# Patient Record
Sex: MALE | Race: BLACK OR AFRICAN AMERICAN | NOT HISPANIC OR LATINO | Employment: OTHER | URBAN - METROPOLITAN AREA
[De-identification: names, ages, dates, MRNs, and addresses within clinical notes are randomized per-mention and may not be internally consistent; named-entity substitution may affect disease eponyms.]

---

## 2017-01-27 ENCOUNTER — HOSPITAL ENCOUNTER (OUTPATIENT)
Dept: PHYSICAL THERAPY | Facility: HOSPITAL | Age: 57
Setting detail: RECURRING SERIES
Discharge: HOME | End: 2017-04-04
Attending: NEUROLOGICAL SURGERY | Admitting: NEUROLOGICAL SURGERY

## 2017-05-05 ENCOUNTER — HOSPITAL ENCOUNTER (OUTPATIENT)
Dept: URGENT CARE | Facility: CLINIC | Age: 57
Setting detail: SPECIMEN
Discharge: HOME | End: 2017-05-05
Attending: FAMILY MEDICINE | Admitting: FAMILY MEDICINE

## 2017-05-05 ENCOUNTER — HOSPITAL ENCOUNTER (OUTPATIENT)
Dept: URGENT CARE | Facility: CLINIC | Age: 57
Discharge: HOME | End: 2017-05-05
Attending: FAMILY MEDICINE | Admitting: FAMILY MEDICINE

## 2017-05-05 LAB
ERYTHROCYTE [DISTWIDTH] IN BLOOD BY AUTOMATED COUNT: 14.1 % (ref 11.5–14.5)
HCT VFR BLD AUTO: 43.3 % (ref 40–54)
HGB BLD-MCNC: 14.4 G/DL (ref 14–18)
MCH RBC QN AUTO: 32.4 PG (ref 26–32)
MCHC RBC AUTO-ENTMCNC: 33.4 G/DL (ref 32–36)
MCV RBC AUTO: 97.1 FL (ref 80–94)
PLATELET # BLD AUTO: 141 10*3/UL (ref 150–450)
PMV BLD AUTO: 10.7 FL (ref 7.4–10.4)
RBC # BLD AUTO: 4.46 10*6/UL (ref 4.6–6)
WBC # BLD AUTO: 10.7 10*3/UL (ref 4.5–11.5)

## 2017-05-08 ENCOUNTER — HOSPITAL ENCOUNTER (OUTPATIENT)
Dept: FAMILY MEDICINE CLINIC | Facility: CLINIC | Age: 57
Setting detail: SPECIMEN
Discharge: HOME | End: 2017-05-08
Attending: PHYSICIAN ASSISTANT | Admitting: PHYSICIAN ASSISTANT

## 2017-05-08 LAB
ALBUMIN SERPL-MCNC: 4.2 G/DL (ref 3.5–4.8)
ALBUMIN/GLOB SERPL: 1.1 {RATIO} (ref 1–1.7)
ALP SERPL-CCNC: 82 IU/L (ref 32–91)
ALT SERPL-CCNC: 71 IU/L (ref 17–63)
ANION GAP SERPL CALC-SCNC: 15.7 MMOL/L (ref 10–20)
AST SERPL-CCNC: 48 IU/L (ref 15–41)
BASOPHILS # BLD AUTO: 0 10*3/UL (ref 0–0.2)
BASOPHILS NFR BLD AUTO: 1 % (ref 0–2)
BILIRUB SERPL-MCNC: 0.6 MG/DL (ref 0.3–1.2)
BUN SERPL-MCNC: 11 MG/DL (ref 8–20)
BUN/CREAT SERPL: 10 (ref 6.2–20.3)
CALCIUM SERPL-MCNC: 9.3 MG/DL (ref 8.9–10.3)
CHLORIDE SERPL-SCNC: 106 MMOL/L (ref 101–111)
CHOLEST SERPL-MCNC: 202 MG/DL
CHOLEST/HDLC SERPL: 4.6 {RATIO}
CONV CO2: 26 MMOL/L (ref 22–32)
CONV LDL CHOLESTEROL DIRECT: 134 MG/DL (ref 0–100)
CONV TOTAL PROTEIN: 8 G/DL (ref 6.1–7.9)
CREAT UR-MCNC: 1.1 MG/DL (ref 0.7–1.2)
DIFFERENTIAL METHOD BLD: (no result)
EOSINOPHIL # BLD AUTO: 0.1 10*3/UL (ref 0–0.3)
EOSINOPHIL # BLD AUTO: 1 % (ref 0–3)
ERYTHROCYTE [DISTWIDTH] IN BLOOD BY AUTOMATED COUNT: 14.1 % (ref 11.5–14.5)
FOLATE SERPL-MCNC: 23.3 NG/ML (ref 5.9–24.8)
GLOBULIN UR ELPH-MCNC: 3.8 G/DL (ref 2.5–3.8)
GLUCOSE SERPL-MCNC: 98 MG/DL (ref 65–99)
HCT VFR BLD AUTO: 44.9 % (ref 40–54)
HDLC SERPL-MCNC: 44 MG/DL
HGB BLD-MCNC: 14.7 G/DL (ref 14–18)
LDLC/HDLC SERPL: 3.1 {RATIO}
LIPID INTERPRETATION: ABNORMAL
LYMPHOCYTES # BLD AUTO: 1.7 10*3/UL (ref 0.8–4.8)
LYMPHOCYTES NFR BLD AUTO: 29 % (ref 18–42)
MAGNESIUM SERPL-MCNC: 2 MG/DL (ref 1.8–2.5)
MCH RBC QN AUTO: 32.3 PG (ref 26–32)
MCHC RBC AUTO-ENTMCNC: 32.8 G/DL (ref 32–36)
MCV RBC AUTO: 98.5 FL (ref 80–94)
MONOCYTES # BLD AUTO: 0.4 10*3/UL (ref 0.1–1.3)
MONOCYTES NFR BLD AUTO: 6 % (ref 2–11)
NEUTROPHILS # BLD AUTO: 3.8 10*3/UL (ref 2.3–8.6)
NEUTROPHILS NFR BLD AUTO: 63 % (ref 50–75)
NRBC BLD AUTO-RTO: 0 /100{WBCS}
NRBC/RBC NFR BLD MANUAL: 0 10*3/UL
PLATELET # BLD AUTO: 152 10*3/UL (ref 150–450)
PMV BLD AUTO: 10 FL (ref 7.4–10.4)
POTASSIUM SERPL-SCNC: 3.7 MMOL/L (ref 3.6–5.1)
PSA SERPL-MCNC: 1.4 NG/ML (ref 0–4)
RBC # BLD AUTO: 4.56 10*6/UL (ref 4.6–6)
SODIUM SERPL-SCNC: 144 MMOL/L (ref 136–144)
TRIGL SERPL-MCNC: 78 MG/DL
TSH SERPL-ACNC: 1.28 UIU/ML (ref 0.34–5.6)
VIT B12 SERPL-MCNC: 241 PG/ML (ref 180–914)
VLDLC SERPL CALC-MCNC: 24.1 MG/DL
WBC # BLD AUTO: 6 10*3/UL (ref 4.5–11.5)

## 2017-07-28 ENCOUNTER — HOSPITAL ENCOUNTER (OUTPATIENT)
Dept: FAMILY MEDICINE CLINIC | Facility: CLINIC | Age: 57
Setting detail: SPECIMEN
Discharge: HOME | End: 2017-07-28
Attending: PHYSICIAN ASSISTANT | Admitting: PHYSICIAN ASSISTANT

## 2017-07-28 LAB
ALBUMIN SERPL-MCNC: 4.2 G/DL (ref 3.5–4.8)
ALBUMIN/GLOB SERPL: 1.2 {RATIO} (ref 1–1.7)
ALP SERPL-CCNC: 64 IU/L (ref 32–91)
ALT SERPL-CCNC: 39 IU/L (ref 17–63)
ANION GAP SERPL CALC-SCNC: 14.2 MMOL/L (ref 10–20)
AST SERPL-CCNC: 22 IU/L (ref 15–41)
BILIRUB SERPL-MCNC: 0.5 MG/DL (ref 0.3–1.2)
BUN SERPL-MCNC: 15 MG/DL (ref 8–20)
BUN/CREAT SERPL: 13.6 (ref 6.2–20.3)
CALCIUM SERPL-MCNC: 9.1 MG/DL (ref 8.9–10.3)
CHLORIDE SERPL-SCNC: 105 MMOL/L (ref 101–111)
CONV CO2: 25 MMOL/L (ref 22–32)
CONV TOTAL PROTEIN: 7.6 G/DL (ref 6.1–7.9)
CREAT UR-MCNC: 1.1 MG/DL (ref 0.7–1.2)
GLOBULIN UR ELPH-MCNC: 3.4 G/DL (ref 2.5–3.8)
GLUCOSE SERPL-MCNC: 116 MG/DL (ref 65–99)
MAGNESIUM SERPL-MCNC: 2.2 MG/DL (ref 1.8–2.5)
POTASSIUM SERPL-SCNC: 4.2 MMOL/L (ref 3.6–5.1)
SODIUM SERPL-SCNC: 140 MMOL/L (ref 136–144)
VIT B12 SERPL-MCNC: 169 PG/ML (ref 180–914)

## 2017-08-08 ENCOUNTER — HOSPITAL ENCOUNTER (OUTPATIENT)
Dept: GENERAL RADIOLOGY | Facility: HOSPITAL | Age: 57
Discharge: HOME | End: 2017-08-08
Attending: PHYSICIAN ASSISTANT | Admitting: PHYSICIAN ASSISTANT

## 2017-11-20 ENCOUNTER — HOSPITAL ENCOUNTER (OUTPATIENT)
Dept: FAMILY MEDICINE CLINIC | Facility: CLINIC | Age: 57
Setting detail: SPECIMEN
Discharge: HOME | End: 2017-11-20
Attending: FAMILY MEDICINE | Admitting: FAMILY MEDICINE

## 2017-11-20 LAB
BILIRUB UR QL STRIP: NEGATIVE MG/DL
CASTS URNS QL MICRO: ABNORMAL /[LPF]
COLOR UR: ABNORMAL
CONV BACTERIA IN URINE MICRO: NEGATIVE
CONV CLARITY OF URINE: CLEAR
CONV HYALINE CASTS IN URINE MICRO: 3 /[LPF] (ref 0–5)
CONV PROTEIN IN URINE BY AUTOMATED TEST STRIP: NEGATIVE MG/DL
CONV SMALL ROUND CELLS: ABNORMAL /[HPF]
CONV UROBILINOGEN IN URINE BY AUTOMATED TEST STRIP: 1 MG/DL
CULTURE INDICATED?: ABNORMAL
GLUCOSE UR QL: NEGATIVE MG/DL
HGB UR QL STRIP: NEGATIVE
KETONES UR QL STRIP: NEGATIVE MG/DL
LEUKOCYTE ESTERASE UR QL STRIP: ABNORMAL
NITRITE UR QL STRIP: NEGATIVE
PH UR STRIP.AUTO: 5.5 [PH] (ref 4.5–8)
RBC #/AREA URNS HPF: 1 /[HPF] (ref 0–3)
SP GR UR: 1.02 (ref 1–1.03)
SPERM URNS QL MICRO: ABNORMAL /[HPF]
SQUAMOUS SPT QL MICRO: 1 /[HPF] (ref 0–5)
UNIDENT CRYS URNS QL MICRO: ABNORMAL /[HPF]
WBC #/AREA URNS HPF: 13 /[HPF] (ref 0–5)
YEAST SPEC QL WET PREP: ABNORMAL /[HPF]

## 2018-01-17 ENCOUNTER — HOSPITAL ENCOUNTER (OUTPATIENT)
Dept: OTHER | Facility: HOSPITAL | Age: 58
Discharge: HOME | End: 2018-01-17
Attending: PHYSICIAN ASSISTANT | Admitting: PHYSICIAN ASSISTANT

## 2018-01-17 ENCOUNTER — HOSPITAL ENCOUNTER (OUTPATIENT)
Dept: FAMILY MEDICINE CLINIC | Facility: CLINIC | Age: 58
Setting detail: SPECIMEN
Discharge: HOME | End: 2018-01-17
Attending: PHYSICIAN ASSISTANT | Admitting: PHYSICIAN ASSISTANT

## 2018-01-17 LAB
ALBUMIN SERPL-MCNC: 4.1 G/DL (ref 3.5–4.8)
ALBUMIN/GLOB SERPL: 1.2 {RATIO} (ref 1–1.7)
ALP SERPL-CCNC: 60 IU/L (ref 32–91)
ALT SERPL-CCNC: 49 IU/L (ref 17–63)
ANION GAP SERPL CALC-SCNC: 11.7 MMOL/L (ref 10–20)
AST SERPL-CCNC: 35 IU/L (ref 15–41)
BASOPHILS # BLD AUTO: 0 10*3/UL (ref 0–0.2)
BASOPHILS NFR BLD AUTO: 1 % (ref 0–2)
BILIRUB SERPL-MCNC: 0.6 MG/DL (ref 0.3–1.2)
BUN SERPL-MCNC: 7 MG/DL (ref 8–20)
BUN/CREAT SERPL: 7.8 (ref 6.2–20.3)
CALCIUM SERPL-MCNC: 9.5 MG/DL (ref 8.9–10.3)
CHLORIDE SERPL-SCNC: 106 MMOL/L (ref 101–111)
CONV CO2: 27 MMOL/L (ref 22–32)
CONV TOTAL PROTEIN: 7.5 G/DL (ref 6.1–7.9)
CREAT UR-MCNC: 0.9 MG/DL (ref 0.7–1.2)
DIFFERENTIAL METHOD BLD: (no result)
EOSINOPHIL # BLD AUTO: 0.1 10*3/UL (ref 0–0.3)
EOSINOPHIL # BLD AUTO: 1 % (ref 0–3)
ERYTHROCYTE [DISTWIDTH] IN BLOOD BY AUTOMATED COUNT: 13.7 % (ref 11.5–14.5)
GLOBULIN UR ELPH-MCNC: 3.4 G/DL (ref 2.5–3.8)
GLUCOSE SERPL-MCNC: 85 MG/DL (ref 65–99)
HCT VFR BLD AUTO: 46.2 % (ref 40–54)
HGB BLD-MCNC: 15.6 G/DL (ref 14–18)
LYMPHOCYTES # BLD AUTO: 2.2 10*3/UL (ref 0.8–4.8)
LYMPHOCYTES NFR BLD AUTO: 36 % (ref 18–42)
MAGNESIUM SERPL-MCNC: 2.1 MG/DL (ref 1.8–2.5)
MCH RBC QN AUTO: 32.1 PG (ref 26–32)
MCHC RBC AUTO-ENTMCNC: 33.7 G/DL (ref 32–36)
MCV RBC AUTO: 95.2 FL (ref 80–94)
MONOCYTES # BLD AUTO: 0.4 10*3/UL (ref 0.1–1.3)
MONOCYTES NFR BLD AUTO: 6 % (ref 2–11)
NEUTROPHILS # BLD AUTO: 3.4 10*3/UL (ref 2.3–8.6)
NEUTROPHILS NFR BLD AUTO: 56 % (ref 50–75)
NRBC BLD AUTO-RTO: 0 /100{WBCS}
NRBC/RBC NFR BLD MANUAL: 0 10*3/UL
PLATELET # BLD AUTO: 151 10*3/UL (ref 150–450)
PMV BLD AUTO: 10.6 FL (ref 7.4–10.4)
POTASSIUM SERPL-SCNC: 3.7 MMOL/L (ref 3.6–5.1)
RBC # BLD AUTO: 4.85 10*6/UL (ref 4.6–6)
SODIUM SERPL-SCNC: 141 MMOL/L (ref 136–144)
WBC # BLD AUTO: 6.1 10*3/UL (ref 4.5–11.5)

## 2018-01-22 ENCOUNTER — HOSPITAL ENCOUNTER (OUTPATIENT)
Dept: GENERAL RADIOLOGY | Facility: HOSPITAL | Age: 58
Discharge: HOME | End: 2018-01-22
Attending: PHYSICIAN ASSISTANT | Admitting: PHYSICIAN ASSISTANT

## 2018-02-20 ENCOUNTER — HOSPITAL ENCOUNTER (OUTPATIENT)
Dept: FAMILY MEDICINE CLINIC | Facility: CLINIC | Age: 58
Setting detail: SPECIMEN
Discharge: HOME | End: 2018-02-20
Attending: FAMILY MEDICINE | Admitting: FAMILY MEDICINE

## 2018-02-20 LAB
ALBUMIN SERPL-MCNC: 3.8 G/DL (ref 3.5–4.8)
ALBUMIN/GLOB SERPL: 1.2 {RATIO} (ref 1–1.7)
ALP SERPL-CCNC: 59 IU/L (ref 32–91)
ALT SERPL-CCNC: 26 IU/L (ref 17–63)
ANION GAP SERPL CALC-SCNC: 9.8 MMOL/L (ref 10–20)
AST SERPL-CCNC: 23 IU/L (ref 15–41)
BASOPHILS # BLD AUTO: 0 10*3/UL (ref 0–0.2)
BASOPHILS NFR BLD AUTO: 1 % (ref 0–2)
BILIRUB SERPL-MCNC: 0.4 MG/DL (ref 0.3–1.2)
BUN SERPL-MCNC: 9 MG/DL (ref 8–20)
BUN/CREAT SERPL: 8.2 (ref 6.2–20.3)
CALCIUM SERPL-MCNC: 8.9 MG/DL (ref 8.9–10.3)
CHLORIDE SERPL-SCNC: 108 MMOL/L (ref 101–111)
CHOLEST SERPL-MCNC: 214 MG/DL
CHOLEST/HDLC SERPL: 4.5 {RATIO}
CONV CO2: 27 MMOL/L (ref 22–32)
CONV LDL CHOLESTEROL DIRECT: 144 MG/DL (ref 0–100)
CONV TOTAL PROTEIN: 6.9 G/DL (ref 6.1–7.9)
CREAT UR-MCNC: 1.1 MG/DL (ref 0.7–1.2)
DIFFERENTIAL METHOD BLD: (no result)
EOSINOPHIL # BLD AUTO: 0.1 10*3/UL (ref 0–0.3)
EOSINOPHIL # BLD AUTO: 2 % (ref 0–3)
ERYTHROCYTE [DISTWIDTH] IN BLOOD BY AUTOMATED COUNT: 14.8 % (ref 11.5–14.5)
GLOBULIN UR ELPH-MCNC: 3.1 G/DL (ref 2.5–3.8)
GLUCOSE SERPL-MCNC: 107 MG/DL (ref 65–99)
HCT VFR BLD AUTO: 44.5 % (ref 40–54)
HDLC SERPL-MCNC: 48 MG/DL
HGB BLD-MCNC: 15.1 G/DL (ref 14–18)
LDLC/HDLC SERPL: 3 {RATIO}
LIPID INTERPRETATION: ABNORMAL
LYMPHOCYTES # BLD AUTO: 1.8 10*3/UL (ref 0.8–4.8)
LYMPHOCYTES NFR BLD AUTO: 28 % (ref 18–42)
MCH RBC QN AUTO: 33.1 PG (ref 26–32)
MCHC RBC AUTO-ENTMCNC: 34 G/DL (ref 32–36)
MCV RBC AUTO: 97.1 FL (ref 80–94)
MONOCYTES # BLD AUTO: 0.3 10*3/UL (ref 0.1–1.3)
MONOCYTES NFR BLD AUTO: 5 % (ref 2–11)
NEUTROPHILS # BLD AUTO: 4 10*3/UL (ref 2.3–8.6)
NEUTROPHILS NFR BLD AUTO: 64 % (ref 50–75)
NRBC BLD AUTO-RTO: 0 /100{WBCS}
NRBC/RBC NFR BLD MANUAL: 0 10*3/UL
PLATELET # BLD AUTO: 151 10*3/UL (ref 150–450)
PMV BLD AUTO: 10.5 FL (ref 7.4–10.4)
POTASSIUM SERPL-SCNC: 3.8 MMOL/L (ref 3.6–5.1)
RBC # BLD AUTO: 4.58 10*6/UL (ref 4.6–6)
SODIUM SERPL-SCNC: 141 MMOL/L (ref 136–144)
TRIGL SERPL-MCNC: 159 MG/DL
VLDLC SERPL CALC-MCNC: 22.1 MG/DL
WBC # BLD AUTO: 6.3 10*3/UL (ref 4.5–11.5)

## 2018-05-29 ENCOUNTER — HOSPITAL ENCOUNTER (OUTPATIENT)
Dept: GENERAL RADIOLOGY | Facility: HOSPITAL | Age: 58
Discharge: HOME | End: 2018-05-29
Attending: NURSE PRACTITIONER | Admitting: NURSE PRACTITIONER

## 2018-06-20 ENCOUNTER — HOSPITAL ENCOUNTER (OUTPATIENT)
Dept: PHYSICAL THERAPY | Facility: HOSPITAL | Age: 58
Setting detail: RECURRING SERIES
Discharge: HOME | End: 2018-08-09
Attending: NURSE PRACTITIONER | Admitting: NURSE PRACTITIONER

## 2018-11-13 ENCOUNTER — HOSPITAL ENCOUNTER (OUTPATIENT)
Dept: FAMILY MEDICINE CLINIC | Facility: CLINIC | Age: 58
Setting detail: SPECIMEN
Discharge: HOME | End: 2018-11-13
Attending: NURSE PRACTITIONER | Admitting: NURSE PRACTITIONER

## 2018-11-13 LAB
BACTERIA SPEC AEROBE CULT: NORMAL
BACTERIA SPEC AEROBE CULT: NORMAL
C TRACH RRNA SPEC QL PROBE: NORMAL
COLONY COUNT: NORMAL
Lab: NORMAL
MICRO REPORT STATUS: NORMAL
N GONORRHOEA RRNA SPEC QL PROBE: NORMAL
SPECIMEN SOURCE: NORMAL
SPECIMEN SOURCE: NORMAL

## 2018-11-15 ENCOUNTER — HOSPITAL ENCOUNTER (OUTPATIENT)
Dept: ULTRASOUND IMAGING | Facility: HOSPITAL | Age: 58
Discharge: HOME | End: 2018-11-15
Attending: NURSE PRACTITIONER | Admitting: NURSE PRACTITIONER

## 2018-12-31 ENCOUNTER — HOSPITAL ENCOUNTER (OUTPATIENT)
Dept: URGENT CARE | Facility: CLINIC | Age: 58
Setting detail: SPECIMEN
Discharge: HOME | End: 2018-12-31
Attending: FAMILY MEDICINE | Admitting: FAMILY MEDICINE

## 2019-01-04 ENCOUNTER — HOSPITAL ENCOUNTER (OUTPATIENT)
Dept: PHYSICAL THERAPY | Facility: HOSPITAL | Age: 59
Setting detail: RECURRING SERIES
Discharge: HOME | End: 2019-02-12
Attending: NURSE PRACTITIONER | Admitting: NURSE PRACTITIONER

## 2019-02-04 ENCOUNTER — HOSPITAL ENCOUNTER (OUTPATIENT)
Dept: CT IMAGING | Facility: HOSPITAL | Age: 59
Discharge: HOME | End: 2019-02-04
Attending: NURSE PRACTITIONER | Admitting: NURSE PRACTITIONER

## 2019-02-04 LAB — CREAT BLDA-MCNC: 1 MG/DL (ref 0.6–1.3)

## 2019-02-08 ENCOUNTER — HOSPITAL ENCOUNTER (OUTPATIENT)
Dept: FAMILY MEDICINE CLINIC | Facility: CLINIC | Age: 59
Setting detail: SPECIMEN
Discharge: HOME | End: 2019-02-08
Attending: FAMILY MEDICINE | Admitting: FAMILY MEDICINE

## 2019-02-08 LAB
ALBUMIN SERPL-MCNC: 4.1 G/DL (ref 3.5–4.8)
ALBUMIN/GLOB SERPL: 1.2 {RATIO} (ref 1–1.7)
ALP SERPL-CCNC: 71 IU/L (ref 32–91)
ALT SERPL-CCNC: 60 IU/L (ref 17–63)
ANION GAP SERPL CALC-SCNC: 11.6 MMOL/L (ref 10–20)
AST SERPL-CCNC: 27 IU/L (ref 15–41)
BASOPHILS # BLD AUTO: 0 10*3/UL (ref 0–0.2)
BASOPHILS NFR BLD AUTO: 1 % (ref 0–2)
BILIRUB SERPL-MCNC: 0.6 MG/DL (ref 0.3–1.2)
BUN SERPL-MCNC: 12 MG/DL (ref 8–20)
BUN/CREAT SERPL: 12 (ref 6.2–20.3)
CALCIUM SERPL-MCNC: 9.1 MG/DL (ref 8.9–10.3)
CHLORIDE SERPL-SCNC: 107 MMOL/L (ref 101–111)
CHOLEST SERPL-MCNC: 220 MG/DL
CHOLEST/HDLC SERPL: 5.2 {RATIO}
CONV CO2: 25 MMOL/L (ref 22–32)
CONV LDL CHOLESTEROL DIRECT: 157 MG/DL (ref 0–100)
CONV TOTAL PROTEIN: 7.6 G/DL (ref 6.1–7.9)
CREAT UR-MCNC: 1 MG/DL (ref 0.7–1.2)
DIFFERENTIAL METHOD BLD: (no result)
EOSINOPHIL # BLD AUTO: 0.1 10*3/UL (ref 0–0.3)
EOSINOPHIL # BLD AUTO: 2 % (ref 0–3)
ERYTHROCYTE [DISTWIDTH] IN BLOOD BY AUTOMATED COUNT: 14.5 % (ref 11.5–14.5)
GLOBULIN UR ELPH-MCNC: 3.5 G/DL (ref 2.5–3.8)
GLUCOSE SERPL-MCNC: 84 MG/DL (ref 65–99)
HCT VFR BLD AUTO: 45.2 % (ref 40–54)
HDLC SERPL-MCNC: 43 MG/DL
HGB BLD-MCNC: 15.1 G/DL (ref 14–18)
LDLC/HDLC SERPL: 3.7 {RATIO}
LIPID INTERPRETATION: ABNORMAL
LYMPHOCYTES # BLD AUTO: 2 10*3/UL (ref 0.8–4.8)
LYMPHOCYTES NFR BLD AUTO: 34 % (ref 18–42)
MCH RBC QN AUTO: 33.4 PG (ref 26–32)
MCHC RBC AUTO-ENTMCNC: 33.5 G/DL (ref 32–36)
MCV RBC AUTO: 99.7 FL (ref 80–94)
MONOCYTES # BLD AUTO: 0.4 10*3/UL (ref 0.1–1.3)
MONOCYTES NFR BLD AUTO: 7 % (ref 2–11)
NEUTROPHILS # BLD AUTO: 3.3 10*3/UL (ref 2.3–8.6)
NEUTROPHILS NFR BLD AUTO: 56 % (ref 50–75)
NRBC BLD AUTO-RTO: 0 /100{WBCS}
NRBC/RBC NFR BLD MANUAL: 0 10*3/UL
PLATELET # BLD AUTO: 165 10*3/UL (ref 150–450)
PMV BLD AUTO: 10.2 FL (ref 7.4–10.4)
POTASSIUM SERPL-SCNC: 3.6 MMOL/L (ref 3.6–5.1)
RBC # BLD AUTO: 4.53 10*6/UL (ref 4.6–6)
SODIUM SERPL-SCNC: 140 MMOL/L (ref 136–144)
TRIGL SERPL-MCNC: 163 MG/DL
VLDLC SERPL CALC-MCNC: 20 MG/DL
WBC # BLD AUTO: 5.9 10*3/UL (ref 4.5–11.5)

## 2019-07-02 ENCOUNTER — OFFICE VISIT (OUTPATIENT)
Dept: FAMILY MEDICINE CLINIC | Facility: CLINIC | Age: 59
End: 2019-07-02

## 2019-07-02 VITALS
RESPIRATION RATE: 18 BRPM | TEMPERATURE: 98.1 F | WEIGHT: 221.4 LBS | HEIGHT: 71 IN | OXYGEN SATURATION: 97 % | BODY MASS INDEX: 31 KG/M2 | HEART RATE: 81 BPM | DIASTOLIC BLOOD PRESSURE: 89 MMHG | SYSTOLIC BLOOD PRESSURE: 132 MMHG

## 2019-07-02 DIAGNOSIS — M25.511 ACUTE PAIN OF RIGHT SHOULDER: Primary | ICD-10-CM

## 2019-07-02 DIAGNOSIS — M25.511 RIGHT SHOULDER PAIN, UNSPECIFIED CHRONICITY: ICD-10-CM

## 2019-07-02 PROBLEM — I10 BENIGN ESSENTIAL HYPERTENSION: Status: ACTIVE | Noted: 2017-05-08

## 2019-07-02 PROBLEM — G89.29 CHRONIC LOW BACK PAIN: Status: ACTIVE | Noted: 2017-08-17

## 2019-07-02 PROBLEM — G62.9 PERIPHERAL NEUROPATHY: Status: ACTIVE | Noted: 2017-05-08

## 2019-07-02 PROBLEM — E53.8 VITAMIN B12 DEFICIENCY: Status: ACTIVE | Noted: 2017-05-09

## 2019-07-02 PROBLEM — F41.9 ANXIETY: Status: ACTIVE | Noted: 2019-01-24

## 2019-07-02 PROBLEM — J44.9 CHRONIC OBSTRUCTIVE PULMONARY DISEASE: Status: ACTIVE | Noted: 2019-02-08

## 2019-07-02 PROBLEM — E78.5 HYPERLIPIDEMIA: Status: ACTIVE | Noted: 2017-05-09

## 2019-07-02 PROBLEM — M54.50 CHRONIC LOW BACK PAIN: Status: ACTIVE | Noted: 2017-08-17

## 2019-07-02 PROBLEM — M50.322 OTHER CERVICAL DISC DEGENERATION AT C5-C6 LEVEL: Status: ACTIVE | Noted: 2018-08-22

## 2019-07-02 PROBLEM — M51.24 THORACIC DISC HERNIATION: Status: ACTIVE | Noted: 2018-08-22

## 2019-07-02 PROCEDURE — 99213 OFFICE O/P EST LOW 20 MIN: CPT | Performed by: NURSE PRACTITIONER

## 2019-07-02 NOTE — PROGRESS NOTES
Subjective   Jagdish eRa is a 58 y.o. male.     Chief Complaint   Patient presents with   • Shoulder Pain     Right. Unsure if he injured. Limited ROM.        HPI  3 weeks ago started having pain right shoulder. He does not know of any injury never injured in past he is right handed.   Limited rom. He has taken ibuprofen.not helping. No physical therapy on this shoulder but had on right side and neck.     The following portions of the patient's history were reviewed and updated as appropriate: allergies, current medications, past family history, past medical history, past social history, past surgical history and problem list.    No current outpatient medications on file.    Recent Results (from the past 4032 hour(s))   POC Creatinine    Collection Time: 02/04/19  7:08 PM   Result Value Ref Range    Creatinine, Arterial 1.0 0.6 - 1.3 mg/dL    GFR MDRD Non African American >60 >60 mL/min/1.73m2    eGFR African Am >60 >60 mL/min/1.73m2   CBC & Differential    Collection Time: 02/08/19 11:10 AM   Result Value Ref Range    WBC 5.9 4.5 - 11.5 10*3/uL    RBC 4.53 (L) 4.60 - 6.00 10*6/uL    Hemoglobin 15.1 14.0 - 18.0 g/dL    Hematocrit 45.2 40 - 54 %    MCV 99.7 (H) 80 - 94 fL    MCH 33.4 (H) 26 - 32 pg    MCHC 33.5 32 - 36 g/dL    RDW 14.5 11.5 - 14.5 %    Platelets 165 150 - 450 10*3/uL    MPV 10.2 7.4 - 10.4 fL    Differential Type AUTO     Neutrophils Absolute 3.3 2.3 - 8.6 10*3/uL    Lymphocytes Absolute 2.0 0.8 - 4.8 10*3/uL    Monocytes Absolute 0.4 0.1 - 1.3 10*3/uL    Eosinophils Absolute 0.1 0.0 - 0.3 10*3/uL    Basophils Absolute 0.0 0 - 0.2 10*3/uL    Neutrophil Rel % 56 50 - 75 %    Lymphocyte Rel % 34 18 - 42 %    Monocyte Rel % 7 2 - 11 %    Eosinophil Rel % 2 0 - 3 %    Basophil Rel % 1 0 - 2 %    nRBC 0 0 /100[WBCs]    Absolute nRBC 0 10*3/uL   Lipid Panel    Collection Time: 02/08/19 11:10 AM   Result Value Ref Range    Total Cholesterol 220 (H) <200 mg/dL    Triglycerides 163 (H) <150 mg/dL    HDL  Cholesterol 43 >39 mg/dL    LDL Cholesterol  157 (H) 0 - 100 mg/dL    VLDL Cholesterol 20.0 <21 mg/dL    LDL/HDL Ratio 3.7     Chol/HDL Ratio 5.2     Lipid Interpretation       Total Cholesterol, Total Triglycerides, LDL Cholesterol,and HDL Cholesterol:     Lipid Interpretation       TOTAL CHOLESTEROL                    HDL CHOLESTEROL  Desirable:               Lipid Interpretation       <200 mg/dL     Low HDL:            <40 mg/dL  Borderline High:   200-239 mg/dL     Lipid Interpretation          Normal:           40-60 mg/dL  High:           > or = 240 mg/dL        Lipid Interpretation       Desirable:          >60 mg/dL  TOTAL TRIGLYCERIDE                   LDL    Lipid Interpretation       CHOLESTEROL  Normal:               <150 mg/dL     Optimal:           <100 mg/dL    Lipid Interpretation        Borderline High:   150-199 mg/dL     Low Risk:       100-129 mg/dL  High:         Lipid Interpretation               200-499 mg/dL     Borderline High:130-159 mg/dL  Very High:      > or =    Lipid Interpretation       500 mg/dL     High:           160-189 mg/dL                                        Lipid Interpretation         Very High:   > or = 190 mg/dL  The following ratios of LDL to HDL and Total    Lipid Interpretation       Cholesterol to HDL are for information only:  LDL/HDL RATIO                        Lipid Interpretation          TOTAL CHOLESTEROL/HDL RATIO  Desirable:              <5           Low Risk:     Lipid Interpretation            3.3-4.4   Optimal:        < or = 3.5           Average Risk:   4.4-7.1        Lipid Interpretation                                          Medium Risk:    7.1-11                          Lipid Interpretation                   High Risk:         >11       Comprehensive Metabolic Panel    Collection Time: 02/08/19 11:10 AM   Result Value Ref Range    Sodium 140 136 - 144 mmol/L    Potassium 3.6 3.6 - 5.1 mmol/L    Chloride 107 101 - 111 mmol/L    CO2 25 22 - 32 mmol/L  "   Glucose 84 65 - 99 mg/dL    BUN 12 8 - 20 mg/dL    Creatinine 1.0 0.7 - 1.2 mg/dl    Calcium 9.1 8.9 - 10.3 mg/dL    Total Protein 7.6 6.1 - 7.9 g/dL    Albumin 4.1 3.5 - 4.8 g/dL    Total Bilirubin 0.6 0.3 - 1.2 mg/dL    Alkaline Phosphatase 71 32 - 91 IU/L    AST (SGOT) 27 15 - 41 IU/L    ALT (SGPT) 60 17 - 63 IU/L    Anion Gap 11.6 10 - 20    BUN/Creatinine Ratio 12.0 6.2 - 20.3    GFR MDRD Non African American >60 >60 mL/min/1.73m2    GFR MDRD African American >60 >60 mL/min/1.73m2    Globulin 3.5 2.5 - 3.8 G/dL    A/G Ratio 1.2 1.0 - 1.7         Review of Systems   Constitutional: Negative for chills and fever.   HENT: Negative for congestion and sinus pressure.    Eyes: Negative for blurred vision and pain.   Respiratory: Negative for cough and shortness of breath.    Cardiovascular: Negative for chest pain and leg swelling.   Gastrointestinal: Negative for abdominal pain, nausea and indigestion.   Endocrine: Negative for cold intolerance, heat intolerance, polydipsia, polyphagia and polyuria.   Musculoskeletal:        Shoulder pain limited ROM   Skin: Negative for dry skin, rash and bruise.   Psychiatric/Behavioral: Negative for dysphoric mood and stress.       Objective     /89 (BP Location: Left arm, Patient Position: Sitting, Cuff Size: Large Adult)   Pulse 81   Temp 98.1 °F (36.7 °C) (Oral)   Resp 18   Ht 180.3 cm (71\")   Wt 100 kg (221 lb 6.4 oz)   SpO2 97%   BMI 30.88 kg/m²     Physical Exam   Constitutional: He is oriented to person, place, and time. He appears well-developed and well-nourished.   HENT:   Head: Normocephalic and atraumatic.   Eyes: Conjunctivae and EOM are normal. Pupils are equal, round, and reactive to light.   Neck: Normal range of motion. Neck supple.   Cardiovascular: Normal rate, regular rhythm, normal heart sounds and intact distal pulses.   Pulmonary/Chest: Effort normal and breath sounds normal.   Abdominal: Soft. Bowel sounds are normal.   Musculoskeletal:     "    Right shoulder: He exhibits decreased range of motion and tenderness. He exhibits no bony tenderness, no crepitus and no deformity.        Arms:  Tender with palpation to right humerus at insertion site of bicep.      Neurological: He is alert and oriented to person, place, and time.   Skin: Skin is warm and dry.   Psychiatric: He has a normal mood and affect. His behavior is normal.   Nursing note and vitals reviewed.        Assessment/Plan   Jagdish was seen today for shoulder pain.    Diagnoses and all orders for this visit:    Acute pain of right shoulder  -     Ambulatory Referral to Physical Therapy    Right shoulder pain, unspecified chronicity      Patient Instructions   Joint Pain  Joint pain can be caused by many things. The joint can be bruised, infected, weak from aging, or sore from exercise. The pain will probably go away if you follow your doctor's instructions for home care. If your joint pain continues, more tests may be needed to help find the cause of your condition.  Follow these instructions at home:  Watch your condition for any changes. Follow these instructions as told to lessen the pain that you are feeling:  · Take medicines only as told by your doctor.  · Rest the sore joint for as long as told by your doctor. If your doctor tells you to, raise (elevate) the painful joint above the level of your heart while you are sitting or lying down.  · Do not do things that cause pain or make the pain worse.  · If told, put ice on the painful area:  ? Put ice in a plastic bag.  ? Place a towel between your skin and the bag.  ? Leave the ice on for 20 minutes, 2-3 times per day.  · Wear an elastic bandage, splint, or sling as told by your doctor. Loosen the bandage or splint if your fingers or toes lose feeling (become numb) and tingle, or if they turn cold and blue.  · Begin exercising or stretching the joint as told by your doctor. Ask your doctor what types of exercise are safe for you.  · Keep all  follow-up visits as told by your doctor. This is important.    Contact a doctor if:  · Your pain gets worse and medicine does not help it.  · Your joint pain does not get better in 3 days.  · You have more bruising or swelling.  · You have a fever.  · You lose 10 pounds (4.5 kg) or more without trying.  Get help right away if:  · You are not able to move the joint.  · Your fingers or toes become numb or they turn cold and blue.  This information is not intended to replace advice given to you by your health care provider. Make sure you discuss any questions you have with your health care provider.  Document Released: 12/06/2010 Document Revised: 05/25/2017 Document Reviewed: 09/29/2015  [employer] Interactive Patient Education © 2018 [employer]  Use heat prior to exercises instructed. Ice after 20 min.   F/u with physical therapy      Yamilka Pascual, KEILA    07/02/19

## 2019-07-02 NOTE — PATIENT INSTRUCTIONS
Joint Pain  Joint pain can be caused by many things. The joint can be bruised, infected, weak from aging, or sore from exercise. The pain will probably go away if you follow your doctor's instructions for home care. If your joint pain continues, more tests may be needed to help find the cause of your condition.  Follow these instructions at home:  Watch your condition for any changes. Follow these instructions as told to lessen the pain that you are feeling:  · Take medicines only as told by your doctor.  · Rest the sore joint for as long as told by your doctor. If your doctor tells you to, raise (elevate) the painful joint above the level of your heart while you are sitting or lying down.  · Do not do things that cause pain or make the pain worse.  · If told, put ice on the painful area:  ? Put ice in a plastic bag.  ? Place a towel between your skin and the bag.  ? Leave the ice on for 20 minutes, 2-3 times per day.  · Wear an elastic bandage, splint, or sling as told by your doctor. Loosen the bandage or splint if your fingers or toes lose feeling (become numb) and tingle, or if they turn cold and blue.  · Begin exercising or stretching the joint as told by your doctor. Ask your doctor what types of exercise are safe for you.  · Keep all follow-up visits as told by your doctor. This is important.    Contact a doctor if:  · Your pain gets worse and medicine does not help it.  · Your joint pain does not get better in 3 days.  · You have more bruising or swelling.  · You have a fever.  · You lose 10 pounds (4.5 kg) or more without trying.  Get help right away if:  · You are not able to move the joint.  · Your fingers or toes become numb or they turn cold and blue.  This information is not intended to replace advice given to you by your health care provider. Make sure you discuss any questions you have with your health care provider.  Document Released: 12/06/2010 Document Revised: 05/25/2017 Document Reviewed:  09/29/2015  [employer] Interactive Patient Education © 2018 [employer]  Use heat prior to exercises instructed. Ice after 20 min.   F/u with physical therapy

## 2019-07-16 ENCOUNTER — OFFICE VISIT (OUTPATIENT)
Dept: PHYSICAL THERAPY | Facility: CLINIC | Age: 59
End: 2019-07-16

## 2019-07-16 DIAGNOSIS — M25.511 ACUTE PAIN OF RIGHT SHOULDER: Primary | ICD-10-CM

## 2019-07-16 PROCEDURE — 97162 PT EVAL MOD COMPLEX 30 MIN: CPT | Performed by: PHYSICAL THERAPIST

## 2019-07-16 PROCEDURE — 97110 THERAPEUTIC EXERCISES: CPT | Performed by: PHYSICAL THERAPIST

## 2019-07-16 NOTE — PROGRESS NOTES
Physical Therapy Initial Evaluation and Plan of Care    Patient: Jagdish Rea   : 1960  Diagnosis/ICD-10 Code:  Acute pain of right shoulder [M25.511]  Referring practitioner: KEILA Kaur  Date of Initial Visit: 2019  Today's Date: 2019  Patient seen for 1 sessions           Subjective Questionnaire: QuickDASH: 53%      Subjective Evaluation    History of Present Illness  Mechanism of injury: Patient reports insidious onset of R lateral shoulder pain 1 month+ ago. Better than initially, but has been avoiding using R UE and compensating with L. Pain localized to lateral acromion. Worse with reaching to side, reaching behind, overhead, putting on seatbelt, reaching shelf, holding anything heavier than coffee cup. Affects driving and dressing, unable to sleep on R side. Ok with use of elbow/wrist. Previous issue with L rotator cuff, mostly better. He is on disability. Wants to return to normal ADLs. He reports occasional N/T R ulnar distribution with sleeping position and carrying groceries. He has used over the door traction previously. Reports knows he has bad posture and contributes.    No scheduled follow up with MD    Subjective comment: R shoulder pain  Patient Occupation: disability Pain  Current pain ratin  At best pain ratin  At worst pain ratin  Location: R supraspinatus insertion  Quality: sharp, throbbing and dull ache  Relieving factors: medications, relaxation and rest  Aggravating factors: overhead activity, lifting, movement, prolonged positioning and outstretched reach    Hand dominance: right    Diagnostic Tests  X-ray: normal (18)  Abnormal MRI: none shoulder, cervical: mild DDD C5-6.    Patient Goals  Patient goals for therapy: decreased pain, increased motion, increased strength, independence with ADLs/IADLs and return to sport/leisure activities         Objective       Postural Observations  Seated posture: poor  Standing posture: poor    Additional  Postural Observation Details  Mild winging  R scap, exaggerated thoracic kyphosis, forward shoulder, forward head, mild thoracic rotation.    Palpation     Right   No palpable tenderness to the biceps. Tenderness of the supraspinatus.     Tenderness     Right Shoulder  Tenderness in the acromion and supraspinatus tendon.     Cervical/Thoracic Screen   Cervical range of motion within normal limits with the following exceptions: Mild restriction rot and sidebend no reproduction of shoulder pain    Neurological Testing     Sensation     Shoulder     Right Shoulder   Intact: light touch    Additional Neurological Details  Intermittent positional ulnar distribution N/T R, not reproduced during eval.    Active Range of Motion   Left Shoulder   Flexion: 147 degrees with pain  Abduction: 124 degrees with pain  External rotation BTH: T3   Internal rotation BTB: L5     Right Shoulder   Flexion: 146 degrees with pain  Abduction: 80 degrees with pain  External rotation BTH: C7 with pain  Internal rotation BTB: sacrum with pain    Additional Active Range of Motion Details    Catch at lateral R shoulder all directions.    Passive Range of Motion     Right Shoulder   External rotation 45°: 85 degrees   Internal rotation 45°: 60 degrees     Scapular Mobility     Right Shoulder   Scapular mobility: fair    Strength/Myotome Testing     Left Shoulder     Planes of Motion   Flexion: 4   Extension: 4   Abduction: 4   External rotation at 0°: 4+   Internal rotation at 0°: 4+     Right Shoulder     Planes of Motion   Flexion: 4- (pain)   Extension: 4- (painfree)   Abduction: 4- (pain)   External rotation at 0°: 4+ (painfree)   Internal rotation at 0°: 4+ (painfree)     Additional Strength Details  Strong painfree elbow flex/ext bilat, symmetrical.    not tested      Tests     Left Shoulder   Negative lift-off.     Right Shoulder   Positive Hawkin's, Neer's and painful arc.   Negative external rotation lag sign and full can.      Additional Tests Details  L lift off difficut/tight but able    R lift off unable to assume position. Empty and full can painful, but strong.   sprulings negative.         Assessment & Plan     Assessment  Impairments: abnormal or restricted ROM, activity intolerance, impaired physical strength, lacks appropriate home exercise program and pain with function  Assessment details: Pt would benefit from skilled care to address the listed deficits of the R shoulder in order to maximize function and return to functional use of R UE with ADLs.  Prognosis: good  Functional Limitations: carrying objects, lifting, sleeping, pulling, uncomfortable because of pain, reaching behind back and reaching overhead  Goals  Plan Goals: SHORT TERM GOALS: Time for goal achievement: 4 weeks  1. Patient to be compliant with their initial HEP.  2. Patient Independent with AAROM shoulder ex. Pulley or cane.  3. Patient has close to full PROM R shoulder.  4. Patient to exhibit active shoulder flexion/abduction 130 or better to assist with overhead activities without pain.     LONG TERM GOALS:  1. Pt score < 20 % perceived disability on DASH.    2. Pt has functional AROM shoulder flexion/abduction without pain.  3. Pt to reach to overhead shelf to put away dishes without pain.  4. Pt has 4+/5 UE strength for chores around the house.  5. Pt reports they are ready for DC to [facility] for self management of their condition.  6. Pt to sleep through the night without aggravation R shoulder pain.    Plan  Therapy options: will be seen for skilled physical therapy services  Planned modality interventions: cryotherapy, ultrasound, TENS and electrical stimulation/Russian stimulation  Planned therapy interventions: flexibility, functional ROM exercises, home exercise program, joint mobilization, manual therapy, postural training, soft tissue mobilization, strengthening, stretching and therapeutic activities  Frequency: 2x week  Duration in  visits: 10  Treatment plan discussed with: patient      Kinesiotape R scap retraction. Education re: posture correction. HEP: provided handout and pt voices good understanding and good initial tolerance. Instructed in home icing program, pt plans to ice at home today.    Timed:         Manual Therapy:    5     mins  73609;     Therapeutic Exercise:    15     mins  11295;     Neuromuscular Jake:        mins  60010;    Therapeutic Activity:          mins  72463;     Gait Training:           mins  53401;     Ultrasound:          mins  63376;    Ionto                                   mins   54027  Self Care                            mins   65916  Aquatic                               mins 54116      Un-Timed:  Electrical Stimulation:         mins  52218 ( );  Dry Needling          mins self-pay  Traction          mins 18705  Low Eval          Mins  09724  Mod Eval     22     Mins  01122  High Eval                            Mins  82517  Re-Eval                               mins  72212        Timed Treatment:  20    mins   Total Treatment:     42   mins    PT SIGNATURE: Beverly Dumont, PT   DATE TREATMENT INITIATED: 7/16/2019    Medicare Initial Certification  Certification Period: 10/14/2019  I certify that the therapy services are furnished while this patient is under my care.  The services outlined above are required by this patient, and will be reviewed every 90 days.     PHYSICIAN: Yamilka Pascual, APRN      DATE:     Please sign and return via fax to  .. Thank you, [facility].

## 2019-07-26 ENCOUNTER — OFFICE VISIT (OUTPATIENT)
Dept: PHYSICAL THERAPY | Facility: CLINIC | Age: 59
End: 2019-07-26

## 2019-07-26 DIAGNOSIS — M25.511 ACUTE PAIN OF RIGHT SHOULDER: Primary | ICD-10-CM

## 2019-07-26 PROCEDURE — 97140 MANUAL THERAPY 1/> REGIONS: CPT | Performed by: PHYSICAL THERAPIST

## 2019-07-26 PROCEDURE — 97110 THERAPEUTIC EXERCISES: CPT | Performed by: PHYSICAL THERAPIST

## 2019-07-26 NOTE — PROGRESS NOTES
Physical Therapy Daily Progress Note    VISIT#: 2    Subjective   Jagdish Rea reports: shoulder doing a little better, had one episode where he moved too quickly that hurt, but calmed back down after rest. Been doing HEP some. Tape seemed to help a lot.    Objective     See Exercise, Manual, and Modality Logs for complete treatment.     Patient Education: posture correction.     Assessment & Plan     Assessment  Assessment details: All exercises painfree. Improvement in PROM and AAROM today. Still had pain with combined motion getting arm into sleeve of shirt.      Progress per Plan of Care. Monitor and progress as tolerated.        Timed:         Manual Therapy:    10     mins  20343;     Therapeutic Exercise:    30     mins  21707;     Neuromuscular Jake:        mins  65881;    Therapeutic Activity:          mins  85669;     Gait Training:           mins  55384;     Ultrasound:          mins  52868;    Ionto                                   mins   09630  Self Care                            mins   18939  Canalith Repos                   mins  4209  Aquatic                               mins 40629    Un-Timed:  Electrical Stimulation:         mins  67560 ( );  Dry Needling          mins self-pay  Traction          mins 76730  Low Eval          Mins  24906  Mod Eval          Mins  03117  High Eval                            Mins  80725  Re-Eval                               mins  90006    Timed Treatment:   40   mins   Total Treatment:     40   mins    Beverly Dumont, PT

## 2019-08-08 ENCOUNTER — DOCUMENTATION (OUTPATIENT)
Dept: PHYSICAL THERAPY | Facility: CLINIC | Age: 59
End: 2019-08-08

## 2019-08-08 ENCOUNTER — TELEPHONE (OUTPATIENT)
Dept: PHYSICAL THERAPY | Facility: CLINIC | Age: 59
End: 2019-08-08

## 2019-08-08 DIAGNOSIS — M25.511 ACUTE PAIN OF RIGHT SHOULDER: Primary | ICD-10-CM

## 2019-08-08 NOTE — PROGRESS NOTES
Discharge Summary  Discharge Summary from Physical Therapy Report      Dates  PT visit: 7/16/19 and 7/26/19  Number of Visits: 2    Discharge Status of Patient: unable to formally reassess due to cancellation. Spoke with patient, said he is doing much better and is doing the exercises provided and would like be discharged at this time     Goals: Partially Met    Discharge Plan: Continue with current home exercise program as instructed    Date of Discharge 8/8/19        Beverly Dumont, PT  Physical Therapist

## 2019-08-10 RX ORDER — AMLODIPINE BESYLATE 10 MG/1
TABLET ORAL
Qty: 30 TABLET | Refills: 0 | Status: SHIPPED | OUTPATIENT
Start: 2019-08-10 | End: 2019-09-15 | Stop reason: SDUPTHER

## 2019-08-10 RX ORDER — METOPROLOL SUCCINATE 25 MG/1
TABLET, EXTENDED RELEASE ORAL
Qty: 30 TABLET | Refills: 0 | Status: SHIPPED | OUTPATIENT
Start: 2019-08-10 | End: 2019-09-15 | Stop reason: SDUPTHER

## 2019-09-15 RX ORDER — METOPROLOL SUCCINATE 25 MG/1
TABLET, EXTENDED RELEASE ORAL
Qty: 30 TABLET | Refills: 2 | Status: SHIPPED | OUTPATIENT
Start: 2019-09-15 | End: 2019-09-28

## 2019-09-15 RX ORDER — AMLODIPINE BESYLATE 10 MG/1
TABLET ORAL
Qty: 30 TABLET | Refills: 2 | Status: SHIPPED | OUTPATIENT
Start: 2019-09-15 | End: 2019-09-28

## 2019-09-28 ENCOUNTER — APPOINTMENT (OUTPATIENT)
Dept: GENERAL RADIOLOGY | Facility: HOSPITAL | Age: 59
End: 2019-09-28

## 2019-09-28 ENCOUNTER — HOSPITAL ENCOUNTER (OUTPATIENT)
Facility: HOSPITAL | Age: 59
Setting detail: OBSERVATION
Discharge: HOME | End: 2019-09-29
Attending: EMERGENCY MEDICINE | Admitting: INTERNAL MEDICINE

## 2019-09-28 DIAGNOSIS — R07.9 CHEST PAIN IN ADULT: ICD-10-CM

## 2019-09-28 DIAGNOSIS — I10 ACCELERATED HYPERTENSION: Primary | ICD-10-CM

## 2019-09-28 DIAGNOSIS — E87.6 HYPOKALEMIA: ICD-10-CM

## 2019-09-28 LAB
ALBUMIN SERPL-MCNC: 3.8 G/DL (ref 3.5–4.8)
ALBUMIN/GLOB SERPL: 1.2 G/DL (ref 1–1.7)
ALP SERPL-CCNC: 66 U/L (ref 32–91)
ALT SERPL W P-5'-P-CCNC: 25 U/L (ref 17–63)
ANION GAP SERPL CALCULATED.3IONS-SCNC: 11.1 MMOL/L (ref 5–15)
AST SERPL-CCNC: 21 U/L (ref 15–41)
BASOPHILS # BLD AUTO: 0 10*3/MM3 (ref 0–0.2)
BASOPHILS NFR BLD AUTO: 0.7 % (ref 0–1.5)
BILIRUB SERPL-MCNC: 0.7 MG/DL (ref 0.3–1.2)
BUN BLD-MCNC: 8 MG/DL (ref 8–20)
BUN/CREAT SERPL: 7.3 (ref 6.2–20.3)
CALCIUM SPEC-SCNC: 8.6 MG/DL (ref 8.9–10.3)
CHLORIDE SERPL-SCNC: 108 MMOL/L (ref 101–111)
CO2 SERPL-SCNC: 25 MMOL/L (ref 22–32)
CREAT BLD-MCNC: 1.1 MG/DL (ref 0.7–1.2)
DEPRECATED RDW RBC AUTO: 47.3 FL (ref 37–54)
EOSINOPHIL # BLD AUTO: 0.1 10*3/MM3 (ref 0–0.4)
EOSINOPHIL NFR BLD AUTO: 2.1 % (ref 0.3–6.2)
ERYTHROCYTE [DISTWIDTH] IN BLOOD BY AUTOMATED COUNT: 13.8 % (ref 12.3–15.4)
GFR SERPL CREATININE-BSD FRML MDRD: 83 ML/MIN/1.73
GLOBULIN UR ELPH-MCNC: 3.3 GM/DL (ref 2.5–3.8)
GLUCOSE BLD-MCNC: 76 MG/DL (ref 65–99)
HCT VFR BLD AUTO: 47.4 % (ref 37.5–51)
HGB BLD-MCNC: 15.9 G/DL (ref 13–17.7)
LYMPHOCYTES # BLD AUTO: 2.4 10*3/MM3 (ref 0.7–3.1)
LYMPHOCYTES NFR BLD AUTO: 36.7 % (ref 19.6–45.3)
MAGNESIUM SERPL-MCNC: 2.2 MG/DL (ref 1.8–2.5)
MCH RBC QN AUTO: 33 PG (ref 26.6–33)
MCHC RBC AUTO-ENTMCNC: 33.5 G/DL (ref 31.5–35.7)
MCV RBC AUTO: 98.5 FL (ref 79–97)
MONOCYTES # BLD AUTO: 0.5 10*3/MM3 (ref 0.1–0.9)
MONOCYTES NFR BLD AUTO: 8.1 % (ref 5–12)
NEUTROPHILS # BLD AUTO: 3.5 10*3/MM3 (ref 1.7–7)
NEUTROPHILS NFR BLD AUTO: 52.4 % (ref 42.7–76)
NRBC BLD AUTO-RTO: 0.1 /100 WBC (ref 0–0.2)
PLATELET # BLD AUTO: 140 10*3/MM3 (ref 140–450)
PMV BLD AUTO: 10.7 FL (ref 6–12)
POTASSIUM BLD-SCNC: 3.1 MMOL/L (ref 3.6–5.1)
PROT SERPL-MCNC: 7.1 G/DL (ref 6.1–7.9)
RBC # BLD AUTO: 4.81 10*6/MM3 (ref 4.14–5.8)
SODIUM BLD-SCNC: 141 MMOL/L (ref 136–144)
TROPONIN I SERPL-MCNC: <0.03 NG/ML (ref 0–0.03)
TROPONIN I SERPL-MCNC: <0.03 NG/ML (ref 0–0.03)
WBC NRBC COR # BLD: 6.7 10*3/MM3 (ref 3.4–10.8)

## 2019-09-28 PROCEDURE — G0378 HOSPITAL OBSERVATION PER HR: HCPCS

## 2019-09-28 PROCEDURE — 84484 ASSAY OF TROPONIN QUANT: CPT | Performed by: EMERGENCY MEDICINE

## 2019-09-28 PROCEDURE — 96374 THER/PROPH/DIAG INJ IV PUSH: CPT

## 2019-09-28 PROCEDURE — 99219 PR INITIAL OBSERVATION CARE/DAY 50 MINUTES: CPT | Performed by: NURSE PRACTITIONER

## 2019-09-28 PROCEDURE — 71045 X-RAY EXAM CHEST 1 VIEW: CPT

## 2019-09-28 PROCEDURE — 85025 COMPLETE CBC W/AUTO DIFF WBC: CPT | Performed by: EMERGENCY MEDICINE

## 2019-09-28 PROCEDURE — 83735 ASSAY OF MAGNESIUM: CPT | Performed by: EMERGENCY MEDICINE

## 2019-09-28 PROCEDURE — 80053 COMPREHEN METABOLIC PANEL: CPT | Performed by: EMERGENCY MEDICINE

## 2019-09-28 PROCEDURE — 99284 EMERGENCY DEPT VISIT MOD MDM: CPT

## 2019-09-28 PROCEDURE — 84484 ASSAY OF TROPONIN QUANT: CPT | Performed by: NURSE PRACTITIONER

## 2019-09-28 PROCEDURE — 93005 ELECTROCARDIOGRAM TRACING: CPT | Performed by: EMERGENCY MEDICINE

## 2019-09-28 RX ORDER — LURASIDONE HYDROCHLORIDE 40 MG/1
20 TABLET, FILM COATED ORAL
COMMUNITY
End: 2020-01-09

## 2019-09-28 RX ORDER — POTASSIUM CHLORIDE 1.5 G/1.77G
40 POWDER, FOR SOLUTION ORAL AS NEEDED
Status: DISCONTINUED | OUTPATIENT
Start: 2019-09-28 | End: 2019-09-29 | Stop reason: HOSPADM

## 2019-09-28 RX ORDER — POTASSIUM CHLORIDE 20 MEQ/1
40 TABLET, EXTENDED RELEASE ORAL AS NEEDED
Status: DISCONTINUED | OUTPATIENT
Start: 2019-09-28 | End: 2019-09-29 | Stop reason: HOSPADM

## 2019-09-28 RX ORDER — ASPIRIN 325 MG
325 TABLET ORAL ONCE
Status: COMPLETED | OUTPATIENT
Start: 2019-09-28 | End: 2019-09-28

## 2019-09-28 RX ORDER — TRAMADOL HYDROCHLORIDE 50 MG/1
50 TABLET ORAL NIGHTLY PRN
Status: CANCELLED | OUTPATIENT
Start: 2019-09-28

## 2019-09-28 RX ORDER — ACETAMINOPHEN 325 MG/1
650 TABLET ORAL EVERY 4 HOURS PRN
Status: DISCONTINUED | OUTPATIENT
Start: 2019-09-28 | End: 2019-09-29 | Stop reason: HOSPADM

## 2019-09-28 RX ORDER — SODIUM CHLORIDE 0.9 % (FLUSH) 0.9 %
10 SYRINGE (ML) INJECTION EVERY 12 HOURS SCHEDULED
Status: DISCONTINUED | OUTPATIENT
Start: 2019-09-28 | End: 2019-09-29 | Stop reason: HOSPADM

## 2019-09-28 RX ORDER — METOPROLOL SUCCINATE 25 MG/1
25 TABLET, EXTENDED RELEASE ORAL DAILY
Status: CANCELLED | OUTPATIENT
Start: 2019-09-29

## 2019-09-28 RX ORDER — ACETAMINOPHEN 160 MG/5ML
650 SOLUTION ORAL EVERY 4 HOURS PRN
Status: DISCONTINUED | OUTPATIENT
Start: 2019-09-28 | End: 2019-09-29 | Stop reason: HOSPADM

## 2019-09-28 RX ORDER — ONDANSETRON 2 MG/ML
4 INJECTION INTRAMUSCULAR; INTRAVENOUS EVERY 6 HOURS PRN
Status: DISCONTINUED | OUTPATIENT
Start: 2019-09-28 | End: 2019-09-29 | Stop reason: HOSPADM

## 2019-09-28 RX ORDER — AMLODIPINE BESYLATE 5 MG/1
10 TABLET ORAL DAILY
Status: CANCELLED | OUTPATIENT
Start: 2019-09-29

## 2019-09-28 RX ORDER — SODIUM CHLORIDE 0.9 % (FLUSH) 0.9 %
10 SYRINGE (ML) INJECTION AS NEEDED
Status: DISCONTINUED | OUTPATIENT
Start: 2019-09-28 | End: 2019-09-29 | Stop reason: HOSPADM

## 2019-09-28 RX ORDER — METOPROLOL TARTRATE 5 MG/5ML
5 INJECTION INTRAVENOUS EVERY 6 HOURS
Status: DISCONTINUED | OUTPATIENT
Start: 2019-09-28 | End: 2019-09-29 | Stop reason: HOSPADM

## 2019-09-28 RX ORDER — METOPROLOL SUCCINATE 25 MG/1
25 TABLET, EXTENDED RELEASE ORAL DAILY
COMMUNITY
End: 2019-12-22

## 2019-09-28 RX ORDER — SODIUM CHLORIDE AND POTASSIUM CHLORIDE 150; 900 MG/100ML; MG/100ML
100 INJECTION, SOLUTION INTRAVENOUS CONTINUOUS
Status: DISCONTINUED | OUTPATIENT
Start: 2019-09-28 | End: 2019-09-29

## 2019-09-28 RX ORDER — TRAMADOL HYDROCHLORIDE 50 MG/1
50 TABLET ORAL
COMMUNITY
End: 2019-11-04

## 2019-09-28 RX ORDER — ARIPIPRAZOLE 5 MG/1
5 TABLET ORAL
COMMUNITY
End: 2020-01-09

## 2019-09-28 RX ORDER — ARIPIPRAZOLE 5 MG/1
5 TABLET ORAL NIGHTLY
Status: CANCELLED | OUTPATIENT
Start: 2019-09-28

## 2019-09-28 RX ORDER — ACETAMINOPHEN 650 MG/1
650 SUPPOSITORY RECTAL EVERY 4 HOURS PRN
Status: DISCONTINUED | OUTPATIENT
Start: 2019-09-28 | End: 2019-09-29 | Stop reason: HOSPADM

## 2019-09-28 RX ORDER — LURASIDONE HYDROCHLORIDE 40 MG/1
20 TABLET, FILM COATED ORAL NIGHTLY
Status: CANCELLED | OUTPATIENT
Start: 2019-09-28

## 2019-09-28 RX ORDER — AMLODIPINE BESYLATE 10 MG/1
10 TABLET ORAL DAILY
COMMUNITY
End: 2019-12-22

## 2019-09-28 RX ADMIN — METOPROLOL TARTRATE 5 MG: 5 INJECTION INTRAVENOUS at 18:57

## 2019-09-28 RX ADMIN — ASPIRIN 325 MG ORAL TABLET 325 MG: 325 PILL ORAL at 18:56

## 2019-09-29 ENCOUNTER — APPOINTMENT (OUTPATIENT)
Dept: NUCLEAR MEDICINE | Facility: HOSPITAL | Age: 59
End: 2019-09-29

## 2019-09-29 VITALS
TEMPERATURE: 97.8 F | OXYGEN SATURATION: 92 % | RESPIRATION RATE: 20 BRPM | DIASTOLIC BLOOD PRESSURE: 68 MMHG | SYSTOLIC BLOOD PRESSURE: 123 MMHG | HEART RATE: 67 BPM | HEIGHT: 73 IN | BODY MASS INDEX: 28.89 KG/M2 | WEIGHT: 218 LBS

## 2019-09-29 LAB
ANION GAP SERPL CALCULATED.3IONS-SCNC: 10 MMOL/L (ref 5–15)
ARTICHOKE IGE QN: 133 MG/DL (ref 0–100)
BASOPHILS # BLD AUTO: 0 10*3/MM3 (ref 0–0.2)
BASOPHILS NFR BLD AUTO: 0.7 % (ref 0–1.5)
BH CV STRESS BP STAGE 1: NORMAL
BH CV STRESS BP STAGE 2: NORMAL
BH CV STRESS BP STAGE 3: NORMAL
BH CV STRESS COMMENTS STAGE 1: NORMAL
BH CV STRESS COMMENTS STAGE 2: NORMAL
BH CV STRESS DOSE REGADENOSON STAGE 1: 0.4
BH CV STRESS DURATION MIN STAGE 1: 0
BH CV STRESS DURATION MIN STAGE 2: 4
BH CV STRESS DURATION SEC STAGE 1: 10
BH CV STRESS DURATION SEC STAGE 2: 0
BH CV STRESS HR STAGE 1: 64
BH CV STRESS HR STAGE 2: 85
BH CV STRESS HR STAGE 3: 86
BH CV STRESS PROTOCOL 1: NORMAL
BH CV STRESS RECOVERY BP: NORMAL MMHG
BH CV STRESS RECOVERY HR: 68 BPM
BH CV STRESS STAGE 1: 1
BH CV STRESS STAGE 2: 2
BH CV STRESS STAGE 3: 3
BUN BLD-MCNC: 9 MG/DL (ref 8–20)
BUN/CREAT SERPL: 6.9 (ref 6.2–20.3)
CALCIUM SPEC-SCNC: 8.4 MG/DL (ref 8.9–10.3)
CHLORIDE SERPL-SCNC: 108 MMOL/L (ref 101–111)
CHOLEST SERPL-MCNC: 180 MG/DL
CO2 SERPL-SCNC: 28 MMOL/L (ref 22–32)
CREAT BLD-MCNC: 1.3 MG/DL (ref 0.7–1.2)
DEPRECATED RDW RBC AUTO: 46.8 FL (ref 37–54)
EOSINOPHIL # BLD AUTO: 0.1 10*3/MM3 (ref 0–0.4)
EOSINOPHIL NFR BLD AUTO: 2.6 % (ref 0.3–6.2)
ERYTHROCYTE [DISTWIDTH] IN BLOOD BY AUTOMATED COUNT: 13.7 % (ref 12.3–15.4)
GFR SERPL CREATININE-BSD FRML MDRD: 69 ML/MIN/1.73
GLUCOSE BLD-MCNC: 91 MG/DL (ref 65–99)
HCT VFR BLD AUTO: 43.4 % (ref 37.5–51)
HDLC SERPL QL: 6.67
HDLC SERPL-MCNC: 27 MG/DL
HGB BLD-MCNC: 14.6 G/DL (ref 13–17.7)
LDLC/HDLC SERPL: 4.3 {RATIO}
LYMPHOCYTES # BLD AUTO: 2.4 10*3/MM3 (ref 0.7–3.1)
LYMPHOCYTES NFR BLD AUTO: 44.7 % (ref 19.6–45.3)
MAXIMAL PREDICTED HEART RATE: 162 BPM
MCH RBC QN AUTO: 32.9 PG (ref 26.6–33)
MCHC RBC AUTO-ENTMCNC: 33.7 G/DL (ref 31.5–35.7)
MCV RBC AUTO: 97.6 FL (ref 79–97)
MONOCYTES # BLD AUTO: 0.4 10*3/MM3 (ref 0.1–0.9)
MONOCYTES NFR BLD AUTO: 7.3 % (ref 5–12)
NEUTROPHILS # BLD AUTO: 2.4 10*3/MM3 (ref 1.7–7)
NEUTROPHILS NFR BLD AUTO: 44.7 % (ref 42.7–76)
NRBC BLD AUTO-RTO: 0.1 /100 WBC (ref 0–0.2)
PERCENT MAX PREDICTED HR: 48.77 %
PLATELET # BLD AUTO: 120 10*3/MM3 (ref 140–450)
PMV BLD AUTO: 10.4 FL (ref 6–12)
POTASSIUM BLD-SCNC: 4 MMOL/L (ref 3.6–5.1)
RBC # BLD AUTO: 4.44 10*6/MM3 (ref 4.14–5.8)
SODIUM BLD-SCNC: 142 MMOL/L (ref 136–144)
STRESS BASELINE BP: NORMAL MMHG
STRESS BASELINE HR: 64 BPM
STRESS PERCENT HR: 57 %
STRESS POST PEAK BP: NORMAL MMHG
STRESS POST PEAK HR: 79 BPM
STRESS TARGET HR: 138 BPM
TRIGL SERPL-MCNC: 184 MG/DL
TROPONIN I SERPL-MCNC: <0.03 NG/ML (ref 0–0.03)
TROPONIN I SERPL-MCNC: <0.03 NG/ML (ref 0–0.03)
VLDLC SERPL-MCNC: 36.8 MG/DL
WBC NRBC COR # BLD: 5.4 10*3/MM3 (ref 3.4–10.8)

## 2019-09-29 PROCEDURE — G0378 HOSPITAL OBSERVATION PER HR: HCPCS

## 2019-09-29 PROCEDURE — 99217 PR OBSERVATION CARE DISCHARGE MANAGEMENT: CPT | Performed by: INTERNAL MEDICINE

## 2019-09-29 PROCEDURE — 25810000003 SODIUM CHLORIDE 0.9 % WITH KCL 20 MEQ 20-0.9 MEQ/L-% SOLUTION: Performed by: NURSE PRACTITIONER

## 2019-09-29 PROCEDURE — 78452 HT MUSCLE IMAGE SPECT MULT: CPT

## 2019-09-29 PROCEDURE — 80061 LIPID PANEL: CPT | Performed by: NURSE PRACTITIONER

## 2019-09-29 PROCEDURE — 25010000002 ENOXAPARIN PER 10 MG: Performed by: NURSE PRACTITIONER

## 2019-09-29 PROCEDURE — 78452 HT MUSCLE IMAGE SPECT MULT: CPT | Performed by: INTERNAL MEDICINE

## 2019-09-29 PROCEDURE — A9500 TC99M SESTAMIBI: HCPCS | Performed by: HOSPITALIST

## 2019-09-29 PROCEDURE — 80048 BASIC METABOLIC PNL TOTAL CA: CPT | Performed by: NURSE PRACTITIONER

## 2019-09-29 PROCEDURE — 84484 ASSAY OF TROPONIN QUANT: CPT | Performed by: NURSE PRACTITIONER

## 2019-09-29 PROCEDURE — 0 TECHNETIUM SESTAMIBI: Performed by: HOSPITALIST

## 2019-09-29 PROCEDURE — 93018 CV STRESS TEST I&R ONLY: CPT | Performed by: INTERNAL MEDICINE

## 2019-09-29 PROCEDURE — 93017 CV STRESS TEST TRACING ONLY: CPT

## 2019-09-29 PROCEDURE — 96372 THER/PROPH/DIAG INJ SC/IM: CPT

## 2019-09-29 PROCEDURE — 85025 COMPLETE CBC W/AUTO DIFF WBC: CPT | Performed by: NURSE PRACTITIONER

## 2019-09-29 PROCEDURE — 93016 CV STRESS TEST SUPVJ ONLY: CPT | Performed by: INTERNAL MEDICINE

## 2019-09-29 RX ADMIN — TECHNETIUM TC 99M SESTAMIBI 1 DOSE: 1 INJECTION INTRAVENOUS at 07:35

## 2019-09-29 RX ADMIN — Medication 10 ML: at 00:13

## 2019-09-29 RX ADMIN — ENOXAPARIN SODIUM 40 MG: 40 INJECTION SUBCUTANEOUS at 00:11

## 2019-09-29 RX ADMIN — POTASSIUM CHLORIDE 40 MEQ: 1500 TABLET, EXTENDED RELEASE ORAL at 00:10

## 2019-09-29 RX ADMIN — SODIUM CHLORIDE AND POTASSIUM CHLORIDE 100 ML/HR: 9; 1.49 INJECTION, SOLUTION INTRAVENOUS at 00:14

## 2019-09-29 RX ADMIN — Medication 10 ML: at 08:59

## 2019-11-04 ENCOUNTER — OFFICE VISIT (OUTPATIENT)
Dept: FAMILY MEDICINE CLINIC | Facility: CLINIC | Age: 59
End: 2019-11-04

## 2019-11-04 VITALS
HEIGHT: 73 IN | BODY MASS INDEX: 29.3 KG/M2 | WEIGHT: 221.1 LBS | OXYGEN SATURATION: 98 % | HEART RATE: 68 BPM | TEMPERATURE: 98.2 F | RESPIRATION RATE: 24 BRPM | DIASTOLIC BLOOD PRESSURE: 90 MMHG | SYSTOLIC BLOOD PRESSURE: 136 MMHG

## 2019-11-04 DIAGNOSIS — K13.70 ORAL LESION: Primary | ICD-10-CM

## 2019-11-04 PROCEDURE — 99212 OFFICE O/P EST SF 10 MIN: CPT | Performed by: NURSE PRACTITIONER

## 2019-11-04 NOTE — PROGRESS NOTES
Subjective   Jagdish Rea is a 58 y.o. male.     Chief Complaint   Patient presents with   • Mouth Lesions     X 4-6 months       HPI  He is here for bone trying to protude in top of his mouth now is growth. feelslike flat small bumps. He has not seen dentist. Has had this for 4-6 months. hasnt used anything for this . Not painful. Bread causes some irritation.         The following portions of the patient's history were reviewed and updated as appropriate: allergies, current medications, past family history, past medical history, past social history, past surgical history and problem list.      Current Outpatient Medications:   •  amLODIPine (NORVASC) 10 MG tablet, Take 10 mg by mouth Daily., Disp: , Rfl:   •  ARIPiprazole (ABILIFY) 5 MG tablet, Take 5 mg by mouth every night at bedtime., Disp: , Rfl:   •  lurasidone (LATUDA) 40 MG tablet tablet, Take 20 mg by mouth every night at bedtime., Disp: , Rfl:   •  metoprolol succinate XL (TOPROL-XL) 25 MG 24 hr tablet, Take 25 mg by mouth Daily., Disp: , Rfl:     Recent Results (from the past 4032 hour(s))   Comprehensive Metabolic Panel    Collection Time: 09/28/19  6:54 PM   Result Value Ref Range    Glucose 76 65 - 99 mg/dL    BUN 8 8 - 20 mg/dL    Creatinine 1.10 0.70 - 1.20 mg/dL    Sodium 141 136 - 144 mmol/L    Potassium 3.1 (L) 3.6 - 5.1 mmol/L    Chloride 108 101 - 111 mmol/L    CO2 25.0 22.0 - 32.0 mmol/L    Calcium 8.6 (L) 8.9 - 10.3 mg/dL    Total Protein 7.1 6.1 - 7.9 g/dL    Albumin 3.80 3.50 - 4.80 g/dL    ALT (SGPT) 25 17 - 63 U/L    AST (SGOT) 21 15 - 41 U/L    Alkaline Phosphatase 66 32 - 91 U/L    Total Bilirubin 0.7 0.3 - 1.2 mg/dL    eGFR  African Amer 83 >60 mL/min/1.73    Globulin 3.3 2.5 - 3.8 gm/dL    A/G Ratio 1.2 1.0 - 1.7 g/dL    BUN/Creatinine Ratio 7.3 6.2 - 20.3    Anion Gap 11.1 5.0 - 15.0 mmol/L   Troponin    Collection Time: 09/28/19  6:54 PM   Result Value Ref Range    Troponin I <0.030 0.000 - 0.030 ng/mL   Magnesium    Collection  Time: 09/28/19  6:54 PM   Result Value Ref Range    Magnesium 2.2 1.8 - 2.5 mg/dL   CBC Auto Differential    Collection Time: 09/28/19  6:54 PM   Result Value Ref Range    WBC 6.70 3.40 - 10.80 10*3/mm3    RBC 4.81 4.14 - 5.80 10*6/mm3    Hemoglobin 15.9 13.0 - 17.7 g/dL    Hematocrit 47.4 37.5 - 51.0 %    MCV 98.5 (H) 79.0 - 97.0 fL    MCH 33.0 26.6 - 33.0 pg    MCHC 33.5 31.5 - 35.7 g/dL    RDW 13.8 12.3 - 15.4 %    RDW-SD 47.3 37.0 - 54.0 fl    MPV 10.7 6.0 - 12.0 fL    Platelets 140 140 - 450 10*3/mm3    Neutrophil % 52.4 42.7 - 76.0 %    Lymphocyte % 36.7 19.6 - 45.3 %    Monocyte % 8.1 5.0 - 12.0 %    Eosinophil % 2.1 0.3 - 6.2 %    Basophil % 0.7 0.0 - 1.5 %    Neutrophils, Absolute 3.50 1.70 - 7.00 10*3/mm3    Lymphocytes, Absolute 2.40 0.70 - 3.10 10*3/mm3    Monocytes, Absolute 0.50 0.10 - 0.90 10*3/mm3    Eosinophils, Absolute 0.10 0.00 - 0.40 10*3/mm3    Basophils, Absolute 0.00 0.00 - 0.20 10*3/mm3    nRBC 0.1 0.0 - 0.2 /100 WBC   Troponin    Collection Time: 09/28/19 10:26 PM   Result Value Ref Range    Troponin I <0.030 0.000 - 0.030 ng/mL   Troponin    Collection Time: 09/28/19 11:36 PM   Result Value Ref Range    Troponin I <0.030 0.000 - 0.030 ng/mL   CBC Auto Differential    Collection Time: 09/29/19  2:49 AM   Result Value Ref Range    WBC 5.40 3.40 - 10.80 10*3/mm3    RBC 4.44 4.14 - 5.80 10*6/mm3    Hemoglobin 14.6 13.0 - 17.7 g/dL    Hematocrit 43.4 37.5 - 51.0 %    MCV 97.6 (H) 79.0 - 97.0 fL    MCH 32.9 26.6 - 33.0 pg    MCHC 33.7 31.5 - 35.7 g/dL    RDW 13.7 12.3 - 15.4 %    RDW-SD 46.8 37.0 - 54.0 fl    MPV 10.4 6.0 - 12.0 fL    Platelets 120 (L) 140 - 450 10*3/mm3    Neutrophil % 44.7 42.7 - 76.0 %    Lymphocyte % 44.7 19.6 - 45.3 %    Monocyte % 7.3 5.0 - 12.0 %    Eosinophil % 2.6 0.3 - 6.2 %    Basophil % 0.7 0.0 - 1.5 %    Neutrophils, Absolute 2.40 1.70 - 7.00 10*3/mm3    Lymphocytes, Absolute 2.40 0.70 - 3.10 10*3/mm3    Monocytes, Absolute 0.40 0.10 - 0.90 10*3/mm3    Eosinophils,  Absolute 0.10 0.00 - 0.40 10*3/mm3    Basophils, Absolute 0.00 0.00 - 0.20 10*3/mm3    nRBC 0.1 0.0 - 0.2 /100 WBC   Basic Metabolic Panel    Collection Time: 09/29/19  2:49 AM   Result Value Ref Range    Glucose 91 65 - 99 mg/dL    BUN 9 8 - 20 mg/dL    Creatinine 1.30 (H) 0.70 - 1.20 mg/dL    Sodium 142 136 - 144 mmol/L    Potassium 4.0 3.6 - 5.1 mmol/L    Chloride 108 101 - 111 mmol/L    CO2 28.0 22.0 - 32.0 mmol/L    Calcium 8.4 (L) 8.9 - 10.3 mg/dL    eGFR  African Amer 69 >60 mL/min/1.73    BUN/Creatinine Ratio 6.9 6.2 - 20.3    Anion Gap 10.0 5.0 - 15.0 mmol/L   Lipid Panel    Collection Time: 09/29/19  2:49 AM   Result Value Ref Range    Total Cholesterol 180 <=200 mg/dL    Triglycerides 184 (H) <=150 mg/dL    HDL Cholesterol 27 (L) >=39 mg/dL    LDL Cholesterol  133 (H) 0 - 100 mg/dL    VLDL Cholesterol 36.8 mg/dL    LDL/HDL Ratio 4.30     Chol/HDL Ratio 6.67    Stress Test With Myocardial Perfusion One Day    Collection Time: 09/29/19 10:14 AM   Result Value Ref Range    BH CV STRESS PROTOCOL 1 Pharmacologic     Stage 1 1     HR Stage 1 64     BP Stage 1 102/64     Duration Min Stage 1 0     Duration Sec Stage 1 10     Stress Dose Regadenoson Stage 1 0.4     Stress Comments Stage 1 10 sec bolus injection     Stage 2 2     HR Stage 2 85     BP Stage 2 102/64     Duration Min Stage 2 4     Duration Sec Stage 2 0     Stress Comments Stage 2 recovery     Stage 3 3     HR Stage 3 86     BP Stage 3 102/64     Baseline HR 64 bpm    Baseline /64 mmHg    Peak HR 79 bpm    Percent Max Pred HR 48.77 %    Percent Target HR 57 %    Peak /76 mmHg    Recovery HR 68 bpm    Recovery /76 mmHg    Target HR (85%) 138 bpm    Max. Pred. HR (100%) 162 bpm   Troponin    Collection Time: 09/29/19 10:33 AM   Result Value Ref Range    Troponin I <0.030 0.000 - 0.030 ng/mL         Review of Systems   Constitutional: Negative for chills and fever.   HENT: Negative for congestion, sinus pressure and swollen glands.   "       Some oral lesions top behind his teeth   Eyes: Negative for blurred vision and pain.   Respiratory: Negative for cough and shortness of breath.    Cardiovascular: Negative for chest pain and leg swelling.   Gastrointestinal: Negative for abdominal pain, nausea and indigestion.   Endocrine: Negative for cold intolerance, heat intolerance, polydipsia, polyphagia and polyuria.   Skin: Negative for dry skin, rash and bruise.   Psychiatric/Behavioral: Negative for dysphoric mood and stress.       Objective     /90 (BP Location: Left arm, Patient Position: Sitting, Cuff Size: Large Adult)   Pulse 68   Temp 98.2 °F (36.8 °C) (Oral)   Resp 24   Ht 185.4 cm (73\")   Wt 100 kg (221 lb 1.6 oz)   SpO2 98%   BMI 29.17 kg/m²     Physical Exam   Constitutional: He is oriented to person, place, and time. He appears well-developed and well-nourished.   HENT:   Head: Normocephalic and atraumatic.   Mouth/Throat: Uvula is midline and mucous membranes are normal. Oral lesions present.       Eyes: Conjunctivae and EOM are normal. Pupils are equal, round, and reactive to light.   Neck: Normal range of motion. Neck supple.   Cardiovascular: Normal rate, regular rhythm, normal heart sounds and intact distal pulses.   Pulmonary/Chest: Effort normal and breath sounds normal.   Abdominal: Soft. Bowel sounds are normal.   Musculoskeletal: Normal range of motion.   Neurological: He is alert and oriented to person, place, and time.   Skin: Skin is warm and dry.   Psychiatric: He has a normal mood and affect. His behavior is normal.   Nursing note and vitals reviewed.        Assessment/Plan   Jagdish was seen today for mouth lesions.    Diagnoses and all orders for this visit:    Oral lesion      Patient Instructions   Follow up with dentist.       Yamilka Pascual, APRN    11/04/19      "

## 2019-12-22 RX ORDER — AMLODIPINE BESYLATE 10 MG/1
TABLET ORAL
Qty: 30 TABLET | Refills: 1 | Status: SHIPPED | OUTPATIENT
Start: 2019-12-22 | End: 2020-02-28

## 2019-12-22 RX ORDER — METOPROLOL SUCCINATE 25 MG/1
TABLET, EXTENDED RELEASE ORAL
Qty: 30 TABLET | Refills: 1 | Status: SHIPPED | OUTPATIENT
Start: 2019-12-22 | End: 2020-02-28

## 2020-01-03 ENCOUNTER — OFFICE VISIT (OUTPATIENT)
Dept: FAMILY MEDICINE CLINIC | Facility: CLINIC | Age: 60
End: 2020-01-03

## 2020-01-03 VITALS
RESPIRATION RATE: 24 BRPM | OXYGEN SATURATION: 99 % | DIASTOLIC BLOOD PRESSURE: 78 MMHG | WEIGHT: 217.2 LBS | SYSTOLIC BLOOD PRESSURE: 122 MMHG | HEIGHT: 73 IN | BODY MASS INDEX: 28.79 KG/M2 | HEART RATE: 78 BPM | TEMPERATURE: 98.6 F

## 2020-01-03 DIAGNOSIS — J44.1 COPD EXACERBATION (HCC): ICD-10-CM

## 2020-01-03 DIAGNOSIS — J22 ACUTE RESPIRATORY INFECTION: Primary | ICD-10-CM

## 2020-01-03 PROBLEM — I10 ACCELERATED HYPERTENSION: Status: RESOLVED | Noted: 2019-09-28 | Resolved: 2020-01-03

## 2020-01-03 PROBLEM — M25.511 ACUTE PAIN OF RIGHT SHOULDER: Status: RESOLVED | Noted: 2019-07-02 | Resolved: 2020-01-03

## 2020-01-03 PROCEDURE — 99213 OFFICE O/P EST LOW 20 MIN: CPT | Performed by: FAMILY MEDICINE

## 2020-01-03 RX ORDER — PREDNISONE 20 MG/1
20 TABLET ORAL 2 TIMES DAILY
Qty: 14 TABLET | Refills: 0 | Status: SHIPPED | OUTPATIENT
Start: 2020-01-03 | End: 2020-01-09

## 2020-01-03 RX ORDER — ALBUTEROL SULFATE 90 UG/1
2 AEROSOL, METERED RESPIRATORY (INHALATION) EVERY 4 HOURS PRN
Qty: 1 INHALER | Refills: 2 | Status: SHIPPED | OUTPATIENT
Start: 2020-01-03 | End: 2022-04-04 | Stop reason: SDUPTHER

## 2020-01-03 RX ORDER — LEVOFLOXACIN 750 MG/1
750 TABLET ORAL DAILY
Qty: 10 TABLET | Refills: 0 | Status: SHIPPED | OUTPATIENT
Start: 2020-01-03 | End: 2020-01-09

## 2020-01-03 NOTE — PROGRESS NOTES
"Subjective   Jagdish Rea is a 59 y.o. male.     Chief Complaint   Patient presents with   • Cough   • Wheezing   • Shortness of Breath       HPI  Chief complaint cough wheezing and shortness of breath    The patient is a 59-year-old white male states he became ill 1 week to 10 days ago with sinus congestion sore throat low-grade fever and cough.  Patient states he has had progression of the symptoms since then.  He states the cough is productive of sputum.  He states that he has had wheezing and shortness of breath.  Patient states he has had a low-grade fever.  He states his symptoms are not improving.        The following portions of the patient's history were reviewed and updated as appropriate: allergies, current medications, past family history, past medical history, past social history, past surgical history and problem list.    Review of Systems    Objective     /78 (BP Location: Left arm, Patient Position: Sitting, Cuff Size: Adult)   Pulse 78   Temp 98.6 °F (37 °C) (Oral)   Resp 24   Ht 185.4 cm (73\")   Wt 98.5 kg (217 lb 3.2 oz)   SpO2 99%   BMI 28.66 kg/m²     Physical Exam   Constitutional: He is oriented to person, place, and time.   HENT:   Head: Normocephalic and atraumatic.   Eyes: Pupils are equal, round, and reactive to light. Conjunctivae and EOM are normal.   Neck: Normal range of motion. Neck supple.   Cardiovascular: Normal rate, regular rhythm, normal heart sounds and intact distal pulses.   Pulmonary/Chest: Effort normal. He has wheezes in the right middle field, the right lower field, the left middle field and the left lower field. He has rhonchi in the right middle field, the right lower field, the left middle field and the left lower field. He has rales in the right lower field and the left lower field.   Abdominal: Soft. Bowel sounds are normal.   Musculoskeletal: Normal range of motion.   Neurological: He is alert and oriented to person, place, and time.   Skin: Skin is " warm and dry.   Psychiatric: He has a normal mood and affect. His behavior is normal.   Nursing note and vitals reviewed.        Assessment/Plan   Jagdish was seen today for cough, wheezing and shortness of breath.    Diagnoses and all orders for this visit:    Acute respiratory infection    COPD exacerbation (CMS/[facility])    Other orders  -     levoFLOXacin (LEVAQUIN) 750 MG tablet; Take 1 tablet by mouth Daily.  -     predniSONE (DELTASONE) 20 MG tablet; Take 1 tablet by mouth 2 (Two) Times a Day.  -     albuterol sulfate  (90 Base) MCG/ACT inhaler; Inhale 2 puffs Every 4 (Four) Hours As Needed for Wheezing.      Patient Instructions   Take the anti-biotics and prednisone as prescribed.    Use the inhaler as needed.    Continue your other medications.    I encourage you not to smoke cigarettes.    Follow up in the office in 1 week or sooner if needed.          Omega Alcala Jr., MD    01/03/20

## 2020-01-03 NOTE — PATIENT INSTRUCTIONS
Take the anti-biotics and prednisone as prescribed.    Use the inhaler as needed.    Continue your other medications.    I encourage you not to smoke cigarettes.    Follow up in the office in 1 week or sooner if needed.

## 2020-01-09 ENCOUNTER — OFFICE VISIT (OUTPATIENT)
Dept: FAMILY MEDICINE CLINIC | Facility: CLINIC | Age: 60
End: 2020-01-09

## 2020-01-09 VITALS
HEART RATE: 90 BPM | WEIGHT: 221.6 LBS | DIASTOLIC BLOOD PRESSURE: 84 MMHG | OXYGEN SATURATION: 97 % | BODY MASS INDEX: 29.37 KG/M2 | TEMPERATURE: 98.4 F | RESPIRATION RATE: 18 BRPM | SYSTOLIC BLOOD PRESSURE: 142 MMHG | HEIGHT: 73 IN

## 2020-01-09 DIAGNOSIS — I10 BENIGN ESSENTIAL HYPERTENSION: ICD-10-CM

## 2020-01-09 DIAGNOSIS — J22 ACUTE RESPIRATORY INFECTION: Primary | ICD-10-CM

## 2020-01-09 DIAGNOSIS — F41.9 ANXIETY: ICD-10-CM

## 2020-01-09 DIAGNOSIS — J44.1 COPD EXACERBATION (HCC): ICD-10-CM

## 2020-01-09 PROCEDURE — 99213 OFFICE O/P EST LOW 20 MIN: CPT | Performed by: FAMILY MEDICINE

## 2020-01-09 RX ORDER — LURASIDONE HYDROCHLORIDE 20 MG/1
1 TABLET, FILM COATED ORAL DAILY
COMMUNITY
Start: 2020-01-07 | End: 2020-07-20

## 2020-01-09 RX ORDER — DOXEPIN HYDROCHLORIDE 10 MG/1
1 CAPSULE ORAL NIGHTLY
COMMUNITY
Start: 2020-01-07 | End: 2020-08-07 | Stop reason: SDUPTHER

## 2020-01-09 NOTE — PROGRESS NOTES
"Subjective   Jagdish Rea is a 59 y.o. male.     Chief Complaint   Patient presents with   • COPD     1 week followup   • Cough     mainly when he lays down       HPI  Complaint: Cough and shortness of breath hypertension anxiety disorder    The patient is a 59-year-old male comes in for follow-up and maintenance of his current problems which includes    1.  Respiratory infection/exacerbation of COPD-improved-patient was seen by me a week ago because of cough shortness of breath and wheezing.  He was treated with antibiotics corticosteroids and an inhaler.  Patient states that he feels better.  He denied fever chills.  States he still has some cough and chest congestion.    2.  Hypertension-stable-patient on metoprolol 25 mg once a day and amlodipine 10 mg daily.  He denied headache lightheadedness dizziness or chest pain.    3.  Anxiety disorder-stable-patient is currently on doxepin 10 mg at night and Latuda 20 mg daily.  Denies anxiousness agitation depressed or suicidal thoughts.        The following portions of the patient's history were reviewed and updated as appropriate: allergies, current medications, past family history, past medical history, past social history, past surgical history and problem list.    Review of Systems    Objective     /84 (BP Location: Left arm, Patient Position: Sitting, Cuff Size: Adult)   Pulse 90   Temp 98.4 °F (36.9 °C) (Oral)   Resp 18   Ht 185.4 cm (73\")   Wt 101 kg (221 lb 9.6 oz)   SpO2 97%   BMI 29.24 kg/m²     Physical Exam   Constitutional: He is oriented to person, place, and time. He appears well-developed and well-nourished.   HENT:   Head: Normocephalic and atraumatic.   Eyes: Pupils are equal, round, and reactive to light. Conjunctivae and EOM are normal.   Neck: Normal range of motion. Neck supple.   Cardiovascular: Normal rate, regular rhythm, normal heart sounds and intact distal pulses.   Pulmonary/Chest: Effort normal and breath sounds normal. "   Abdominal: Soft. Bowel sounds are normal.   Musculoskeletal: Normal range of motion.   Neurological: He is alert and oriented to person, place, and time.   Skin: Skin is warm and dry.   Psychiatric: He has a normal mood and affect. His behavior is normal.   Nursing note and vitals reviewed.        Assessment/Plan   Jagdish was seen today for copd and cough.    Diagnoses and all orders for this visit:    Acute respiratory infection    COPD exacerbation (CMS/HCC)    Benign essential hypertension    Anxiety      Patient Instructions   Finish the anti-biotics and steroids.    Continue your other medications and treatment.    Follow up in the office in 6 months.    The cough should gradually subside.      Omega Alcala Jr., MD    01/09/20

## 2020-01-09 NOTE — PATIENT INSTRUCTIONS
Finish the anti-biotics and steroids.    Continue your other medications and treatment.    Follow up in the office in 6 months.    The cough should gradually subside.

## 2020-02-28 RX ORDER — AMLODIPINE BESYLATE 10 MG/1
TABLET ORAL
Qty: 30 TABLET | Refills: 0 | Status: SHIPPED | OUTPATIENT
Start: 2020-02-28 | End: 2020-04-02

## 2020-02-28 RX ORDER — METOPROLOL SUCCINATE 25 MG/1
TABLET, EXTENDED RELEASE ORAL
Qty: 30 TABLET | Refills: 0 | Status: SHIPPED | OUTPATIENT
Start: 2020-02-28 | End: 2020-04-02

## 2020-04-02 RX ORDER — AMLODIPINE BESYLATE 10 MG/1
TABLET ORAL
Qty: 30 TABLET | Refills: 0 | Status: SHIPPED | OUTPATIENT
Start: 2020-04-02 | End: 2020-04-08

## 2020-04-02 RX ORDER — METOPROLOL SUCCINATE 25 MG/1
TABLET, EXTENDED RELEASE ORAL
Qty: 30 TABLET | Refills: 0 | Status: SHIPPED | OUTPATIENT
Start: 2020-04-02 | End: 2020-04-08

## 2020-04-08 RX ORDER — AMLODIPINE BESYLATE 10 MG/1
TABLET ORAL
Qty: 30 TABLET | Refills: 0 | Status: SHIPPED | OUTPATIENT
Start: 2020-04-08 | End: 2020-05-11

## 2020-04-08 RX ORDER — METOPROLOL SUCCINATE 25 MG/1
TABLET, EXTENDED RELEASE ORAL
Qty: 30 TABLET | Refills: 0 | Status: SHIPPED | OUTPATIENT
Start: 2020-04-08 | End: 2020-05-11

## 2020-04-30 PROCEDURE — U0003 INFECTIOUS AGENT DETECTION BY NUCLEIC ACID (DNA OR RNA); SEVERE ACUTE RESPIRATORY SYNDROME CORONAVIRUS 2 (SARS-COV-2) (CORONAVIRUS DISEASE [COVID-19]), AMPLIFIED PROBE TECHNIQUE, MAKING USE OF HIGH THROUGHPUT TECHNOLOGIES AS DESCRIBED BY CMS-2020-01-R: HCPCS | Performed by: NURSE PRACTITIONER

## 2020-05-11 RX ORDER — METOPROLOL SUCCINATE 25 MG/1
TABLET, EXTENDED RELEASE ORAL
Qty: 30 TABLET | Refills: 0 | Status: SHIPPED | OUTPATIENT
Start: 2020-05-11 | End: 2020-06-04

## 2020-05-11 RX ORDER — AMLODIPINE BESYLATE 10 MG/1
TABLET ORAL
Qty: 30 TABLET | Refills: 0 | Status: SHIPPED | OUTPATIENT
Start: 2020-05-11 | End: 2020-06-04

## 2020-06-04 RX ORDER — METOPROLOL SUCCINATE 25 MG/1
TABLET, EXTENDED RELEASE ORAL
Qty: 30 TABLET | Refills: 0 | Status: SHIPPED | OUTPATIENT
Start: 2020-06-04 | End: 2020-07-27

## 2020-06-04 RX ORDER — AMLODIPINE BESYLATE 10 MG/1
TABLET ORAL
Qty: 30 TABLET | Refills: 0 | Status: SHIPPED | OUTPATIENT
Start: 2020-06-04 | End: 2020-07-27

## 2020-07-11 ENCOUNTER — HOSPITAL ENCOUNTER (EMERGENCY)
Facility: HOSPITAL | Age: 60
Discharge: HOME | End: 2020-07-11
Admitting: EMERGENCY MEDICINE

## 2020-07-11 ENCOUNTER — APPOINTMENT (OUTPATIENT)
Dept: GENERAL RADIOLOGY | Facility: HOSPITAL | Age: 60
End: 2020-07-11

## 2020-07-11 VITALS
SYSTOLIC BLOOD PRESSURE: 131 MMHG | BODY MASS INDEX: 31.79 KG/M2 | HEART RATE: 65 BPM | RESPIRATION RATE: 14 BRPM | DIASTOLIC BLOOD PRESSURE: 76 MMHG | OXYGEN SATURATION: 95 % | WEIGHT: 227.07 LBS | TEMPERATURE: 98 F | HEIGHT: 71 IN
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
## 2020-07-11 DIAGNOSIS — S49.91XA INJURY OF RIGHT SHOULDER, INITIAL ENCOUNTER: ICD-10-CM

## 2020-07-11 PROCEDURE — 96374 THER/PROPH/DIAG INJ IV PUSH: CPT

## 2020-07-11 PROCEDURE — 73030 X-RAY EXAM OF SHOULDER: CPT

## 2020-07-11 PROCEDURE — 99283 EMERGENCY DEPT VISIT LOW MDM: CPT

## 2020-07-11 PROCEDURE — 25010000002 MORPHINE PER 10 MG: Performed by: NURSE PRACTITIONER

## 2020-07-11 RX ORDER — SODIUM CHLORIDE 0.9 % (FLUSH) 0.9 %
10 SYRINGE (ML) INJECTION AS NEEDED
Status: DISCONTINUED | OUTPATIENT
Start: 2020-07-11 | End: 2020-07-11 | Stop reason: HOSPADM

## 2020-07-11 RX ORDER — HYDROCODONE BITARTRATE AND ACETAMINOPHEN 5; 325 MG/1; MG/1
1 TABLET ORAL 3 TIMES DAILY PRN
Qty: 9 TABLET | Refills: 0 | Status: SHIPPED | OUTPATIENT
Start: 2020-07-11 | End: 2020-07-20

## 2020-07-11 RX ORDER — MORPHINE SULFATE 4 MG/ML
4 INJECTION, SOLUTION INTRAMUSCULAR; INTRAVENOUS ONCE
Status: COMPLETED | OUTPATIENT
Start: 2020-07-11 | End: 2020-07-11

## 2020-07-11 RX ADMIN — MORPHINE SULFATE 4 MG: 4 INJECTION INTRAVENOUS at 14:33

## 2020-07-11 NOTE — ED PROVIDER NOTES
"Subjective   Patient is a 59-year-old male who presents emergency department complaint of shoulder pain he reports he was holding onto a towel towel rack with his left hand, the towel rack broke and he caught himself with his right arm and shoulder.  He not completely fall to the ground, or fall on an outstretched hand.  Reports he hit his head against the wall, denies any loss of consciousness or severe headache.    He reports \"I have had problems with my shoulder for a while\".  But it is been worse today.  He reports he has had an injury to his rotator cuff before.    Onset: Today    Provocative and palliative factors: Shoulder pain worsened with movement, range of motion    Quality: Pressure    Region: Right shoulder    Severity: Moderate    Timing: Consistent                Review of Systems   Constitutional: Negative for chills and fever.   Respiratory: Negative for cough, chest tightness and shortness of breath.    Cardiovascular: Negative for chest pain, palpitations and leg swelling.   Musculoskeletal: Negative for neck pain.        Shoulder pain   Skin: Negative.    Neurological: Negative for dizziness, tremors, seizures, syncope, facial asymmetry, speech difficulty, weakness, light-headedness, numbness and headaches.       Past Medical History:   Diagnosis Date   • Hypertension    • PTSD (post-traumatic stress disorder)        Allergies   Allergen Reactions   • Atorvastatin Swelling   • Lisinopril Unknown - High Severity     Facial paralysis        Past Surgical History:   Procedure Laterality Date   • HERNIA REPAIR     • KNEE ARTHROPLASTY      x2   • TONSILLECTOMY         Family History   Problem Relation Age of Onset   • Heart disease Mother        Social History     Socioeconomic History   • Marital status:      Spouse name: Not on file   • Number of children: Not on file   • Years of education: Not on file   • Highest education level: Not on file   Tobacco Use   • Smoking status: Current Every " "Day Smoker   • Smokeless tobacco: Never Used   Substance and Sexual Activity   • Alcohol use: No     Frequency: Never   • Drug use: No   • Sexual activity: Defer           Objective   Physical Exam   Constitutional: He is oriented to person, place, and time. He appears well-developed and well-nourished.   HENT:   Head: Normocephalic and atraumatic.   Right Ear: External ear normal.   Left Ear: External ear normal.   Nose: Nose normal.   Mouth/Throat: Oropharynx is clear and moist.   Eyes: Pupils are equal, round, and reactive to light. Conjunctivae and EOM are normal.   Neck: Normal range of motion. Neck supple.   Cardiovascular: Normal rate and regular rhythm. Exam reveals no gallop and no friction rub.   No murmur heard.  Pulmonary/Chest: Effort normal and breath sounds normal.   Abdominal: Soft. Bowel sounds are normal.   Musculoskeletal:        Right shoulder: He exhibits decreased range of motion, tenderness and pain. He exhibits no bony tenderness, no swelling, no effusion, no crepitus, no deformity, no laceration, no spasm, normal pulse and normal strength.   Severe pain with range of motion of the right shoulder, tenderness to the posterior right shoulder.  Pain with empty can test.  Brachial and radial pulse 2+.   Neurological: He is alert and oriented to person, place, and time.   Skin: Skin is warm and dry.   Psychiatric: He has a normal mood and affect. His behavior is normal. Judgment and thought content normal.   Vitals reviewed.      Procedures           ED Course  /76 (BP Location: Left arm, Patient Position: Lying)   Pulse 65   Temp 98 °F (36.7 °C) (Oral)   Resp 14   Ht 180.3 cm (71\")   Wt 103 kg (227 lb 1.2 oz)   SpO2 95%   BMI 31.67 kg/m²   Labs Reviewed - No data to display  Medications   sodium chloride 0.9 % flush 10 mL (has no administration in time range)   Morphine sulfate (PF) injection 4 mg (4 mg Intravenous Given 7/11/20 1433)     Xr Shoulder 2+ View Right    Result Date: " 7/11/2020  No acute fracture or dislocation.  Electronically Signed By-Jagdish Joe On:7/11/2020 3:15 PM This report was finalized on [id number] by  Jagdish Joe, .      ED Course as of Jul 11 1805   Sat Jul 11, 2020   1535 Pt reports pain is much improved.     [LB]   1544 Patient INSPECT report queried and reviewed.  I discussed with the patient the risk and benefits associated with opiate/narcotic pain medication today.  Based on patient's exam findings and assessment, I do feel it appropriate to prescribe a short course of opiate/ narcotic pain medication from the emergency department.  The patient was advised of the risks associated with such medication, including risk of dependency.  Patient was advised of medication administration instructions, appropriate use, potential side effects and adverse reactions.  Acknowledged and verbalized understanding, and agrees to treatment.    [LB]      ED Course User Index  [LB] Dorina Godoy, KEILA           Appropriate PPE was worn during the duration of the care for this patient while in the emergency department per [facility] guidelines                                MDM  Number of Diagnoses or Management Options  Fall, initial encounter:   Injury of right shoulder, initial encounter:   Diagnosis management comments: Co morbidities: Hypertension, PTSD, question asthma    Differentials: Rotator cuff injury, strain, dislocation  This list is not all inclusive and does not constitute the entireity of considered causes.   Labs: Not indicated    Imaging, Interpreted per radiologist, independently viewed by myself: No acute abnormality on x-ray    Patient was brought back to the emergency department room for evaluation and  placed on appropriate monitoring.  The above work-up was completed.    Plan and Disposition: Discharge home    Patient presented to ED with a complaint of a shoulder injury, he reports previous injury to his rotator cuff.  After a near fall today  it appears he experienced worsening pain.  There is concern that the patient has further injured his rotator cuff, given patient's history of elevated creatinine, I do not feel NSAIDs are appropriate, I will give the patient a short course of pain medication.  He does report he has an orthopedist which he will follow-up with calling Monday to make an appointment.  Be given a sling for support.    I discussed with the patient their test results today, plan of care, follow-up and return precautions.  Opportunities provided as questions.  All questions concerns were addressed at the bedside.    Patient is aware of signs and symptoms that require immediate return to the emergency department.           Amount and/or Complexity of Data Reviewed  Tests in the radiology section of CPT®: reviewed  Independent visualization of images, tracings, or specimens: yes (Imaging was independently viewed by me, and interpreted by the radiologist)    Patient Progress  Patient progress: stable      Final diagnoses:   Fall, initial encounter   Injury of right shoulder, initial encounter            Dorina Godoy, APRN  07/11/20 3212

## 2020-07-11 NOTE — DISCHARGE INSTRUCTIONS
Please call your orthopedic surgeon for a follow-up appointment  Return to the emergency department for new or worsening symptoms  Wear sling to support your shoulder  Pain medication as prescribed

## 2020-07-13 ENCOUNTER — OFFICE VISIT (OUTPATIENT)
Dept: FAMILY MEDICINE CLINIC | Facility: CLINIC | Age: 60
End: 2020-07-13

## 2020-07-13 VITALS
HEIGHT: 71 IN | OXYGEN SATURATION: 97 % | DIASTOLIC BLOOD PRESSURE: 91 MMHG | HEART RATE: 78 BPM | BODY MASS INDEX: 32.23 KG/M2 | RESPIRATION RATE: 18 BRPM | WEIGHT: 230.2 LBS | SYSTOLIC BLOOD PRESSURE: 148 MMHG

## 2020-07-13 DIAGNOSIS — M79.2 RADICULAR PAIN IN RIGHT ARM: ICD-10-CM
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
## 2020-07-13 DIAGNOSIS — M25.511 RIGHT SHOULDER PAIN, UNSPECIFIED CHRONICITY: Primary | ICD-10-CM

## 2020-07-13 PROCEDURE — 99213 OFFICE O/P EST LOW 20 MIN: CPT | Performed by: NURSE PRACTITIONER

## 2020-07-13 NOTE — PROGRESS NOTES
Subjective   Jagdish Rea is a 59 y.o. male.     Chief Complaint   Patient presents with   • Shoulder Pain     right - ED follow up       HPI  Patient is here for hospital follow up. He was seen in the ED on 7/11/2020. He said he was holding onto a towel rack with left hand, it broke and he fell on his right arm and shoulder after hitting his head on the other wall. NO LOC.   Xray right shoulder: no acute fracture or dislocation. Has mild spurring of glenohumeral joint indicating arthritis.   He is taking 1/2 of hydrocodone prescribed by ED. He is wearing sling. He has not used ice.   He does not sleep in sling. Pain scale is 6 with 1/2 pain medication. He has had physical therapy 2 times in past for rotator cuff.   Denies any more neck pain than normal. He always has limited movement to the right when he turns head.  Pain from arm radiates to pinky finger sometimes.           The following portions of the patient's history were reviewed and updated as appropriate: allergies, current medications, past family history, past medical history, past social history, past surgical history and problem list.      Current Outpatient Medications:   •  albuterol sulfate  (90 Base) MCG/ACT inhaler, Inhale 2 puffs Every 4 (Four) Hours As Needed for Wheezing., Disp: 1 inhaler, Rfl: 2  •  amLODIPine (NORVASC) 10 MG tablet, TAKE ONE TABLET BY MOUTH DAILY, Disp: 30 tablet, Rfl: 0  •  doxepin (SINEquan) 10 MG capsule, Take 1 capsule by mouth Every Night., Disp: , Rfl:   •  HYDROcodone-acetaminophen (NORCO) 5-325 MG per tablet, Take 1 tablet by mouth 3 (Three) Times a Day As Needed for Moderate Pain ., Disp: 9 tablet, Rfl: 0  •  lamoTRIgine (LaMICtal) 25 MG tablet, , Disp: , Rfl:   •  metoprolol succinate XL (TOPROL-XL) 25 MG 24 hr tablet, TAKE ONE TABLET BY MOUTH DAILY, Disp: 30 tablet, Rfl: 0  •  risperiDONE (risperDAL) 1 MG tablet, , Disp: , Rfl:   •  azithromycin (Zithromax Z-Alverto) 250 MG tablet, Take 2 tablets the first day,  "then 1 tablet daily for 4 days., Disp: 6 tablet, Rfl: 0  •  guaiFENesin-codeine (GUAIFENESIN AC) 100-10 MG/5ML liquid, Take 10 mL by mouth 3 (Three) Times a Day As Needed for Cough., Disp: 180 mL, Rfl: 0  •  LATUDA 20 MG tablet tablet, Take 1 tablet by mouth Daily., Disp: , Rfl:   •  montelukast (SINGULAIR) 10 MG tablet, Take 1 tablet by mouth Every Night., Disp: 30 tablet, Rfl: 0    Recent Results (from the past 4032 hour(s))   SARS-CoV-2, SERGIO ([facility]) - Swab, Nasopharynx    Collection Time: 04/30/20  3:16 PM   Result Value Ref Range    SARS-CoV-2, SERGIO Not Detected Not Detected         Review of Systems   Constitutional: Negative for fever.   Musculoskeletal: Positive for neck pain.        Limited ROM to turn neck right side.   Right posterior shoulder pain   Neurological: Positive for numbness.        Having numbness down right arm into right 5th finger.        Objective     /91 (BP Location: Left arm, Patient Position: Sitting, Cuff Size: Adult)   Pulse 78   Resp 18   Ht 180.3 cm (71\")   Wt 104 kg (230 lb 3.2 oz)   SpO2 97%   BMI 32.11 kg/m²     Physical Exam   Constitutional: He appears well-developed and well-nourished.   HENT:   Head: Normocephalic and atraumatic.   Right Ear: External ear normal.   Left Ear: External ear normal.   Nose: Nose normal.   Mouth/Throat: Oropharynx is clear and moist.   Eyes: Pupils are equal, round, and reactive to light.   Neck: Trachea normal. Neck supple. Spinous process tenderness and muscular tenderness present. Decreased range of motion present. No edema and no erythema present.       Pulmonary/Chest: Effort normal and breath sounds normal.   Musculoskeletal:        Right shoulder: He exhibits decreased range of motion, tenderness, bony tenderness and pain. He exhibits no swelling, no effusion, no crepitus, no deformity, no laceration, no spasm, normal pulse and normal strength.   Nursing note and vitals reviewed.        Assessment/Plan   Jagdish was seen today " for shoulder pain.    Diagnoses and all orders for this visit:    Right shoulder pain, unspecified chronicity  Comments:  has had PT in past for rotator cuff pain and limited movement. having increased pain due to fall onto shoulder  Orders:  -     Ambulatory Referral to Orthopedic Surgery  -     XR Spine Cervical Complete 4 or 5 View    Fall, initial encounter  Comments:  having pain right shoulder radiates down arm to numbness and pain 5th digit. has had problems in past rotator cuff. referred to orthopedist    Orders:  -     Ambulatory Referral to Orthopedic Surgery  -     XR Spine Cervical Complete 4 or 5 View    Radicular pain in right arm  -     XR Spine Cervical Complete 4 or 5 View      Patient Instructions   Shoulder Pain  Many things can cause shoulder pain, including:  · An injury to the shoulder.  · Overuse of the shoulder.  · Arthritis.  The source of the pain can be:  · Inflammation.  · An injury to the shoulder joint.  · An injury to a tendon, ligament, or bone.  Follow these instructions at home:  Pay attention to changes in your symptoms. Let your health care provider know about them. Follow these instructions to relieve your pain.  If you have a sling:  · Wear the sling as told by your health care provider. Remove it only as told by your health care provider.  · Loosen the sling if your fingers tingle, become numb, or turn cold and blue.  · Keep the sling clean.  · If the sling is not waterproof:  ? Do not let it get wet. Remove it to shower or bathe.  · Move your arm as little as possible, but keep your hand moving to prevent swelling.  Managing pain, stiffness, and swelling    · If directed, put ice on the painful area:  ? Put ice in a plastic bag.  ? Place a towel between your skin and the bag.  ? Leave the ice on for 20 minutes, 2-3 times per day. Stop applying ice if it does not help with the pain.  · Squeeze a soft ball or a foam pad as much as possible. This helps to keep the shoulder from  swelling. It also helps to strengthen the arm.  General instructions  · Take over-the-counter and prescription medicines only as told by your health care provider.  · Keep all follow-up visits as told by your health care provider. This is important.  Contact a health care provider if:  · Your pain gets worse.  · Your pain is not relieved with medicines.  · New pain develops in your arm, hand, or fingers.  Get help right away if:  · Your arm, hand, or fingers:  ? Tingle.  ? Become numb.  ? Become swollen.  ? Become painful.  ? Turn white or blue.  Summary  · Shoulder pain can be caused by an injury, overuse, or arthritis.  · Pay attention to changes in your symptoms. Let your health care provider know about them.  · This condition may be treated with a sling, ice, and pain medicines.  · Contact your health care provider if the pain gets worse or new pain develops. Get help right away if your arm, hand, or fingers tingle or become numb, swollen, or painful.  · Keep all follow-up visits as told by your health care provider. This is important.  This information is not intended to replace advice given to you by your health care provider. Make sure you discuss any questions you have with your health care provider.  Document Released: 09/27/2006 Document Revised: 07/02/2019 Document Reviewed: 07/02/2019  [employer] Patient Education © 2020 [employer]      Heat prior to exercises instructed. Ice after. dont wear sling all the time.  \f/u with orthpedist as discussed.  Take ibuprofen 600 mg 4 times a day with food and water. Use norco only if severe pain.       Yamilka Pascual, APRN    07/13/20

## 2020-07-13 NOTE — PATIENT INSTRUCTIONS
Shoulder Pain  Many things can cause shoulder pain, including:  · An injury to the shoulder.  · Overuse of the shoulder.  · Arthritis.  The source of the pain can be:  · Inflammation.  · An injury to the shoulder joint.  · An injury to a tendon, ligament, or bone.  Follow these instructions at home:  Pay attention to changes in your symptoms. Let your health care provider know about them. Follow these instructions to relieve your pain.  If you have a sling:  · Wear the sling as told by your health care provider. Remove it only as told by your health care provider.  · Loosen the sling if your fingers tingle, become numb, or turn cold and blue.  · Keep the sling clean.  · If the sling is not waterproof:  ? Do not let it get wet. Remove it to shower or bathe.  · Move your arm as little as possible, but keep your hand moving to prevent swelling.  Managing pain, stiffness, and swelling    · If directed, put ice on the painful area:  ? Put ice in a plastic bag.  ? Place a towel between your skin and the bag.  ? Leave the ice on for 20 minutes, 2-3 times per day. Stop applying ice if it does not help with the pain.  · Squeeze a soft ball or a foam pad as much as possible. This helps to keep the shoulder from swelling. It also helps to strengthen the arm.  General instructions  · Take over-the-counter and prescription medicines only as told by your health care provider.  · Keep all follow-up visits as told by your health care provider. This is important.  Contact a health care provider if:  · Your pain gets worse.  · Your pain is not relieved with medicines.  · New pain develops in your arm, hand, or fingers.  Get help right away if:  · Your arm, hand, or fingers:  ? Tingle.  ? Become numb.  ? Become swollen.  ? Become painful.  ? Turn white or blue.  Summary  · Shoulder pain can be caused by an injury, overuse, or arthritis.  · Pay attention to changes in your symptoms. Let your health care provider know about  them.  · This condition may be treated with a sling, ice, and pain medicines.  · Contact your health care provider if the pain gets worse or new pain develops. Get help right away if your arm, hand, or fingers tingle or become numb, swollen, or painful.  · Keep all follow-up visits as told by your health care provider. This is important.  This information is not intended to replace advice given to you by your health care provider. Make sure you discuss any questions you have with your health care provider.  Document Released: 09/27/2006 Document Revised: 07/02/2019 Document Reviewed: 07/02/2019  [employer] Patient Education © 2020 [employer]      Heat prior to exercises instructed. Ice after. dont wear sling all the time.  \f/u with orthpedist as discussed.  Take ibuprofen 600 mg 4 times a day with food and water. Use norco only if severe pain.

## 2020-07-14 ENCOUNTER — HOSPITAL ENCOUNTER (OUTPATIENT)
Dept: GENERAL RADIOLOGY | Facility: HOSPITAL | Age: 60
Discharge: HOME | End: 2020-07-14
Admitting: NURSE PRACTITIONER

## 2020-07-14 PROCEDURE — 72050 X-RAY EXAM NECK SPINE 4/5VWS: CPT

## 2020-07-20 ENCOUNTER — OFFICE VISIT (OUTPATIENT)
Dept: ORTHOPEDIC SURGERY | Facility: CLINIC | Age: 60
End: 2020-07-20

## 2020-07-20 VITALS
BODY MASS INDEX: 32.2 KG/M2 | SYSTOLIC BLOOD PRESSURE: 137 MMHG | HEART RATE: 72 BPM | WEIGHT: 230 LBS | DIASTOLIC BLOOD PRESSURE: 85 MMHG | HEIGHT: 71 IN

## 2020-07-20 DIAGNOSIS — M25.511 ACUTE PAIN OF RIGHT SHOULDER: Primary | ICD-10-CM

## 2020-07-20 PROCEDURE — 99203 OFFICE O/P NEW LOW 30 MIN: CPT | Performed by: ORTHOPAEDIC SURGERY

## 2020-07-20 NOTE — PROGRESS NOTES
"     Patient ID: Jagdish Rea is a 59 y.o. male.    Chief Complaint:    Chief Complaint   Patient presents with   • Right Shoulder - Consult       HPI:  Jagdish is a 59-year-old gentleman here in consultation for right shoulder pain from Nicole Pascual.  Pain is been present for 6 months.  There is no injury.  He has pain to the deltoid it is worse with activity and lifting.  It is better with rest.  He has taken oral ibuprofen with minimal relief.  He has done some stretching and had 2 courses of physical therapy under the guidance of his primary care physician.  None of this is really helped his pain much.  Past Medical History:   Diagnosis Date   • Hypertension    • PTSD (post-traumatic stress disorder)        Past Surgical History:   Procedure Laterality Date   • HERNIA REPAIR     • KNEE ARTHROPLASTY      x2   • TONSILLECTOMY         Family History   Problem Relation Age of Onset   • Heart disease Mother           Social History     Occupational History   • Not on file   Tobacco Use   • Smoking status: Current Every Day Smoker   • Smokeless tobacco: Never Used   Substance and Sexual Activity   • Alcohol use: No     Frequency: Never   • Drug use: No   • Sexual activity: Defer      Review of Systems   Cardiovascular: Negative for chest pain.   Musculoskeletal: Positive for arthralgias.       Objective:    /85   Pulse 72   Ht 180.3 cm (71\")   Wt 104 kg (230 lb)   BMI 32.08 kg/m²     Physical Examination:  He is a pleasant male in no distress. He is alert and oriented x3 and appears his stated age.  Right shoulder demonstrates no scars and no atrophy.  There are no areas of tenderness.  Passive elevation 160 degrees abduction 130 degrees external rotation 40 degrees internal rotation L5.  He has pain and weakness on Speed, Jagdish, supraspinatus testing.  Belly press and liftoff are 5/5 in 4/5.Sensory and motor exam are intact all distributions. Radial pulse is palpable and capillary refill is less than two " seconds to all digits    Imaging:  X-rays demonstrate mild glenohumeral arthritis    Assessment:  Right shoulder pain possible cuff tear    Plan:    I recommend MRI to evaluate the rotator cuff        Disclaimer: Please note that areas of this note were completed with computer voice recognition software.  Quite often unanticipated grammatical, syntax, homophones, and other interpretive errors are inadvertently transcribed by the computer software. Please excuse any errors that have escaped final proofreading.

## 2020-07-27 RX ORDER — AMLODIPINE BESYLATE 10 MG/1
TABLET ORAL
Qty: 30 TABLET | Refills: 0 | Status: SHIPPED | OUTPATIENT
Start: 2020-07-27 | End: 2020-08-22

## 2020-07-27 RX ORDER — METOPROLOL SUCCINATE 25 MG/1
TABLET, EXTENDED RELEASE ORAL
Qty: 30 TABLET | Refills: 0 | Status: SHIPPED | OUTPATIENT
Start: 2020-07-27 | End: 2020-08-22

## 2020-07-29 ENCOUNTER — OFFICE VISIT (OUTPATIENT)
Dept: ORTHOPEDIC SURGERY | Facility: CLINIC | Age: 60
End: 2020-07-29

## 2020-07-29 ENCOUNTER — PREP FOR SURGERY (OUTPATIENT)
Dept: OTHER | Facility: HOSPITAL | Age: 60
End: 2020-07-29

## 2020-07-29 VITALS
DIASTOLIC BLOOD PRESSURE: 87 MMHG | BODY MASS INDEX: 32.2 KG/M2 | WEIGHT: 230 LBS | SYSTOLIC BLOOD PRESSURE: 137 MMHG | HEART RATE: 73 BPM | HEIGHT: 71 IN

## 2020-07-29 DIAGNOSIS — M75.111 PARTIAL NONTRAUMATIC TEAR OF RIGHT ROTATOR CUFF: ICD-10-CM

## 2020-07-29 DIAGNOSIS — R79.1 ABNORMAL COAGULATION PROFILE: ICD-10-CM

## 2020-07-29 DIAGNOSIS — M25.511 ACUTE PAIN OF RIGHT SHOULDER: Primary | ICD-10-CM

## 2020-07-29 PROCEDURE — 99214 OFFICE O/P EST MOD 30 MIN: CPT | Performed by: ORTHOPAEDIC SURGERY

## 2020-07-29 RX ORDER — TRIAMCINOLONE ACETONIDE 40 MG/ML
40 INJECTION, SUSPENSION INTRA-ARTICULAR; INTRAMUSCULAR ONCE
Status: DISCONTINUED | OUTPATIENT
Start: 2020-07-29 | End: 2022-07-14

## 2020-07-29 NOTE — PROGRESS NOTES
"     Patient ID: Jagdish Rea is a 59 y.o. male.  Right shoulder pain  Jagdish is a 59-year-old gentleman with nearly a year of right shoulder pain despite conservative treatment with physical therapy and oral medication.  Continues with pain in the impingement area down to the tricep and bicep area worse at night and with overhead activity    Review of Systems:  Right shoulder pain  Denies chest pain      Objective:    /87   Pulse 73   Ht 180.3 cm (71\")   Wt 104 kg (230 lb)   BMI 32.08 kg/m²     Physical Examination:     He is a pleasant male in no distress. He is alert and oriented x3 and appears his stated age.  Right shoulder demonstrates no scars and no atrophy.  There are no areas of tenderness.  Passive elevation 160 degrees abduction 130 degrees external rotation 40 degrees internal rotation L5.  He has pain and weakness on Speed, Jagdish, supraspinatus testing.  Belly press and liftoff are 5/5 in 4/5.Sensory and motor exam are intact all distributions. Radial pulse is palpable and capillary refill is less than two seconds to all digits    Imaging:   MRI from outside open MRI report is normal, my read read of it demonstrates partial high-grade supraspinatus tearing with intact bursal surface anteriorly    Assessment:    Right shoulder partial high-grade cuff tear    Plan:  Treatment options discussed.  I discussed my read of his MRI in light of the report.  Further treatment options were discussed including therapy and injections versus diagnostic shoulder arthroscopy and possible cuff repair.  He would like to proceed with shoulder arthroscopy and possible cuff repair  Risks and benefits, specifically risks of bleeding, scar, infection, fracture, stiffness, retear, nerve, tendon, artery damage, the need for further surgery, DVT, and loss of life or limb and rehab were discussed. All questions were answered and addressed.          Disclaimer: Please note that areas of this note were completed with " computer voice recognition software.  Quite often unanticipated grammatical, syntax, homophones, and other interpretive errors are inadvertently transcribed by the computer software. Please excuse any errors that have escaped final proofreading.

## 2020-08-05 RX ORDER — HYDROCHLOROTHIAZIDE 12.5 MG/1
12.5 CAPSULE, GELATIN COATED ORAL AS NEEDED
COMMUNITY

## 2020-08-05 RX ORDER — PRAVASTATIN SODIUM 10 MG
10 TABLET ORAL NIGHTLY
COMMUNITY
End: 2020-11-18 | Stop reason: SDUPTHER

## 2020-08-07 RX ORDER — DOXEPIN HYDROCHLORIDE 10 MG/1
10 CAPSULE ORAL NIGHTLY
Qty: 90 CAPSULE | Refills: 1 | Status: SHIPPED | OUTPATIENT
Start: 2020-08-07 | End: 2020-11-18 | Stop reason: SDUPTHER

## 2020-08-08 ENCOUNTER — LAB (OUTPATIENT)
Dept: LAB | Facility: HOSPITAL | Age: 60
End: 2020-08-08

## 2020-08-08 ENCOUNTER — HOSPITAL ENCOUNTER (OUTPATIENT)
Dept: CARDIOLOGY | Facility: HOSPITAL | Age: 60
Discharge: HOME | End: 2020-08-08
Admitting: ORTHOPAEDIC SURGERY

## 2020-08-08 DIAGNOSIS — M25.511 ACUTE PAIN OF RIGHT SHOULDER: ICD-10-CM

## 2020-08-08 DIAGNOSIS — R79.1 ABNORMAL COAGULATION PROFILE: ICD-10-CM

## 2020-08-08 LAB
ANION GAP SERPL CALCULATED.3IONS-SCNC: 11.9 MMOL/L (ref 5–15)
APTT PPP: 28.5 SECONDS (ref 24–31)
BASOPHILS # BLD AUTO: 0.05 10*3/MM3 (ref 0–0.2)
BASOPHILS NFR BLD AUTO: 0.7 % (ref 0–1.5)
BUN SERPL-MCNC: 16 MG/DL (ref 6–20)
BUN/CREAT SERPL: 15.8 (ref 7–25)
CALCIUM SPEC-SCNC: 9.1 MG/DL (ref 8.6–10.5)
CHLORIDE SERPL-SCNC: 105 MMOL/L (ref 98–107)
CO2 SERPL-SCNC: 24.1 MMOL/L (ref 22–29)
CREAT SERPL-MCNC: 1.01 MG/DL (ref 0.76–1.27)
DEPRECATED RDW RBC AUTO: 49.9 FL (ref 37–54)
EOSINOPHIL # BLD AUTO: 0.15 10*3/MM3 (ref 0–0.4)
EOSINOPHIL NFR BLD AUTO: 2.2 % (ref 0.3–6.2)
ERYTHROCYTE [DISTWIDTH] IN BLOOD BY AUTOMATED COUNT: 13.5 % (ref 12.3–15.4)
GFR SERPL CREATININE-BSD FRML MDRD: 92 ML/MIN/1.73
GLUCOSE SERPL-MCNC: 94 MG/DL (ref 65–99)
HCT VFR BLD AUTO: 45.4 % (ref 37.5–51)
HGB BLD-MCNC: 15.1 G/DL (ref 13–17.7)
IMM GRANULOCYTES # BLD AUTO: 0.08 10*3/MM3 (ref 0–0.05)
IMM GRANULOCYTES NFR BLD AUTO: 1.2 % (ref 0–0.5)
INR PPP: 0.96 (ref 0.93–1.1)
LYMPHOCYTES # BLD AUTO: 2.56 10*3/MM3 (ref 0.7–3.1)
LYMPHOCYTES NFR BLD AUTO: 37.3 % (ref 19.6–45.3)
MCH RBC QN AUTO: 33 PG (ref 26.6–33)
MCHC RBC AUTO-ENTMCNC: 33.3 G/DL (ref 31.5–35.7)
MCV RBC AUTO: 99.1 FL (ref 79–97)
MONOCYTES # BLD AUTO: 0.58 10*3/MM3 (ref 0.1–0.9)
MONOCYTES NFR BLD AUTO: 8.5 % (ref 5–12)
NEUTROPHILS NFR BLD AUTO: 3.44 10*3/MM3 (ref 1.7–7)
NEUTROPHILS NFR BLD AUTO: 50.1 % (ref 42.7–76)
NRBC BLD AUTO-RTO: 0 /100 WBC (ref 0–0.2)
PLATELET # BLD AUTO: 154 10*3/MM3 (ref 140–450)
PMV BLD AUTO: 12 FL (ref 6–12)
POTASSIUM SERPL-SCNC: 3.7 MMOL/L (ref 3.5–5.2)
PROTHROMBIN TIME: 10.5 SECONDS (ref 9.6–11.7)
RBC # BLD AUTO: 4.58 10*6/MM3 (ref 4.14–5.8)
SODIUM SERPL-SCNC: 141 MMOL/L (ref 136–145)
WBC # BLD AUTO: 6.86 10*3/MM3 (ref 3.4–10.8)

## 2020-08-08 PROCEDURE — 85730 THROMBOPLASTIN TIME PARTIAL: CPT

## 2020-08-08 PROCEDURE — U0004 COV-19 TEST NON-CDC HGH THRU: HCPCS

## 2020-08-08 PROCEDURE — 93005 ELECTROCARDIOGRAM TRACING: CPT | Performed by: ORTHOPAEDIC SURGERY

## 2020-08-08 PROCEDURE — C9803 HOPD COVID-19 SPEC COLLECT: HCPCS

## 2020-08-08 PROCEDURE — 36415 COLL VENOUS BLD VENIPUNCTURE: CPT

## 2020-08-08 PROCEDURE — 85610 PROTHROMBIN TIME: CPT

## 2020-08-08 PROCEDURE — U0002 COVID-19 LAB TEST NON-CDC: HCPCS

## 2020-08-08 PROCEDURE — 80048 BASIC METABOLIC PNL TOTAL CA: CPT

## 2020-08-08 PROCEDURE — 85025 COMPLETE CBC W/AUTO DIFF WBC: CPT

## 2020-08-09 PROCEDURE — 93010 ELECTROCARDIOGRAM REPORT: CPT | Performed by: INTERNAL MEDICINE

## 2020-08-10 ENCOUNTER — ANESTHESIA EVENT (OUTPATIENT)
Dept: PERIOP | Facility: HOSPITAL | Age: 60
End: 2020-08-10

## 2020-08-10 DIAGNOSIS — M75.111 PARTIAL NONTRAUMATIC TEAR OF RIGHT ROTATOR CUFF: Primary | ICD-10-CM

## 2020-08-10 LAB
REF LAB TEST METHOD: NORMAL
SARS-COV-2 RNA RESP QL NAA+PROBE: NOT DETECTED

## 2020-08-10 RX ORDER — OXYCODONE AND ACETAMINOPHEN 10; 325 MG/1; MG/1
1 TABLET ORAL EVERY 6 HOURS PRN
Qty: 28 TABLET | Refills: 0 | Status: SHIPPED | OUTPATIENT
Start: 2020-08-10 | End: 2020-09-08

## 2020-08-10 RX ORDER — PROMETHAZINE HYDROCHLORIDE 25 MG/1
25 TABLET ORAL EVERY 6 HOURS PRN
Qty: 21 TABLET | Refills: 1 | Status: SHIPPED | OUTPATIENT
Start: 2020-08-10 | End: 2020-09-08

## 2020-08-10 RX ORDER — CEPHALEXIN 500 MG/1
500 CAPSULE ORAL 4 TIMES DAILY
Qty: 4 CAPSULE | Refills: 0 | Status: SHIPPED | OUTPATIENT
Start: 2020-08-10 | End: 2020-08-11

## 2020-08-10 RX ORDER — NAPROXEN 500 MG/1
500 TABLET ORAL 2 TIMES DAILY WITH MEALS
Qty: 28 TABLET | Refills: 0 | Status: SHIPPED | OUTPATIENT
Start: 2020-08-10 | End: 2020-09-08

## 2020-08-11 ENCOUNTER — HOSPITAL ENCOUNTER (OUTPATIENT)
Facility: HOSPITAL | Age: 60
Setting detail: HOSPITAL OUTPATIENT SURGERY
Discharge: HOME | End: 2020-08-11
Attending: ORTHOPAEDIC SURGERY | Admitting: ORTHOPAEDIC SURGERY

## 2020-08-11 ENCOUNTER — ANESTHESIA (OUTPATIENT)
Dept: PERIOP | Facility: HOSPITAL | Age: 60
End: 2020-08-11

## 2020-08-11 VITALS
RESPIRATION RATE: 16 BRPM | OXYGEN SATURATION: 91 % | HEIGHT: 71 IN | SYSTOLIC BLOOD PRESSURE: 132 MMHG | HEART RATE: 74 BPM | DIASTOLIC BLOOD PRESSURE: 86 MMHG | TEMPERATURE: 96.2 F | WEIGHT: 230 LBS | BODY MASS INDEX: 32.2 KG/M2

## 2020-08-11 DIAGNOSIS — M25.511 ACUTE PAIN OF RIGHT SHOULDER: ICD-10-CM

## 2020-08-11 PROCEDURE — 25010000002 KETOROLAC TROMETHAMINE PER 15 MG: Performed by: ORTHOPAEDIC SURGERY

## 2020-08-11 PROCEDURE — 25010000003 LIDOCAINE 1 % SOLUTION 50 ML VIAL: Performed by: ORTHOPAEDIC SURGERY

## 2020-08-11 PROCEDURE — 76942 ECHO GUIDE FOR BIOPSY: CPT | Performed by: ORTHOPAEDIC SURGERY

## 2020-08-11 PROCEDURE — 25010000002 ROPIVACAINE PER 1 MG: Performed by: ANESTHESIOLOGY

## 2020-08-11 PROCEDURE — 29823 SHO ARTHRS SRG XTNSV DBRDMT: CPT | Performed by: ORTHOPAEDIC SURGERY

## 2020-08-11 PROCEDURE — 25010000002 FENTANYL CITRATE (PF) 100 MCG/2ML SOLUTION: Performed by: NURSE ANESTHETIST, CERTIFIED REGISTERED

## 2020-08-11 PROCEDURE — 25010000002 EPINEPHRINE PER 0.1 MG: Performed by: ORTHOPAEDIC SURGERY

## 2020-08-11 PROCEDURE — 25010000002 DEXAMETHASONE PER 1 MG: Performed by: ANESTHESIOLOGY

## 2020-08-11 PROCEDURE — 25010000003 LIDOCAINE 1 % SOLUTION 20 ML VIAL: Performed by: ORTHOPAEDIC SURGERY

## 2020-08-11 PROCEDURE — 25010000002 ONDANSETRON PER 1 MG: Performed by: ORTHOPAEDIC SURGERY

## 2020-08-11 PROCEDURE — 25010000002 DEXAMETHASONE PER 1 MG: Performed by: NURSE ANESTHETIST, CERTIFIED REGISTERED

## 2020-08-11 PROCEDURE — 25010000002 MIDAZOLAM PER 1 MG: Performed by: ANESTHESIOLOGY

## 2020-08-11 PROCEDURE — C1713 ANCHOR/SCREW BN/BN,TIS/BN: HCPCS | Performed by: ORTHOPAEDIC SURGERY

## 2020-08-11 PROCEDURE — 25010000002 PROPOFOL 10 MG/ML EMULSION: Performed by: NURSE ANESTHETIST, CERTIFIED REGISTERED

## 2020-08-11 PROCEDURE — 29827 SHO ARTHRS SRG RT8TR CUF RPR: CPT | Performed by: ORTHOPAEDIC SURGERY

## 2020-08-11 DEVICE — 2MM HI-FI® TAPE WHITE/BLACK
Type: IMPLANTABLE DEVICE | Status: FUNCTIONAL
Brand: HI-FI

## 2020-08-11 DEVICE — HI-FI® PASSING LOOP
Type: IMPLANTABLE DEVICE | Status: FUNCTIONAL
Brand: HI-FI

## 2020-08-11 DEVICE — CROSSFT KNOTLESS BIOCOMPOSITE 4.75 MM SUTURE ANCHOR
Type: IMPLANTABLE DEVICE | Site: SHOULDER | Status: FUNCTIONAL
Brand: CROSSFT

## 2020-08-11 RX ORDER — ACETAMINOPHEN 650 MG/1
650 SUPPOSITORY RECTAL ONCE AS NEEDED
Status: DISCONTINUED | OUTPATIENT
Start: 2020-08-11 | End: 2020-08-11 | Stop reason: HOSPADM

## 2020-08-11 RX ORDER — SODIUM CHLORIDE 0.9 % (FLUSH) 0.9 %
3 SYRINGE (ML) INJECTION EVERY 12 HOURS SCHEDULED
Status: DISCONTINUED | OUTPATIENT
Start: 2020-08-11 | End: 2020-08-11 | Stop reason: HOSPADM

## 2020-08-11 RX ORDER — MIDAZOLAM HYDROCHLORIDE 1 MG/ML
INJECTION INTRAMUSCULAR; INTRAVENOUS
Status: COMPLETED | OUTPATIENT
Start: 2020-08-11 | End: 2020-08-11

## 2020-08-11 RX ORDER — SODIUM CHLORIDE, SODIUM LACTATE, POTASSIUM CHLORIDE, CALCIUM CHLORIDE 600; 310; 30; 20 MG/100ML; MG/100ML; MG/100ML; MG/100ML
9 INJECTION, SOLUTION INTRAVENOUS CONTINUOUS PRN
Status: DISCONTINUED | OUTPATIENT
Start: 2020-08-11 | End: 2020-08-11 | Stop reason: HOSPADM

## 2020-08-11 RX ORDER — NEOSTIGMINE METHYLSULFATE 5 MG/5 ML
SYRINGE (ML) INTRAVENOUS AS NEEDED
Status: DISCONTINUED | OUTPATIENT
Start: 2020-08-11 | End: 2020-08-11 | Stop reason: SURG

## 2020-08-11 RX ORDER — PROMETHAZINE HYDROCHLORIDE 25 MG/1
25 SUPPOSITORY RECTAL ONCE AS NEEDED
Status: DISCONTINUED | OUTPATIENT
Start: 2020-08-11 | End: 2020-08-11 | Stop reason: HOSPADM

## 2020-08-11 RX ORDER — LIDOCAINE HYDROCHLORIDE 20 MG/ML
INJECTION, SOLUTION EPIDURAL; INFILTRATION; INTRACAUDAL; PERINEURAL AS NEEDED
Status: DISCONTINUED | OUTPATIENT
Start: 2020-08-11 | End: 2020-08-11 | Stop reason: SURG

## 2020-08-11 RX ORDER — PROPOFOL 10 MG/ML
VIAL (ML) INTRAVENOUS AS NEEDED
Status: DISCONTINUED | OUTPATIENT
Start: 2020-08-11 | End: 2020-08-11 | Stop reason: SURG

## 2020-08-11 RX ORDER — ROCURONIUM BROMIDE 10 MG/ML
INJECTION, SOLUTION INTRAVENOUS AS NEEDED
Status: DISCONTINUED | OUTPATIENT
Start: 2020-08-11 | End: 2020-08-11 | Stop reason: SURG

## 2020-08-11 RX ORDER — ROPIVACAINE HYDROCHLORIDE 5 MG/ML
INJECTION, SOLUTION EPIDURAL; INFILTRATION; PERINEURAL
Status: COMPLETED | OUTPATIENT
Start: 2020-08-11 | End: 2020-08-11

## 2020-08-11 RX ORDER — DEXAMETHASONE SODIUM PHOSPHATE 4 MG/ML
INJECTION, SOLUTION INTRA-ARTICULAR; INTRALESIONAL; INTRAMUSCULAR; INTRAVENOUS; SOFT TISSUE AS NEEDED
Status: DISCONTINUED | OUTPATIENT
Start: 2020-08-11 | End: 2020-08-11 | Stop reason: SURG

## 2020-08-11 RX ORDER — SODIUM CHLORIDE 0.9 % (FLUSH) 0.9 %
3-10 SYRINGE (ML) INJECTION AS NEEDED
Status: DISCONTINUED | OUTPATIENT
Start: 2020-08-11 | End: 2020-08-11 | Stop reason: HOSPADM

## 2020-08-11 RX ORDER — ONDANSETRON 2 MG/ML
4 INJECTION INTRAMUSCULAR; INTRAVENOUS ONCE AS NEEDED
Status: DISCONTINUED | OUTPATIENT
Start: 2020-08-11 | End: 2020-08-11 | Stop reason: HOSPADM

## 2020-08-11 RX ORDER — PHENYLEPHRINE HCL IN 0.9% NACL 0.5 MG/5ML
SYRINGE (ML) INTRAVENOUS AS NEEDED
Status: DISCONTINUED | OUTPATIENT
Start: 2020-08-11 | End: 2020-08-11 | Stop reason: SURG

## 2020-08-11 RX ORDER — ONDANSETRON 2 MG/ML
4 INJECTION INTRAMUSCULAR; INTRAVENOUS ONCE AS NEEDED
Status: COMPLETED | OUTPATIENT
Start: 2020-08-11 | End: 2020-08-11

## 2020-08-11 RX ORDER — GLYCOPYRROLATE 1 MG/5 ML
SYRINGE (ML) INTRAVENOUS AS NEEDED
Status: DISCONTINUED | OUTPATIENT
Start: 2020-08-11 | End: 2020-08-11 | Stop reason: SURG

## 2020-08-11 RX ORDER — FENTANYL CITRATE 50 UG/ML
INJECTION, SOLUTION INTRAMUSCULAR; INTRAVENOUS AS NEEDED
Status: DISCONTINUED | OUTPATIENT
Start: 2020-08-11 | End: 2020-08-11 | Stop reason: SURG

## 2020-08-11 RX ORDER — PROMETHAZINE HYDROCHLORIDE 25 MG/1
25 TABLET ORAL ONCE AS NEEDED
Status: DISCONTINUED | OUTPATIENT
Start: 2020-08-11 | End: 2020-08-11 | Stop reason: HOSPADM

## 2020-08-11 RX ORDER — DEXAMETHASONE SODIUM PHOSPHATE 4 MG/ML
INJECTION, SOLUTION INTRA-ARTICULAR; INTRALESIONAL; INTRAMUSCULAR; INTRAVENOUS; SOFT TISSUE
Status: COMPLETED | OUTPATIENT
Start: 2020-08-11 | End: 2020-08-11

## 2020-08-11 RX ORDER — ACETAMINOPHEN 325 MG/1
650 TABLET ORAL ONCE AS NEEDED
Status: DISCONTINUED | OUTPATIENT
Start: 2020-08-11 | End: 2020-08-11 | Stop reason: HOSPADM

## 2020-08-11 RX ORDER — HYDROMORPHONE HCL 110MG/55ML
0.25 PATIENT CONTROLLED ANALGESIA SYRINGE INTRAVENOUS
Status: DISCONTINUED | OUTPATIENT
Start: 2020-08-11 | End: 2020-08-11 | Stop reason: HOSPADM

## 2020-08-11 RX ORDER — PROMETHAZINE HYDROCHLORIDE 25 MG/ML
6.25 INJECTION, SOLUTION INTRAMUSCULAR; INTRAVENOUS ONCE AS NEEDED
Status: DISCONTINUED | OUTPATIENT
Start: 2020-08-11 | End: 2020-08-11 | Stop reason: HOSPADM

## 2020-08-11 RX ORDER — FENTANYL CITRATE 50 UG/ML
25 INJECTION, SOLUTION INTRAMUSCULAR; INTRAVENOUS
Status: DISCONTINUED | OUTPATIENT
Start: 2020-08-11 | End: 2020-08-11 | Stop reason: HOSPADM

## 2020-08-11 RX ORDER — LIDOCAINE HYDROCHLORIDE 10 MG/ML
0.5 INJECTION, SOLUTION EPIDURAL; INFILTRATION; INTRACAUDAL; PERINEURAL ONCE AS NEEDED
Status: DISCONTINUED | OUTPATIENT
Start: 2020-08-11 | End: 2020-08-11 | Stop reason: HOSPADM

## 2020-08-11 RX ADMIN — CEFAZOLIN SODIUM 2 G: 1 INJECTION, POWDER, FOR SOLUTION INTRAMUSCULAR; INTRAVENOUS at 10:08

## 2020-08-11 RX ADMIN — DEXAMETHASONE SODIUM PHOSPHATE 12 MG: 4 INJECTION, SOLUTION INTRAMUSCULAR; INTRAVENOUS at 10:23

## 2020-08-11 RX ADMIN — PHENYLEPHRINE HYDROCHLORIDE 100 MCG: 10 INJECTION INTRAVENOUS at 10:49

## 2020-08-11 RX ADMIN — PHENYLEPHRINE HYDROCHLORIDE 100 MCG: 10 INJECTION INTRAVENOUS at 10:24

## 2020-08-11 RX ADMIN — ROPIVACAINE HYDROCHLORIDE 30 ML: 5 INJECTION, SOLUTION EPIDURAL; INFILTRATION; PERINEURAL at 08:44

## 2020-08-11 RX ADMIN — LIDOCAINE HYDROCHLORIDE 40 MG: 20 INJECTION, SOLUTION EPIDURAL; INFILTRATION; INTRACAUDAL; PERINEURAL at 10:05

## 2020-08-11 RX ADMIN — DEXAMETHASONE SODIUM PHOSPHATE 4 MG: 4 INJECTION, SOLUTION INTRAMUSCULAR; INTRAVENOUS at 08:44

## 2020-08-11 RX ADMIN — PHENYLEPHRINE HYDROCHLORIDE 100 MCG: 10 INJECTION INTRAVENOUS at 11:04

## 2020-08-11 RX ADMIN — PHENYLEPHRINE HYDROCHLORIDE 100 MCG: 10 INJECTION INTRAVENOUS at 10:40

## 2020-08-11 RX ADMIN — PHENYLEPHRINE HYDROCHLORIDE 100 MCG: 10 INJECTION INTRAVENOUS at 10:33

## 2020-08-11 RX ADMIN — Medication 0.4 MG: at 10:59

## 2020-08-11 RX ADMIN — PHENYLEPHRINE HYDROCHLORIDE 100 MCG: 10 INJECTION INTRAVENOUS at 10:54

## 2020-08-11 RX ADMIN — ROCURONIUM BROMIDE 50 MG: 10 INJECTION, SOLUTION INTRAVENOUS at 10:05

## 2020-08-11 RX ADMIN — ONDANSETRON 4 MG: 2 INJECTION INTRAMUSCULAR; INTRAVENOUS at 10:59

## 2020-08-11 RX ADMIN — FENTANYL CITRATE 100 MCG: 50 INJECTION, SOLUTION INTRAMUSCULAR; INTRAVENOUS at 10:05

## 2020-08-11 RX ADMIN — PROPOFOL 200 MG: 10 INJECTION, EMULSION INTRAVENOUS at 10:05

## 2020-08-11 RX ADMIN — Medication 3 MG: at 10:59

## 2020-08-11 RX ADMIN — SODIUM CHLORIDE, SODIUM LACTATE, POTASSIUM CHLORIDE, AND CALCIUM CHLORIDE 9 ML/HR: .6; .31; .03; .02 INJECTION, SOLUTION INTRAVENOUS at 08:02

## 2020-08-11 RX ADMIN — MIDAZOLAM 2 MG: 1 INJECTION INTRAMUSCULAR; INTRAVENOUS at 08:44

## 2020-08-11 NOTE — ANESTHESIA PREPROCEDURE EVALUATION
Anesthesia Evaluation     Patient summary reviewed and Nursing notes reviewed   NPO Solid Status: > 8 hours  NPO Liquid Status: > 8 hours           Airway   Mallampati: II  TM distance: >3 FB  Neck ROM: full  Possible difficult intubation  Dental      Pulmonary    (+) a smoker Current Abstained day of surgery, COPD mild,   Cardiovascular   Exercise tolerance: good (4-7 METS)    (+) hypertension, hyperlipidemia,       Neuro/Psych  (+) numbness, psychiatric history Anxiety and PTSD,     GI/Hepatic/Renal/Endo      Musculoskeletal     Abdominal    Substance History      OB/GYN          Other   arthritis,          Phys Exam Other: beard                Anesthesia Plan    ASA 3     general with block     intravenous induction     Anesthetic plan, all risks, benefits, and alternatives have been provided, discussed and informed consent has been obtained with: patient.

## 2020-08-11 NOTE — ANESTHESIA PROCEDURE NOTES
Airway  Urgency: elective    Date/Time: 8/11/2020 10:06 AM  Airway not difficult    General Information and Staff    Patient location during procedure: OR  Anesthesiologist: Paul Nascimento MD  CRNA: Alexandrea Baxter CRNA    Indications and Patient Condition  Indications for airway management: airway protection    Preoxygenated: yes  MILS maintained throughout  Mask difficulty assessment: 1 - vent by mask    Final Airway Details  Final airway type: endotracheal airway      Successful airway: ETT  Cuffed: yes   Successful intubation technique: direct laryngoscopy  Facilitating devices/methods: intubating stylet and cricoid pressure  Endotracheal tube insertion site: oral  Blade: Cespedes  Blade size: 2  ETT size (mm): 7.5  Cormack-Lehane Classification: grade IIb - view of arytenoids or posterior of glottis only  Placement verified by: chest auscultation and capnometry   Measured from: lips  ETT/EBT  to lips (cm): 24  Number of attempts at approach: 1

## 2020-08-11 NOTE — ANESTHESIA PROCEDURE NOTES
Peripheral Block    Pre-sedation assessment completed: 8/11/2020 8:24 AM    Patient reassessed immediately prior to procedure    Patient location during procedure: pre-op  Start time: 8/11/2020 8:32 AM  Stop time: 8/11/2020 8:44 AM  Reason for block: at surgeon's request and post-op pain management  Performed by  Anesthesiologist: Paul Nascimento MD  Preanesthetic Checklist  Completed: patient identified, site marked, surgical consent, pre-op evaluation, timeout performed, IV checked, risks and benefits discussed and monitors and equipment checked  Prep:  Pt Position: sitting  Sterile barriers:cap, gloves, mask and sterile barriers  Prep: ChloraPrep  Patient monitoring: blood pressure monitoring, continuous pulse oximetry and EKG  Procedure  Sedation:yes  Performed under: local infiltration  Guidance:ultrasound guided  ULTRASOUND INTERPRETATION.  Using ultrasound guidance a 20 G gauge needle was placed in close proximity to the brachial plexus nerve, at which point, under ultrasound guidance anesthetic was injected in the area of the nerve and spread of the anesthesia was seen on ultrasound in close proximity thereto.  There were no abnormalities seen on ultrasound; a digital image was taken; and the patient tolerated the procedure with no complications. Images:still images obtained, printed/placed on chart    Laterality:right  Block Type:interscalene  Injection Technique:single-shot  Needle Type:echogenic  Needle Gauge:20 G  Resistance on Injection: none  Sedation medications used: midazolam (VERSED) injection, 2 mg  Medications Used: dexamethasone (DECADRON) injection, 4 mg  ropivacaine (NAROPIN) 0.5 % injection, 30 mL  Med admintered at 8/11/2020 8:44 AM      Post Assessment  Injection Assessment: negative aspiration for heme, no paresthesia on injection and incremental injection  Patient Tolerance:comfortable throughout block  Complications:no

## 2020-08-11 NOTE — OP NOTE
SHOULDER ARTHROSCOPY WITH ROTATOR CUFF REPAIR  Procedure Report    Patient Name:  Jagdish Rea  YOB: 1960    Date of Surgery:  8/11/2020     Indications: This is a 59 y.o. male with pain to the right shoulder.  Imaging demonstrated rotator cuff tear.Treatment options were discussed.  They desired to proceed with shoulder arthroscopy with rotator cuff repair after discussing the risks including bleeding, scarring, infection, stiffness, nerve damage, tendon damage, artery damage, continued pain, DVT, loss of life or limb, and a need for further surgery.      Pre-op Diagnosis:   Right shoulder rotator cuff tear       Post-Op Diagnosis Codes:  Right shoulder 80% supraspinatus tear and labral tear with grade 3/4 chondral defect humeral head    Procedure/CPT® Codes: 12965 [id number]    Procedure(s):  SHOULDER ARTHROSCOPY WITH ROTATOR CUFF REPAIR, extensive debridement    Assistant: Benito Saha certified first assist    was responsible for performing the following activities: Retraction, Suction, Irrigation, Suturing, Closing and Placing Dressing and their skilled assistance was necessary for the success of this case.         Anesthesia: General with Block    IV fluids: See anesthesia record    Estimated Blood Loss: 15 mL    Implants:    Implant Name Type Inv. Item Serial No.  Lot No. LRB No. Used   SUT/ANCH SHLDR KNOTLSS BIOCOMP 4.75MM - [id number] Implant SUT/ANCH SHLDR KNOTLSS BIOCOMP 4.75MM  [id number] Right 1         Complications: None    Specimens:none    Description of Procedure: The patient's operative site was marked.  Regional anesthesia was administered.  They were brought to the operating room and placed  on the operating room table.  General anesthesia was administered. Antibiotics were dosed.  A timeout was taken, confirming the correct operative site and procedure.  Exam under anesthesia indicated full range of motion and no instability.  They were placed in a semilateral  position.  An axillary roll and SCDs were placed.  The right shoulder was prepped and draped in the standard surgical fashion.  The shoulder was injected with local anesthetic and was placed into traction.  A posterior portal was created.  A camera was inserted.  The glenoid chondral surface was intact.  The humerus surface had a 2.5 cm grade 3/4 defect anteriorly just posterior to the subscapularis with displaced chondral tissue.  The subscapularis was intact.  The biceps was intact.  The labrum was torn circumferentially.  The undersurface of the cuff demonstrated high-grade anterior supraspinatus tearing. A shaver was inserted.  The labrum and humeral head cartilage was debrided completely stable tissue along with the anterior supraspinatus.  Supraspinatus tear measured 6 to 7 mm in depth by 1.5 cm in length and was tagged with a PDS suture.  the biceps was pulled into the shoulder and found to be intact.  The axillary pouch was free of synovitis or loose bodies. The instruments were placed in the subacromial space.  A full-thickness bursectomy was performed.  The CA ligament was intact.  No impingement was noted.  An accessory portal was created. Tear was visualized and prepared after identifying corresponding bursal sided tearing.  It was a 1.5 cm crescent shape tear.  Tuberosity skeletonized and a microfracture performed.   Fiber tape suture as well as a fiber link suture were used to create a mattress and ripstop configuration. These were secured to a anchor off the lateral edge of the tuberosity restoring the tendon to its footprint.    The instruments were removed.  The wounds were closed with suture and Steri-Strips.    30 mg of Toradol and lidocaine were injected into the deltoid  and a sterile dressing was applied to the rest of the shoulder.  They were placed in a sling and taken to the recovery room.  There were no complications.  I was present for all portions.  All counts were correct.  Good  capillary refill was noted to the hand.      Fredi Ritchie MD     Date: 8/11/2020  Time: 11:07

## 2020-08-13 ENCOUNTER — OFFICE VISIT (OUTPATIENT)
Dept: ORTHOPEDIC SURGERY | Facility: CLINIC | Age: 60
End: 2020-08-13

## 2020-08-13 VITALS
HEIGHT: 71 IN | BODY MASS INDEX: 32.2 KG/M2 | HEART RATE: 76 BPM | DIASTOLIC BLOOD PRESSURE: 84 MMHG | SYSTOLIC BLOOD PRESSURE: 138 MMHG | WEIGHT: 230 LBS

## 2020-08-13 DIAGNOSIS — M75.111 PARTIAL NONTRAUMATIC TEAR OF RIGHT ROTATOR CUFF: ICD-10-CM

## 2020-08-13 DIAGNOSIS — Z47.89 ORTHOPEDIC AFTERCARE: Primary | ICD-10-CM

## 2020-08-13 PROCEDURE — 99024 POSTOP FOLLOW-UP VISIT: CPT | Performed by: ORTHOPAEDIC SURGERY

## 2020-08-13 NOTE — PATIENT INSTRUCTIONS
Shoulder Arthroscopy: Post- Operative Visit Objectives    1) Review the operative findings, procedures and photos.  2) Make sure medications are effective and not causing problems.  a) Pain: Oxycodone or hydrocodone is for pain. You may take 1 tablet every 6 hours as necessary.  Some patients don’t require the use of these…in those circumstances just use Tylenol extra-strength 1 or 2 tablets every 4-6 hours.   b) Naproxen 500 mg. For pain and inflammation.  You should take 1 every twice a day.  Do not use Aleve, Ibuprofen, Motrin or Advil during this time.  If you have had any problems stop taking these medicines and please tell us!  c) Keflex (cephalexin). This is an antibiotic to be taken as a preventive medicine.  Take 1 pill every 6 hours for 1 day. If you have a penicillin allergy this will be replaced by other options.  3) Wound Care:  a) Today we will change your dressings and cover your wounds with a plastic covering called Tegaderm. This will allow you to shower immediately.  Remove the tegaderm and underlying dressings in two weeks then ok to get wet in a shower. No Baths or swimming until 4 weeks after surgery.  Keep a towel on the dressing while applying ice.  4) Exercises and Physical Therapy Remember the protocol is 3 phases:  a) Healing (6 wks)  Continue the use of the sling for 6 weeks; depending on procedure utilized this may be shorter. You can do gentle range of motion exercise of the elbow and wrist immediately with the arm at the side.  Formal physical therapy will usually start in two weeks.    b) Rehabilitative (6 wks) You begin a more aggressive phase of physical therapy at the 6 week regino. Light strengthening and a continued emphasis on protecting the repair are important at this stage.  c) Restorative (4 wks)  Back to your sports and job activities gradually   5) Follow Up appointments Schedule Follow up visits as directed usually in 4 weeks. Physical therapy will be ordered today and you  should receive a phone call or can schedule yourself if appropriate.  6) Notes  Make sure you have all necessary notes and documentation for school or work.  7) Issues: Remember our goal is to make this process smooth and easy if there is any thing you need please ask us or call back [phone]  8)

## 2020-08-13 NOTE — PROGRESS NOTES
Patient ID: Jagdish Rea is a 59 y.o. male.  8/11/20 right shoulder arthroscopy with supraspinatus repair  Pain mild    Objective:    There were no vitals taken for this visit.    Physical Examination:  Wounds are well approximated without infection.  Sensory and motor exam are intact all distributions. Radial pulse is palpable and capillary refill is less than two seconds to all digits      Imaging:      Assessment:  Doing well after cuff repair    Plan:  Wounds were cleaned and redressed. Restrictions discussed.  Begin therapy and see me in 4 weeks.  Remove dressings in two weeks

## 2020-08-22 RX ORDER — AMLODIPINE BESYLATE 10 MG/1
TABLET ORAL
Qty: 30 TABLET | Refills: 0 | Status: SHIPPED | OUTPATIENT
Start: 2020-08-22 | End: 2020-10-05

## 2020-08-22 RX ORDER — METOPROLOL SUCCINATE 25 MG/1
TABLET, EXTENDED RELEASE ORAL
Qty: 30 TABLET | Refills: 0 | Status: SHIPPED | OUTPATIENT
Start: 2020-08-22 | End: 2020-10-05

## 2020-08-25 ENCOUNTER — TREATMENT (OUTPATIENT)
Dept: PHYSICAL THERAPY | Facility: CLINIC | Age: 60
End: 2020-08-25

## 2020-08-25 DIAGNOSIS — M75.111 NONTRAUMATIC INCOMPLETE TEAR OF RIGHT ROTATOR CUFF: ICD-10-CM

## 2020-08-25 DIAGNOSIS — M25.511 ACUTE PAIN OF RIGHT SHOULDER: Primary | ICD-10-CM

## 2020-08-25 DIAGNOSIS — Z47.89 UNSPECIFIED ORTHOPEDIC AFTERCARE: ICD-10-CM

## 2020-08-25 PROCEDURE — 97110 THERAPEUTIC EXERCISES: CPT | Performed by: PHYSICAL THERAPIST

## 2020-08-25 PROCEDURE — 97140 MANUAL THERAPY 1/> REGIONS: CPT | Performed by: PHYSICAL THERAPIST

## 2020-08-25 PROCEDURE — 97162 PT EVAL MOD COMPLEX 30 MIN: CPT | Performed by: PHYSICAL THERAPIST

## 2020-08-25 NOTE — PROGRESS NOTES
Physical Therapy Initial Evaluation and Plan of Care    Patient: Jagdish Rea   : 1960  Diagnosis/ICD-10 Code:  Acute pain of right shoulder [M25.511]  Referring practitioner: Fredi Ritchie, *  Date of Initial Visit: 2020  Today's Date: 2020  Patient seen for 1 sessions           Subjective Questionnaire: QuickDASH: 75%    Subjective Evaluation    History of Present Illness  Mechanism of injury: Patient is s/p right rotator cuff supraspinatus repair on . Wearing sling. Says he is doing well, just having trouble sleeping. Having a little swelling in his hand, denies N/T.     Per op report: Right shoulder 80% supraspinatus tear and labral tear with grade 3/4 chondral defect humeral head - underwent supraspinatus repair and extensive debridement.      Patient Occupation: not working Pain  Current pain ratin  At worst pain ratin  Relieving factors: change in position, ice and medications  Aggravating factors: movement    Social Support  Lives with: spouse (13 year old son)    Hand dominance: right    Patient Goals  Patient goals for therapy: decreased pain, increased motion, increased strength, independence with ADLs/IADLs and return to sport/leisure activities           Precautions:  HTN    Objective          Postural Observations    Additional Postural Observation Details  Forward shoulder, wearing sling    Neurological Testing     Sensation     Shoulder   Left Shoulder   Intact: light touch    Right Shoulder   Intact: light touch    Passive Range of Motion     Right Shoulder   Flexion: 70 degrees   Abduction: 40 degrees   External rotation 45°: 0 degrees   Internal rotation 45°: 20 degrees     Additional Passive Range of Motion Details  Elbow extension: 10 deg    Scapular Mobility     Right Shoulder   Scapular mobility: poor          Assessment & Plan     Assessment  Impairments: abnormal muscle firing, abnormal or restricted ROM, activity intolerance, impaired physical  strength, lacks appropriate home exercise program and pain with function  Assessment details: The patient is a 59 y.o. male who presents to physical therapy today for right shoulder pain and dysfunction following rotator cuff repair. Upon initial evaluation, the patient demonstrates the following impairments: decreased ROM, weakness, high pain level. Due to these impairments, the patient is unable to use his arm for any functional or self care tasks. The patient would benefit from skilled PT services to address functional limitations and impairments and to improve patient quality of life.      Prognosis: good  Functional Limitations: carrying objects, lifting, sleeping, pulling, pushing, uncomfortable because of pain, moving in bed, reaching behind back, reaching overhead and unable to perform repetitive tasks  Goals  Plan Goals: STG:   Pt will be independent and compliant with initial HEP and surgical precautions in 3 weeks.  Pt will demonstrate an increase in shoulder flexion PROM to 100 degrees and abduction to 80 deg within 3 weeks.   Pt will report pain level at worst <5 in 3 weeks.   LTG:   Pt will be independent with final HEP for self-management of condition by DC.  Pt will demonstrate shoulder AROM flexion to >140 degrees in order to return to overhead activities by DC.   Pt will demonstrate shoulder strength grossly to >4/5 by DC.  Pt will improve score on QuickDASH to less than 25% by DC.       Plan  Therapy options: will be seen for skilled physical therapy services  Planned modality interventions: cryotherapy, electrical stimulation/Russian stimulation, TENS, thermotherapy (hydrocollator packs) and ultrasound  Planned therapy interventions: ADL retraining, body mechanics training, fine motor coordination training, flexibility, functional ROM exercises, home exercise program, joint mobilization, manual therapy, motor coordination training, neuromuscular re-education, postural training, soft tissue  mobilization, strengthening, stretching and therapeutic activities  Frequency: 3x week  Treatment plan discussed with: patient  Plan details: 30 visits        See flowsheets for treatment detail. Very painful with manual therapy so ROM performed very slowly, however decreased pain after.     History # of Personal Factors and/or Comorbidities: MODERATE (1-2)  Examination of Body System(s): # of elements: MODERATE (3)  Clinical Presentation: EVOLVING  Clinical Decision Making: MODERATE      Timed:         Manual Therapy:    20     mins  29278;     Therapeutic Exercise:     15    mins  16006;     Neuromuscular Jake:        mins  32676;    Therapeutic Activity:          mins  12554;     Gait Training:           mins  21061;     Ultrasound:          mins  77787;    Ionto                                   mins   24328  Self Care                            mins   10998      Un-Timed:  Electrical Stimulation:         mins  14582 ( );  Dry Needling          mins self-pay  Traction          mins 40808  Low Eval          Mins  53576  Mod Eval     25     Mins  39488  High Eval                            Mins  60202  Re-Eval                               mins  38827      Timed Treatment:   35   mins   Total Treatment:     60   mins    PT SIGNATURE: Brynn Saavedra, KOBI   DATE TREATMENT INITIATED: 8/25/2020    Initial Certification  Certification Period: 11/23/2020  I certify that the therapy services are furnished while this patient is under my care.  The services outlined above are required by this patient, and will be reviewed every 90 days.     PHYSICIAN: Fredi Ritchie MD      DATE:     Please sign and return via fax to [phone].. Thank you, [facility].

## 2020-08-27 ENCOUNTER — TREATMENT (OUTPATIENT)
Dept: PHYSICAL THERAPY | Facility: CLINIC | Age: 60
End: 2020-08-27

## 2020-08-27 DIAGNOSIS — M75.111 NONTRAUMATIC INCOMPLETE TEAR OF RIGHT ROTATOR CUFF: ICD-10-CM

## 2020-08-27 DIAGNOSIS — M25.511 ACUTE PAIN OF RIGHT SHOULDER: Primary | ICD-10-CM

## 2020-08-27 DIAGNOSIS — Z47.89 UNSPECIFIED ORTHOPEDIC AFTERCARE: ICD-10-CM

## 2020-08-27 PROCEDURE — 97140 MANUAL THERAPY 1/> REGIONS: CPT | Performed by: PHYSICAL THERAPIST

## 2020-08-27 PROCEDURE — 97110 THERAPEUTIC EXERCISES: CPT | Performed by: PHYSICAL THERAPIST

## 2020-08-27 NOTE — PROGRESS NOTES
Physical Therapy Daily Progress Note    Patient: Jagdish Rea  : 1960  Referring practitioner: Fredi Ritchie, *  Today's Date: 2020    VISIT#: 2   S/P R shoulder RCR 20    Subjective   Pt reports: doing ok this morning. Has done HEP 3xday      Objective     See Exercise, Manual, and Modality Logs for complete treatment. Continued with PROM . R shoulder is very tight and guarded. Low beatriz to PROM. Tolerated flex better in Side lying. Pt to ice it at home.    Patient Education:    Assessment & Plan     Assessment  Assessment details: Fair beatriz to today's treatment. R shoulder very tight and painful during PROM.           Progress per Plan of Care            Timed:         Manual Therapy:    30     mins  89124;     Therapeutic Exercise:     15    mins  30860;     Neuromuscular Jake:        mins  82562;    Therapeutic Activity:          mins  82062;     Gait Training:           mins  92622;     Ultrasound:          mins  09549;    Ionto                                   mins   46276  Self Care                            mins   48158  Canal repositioning           mins    98709    Un-Timed:  Electrical Stimulation:         mins  47558 ( );  Traction          mins 17436  Low Eval          Mins  59492  Mod Eval          Mins  62597  High Eval                            Mins  52655  Re-Eval                               mins  50832    Timed Treatment:  45    mins   Total Treatment:     45   mins    Baudilio Reese, PT, CLT  Physical Therapist

## 2020-09-01 ENCOUNTER — TREATMENT (OUTPATIENT)
Dept: PHYSICAL THERAPY | Facility: CLINIC | Age: 60
End: 2020-09-01

## 2020-09-01 DIAGNOSIS — M25.511 ACUTE PAIN OF RIGHT SHOULDER: Primary | ICD-10-CM

## 2020-09-01 PROCEDURE — 97110 THERAPEUTIC EXERCISES: CPT | Performed by: PHYSICAL THERAPIST

## 2020-09-01 PROCEDURE — 97140 MANUAL THERAPY 1/> REGIONS: CPT | Performed by: PHYSICAL THERAPIST

## 2020-09-01 NOTE — PROGRESS NOTES
Physical Therapy Daily Progress Note    Patient: Jagdish Rea  : 1960  Referring practitioner: No ref. provider found  Today's Date: 2020    VISIT#: 3   S/P R shoulder RCR 20.  Pt. 3 weeks post surgery.  Dr. Ritchie 9/10/20    Subjective:   No pain at rest.  Doing HEP.  Pt. Admits not using ice at home.         Objective   OBSERVATION:  Swelling noted  L lower arm and hand.   See Exercise, Manual, and Modality Logs for complete treatment.     Patient Education:  Use ice after HEP and intermittently throughout day.  Also, keep  L UE elevated due to swelling.     ASSESSMENT:  Pt. Admittedly not using ice @ home.  Strongly urged patient to ice L shoulder and elevate arm due to swelling.  Tolerated PROM flexion in supine today.  Pt. Remains guarded.     Progress per Plan of Care:  Monitor swelling L UE and if pt. Is now using ice and elevating L UE.             Timed:         Manual Therapy:    30     mins  69566;     Therapeutic Exercise:     15    mins  14223;     Neuromuscular Jake:        mins  13480;    Therapeutic Activity:          mins  68187;     Gait Training:           mins  86337;     Ultrasound:          mins  51853;    Ionto                                   mins   36260  Self Care                            mins   15083  Canal repositioning           mins    54774    Un-Timed:  Electrical Stimulation:         mins  73315 ( );  Traction          mins 30410  Low Eval          Mins  73414  Mod Eval          Mins  81485  High Eval                            Mins  06436  Re-Eval                               mins  64050    Timed Treatment:  45    mins   Total Treatment:     45   mins    Kecia Waters, PTA, CLT  Physical Therapist

## 2020-09-02 ENCOUNTER — TREATMENT (OUTPATIENT)
Dept: PHYSICAL THERAPY | Facility: CLINIC | Age: 60
End: 2020-09-02

## 2020-09-02 DIAGNOSIS — M25.511 ACUTE PAIN OF RIGHT SHOULDER: Primary | ICD-10-CM

## 2020-09-02 DIAGNOSIS — Z47.89 UNSPECIFIED ORTHOPEDIC AFTERCARE: ICD-10-CM

## 2020-09-02 DIAGNOSIS — M75.111 NONTRAUMATIC INCOMPLETE TEAR OF RIGHT ROTATOR CUFF: ICD-10-CM

## 2020-09-02 PROCEDURE — 97140 MANUAL THERAPY 1/> REGIONS: CPT | Performed by: PHYSICAL THERAPIST

## 2020-09-02 PROCEDURE — 97110 THERAPEUTIC EXERCISES: CPT | Performed by: PHYSICAL THERAPIST

## 2020-09-02 NOTE — PROGRESS NOTES
Physical Therapy Daily Progress Note    Patient: Jagdish Rea  : 1960  Referring practitioner: Dr. Velazquez  Today's Date: 2020    VISIT#: 4    Subjective   Jagdish Rea reports: Doing ok, having less pain overall. Exercises are going well at home. Said it really only hurts when he accidentally reaches for something.      Objective     See Exercise, Manual, and Modality Logs for complete treatment.     Patient Education: continue HEP.    Assessment & Plan     Assessment  Assessment details: Good tolerance to session, much improved tolerance to manual therapy, less pain and better able to relax.     Goals  Plan Goals: STG:   Pt will be independent and compliant with initial HEP and surgical precautions in 3 weeks. MET  Pt will demonstrate an increase in shoulder flexion PROM to 100 degrees and abduction to 80 deg within 3 weeks. PARTIALLY MET  Pt will report pain level at worst <5 in 3 weeks. NOT MET  LTG:   Pt will be independent with final HEP for self-management of condition by DC.  Pt will demonstrate shoulder AROM flexion to >140 degrees in order to return to overhead activities by DC.   Pt will demonstrate shoulder strength grossly to >4/5 by DC.  Pt will improve score on QuickDASH to less than 25% by DC.       Progress per Plan of Care            Timed:         Manual Therapy:    30     mins  36430;     Therapeutic Exercise:    15     mins  69200;     Neuromuscular Jake:        mins  95273;    Therapeutic Activity:          mins  55526;     Gait Training:           mins  20072;     Ultrasound:          mins  74084;    Ionto:                                   mins   45918  Self Care:                            mins   25433    Un-Timed:  Electrical Stimulation:         mins  95846 ( );  Dry Needling          mins self-pay  Traction          mins 67280  Re-Eval                               mins  47962    Timed Treatment:   45   mins   Total Treatment:     45   mins    Brynn Saavedra, PT  Physical  Therapist

## 2020-09-04 ENCOUNTER — TREATMENT (OUTPATIENT)
Dept: PHYSICAL THERAPY | Facility: CLINIC | Age: 60
End: 2020-09-04

## 2020-09-04 DIAGNOSIS — Z47.89 UNSPECIFIED ORTHOPEDIC AFTERCARE: ICD-10-CM

## 2020-09-04 DIAGNOSIS — M25.511 ACUTE PAIN OF RIGHT SHOULDER: Primary | ICD-10-CM

## 2020-09-04 PROCEDURE — 97140 MANUAL THERAPY 1/> REGIONS: CPT | Performed by: PHYSICAL THERAPIST

## 2020-09-04 PROCEDURE — 97110 THERAPEUTIC EXERCISES: CPT | Performed by: PHYSICAL THERAPIST

## 2020-09-04 NOTE — PROGRESS NOTES
Physical Therapy Daily Progress Note    Patient: Jagdish Rea  : 1960  Referring practitioner: No ref. provider found  Today's Date: 2020    VISIT#: 5    Subjective   Pt reports: doing pretty good today, no pain at rest. Has pain if move in a wrong way. Tried the Pendulum exercise but it was painful.       Objective     See Exercise, Manual, and Modality Logs for complete treatment.     Patient Education:    Assessment & Plan     Assessment  Assessment details: Good beatriz to today's treatment. Inc beatriz to PROM, able to relax more during ROM. Required vcs with HEP to slow down and do them slower.           Progress per Plan of Care            Timed:         Manual Therapy:     30    mins  34098;     Therapeutic Exercise:    15     mins  91008;     Neuromuscular Jake:        mins  27678;    Therapeutic Activity:          mins  47302;     Gait Training:          mins  47426;     Ultrasound:          mins  13794;    Ionto                                   mins   98340  Self Care                            mins   70802  Canal repositioning           mins    72013    Un-Timed:  Electrical Stimulation:         mins  11514 ( );  Traction          mins 22993  Low Eval          Mins  32024  Mod Eval          Mins  43906  High Eval                            Mins  53293  Re-Eval                               mins  83479    Timed Treatment:  45    mins   Total Treatment:    45    mins    Baudilio Reese, PT, CLT  Physical Therapist

## 2020-09-08 ENCOUNTER — OFFICE VISIT (OUTPATIENT)
Dept: FAMILY MEDICINE CLINIC | Facility: CLINIC | Age: 60
End: 2020-09-08

## 2020-09-08 VITALS
TEMPERATURE: 97.3 F | HEART RATE: 88 BPM | RESPIRATION RATE: 20 BRPM | OXYGEN SATURATION: 98 % | BODY MASS INDEX: 32.26 KG/M2 | SYSTOLIC BLOOD PRESSURE: 114 MMHG | DIASTOLIC BLOOD PRESSURE: 84 MMHG | HEIGHT: 71 IN | WEIGHT: 230.4 LBS

## 2020-09-08 DIAGNOSIS — J44.1 COPD EXACERBATION (HCC): ICD-10-CM

## 2020-09-08 DIAGNOSIS — I10 BENIGN ESSENTIAL HYPERTENSION: Primary | ICD-10-CM

## 2020-09-08 DIAGNOSIS — J44.9 CHRONIC OBSTRUCTIVE PULMONARY DISEASE, UNSPECIFIED COPD TYPE (HCC): ICD-10-CM

## 2020-09-08 DIAGNOSIS — Z72.0 TOBACCO ABUSE: ICD-10-CM

## 2020-09-08 DIAGNOSIS — E78.5 HYPERLIPIDEMIA, UNSPECIFIED HYPERLIPIDEMIA TYPE: ICD-10-CM

## 2020-09-08 DIAGNOSIS — F41.9 ANXIETY: ICD-10-CM

## 2020-09-08 PROBLEM — J22 ACUTE RESPIRATORY INFECTION: Status: RESOLVED | Noted: 2020-01-03 | Resolved: 2020-09-08

## 2020-09-08 PROCEDURE — 99214 OFFICE O/P EST MOD 30 MIN: CPT | Performed by: NURSE PRACTITIONER

## 2020-09-08 NOTE — PATIENT INSTRUCTIONS
Get us copy of labs  Continue current medications.  If no PSA will need this done.  Continue to try to quit smoking.   Schedule eye exam and dental exam.

## 2020-09-08 NOTE — PROGRESS NOTES
Subjective   Jagdish Rea is a 59 y.o. male.     Chief Complaint   Patient presents with   • Hypertension   • COPD   • Anxiety   • Med Refill       HPI  Patient is here for management of his chronic medical problems; hypertension, COPD, anxiety ,   Recent recovery right shoulder surgery, tobacco abuse    sP right shoulder arthroscopy with supraspinatus repair. He is currently in physical therapy and pain is well controlled on ibuprofen.    Hypertension: metoprolol succinate XL 25 mg one daily . hctz 12.5 mg once day amlodipine 10 mg once day. Denies he is having chest pain. Not short of air. Well controlled on current meds.     Anxiety: taking lamictal 50 mg once at night. Sinequan 10 mg at night risperidone 1 mg daily.   Denies he is having any symptoms at this time.     Copd he denies he has to use albuterol often using just as needed. singulair 10 mg once day.     Hyperlipidemia: taking pravachol 10 mg nightly. Denies any muscle cramping. He recently had labs with [facility].     Tobacco abuse: he is using the gum to try to quit smoking          The following portions of the patient's history were reviewed and updated as appropriate: allergies, current medications, past family history, past medical history, past social history, past surgical history and problem list.      Current Outpatient Medications:   •  albuterol sulfate  (90 Base) MCG/ACT inhaler, Inhale 2 puffs Every 4 (Four) Hours As Needed for Wheezing., Disp: 1 inhaler, Rfl: 2  •  amLODIPine (NORVASC) 10 MG tablet, TAKE ONE TABLET BY MOUTH DAILY, Disp: 30 tablet, Rfl: 0  •  doxepin (SINEquan) 10 MG capsule, Take 1 capsule by mouth Every Night., Disp: 90 capsule, Rfl: 1  •  hydroCHLOROthiazide (MICROZIDE) 12.5 MG capsule, Take 12.5 mg by mouth As Needed., Disp: , Rfl:   •  lamoTRIgine (LaMICtal) 25 MG tablet, Take 50 mg by mouth Every Night., Disp: , Rfl:   •  metoprolol succinate XL (TOPROL-XL) 25 MG 24 hr tablet, TAKE ONE TABLET BY MOUTH DAILY  **MUST CALL MD FOR APPOINTMENT, Disp: 30 tablet, Rfl: 0  •  montelukast (SINGULAIR) 10 MG tablet, Take 1 tablet by mouth Every Night., Disp: 30 tablet, Rfl: 0  •  pravastatin (PRAVACHOL) 10 MG tablet, Take 10 mg by mouth Every Night., Disp: , Rfl:   •  risperiDONE (risperDAL) 1 MG tablet, Take 1 mg by mouth Daily., Disp: , Rfl:     Current Facility-Administered Medications:   •  triamcinolone acetonide (KENALOG-40) injection 40 mg, 40 mg, Intra-articular, Once, Fredi Ritchie MD    Recent Results (from the past 4032 hour(s))   SARS-CoV-2, SERGIO ([facility]) - Swab, Nasopharynx    Collection Time: 04/30/20  3:16 PM   Result Value Ref Range    SARS-CoV-2, SERGIO Not Detected Not Detected   Basic metabolic panel    Collection Time: 08/08/20  8:41 AM   Result Value Ref Range    Glucose 94 65 - 99 mg/dL    BUN 16 6 - 20 mg/dL    Creatinine 1.01 0.76 - 1.27 mg/dL    Sodium 141 136 - 145 mmol/L    Potassium 3.7 3.5 - 5.2 mmol/L    Chloride 105 98 - 107 mmol/L    CO2 24.1 22.0 - 29.0 mmol/L    Calcium 9.1 8.6 - 10.5 mg/dL    eGFR  African Amer 92 >60 mL/min/1.73    BUN/Creatinine Ratio 15.8 7.0 - 25.0    Anion Gap 11.9 5.0 - 15.0 mmol/L   Protime-INR    Collection Time: 08/08/20  8:41 AM   Result Value Ref Range    Protime 10.5 9.6 - 11.7 Seconds    INR 0.96 0.93 - 1.10   APTT    Collection Time: 08/08/20  8:41 AM   Result Value Ref Range    PTT 28.5 24.0 - 31.0 seconds   CBC Auto Differential    Collection Time: 08/08/20  8:41 AM   Result Value Ref Range    WBC 6.86 3.40 - 10.80 10*3/mm3    RBC 4.58 4.14 - 5.80 10*6/mm3    Hemoglobin 15.1 13.0 - 17.7 g/dL    Hematocrit 45.4 37.5 - 51.0 %    MCV 99.1 (H) 79.0 - 97.0 fL    MCH 33.0 26.6 - 33.0 pg    MCHC 33.3 31.5 - 35.7 g/dL    RDW 13.5 12.3 - 15.4 %    RDW-SD 49.9 37.0 - 54.0 fl    MPV 12.0 6.0 - 12.0 fL    Platelets 154 140 - 450 10*3/mm3    Neutrophil % 50.1 42.7 - 76.0 %    Lymphocyte % 37.3 19.6 - 45.3 %    Monocyte % 8.5 5.0 - 12.0 %    Eosinophil % 2.2 0.3 - 6.2 %  "   Basophil % 0.7 0.0 - 1.5 %    Immature Grans % 1.2 (H) 0.0 - 0.5 %    Neutrophils, Absolute 3.44 1.70 - 7.00 10*3/mm3    Lymphocytes, Absolute 2.56 0.70 - 3.10 10*3/mm3    Monocytes, Absolute 0.58 0.10 - 0.90 10*3/mm3    Eosinophils, Absolute 0.15 0.00 - 0.40 10*3/mm3    Basophils, Absolute 0.05 0.00 - 0.20 10*3/mm3    Immature Grans, Absolute 0.08 (H) 0.00 - 0.05 10*3/mm3    nRBC 0.0 0.0 - 0.2 /100 WBC   COVID-19,[facility], NP SWAB IN [facility] VIRAL TRANSPORT MEDIA 24-30 HR TAT - Swab, Nasopharynx    Collection Time: 08/08/20  8:41 AM   Result Value Ref Range    Reference Lab Report       Testing performed by [facility]  [address]  [address]    COVID19 Not Detected Not Detected - Ref. Range         Review of Systems   Constitutional: Negative for fever.   HENT: Negative for dental problem.         Dental not UTD    Eyes:        Glasses, exam not UTD    Respiratory: Negative for cough and shortness of breath.    Cardiovascular: Negative for chest pain.   Gastrointestinal: Negative.  Negative for constipation and diarrhea.   Genitourinary: Negative for dysuria, flank pain and frequency.   Musculoskeletal:        Right shoulder s/p surgery.    Psychiatric/Behavioral: Negative.        Objective     /84 (BP Location: Left arm, Patient Position: Sitting, Cuff Size: Adult)   Pulse 88   Temp 97.3 °F (36.3 °C) (Infrared)   Resp 20   Ht 180.3 cm (71\")   Wt 105 kg (230 lb 6.4 oz)   SpO2 98%   BMI 32.13 kg/m²     Physical Exam   Constitutional: He is oriented to person, place, and time. He appears well-developed and well-nourished.   HENT:   Head: Normocephalic and atraumatic.   Eyes: Pupils are equal, round, and reactive to light. Conjunctivae and EOM are normal.   Neck: Normal range of motion. Neck supple.   Cardiovascular: Normal rate, regular rhythm, normal heart sounds and intact distal pulses.   Pulmonary/Chest: Effort normal and breath sounds normal.   Abdominal: Soft. Bowel " sounds are normal.   Musculoskeletal: Normal range of motion.        Arms:  Neurological: He is alert and oriented to person, place, and time.   Skin: Skin is warm and dry.   Psychiatric: He has a normal mood and affect. His behavior is normal.   Nursing note and vitals reviewed.        Assessment/Plan   Jagdish was seen today for hypertension, copd, anxiety and med refill.    Diagnoses and all orders for this visit:    Benign essential hypertension  Comments:  stable      Hyperlipidemia, unspecified hyperlipidemia type  Comments:  he reports recent labs i have no results    COPD exacerbation (CMS/[facility])  Comments:  stable    Anxiety  Comments:  stable followed  by [facility] psych    Chronic obstructive pulmonary disease, unspecified COPD type (CMS/[facility])  Comments:  trying to quit smoking using gum this time    Tobacco abuse  Comments:  reports he is trying to quit is using gum at this time      Patient Instructions   Get us copy of labs  Continue current medications.  If no PSA will need this done.  Continue to try to quit smoking.   Schedule eye exam and dental exam.         Yamilka Pascual, KEILA    09/08/20

## 2020-09-09 ENCOUNTER — TREATMENT (OUTPATIENT)
Dept: PHYSICAL THERAPY | Facility: CLINIC | Age: 60
End: 2020-09-09

## 2020-09-09 DIAGNOSIS — M75.111 NONTRAUMATIC INCOMPLETE TEAR OF RIGHT ROTATOR CUFF: ICD-10-CM

## 2020-09-09 DIAGNOSIS — Z47.89 UNSPECIFIED ORTHOPEDIC AFTERCARE: ICD-10-CM

## 2020-09-09 DIAGNOSIS — M25.511 ACUTE PAIN OF RIGHT SHOULDER: Primary | ICD-10-CM

## 2020-09-09 PROCEDURE — 97140 MANUAL THERAPY 1/> REGIONS: CPT | Performed by: PHYSICAL THERAPIST

## 2020-09-09 PROCEDURE — 97110 THERAPEUTIC EXERCISES: CPT | Performed by: PHYSICAL THERAPIST

## 2020-09-09 NOTE — PROGRESS NOTES
Physical Therapy Daily Progress Note    Patient: Jagdish Rea  : 1960  Referring practitioner: Fredi Ritchie, *  Today's Date: 2020    VISIT#: 6    Subjective   Jagdish Rea reports: Says he is doing ok, accidentally reached out to catch his granddaughter with his right arm and it was pretty sore after that. Still a little sore today, but better.     Objective     See Exercise, Manual, and Modality Logs for complete treatment.     Assessment & Plan     Assessment  Assessment details: Good response to therapy, much less pain during and after manual therapy.     Goals  Plan Goals: STG:   Pt will be independent and compliant with initial HEP and surgical precautions in 3 weeks. MET  Pt will demonstrate an increase in shoulder flexion PROM to 100 degrees and abduction to 80 deg within 3 weeks. PARTIALLY MET  Pt will report pain level at worst <5 in 3 weeks. NOT MET  LTG:   Pt will be independent with final HEP for self-management of condition by DC.  Pt will demonstrate shoulder AROM flexion to >140 degrees in order to return to overhead activities by DC.   Pt will demonstrate shoulder strength grossly to >4/5 by DC.  Pt will improve score on QuickDASH to less than 25% by DC.     Plan  Frequency: 2x week      Progress per Plan of Care        Timed:         Manual Therapy:    30     mins  55978;     Therapeutic Exercise:    10     mins  24602;     Neuromuscular Jake:        mins  04515;    Therapeutic Activity:          mins  77295;     Gait Training:           mins  63899;     Ultrasound:          mins  23896;    Ionto:                                   mins   91186  Self Care:                            mins   81078    Un-Timed:  Electrical Stimulation:         mins  47157 ( );  Dry Needling          mins self-pay  Traction          mins 47910  Re-Eval                               mins  24056    Timed Treatment:   40   mins   Total Treatment:     50   mins    Brynn Saavedra, PT  Physical  Therapist

## 2020-09-10 ENCOUNTER — OFFICE VISIT (OUTPATIENT)
Dept: ORTHOPEDIC SURGERY | Facility: CLINIC | Age: 60
End: 2020-09-10

## 2020-09-10 VITALS
HEIGHT: 71 IN | SYSTOLIC BLOOD PRESSURE: 134 MMHG | DIASTOLIC BLOOD PRESSURE: 85 MMHG | HEART RATE: 76 BPM | WEIGHT: 230 LBS | BODY MASS INDEX: 32.2 KG/M2

## 2020-09-10 DIAGNOSIS — Z47.89 ORTHOPEDIC AFTERCARE: Primary | ICD-10-CM

## 2020-09-10 PROCEDURE — 99024 POSTOP FOLLOW-UP VISIT: CPT | Performed by: ORTHOPAEDIC SURGERY

## 2020-09-10 NOTE — PROGRESS NOTES
Patient ID: Jagdish Rea is a 59 y.o. male.  8/11/20 right shoulder arthroscopy with supraspinatus repair  Pain mild      Objective:    There were no vitals taken for this visit.    Physical Examination:  Incisions are healed, passive elevation 110 degrees external rotation 25 degrees      Imaging:      Assessment:  Doing well after cuff repair    Plan:  Restrictions discussed. Continue physical therapy. See me in 8 weeks. Sling for 2 weeks

## 2020-09-11 ENCOUNTER — TREATMENT (OUTPATIENT)
Dept: PHYSICAL THERAPY | Facility: CLINIC | Age: 60
End: 2020-09-11

## 2020-09-11 DIAGNOSIS — M25.511 ACUTE PAIN OF RIGHT SHOULDER: Primary | ICD-10-CM

## 2020-09-11 DIAGNOSIS — Z47.89 UNSPECIFIED ORTHOPEDIC AFTERCARE: ICD-10-CM

## 2020-09-11 DIAGNOSIS — M75.111 NONTRAUMATIC INCOMPLETE TEAR OF RIGHT ROTATOR CUFF: ICD-10-CM

## 2020-09-11 PROCEDURE — 97110 THERAPEUTIC EXERCISES: CPT | Performed by: PHYSICAL THERAPIST

## 2020-09-11 PROCEDURE — 97140 MANUAL THERAPY 1/> REGIONS: CPT | Performed by: PHYSICAL THERAPIST

## 2020-09-11 NOTE — PROGRESS NOTES
Physical Therapy Daily Progress Note    Patient: Jagdish Rea  : 1960  Referring practitioner: Fredi Ritchie, *  Today's Date: 2020    VISIT#: 7   S/P R shoulder RCR 20.  Pt. 4 weeks post surgery as of 20  Dr. Ritchie 20    Subjective:   No pain at rest.  Doing HEP.  He saw Dr. Ritchie who told him he can come out of his sling in 2 weeks.         Objective   OBSERVATION:  Swelling noted  L lower arm and hand.   See Exercise, Manual, and Modality Logs for complete treatment.     Patient Education:      ASSESSMENT:  Pt. Improving with relaxation during PROM.  ER very limited.     Progress per Plan of Care:              Timed:         Manual Therapy:    25     mins  24248;     Therapeutic Exercise:     20    mins  36349;     Neuromuscular Jake:        mins  69079;    Therapeutic Activity:          mins  22261;     Gait Training:           mins  09430;     Ultrasound:          mins  09122;    Ionto                                   mins   20610  Self Care                            mins   36525  Canal repositioning           mins    27049    Un-Timed:  Electrical Stimulation:         mins  83506 ( );  Traction          mins 44997  Low Eval          Mins  57083  Mod Eval          Mins  42845  High Eval                            Mins  38534  Re-Eval                               mins  00182    Timed Treatment:  45    mins   Total Treatment:     45   mins    Kecia Waters PTA, PTA  Physical Therapist Assistant

## 2020-09-15 ENCOUNTER — TREATMENT (OUTPATIENT)
Dept: PHYSICAL THERAPY | Facility: CLINIC | Age: 60
End: 2020-09-15

## 2020-09-15 DIAGNOSIS — Z47.89 UNSPECIFIED ORTHOPEDIC AFTERCARE: ICD-10-CM

## 2020-09-15 DIAGNOSIS — M75.111 NONTRAUMATIC INCOMPLETE TEAR OF RIGHT ROTATOR CUFF: ICD-10-CM

## 2020-09-15 DIAGNOSIS — M25.511 ACUTE PAIN OF RIGHT SHOULDER: Primary | ICD-10-CM

## 2020-09-15 PROCEDURE — 97110 THERAPEUTIC EXERCISES: CPT | Performed by: PHYSICAL THERAPIST

## 2020-09-15 PROCEDURE — 97140 MANUAL THERAPY 1/> REGIONS: CPT | Performed by: PHYSICAL THERAPIST

## 2020-09-15 NOTE — PROGRESS NOTES
Physical Therapy Daily Progress Note    Patient: Jagdish Rea  : 1960  Referring practitioner: Fredi Ritchie, *  Today's Date: 9/15/2020    VISIT#: 8    Subjective   Jagdish Rea reports: Doing well, shoulder feeling better overall.      Objective     See Exercise, Manual, and Modality Logs for complete treatment.     Patient Education: progressed HEP to include AAROM in supine    Assessment & Plan     Assessment  Assessment details: Good response to therapy, much less pain during and after manual therapy. Able to obtain better stretch with shoulder flexion. Still limited ROM overall. Good tolerance to initiation of AAROM without any c/o pain.     Goals  Plan Goals: STG:   Pt will be independent and compliant with initial HEP and surgical precautions in 3 weeks. MET  Pt will demonstrate an increase in shoulder flexion PROM to 100 degrees and abduction to 80 deg within 3 weeks. MET  Pt will report pain level at worst <5 in 3 weeks. NOT MET  LTG:   Pt will be independent with final HEP for self-management of condition by DC.  Pt will demonstrate shoulder AROM flexion to >140 degrees in order to return to overhead activities by DC.   Pt will demonstrate shoulder strength grossly to >4/5 by DC.  Pt will improve score on QuickDASH to less than 25% by DC.     Plan  Frequency: 2x week          Progress per Plan of Care            Timed:         Manual Therapy:    30     mins  95831;     Therapeutic Exercise:    15     mins  15559;     Neuromuscular Jake:        mins  55944;    Therapeutic Activity:          mins  60728;     Gait Training:           mins  55043;     Ultrasound:          mins  87801;    Ionto:                                   mins   24400  Self Care:                            mins   96274    Un-Timed:  Electrical Stimulation:         mins  35075 ( );  Dry Needling          mins self-pay  Traction          mins 29729  Re-Eval                               mins  87129    Timed  Treatment:   45   mins   Total Treatment:     45   mins    Brynn Saavedra, PT  Physical Therapist

## 2020-09-17 RX ORDER — LAMOTRIGINE 25 MG/1
50 TABLET ORAL NIGHTLY
Qty: 180 TABLET | Refills: 0 | Status: SHIPPED | OUTPATIENT
Start: 2020-09-17 | End: 2020-11-18 | Stop reason: SDUPTHER

## 2020-09-22 ENCOUNTER — TREATMENT (OUTPATIENT)
Dept: PHYSICAL THERAPY | Facility: CLINIC | Age: 60
End: 2020-09-22

## 2020-09-22 DIAGNOSIS — M25.511 ACUTE PAIN OF RIGHT SHOULDER: Primary | ICD-10-CM

## 2020-09-22 DIAGNOSIS — M75.111 NONTRAUMATIC INCOMPLETE TEAR OF RIGHT ROTATOR CUFF: ICD-10-CM

## 2020-09-22 DIAGNOSIS — Z47.89 UNSPECIFIED ORTHOPEDIC AFTERCARE: ICD-10-CM

## 2020-09-22 PROCEDURE — 97110 THERAPEUTIC EXERCISES: CPT | Performed by: PHYSICAL THERAPIST

## 2020-09-22 PROCEDURE — 97140 MANUAL THERAPY 1/> REGIONS: CPT | Performed by: PHYSICAL THERAPIST

## 2020-09-22 NOTE — PROGRESS NOTES
Physical Therapy Daily Progress Note    Patient: Jagdish Rea  : 1960  Referring practitioner: Fredi Ritchie, *  Today's Date: 2020    VISIT#: 9  DOS:   As of  pt is 6 weeks post op    Subjective   Pt reports: doing pretty well, co only mild pain. Presently denies any pain.       Objective     See Exercise, Manual, and Modality Logs for complete treatment.     Patient Education:    Assessment & Plan     Assessment  Assessment details: Good beatriz to today's session and progression of his ex program per protocol. Able to relax a lot better during PROM.  Discussed precautions when remove the sling this week.           Progress per Plan of Care            Timed:         Manual Therapy:    25     mins  90872;     Therapeutic Exercise:    20     mins  36506;     Neuromuscular Jake:        mins  21165;    Therapeutic Activity:          mins  67064;     Gait Training:           mins  35336;     Ultrasound:          mins  82934;    Ionto                                   mins   22914  Self Care                            mins   77507  Canal repositioning           mins    24426    Un-Timed:  Electrical Stimulation:         mins  08429 ( );  Traction          mins 60458  Low Eval          Mins  60414  Mod Eval          Mins  39675  High Eval                            Mins  01962  Re-Eval                               mins  06205    Timed Treatment:  45    mins   Total Treatment:   45     mins    Baudilio Reese, PT, CLT  Physical Therapist

## 2020-09-24 ENCOUNTER — TREATMENT (OUTPATIENT)
Dept: PHYSICAL THERAPY | Facility: CLINIC | Age: 60
End: 2020-09-24

## 2020-09-24 DIAGNOSIS — Z47.89 UNSPECIFIED ORTHOPEDIC AFTERCARE: ICD-10-CM

## 2020-09-24 DIAGNOSIS — M75.111 NONTRAUMATIC INCOMPLETE TEAR OF RIGHT ROTATOR CUFF: ICD-10-CM

## 2020-09-24 DIAGNOSIS — M25.511 ACUTE PAIN OF RIGHT SHOULDER: Primary | ICD-10-CM

## 2020-09-24 PROCEDURE — 97140 MANUAL THERAPY 1/> REGIONS: CPT | Performed by: PHYSICAL THERAPIST

## 2020-09-24 PROCEDURE — 97110 THERAPEUTIC EXERCISES: CPT | Performed by: PHYSICAL THERAPIST

## 2020-09-24 NOTE — PROGRESS NOTES
Physical Therapy Daily Progress Note    Patient: Jagdish Rea  : 1960  Referring practitioner: Fredi Ritchie, *  Today's Date: 2020    VISIT#: 10    Subjective   Jagdish Rea reports: Doing pretty well, is starting to wean out of the sling per MD instruction today. Shoulder still gets sore when he accidentally uses it too much.       Objective     See Exercise, Manual, and Modality Logs for complete treatment.     Patient Education: progressed HEP     Assessment & Plan     Assessment  Assessment details: Good response to therapy, able to perform stronger stretch today with less pain. He was slightly more sore after shoulder abduction AAROM w/ cane, ended with ice.     Goals  Plan Goals: STG:   Pt will be independent and compliant with initial HEP and surgical precautions in 3 weeks. MET  Pt will demonstrate an increase in shoulder flexion PROM to 100 degrees and abduction to 80 deg within 3 weeks. MET  Pt will report pain level at worst <5 in 3 weeks. NOT MET  LTG:   Pt will be independent with final HEP for self-management of condition by DC.  Pt will demonstrate shoulder AROM flexion to >140 degrees in order to return to overhead activities by DC.   Pt will demonstrate shoulder strength grossly to >4/5 by DC.  Pt will improve score on QuickDASH to less than 25% by DC.     Plan  Frequency: 2x week      Progress per Plan of Care            Timed:         Manual Therapy:    25     mins  77306;     Therapeutic Exercise:    18     mins  83224;     Neuromuscular Jake:        mins  10412;    Therapeutic Activity:          mins  83549;     Gait Training:           mins  67492;     Ultrasound:          mins  81924;    Ionto:                                   mins   31507  Self Care:                            mins   27891    Un-Timed:  Electrical Stimulation:         mins  79153 ( );  Dry Needling          mins self-pay  Traction          mins 18966  Re-Eval                               mins   58126    Timed Treatment:   43   mins   Total Treatment:     53   mins    Brynn Saavedra, PT  Physical Therapist

## 2020-09-29 ENCOUNTER — TREATMENT (OUTPATIENT)
Dept: PHYSICAL THERAPY | Facility: CLINIC | Age: 60
End: 2020-09-29

## 2020-09-29 DIAGNOSIS — Z47.89 UNSPECIFIED ORTHOPEDIC AFTERCARE: Primary | ICD-10-CM

## 2020-09-29 DIAGNOSIS — M25.511 ACUTE PAIN OF RIGHT SHOULDER: ICD-10-CM

## 2020-09-29 PROCEDURE — 97110 THERAPEUTIC EXERCISES: CPT | Performed by: PHYSICAL THERAPIST

## 2020-09-29 PROCEDURE — 97140 MANUAL THERAPY 1/> REGIONS: CPT | Performed by: PHYSICAL THERAPIST

## 2020-09-29 NOTE — PROGRESS NOTES
Physical Therapy Daily Progress Note    Patient: Jagdish Rea  : 1960  Referring practitioner: Fredi Ritchie, *  Today's Date: 2020    VISIT#: 11   S/P R shoulder RCR 20.  Pt. 7 weeks post surgery as of 20  Dr. Ritchie 20    Subjective:   Denies pain currently. No problem with being out of sling.  He admits he is not doing HEP.         Objective     See Exercise, Manual, and Modality Logs for complete treatment.   Pt. To ice @ home.    Patient Education:  Emphasized importance of performing HEP consistently.     ASSESSMENT:  Pt. Admittedly not performing HEP but in spite of that he is progressing well, especially with PROM.     Progress per Plan of Care:  Continue to stress performing HEP.             Timed:         Manual Therapy:    20     mins  38012;     Therapeutic Exercise:     25    mins  47487;     Neuromuscular Jake:        mins  68737;    Therapeutic Activity:          mins  65929;     Gait Training:           mins  23814;     Ultrasound:          mins  17704;    Ionto                                   mins   37728  Self Care                            mins   91541  Canal repositioning           mins    17600    Un-Timed:  Electrical Stimulation:         mins  67287 ( );  Traction          mins 98488  Low Eval          Mins  16506  Mod Eval          Mins  51841  High Eval                            Mins  28863  Re-Eval                               mins  68162    Timed Treatment:  45    mins   Total Treatment:     45   mins    Kecia Waters PTA, FRANCO  Physical Therapist Assistant

## 2020-10-05 RX ORDER — METOPROLOL SUCCINATE 25 MG/1
TABLET, EXTENDED RELEASE ORAL
Qty: 30 TABLET | Refills: 0 | Status: SHIPPED | OUTPATIENT
Start: 2020-10-05 | End: 2020-10-08

## 2020-10-05 RX ORDER — AMLODIPINE BESYLATE 10 MG/1
TABLET ORAL
Qty: 30 TABLET | Refills: 0 | Status: SHIPPED | OUTPATIENT
Start: 2020-10-05 | End: 2020-11-03

## 2020-10-08 RX ORDER — METOPROLOL SUCCINATE 25 MG/1
TABLET, EXTENDED RELEASE ORAL
Qty: 30 TABLET | Refills: 0 | Status: SHIPPED | OUTPATIENT
Start: 2020-10-08 | End: 2020-11-03

## 2020-10-09 RX ORDER — RISPERIDONE 1 MG/1
1 TABLET ORAL DAILY
Qty: 30 TABLET | Refills: 2 | Status: SHIPPED | OUTPATIENT
Start: 2020-10-09 | End: 2020-11-18 | Stop reason: SDUPTHER

## 2020-11-03 RX ORDER — METOPROLOL SUCCINATE 25 MG/1
TABLET, EXTENDED RELEASE ORAL
Qty: 30 TABLET | Refills: 0 | Status: SHIPPED | OUTPATIENT
Start: 2020-11-03 | End: 2020-11-18 | Stop reason: SDUPTHER

## 2020-11-03 RX ORDER — AMLODIPINE BESYLATE 10 MG/1
TABLET ORAL
Qty: 30 TABLET | Refills: 0 | Status: SHIPPED | OUTPATIENT
Start: 2020-11-03 | End: 2020-11-18 | Stop reason: SDUPTHER

## 2020-11-11 ENCOUNTER — OFFICE VISIT (OUTPATIENT)
Dept: ORTHOPEDIC SURGERY | Facility: CLINIC | Age: 60
End: 2020-11-11

## 2020-11-11 VITALS
HEIGHT: 71 IN | DIASTOLIC BLOOD PRESSURE: 82 MMHG | HEART RATE: 76 BPM | WEIGHT: 230 LBS | SYSTOLIC BLOOD PRESSURE: 129 MMHG | BODY MASS INDEX: 32.2 KG/M2

## 2020-11-11 DIAGNOSIS — M75.111 PARTIAL NONTRAUMATIC TEAR OF RIGHT ROTATOR CUFF: Primary | ICD-10-CM

## 2020-11-11 DIAGNOSIS — M25.511 ACUTE PAIN OF RIGHT SHOULDER: ICD-10-CM

## 2020-11-11 DIAGNOSIS — Z47.89 ORTHOPEDIC AFTERCARE: ICD-10-CM

## 2020-11-11 PROCEDURE — 99213 OFFICE O/P EST LOW 20 MIN: CPT | Performed by: ORTHOPAEDIC SURGERY

## 2020-11-11 NOTE — PROGRESS NOTES
"     Patient ID: Jagdish Rea is a 59 y.o. male.  Right shoulder pain  8/11/20 right shoulder arthroscopy with supraspinatus repair  Pain mild, has stopped going to physical therapy for some reason.  States he has been lifting his arm at home with some soreness especially at night.  Not at goal    Review of Systems:  Right shoulder pain  Denies chest pain      Objective:    /82   Pulse 76   Ht 180.3 cm (71\")   Wt 104 kg (230 lb)   BMI 32.08 kg/m²     Physical Examination:   He is a pleasant male in no distress. He is alert and oriented x3 and appears his stated age.  Incisions are healed, passive elevation 170 degrees abduction and 20 degrees external rotation 50 degrees with intact repair.Sensory and motor exam are intact all distributions. Radial pulse is palpable and capillary refill is less than two seconds to all digits      Imaging:       Assessment:    Doing well after cuff repair    Plan:  Recommend resuming formal physical therapy, okay for some light activity at home and then see me in a month          Disclaimer: Please note that areas of this note were completed with computer voice recognition software.  Quite often unanticipated grammatical, syntax, homophones, and other interpretive errors are inadvertently transcribed by the computer software. Please excuse any errors that have escaped final proofreading.  "

## 2020-11-18 RX ORDER — RISPERIDONE 1 MG/1
1 TABLET ORAL DAILY
Qty: 90 TABLET | Refills: 1 | Status: SHIPPED | OUTPATIENT
Start: 2020-11-18 | End: 2020-11-24 | Stop reason: SDUPTHER

## 2020-11-18 RX ORDER — METOPROLOL SUCCINATE 25 MG/1
25 TABLET, EXTENDED RELEASE ORAL DAILY
Qty: 90 TABLET | Refills: 1 | Status: SHIPPED | OUTPATIENT
Start: 2020-11-18 | End: 2020-11-24 | Stop reason: SDUPTHER

## 2020-11-18 RX ORDER — LAMOTRIGINE 25 MG/1
50 TABLET ORAL NIGHTLY
Qty: 180 TABLET | Refills: 1 | Status: SHIPPED | OUTPATIENT
Start: 2020-11-18 | End: 2020-12-01 | Stop reason: SDUPTHER

## 2020-11-18 RX ORDER — PRAVASTATIN SODIUM 10 MG
10 TABLET ORAL NIGHTLY
Qty: 90 TABLET | Refills: 1 | Status: SHIPPED | OUTPATIENT
Start: 2020-11-18 | End: 2020-11-24 | Stop reason: SDUPTHER

## 2020-11-18 RX ORDER — AMLODIPINE BESYLATE 10 MG/1
10 TABLET ORAL DAILY
Qty: 90 TABLET | Refills: 1 | Status: SHIPPED | OUTPATIENT
Start: 2020-11-18 | End: 2020-12-01

## 2020-11-18 RX ORDER — DOXEPIN HYDROCHLORIDE 10 MG/1
10 CAPSULE ORAL NIGHTLY
Qty: 90 CAPSULE | Refills: 1 | Status: SHIPPED | OUTPATIENT
Start: 2020-11-18 | End: 2020-11-24 | Stop reason: SDUPTHER

## 2020-11-19 ENCOUNTER — TREATMENT (OUTPATIENT)
Dept: PHYSICAL THERAPY | Facility: CLINIC | Age: 60
End: 2020-11-19

## 2020-11-19 DIAGNOSIS — Z47.89 UNSPECIFIED ORTHOPEDIC AFTERCARE: Primary | ICD-10-CM

## 2020-11-19 DIAGNOSIS — M25.511 ACUTE PAIN OF RIGHT SHOULDER: ICD-10-CM

## 2020-11-19 DIAGNOSIS — M75.111 NONTRAUMATIC INCOMPLETE TEAR OF RIGHT ROTATOR CUFF: ICD-10-CM

## 2020-11-19 PROCEDURE — 97140 MANUAL THERAPY 1/> REGIONS: CPT | Performed by: PHYSICAL THERAPIST

## 2020-11-19 PROCEDURE — 97110 THERAPEUTIC EXERCISES: CPT | Performed by: PHYSICAL THERAPIST

## 2020-11-19 PROCEDURE — 97530 THERAPEUTIC ACTIVITIES: CPT | Performed by: PHYSICAL THERAPIST

## 2020-11-19 NOTE — PROGRESS NOTES
Re-Assessment / Re-Certification        Patient: Jagdish Rea   : 1960  Diagnosis/ICD-10 Code:  Unspecified orthopedic aftercare [Z47.89]  Referring practitioner: Fredi Ritchie, *  Date of Initial Visit: Type: THERAPY  Noted: 2020  Today's Date: 2020  Patient seen for 12 sessions      Subjective:   Jagdish Rea reports: see below  Subjective Questionnaire: QuickDASH: 40%  Clinical Progress: improved  Home Program Compliance: No  Treatment has included: therapeutic exercise, neuromuscular re-education, manual therapy and therapeutic activity    Subjective Evaluation    History of Present Illness  Mechanism of injury: Pt reports he didn't continue with therapy due to personal issues and transportation issues.  He has been using his arm with everyday activities. He had to move a few weeks ago and found out he is not able to use his R arm as much as he likes.  No pain or very mild pain during the day but at night the pain gets really bad and can't get comfortable. Sleeps on his back with HOB elevated. The Dr told him it will take 6-12 months for it to completely heal. He wants him to resume therapy and go back to see him in 6 weeks.     Quality of life: good    Pain  Current pain ratin  At best pain ratin  At worst pain ratin (mostly at night)  Quality: dull ache  Aggravating factors: sleeping  Progression: improved    Treatments  Previous treatment: physical therapy  Patient Goals  Patient goals for therapy: decreased pain, increased motion and increased strength         Objective          Postural Observations  Seated posture: poor    Additional Postural Observation Details  Rounded shoulders / slump sitting posture    Active Range of Motion     Right Shoulder   Flexion: 135 degrees   Abduction: 115 degrees   External rotation 45°: 45 degrees   Internal rotation 45°: 80 degrees     Additional Active Range of Motion Details  Measured in supine with head elevated    Passive Range  of Motion     Right Shoulder   Flexion: 145 degrees   Abduction: 120 degrees   External rotation 45°: 55 degrees   Internal rotation 0°: WFL    Strength/Myotome Testing     Right Shoulder     Planes of Motion   Flexion: 4   Abduction: 4   External rotation at 45°: 4-   Internal rotation at 0°: 4       Assessment & Plan     Assessment  Impairments: abnormal or restricted ROM and activity intolerance  Assessment details: The patient is a 59 y.o. male s/p right shoulder RCR on 8/11/20.  Pt stopped coming to therapy due to transportation and personal issues. He returns today to resume therapy.  He has been using his arm with everyday activities and has not been performing his HEP.  Reports he doesn't have much pain during the day but his pain intensifies at night and unable to get comfortable and sleep well due to pain.  Presents with impaired ROM and strength in R shoulder.  He  would benefit from skilled PT services to address functional limitations and impairments and to improve patient quality of life and max function.     Prognosis: good  Functional Limitations: lifting, sleeping, reaching behind back and reaching overhead  Goals  Plan Goals: Plan Goals: STG:   Pt will be independent and compliant with initial HEP and surgical precautions in 3 weeks. (MET)  Pt will demonstrate an increase in shoulder flexion PROM to 100 degrees and abduction to 80 deg within 3 weeks. ( MET)  Pt will report pain level at worst <5 in 3 weeks.     LTG:   Pt will be independent with final HEP for self-management of condition by DC.  Pt will demonstrate shoulder AROM flexion to >140 degrees in order to return to overhead activities by DC.   Pt will demonstrate shoulder strength grossly to >4/5 by DC.  Pt will improve score on QuickDASH to less than 25% by DC.      Plan  Therapy options: will be seen for skilled physical therapy services  Planned therapy interventions: functional ROM exercises, home exercise program, manual therapy,  postural training, strengthening, therapeutic activities and neuromuscular re-education  Frequency: 2x week  Treatment plan discussed with: patient  Plan details: 12 visits.  As of 11/17/20 pt is 10 weeks post op.        Progress toward previous goals: Partially Met    Recommendations: Continue as planned  Timeframe: 6 weeks  Prognosis to achieve goals: good    PT Signature: Baudilio Reese, PT, CLT      Based upon review of the patient's progress and continued therapy plan, it is my medical opinion that Jagdish Rea should continue physical therapy treatment at [facility] [address] [facility]  [address] [place] [address].    Signature: __________________________________  Fredi Ritchie MD  Please sign and return via fax to [phone].. Thank you, [facility].    Timed:         Manual Therapy:    15     mins  18284;     Therapeutic Exercise:    20     mins  17674;     Neuromuscular Jake:        mins  32135;    Therapeutic Activity:      10    mins  90514;     Gait Training:           mins  30485;     Ultrasound:          mins  02837;    Ionto                                   mins   87201  Self Care                            mins   77173      Un-Timed:  Electrical Stimulation:         mins  20132 ( );  Dry Needling          mins self-pay  Traction          mins 76320  Low Eval          Mins  41103  Mod Eval          Mins  57865  High Eval                            Mins  15142  Re-Eval                               mins  86762      Timed Treatment:  45    mins   Total Treatment:    45   mins

## 2020-11-24 ENCOUNTER — TREATMENT (OUTPATIENT)
Dept: PHYSICAL THERAPY | Facility: CLINIC | Age: 60
End: 2020-11-24

## 2020-11-24 DIAGNOSIS — Z47.89 UNSPECIFIED ORTHOPEDIC AFTERCARE: Primary | ICD-10-CM

## 2020-11-24 PROCEDURE — 97110 THERAPEUTIC EXERCISES: CPT | Performed by: PHYSICAL THERAPIST

## 2020-11-24 PROCEDURE — 97140 MANUAL THERAPY 1/> REGIONS: CPT | Performed by: PHYSICAL THERAPIST

## 2020-11-24 RX ORDER — RISPERIDONE 1 MG/1
1 TABLET ORAL DAILY
Qty: 90 TABLET | Refills: 1 | Status: SHIPPED | OUTPATIENT
Start: 2020-11-24 | End: 2020-12-01 | Stop reason: SDUPTHER

## 2020-11-24 RX ORDER — METOPROLOL SUCCINATE 25 MG/1
25 TABLET, EXTENDED RELEASE ORAL DAILY
Qty: 90 TABLET | Refills: 1 | Status: SHIPPED | OUTPATIENT
Start: 2020-11-24 | End: 2020-12-01 | Stop reason: SDUPTHER

## 2020-11-24 RX ORDER — PRAVASTATIN SODIUM 10 MG
10 TABLET ORAL NIGHTLY
Qty: 90 TABLET | Refills: 1 | Status: SHIPPED | OUTPATIENT
Start: 2020-11-24 | End: 2020-11-30 | Stop reason: SDUPTHER

## 2020-11-24 RX ORDER — DOXEPIN HYDROCHLORIDE 10 MG/1
10 CAPSULE ORAL NIGHTLY
Qty: 90 CAPSULE | Refills: 1 | Status: SHIPPED | OUTPATIENT
Start: 2020-11-24 | End: 2020-12-01 | Stop reason: SDUPTHER

## 2020-11-24 NOTE — PROGRESS NOTES
Physical Therapy Daily Progress Note    VISIT#: 13 2x /wk    Subjective   Jagdish Rea reports: Patient denies pain currently.  When he does have pain it is in his upper lateral arm and R shoulder joint. Pt. Lost previous HEP.       Objective     See Exercise, Manual, and Modality Logs for complete treatment.     Patient Education: EMPHASIZED importance of doing HEP regularly and using ice afterward.     HEP updated:  See chart.     Exercises  Seated Scapular Retraction - 10 reps - 2 sets - 5 sec hold - 1x daily - 7x weekly  Standing Backward Shoulder Rolls - 10 reps - 3 sets - 1x daily - 7x weekly  Hooklying Scapular Protraction on Foam Roll - 10 reps - 2 sets - 1x daily - 7x weekly  Supine Shoulder Flexion with Dowel - 10 reps - 2 sets - 1x daily - 7x weekly  Standing Shoulder Abduction AAROM with Dowel - 10 reps - 1 sets - 1x daily - 7x weekly  Seated Shoulder External Rotation AAROM with Cane and Hand in Neutral - 10 reps - 1 sets - 1x daily - 7x weekly  Prone Shoulder Row - 10 reps - 2 sets - 1x daily - 7x weekly    Assessment/Plan:  Pt. With impingement signs, instructed to turn thumb up with there ex.  Questionable whether patient will perform HEP.       Progress per Plan of Care:  Monitor response.             Timed:         Manual Therapy:  15       mins  66772;     Therapeutic Exercise:    30     mins  58047;     Neuromuscular Jake:        mins  14137;    Therapeutic Activity:          mins  62172;     Gait Training:           mins  55620;     Ultrasound:          mins  15111;    Ionto                                   mins   17479  Self Care                            mins   11718  Canalith Repos                   mins  4209  Aquatic                               mins 19019    Un-Timed:  Electrical Stimulation:         mins  68470 ( );  Dry Needling          mins self-pay  Traction          mins 19681  Low Eval          Mins  32527  Mod Eval          Mins  58673  High Eval                             Mins  36399  Re-Eval                               mins  40981    Timed Treatment:   45   mins   Total Treatment:    45    mins    Kecia Waters, PTA

## 2020-11-30 RX ORDER — PRAVASTATIN SODIUM 10 MG
10 TABLET ORAL NIGHTLY
Qty: 90 TABLET | Refills: 1 | Status: SHIPPED | OUTPATIENT
Start: 2020-11-30 | End: 2021-05-30

## 2020-12-01 RX ORDER — LAMOTRIGINE 25 MG/1
50 TABLET ORAL NIGHTLY
Qty: 180 TABLET | Refills: 1 | Status: SHIPPED | OUTPATIENT
Start: 2020-12-01 | End: 2022-10-25 | Stop reason: SDUPTHER

## 2020-12-01 RX ORDER — AMLODIPINE BESYLATE 10 MG/1
TABLET ORAL
Qty: 30 TABLET | Refills: 0 | Status: SHIPPED | OUTPATIENT
Start: 2020-12-01 | End: 2020-12-01 | Stop reason: SDUPTHER

## 2020-12-01 RX ORDER — AMLODIPINE BESYLATE 10 MG/1
10 TABLET ORAL DAILY
Qty: 90 TABLET | Refills: 0 | Status: SHIPPED | OUTPATIENT
Start: 2020-12-01 | End: 2020-12-10

## 2020-12-01 RX ORDER — RISPERIDONE 1 MG/1
1 TABLET ORAL DAILY
Qty: 90 TABLET | Refills: 1 | Status: SHIPPED | OUTPATIENT
Start: 2020-12-01 | End: 2022-10-25 | Stop reason: SDUPTHER

## 2020-12-01 RX ORDER — DOXEPIN HYDROCHLORIDE 10 MG/1
10 CAPSULE ORAL NIGHTLY
Qty: 90 CAPSULE | Refills: 1 | Status: SHIPPED | OUTPATIENT
Start: 2020-12-01 | End: 2021-05-27

## 2020-12-01 RX ORDER — METOPROLOL SUCCINATE 25 MG/1
25 TABLET, EXTENDED RELEASE ORAL DAILY
Qty: 90 TABLET | Refills: 1 | Status: SHIPPED | OUTPATIENT
Start: 2020-12-01 | End: 2020-12-10

## 2020-12-04 ENCOUNTER — TREATMENT (OUTPATIENT)
Dept: PHYSICAL THERAPY | Facility: CLINIC | Age: 60
End: 2020-12-04

## 2020-12-04 DIAGNOSIS — Z47.89 UNSPECIFIED ORTHOPEDIC AFTERCARE: Primary | ICD-10-CM

## 2020-12-04 DIAGNOSIS — M75.111 NONTRAUMATIC INCOMPLETE TEAR OF RIGHT ROTATOR CUFF: ICD-10-CM

## 2020-12-04 DIAGNOSIS — M25.511 ACUTE PAIN OF RIGHT SHOULDER: ICD-10-CM

## 2020-12-04 PROCEDURE — 97530 THERAPEUTIC ACTIVITIES: CPT | Performed by: PHYSICAL THERAPIST

## 2020-12-04 PROCEDURE — 97110 THERAPEUTIC EXERCISES: CPT | Performed by: PHYSICAL THERAPIST

## 2020-12-04 PROCEDURE — 97140 MANUAL THERAPY 1/> REGIONS: CPT | Performed by: PHYSICAL THERAPIST

## 2020-12-04 NOTE — PROGRESS NOTES
Physical Therapy Daily Progress Note    VISIT#: 14    Subjective   Jagdish Rea reports: he is only doing his HEP 1x every other day. His shoulder feels pretty good this afternoon.  Current Pain Level: 0/10    Objective     See Exercise, Manual, and Modality Logs for complete treatment.     Patient Education: continue to educate and encourage the improtance of doing his HEP as recommended  Added resisted shoulder ext and horiz abd. Handouts given for HEP Increased TB to Blue for ER/IR.    Assessment/Plan  Progressed strengthening per protocol and patient tolerated very well. Patient is doing his exercises but not as prescribed. No c/o pain during treatment. Denied ice at end of session.    Goals  Plan Goals: Plan Goals: STG:   Pt will be independent and compliant with initial HEP and surgical precautions in 3 weeks. (MET)  Pt will demonstrate an increase in shoulder flexion PROM to 100 degrees and abduction to 80 deg within 3 weeks. ( MET)  Pt will report pain level at worst <5 in 3 weeks. (MET)    LTG:   Pt will be independent with final HEP for self-management of condition by DC.  Pt will demonstrate shoulder AROM flexion to >140 degrees in order to return to overhead activities by DC.   Pt will demonstrate shoulder strength grossly to >4/5 by DC.  Pt will improve score on QuickDASH to less than 25% by DC.             Timed:         Manual Therapy:    12     mins  66204;     Therapeutic Exercise:    22     mins  31445;       Therapeutic Activity:     12     mins  77566;       Un-Timed:      Timed Treatment:   46   mins   Total Treatment:     46   mins    Stephani Sawyer PTA    Physical Therapist Assistant

## 2020-12-09 ENCOUNTER — TREATMENT (OUTPATIENT)
Dept: PHYSICAL THERAPY | Facility: CLINIC | Age: 60
End: 2020-12-09

## 2020-12-09 DIAGNOSIS — Z47.89 UNSPECIFIED ORTHOPEDIC AFTERCARE: Primary | ICD-10-CM

## 2020-12-09 PROCEDURE — 97112 NEUROMUSCULAR REEDUCATION: CPT | Performed by: PHYSICAL THERAPIST

## 2020-12-09 PROCEDURE — 97110 THERAPEUTIC EXERCISES: CPT | Performed by: PHYSICAL THERAPIST

## 2020-12-09 PROCEDURE — 97140 MANUAL THERAPY 1/> REGIONS: CPT | Performed by: PHYSICAL THERAPIST

## 2020-12-09 NOTE — PROGRESS NOTES
Physical Therapy Daily Progress Note    VISIT#: 15 2x /wk  Abeln 12/23/20  s/p R RTC repair 8/11/20, as of 12/8/20-13 weeks post op    Subjective   Jagdishgrecia Rea reports:  No c/o pain.  Notes he is sleeping better.  Admits he is not doing HEP.      Objective     See Exercise, Manual, and Modality Logs for complete treatment.   Progression as noted.     Patient Education: EMPHASIZED importance of doing HEP regularly and using ice afterward.       Assessment/Plan:  Pt. Able to progress without issue.  He requires frequent cueing to correct posture.       Progress per Plan of Care:  Monitor response to progression.           Timed:         Manual Therapy:  15       mins  60022;     Therapeutic Exercise:    15     mins  34036;     Neuromuscular Jake:   15     mins  82308;    Therapeutic Activity:          mins  06141;     Gait Training:           mins  89212;     Ultrasound:          mins  83653;    Ionto                                   mins   11656  Self Care                            mins   30890  Canalith Repos                   mins  4209  Aquatic                               mins 40472    Un-Timed:  Electrical Stimulation:         mins  09261 ( );  Dry Needling          mins self-pay  Traction          mins 74904  Low Eval          Mins  24775  Mod Eval          Mins  28881  High Eval                            Mins  91274  Re-Eval                               mins  25862    Timed Treatment:   45   mins   Total Treatment:    45    mins    Kecia Waters PTA

## 2020-12-10 RX ORDER — METOPROLOL SUCCINATE 25 MG/1
TABLET, EXTENDED RELEASE ORAL
Qty: 30 TABLET | Refills: 0 | Status: SHIPPED | OUTPATIENT
Start: 2020-12-10 | End: 2021-05-30

## 2020-12-10 RX ORDER — AMLODIPINE BESYLATE 10 MG/1
TABLET ORAL
Qty: 30 TABLET | Refills: 0 | Status: SHIPPED | OUTPATIENT
Start: 2020-12-10 | End: 2021-01-06

## 2020-12-23 ENCOUNTER — OFFICE VISIT (OUTPATIENT)
Dept: ORTHOPEDIC SURGERY | Facility: CLINIC | Age: 60
End: 2020-12-23

## 2020-12-23 VITALS — HEIGHT: 71 IN | BODY MASS INDEX: 33.6 KG/M2 | WEIGHT: 240 LBS

## 2020-12-23 DIAGNOSIS — M75.111 PARTIAL NONTRAUMATIC TEAR OF RIGHT ROTATOR CUFF: Primary | ICD-10-CM

## 2020-12-23 PROCEDURE — 99212 OFFICE O/P EST SF 10 MIN: CPT | Performed by: ORTHOPAEDIC SURGERY

## 2020-12-23 RX ORDER — OMEPRAZOLE 20 MG/1
20 CAPSULE, DELAYED RELEASE ORAL 2 TIMES DAILY
COMMUNITY
End: 2022-08-22

## 2020-12-23 NOTE — PROGRESS NOTES
"     Patient ID: Jagdish Rea is a 60 y.o. male.  Right shoulder pain  8/11/20 right shoulder arthroscopy with supraspinatus repair  Pain minimal, done with physical therapy      Review of Systems:    Right shoulder pain resolved    Objective:    Ht 180.3 cm (71\")   Wt 109 kg (240 lb)   BMI 33.47 kg/m²     Physical Examination:   He is a pleasant male in no distress. He is alert and oriented x3 and appears his stated age.  Right shoulder demonstrates healed incisions, active elevation 170 degrees abduction 130 degrees external Tatian 40 degrees and intact repair.Sensory and motor exam are intact all distributions. Radial pulse is palpable and capillary refill is less than 2 seconds all digits       Imaging:       Assessment:    Doing well after cuff repair    Plan:   Activity as tolerated and see me as needed      Procedures          Disclaimer: Please note that areas of this note were completed with computer voice recognition software.  Quite often unanticipated grammatical, syntax, homophones, and other interpretive errors are inadvertently transcribed by the computer software. Please excuse any errors that have escaped final proofreading.  "

## 2021-01-06 RX ORDER — AMLODIPINE BESYLATE 10 MG/1
TABLET ORAL
Qty: 30 TABLET | Refills: 0 | Status: SHIPPED | OUTPATIENT
Start: 2021-01-06 | End: 2021-05-27

## 2021-01-08 ENCOUNTER — OFFICE VISIT (OUTPATIENT)
Dept: FAMILY MEDICINE CLINIC | Facility: CLINIC | Age: 61
End: 2021-01-08

## 2021-01-08 VITALS
RESPIRATION RATE: 16 BRPM | DIASTOLIC BLOOD PRESSURE: 86 MMHG | WEIGHT: 237.8 LBS | SYSTOLIC BLOOD PRESSURE: 126 MMHG | OXYGEN SATURATION: 97 % | HEIGHT: 71 IN | TEMPERATURE: 96.8 F | HEART RATE: 79 BPM | BODY MASS INDEX: 33.29 KG/M2

## 2021-01-08 DIAGNOSIS — N64.4 BREAST PAIN, LEFT: Primary | ICD-10-CM

## 2021-01-08 PROCEDURE — 99213 OFFICE O/P EST LOW 20 MIN: CPT | Performed by: NURSE PRACTITIONER

## 2021-01-08 NOTE — PROGRESS NOTES
Subjective   Jagdish Rea is a 60 y.o. male.     Chief Complaint   Patient presents with   • Breast Problem     SORENESS LEFT CHEST AREA .SWELLING. TENDERNESS       HPI  Patient is here for soreness and swelling under left breast for 2 months. Never had this before not a lot of breast cancer in family. He did have gynecomastia.  No drainage from nipple. He has gained weight as reported.      The following portions of the patient's history were reviewed and updated as appropriate: allergies, current medications, past family history, past medical history, past social history, past surgical history and problem list.      Current Outpatient Medications:   •  albuterol sulfate  (90 Base) MCG/ACT inhaler, Inhale 2 puffs Every 4 (Four) Hours As Needed for Wheezing., Disp: 1 inhaler, Rfl: 2  •  amLODIPine (NORVASC) 10 MG tablet, TAKE ONE TABLET BY MOUTH DAILY, Disp: 30 tablet, Rfl: 0  •  doxepin (SINEquan) 10 MG capsule, Take 1 capsule by mouth Every Night., Disp: 90 capsule, Rfl: 1  •  hydroCHLOROthiazide (MICROZIDE) 12.5 MG capsule, Take 12.5 mg by mouth As Needed., Disp: , Rfl:   •  lamoTRIgine (LaMICtal) 25 MG tablet, Take 2 tablets by mouth Every Night., Disp: 180 tablet, Rfl: 1  •  metoprolol succinate XL (TOPROL-XL) 25 MG 24 hr tablet, TAKE ONE TABLET BY MOUTH DAILY, Disp: 30 tablet, Rfl: 0  •  omeprazole (priLOSEC) 20 MG capsule, Take 20 mg by mouth Daily., Disp: , Rfl:   •  pravastatin (PRAVACHOL) 10 MG tablet, Take 1 tablet by mouth Every Night., Disp: 90 tablet, Rfl: 1  •  risperiDONE (risperDAL) 1 MG tablet, Take 1 tablet by mouth Daily., Disp: 90 tablet, Rfl: 1    Current Facility-Administered Medications:   •  triamcinolone acetonide (KENALOG-40) injection 40 mg, 40 mg, Intra-articular, Once, Fredi Ritchie MD    Recent Results (from the past 4032 hour(s))   Basic metabolic panel    Collection Time: 08/08/20  8:41 AM    Specimen: Blood   Result Value Ref Range    Glucose 94 65 - 99 mg/dL     BUN 16 6 - 20 mg/dL    Creatinine 1.01 0.76 - 1.27 mg/dL    Sodium 141 136 - 145 mmol/L    Potassium 3.7 3.5 - 5.2 mmol/L    Chloride 105 98 - 107 mmol/L    CO2 24.1 22.0 - 29.0 mmol/L    Calcium 9.1 8.6 - 10.5 mg/dL    eGFR  African Amer 92 >60 mL/min/1.73    BUN/Creatinine Ratio 15.8 7.0 - 25.0    Anion Gap 11.9 5.0 - 15.0 mmol/L   Protime-INR    Collection Time: 08/08/20  8:41 AM    Specimen: Blood   Result Value Ref Range    Protime 10.5 9.6 - 11.7 Seconds    INR 0.96 0.93 - 1.10   APTT    Collection Time: 08/08/20  8:41 AM    Specimen: Blood   Result Value Ref Range    PTT 28.5 24.0 - 31.0 seconds   CBC Auto Differential    Collection Time: 08/08/20  8:41 AM    Specimen: Blood   Result Value Ref Range    WBC 6.86 3.40 - 10.80 10*3/mm3    RBC 4.58 4.14 - 5.80 10*6/mm3    Hemoglobin 15.1 13.0 - 17.7 g/dL    Hematocrit 45.4 37.5 - 51.0 %    MCV 99.1 (H) 79.0 - 97.0 fL    MCH 33.0 26.6 - 33.0 pg    MCHC 33.3 31.5 - 35.7 g/dL    RDW 13.5 12.3 - 15.4 %    RDW-SD 49.9 37.0 - 54.0 fl    MPV 12.0 6.0 - 12.0 fL    Platelets 154 140 - 450 10*3/mm3    Neutrophil % 50.1 42.7 - 76.0 %    Lymphocyte % 37.3 19.6 - 45.3 %    Monocyte % 8.5 5.0 - 12.0 %    Eosinophil % 2.2 0.3 - 6.2 %    Basophil % 0.7 0.0 - 1.5 %    Immature Grans % 1.2 (H) 0.0 - 0.5 %    Neutrophils, Absolute 3.44 1.70 - 7.00 10*3/mm3    Lymphocytes, Absolute 2.56 0.70 - 3.10 10*3/mm3    Monocytes, Absolute 0.58 0.10 - 0.90 10*3/mm3    Eosinophils, Absolute 0.15 0.00 - 0.40 10*3/mm3    Basophils, Absolute 0.05 0.00 - 0.20 10*3/mm3    Immature Grans, Absolute 0.08 (H) 0.00 - 0.05 10*3/mm3    nRBC 0.0 0.0 - 0.2 /100 WBC   COVID-19,[facility], NP SWAB IN [facility] VIRAL TRANSPORT MEDIA 24-30 HR TAT - Swab, Nasopharynx    Collection Time: 08/08/20  8:41 AM    Specimen: Nasopharynx; Swab   Result Value Ref Range    Reference Lab Report       Testing performed by [facility]  [address] [place] [address]  [id number]    COVID19 Not Detected Not Detected - Ref.  "Range         Review of Systems   Constitutional: Positive for unexpected weight gain.   Genitourinary: Negative for breast discharge, difficulty urinating, penile swelling, scrotal swelling and testicular pain.       Objective     /86 (BP Location: Right arm, Patient Position: Sitting, Cuff Size: Adult)   Pulse 79   Temp 96.8 °F (36 °C) (Temporal)   Resp 16   Ht 180.3 cm (71\")   Wt 108 kg (237 lb 12.8 oz)   SpO2 97%   BMI 33.17 kg/m²     Physical Exam  Vitals signs and nursing note reviewed.   Constitutional:       Appearance: Normal appearance.   HENT:      Head: Normocephalic and atraumatic.      Right Ear: Tympanic membrane normal.      Left Ear: Tympanic membrane normal.      Nose: Nose normal.      Mouth/Throat:      Mouth: Mucous membranes are moist.   Cardiovascular:      Rate and Rhythm: Normal rate.   Neurological:      Mental Status: He is alert.           Assessment/Plan   Diagnoses and all orders for this visit:    1. Breast pain, left (Primary)  Comments:  he is using lavander fragrance in new smaller home discussed this could cause gynacomastia.mammogram ordered. medications can cause symptoms.   Orders:  -     Mammo Diagnostic Bilateral With CAD; Future      Patient Instructions   Get rid of lavander in home. Use another sent.   Follow up after mammgram.  Further treatment pending results.       Yamilka Pascual, APRN    01/08/21      "

## 2021-01-08 NOTE — PATIENT INSTRUCTIONS
Get rid of lavander in home. Use another sent.   Follow up after mammgram.  Further treatment pending results.

## 2021-01-28 ENCOUNTER — HOSPITAL ENCOUNTER (OUTPATIENT)
Dept: MAMMOGRAPHY | Facility: HOSPITAL | Age: 61
Discharge: HOME | End: 2021-01-28
Admitting: NURSE PRACTITIONER

## 2021-01-28 DIAGNOSIS — N64.4 BREAST PAIN, LEFT: ICD-10-CM

## 2021-01-28 PROCEDURE — G0279 TOMOSYNTHESIS, MAMMO: HCPCS

## 2021-01-28 PROCEDURE — 77066 DX MAMMO INCL CAD BI: CPT

## 2021-03-09 ENCOUNTER — OFFICE VISIT (OUTPATIENT)
Dept: FAMILY MEDICINE CLINIC | Facility: CLINIC | Age: 61
End: 2021-03-09

## 2021-03-09 VITALS
SYSTOLIC BLOOD PRESSURE: 144 MMHG | HEIGHT: 71 IN | HEART RATE: 73 BPM | BODY MASS INDEX: 33.85 KG/M2 | OXYGEN SATURATION: 96 % | DIASTOLIC BLOOD PRESSURE: 93 MMHG | TEMPERATURE: 96.2 F | WEIGHT: 241.8 LBS

## 2021-03-09 DIAGNOSIS — M54.50 CHRONIC MIDLINE LOW BACK PAIN WITHOUT SCIATICA: ICD-10-CM

## 2021-03-09 DIAGNOSIS — G89.29 CHRONIC PAIN OF BOTH KNEES: Primary | ICD-10-CM

## 2021-03-09 DIAGNOSIS — Z72.0 TOBACCO ABUSE: Chronic | ICD-10-CM

## 2021-03-09 DIAGNOSIS — G89.29 CHRONIC MIDLINE LOW BACK PAIN WITHOUT SCIATICA: ICD-10-CM

## 2021-03-09 DIAGNOSIS — M25.561 CHRONIC PAIN OF BOTH KNEES: Primary | ICD-10-CM

## 2021-03-09 DIAGNOSIS — M25.562 CHRONIC PAIN OF BOTH KNEES: Primary | ICD-10-CM

## 2021-03-09 PROCEDURE — 99214 OFFICE O/P EST MOD 30 MIN: CPT | Performed by: NURSE PRACTITIONER

## 2021-03-09 RX ORDER — MELOXICAM 7.5 MG/1
7.5 TABLET ORAL DAILY
Qty: 30 TABLET | Refills: 1 | Status: SHIPPED | OUTPATIENT
Start: 2021-03-09 | End: 2021-04-09 | Stop reason: SDUPTHER

## 2021-03-09 NOTE — PROGRESS NOTES
Subjective   {CC  Problem List  Visit Diagnosis   Encounters  Notes  Medications  Labs  Result Review Imaging  Media :23}     Jagdish Rea is a 60 y.o. male.     Chief Complaint   Patient presents with   • Hypertension     6 month f/u   • Hyperlipidemia   • COPD   • Back Pain     lower back pain for a while that radiates to bilateral hips, pt unable to stand for an extended period of time without pain       History of Present Illness  Patient is here for back pain has had this few months thought would go away. Pressure and pain. Has dull ache not sharp. Getting off his feet makes it better. Can't stand for one hour has to lean on cart to shop. He has bilateral knee pain that is not getting any better. Pain is right above his crack he reports.   Sometimes goes close to hips. He is  Taking ibuprofen when it gets bad couple times a day. Takes edge off and laying down alleviates it.   MRI 2017 reviewed: very mild degenerative change with facet arthropathy at L4-5 and L5-S1 mild foraminal narrowing due to mild facet arthropathy.     niko knee pain- right worse than left. Was getting shots in past. Told return when he had bone spurs. Has had surgery on right knee in past.   Unable to squat . Knee pain gets so bad hard to stand. His back is currently in flare with pain.         The following portions of the patient's history were reviewed and updated as appropriate: allergies, current medications, past family history, past medical history, past social history, past surgical history and problem list.      Current Outpatient Medications:   •  albuterol sulfate  (90 Base) MCG/ACT inhaler, Inhale 2 puffs Every 4 (Four) Hours As Needed for Wheezing., Disp: 1 inhaler, Rfl: 2  •  amLODIPine (NORVASC) 10 MG tablet, TAKE ONE TABLET BY MOUTH DAILY, Disp: 30 tablet, Rfl: 0  •  doxepin (SINEquan) 10 MG capsule, Take 1 capsule by mouth Every Night., Disp: 90 capsule, Rfl: 1  •  hydroCHLOROthiazide (MICROZIDE) 12.5 MG  "capsule, Take 12.5 mg by mouth As Needed., Disp: , Rfl:   •  lamoTRIgine (LaMICtal) 25 MG tablet, Take 2 tablets by mouth Every Night., Disp: 180 tablet, Rfl: 1  •  metoprolol succinate XL (TOPROL-XL) 25 MG 24 hr tablet, TAKE ONE TABLET BY MOUTH DAILY, Disp: 30 tablet, Rfl: 0  •  omeprazole (priLOSEC) 20 MG capsule, Take 20 mg by mouth Daily., Disp: , Rfl:   •  pravastatin (PRAVACHOL) 10 MG tablet, Take 1 tablet by mouth Every Night., Disp: 90 tablet, Rfl: 1  •  risperiDONE (risperDAL) 1 MG tablet, Take 1 tablet by mouth Daily., Disp: 90 tablet, Rfl: 1  •  meloxicam (Mobic) 7.5 MG tablet, Take 1 tablet by mouth Daily., Disp: 30 tablet, Rfl: 1    Current Facility-Administered Medications:   •  triamcinolone acetonide (KENALOG-40) injection 40 mg, 40 mg, Intra-articular, Once, Fredi Ritchie MD    No results found for this or any previous visit (from the past 4032 hour(s)).      Review of Systems    Objective     /93 (BP Location: Right arm, Patient Position: Sitting, Cuff Size: Adult)   Pulse 73   Temp 96.2 °F (35.7 °C) (Infrared)   Ht 180.3 cm (71\")   Wt 110 kg (241 lb 12.8 oz)   SpO2 96%   BMI 33.72 kg/m²     Physical Exam  Vitals and nursing note reviewed.   Constitutional:       Appearance: Normal appearance.   HENT:      Head: Normocephalic.      Right Ear: External ear normal.      Left Ear: External ear normal.      Nose: Nose normal.      Mouth/Throat:      Mouth: Mucous membranes are moist.   Eyes:      Conjunctiva/sclera: Conjunctivae normal.      Pupils: Pupils are equal, round, and reactive to light.   Cardiovascular:      Rate and Rhythm: Normal rate and regular rhythm.      Pulses: Normal pulses.      Heart sounds: Normal heart sounds.   Pulmonary:      Effort: Pulmonary effort is normal.      Breath sounds: Normal breath sounds.   Abdominal:      General: Bowel sounds are normal.      Palpations: Abdomen is soft.   Musculoskeletal:      Cervical back: Normal and neck supple.      " Thoracic back: Normal.      Lumbar back: No swelling or edema. Decreased range of motion. Negative right straight leg raise test and negative left straight leg raise test.        Legs:       Comments: Limited squat due to pain knees that prevents easy return to stand.   Unable to twist without pain  Limited side to side movement.   Straight leg test sitting neg.      Neurological:      Mental Status: He is alert.      GCS: GCS eye subscore is 4. GCS verbal subscore is 5. GCS motor subscore is 6.   Psychiatric:         Attention and Perception: Attention normal.         Mood and Affect: Mood normal.         Speech: Speech normal.         Behavior: Behavior normal.         Cognition and Memory: Cognition normal.         Result Review :                Assessment/Plan    Diagnoses and all orders for this visit:    1. Chronic pain of both knees (Primary)  Comments:  xrays, meloxicam and referral to ortho  Orders:  -     XR Knee 3 View Bilateral; Future  -     Ambulatory Referral to Physical Therapy Evaluate and treat  -     Ambulatory Referral to Orthopedic Surgery    2. Chronic midline low back pain without sciatica  Comments:  start with xrays and meloxicam and Physical therapy  Orders:  -     XR Spine Lumbar Complete With Flex & Ext; Future  -     Ambulatory Referral to Physical Therapy Evaluate and treat  -     Ambulatory Referral to Orthopedic Surgery    3. Tobacco abuse  Comments:  recommmned stop smoking    Other orders  -     meloxicam (Mobic) 7.5 MG tablet; Take 1 tablet by mouth Daily.  Dispense: 30 tablet; Refill: 1      Patient Instructions   Follow up with ortho.  Start meloxicam  Physical therapy with contact you.         Follow Up   Return in about 1 month (around 4/9/2021).    Patient was given instructions and counseling regarding his condition or for health maintenance advice. Please see specific information pulled into the AVS if appropriate.     Yamilka Pascual, APRN    03/09/21

## 2021-03-11 ENCOUNTER — HOSPITAL ENCOUNTER (OUTPATIENT)
Dept: GENERAL RADIOLOGY | Facility: HOSPITAL | Age: 61
Discharge: HOME | End: 2021-03-11

## 2021-03-11 DIAGNOSIS — M54.50 CHRONIC MIDLINE LOW BACK PAIN WITHOUT SCIATICA: ICD-10-CM

## 2021-03-11 DIAGNOSIS — G89.29 CHRONIC MIDLINE LOW BACK PAIN WITHOUT SCIATICA: ICD-10-CM

## 2021-03-11 DIAGNOSIS — G89.29 CHRONIC PAIN OF BOTH KNEES: ICD-10-CM

## 2021-03-11 DIAGNOSIS — M25.562 CHRONIC PAIN OF BOTH KNEES: ICD-10-CM

## 2021-03-11 DIAGNOSIS — M25.561 CHRONIC PAIN OF BOTH KNEES: ICD-10-CM

## 2021-03-11 PROCEDURE — 72114 X-RAY EXAM L-S SPINE BENDING: CPT

## 2021-03-11 PROCEDURE — 73562 X-RAY EXAM OF KNEE 3: CPT

## 2021-03-19 ENCOUNTER — OFFICE VISIT (OUTPATIENT)
Dept: ORTHOPEDIC SURGERY | Facility: CLINIC | Age: 61
End: 2021-03-19

## 2021-03-19 VITALS
HEART RATE: 76 BPM | WEIGHT: 238 LBS | SYSTOLIC BLOOD PRESSURE: 147 MMHG | BODY MASS INDEX: 33.32 KG/M2 | DIASTOLIC BLOOD PRESSURE: 94 MMHG | HEIGHT: 71 IN

## 2021-03-19 DIAGNOSIS — M17.0 PRIMARY OSTEOARTHRITIS OF BOTH KNEES: Primary | ICD-10-CM

## 2021-03-19 PROCEDURE — 99203 OFFICE O/P NEW LOW 30 MIN: CPT | Performed by: ORTHOPAEDIC SURGERY

## 2021-03-19 NOTE — PROGRESS NOTES
Patient ID: Jagdish Rea     Chief Complaint:    Chief Complaint   Patient presents with   • Right Knee - Pain   • Left Knee - Pain        HPI:    Jagdish Rea is a 60 y.o. who presents today for evaluation of bilateral knee pain.  Right bothers him more than the left.  He did have a right knee scope in 2015 for partial meniscectomy.  States his right knee hurts on the inside/medial aspect but can sometimes be generalized.  Left knee just bothers him medially.  Saw his primary care doctor recently and just started meloxicam last week says that is helping.  He is scheduled to start some therapy next week.  States he did have some steroid injections about 4 years ago but did not seem to help his symptoms at that time.  He also states he has some back issues.  He has had previous sciatica but currently just some low back pain and pressure.  His back bothers him with prolonged standing and activity.  Knees are worse with increased activity better with rest.  Does report some decreased motion in his knees.  No recent trauma.    Denies distal numbness or tingling.  Social History     Socioeconomic History   • Marital status:      Spouse name: Not on file   • Number of children: Not on file   • Years of education: Not on file   • Highest education level: Not on file   Tobacco Use   • Smoking status: Current Every Day Smoker     Packs/day: 0.50     Years: 47.00     Pack years: 23.50     Types: Cigarettes   • Smokeless tobacco: Never Used   Vaping Use   • Vaping Use: Never used   Substance and Sexual Activity   • Alcohol use: No   • Drug use: No   • Sexual activity: Defer     Past Medical History:   Diagnosis Date   • Anxiety    • Back pain    • COPD (chronic obstructive pulmonary disease) (CMS/[facility])    • Hyperlipidemia    • Hypertension    • PTSD (post-traumatic stress disorder)    • Thoracic disc herniation    • Vitamin B12 deficiency      Family History   Problem Relation Age of Onset   • Heart disease Mother   "      ROS:    ROS:  Constitutional:  Denies fever, shaking or chills   Eyes:  Denies change in visual acuity   HENT:  Denies nasal congestion or sore throat   Respiratory:  Denies cough or shortness of breath   Cardiovascular:  Denies chest pain or edema   GI:  Denies abdominal pain, nausea, vomiting, bloody stools or diarrhea   Musculoskeletal:  Denies numbness tingling or loss of motor function except as outlined above in history of present illness.  Integument:  Denies rash, lesion or ulceration   Neurologic:  Denies headache or focal weakness  Lymphatics: No lymphadenopathy, no lymphedema      All other pertinent positives and negatives as noted above in HPI.    Physical Exam:     Vital Signs:  /94   Pulse 76   Ht 180.3 cm (71\")   Wt 108 kg (238 lb)   BMI 33.19 kg/m²   Constitutional: Awake alert and oriented x3, well developed, well nourished, no acute distress, non-toxic appearance.  HENT:  Normocephalic, Atraumatic, Bilateral external ears normal, Oropharynx moist, No oral exudates, Nose normal.   Respiratory:  No respiratory distress, No wheezing  Vascular:  Brisk cap refill, Intact distal pulses, No cyanosis, all compartments soft with no signs or symptoms of compartment syndrome or DVT.  Neurologic: Sensation grossly intact to light touch throughout the involved extremity and bilaterally symmetric, deep tendon reflexes are 2+ and bilaterally symmetric, No focal deficits noted.   Neck:  Normal range of motion, No tenderness, Supple, Negative Spurlings.  Integument: Well hydrated, no rash, warm, dry, no lesions or ulceration.   Lymphatics: No obvious lymphadenopathy, No lymphedema    Musculoskeletal:    Exam of the right  knee:  Gait patient stifflegged  No muscle atrophy, erythema, ecchymosis, or gross deformity noted  no knee effusion  Patient does not tenderness along the medial femoral condyle.  Active range of motion 7-115  4-/5 strength flexion and extension  The knee is stable to varus and " valgus stress testing  Mild varus alignment of the limb  Lachman negative  Posterior drawer negative  Bassam's negative  Patellofemoral grind negative  Sensation grossly intact to light tough throughout the lower extremity  Skin is intact  Distal pulses are 2+  No signs or symptoms of DVT    Exam of the contralateral knee is normal:  No muscle atrophy, erythema, ecchymosis, or gross deformity noted  No knee effusion  No medial or lateral joint line tenderness  Full active range of motion  4/5 strength flexion and extension  The knee is stable to varus and valgus stress testing  Lachman negative  Posterior drawer negative  Bassam's negative  Patellofemoral grind negative  Sensation grossly intact to light tough throughout the lower extremity  Skin is intact  Distal pulses are intact  No signs or symptoms of DVT    Bilateral Hip exam:  No atrophy, erythema, ecchymosis, or gross deformity noted  No tenderness to palpation  Slightly dimished active range of motion, mild lack of IR but nonpainful  5/5 strength in hip flexion, abduction, and adduction  Anterior, lateral, and posterior hip impingement tests negative  Stinchfield and straight leg raise negative  RADHA negative        Diagnostic Studies:     Imaging was personally and individually reviewed and discussed at length with the patient:    4V bilateral knee(s) were taken in the office today, including AP, flexion PA, lateral, and sunrise views to evaluate the patient's complaint:  Weight bearing views show mild degenerative changes in all three compartments with the medial compartment being most affected.  There is early osteophyte formation throughout all three compartments.  There is no evidence of fracture or dislocation.  No periosteal reactions or medullary lesions are seen.  Patellar height and alignment are within normal limits.     Comparison films not available    AP pelvis was taken in the office today: Mild degenerative changes about the hip joints  no acute fractures.            Assessment:     bilateral  Knee Osteoarthritis            Plan:     Based on x-rays, history, and office evaluation, we have diagnosed Jagdish Rea with knee arthritis. At this time, we recommend starting with a conservative treatment program. This will consist of cortisone injection during significant flare-ups, NSAIDS for daily maintenance, physical therapy for strengthening and modalities as indicated, and bracing when appropriate. These measures will continue, until symptoms are no longer relieved, become more severe or function begins to significantly deteriorate. At that point joint replacement options will be discussed.      Patient does have some early arthritic symptoms and findings on imaging.  He also has some low back posture issues with an abnormal gait.  I do think he benefit from conservative treatment starting with continuing his meloxicam as well as some formal physical therapy to work on his knees especially strengthening of the right as it is quite weak compared to contralateral side.  Also work on range of motion of bilateral knees.  Patient also benefit from gait training and core strengthening to help his back.  I do not think any kind of surgery at this time would benefit him given his current condition as he is quite deconditioned.    Going forward to always try injections if needed as stated above.    Follow up in as needed unless symptoms return or a new issue occurs.  Patient will call the office to schedule an appointment.     All questions were answered, the patient understands and agrees with the plan.

## 2021-03-24 ENCOUNTER — TREATMENT (OUTPATIENT)
Dept: PHYSICAL THERAPY | Facility: CLINIC | Age: 61
End: 2021-03-24

## 2021-03-24 DIAGNOSIS — G89.29 CHRONIC PAIN OF BOTH KNEES: ICD-10-CM

## 2021-03-24 DIAGNOSIS — M25.561 CHRONIC PAIN OF BOTH KNEES: ICD-10-CM

## 2021-03-24 DIAGNOSIS — M54.50 CHRONIC MIDLINE LOW BACK PAIN, UNSPECIFIED WHETHER SCIATICA PRESENT: Primary | ICD-10-CM

## 2021-03-24 DIAGNOSIS — M25.562 CHRONIC PAIN OF BOTH KNEES: ICD-10-CM

## 2021-03-24 DIAGNOSIS — G89.29 CHRONIC MIDLINE LOW BACK PAIN, UNSPECIFIED WHETHER SCIATICA PRESENT: Primary | ICD-10-CM

## 2021-03-24 PROCEDURE — 97110 THERAPEUTIC EXERCISES: CPT | Performed by: PHYSICAL THERAPIST

## 2021-03-24 PROCEDURE — 97162 PT EVAL MOD COMPLEX 30 MIN: CPT | Performed by: PHYSICAL THERAPIST

## 2021-03-24 PROCEDURE — G0283 ELEC STIM OTHER THAN WOUND: HCPCS | Performed by: PHYSICAL THERAPIST

## 2021-03-24 NOTE — PROGRESS NOTES
Physical Therapy Initial Evaluation and Plan of Care    Patient: Jagdish Rea   : 1960  Diagnosis/ICD-10 Code:  Chronic midline low back pain, unspecified whether sciatica present [M54.5, G89.29]  Referring practitioner: KEILA Kaur  Date of Initial Visit: 3/24/2021  Today's Date: 3/24/2021  Patient seen for 1 sessions           Subjective Questionnaire: LEFS: 40/80 ( 50%)   Oswestry: 21/50 ( 42%)      Subjective Evaluation    History of Present Illness  Mechanism of injury: Pt is referred to therapy due to chronic LBP and chronic pain in both knees R>L .  His back pain is more debilitating and affect his daily activities.  He always has pain in his back but the knee pain depends on the activities.   Inc LBP pain with 5 min of standing, walking through the stores, bending over for more than 5 min. Inc pain with carrying  the groceries in from the car. Inc knee pain  with walking, twisting, squatting. Unable to stand long enough to wash the dishes.  He can squat but can't get back up. Unable to go up/down stairs.   Co pressure in LB. Denies any radiating pain in LEs/ or hips.   Better with rest and lying down flat or on his side and pain med.  He sleeps ok at night.     PMH: R RCR      Patient Occupation: on disability Quality of life: poor    Pain  Current pain rating: 3  At best pain rating: 3  At worst pain ratin  Location: presently LB  Quality: pressure, dull ache and tight  Relieving factors: change in position, rest and medications  Aggravating factors: ambulation, squatting, lifting, stairs, prolonged positioning, movement, standing and repetitive movement  Progression: worsening    Social Support  Lives in: apartment  Lives with: spouse    Patient Goals  Patient goals for therapy: decreased pain, increased motion and increased strength  Patient goal: be able to stand long enough to wash dishes and go for walks         Precautions:     Objective          Postural Observations  Seated  posture: fair  Standing posture: poor  Correction of posture: makes symptoms worse    Additional Postural Observation Details  Stands with flexed posture, inc pain with lumbar ext, even to neutral    Slow gait, dec knee / hip flex during gait    Observations   Left Knee   Negative for edema.     Right Knee   Negative for edema.       Palpation   Left   Muscle spasm in the lumbar paraspinals.   Tenderness of the lumbar paraspinals.     Right   Muscle spasm in the lumbar paraspinals. Tenderness of the lumbar paraspinals.     Tenderness     Right Knee   Tenderness in the medial joint line.     Active Range of Motion     Lumbar   Flexion: WFL  Extension: with pain  Left Knee   Normal active range of motion    Right Knee   Normal active range of motion  Flexion: with pain  Extension: with pain    Additional Active Range of Motion Details  Standing lumbar flex: wfl no inc pain. Reports it feels better when he bends but can't stay in that position for long    Standing lumbar ext: max limitation inc pain even trying to get to neutral    Supine flex: wfl, inc pain with R SKTC but ok with L     Prone ext: unable to perf due to inc pain.  Inc pain in prone position unable to tolerate    Strength/Myotome Testing     Left Hip   Normal muscle strength  Planes of Motion   Flexion: 5  Abduction: 5  Adduction: 5    Right Hip   Normal muscle strength  Planes of Motion   Flexion: 4  Abduction: 4  Adduction: 4    Left Knee   Flexion: 5  Extension: 5  Quadriceps contraction: good    Right Knee   Flexion: 4-  Extension: 4-  Quadriceps contraction: fair    Left Ankle/Foot   Dorsiflexion: 5    Right Ankle/Foot   Dorsiflexion: 4    Tests     Lumbar     Left   Negative passive SLR.     Right   Positive passive SLR.     Left Hip Flexibility Comments:   Mod tightness HS    Right Hip Flexibility Comments:   Mod tightness HS          Assessment & Plan     Assessment  Impairments: abnormal gait, abnormal or restricted ROM, activity intolerance,  lacks appropriate home exercise program, pain with function and weight-bearing intolerance  Assessment details: Pt is a 59 y/o m referred to therapy due to chronic LBP and chronic pain in R knee.   Pt presents with impaired lumbar mobility, poor posture, dec tolerance to standing/ walking and inc physical activities. Upon initial evaluation pt exhibits the above impairments and functional limitations. Impairments affect performing normal and daily activities, standing long enough to wash dishes, or go to the grocery and bringing them in, inability to negotiate steps. Pt will benefit from skilled physical therapy to address impairments, reduce pain , improved posture, improved ROM/ strength and maximize function.   Prognosis: good  Functional Limitations: carrying objects, lifting, walking, pulling, uncomfortable because of pain, sitting, standing, stooping and unable to perform repetitive tasks  Goals  Plan Goals: STGs:  In 4 weeks  1- Pt will  report at least 35 % improvement and pain reduction   2- Pt will be independent with initial HEP   3- Pt will tolerate progression of HEP and her exercise program     LTGs: By DC   1- Pt will report at least 75 % improvement and pain reduction   2- Pt will be independent with final HEP and self management of his condition   3- Pt will improve Oswestry and LEFS score compare to initial score at eval indicating functional improvement   4- Pt will voice readiness for DC with independent program   5- Pt will demonstrate improved posture and body mechanics  6- Pt will demonstrate improved lumbar ext   7- Pt will demonstrate improved tolerance to standing/ walking with less pain    Plan  Therapy options: will be seen for skilled physical therapy services  Planned modality interventions: high voltage pulsed current (pain management), traction and ultrasound  Planned therapy interventions: abdominal trunk stabilization, body mechanics training, flexibility, functional ROM exercises,  gait training, home exercise program, manual therapy, neuromuscular re-education, postural training, strengthening, stretching and therapeutic activities  Frequency: 2x week  Treatment plan discussed with: patient  Plan details: 20 visits        See flow sheet for treatment detail    History # of Personal Factors and/or Comorbidities: MODERATE (1-2)  Examination of Body System(s): # of elements: MODERATE (3)  Clinical Presentation: EVOLVING  Clinical Decision Making: MODERATE          Timed:         Manual Therapy:         mins  98795;     Therapeutic Exercise:  15       mins  65983;     Neuromuscular Jake:        mins  51809;    Therapeutic Activity:          mins  11025;     Gait Training:           mins  88748;     Ultrasound:          mins  86598;    Ionto                                   mins   74467  Self Care                            mins   79561  Canal repositioning           mins    49281      Un-Timed:  Electrical Stimulation:   15      mins  91763 ( );  Dry Needling          mins self-pay  Traction          mins 65366  Low Eval          Mins  13444  Mod Eval    30      Mins  72219  High Eval                            Mins  10984  Re-Eval                               mins  38247        Timed Treatment: 15     mins   Total Treatment:    60    mins    PT SIGNATURE: Baudilio Reese PT, CLT   DATE TREATMENT INITIATED: 3/24/2021    Initial Certification  Certification Period: 6/22/2021  I certify that the therapy services are furnished while this patient is under my care.  The services outlined above are required by this patient, and will be reviewed every 90 days.     PHYSICIAN: Yamilka Pascual, APRN      DATE:     Please sign and return via fax to [phone].. Thank you, [facility].

## 2021-03-31 ENCOUNTER — TREATMENT (OUTPATIENT)
Dept: PHYSICAL THERAPY | Facility: CLINIC | Age: 61
End: 2021-03-31

## 2021-03-31 DIAGNOSIS — M54.50 CHRONIC MIDLINE LOW BACK PAIN, UNSPECIFIED WHETHER SCIATICA PRESENT: Primary | ICD-10-CM

## 2021-03-31 DIAGNOSIS — G89.29 CHRONIC MIDLINE LOW BACK PAIN, UNSPECIFIED WHETHER SCIATICA PRESENT: Primary | ICD-10-CM

## 2021-03-31 PROCEDURE — G0283 ELEC STIM OTHER THAN WOUND: HCPCS | Performed by: PHYSICAL THERAPIST

## 2021-03-31 PROCEDURE — 97112 NEUROMUSCULAR REEDUCATION: CPT | Performed by: PHYSICAL THERAPIST

## 2021-03-31 PROCEDURE — 97110 THERAPEUTIC EXERCISES: CPT | Performed by: PHYSICAL THERAPIST

## 2021-03-31 NOTE — PROGRESS NOTES
Physical Therapy Daily Progress Note    Patient: Jagdish Rea  : 1960  Referring practitioner: Baljeet Manzo MD  Today's Date: 3/31/2021    VISIT#: 2   visits  Exp: 21    Subjective   Pt reports: doing pretty good this morning. Rates pain 2/10 in LB. Hasn't been doing much though this morning. The ES really helped last time.       Objective     See Exercise, Manual, and Modality Logs for complete treatment.     Patient Education:    Assessment & Plan     Assessment  Assessment details: Good beatriz to today's session and progression of his ex program. Responding well to ES for pain management. Lumbar ext is still painful.           Progress per Plan of Care            Timed:         Manual Therapy:         mins  35548;     Therapeutic Exercise:  15       mins  63377;     Neuromuscular Jake:  15      mins  22514;    Therapeutic Activity:          mins  63929;     Gait Training:           mins  76539;     Ultrasound:          mins  64140;    Ionto                                   mins   11926  Self Care                            mins   98147  Canal repositioning           mins    86364    Un-Timed:  Electrical Stimulation:   15      mins  48249 ( );  Traction          mins 36291  Low Eval          Mins  82497  Mod Eval          Mins  59612  High Eval                            Mins  18355  Re-Eval                               mins  18030    Timed Treatment:  30    mins   Total Treatment:    45    mins    Baudilio Reese PT, CLT  Physical Therapist

## 2021-04-07 ENCOUNTER — TREATMENT (OUTPATIENT)
Dept: PHYSICAL THERAPY | Facility: CLINIC | Age: 61
End: 2021-04-07

## 2021-04-07 DIAGNOSIS — M54.50 CHRONIC MIDLINE LOW BACK PAIN, UNSPECIFIED WHETHER SCIATICA PRESENT: Primary | ICD-10-CM

## 2021-04-07 DIAGNOSIS — G89.29 CHRONIC MIDLINE LOW BACK PAIN, UNSPECIFIED WHETHER SCIATICA PRESENT: Primary | ICD-10-CM

## 2021-04-07 PROCEDURE — G0283 ELEC STIM OTHER THAN WOUND: HCPCS | Performed by: PHYSICAL THERAPIST

## 2021-04-07 PROCEDURE — 97110 THERAPEUTIC EXERCISES: CPT | Performed by: PHYSICAL THERAPIST

## 2021-04-07 PROCEDURE — 97112 NEUROMUSCULAR REEDUCATION: CPT | Performed by: PHYSICAL THERAPIST

## 2021-04-07 NOTE — PROGRESS NOTES
Physical Therapy Daily Progress Note    Patient: Jagdish Rea  : 1960  Referring practitioner: Baljeet Manzo MD  Today's Date: 2021    VISIT#: 3  2/6 visits  Exp: 21    Subjective   Pt reports: doing better,  his back is better. Has done HEP but hasn't done much walking.       Objective     See Exercise, Manual, and Modality Logs for complete treatment. Still having inc pain in prone position. Better with pillow under abd but still has inc pain with prone ext and standing lumbar ext.      Patient Education:    Assessment & Plan     Assessment  Assessment details: Good beatriz to today's treatment session. Subjective improvement is reported. Lumbar ext still producing and worsening his pain. Responding well to ES for pain management.           Progress per Plan of Care            Timed:         Manual Therapy:         mins  85565;     Therapeutic Exercise:   15      mins  17651;     Neuromuscular Jake:  15      mins  05049;    Therapeutic Activity:          mins  51468;     Gait Training:           mins  89998;     Ultrasound:          mins  70758;    Ionto                                   mins   34128  Self Care                            mins   30515  Canal repositioning           mins    38565    Un-Timed:  Electrical Stimulation:    15     mins  25890 ( );  Traction          mins 35750  Low Eval          Mins  30991  Mod Eval          Mins  15032  High Eval                            Mins  52235  Re-Eval                               mins  05097    Timed Treatment:  30    mins   Total Treatment:    45    mins    Baudilio Reese PT, CLT  Physical Therapist

## 2021-04-09 ENCOUNTER — OFFICE VISIT (OUTPATIENT)
Dept: FAMILY MEDICINE CLINIC | Facility: CLINIC | Age: 61
End: 2021-04-09

## 2021-04-09 VITALS
HEART RATE: 83 BPM | HEIGHT: 71 IN | OXYGEN SATURATION: 96 % | SYSTOLIC BLOOD PRESSURE: 142 MMHG | RESPIRATION RATE: 18 BRPM | TEMPERATURE: 96.9 F | DIASTOLIC BLOOD PRESSURE: 93 MMHG | BODY MASS INDEX: 34.02 KG/M2 | WEIGHT: 243 LBS

## 2021-04-09 DIAGNOSIS — M54.50 CHRONIC MIDLINE LOW BACK PAIN WITHOUT SCIATICA: Primary | ICD-10-CM

## 2021-04-09 DIAGNOSIS — M25.561 CHRONIC PAIN OF BOTH KNEES: Chronic | ICD-10-CM

## 2021-04-09 DIAGNOSIS — G89.29 CHRONIC PAIN OF BOTH KNEES: Chronic | ICD-10-CM

## 2021-04-09 DIAGNOSIS — M25.562 CHRONIC PAIN OF BOTH KNEES: Chronic | ICD-10-CM

## 2021-04-09 DIAGNOSIS — G89.29 CHRONIC MIDLINE LOW BACK PAIN WITHOUT SCIATICA: Primary | ICD-10-CM

## 2021-04-09 PROBLEM — J44.9 CHRONIC OBSTRUCTIVE PULMONARY DISEASE: Status: RESOLVED | Noted: 2019-02-08 | Resolved: 2021-04-09

## 2021-04-09 PROBLEM — J44.1 COPD EXACERBATION: Status: RESOLVED | Noted: 2020-01-03 | Resolved: 2021-04-09

## 2021-04-09 PROCEDURE — 99213 OFFICE O/P EST LOW 20 MIN: CPT | Performed by: NURSE PRACTITIONER

## 2021-04-09 RX ORDER — MELOXICAM 7.5 MG/1
7.5 TABLET ORAL DAILY
Qty: 90 TABLET | Refills: 0 | Status: SHIPPED | OUTPATIENT
Start: 2021-04-09 | End: 2021-05-02

## 2021-04-09 NOTE — PROGRESS NOTES
Subjective        Jagdish Rea is a 60 y.o. male.     Chief Complaint   Patient presents with   • Back Pain     One month follow up    • Knee Pain       History of Present Illness  Patient is here for management of his chronic medical problems: low back pain and seb knee pain.     Seb knee pain; reviewed notes from ortho recent visit. xrays seb knees: weight bearing views show mild degenerative changes in all 3 compartments with medial compartment being most affected. There is early osteophyte formation throughout all 3 compartments   Ap pelvis shows mild degenerative changes about the hip joints no acut fractures.   Diagnosis: seb knee osteroarthritis.   Treatment: conservative continue meloxicam and PT with cortisone injections for flares.     Back pain: meloxicam Is helping symptoms he is currently in PT for gait training and core strengthening to help back.   He is reporting he is doing well especially like the stem therapy thru PT>  No bowel or bladder incontinence.   Pain stays in low back non radiation.       The following portions of the patient's history were reviewed and updated as appropriate: allergies, current medications, past family history, past medical history, past social history, past surgical history and problem list.      Current Outpatient Medications:   •  albuterol sulfate  (90 Base) MCG/ACT inhaler, Inhale 2 puffs Every 4 (Four) Hours As Needed for Wheezing., Disp: 1 inhaler, Rfl: 2  •  amLODIPine (NORVASC) 10 MG tablet, TAKE ONE TABLET BY MOUTH DAILY, Disp: 30 tablet, Rfl: 0  •  doxepin (SINEquan) 10 MG capsule, Take 1 capsule by mouth Every Night., Disp: 90 capsule, Rfl: 1  •  hydroCHLOROthiazide (MICROZIDE) 12.5 MG capsule, Take 12.5 mg by mouth As Needed., Disp: , Rfl:   •  lamoTRIgine (LaMICtal) 25 MG tablet, Take 2 tablets by mouth Every Night., Disp: 180 tablet, Rfl: 1  •  meloxicam (Mobic) 7.5 MG tablet, Take 1 tablet by mouth Daily., Disp: 90 tablet, Rfl: 0  •  metoprolol  "succinate XL (TOPROL-XL) 25 MG 24 hr tablet, TAKE ONE TABLET BY MOUTH DAILY, Disp: 30 tablet, Rfl: 0  •  omeprazole (priLOSEC) 20 MG capsule, Take 20 mg by mouth Daily., Disp: , Rfl:   •  pravastatin (PRAVACHOL) 10 MG tablet, Take 1 tablet by mouth Every Night., Disp: 90 tablet, Rfl: 1  •  risperiDONE (risperDAL) 1 MG tablet, Take 1 tablet by mouth Daily., Disp: 90 tablet, Rfl: 1    Current Facility-Administered Medications:   •  triamcinolone acetonide (KENALOG-40) injection 40 mg, 40 mg, Intra-articular, Once, Fredi Ritchie MD    No results found for this or any previous visit (from the past 4032 hour(s)).      Review of Systems    Objective     /93 (BP Location: Left arm, Patient Position: Sitting, Cuff Size: Adult)   Pulse 83   Temp 96.9 °F (36.1 °C) (Infrared)   Resp 18   Ht 180.3 cm (71\")   Wt 110 kg (243 lb)   SpO2 96%   BMI 33.89 kg/m²     Physical Exam  Vitals and nursing note reviewed.   Constitutional:       Appearance: Normal appearance.   HENT:      Head: Normocephalic.      Right Ear: External ear normal.      Left Ear: External ear normal.      Nose: Nose normal.      Mouth/Throat:      Mouth: Mucous membranes are moist.   Eyes:      Pupils: Pupils are equal, round, and reactive to light.   Cardiovascular:      Rate and Rhythm: Normal rate and regular rhythm.      Pulses: Normal pulses.      Heart sounds: Normal heart sounds.   Pulmonary:      Effort: Pulmonary effort is normal.      Breath sounds: Normal breath sounds.   Abdominal:      General: Bowel sounds are normal.      Palpations: Abdomen is soft.   Musculoskeletal:         General: Normal range of motion.   Skin:     General: Skin is warm and dry.   Neurological:      General: No focal deficit present.      Mental Status: He is alert and oriented to person, place, and time.   Psychiatric:         Mood and Affect: Mood normal.         Behavior: Behavior normal.         Thought Content: Thought content normal.         " Judgment: Judgment normal.         Result Review :                Assessment/Plan    Diagnoses and all orders for this visit:    1. Chronic midline low back pain without sciatica (Primary)  Comments:  stable     2. Chronic pain of both knees  Comments:  stable    Other orders  -     meloxicam (Mobic) 7.5 MG tablet; Take 1 tablet by mouth Daily.  Dispense: 90 tablet; Refill: 0      Patient Instructions   Continue meloxicam and physicial therapy stay active.       Follow Up   Return in about 6 months (around 10/9/2021).    Patient was given instructions and counseling regarding his condition or for health maintenance advice. Please see specific information pulled into the AVS if appropriate.     Yamilka Pascual, APRN    04/09/21

## 2021-04-09 NOTE — PROGRESS NOTES
"Subjective        Jagdish Rea is a 60 y.o. male.     Chief Complaint   Patient presents with   • Back Pain     One month follow up    • Knee Pain       History of Present Illness    The following portions of the patient's history were reviewed and updated as appropriate: allergies, current medications, past family history, past medical history, past social history, past surgical history and problem list.      Current Outpatient Medications:   •  albuterol sulfate  (90 Base) MCG/ACT inhaler, Inhale 2 puffs Every 4 (Four) Hours As Needed for Wheezing., Disp: 1 inhaler, Rfl: 2  •  amLODIPine (NORVASC) 10 MG tablet, TAKE ONE TABLET BY MOUTH DAILY, Disp: 30 tablet, Rfl: 0  •  doxepin (SINEquan) 10 MG capsule, Take 1 capsule by mouth Every Night., Disp: 90 capsule, Rfl: 1  •  hydroCHLOROthiazide (MICROZIDE) 12.5 MG capsule, Take 12.5 mg by mouth As Needed., Disp: , Rfl:   •  lamoTRIgine (LaMICtal) 25 MG tablet, Take 2 tablets by mouth Every Night., Disp: 180 tablet, Rfl: 1  •  meloxicam (Mobic) 7.5 MG tablet, Take 1 tablet by mouth Daily., Disp: 30 tablet, Rfl: 1  •  metoprolol succinate XL (TOPROL-XL) 25 MG 24 hr tablet, TAKE ONE TABLET BY MOUTH DAILY, Disp: 30 tablet, Rfl: 0  •  omeprazole (priLOSEC) 20 MG capsule, Take 20 mg by mouth Daily., Disp: , Rfl:   •  pravastatin (PRAVACHOL) 10 MG tablet, Take 1 tablet by mouth Every Night., Disp: 90 tablet, Rfl: 1  •  risperiDONE (risperDAL) 1 MG tablet, Take 1 tablet by mouth Daily., Disp: 90 tablet, Rfl: 1    Current Facility-Administered Medications:   •  triamcinolone acetonide (KENALOG-40) injection 40 mg, 40 mg, Intra-articular, Once, Fredi Ritchie MD    No results found for this or any previous visit (from the past 4032 hour(s)).      Review of Systems    Objective     /93 (BP Location: Left arm, Patient Position: Sitting, Cuff Size: Adult)   Pulse 83   Temp 96.9 °F (36.1 °C) (Infrared)   Resp 18   Ht 180.3 cm (71\")   Wt 110 kg (243 lb)   " SpO2 96%   BMI 33.89 kg/m²     Physical Exam    Result Review :                Assessment/Plan    There are no diagnoses linked to this encounter.  There are no Patient Instructions on file for this visit.    Follow Up   No follow-ups on file.    Patient was given instructions and counseling regarding his condition or for health maintenance advice. Please see specific information pulled into the AVS if appropriate.     Yamilka Pascual, APRN    04/09/21

## 2021-04-13 ENCOUNTER — TREATMENT (OUTPATIENT)
Dept: PHYSICAL THERAPY | Facility: CLINIC | Age: 61
End: 2021-04-13

## 2021-04-13 DIAGNOSIS — M25.561 CHRONIC PAIN OF BOTH KNEES: ICD-10-CM

## 2021-04-13 DIAGNOSIS — M25.562 CHRONIC PAIN OF BOTH KNEES: ICD-10-CM

## 2021-04-13 DIAGNOSIS — M54.50 CHRONIC MIDLINE LOW BACK PAIN, UNSPECIFIED WHETHER SCIATICA PRESENT: Primary | ICD-10-CM

## 2021-04-13 DIAGNOSIS — G89.29 CHRONIC MIDLINE LOW BACK PAIN, UNSPECIFIED WHETHER SCIATICA PRESENT: Primary | ICD-10-CM

## 2021-04-13 DIAGNOSIS — G89.29 CHRONIC PAIN OF BOTH KNEES: ICD-10-CM

## 2021-04-13 PROCEDURE — 97110 THERAPEUTIC EXERCISES: CPT | Performed by: PHYSICAL THERAPIST

## 2021-04-13 PROCEDURE — 97112 NEUROMUSCULAR REEDUCATION: CPT | Performed by: PHYSICAL THERAPIST

## 2021-04-13 PROCEDURE — G0283 ELEC STIM OTHER THAN WOUND: HCPCS | Performed by: PHYSICAL THERAPIST

## 2021-04-13 NOTE — PROGRESS NOTES
Physical Therapy Daily Progress Note        Patient: Jagdish Rea   : 1960  Diagnosis/ICD-10 Code:  Chronic midline low back pain, unspecified whether sciatica present [M54.5, G89.29]  Referring practitioner: Baljeet Manzo MD  Date of Initial Visit: Type: THERAPY  Noted: 3/24/2021  Today's Date: 2021  Patient seen for 4 sessions.  POC 20, 2x/wk.  Authorized 3/6 visits, exp. 21.    PMH:  R RCR           Subjective :  Pain 2/10  R low back.  No knee pain at all.  He thinks that HEP is helping his back. Also really likes electrical stimulation.     Objective   See Exercise, Manual, and Modality Logs for complete treatment.     Education:  Pt. Given information re:  TENS 7000 unit from [employer].       Assessment/Plan:  Pt. With better response to extension today and decreased pain after there ex.  Pt. Seems to benefit from IFC with decreased pain also. Goals met as noted.     Plan Goals: STGs:  In 4 weeks  1- Pt will  report at least 35 % improvement and pain reduction   2- Pt will be independent with initial HEP -MET  3- Pt will tolerate progression of HEP and her exercise program -MET     Progress per Plan of Care        Timed:                                                                          Manual Therapy:                 mins  23702;                      Therapeutic Exercise:  20       mins  60349;     Neuromuscular Jake:  10      mins  57138;    Therapeutic Activity:           mins  99219;     Gait Training:                      mins  34447;     Ultrasound:                          mins  64727;    Ionto                                   mins   94264  Self Care                            mins   50523  Canal repositioning           mins    92630     Un-Timed:  Electrical Stimulation:   15      mins  10899 ( );  Traction                               mins 44302  Low Eval                             Mins  80368  Mod Eval                             Mins  18749  High Eval                             Mins  38995  Re-Eval                               mins  97659     Timed Treatment:  30    mins   Total Treatment:    45    mins        Kecia Waters PTA  Physical Therapist Assistant

## 2021-04-20 ENCOUNTER — TREATMENT (OUTPATIENT)
Dept: PHYSICAL THERAPY | Facility: CLINIC | Age: 61
End: 2021-04-20

## 2021-04-20 DIAGNOSIS — G89.29 CHRONIC MIDLINE LOW BACK PAIN, UNSPECIFIED WHETHER SCIATICA PRESENT: Primary | ICD-10-CM

## 2021-04-20 DIAGNOSIS — M54.50 CHRONIC MIDLINE LOW BACK PAIN, UNSPECIFIED WHETHER SCIATICA PRESENT: Primary | ICD-10-CM

## 2021-04-20 DIAGNOSIS — G89.29 CHRONIC PAIN OF BOTH KNEES: ICD-10-CM

## 2021-04-20 DIAGNOSIS — M25.561 CHRONIC PAIN OF BOTH KNEES: ICD-10-CM

## 2021-04-20 DIAGNOSIS — M25.562 CHRONIC PAIN OF BOTH KNEES: ICD-10-CM

## 2021-04-20 PROCEDURE — G0283 ELEC STIM OTHER THAN WOUND: HCPCS | Performed by: PHYSICAL THERAPIST

## 2021-04-20 PROCEDURE — 97112 NEUROMUSCULAR REEDUCATION: CPT | Performed by: PHYSICAL THERAPIST

## 2021-04-20 PROCEDURE — 97110 THERAPEUTIC EXERCISES: CPT | Performed by: PHYSICAL THERAPIST

## 2021-04-20 NOTE — PROGRESS NOTES
Physical Therapy Daily Progress Note        Patient: Jagdish Rea   : 1960  Diagnosis/ICD-10 Code:  Chronic midline low back pain, unspecified whether sciatica present [M54.5, G89.29]  Referring practitioner: Baljeet Manzo MD  Date of Initial Visit: Type: THERAPY  Noted: 3/24/2021  Today's Date: 2021  Patient seen for 5 sessions.  POC 20, 2x/wk.  Authorized 4/6 visits, exp. 21.    PMH:  R RCR           Subjective :  Pt. Not doing HEP consistently even though he knows it helps. Pain 2/10 R low back.     Objective   See Exercise, Manual, and Modality Logs for complete treatment.     Education:        Assessment/Plan:  Pt. With decreased pain after EIS and lying prone.  Small improvement in lumbar extension ROM.  Concern that patient not performing HEP.     Progress per Plan of Care        Timed:                                                                          Manual Therapy:                 mins  13363;                      Therapeutic Exercise:  20       mins  90339;     Neuromuscular Jake:  10      mins  91367;    Therapeutic Activity:           mins  77312;     Gait Training:                      mins  02238;     Ultrasound:                          mins  69955;    Ionto                                   mins   00313  Self Care                            mins   19966  Canal repositioning           mins    15944     Un-Timed:  Electrical Stimulation:   15      mins  22535 ( );  Traction                               mins 09197  Low Eval                             Mins  49094  Mod Eval                             Mins  16045  High Eval                            Mins  13043  Re-Eval                               mins  27277     Timed Treatment:  30    mins   Total Treatment:    45    mins        Kecia Waters PTA  Physical Therapist Assistant

## 2021-04-22 ENCOUNTER — TREATMENT (OUTPATIENT)
Dept: PHYSICAL THERAPY | Facility: CLINIC | Age: 61
End: 2021-04-22

## 2021-04-22 DIAGNOSIS — M54.50 CHRONIC MIDLINE LOW BACK PAIN, UNSPECIFIED WHETHER SCIATICA PRESENT: Primary | ICD-10-CM

## 2021-04-22 DIAGNOSIS — G89.29 CHRONIC MIDLINE LOW BACK PAIN, UNSPECIFIED WHETHER SCIATICA PRESENT: Primary | ICD-10-CM

## 2021-04-22 PROCEDURE — G0283 ELEC STIM OTHER THAN WOUND: HCPCS | Performed by: PHYSICAL THERAPIST

## 2021-04-22 PROCEDURE — 97112 NEUROMUSCULAR REEDUCATION: CPT | Performed by: PHYSICAL THERAPIST

## 2021-04-22 PROCEDURE — 97110 THERAPEUTIC EXERCISES: CPT | Performed by: PHYSICAL THERAPIST

## 2021-04-22 NOTE — PROGRESS NOTES
Physical Therapy Progress Note/ DC summary    Patient: Jagdish Rea  : 1960  Referring practitioner: Baljeet Manzo MD  Today's Date: 2021    VISIT#: 6  5/6 visits  Exp: 21    Subjective   Pt reports: doing better feels therapy has been beneficial and has helped his back. Is able to stand and walk longer with less pain. Reports at 30% improvement. Would like to be DC to cont with HEP. Is able to stand long enough to do the dish now. Presently rates pain 1/10. Worse pain still about 7-8/10.      Objective     See Exercise, Manual, and Modality Logs for complete treatment.     Patient Education:    Assessment & Plan     Assessment  Assessment details: Pt referred to therapy due to chronic LBP.  Pt presented with impaired lumbar mobility, poor posture, dec tolerance to standing/ walking and inc physical activities.  Impairments affect performing normal and daily activities, standing long enough to wash dishes, or go to the grocery and bringing them in, inability to negotiate steps.   Pt has responded well to therapy with improved lumbar ROM, dec pain and improved tolerance to standing and walking. Able to do the dishes now without inc pain and go to grocery store with less pain.    All STGs and 5/7 LTGs met.  Oswestry score: 34%  Improved from eval    Goals  Plan Goals: Plan Goals: STGs:  In 4 weeks ( all MET)  1- Pt will  report at least 35 % improvement and pain reduction   2- Pt will be independent with initial HEP   3- Pt will tolerate progression of HEP and her exercise program     LTGs: By DC   1- Pt will report at least 75 % improvement and pain reduction   2- Pt will be independent with final HEP and self management of his condition ( MET)  3- Pt will improve Oswestry and LEFS score compare to initial score at eval indicating functional improvement ( MET)  4- Pt will voice readiness for DC with independent program ( MET)  5- Pt will demonstrate improved posture and body mechanics (  partially MET)  6- Pt will demonstrate improved lumbar ext ( MET)  7- Pt will demonstrate improved tolerance to standing/ walking with less pain ( MET)                  Timed:         Manual Therapy:         mins  04462;     Therapeutic Exercise:   15      mins  56828;     Neuromuscular Jake: 15       mins  77921;    Therapeutic Activity:          mins  90116;     Gait Training:           mins  82146;     Ultrasound:          mins  74807;    Ionto                                   mins   31587  Self Care                            mins   02402  Canal repositioning           mins    58672    Un-Timed:  Electrical Stimulation:   15      mins  99569 ( );  Traction          mins 93878  Low Eval          Mins  23687  Mod Eval          Mins  82253  High Eval                            Mins  65590  Re-Eval                               mins  50495    Timed Treatment: 30     mins   Total Treatment:     45   mins    Baudilio Reese, PT, CLT  Physical Therapist

## 2021-05-02 RX ORDER — MELOXICAM 7.5 MG/1
TABLET ORAL
Qty: 30 TABLET | Refills: 0 | Status: SHIPPED | OUTPATIENT
Start: 2021-05-02 | End: 2021-09-03

## 2021-05-24 ENCOUNTER — APPOINTMENT (OUTPATIENT)
Dept: CT IMAGING | Facility: HOSPITAL | Age: 61
End: 2021-05-24

## 2021-05-24 ENCOUNTER — HOSPITAL ENCOUNTER (EMERGENCY)
Facility: HOSPITAL | Age: 61
Discharge: HOME | End: 2021-05-24
Admitting: EMERGENCY MEDICINE

## 2021-05-24 VITALS
OXYGEN SATURATION: 96 % | HEART RATE: 80 BPM | TEMPERATURE: 98.4 F | BODY MASS INDEX: 34.18 KG/M2 | SYSTOLIC BLOOD PRESSURE: 147 MMHG | HEIGHT: 71 IN | WEIGHT: 244.14 LBS | DIASTOLIC BLOOD PRESSURE: 99 MMHG | RESPIRATION RATE: 18 BRPM

## 2021-05-24 DIAGNOSIS — S09.90XA MINOR HEAD INJURY, INITIAL ENCOUNTER: Primary | ICD-10-CM

## 2021-05-24 PROCEDURE — 99282 EMERGENCY DEPT VISIT SF MDM: CPT

## 2021-05-24 PROCEDURE — 70450 CT HEAD/BRAIN W/O DYE: CPT

## 2021-05-24 NOTE — ED PROVIDER NOTES
Dar   Is a 60-year-old male who states he was working on his car today when the shea of the car fell and hit him in the back of the head.  He states that he had a headache after and he states that he felt like his left eye was not working correctly and came to the emergency room for evaluation he rates his pain a 4/10.  He states it is a dull aching pain and constant nonradiating no exacerbating or alleviating factors.          Review of Systems   Constitutional: Negative for chills, fatigue and fever.   HENT: Negative for congestion, tinnitus and trouble swallowing.    Eyes: Negative for photophobia, discharge and redness.   Respiratory: Negative for cough and shortness of breath.    Cardiovascular: Negative for chest pain and palpitations.   Gastrointestinal: Negative for abdominal pain, diarrhea, nausea and vomiting.   Genitourinary: Negative for dysuria, frequency and urgency.   Musculoskeletal: Negative for back pain, joint swelling and myalgias.   Skin: Negative for rash.   Neurological: Positive for headaches. Negative for dizziness.   Psychiatric/Behavioral: Negative for confusion.   All other systems reviewed and are negative.      Past Medical History:   Diagnosis Date   • Allergic    • Anxiety    • Back pain    • COPD (chronic obstructive pulmonary disease) (CMS/[facility])    • Hyperlipidemia    • Hypertension    • PTSD (post-traumatic stress disorder)    • Thoracic disc herniation    • Vitamin B12 deficiency        Allergies   Allergen Reactions   • Atorvastatin Swelling   • Lisinopril Unknown - High Severity     Facial paralysis        Past Surgical History:   Procedure Laterality Date   • HERNIA REPAIR     • KNEE ARTHROPLASTY      x2   • SHOULDER ARTHROSCOPY W/ ROTATOR CUFF REPAIR Right 8/11/2020    Procedure: SHOULDER ARTHROSCOPY WITH ROTATOR CUFF REPAIR, extensive debridement;  Surgeon: Fredi Ritchie MD;  Location: [facility] MAIN OR;  Service: Orthopedics;  Laterality: Right;   •  TONSILLECTOMY         Family History   Problem Relation Age of Onset   • Heart disease Mother        Social History     Socioeconomic History   • Marital status:      Spouse name: Not on file   • Number of children: Not on file   • Years of education: Not on file   • Highest education level: Not on file   Tobacco Use   • Smoking status: Current Every Day Smoker     Packs/day: 0.50     Years: 47.00     Pack years: 23.50     Types: Cigarettes     Start date: 1/9/1973   • Smokeless tobacco: Never Used   Vaping Use   • Vaping Use: Never used   Substance and Sexual Activity   • Alcohol use: No   • Drug use: No   • Sexual activity: Defer           Objective   Physical Exam  Vitals reviewed.   Constitutional:       Appearance: He is well-developed.   HENT:      Head: Normocephalic and atraumatic.      Right Ear: Tympanic membrane and external ear normal.      Left Ear: Tympanic membrane and external ear normal.      Nose: Nose normal.      Mouth/Throat:      Mouth: Mucous membranes are moist.      Pharynx: Oropharynx is clear.   Eyes:      Conjunctiva/sclera: Conjunctivae normal.      Pupils: Pupils are equal, round, and reactive to light.   Cardiovascular:      Rate and Rhythm: Normal rate and regular rhythm.      Heart sounds: Normal heart sounds.   Pulmonary:      Effort: Pulmonary effort is normal.      Breath sounds: Normal breath sounds.   Abdominal:      General: Bowel sounds are normal.      Palpations: Abdomen is soft.   Musculoskeletal:         General: Normal range of motion.      Cervical back: Normal range of motion and neck supple.   Skin:     General: Skin is warm and dry.      Capillary Refill: Capillary refill takes less than 2 seconds.   Neurological:      General: No focal deficit present.      Mental Status: He is alert and oriented to person, place, and time. Mental status is at baseline.      GCS: GCS eye subscore is 4. GCS verbal subscore is 5. GCS motor subscore is 6.      Cranial Nerves:  "Cranial nerves are intact.      Sensory: Sensation is intact.      Motor: Motor function is intact.      Coordination: Coordination is intact.   Psychiatric:         Attention and Perception: Attention normal.         Mood and Affect: Mood normal.         Speech: Speech normal.         Behavior: Behavior normal. Behavior is cooperative.         Thought Content: Thought content normal.         Cognition and Memory: Cognition and memory normal.         Judgment: Judgment normal.         Procedures           ED Course      /99   Pulse 80   Temp 98.4 °F (36.9 °C) (Oral)   Resp 18   Ht 180.3 cm (71\")   Wt 111 kg (244 lb 2.2 oz)   SpO2 96%   BMI 34.05 kg/m²   Labs Reviewed - No data to display  Medications - No data to display  CT Head Without Contrast    Result Date: 5/24/2021  No acute intracranial finding. No significant  change from 6/17/2018.  Electronically Signed By-Carli Scott MD On:5/24/2021 12:52 PM This report was finalized on [id number] by  Crali Scott MD.                                         MDM  Number of Diagnoses or Management Options  Minor head injury, initial encounter  Diagnosis management comments: She had above exam and CT was found to be within normal limits no acute findings.  The patient was found to have a NIH of 0.  Patient was told of the findings and was discharged home to follow-up with primary care for any further problems and to return if worse he verbalized understood discharge instruction       Amount and/or Complexity of Data Reviewed  Tests in the radiology section of CPT®: reviewed    Risk of Complications, Morbidity, and/or Mortality  Presenting problems: minimal  Diagnostic procedures: low  Management options: low    Patient Progress  Patient progress: improved      Final diagnoses:   Minor head injury, initial encounter       ED Disposition  ED Disposition     ED Disposition Condition Comment    Discharge Stable           Yamilka Pascual, APRN  [address] " [facility] 4 [facility] [address] [place] [address] [place] [address]  [phone]    In 3 days  If symptoms worsen, As needed         Medication List      No changes were made to your prescriptions during this visit.          Roxane Jones, APRN  05/24/21 1329

## 2021-05-25 ENCOUNTER — EPISODE CHANGES (OUTPATIENT)
Dept: CASE MANAGEMENT | Facility: OTHER | Age: 61
End: 2021-05-25

## 2021-05-26 ENCOUNTER — PATIENT OUTREACH (OUTPATIENT)
Dept: CASE MANAGEMENT | Facility: OTHER | Age: 61
End: 2021-05-26

## 2021-05-26 NOTE — OUTREACH NOTE
Care Coordination Assessment    Documented/Reviewed By: Henri Kenney RN Date/time: 5/26/2021  1:04 PM   Assessment completed with: patient  Enrolled in care management program: No  Living arrangement: significant other, children  Support system: spouse, family, children  Equipment used at home: cane (Comment: cane prn)  Communication device: Yes  Bed or wheelchair confined: No  Inadequate nutrition: No  Medication adherence problem: No  History of fall(s) in last 6 months: No  Difficulty keeping appointments: No  Family aware of the patient's advance care planning wishes: No  Chronic pain: Yes  Location of chronic pain: back

## 2021-05-26 NOTE — OUTREACH NOTE
Care Plan Note      Responses   Annual Wellness Visit:   Patient Will Schedule   Care Gaps Addressed  Colon Cancer Screening, Flu Shot, Pneumonia Vaccine, Other (See Comment) [covid vaccine]   Colon Cancer Screening Type  Colonoscopy   Colonoscopy Status  Up to Date (< 10 yrs)   Flu Shot Status  Up to Date   Pneumonia Vaccine Status  Up to Date   Care Gap Comments  covid vaccine- UTD   Other Patient Education/Resources   24/7 [facility] Nurse Call Line   24/7 Nurse Call Line Education Method  Verbal   Does patient have depression diagnosis?  No   Advanced Directives:  Patient Has [pt states he has materials]        The main concerns and/or symptoms the patient would like to address are: Pt discharged from [facility] ED on 5/24/21, seen for head injury. RN-ACM outreach call made to pt, able to reach pt on 2nd attempt. Explained role of RN-ACM. Pt states left eye is better, he denies h/a, dizziness, confusion, or other symptoms or concerns.     Education/instruction provided by Care Coordinator: reviewed with pt: ED AVS; head injury instructions; medical appointments; RN-ACM contact information; [facility] 24 hour nurse line number; health maintenance gaps; AD- pt states he has materials; MyChart- active; SDOH- pt denies food, medication, or transportation insecurities. Discussed AWV, pt to schedule, note added to next appt. Pt reports to have good family support. He has next routine PCP appt scheduled. No questions or concerns per pt. Pt appreciates the phone call and declines need for further calls. Advised pt to call with any needs. Follow up outreach as needed.     Follow Up Outreach Due: as needed    Henri Kenney RN  Ambulatory     5/26/2021, 13:07 EDT

## 2021-05-27 RX ORDER — AMLODIPINE BESYLATE 10 MG/1
TABLET ORAL
Qty: 90 TABLET | Refills: 0 | Status: SHIPPED | OUTPATIENT
Start: 2021-05-27 | End: 2021-08-11

## 2021-05-27 RX ORDER — DOXEPIN HYDROCHLORIDE 10 MG/1
10 CAPSULE ORAL NIGHTLY
Qty: 90 CAPSULE | Refills: 1 | Status: SHIPPED | OUTPATIENT
Start: 2021-05-27 | End: 2022-03-10

## 2021-05-30 RX ORDER — METOPROLOL SUCCINATE 25 MG/1
TABLET, EXTENDED RELEASE ORAL
Qty: 90 TABLET | Refills: 0 | Status: SHIPPED | OUTPATIENT
Start: 2021-05-30 | End: 2021-08-13

## 2021-05-30 RX ORDER — PRAVASTATIN SODIUM 10 MG
10 TABLET ORAL NIGHTLY
Qty: 90 TABLET | Refills: 1 | Status: SHIPPED | OUTPATIENT
Start: 2021-05-30 | End: 2021-10-08

## 2021-06-15 ENCOUNTER — OFFICE VISIT (OUTPATIENT)
Dept: FAMILY MEDICINE CLINIC | Facility: CLINIC | Age: 61
End: 2021-06-15

## 2021-06-15 VITALS
SYSTOLIC BLOOD PRESSURE: 121 MMHG | BODY MASS INDEX: 34.3 KG/M2 | HEART RATE: 80 BPM | WEIGHT: 245 LBS | OXYGEN SATURATION: 95 % | DIASTOLIC BLOOD PRESSURE: 76 MMHG | TEMPERATURE: 96.6 F | HEIGHT: 71 IN

## 2021-06-15 DIAGNOSIS — M25.511 ACUTE PAIN OF RIGHT SHOULDER: Primary | ICD-10-CM

## 2021-06-15 PROCEDURE — 99213 OFFICE O/P EST LOW 20 MIN: CPT | Performed by: NURSE PRACTITIONER

## 2021-06-15 NOTE — PATIENT INSTRUCTIONS
Use heat before exercise ice after.   Follow up with Dr Ritchie  Take ibuprofen  Do the exercises instructed by PT after last shoulder surgery

## 2021-06-15 NOTE — PROGRESS NOTES
Subjective        Jagdish Rea is a 60 y.o. male.     Chief Complaint   Patient presents with   • Shoulder Pain     R shoulder pain x2 week       History of Present Illness  Patient is here for right shoulder pain same arm he had surgery on 8/2020. His original MRI was read as negative but Dr Ritchie read MRI as partial high grade supraspinatus tearing with intact bursal surface anteriorly.  He states 2 weeks ago started he was working on a car and dropped shea on his head . He has not worked on it since then.   He is taking ibuprofen he has not done any of his physical therapy that he did in past not really pain in same spot he reports is different . Pain is more located on the back of his right arm. He denies numbness and tingling down the arm. Certain movements hurt worse than other.     Reviewed notes from Dr Ritchie : he had diagnostic shoulder arthroscopy and possible cuff repair.       The following portions of the patient's history were reviewed and updated as appropriate: allergies, current medications, past family history, past medical history, past social history, past surgical history and problem list.      Current Outpatient Medications:   •  albuterol sulfate  (90 Base) MCG/ACT inhaler, Inhale 2 puffs Every 4 (Four) Hours As Needed for Wheezing., Disp: 1 inhaler, Rfl: 2  •  amLODIPine (NORVASC) 10 MG tablet, TAKE 1 TABLET EVERY DAY, Disp: 90 tablet, Rfl: 0  •  doxepin (SINEquan) 10 MG capsule, TAKE 1 CAPSULE BY MOUTH EVERY NIGHT., Disp: 90 capsule, Rfl: 1  •  hydroCHLOROthiazide (MICROZIDE) 12.5 MG capsule, Take 12.5 mg by mouth As Needed., Disp: , Rfl:   •  lamoTRIgine (LaMICtal) 25 MG tablet, Take 2 tablets by mouth Every Night., Disp: 180 tablet, Rfl: 1  •  meloxicam (MOBIC) 7.5 MG tablet, TAKE ONE TABLET BY MOUTH DAILY, Disp: 30 tablet, Rfl: 0  •  metoprolol succinate XL (TOPROL-XL) 25 MG 24 hr tablet, TAKE 1 TABLET EVERY DAY, Disp: 90 tablet, Rfl: 0  •  omeprazole (priLOSEC) 20 MG capsule,  "Take 20 mg by mouth Daily., Disp: , Rfl:   •  pravastatin (PRAVACHOL) 10 MG tablet, TAKE 1 TABLET BY MOUTH EVERY NIGHT., Disp: 90 tablet, Rfl: 1  •  risperiDONE (risperDAL) 1 MG tablet, Take 1 tablet by mouth Daily., Disp: 90 tablet, Rfl: 1    Current Facility-Administered Medications:   •  triamcinolone acetonide (KENALOG-40) injection 40 mg, 40 mg, Intra-articular, Once, Fredi Ritchie MD    No results found for this or any previous visit (from the past 4032 hour(s)).      Review of Systems    Objective     /76 (BP Location: Left arm, Patient Position: Sitting, Cuff Size: Adult)   Pulse 80   Temp 96.6 °F (35.9 °C) (Infrared)   Ht 180.3 cm (71\")   Wt 111 kg (245 lb)   SpO2 95%   BMI 34.17 kg/m²     Physical Exam  Constitutional:       Appearance: Normal appearance.   HENT:      Head: Normocephalic.      Right Ear: External ear normal.      Left Ear: External ear normal.      Nose: Nose normal.   Eyes:      Pupils: Pupils are equal, round, and reactive to light.   Cardiovascular:      Rate and Rhythm: Normal rate and regular rhythm.      Pulses: Normal pulses.      Heart sounds: Normal heart sounds.   Pulmonary:      Effort: Pulmonary effort is normal.      Breath sounds: Normal breath sounds.   Musculoskeletal:        Arms:       Cervical back: Normal range of motion and neck supple.      Comments: Point tender with palpation   niko and equal strengths niko and equal  Pain with internal rotation of arm  Pain with elevation over head.    Skin:     General: Skin is warm and dry.      Capillary Refill: Capillary refill takes less than 2 seconds.   Neurological:      General: No focal deficit present.      Mental Status: He is alert and oriented to person, place, and time.   Psychiatric:         Mood and Affect: Mood normal.         Behavior: Behavior normal.         Thought Content: Thought content normal.         Judgment: Judgment normal.         Result Review :                Assessment/Plan "    Diagnoses and all orders for this visit:    1. Acute pain of right shoulder (Primary)  Comments:  referred to orthopedist  Orders:  -     Ambulatory Referral to Orthopedic Surgery      Patient Instructions   Use heat before exercise ice after.   Follow up with Dr Ritchie  Take ibuprofen  Do the exercises instructed by PT after last shoulder surgery      Follow Up   No follow-ups on file.    Patient was given instructions and counseling regarding his condition or for health maintenance advice. Please see specific information pulled into the AVS if appropriate.     Yamilka Pascual, APRN    06/15/21

## 2021-08-11 RX ORDER — AMLODIPINE BESYLATE 10 MG/1
TABLET ORAL
Qty: 90 TABLET | Refills: 0 | Status: SHIPPED | OUTPATIENT
Start: 2021-08-11 | End: 2021-12-28

## 2021-08-13 RX ORDER — METOPROLOL SUCCINATE 25 MG/1
TABLET, EXTENDED RELEASE ORAL
Qty: 90 TABLET | Refills: 0 | Status: SHIPPED | OUTPATIENT
Start: 2021-08-13 | End: 2021-12-28

## 2021-09-03 RX ORDER — MELOXICAM 7.5 MG/1
TABLET ORAL
Qty: 30 TABLET | Refills: 0 | Status: SHIPPED | OUTPATIENT
Start: 2021-09-03 | End: 2021-10-08

## 2021-10-08 ENCOUNTER — OFFICE VISIT (OUTPATIENT)
Dept: FAMILY MEDICINE CLINIC | Facility: CLINIC | Age: 61
End: 2021-10-08

## 2021-10-08 VITALS
BODY MASS INDEX: 34.86 KG/M2 | OXYGEN SATURATION: 96 % | HEIGHT: 71 IN | DIASTOLIC BLOOD PRESSURE: 86 MMHG | HEART RATE: 92 BPM | SYSTOLIC BLOOD PRESSURE: 131 MMHG | WEIGHT: 249 LBS | TEMPERATURE: 96.4 F

## 2021-10-08 DIAGNOSIS — F41.9 ANXIETY: Chronic | ICD-10-CM

## 2021-10-08 DIAGNOSIS — I10 BENIGN ESSENTIAL HYPERTENSION: Chronic | ICD-10-CM

## 2021-10-08 DIAGNOSIS — Z00.00 INITIAL MEDICARE ANNUAL WELLNESS VISIT: Primary | ICD-10-CM

## 2021-10-08 DIAGNOSIS — E78.5 HYPERLIPIDEMIA, UNSPECIFIED HYPERLIPIDEMIA TYPE: Chronic | ICD-10-CM

## 2021-10-08 DIAGNOSIS — E53.8 VITAMIN B12 DEFICIENCY: ICD-10-CM

## 2021-10-08 DIAGNOSIS — Z72.0 TOBACCO ABUSE: ICD-10-CM

## 2021-10-08 PROCEDURE — 1170F FXNL STATUS ASSESSED: CPT | Performed by: NURSE PRACTITIONER

## 2021-10-08 PROCEDURE — 1125F AMNT PAIN NOTED PAIN PRSNT: CPT | Performed by: NURSE PRACTITIONER

## 2021-10-08 PROCEDURE — G0438 PPPS, INITIAL VISIT: HCPCS | Performed by: NURSE PRACTITIONER

## 2021-10-08 PROCEDURE — 96160 PT-FOCUSED HLTH RISK ASSMT: CPT | Performed by: NURSE PRACTITIONER

## 2021-10-08 PROCEDURE — 99214 OFFICE O/P EST MOD 30 MIN: CPT | Performed by: NURSE PRACTITIONER

## 2021-10-08 PROCEDURE — 1160F RVW MEDS BY RX/DR IN RCRD: CPT | Performed by: NURSE PRACTITIONER

## 2021-10-08 RX ORDER — MELOXICAM 7.5 MG/1
7.5 TABLET ORAL 2 TIMES DAILY
Qty: 180 TABLET | Refills: 0 | Status: SHIPPED | OUTPATIENT
Start: 2021-10-08 | End: 2022-01-25

## 2021-10-08 RX ORDER — PRAVASTATIN SODIUM 40 MG
40 TABLET ORAL NIGHTLY
Qty: 90 TABLET | Refills: 0
Start: 2021-10-08 | End: 2021-10-25 | Stop reason: SDUPTHER

## 2021-10-08 NOTE — PROGRESS NOTES
Subjective        Jagdish Rea is a 60 y.o. male.     Chief Complaint   Patient presents with   • Medicare Wellness-Initial Visit     initial medicare wellness   • Back Pain     6 month f/u   • Hyperlipidemia   • Hypertension       History of Present Illness    The following portions of the patient's history were reviewed and updated as appropriate: allergies, current medications, past family history, past medical history, past social history, past surgical history and problem list.      Current Outpatient Medications:   •  albuterol sulfate  (90 Base) MCG/ACT inhaler, Inhale 2 puffs Every 4 (Four) Hours As Needed for Wheezing., Disp: 1 inhaler, Rfl: 2  •  amLODIPine (NORVASC) 10 MG tablet, TAKE 1 TABLET EVERY DAY, Disp: 90 tablet, Rfl: 0  •  doxepin (SINEquan) 10 MG capsule, TAKE 1 CAPSULE BY MOUTH EVERY NIGHT., Disp: 90 capsule, Rfl: 1  •  hydroCHLOROthiazide (MICROZIDE) 12.5 MG capsule, Take 12.5 mg by mouth As Needed., Disp: , Rfl:   •  lamoTRIgine (LaMICtal) 25 MG tablet, Take 2 tablets by mouth Every Night., Disp: 180 tablet, Rfl: 1  •  meloxicam (MOBIC) 7.5 MG tablet, TAKE ONE TABLET BY MOUTH DAILY, Disp: 30 tablet, Rfl: 0  •  metoprolol succinate XL (TOPROL-XL) 25 MG 24 hr tablet, TAKE 1 TABLET EVERY DAY, Disp: 90 tablet, Rfl: 0  •  omeprazole (priLOSEC) 20 MG capsule, Take 20 mg by mouth Daily., Disp: , Rfl:   •  pravastatin (PRAVACHOL) 10 MG tablet, TAKE 1 TABLET BY MOUTH EVERY NIGHT. (Patient taking differently: Take 40 mg by mouth Every Night.), Disp: 90 tablet, Rfl: 1  •  risperiDONE (risperDAL) 1 MG tablet, Take 1 tablet by mouth Daily., Disp: 90 tablet, Rfl: 1    Current Facility-Administered Medications:   •  triamcinolone acetonide (KENALOG-40) injection 40 mg, 40 mg, Intra-articular, Once, Fredi Ritchie MD    No results found for this or any previous visit (from the past 4032 hour(s)).      Review of Systems    Objective     /86 (BP Location: Left arm, Patient Position:  "Sitting, Cuff Size: Adult)   Pulse 92   Temp 96.4 °F (35.8 °C) (Infrared)   Ht 180.3 cm (71\")   Wt 113 kg (249 lb)   SpO2 96%   BMI 34.73 kg/m²     Physical Exam    Result Review :                Assessment/Plan    There are no diagnoses linked to this encounter.  There are no Patient Instructions on file for this visit.    Follow Up   No follow-ups on file.    Patient was given instructions and counseling regarding his condition or for health maintenance advice. Please see specific information pulled into the AVS if appropriate.     Yamilka Pascual, APRN    10/08/21      "

## 2021-10-08 NOTE — PROGRESS NOTES
The ABCs of the Annual Wellness Visit  Initial Medicare Wellness Visit    Chief Complaint   Patient presents with   • Medicare Wellness-Initial Visit     initial medicare wellness   • Back Pain     6 month f/u   • Hyperlipidemia   • Hypertension     Subjective   History of Present Illness:  Jagdish Rea is a 60 y.o. male who presents for an Initial Medicare Wellness Visit.    Patient is here for management of his chronic medical problems:   GERDs, hypertension, hyperlipidemia B12 def anxiety. Chronic low back pain.     GERDs he is taking omeprazole 20 mg nighlty.he denies nausea.    Hyperlipidemia; recent labs 7/30/2021 tC 232 tri 187normal hdl 36 l ldl 158.6 high his  Pravachol was just increased to 40 mg nightly.     Hypertension; taking hctz 12.5 mg daily amlodipine 10 mg once day. Metoprolol succinate xl 25 mg nightly.     Anxiety taking doxepin 10 mg nightly lamictal 50 nightly risperidone 1 mg daily [facility].     Tobacco abuse; he continues to smoke.       The following portions of the patient's history were reviewed and   updated as appropriate: allergies, current medications, past family history, past medical history, past social history, past surgical history and problem list.     Compared to one year ago, the patient feels his physical   health is better.    Compared to one year ago, the patient feels his mental   health is the same.    Recent Hospitalizations:  He was not admitted to the hospital during the last year.       Current Medical Providers:  Patient Care Team:  Yamilka Pascual APRN as PCP - General (Nurse Practitioner)    Outpatient Medications Prior to Visit   Medication Sig Dispense Refill   • albuterol sulfate  (90 Base) MCG/ACT inhaler Inhale 2 puffs Every 4 (Four) Hours As Needed for Wheezing. 1 inhaler 2   • amLODIPine (NORVASC) 10 MG tablet TAKE 1 TABLET EVERY DAY 90 tablet 0   • doxepin (SINEquan) 10 MG capsule TAKE 1 CAPSULE BY MOUTH EVERY NIGHT. 90  capsule 1   • hydroCHLOROthiazide (MICROZIDE) 12.5 MG capsule Take 12.5 mg by mouth As Needed.     • lamoTRIgine (LaMICtal) 25 MG tablet Take 2 tablets by mouth Every Night. 180 tablet 1   • metoprolol succinate XL (TOPROL-XL) 25 MG 24 hr tablet TAKE 1 TABLET EVERY DAY 90 tablet 0   • omeprazole (priLOSEC) 20 MG capsule Take 20 mg by mouth Daily.     • risperiDONE (risperDAL) 1 MG tablet Take 1 tablet by mouth Daily. 90 tablet 1   • meloxicam (MOBIC) 7.5 MG tablet TAKE ONE TABLET BY MOUTH DAILY 30 tablet 0   • pravastatin (PRAVACHOL) 10 MG tablet TAKE 1 TABLET BY MOUTH EVERY NIGHT. (Patient taking differently: Take 40 mg by mouth Every Night.) 90 tablet 1     Facility-Administered Medications Prior to Visit   Medication Dose Route Frequency Provider Last Rate Last Admin   • triamcinolone acetonide (KENALOG-40) injection 40 mg  40 mg Intra-articular Once Fredi Ritchie MD           No opioid medication identified on active medication list. I have reviewed chart for other potential  high risk medication/s and harmful drug interactions in the elderly.          Aspirin is not on active medication list.  Aspirin use is not indicated based on review of current medical condition/s. Risk of harm outweighs potential benefits.  .    Patient Active Problem List   Diagnosis   • Anxiety   • Benign essential hypertension   • Chronic low back pain   • Hyperlipidemia   • Other cervical disc degeneration at C5-C6 level   • Peripheral neuropathy   • Thoracic disc herniation   • Vitamin B12 deficiency   • Right shoulder pain   • Fall   • Radicular pain in right arm   • Acute pain of right shoulder   • Tobacco abuse   • Chronic pain of both knees   • Initial Medicare annual wellness visit     Advance Care Planning  Advance Directive is not on file.  ACP discussion was held with the patient during this visit. Patient does not have an advance directive, information provided.    Review of Systems   HENT:        He has dentist not  "recent   Eyes:        Wears glasses [facility]   Respiratory: Negative for cough, choking, wheezing and stridor.         Cough due to smoking   Cardiovascular: Negative for chest pain, palpitations and leg swelling.   Gastrointestinal: Negative for abdominal pain, anal bleeding, blood in stool, constipation and diarrhea.   Genitourinary: Negative for enuresis, frequency, hematuria, penile swelling and testicular pain.   Musculoskeletal: Positive for back pain and myalgias.   Skin:        No rashes no skin cancer. No poor wound healing.    Allergic/Immunologic: Negative.    Neurological: Negative for tremors, seizures and syncope.        Left thumb will have trembling off and on   Hematological: Negative.    Psychiatric/Behavioral: Negative for self-injury and suicidal ideas.        Objective       Vitals:    10/08/21 1327   BP: 131/86   BP Location: Left arm   Patient Position: Sitting   Cuff Size: Adult   Pulse: 92   Temp: 96.4 °F (35.8 °C)   TempSrc: Infrared   SpO2: 96%   Weight: 113 kg (249 lb)   Height: 180.3 cm (71\")   PainSc:   2   PainLoc: Generalized     BMI Readings from Last 1 Encounters:   10/08/21 34.73 kg/m²   BMI is above normal parameters. Recommendations include: educational material    Does the patient have evidence of cognitive impairment? No    Physical Exam  Constitutional:       Appearance: Normal appearance.   HENT:      Head: Normocephalic.      Right Ear: Tympanic membrane normal.      Left Ear: Tympanic membrane normal.      Nose: Nose normal.      Mouth/Throat:      Mouth: Mucous membranes are moist.   Eyes:      Conjunctiva/sclera: Conjunctivae normal.      Pupils: Pupils are equal, round, and reactive to light.   Cardiovascular:      Rate and Rhythm: Normal rate and regular rhythm.      Pulses: Normal pulses.      Heart sounds: Normal heart sounds.   Pulmonary:      Effort: Pulmonary effort is normal.      Breath sounds: Normal breath sounds.   Abdominal:      General: Bowel " sounds are normal.      Palpations: Abdomen is soft.   Musculoskeletal:         General: Normal range of motion.   Skin:     General: Skin is warm and dry.      Capillary Refill: Capillary refill takes less than 2 seconds.   Neurological:      General: No focal deficit present.      Mental Status: He is alert and oriented to person, place, and time.   Psychiatric:         Mood and Affect: Mood normal.         Behavior: Behavior normal.         Thought Content: Thought content normal.         Judgment: Judgment normal.               HEALTH RISK ASSESSMENT    Smoking Status:  Social History     Tobacco Use   Smoking Status Current Every Day Smoker   • Packs/day: 1.00   • Years: 47.00   • Pack years: 47.00   • Types: Cigarettes   • Start date: 1/9/1973   Smokeless Tobacco Never Used     Alcohol Consumption:  Social History     Substance and Sexual Activity   Alcohol Use No     Fall Risk Screen:    GLEN Fall Risk Assessment was completed, and patient is at LOW risk for falls.Assessment completed on:10/8/2021    Depression Screen:   PHQ-2/PHQ-9 Depression Screening 10/8/2021   Little interest or pleasure in doing things 0   Feeling down, depressed, or hopeless 0   Total Score 0       Health Habits and Functional and Cognitive Screening:  Functional & Cognitive Status 10/8/2021   Do you have difficulty preparing food and eating? No   Do you have difficulty bathing yourself, getting dressed or grooming yourself? No   Do you have difficulty using the toilet? No   Do you have difficulty moving around from place to place? No   Do you have trouble with steps or getting out of a bed or a chair? No   Current Diet Well Balanced Diet   Dental Exam Not up to date   Eye Exam Up to date   Exercise (times per week) 3 times per week   Current Exercises Include Walking   Do you need help using the phone?  No   Are you deaf or do you have serious difficulty hearing?  No   Do you need help with transportation? No   Do you need help  shopping? No   Do you need help preparing meals?  No   Do you need help with housework?  No   Do you need help with laundry? No   Do you need help taking your medications? No   Do you need help managing money? No   Do you ever drive or ride in a car without wearing a seat belt? No   Have you felt unusual stress, anger or loneliness in the last month? No   Who do you live with? Spouse   If you need help, do you have trouble finding someone available to you? No   Have you been bothered in the last four weeks by sexual problems? No   Do you have difficulty concentrating, remembering or making decisions? Yes       Age-appropriate Screening Schedule:  Refer to the list below for future screening recommendations based on patient's age, sex and/or medical conditions. Orders for these recommended tests are listed in the plan section. The patient has been provided with a written plan.    Health Maintenance   Topic Date Due   • TDAP/TD VACCINES (1 - Tdap) Never done   • INFLUENZA VACCINE  10/08/2022 (Originally 8/1/2021)   • LIPID PANEL  07/30/2022   • ZOSTER VACCINE  Completed            Assessment/Plan   CMS Preventative Services Quick Reference  Risk Factors Identified During Encounter  Tobacco Use/Dependance (use dotphrase .tobaccocessation for documentation)  The above risks/problems have been discussed with the patient.  Follow up actions/plans if indicated are seen below in the Assessment/Plan Section.  Pertinent information has been shared with the patient in the After Visit Summary.    Diagnoses and all orders for this visit:    1. Initial Medicare annual wellness visit (Primary)  Comments:  completed    2. Anxiety  Comments:  stable    3. Vitamin B12 deficiency    4. Hyperlipidemia, unspecified hyperlipidemia type  Comments:  stable    5. Benign essential hypertension  Comments:  stable    6. Tobacco abuse  Comments:  discussed cost of cigarettes vs food. and healthy living    Other orders  -     pravastatin  (PRAVACHOL) 40 MG tablet; Take 1 tablet by mouth Every Night.  Dispense: 90 tablet; Refill: 0  -     meloxicam (MOBIC) 7.5 MG tablet; Take 1 tablet by mouth 2 (Two) Times a Day.  Dispense: 180 tablet; Refill: 0        Follow Up:  Return in about 6 months (around 4/8/2022).     An After Visit Summary and PPPS were made available to the patient.

## 2021-10-08 NOTE — PATIENT INSTRUCTIONS
Advance Care Planning and Advance Directives     You make decisions on a daily basis - decisions about where you want to live, your career, your home, your life. Perhaps one of the most important decisions you face is your choice for future medical care. Take time to talk with your family and your healthcare team and start planning today.  Advance Care Planning is a process that can help you:  · Understand possible future healthcare decisions in light of your own experiences  · Reflect on those decision in light of your goals and values  · Discuss your decisions with those closest to you and the healthcare professionals that care for you  · Make a plan by creating a document that reflects your wishes    Surrogate Decision Maker  In the event of a medical emergency, which has left you unable to communicate or to make your own decisions, you would need someone to make decisions for you.  It is important to discuss your preferences for medical treatment with this person while you are in good health.     Qualities of a surrogate decision maker:  • Willing to take on this role and responsibility  • Knows what you want for future medical care  • Willing to follow your wishes even if they don't agree with them  • Able to make difficult medical decisions under stressful circumstances    Advance Directives  These are legal documents you can create that will guide your healthcare team and decision maker(s) when needed. These documents can be stored in the electronic medical record.    · Living Will - a legal document to guide your care if you have a terminal condition or a serious illness and are unable to communicate. States vary by statute in document names/types, but most forms may include one or more of the following:        -  Directions regarding life-prolonging treatments        -  Directions regarding artificially provided nutrition/hydration        -  Choosing a healthcare decision maker        -  Direction  regarding organ/tissue donation    · Durable Power of  for Healthcare - this document names an -in-fact to make medical decisions for you, but it may also allow this person to make personal and financial decisions for you. Please seek the advice of an  if you need this type of document.    **Advance Directives are not required and no one may discriminate against you if you do not sign one.    Medical Orders  Many states allow specific forms/orders signed by your physician to record your wishes for medical treatment in your current state of health. This form, signed in personal communication with your physician, addresses resuscitation and other medical interventions that you may or may not want.      For more information or to schedule a time with a [facility] Advance Care Planning Facilitator contact: [email or web address] or call [phone] and someone will contact you directly.

## 2021-10-25 RX ORDER — PRAVASTATIN SODIUM 40 MG
40 TABLET ORAL NIGHTLY
Qty: 90 TABLET | Refills: 0
Start: 2021-10-25 | End: 2022-10-28

## 2021-12-03 ENCOUNTER — TELEMEDICINE (OUTPATIENT)
Dept: FAMILY MEDICINE CLINIC | Facility: CLINIC | Age: 61
End: 2021-12-03

## 2021-12-03 VITALS — HEART RATE: 81 BPM | SYSTOLIC BLOOD PRESSURE: 161 MMHG | DIASTOLIC BLOOD PRESSURE: 94 MMHG | TEMPERATURE: 99.1 F

## 2021-12-03 DIAGNOSIS — J06.9 URI, ACUTE: Primary | ICD-10-CM

## 2021-12-03 PROCEDURE — 99213 OFFICE O/P EST LOW 20 MIN: CPT | Performed by: NURSE PRACTITIONER

## 2021-12-03 RX ORDER — AZITHROMYCIN 250 MG/1
TABLET, FILM COATED ORAL
Qty: 6 TABLET | Refills: 0 | Status: SHIPPED | OUTPATIENT
Start: 2021-12-03 | End: 2022-02-09

## 2021-12-03 NOTE — PROGRESS NOTES
Subjective        Jagdish Rea is a 61 y.o. male.     No chief complaint on file.  CC: patient is with me per video today. He is here for cough and congestion.     History of Present Illness  Patient is agreeable to video visit. He has is here for cough and congetion 3 days. No fever said he coughs so hard muscles cramp. He can taste and smell food. He had his covid vaccine. No wheezing not short of air. Taking tylenol and coricidin hbp.   No know exposure to covid. T99.1 skin thermal.           The following portions of the patient's history were reviewed and updated as appropriate: allergies, current medications, past family history, past medical history, past social history, past surgical history and problem list.      Current Outpatient Medications:   •  albuterol sulfate  (90 Base) MCG/ACT inhaler, Inhale 2 puffs Every 4 (Four) Hours As Needed for Wheezing., Disp: 1 inhaler, Rfl: 2  •  amLODIPine (NORVASC) 10 MG tablet, TAKE 1 TABLET EVERY DAY, Disp: 90 tablet, Rfl: 0  •  azithromycin (Zithromax) 250 MG tablet, Take 2 day one then one a day for days 2-5, Disp: 6 tablet, Rfl: 0  •  doxepin (SINEquan) 10 MG capsule, TAKE 1 CAPSULE BY MOUTH EVERY NIGHT., Disp: 90 capsule, Rfl: 1  •  hydroCHLOROthiazide (MICROZIDE) 12.5 MG capsule, Take 12.5 mg by mouth As Needed., Disp: , Rfl:   •  lamoTRIgine (LaMICtal) 25 MG tablet, Take 2 tablets by mouth Every Night., Disp: 180 tablet, Rfl: 1  •  meloxicam (MOBIC) 7.5 MG tablet, Take 1 tablet by mouth 2 (Two) Times a Day., Disp: 180 tablet, Rfl: 0  •  metoprolol succinate XL (TOPROL-XL) 25 MG 24 hr tablet, TAKE 1 TABLET EVERY DAY, Disp: 90 tablet, Rfl: 0  •  omeprazole (priLOSEC) 20 MG capsule, Take 20 mg by mouth Daily., Disp: , Rfl:   •  pravastatin (PRAVACHOL) 40 MG tablet, Take 1 tablet by mouth Every Night., Disp: 90 tablet, Rfl: 0  •  risperiDONE (risperDAL) 1 MG tablet, Take 1 tablet by mouth Daily., Disp: 90 tablet, Rfl: 1    Current Facility-Administered  Medications:   •  triamcinolone acetonide (KENALOG-40) injection 40 mg, 40 mg, Intra-articular, Once, Fredi Ritchie MD    No results found for this or any previous visit (from the past 4032 hour(s)).      Review of Systems    Objective     /94   Pulse 81   Temp 99.1 °F (37.3 °C) (Oral)     Physical Exam  Vitals reviewed.   Constitutional:       Appearance: Normal appearance.   HENT:      Head: Normocephalic.   Neurological:      General: No focal deficit present.      Mental Status: He is alert.   Psychiatric:         Mood and Affect: Mood normal.         Thought Content: Thought content normal.     limited exam due to video visit.    Result Review :{ Labs  Result Review  Imaging  Med Tab  Media :23}                Assessment/Plan    Diagnoses and all orders for this visit:    1. URI, acute (Primary)    Other orders  -     azithromycin (Zithromax) 250 MG tablet; Take 2 day one then one a day for days 2-5  Dispense: 6 tablet; Refill: 0      Patient Instructions   Drink fluids use muccinex  Take your coricidin  Hydrate.   Take all the antibiotics  Go to the eD if short of air vomiting, or you worsen.       Follow Up   No follow-ups on file.    Patient was given instructions and counseling regarding his condition or for health maintenance advice. Please see specific information pulled into the AVS if appropriate.     Yamilka Pascual, APRN    12/03/21

## 2021-12-03 NOTE — PATIENT INSTRUCTIONS
Drink fluids use muccinex  Take your coricidin  Hydrate.   Take all the antibiotics  Go to the eD if short of air vomiting, or you worsen.

## 2021-12-21 ENCOUNTER — DOCUMENTATION (OUTPATIENT)
Dept: PHYSICAL THERAPY | Facility: CLINIC | Age: 61
End: 2021-12-21

## 2021-12-21 NOTE — PROGRESS NOTES
Discharge Summary  Discharge Summary from Physical/Occupational Therapy Report    Patient: Jagdish Rea   : 1960  Today's Date: 2021    Patient seen for 15 visits.  Dates of Service: 21 - 21    Discharge Status of Patient: Jagdish was making very good progress with therapy. He continued to have somewhat limited shoulder ROM and weakness, however his functional had significantly improved. He ended up cancelled last few appointments due to COVID and did not call back to reschedule.     Goals: Partially Met    Discharge Plan: Continue with current home exercise program as instructed  Patient to return to referring/providing physician    Thank you for this referral to [facility].    SIGNATURE: Brynn Saavedra, PT

## 2021-12-28 RX ORDER — METOPROLOL SUCCINATE 25 MG/1
TABLET, EXTENDED RELEASE ORAL
Qty: 90 TABLET | Refills: 0 | Status: SHIPPED | OUTPATIENT
Start: 2021-12-28 | End: 2022-03-24

## 2021-12-28 RX ORDER — AMLODIPINE BESYLATE 10 MG/1
TABLET ORAL
Qty: 90 TABLET | Refills: 0 | Status: SHIPPED | OUTPATIENT
Start: 2021-12-28 | End: 2022-03-10

## 2022-01-25 RX ORDER — MELOXICAM 7.5 MG/1
TABLET ORAL
Qty: 180 TABLET | Refills: 0 | Status: SHIPPED | OUTPATIENT
Start: 2022-01-25 | End: 2022-05-01

## 2022-02-09 ENCOUNTER — OFFICE VISIT (OUTPATIENT)
Dept: FAMILY MEDICINE CLINIC | Facility: CLINIC | Age: 62
End: 2022-02-09

## 2022-02-09 ENCOUNTER — TELEPHONE (OUTPATIENT)
Dept: FAMILY MEDICINE CLINIC | Facility: CLINIC | Age: 62
End: 2022-02-09

## 2022-02-09 VITALS
TEMPERATURE: 96.6 F | HEART RATE: 70 BPM | SYSTOLIC BLOOD PRESSURE: 157 MMHG | WEIGHT: 252 LBS | DIASTOLIC BLOOD PRESSURE: 90 MMHG | OXYGEN SATURATION: 97 % | HEIGHT: 71 IN | BODY MASS INDEX: 35.28 KG/M2

## 2022-02-09 DIAGNOSIS — G89.29 CHRONIC MIDLINE LOW BACK PAIN WITHOUT SCIATICA: Primary | ICD-10-CM

## 2022-02-09 DIAGNOSIS — M54.50 CHRONIC MIDLINE LOW BACK PAIN WITHOUT SCIATICA: Primary | ICD-10-CM

## 2022-02-09 PROCEDURE — 99213 OFFICE O/P EST LOW 20 MIN: CPT | Performed by: NURSE PRACTITIONER

## 2022-02-09 RX ORDER — CYCLOBENZAPRINE HYDROCHLORIDE 7.5 MG/1
7.5 TABLET, FILM COATED ORAL 3 TIMES DAILY PRN
Qty: 30 TABLET | Refills: 0 | Status: SHIPPED | OUTPATIENT
Start: 2022-02-09 | End: 2022-04-04

## 2022-02-09 NOTE — PATIENT INSTRUCTIONS
Use ice pack to back 20 min every couple hours for next 3 days.  Use use heat couple times a day.   Continue meloxicam  Follow up with physical therapy.

## 2022-02-09 NOTE — PROGRESS NOTES
"Subjective        Jagdish Rea is a 61 y.o. male.     Chief Complaint   Patient presents with   • Back Pain     low back pain \"for a while\", pain worsening, no known injury       History of Present Illness  Patient is here for worsening back pain  He is unable to do dishes used be able walk around but now has to hold cart to walk. Standing straight up makes it worse sometimes feels ice and heat between shoulder blades. He can deal with that the lower back is constant pain.   He denies he has had bowel or bladder incontinence. He said sometimes has pressure in rectum but does not have bowel movement.   Stays base of back sometimes radiates to hips.   He has hurt it long time ago. He had meningitis long time ago and they stuck needle in his back and has back pain since then was in the 2007 or 8. He completed PT in past and that helped a lot.   He does not do any of the therapy now. No fever reported.           The following portions of the patient's history were reviewed and updated as appropriate: allergies, current medications, past family history, past medical history, past social history, past surgical history and problem list.      Current Outpatient Medications:   •  albuterol sulfate  (90 Base) MCG/ACT inhaler, Inhale 2 puffs Every 4 (Four) Hours As Needed for Wheezing., Disp: 1 inhaler, Rfl: 2  •  amLODIPine (NORVASC) 10 MG tablet, TAKE 1 TABLET EVERY DAY, Disp: 90 tablet, Rfl: 0  •  doxepin (SINEquan) 10 MG capsule, TAKE 1 CAPSULE BY MOUTH EVERY NIGHT., Disp: 90 capsule, Rfl: 1  •  hydroCHLOROthiazide (MICROZIDE) 12.5 MG capsule, Take 12.5 mg by mouth As Needed., Disp: , Rfl:   •  lamoTRIgine (LaMICtal) 25 MG tablet, Take 2 tablets by mouth Every Night., Disp: 180 tablet, Rfl: 1  •  meloxicam (MOBIC) 7.5 MG tablet, TAKE ONE TABLET BY MOUTH TWICE A DAY, Disp: 180 tablet, Rfl: 0  •  metoprolol succinate XL (TOPROL-XL) 25 MG 24 hr tablet, TAKE 1 TABLET EVERY DAY, Disp: 90 tablet, Rfl: 0  •  omeprazole " "(priLOSEC) 20 MG capsule, Take 20 mg by mouth Daily., Disp: , Rfl:   •  pravastatin (PRAVACHOL) 40 MG tablet, Take 1 tablet by mouth Every Night., Disp: 90 tablet, Rfl: 0  •  risperiDONE (risperDAL) 1 MG tablet, Take 1 tablet by mouth Daily., Disp: 90 tablet, Rfl: 1  •  cyclobenzaprine (FEXMID) 7.5 MG tablet, Take 1 tablet by mouth 3 (Three) Times a Day As Needed for Muscle Spasms., Disp: 30 tablet, Rfl: 0    Current Facility-Administered Medications:   •  triamcinolone acetonide (KENALOG-40) injection 40 mg, 40 mg, Intra-articular, Once, Fredi Ritchie MD    No results found for this or any previous visit (from the past 4032 hour(s)).      Review of Systems    Objective     /90 (BP Location: Left arm, Patient Position: Sitting, Cuff Size: Adult)   Pulse 70   Temp 96.6 °F (35.9 °C) (Infrared)   Ht 180.3 cm (71\")   Wt 114 kg (252 lb)   SpO2 97%   BMI 35.15 kg/m²     Physical Exam  HENT:      Head: Normocephalic.      Right Ear: External ear normal.      Left Ear: External ear normal.      Nose: Nose normal.      Mouth/Throat:      Mouth: Mucous membranes are moist.   Cardiovascular:      Rate and Rhythm: Normal rate and regular rhythm.      Pulses: Normal pulses.      Heart sounds: Normal heart sounds.   Pulmonary:      Effort: Pulmonary effort is normal.      Breath sounds: Normal breath sounds.   Abdominal:      General: Bowel sounds are normal.   Musculoskeletal:      Lumbar back: No swelling, edema, deformity, signs of trauma, spasms or bony tenderness. Positive left straight leg raise test. Negative right straight leg raise test. No scoliosis.        Back:       Right lower leg: No edema.      Left lower leg: No edema.      Comments: Tender with palpitation paraspinal.   Pain with internal rotation left leg mild with external rotation    straight leg pos left to back only.      Skin:     General: Skin is warm and dry.   Neurological:      General: No focal deficit present.      Mental " Status: He is alert and oriented to person, place, and time.      Deep Tendon Reflexes:      Reflex Scores:       Tricep reflexes are 2+ on the right side and 2+ on the left side.       Bicep reflexes are 2+ on the right side and 2+ on the left side.       Brachioradialis reflexes are 2+ on the right side and 2+ on the left side.       Patellar reflexes are 2+ on the right side and 2+ on the left side.       Achilles reflexes are 2+ on the right side and 2+ on the left side.     Comments: Walks slightly slumped forward. Discussed standing straight . Using core.   Psychiatric:         Mood and Affect: Mood normal.         Behavior: Behavior normal.         Thought Content: Thought content normal.         Judgment: Judgment normal.         Result Review :                Assessment/Plan    Diagnoses and all orders for this visit:    1. Chronic midline low back pain without sciatica (Primary)  -     Ambulatory Referral to Physical Therapy Evaluate and treat    Other orders  -     cyclobenzaprine (FEXMID) 7.5 MG tablet; Take 1 tablet by mouth 3 (Three) Times a Day As Needed for Muscle Spasms.  Dispense: 30 tablet; Refill: 0      Patient Instructions   Use ice pack to back 20 min every couple hours for next 3 days.  Use use heat couple times a day.   Continue meloxicam  Follow up with physical therapy.       Follow Up   Return in about 1 month (around 3/9/2022).    Patient was given instructions and counseling regarding his condition or for health maintenance advice. Please see specific information pulled into the AVS if appropriate.     Yamilka Pascual, APRN    02/09/22

## 2022-03-03 ENCOUNTER — TREATMENT (OUTPATIENT)
Dept: PHYSICAL THERAPY | Facility: CLINIC | Age: 62
End: 2022-03-03

## 2022-03-03 DIAGNOSIS — M54.50 CHRONIC MIDLINE LOW BACK PAIN WITHOUT SCIATICA: Primary | ICD-10-CM

## 2022-03-03 DIAGNOSIS — G89.29 CHRONIC MIDLINE LOW BACK PAIN WITHOUT SCIATICA: Primary | ICD-10-CM

## 2022-03-03 PROCEDURE — 97162 PT EVAL MOD COMPLEX 30 MIN: CPT | Performed by: PHYSICAL THERAPIST

## 2022-03-03 PROCEDURE — 97110 THERAPEUTIC EXERCISES: CPT | Performed by: PHYSICAL THERAPIST

## 2022-03-03 NOTE — PROGRESS NOTES
Physical Therapy Initial Evaluation and Plan of Care    Patient: Jagdish Rea   : 1960  Diagnosis/ICD-10 Code:  Chronic midline low back pain without sciatica [M54.50, G89.29]  Referring practitioner: KEILA Kaur  Date of Initial Visit: 3/3/2022  Today's Date: 3/3/2022  Patient seen for 1 sessions           Subjective Questionnaire: Oswestry: 38%      Subjective Evaluation    History of Present Illness  Mechanism of injury: Pt is referred to therapy due to chronic LBP. Reports it is getting worse. Unable to stand for more than 15-20 min before needing to stop and lie down. Inc pain with prolonged sitting as well so spends a lot of time lying down. Walking is ok but no more than 30 min. He had a few injections a few years ago but it didn't help. Pt denies any LE symptoms. He has pain in R knee and eventually will need a TKR.     Pt was in therapy last year for his knee and back.     Onset of symptoms:  on and off    Aggravating factors: standing/ prolonged walking/ prolonged sitting, inc physical activities, bending , twisting, lifting.     Relieving factors: rest    Functional limitation: standing long enough to do dishes, shopping, taking long walks, yard work, mopping, lifting.     PMH: L RCR, R knee pain, COPD          Patient Occupation: retired Quality of life: fair    Pain  Current pain rating: 3  At best pain ratin  At worst pain ratin  Quality: dull ache  Progression: worsening    Treatments  Previous treatment: physical therapy  Current treatment: medication  Patient Goals  Patient goals for therapy: decreased pain, increased motion, increased strength and return to sport/leisure activities           Precautions:     Objective          Postural Observations  Seated posture: fair  Standing posture: poor  Correction of posture: has no consistent effect    Additional Postural Observation Details  Stands and sits with flexed posture. Slightly shifted to R . Slow and guarded  movement.        Observations   Left Knee   Negative for edema.     Right Knee   Negative for edema.       Tenderness     Right Knee   Tenderness in the medial joint line.     Active Range of Motion   Left Knee   Normal active range of motion    Right Knee   Normal active range of motion  Flexion: with pain  Extension: with pain    Additional Active Range of Motion Details  Standing lumbar flex: wfl with inc pain coming up, worse with reps    Standing lumbar ext: mod/ max limitation , inc LBP, worse  with reps    Supine flex: wfl, limitation is due to R knee pain, inc pain in LB with reps    Prone ext: mod limitation, better than supine position, dec pain in LB with reps    SLR: co tightness in both legs and LB    Flexibility: mod tightness B HS        Strength/Myotome Testing     Left Hip   Normal muscle strength  Planes of Motion   Flexion: 4  Abduction: 4  Adduction: 4    Right Hip   Normal muscle strength  Planes of Motion   Flexion: 4  Abduction: 4  Adduction: 4    Left Knee   Flexion: 5  Extension: 5  Quadriceps contraction: good    Right Knee   Flexion: 5  Extension: 5  Quadriceps contraction: fair    Left Ankle/Foot   Dorsiflexion: 5    Right Ankle/Foot   Dorsiflexion: 5    Left Hip Flexibility Comments:   Mod tightness HS    Right Hip Flexibility Comments:   Mod tightness HS          Assessment & Plan     Assessment  Impairments: abnormal gait, abnormal or restricted ROM, activity intolerance, lacks appropriate home exercise program, pain with function and weight-bearing intolerance  Functional Limitations: carrying objects, lifting, walking, pulling, uncomfortable because of pain, sitting, standing, stooping and unable to perform repetitive tasks  Assessment details: Pt is a 61 y/o m referred to therapy due to chronic LBP.  Pt presents with impaired lumbar mobility, poor posture, dec tolerance to standing/ walking and  physical activities. Upon initial evaluation pt exhibits the above impairments and  functional limitations. Impairments affect performing normal and daily activities, standing long enough to do dishes, or go to the grocery.  Pt will benefit from skilled physical therapy to address impairments, reduce pain , improved posture, improved ROM/ strength and maximize function.   Prognosis: good    Goals  Plan Goals: STGs:  In 4 weeks  1- Pt will  report at least 35 % improvement and pain reduction   2- Pt will be independent with initial HEP   3- Pt will tolerate progression of HEP and his exercise program     LTGs: By DC   1- Pt will report at least 75 % improvement and pain reduction   2- Pt will be independent with final HEP and self management of his condition   3-  Oswestry score will be 25% or better indicating functional improvement and pain reduction  4- Pt will voice readiness for DC with independent program   5- Pt will demonstrate improved posture and body mechanics  6- Pt will demonstrate improved lumbar ext   7- Pt will demonstrate improved tolerance to standing/ walking with less pain    Plan  Therapy options: will be seen for skilled therapy services  Planned modality interventions: high voltage pulsed current (pain management) and ultrasound  Planned therapy interventions: abdominal trunk stabilization, body mechanics training, flexibility, functional ROM exercises, home exercise program, manual therapy, neuromuscular re-education, postural training, strengthening, stretching and therapeutic activities  Frequency: 2x week  Duration in weeks: 12  Treatment plan discussed with: patient        See flow sheet for treatment detail    History # of Personal Factors and/or Comorbidities: MODERATE (1-2)  Examination of Body System(s): # of elements: MODERATE (3)  Clinical Presentation: EVOLVING  Clinical Decision Making: MODERATE          Timed:         Manual Therapy:         mins  84971;     Therapeutic Exercise:    20     mins  31338;     Neuromuscular Jake:        mins  68558;    Therapeutic  Activity:          mins  40053;     Gait Training:           mins  76135;     Ultrasound:          mins  52522;    Ionto                                   mins   16435  Self Care                            mins   00977  Canal repositioning           mins    38109      Un-Timed:  Electrical Stimulation:         mins  48208 ( );  Dry Needling          mins self-pay  Traction          mins 90928  Low Eval          Mins  50378  Mod Eval     25     Mins  00329  High Eval                            Mins  07899  Re-Eval                               mins  60137        Timed Treatment:  20    mins   Total Treatment:     45   mins    PT SIGNATURE: Baudilio Reese PT, CLT  [place] License: # [id number]     DATE TREATMENT INITIATED: 3/3/2022    Initial Certification  Certification Period: 3/3/2022 thru 5/31/2022  I certify that the therapy services are furnished while this patient is under my care.  The services outlined above are required by this patient, and will be reviewed every 90 days.     PHYSICIAN: Yamilka Pascual APRN   NPI: [id number]                                      DATE:     Please sign and return via fax to [phone].. Thank you, [facility].

## 2022-03-10 RX ORDER — AMLODIPINE BESYLATE 10 MG/1
TABLET ORAL
Qty: 90 TABLET | Refills: 0 | Status: SHIPPED | OUTPATIENT
Start: 2022-03-10 | End: 2022-03-24

## 2022-03-10 RX ORDER — DOXEPIN HYDROCHLORIDE 10 MG/1
10 CAPSULE ORAL NIGHTLY
Qty: 90 CAPSULE | Refills: 1 | Status: SHIPPED | OUTPATIENT
Start: 2022-03-10 | End: 2022-09-26

## 2022-03-23 ENCOUNTER — TREATMENT (OUTPATIENT)
Dept: PHYSICAL THERAPY | Facility: CLINIC | Age: 62
End: 2022-03-23

## 2022-03-23 DIAGNOSIS — G89.29 CHRONIC MIDLINE LOW BACK PAIN WITHOUT SCIATICA: Primary | ICD-10-CM

## 2022-03-23 DIAGNOSIS — M54.50 CHRONIC MIDLINE LOW BACK PAIN WITHOUT SCIATICA: Primary | ICD-10-CM

## 2022-03-23 PROCEDURE — 97530 THERAPEUTIC ACTIVITIES: CPT | Performed by: PHYSICAL THERAPIST

## 2022-03-23 PROCEDURE — 97110 THERAPEUTIC EXERCISES: CPT | Performed by: PHYSICAL THERAPIST

## 2022-03-23 NOTE — PROGRESS NOTES
Physical Therapy Daily Progress Note    Patient: Jagdish Rea  : 1960  Referring practitioner: KEILA Kaur  Today's Date: 3/23/2022    VISIT#: 2    Subjective   Jagdish Rea reports: Says he is doing ok, worked on the exercise on his elbows about 2 times per day. The exercise got easier but hasn't seen a change overall in his pain level yet.       Objective     See Exercise, Manual, and Modality Logs for complete treatment.     Patient Education: changed HEP, instructed to hold prone ex due to poor response this session.    Assessment/Plan  Began session on Nustep with good tolerance, no increased pain today. Then performed prone exercises. After prone ex he got up and low back pain was quite a bit worse. Then attempted supine w/ BLE elevated on stool in 90/90 decompression position -- this partially relieved his pain. Followed by hooklying exercises. This decreased his pain     Progress per Plan of Care            Timed:         Manual Therapy:         mins  60280;     Therapeutic Exercise:    25     mins  59124;     Neuromuscular Jake:        mins  26557;    Therapeutic Activity:     15     mins  95313;     Gait Training:           mins  20573;     Ultrasound:          mins  78685;    Ionto:                                   mins   44798  Self Care:                            mins   06503    Un-Timed:  Electrical Stimulation:         mins  76447 ( );  Dry Needling          mins self-pay  Traction          mins 59984  Re-Eval                               mins  22528    Timed Treatment:   40   mins   Total Treatment:     40   mins    Brynn Saavedra PT  Physical Therapist  [place] PT license #: [id number]

## 2022-03-24 RX ORDER — METOPROLOL SUCCINATE 25 MG/1
TABLET, EXTENDED RELEASE ORAL
Qty: 90 TABLET | Refills: 0 | Status: SHIPPED | OUTPATIENT
Start: 2022-03-24 | End: 2022-06-12

## 2022-03-24 RX ORDER — AMLODIPINE BESYLATE 10 MG/1
TABLET ORAL
Qty: 90 TABLET | Refills: 0 | Status: SHIPPED | OUTPATIENT
Start: 2022-03-24 | End: 2022-06-12

## 2022-04-01 ENCOUNTER — TREATMENT (OUTPATIENT)
Dept: PHYSICAL THERAPY | Facility: CLINIC | Age: 62
End: 2022-04-01

## 2022-04-01 DIAGNOSIS — M54.50 CHRONIC MIDLINE LOW BACK PAIN WITHOUT SCIATICA: Primary | ICD-10-CM

## 2022-04-01 DIAGNOSIS — G89.29 CHRONIC MIDLINE LOW BACK PAIN WITHOUT SCIATICA: Primary | ICD-10-CM

## 2022-04-01 PROCEDURE — 97110 THERAPEUTIC EXERCISES: CPT | Performed by: PHYSICAL THERAPIST

## 2022-04-01 PROCEDURE — 97530 THERAPEUTIC ACTIVITIES: CPT | Performed by: PHYSICAL THERAPIST

## 2022-04-01 PROCEDURE — G0283 ELEC STIM OTHER THAN WOUND: HCPCS | Performed by: PHYSICAL THERAPIST

## 2022-04-01 NOTE — PROGRESS NOTES
Physical Therapy Daily Progress Note    Patient: Jagdish Rea  : 1960  Referring practitioner: KEILA Kaur  Today's Date: 2022    VISIT#: 3    Subjective   Pt reports: rates his pain 3/10 presently but it gets worse when he goes shopping or do anything.       Objective     See Exercise, Manual, and Modality Logs for complete treatment.     Patient Education: continue with HEP and walk short distance regularly.    Assessment & Plan     Assessment    Assessment details: Fair tolerance to exercises. Attempted PPT, pt had inc pain therefore instructed in neutral spine for now.  Trial of ES today in decompression position for pain management. Dec pain reported at conclusion of today's session.     Info provided regarding purchasing a TENS unit.           Progress per Plan of Care            Timed:         Manual Therapy:         mins  82386;     Therapeutic Exercise:  23       mins  50096;     Neuromuscular Jake:        mins  40196;    Therapeutic Activity:    15      mins  27967;     Gait Training:           mins  78411;     Ultrasound:          mins  06853;    Ionto                                   mins   50828  Self Care                            mins   84801  Canal repositioning           mins    49389    Un-Timed:  Electrical Stimulation:  15       mins  38985 ( );  Traction          mins 31814  Low Eval          Mins  68104  Mod Eval          Mins  71962  High Eval                            Mins  74975  Re-Eval                               mins  62131    Timed Treatment:  38    mins   Total Treatment:    53    mins    Baudilio Reese PT, CLT  Physical Therapist  [place] License:  # [id number]

## 2022-04-04 ENCOUNTER — LAB (OUTPATIENT)
Dept: LAB | Facility: HOSPITAL | Age: 62
End: 2022-04-04

## 2022-04-04 ENCOUNTER — HOSPITAL ENCOUNTER (OUTPATIENT)
Dept: GENERAL RADIOLOGY | Facility: HOSPITAL | Age: 62
Discharge: HOME | End: 2022-04-04

## 2022-04-04 ENCOUNTER — OFFICE VISIT (OUTPATIENT)
Dept: FAMILY MEDICINE CLINIC | Facility: CLINIC | Age: 62
End: 2022-04-04

## 2022-04-04 VITALS
BODY MASS INDEX: 33.74 KG/M2 | HEIGHT: 71 IN | SYSTOLIC BLOOD PRESSURE: 151 MMHG | OXYGEN SATURATION: 97 % | WEIGHT: 241 LBS | DIASTOLIC BLOOD PRESSURE: 91 MMHG | TEMPERATURE: 96.9 F | HEART RATE: 84 BPM

## 2022-04-04 DIAGNOSIS — J18.9 PNEUMONIA OF BOTH LUNGS DUE TO INFECTIOUS ORGANISM, UNSPECIFIED PART OF LUNG: ICD-10-CM

## 2022-04-04 DIAGNOSIS — Z72.0 TOBACCO ABUSE: ICD-10-CM

## 2022-04-04 DIAGNOSIS — J18.9 PNEUMONIA OF BOTH LUNGS DUE TO INFECTIOUS ORGANISM, UNSPECIFIED PART OF LUNG: Primary | ICD-10-CM

## 2022-04-04 LAB
ALBUMIN SERPL-MCNC: 4.4 G/DL (ref 3.5–5.2)
ALBUMIN/GLOB SERPL: 1.3 G/DL
ALP SERPL-CCNC: 92 U/L (ref 39–117)
ALT SERPL W P-5'-P-CCNC: 80 U/L (ref 1–41)
ANION GAP SERPL CALCULATED.3IONS-SCNC: 10.5 MMOL/L (ref 5–15)
AST SERPL-CCNC: 43 U/L (ref 1–40)
B PARAPERT DNA SPEC QL NAA+PROBE: NOT DETECTED
B PERT DNA SPEC QL NAA+PROBE: NOT DETECTED
BASOPHILS # BLD AUTO: 0.05 10*3/MM3 (ref 0–0.2)
BASOPHILS NFR BLD AUTO: 0.6 % (ref 0–1.5)
BILIRUB SERPL-MCNC: 0.5 MG/DL (ref 0–1.2)
BUN SERPL-MCNC: 9 MG/DL (ref 8–23)
BUN/CREAT SERPL: 8.2 (ref 7–25)
C PNEUM DNA NPH QL NAA+NON-PROBE: NOT DETECTED
CALCIUM SPEC-SCNC: 9.2 MG/DL (ref 8.6–10.5)
CHLORIDE SERPL-SCNC: 105 MMOL/L (ref 98–107)
CO2 SERPL-SCNC: 25.5 MMOL/L (ref 22–29)
CREAT SERPL-MCNC: 1.1 MG/DL (ref 0.76–1.27)
DEPRECATED RDW RBC AUTO: 46.1 FL (ref 37–54)
EGFRCR SERPLBLD CKD-EPI 2021: 76.4 ML/MIN/1.73
EOSINOPHIL # BLD AUTO: 0.18 10*3/MM3 (ref 0–0.4)
EOSINOPHIL NFR BLD AUTO: 2.1 % (ref 0.3–6.2)
ERYTHROCYTE [DISTWIDTH] IN BLOOD BY AUTOMATED COUNT: 13 % (ref 12.3–15.4)
EXPIRATION DATE: NORMAL
FLUAV AG UPPER RESP QL IA.RAPID: NOT DETECTED
FLUAV SUBTYP SPEC NAA+PROBE: NOT DETECTED
FLUBV AG UPPER RESP QL IA.RAPID: NOT DETECTED
FLUBV RNA ISLT QL NAA+PROBE: NOT DETECTED
GLOBULIN UR ELPH-MCNC: 3.3 GM/DL
GLUCOSE SERPL-MCNC: 98 MG/DL (ref 65–99)
HADV DNA SPEC NAA+PROBE: NOT DETECTED
HCOV 229E RNA SPEC QL NAA+PROBE: NOT DETECTED
HCOV HKU1 RNA SPEC QL NAA+PROBE: NOT DETECTED
HCOV NL63 RNA SPEC QL NAA+PROBE: NOT DETECTED
HCOV OC43 RNA SPEC QL NAA+PROBE: NOT DETECTED
HCT VFR BLD AUTO: 47.3 % (ref 37.5–51)
HGB BLD-MCNC: 16 G/DL (ref 13–17.7)
HMPV RNA NPH QL NAA+NON-PROBE: NOT DETECTED
HPIV1 RNA ISLT QL NAA+PROBE: NOT DETECTED
HPIV2 RNA SPEC QL NAA+PROBE: NOT DETECTED
HPIV3 RNA NPH QL NAA+PROBE: NOT DETECTED
HPIV4 P GENE NPH QL NAA+PROBE: NOT DETECTED
IMM GRANULOCYTES # BLD AUTO: 0.06 10*3/MM3 (ref 0–0.05)
IMM GRANULOCYTES NFR BLD AUTO: 0.7 % (ref 0–0.5)
INTERNAL CONTROL: NORMAL
LYMPHOCYTES # BLD AUTO: 2.79 10*3/MM3 (ref 0.7–3.1)
LYMPHOCYTES NFR BLD AUTO: 33.1 % (ref 19.6–45.3)
Lab: NORMAL
M PNEUMO IGG SER IA-ACNC: NOT DETECTED
MCH RBC QN AUTO: 33.1 PG (ref 26.6–33)
MCHC RBC AUTO-ENTMCNC: 33.8 G/DL (ref 31.5–35.7)
MCV RBC AUTO: 97.7 FL (ref 79–97)
MONOCYTES # BLD AUTO: 0.46 10*3/MM3 (ref 0.1–0.9)
MONOCYTES NFR BLD AUTO: 5.5 % (ref 5–12)
NEUTROPHILS NFR BLD AUTO: 4.89 10*3/MM3 (ref 1.7–7)
NEUTROPHILS NFR BLD AUTO: 58 % (ref 42.7–76)
NRBC BLD AUTO-RTO: 0 /100 WBC (ref 0–0.2)
PLATELET # BLD AUTO: 161 10*3/MM3 (ref 140–450)
PMV BLD AUTO: 11.9 FL (ref 6–12)
POTASSIUM SERPL-SCNC: 3.8 MMOL/L (ref 3.5–5.2)
PROCALCITONIN SERPL-MCNC: 0.07 NG/ML (ref 0–0.25)
PROT SERPL-MCNC: 7.7 G/DL (ref 6–8.5)
RBC # BLD AUTO: 4.84 10*6/MM3 (ref 4.14–5.8)
RHINOVIRUS RNA SPEC NAA+PROBE: NOT DETECTED
RSV RNA NPH QL NAA+NON-PROBE: NOT DETECTED
SARS-COV-2 AG UPPER RESP QL IA.RAPID: NOT DETECTED
SARS-COV-2 RNA NPH QL NAA+NON-PROBE: NOT DETECTED
SODIUM SERPL-SCNC: 141 MMOL/L (ref 136–145)
WBC NRBC COR # BLD: 8.43 10*3/MM3 (ref 3.4–10.8)

## 2022-04-04 PROCEDURE — 84145 PROCALCITONIN (PCT): CPT

## 2022-04-04 PROCEDURE — 87428 SARSCOV & INF VIR A&B AG IA: CPT | Performed by: NURSE PRACTITIONER

## 2022-04-04 PROCEDURE — 80053 COMPREHEN METABOLIC PANEL: CPT

## 2022-04-04 PROCEDURE — 0202U NFCT DS 22 TRGT SARS-COV-2: CPT

## 2022-04-04 PROCEDURE — 71046 X-RAY EXAM CHEST 2 VIEWS: CPT

## 2022-04-04 PROCEDURE — 36415 COLL VENOUS BLD VENIPUNCTURE: CPT

## 2022-04-04 PROCEDURE — 85025 COMPLETE CBC W/AUTO DIFF WBC: CPT

## 2022-04-04 PROCEDURE — 99214 OFFICE O/P EST MOD 30 MIN: CPT | Performed by: NURSE PRACTITIONER

## 2022-04-04 RX ORDER — CEFDINIR 300 MG/1
300 CAPSULE ORAL 2 TIMES DAILY
Qty: 14 CAPSULE | Refills: 0 | Status: SHIPPED | OUTPATIENT
Start: 2022-04-04 | End: 2022-04-11 | Stop reason: SDUPTHER

## 2022-04-04 RX ORDER — ALBUTEROL SULFATE 90 UG/1
2 AEROSOL, METERED RESPIRATORY (INHALATION) EVERY 4 HOURS PRN
Qty: 18 G | Refills: 1 | Status: SHIPPED | OUTPATIENT
Start: 2022-04-04

## 2022-04-04 NOTE — PATIENT INSTRUCTIONS
Go to the hospital for testing.  Start cefdinir.  Use albuterol inhaler  Stop smoking  Continue muccinex with water.   Follow up on testing  If short of air and symptoms worsen get seen.

## 2022-04-04 NOTE — PROGRESS NOTES
Subjective        Jagdish Rea is a 61 y.o. male.     Chief Complaint   Patient presents with   • URI     Chest congestion, cough x2 weeks       History of Present Illness  Patient is here for chest congestion for 2 weeks. He got sick with coughing things had chest congestion. No fever no chills.  He did get the [employer] covid vaccine no booster.  He has had exposures to grand kids that had flu.   He declines flu shots.   He took covid test at home was negative.  He is taking muccinex, nothing else.   He can taste and smell food. He is using his albuterol inhaler.      The following portions of the patient's history were reviewed and updated as appropriate: allergies, current medications, past family history, past medical history, past social history, past surgical history and problem list.      Current Outpatient Medications:   •  albuterol sulfate  (90 Base) MCG/ACT inhaler, Inhale 2 puffs Every 4 (Four) Hours As Needed for Wheezing., Disp: 18 g, Rfl: 1  •  amLODIPine (NORVASC) 10 MG tablet, TAKE 1 TABLET EVERY DAY, Disp: 90 tablet, Rfl: 0  •  doxepin (SINEquan) 10 MG capsule, TAKE 1 CAPSULE BY MOUTH EVERY NIGHT., Disp: 90 capsule, Rfl: 1  •  hydroCHLOROthiazide (MICROZIDE) 12.5 MG capsule, Take 12.5 mg by mouth As Needed., Disp: , Rfl:   •  lamoTRIgine (LaMICtal) 25 MG tablet, Take 2 tablets by mouth Every Night., Disp: 180 tablet, Rfl: 1  •  meloxicam (MOBIC) 7.5 MG tablet, TAKE ONE TABLET BY MOUTH TWICE A DAY, Disp: 180 tablet, Rfl: 0  •  metoprolol succinate XL (TOPROL-XL) 25 MG 24 hr tablet, TAKE 1 TABLET EVERY DAY, Disp: 90 tablet, Rfl: 0  •  omeprazole (priLOSEC) 20 MG capsule, Take 20 mg by mouth Daily., Disp: , Rfl:   •  pravastatin (PRAVACHOL) 40 MG tablet, Take 1 tablet by mouth Every Night., Disp: 90 tablet, Rfl: 0  •  risperiDONE (risperDAL) 1 MG tablet, Take 1 tablet by mouth Daily., Disp: 90 tablet, Rfl: 1  •  cefdinir (OMNICEF) 300 MG capsule, Take 1 capsule by mouth 2 (Two) Times a Day.,  "Disp: 14 capsule, Rfl: 0  •  cyclobenzaprine (FEXMID) 7.5 MG tablet, Take 1 tablet by mouth 3 (Three) Times a Day As Needed for Muscle Spasms., Disp: 30 tablet, Rfl: 0    Current Facility-Administered Medications:   •  triamcinolone acetonide (KENALOG-40) injection 40 mg, 40 mg, Intra-articular, Once, Fredi Ritchie MD    No results found for this or any previous visit (from the past 4032 hour(s)).      Review of Systems    Objective     /91 (BP Location: Left arm, Patient Position: Sitting, Cuff Size: Adult)   Pulse 84   Temp 96.9 °F (36.1 °C) (Infrared)   Ht 180.3 cm (71\")   Wt 109 kg (241 lb)   SpO2 97%   BMI 33.61 kg/m²     Physical Exam  Vitals and nursing note reviewed.   Constitutional:       Appearance: Normal appearance.   Cardiovascular:      Rate and Rhythm: Normal rate and regular rhythm.      Pulses: Normal pulses.   Pulmonary:      Effort: No respiratory distress.      Breath sounds: Rhonchi and rales present.      Comments: Mild exp wheeze right   Cough with inspiration then wheeze  Neurological:      Mental Status: He is alert.         Result Review :                Assessment/Plan    Diagnoses and all orders for this visit:    1. Pneumonia of both lungs due to infectious organism, unspecified part of lung (Primary)  Comments:  he is smoker. labs and testing ordered . cefdinir ordered.   Orders:  -     CBC & Differential; Future  -     Procalcitonin; Future  -     Respiratory Panel PCR w/COVID-19(SARS-CoV-2) ISAEL/ALEXIA/ELISEO/PAD/COR/MAD/ARNOLD In-House, NP Swab in UTM/VTM, 3-4 HR TAT - Swab, Nasopharynx; Future  -     Comprehensive Metabolic Panel; Future  -     XR Chest 2 View; Future    2. Tobacco abuse    Other orders  -     albuterol sulfate  (90 Base) MCG/ACT inhaler; Inhale 2 puffs Every 4 (Four) Hours As Needed for Wheezing.  Dispense: 18 g; Refill: 1  -     cefdinir (OMNICEF) 300 MG capsule; Take 1 capsule by mouth 2 (Two) Times a Day.  Dispense: 14 capsule; Refill: " 0      Patient Instructions   Go to the hospital for testing.  Start cefdinir.  Use albuterol inhaler  Stop smoking  Continue muccinex with water.   Follow up on testing  If short of air and symptoms worsen get seen.         Follow Up   Return in about 1 week (around 4/11/2022).    Patient was given instructions and counseling regarding his condition or for health maintenance advice. Please see specific information pulled into the AVS if appropriate.     Yamilka Pascual, KEILA    04/04/22

## 2022-04-05 ENCOUNTER — TELEPHONE (OUTPATIENT)
Dept: FAMILY MEDICINE CLINIC | Facility: CLINIC | Age: 62
End: 2022-04-05

## 2022-04-05 NOTE — TELEPHONE ENCOUNTER
"Pt called stating he is now coughing up a \"reddish tint\", pt seen yesterday for PNA. please advise  "

## 2022-04-11 ENCOUNTER — OFFICE VISIT (OUTPATIENT)
Dept: FAMILY MEDICINE CLINIC | Facility: CLINIC | Age: 62
End: 2022-04-11

## 2022-04-11 VITALS
DIASTOLIC BLOOD PRESSURE: 84 MMHG | WEIGHT: 240 LBS | SYSTOLIC BLOOD PRESSURE: 128 MMHG | BODY MASS INDEX: 33.6 KG/M2 | OXYGEN SATURATION: 94 % | HEIGHT: 71 IN | HEART RATE: 80 BPM | TEMPERATURE: 96.8 F

## 2022-04-11 DIAGNOSIS — Z72.0 TOBACCO ABUSE: Primary | ICD-10-CM

## 2022-04-11 DIAGNOSIS — J18.9 PNEUMONIA OF BOTH LUNGS DUE TO INFECTIOUS ORGANISM, UNSPECIFIED PART OF LUNG: ICD-10-CM

## 2022-04-11 PROCEDURE — 99213 OFFICE O/P EST LOW 20 MIN: CPT | Performed by: NURSE PRACTITIONER

## 2022-04-11 RX ORDER — CEFDINIR 300 MG/1
300 CAPSULE ORAL 2 TIMES DAILY
Qty: 6 CAPSULE | Refills: 0 | Status: SHIPPED | OUTPATIENT
Start: 2022-04-11 | End: 2022-06-09

## 2022-04-11 NOTE — PROGRESS NOTES
Subjective        Jagdish Rea is a 61 y.o. male.     Chief Complaint   Patient presents with   • Pneumonia     Follow up       History of Present Illness  Patient is here for follow up from his pneumonia. Stopped coughing up pink stuff few days ago. States feeling better. Took his last cefdinir this am.     Pneumonia: using muccinex. Continues to smoke. Took last dose cefdinir this am . Will prescribe 3 more days. Reviewed lab and chest xray. All normal. Continue albuterol as needed.             The following portions of the patient's history were reviewed and updated as appropriate: allergies, current medications, past family history, past medical history, past social history, past surgical history and problem list.      Current Outpatient Medications:   •  albuterol sulfate  (90 Base) MCG/ACT inhaler, Inhale 2 puffs Every 4 (Four) Hours As Needed for Wheezing., Disp: 18 g, Rfl: 1  •  amLODIPine (NORVASC) 10 MG tablet, TAKE 1 TABLET EVERY DAY, Disp: 90 tablet, Rfl: 0  •  cefdinir (OMNICEF) 300 MG capsule, Take 1 capsule by mouth 2 (Two) Times a Day., Disp: 6 capsule, Rfl: 0  •  doxepin (SINEquan) 10 MG capsule, TAKE 1 CAPSULE BY MOUTH EVERY NIGHT., Disp: 90 capsule, Rfl: 1  •  hydroCHLOROthiazide (MICROZIDE) 12.5 MG capsule, Take 12.5 mg by mouth As Needed., Disp: , Rfl:   •  lamoTRIgine (LaMICtal) 25 MG tablet, Take 2 tablets by mouth Every Night., Disp: 180 tablet, Rfl: 1  •  meloxicam (MOBIC) 7.5 MG tablet, TAKE ONE TABLET BY MOUTH TWICE A DAY, Disp: 180 tablet, Rfl: 0  •  metoprolol succinate XL (TOPROL-XL) 25 MG 24 hr tablet, TAKE 1 TABLET EVERY DAY, Disp: 90 tablet, Rfl: 0  •  omeprazole (priLOSEC) 20 MG capsule, Take 20 mg by mouth Daily., Disp: , Rfl:   •  pravastatin (PRAVACHOL) 40 MG tablet, Take 1 tablet by mouth Every Night., Disp: 90 tablet, Rfl: 0  •  risperiDONE (risperDAL) 1 MG tablet, Take 1 tablet by mouth Daily., Disp: 90 tablet, Rfl: 1    Current Facility-Administered Medications:   •   triamcinolone acetonide (KENALOG-40) injection 40 mg, 40 mg, Intra-articular, Once, Fredi Ritchie MD    Recent Results (from the past 4032 hour(s))   POCT SARS-CoV-2 Antigen TRIPP + Flu    Collection Time: 04/04/22 10:38 AM    Specimen: Swab   Result Value Ref Range    SARS Antigen Not Detected Not Detected    Influenza A Antigen TRIPP Not Detected     Influenza B Antigen TRIPP Not Detected     Internal Control Passed Passed    Lot Number [id number]     Expiration Date 10/28/2022    Procalcitonin    Collection Time: 04/04/22 10:50 AM    Specimen: Blood   Result Value Ref Range    Procalcitonin 0.07 0.00 - 0.25 ng/mL   Respiratory Panel PCR w/COVID-19(SARS-CoV-2) ISAEL/ALEXIA/ELISEO/PAD/COR/MAD/[facility]OLD In-House, NP Swab in UTM/VTM, 3-4 HR TAT - Swab, Nasopharynx    Collection Time: 04/04/22 10:50 AM    Specimen: Nasopharynx; Swab   Result Value Ref Range    ADENOVIRUS, PCR Not Detected Not Detected    Coronavirus 229E Not Detected Not Detected    Coronavirus HKU1 Not Detected Not Detected    Coronavirus NL63 Not Detected Not Detected    Coronavirus OC43 Not Detected Not Detected    COVID19 Not Detected Not Detected - Ref. Range    Human Metapneumovirus Not Detected Not Detected    Human Rhinovirus/Enterovirus Not Detected Not Detected    Influenza A PCR Not Detected Not Detected    Influenza B PCR Not Detected Not Detected    Parainfluenza Virus 1 Not Detected Not Detected    Parainfluenza Virus 2 Not Detected Not Detected    Parainfluenza Virus 3 Not Detected Not Detected    Parainfluenza Virus 4 Not Detected Not Detected    RSV, PCR Not Detected Not Detected    Bordetella pertussis pcr Not Detected Not Detected    Bordetella parapertussis PCR Not Detected Not Detected    Chlamydophila pneumoniae PCR Not Detected Not Detected    Mycoplasma pneumo by PCR Not Detected Not Detected   Comprehensive Metabolic Panel    Collection Time: 04/04/22 10:50 AM    Specimen: Blood   Result Value Ref Range    Glucose 98 65 - 99 mg/dL    BUN  "9 8 - 23 mg/dL    Creatinine 1.10 0.76 - 1.27 mg/dL    Sodium 141 136 - 145 mmol/L    Potassium 3.8 3.5 - 5.2 mmol/L    Chloride 105 98 - 107 mmol/L    CO2 25.5 22.0 - 29.0 mmol/L    Calcium 9.2 8.6 - 10.5 mg/dL    Total Protein 7.7 6.0 - 8.5 g/dL    Albumin 4.40 3.50 - 5.20 g/dL    ALT (SGPT) 80 (H) 1 - 41 U/L    AST (SGOT) 43 (H) 1 - 40 U/L    Alkaline Phosphatase 92 39 - 117 U/L    Total Bilirubin 0.5 0.0 - 1.2 mg/dL    Globulin 3.3 gm/dL    A/G Ratio 1.3 g/dL    BUN/Creatinine Ratio 8.2 7.0 - 25.0    Anion Gap 10.5 5.0 - 15.0 mmol/L    eGFR 76.4 >60.0 mL/min/1.73   CBC Auto Differential    Collection Time: 04/04/22 10:50 AM    Specimen: Blood   Result Value Ref Range    WBC 8.43 3.40 - 10.80 10*3/mm3    RBC 4.84 4.14 - 5.80 10*6/mm3    Hemoglobin 16.0 13.0 - 17.7 g/dL    Hematocrit 47.3 37.5 - 51.0 %    MCV 97.7 (H) 79.0 - 97.0 fL    MCH 33.1 (H) 26.6 - 33.0 pg    MCHC 33.8 31.5 - 35.7 g/dL    RDW 13.0 12.3 - 15.4 %    RDW-SD 46.1 37.0 - 54.0 fl    MPV 11.9 6.0 - 12.0 fL    Platelets 161 140 - 450 10*3/mm3    Neutrophil % 58.0 42.7 - 76.0 %    Lymphocyte % 33.1 19.6 - 45.3 %    Monocyte % 5.5 5.0 - 12.0 %    Eosinophil % 2.1 0.3 - 6.2 %    Basophil % 0.6 0.0 - 1.5 %    Immature Grans % 0.7 (H) 0.0 - 0.5 %    Neutrophils, Absolute 4.89 1.70 - 7.00 10*3/mm3    Lymphocytes, Absolute 2.79 0.70 - 3.10 10*3/mm3    Monocytes, Absolute 0.46 0.10 - 0.90 10*3/mm3    Eosinophils, Absolute 0.18 0.00 - 0.40 10*3/mm3    Basophils, Absolute 0.05 0.00 - 0.20 10*3/mm3    Immature Grans, Absolute 0.06 (H) 0.00 - 0.05 10*3/mm3    nRBC 0.0 0.0 - 0.2 /100 WBC         Review of Systems    Objective     /84 (BP Location: Left arm, Patient Position: Sitting, Cuff Size: Adult)   Pulse 80   Temp 96.8 °F (36 °C) (Infrared)   Ht 180.3 cm (71\")   Wt 109 kg (240 lb)   SpO2 94%   BMI 33.47 kg/m²     Physical Exam  Vitals and nursing note reviewed.   Constitutional:       Appearance: Normal appearance.   HENT:      Head: " Normocephalic.      Right Ear: Tympanic membrane normal.      Left Ear: Tympanic membrane normal.      Mouth/Throat:      Mouth: Mucous membranes are moist.   Eyes:      Conjunctiva/sclera: Conjunctivae normal.      Pupils: Pupils are equal, round, and reactive to light.   Cardiovascular:      Pulses: Normal pulses.   Pulmonary:      Effort: Pulmonary effort is normal.      Breath sounds: Examination of the right-upper field reveals rhonchi. Examination of the left-upper field reveals rhonchi. Rhonchi present.      Comments: Mild upper rhonchi pretty much cleared with good cough.   Abdominal:      Palpations: Abdomen is soft.   Musculoskeletal:      Cervical back: Neck supple.   Skin:     General: Skin is warm and dry.      Capillary Refill: Capillary refill takes less than 2 seconds.   Neurological:      General: No focal deficit present.      Mental Status: He is alert and oriented to person, place, and time.   Psychiatric:         Mood and Affect: Mood normal.         Behavior: Behavior normal.         Result Review :                Assessment/Plan    Diagnoses and all orders for this visit:    1. Tobacco abuse (Primary)    2. Pneumonia of both lungs due to infectious organism, unspecified part of lung    Other orders  -     cefdinir (OMNICEF) 300 MG capsule; Take 1 capsule by mouth 2 (Two) Times a Day.  Dispense: 6 capsule; Refill: 0      Patient Instructions   Complete the last 3 days of antibiotics.  Use muccinex  Use inhaler as needed.  Drink water.  Get seen if symptoms worsen.         Follow Up   Return for Next scheduled follow up.    Patient was given instructions and counseling regarding his condition or for health maintenance advice. Please see specific information pulled into the AVS if appropriate.     Yamilka Pascual, APRN    04/11/22

## 2022-04-11 NOTE — PATIENT INSTRUCTIONS
Complete the last 3 days of antibiotics.  Use muccinex  Use inhaler as needed.  Drink water.  Get seen if symptoms worsen.

## 2022-04-12 ENCOUNTER — TREATMENT (OUTPATIENT)
Dept: PHYSICAL THERAPY | Facility: CLINIC | Age: 62
End: 2022-04-12

## 2022-04-12 DIAGNOSIS — G89.29 CHRONIC MIDLINE LOW BACK PAIN WITHOUT SCIATICA: Primary | ICD-10-CM

## 2022-04-12 DIAGNOSIS — M54.50 CHRONIC MIDLINE LOW BACK PAIN WITHOUT SCIATICA: Primary | ICD-10-CM

## 2022-04-12 DIAGNOSIS — M54.50 CHRONIC MIDLINE LOW BACK PAIN, UNSPECIFIED WHETHER SCIATICA PRESENT: ICD-10-CM

## 2022-04-12 DIAGNOSIS — G89.29 CHRONIC MIDLINE LOW BACK PAIN, UNSPECIFIED WHETHER SCIATICA PRESENT: ICD-10-CM

## 2022-04-12 PROCEDURE — 97110 THERAPEUTIC EXERCISES: CPT | Performed by: PHYSICAL THERAPIST

## 2022-04-12 PROCEDURE — G0283 ELEC STIM OTHER THAN WOUND: HCPCS | Performed by: PHYSICAL THERAPIST

## 2022-04-12 PROCEDURE — 97112 NEUROMUSCULAR REEDUCATION: CPT | Performed by: PHYSICAL THERAPIST

## 2022-04-12 NOTE — PROGRESS NOTES
Physical Therapy Daily Progress Note    Patient: Jagdish Rea  : 1960  Referring practitioner: KEILA Kaur  Today's Date: 2022    VISIT#: 4    Subjective   Pt reports: pt had to cx last week due to having pneumonia. Still feels bad but didn't want to cx again today. He is on antibiotics. Has been tested a couple of time for Covid.   Presently rates pain 3/10    Objective     See Exercise, Manual, and Modality Logs for complete treatment.     Patient Education:    Assessment & Plan     Assessment    Assessment details: Good beatriz to today's rx session. Good initial respond to ES. Has info regarding purchasing a TENS unit for home use. Dec pain at conclusion of today's rx session.   He has been sick and has missed a couple of appts. Will schedule a few more appts to continue with current rx plan and progress as tolerated.           Progress per Plan of Care            Timed:         Manual Therapy:         mins  54795;     Therapeutic Exercise:    24     mins  45450;     Neuromuscular Jake:  15      mins  34059;    Therapeutic Activity:          mins  79876;     Gait Training:           mins  58458;     Ultrasound:          mins  90320;    Ionto                                   mins   67527  Self Care                            mins   80321  Canal repositioning           mins    55496    Un-Timed:  Electrical Stimulation:   15      mins  14643 ( );  Traction          mins 88166  Low Eval          Mins  65761  Mod Eval          Mins  42043  High Eval                            Mins  72421  Re-Eval                               mins  12535    Timed Treatment:   39   mins   Total Treatment:    54    mins    Baudilio Reese, PT, CLT  Physical Therapist  [place] License:  # [id number]

## 2022-04-19 ENCOUNTER — TREATMENT (OUTPATIENT)
Dept: PHYSICAL THERAPY | Facility: CLINIC | Age: 62
End: 2022-04-19

## 2022-04-19 DIAGNOSIS — M54.50 CHRONIC MIDLINE LOW BACK PAIN WITHOUT SCIATICA: Primary | ICD-10-CM

## 2022-04-19 DIAGNOSIS — G89.29 CHRONIC MIDLINE LOW BACK PAIN WITHOUT SCIATICA: Primary | ICD-10-CM

## 2022-04-19 PROCEDURE — 97110 THERAPEUTIC EXERCISES: CPT | Performed by: PHYSICAL THERAPIST

## 2022-04-19 PROCEDURE — 97112 NEUROMUSCULAR REEDUCATION: CPT | Performed by: PHYSICAL THERAPIST

## 2022-04-19 PROCEDURE — G0283 ELEC STIM OTHER THAN WOUND: HCPCS | Performed by: PHYSICAL THERAPIST

## 2022-04-19 NOTE — PROGRESS NOTES
Physical Therapy Daily Progress Note    Patient: Jagdish Rea  : 1960  Referring practitioner: KEILA Kaur  Today's Date: 2022    VISIT#: 5   visits  Exp: 6/10    Subjective   Pt reports: feels pretty good after ex session and it feels better for a while but it gradually comes back.       Objective     See Exercise, Manual, and Modality Logs for complete treatment. Continued/ progressed with there ex as noted. Concluded with ES for pain management.     Patient Education:    Assessment & Plan     Assessment    Assessment details: Good beatriz to progression of his ex program and HEP. Responding well to ES for pain management.           Progress per Plan of Care            Timed:         Manual Therapy:         mins  35638;     Therapeutic Exercise:    25     mins  97410;     Neuromuscular Jake:   15     mins  59555;    Therapeutic Activity:          mins  13796;     Gait Training:           mins  83221;     Ultrasound:          mins  77041;    Ionto                                   mins   35004  Self Care                            mins   87440  Canal repositioning           mins    67461    Un-Timed:  Electrical Stimulation:   15      mins  06508 ( );  Traction          mins 63000      Timed Treatment: 40     mins   Total Treatment:    55    mins    Baudilio Reese, PT, CLT  Physical Therapist  [place] License:  # [id number]

## 2022-04-22 ENCOUNTER — TRANSCRIBE ORDERS (OUTPATIENT)
Dept: ADMINISTRATIVE | Facility: HOSPITAL | Age: 62
End: 2022-04-22

## 2022-04-22 DIAGNOSIS — R13.10 DYSPHAGIA, UNSPECIFIED TYPE: Primary | ICD-10-CM

## 2022-04-29 ENCOUNTER — HOSPITAL ENCOUNTER (OUTPATIENT)
Dept: GENERAL RADIOLOGY | Facility: HOSPITAL | Age: 62
Discharge: HOME | End: 2022-04-29
Admitting: OTOLARYNGOLOGY

## 2022-04-29 DIAGNOSIS — R13.10 DYSPHAGIA, UNSPECIFIED TYPE: ICD-10-CM

## 2022-04-29 PROCEDURE — 74220 X-RAY XM ESOPHAGUS 1CNTRST: CPT

## 2022-04-29 PROCEDURE — A9270 NON-COVERED ITEM OR SERVICE: HCPCS | Performed by: OTOLARYNGOLOGY

## 2022-04-29 PROCEDURE — 63710000001 BARIUM SULFATE 98 % RECONSTITUTED SUSPENSION: Performed by: OTOLARYNGOLOGY

## 2022-04-29 PROCEDURE — 63710000001 BARIUM SULFATE 700 MG TABLET: Performed by: OTOLARYNGOLOGY

## 2022-04-29 PROCEDURE — 63710000001 BARIUM SULFATE 96 % RECONSTITUTED SUSPENSION: Performed by: OTOLARYNGOLOGY

## 2022-04-29 RX ADMIN — BARIUM SULFATE 135 ML: 980 POWDER, FOR SUSPENSION ORAL at 08:37

## 2022-04-29 RX ADMIN — BARIUM SULFATE 183 ML: 960 POWDER, FOR SUSPENSION ORAL at 08:36

## 2022-04-29 RX ADMIN — BARIUM SULFATE 700 MG: 700 TABLET ORAL at 08:37

## 2022-05-01 RX ORDER — MELOXICAM 7.5 MG/1
TABLET ORAL
Qty: 180 TABLET | Refills: 0 | Status: SHIPPED | OUTPATIENT
Start: 2022-05-01 | End: 2022-07-13 | Stop reason: HOSPADM

## 2022-05-19 ENCOUNTER — OFFICE (AMBULATORY)
Dept: URBAN - METROPOLITAN AREA PATHOLOGY 4 | Facility: PATHOLOGY | Age: 62
End: 2022-05-19
Payer: COMMERCIAL

## 2022-05-19 ENCOUNTER — OFFICE (AMBULATORY)
Dept: URBAN - METROPOLITAN AREA PATHOLOGY 4 | Facility: PATHOLOGY | Age: 62
End: 2022-05-19

## 2022-05-19 ENCOUNTER — ON CAMPUS - OUTPATIENT (AMBULATORY)
Dept: URBAN - METROPOLITAN AREA HOSPITAL 2 | Facility: HOSPITAL | Age: 62
End: 2022-05-19

## 2022-05-19 VITALS
SYSTOLIC BLOOD PRESSURE: 122 MMHG | SYSTOLIC BLOOD PRESSURE: 136 MMHG | DIASTOLIC BLOOD PRESSURE: 93 MMHG | HEART RATE: 78 BPM | DIASTOLIC BLOOD PRESSURE: 84 MMHG | SYSTOLIC BLOOD PRESSURE: 131 MMHG | HEART RATE: 76 BPM | SYSTOLIC BLOOD PRESSURE: 118 MMHG | OXYGEN SATURATION: 93 % | RESPIRATION RATE: 18 BRPM | SYSTOLIC BLOOD PRESSURE: 124 MMHG | TEMPERATURE: 97.3 F | DIASTOLIC BLOOD PRESSURE: 74 MMHG | OXYGEN SATURATION: 97 % | HEART RATE: 86 BPM | OXYGEN SATURATION: 98 % | WEIGHT: 249 LBS | HEART RATE: 75 BPM | RESPIRATION RATE: 16 BRPM | OXYGEN SATURATION: 96 % | DIASTOLIC BLOOD PRESSURE: 81 MMHG | HEIGHT: 71 IN | DIASTOLIC BLOOD PRESSURE: 72 MMHG

## 2022-05-19 DIAGNOSIS — K22.2 ESOPHAGEAL OBSTRUCTION: ICD-10-CM

## 2022-05-19 DIAGNOSIS — R13.10 DYSPHAGIA, UNSPECIFIED: ICD-10-CM

## 2022-05-19 DIAGNOSIS — K44.9 DIAPHRAGMATIC HERNIA WITHOUT OBSTRUCTION OR GANGRENE: ICD-10-CM

## 2022-05-19 DIAGNOSIS — K31.89 OTHER DISEASES OF STOMACH AND DUODENUM: ICD-10-CM

## 2022-05-19 LAB
GI HISTOLOGY: A. SELECT: (no result)
GI HISTOLOGY: PDF REPORT: (no result)

## 2022-05-19 PROCEDURE — 88305 TISSUE EXAM BY PATHOLOGIST: CPT | Mod: 26 | Performed by: INTERNAL MEDICINE

## 2022-05-19 PROCEDURE — 43239 EGD BIOPSY SINGLE/MULTIPLE: CPT | Performed by: INTERNAL MEDICINE

## 2022-05-19 PROCEDURE — 43450 DILATE ESOPHAGUS 1/MULT PASS: CPT | Performed by: INTERNAL MEDICINE

## 2022-06-09 ENCOUNTER — OFFICE VISIT (OUTPATIENT)
Dept: FAMILY MEDICINE CLINIC | Facility: CLINIC | Age: 62
End: 2022-06-09

## 2022-06-09 VITALS
OXYGEN SATURATION: 97 % | WEIGHT: 248 LBS | SYSTOLIC BLOOD PRESSURE: 136 MMHG | TEMPERATURE: 96.8 F | DIASTOLIC BLOOD PRESSURE: 82 MMHG | HEIGHT: 71 IN | BODY MASS INDEX: 34.72 KG/M2 | HEART RATE: 79 BPM

## 2022-06-09 DIAGNOSIS — M25.511 ACUTE PAIN OF RIGHT SHOULDER: Primary | ICD-10-CM

## 2022-06-09 DIAGNOSIS — M50.322 OTHER CERVICAL DISC DEGENERATION AT C5-C6 LEVEL: ICD-10-CM

## 2022-06-09 DIAGNOSIS — M79.2 RADICULAR PAIN IN RIGHT ARM: Chronic | ICD-10-CM

## 2022-06-09 PROCEDURE — 99214 OFFICE O/P EST MOD 30 MIN: CPT | Performed by: NURSE PRACTITIONER

## 2022-06-09 NOTE — PROGRESS NOTES
Subjective        Jagdish Rea is a 61 y.o. male.     Chief Complaint   Patient presents with   • Shoulder Pain     R shoulder pain x1 month       History of Present Illness  Right shoulder pain started 4 weeks ago. He has had surgery on this in past. He can't recall any injury. He will sometimes get a tingling down into his thumb. He said he has to strain to lift right arm then feels it in back of the shoulder..  Reviewed cervical spine film 7/2020 degenerative change at C5-6 no acute abnormality. He is on meloxicam now.   He has had surgery 8/11/2020 right shouder rotator cuff tear, right shoulder 80% supraspinatus tear and labral tear with grade 3/4 humeral head.   Shoulder arthroscopy with rotator cuff repair extensive debridement.       The following portions of the patient's history were reviewed and updated as appropriate: allergies, current medications, past family history, past medical history, past social history, past surgical history and problem list.      Current Outpatient Medications:   •  albuterol sulfate  (90 Base) MCG/ACT inhaler, Inhale 2 puffs Every 4 (Four) Hours As Needed for Wheezing., Disp: 18 g, Rfl: 1  •  amLODIPine (NORVASC) 10 MG tablet, TAKE 1 TABLET EVERY DAY, Disp: 90 tablet, Rfl: 0  •  doxepin (SINEquan) 10 MG capsule, TAKE 1 CAPSULE BY MOUTH EVERY NIGHT., Disp: 90 capsule, Rfl: 1  •  hydroCHLOROthiazide (MICROZIDE) 12.5 MG capsule, Take 12.5 mg by mouth As Needed., Disp: , Rfl:   •  lamoTRIgine (LaMICtal) 25 MG tablet, Take 2 tablets by mouth Every Night., Disp: 180 tablet, Rfl: 1  •  meloxicam (MOBIC) 7.5 MG tablet, TAKE ONE TABLET BY MOUTH TWICE A DAY, Disp: 180 tablet, Rfl: 0  •  metoprolol succinate XL (TOPROL-XL) 25 MG 24 hr tablet, TAKE 1 TABLET EVERY DAY, Disp: 90 tablet, Rfl: 0  •  omeprazole (priLOSEC) 20 MG capsule, Take 20 mg by mouth 2 (Two) Times a Day., Disp: , Rfl:   •  pravastatin (PRAVACHOL) 40 MG tablet, Take 1 tablet by mouth Every Night., Disp: 90 tablet,  Rfl: 0  •  risperiDONE (risperDAL) 1 MG tablet, Take 1 tablet by mouth Daily., Disp: 90 tablet, Rfl: 1    Current Facility-Administered Medications:   •  triamcinolone acetonide (KENALOG-40) injection 40 mg, 40 mg, Intra-articular, Once, Fredi Ritchie MD    Recent Results (from the past 4032 hour(s))   POCT SARS-CoV-2 Antigen TRIPP + Flu    Collection Time: 04/04/22 10:38 AM    Specimen: Swab   Result Value Ref Range    SARS Antigen Not Detected Not Detected    Influenza A Antigen TRIPP Not Detected     Influenza B Antigen TRIPP Not Detected     Internal Control Passed Passed    Lot Number [id number]     Expiration Date 10/28/2022    Procalcitonin    Collection Time: 04/04/22 10:50 AM    Specimen: Blood   Result Value Ref Range    Procalcitonin 0.07 0.00 - 0.25 ng/mL   Respiratory Panel PCR w/COVID-19(SARS-CoV-2) ISAEL/ALEXIA/ELISEO/PAD/COR/MAD/ARNOLD In-House, NP Swab in UTM/VTM, 3-4 HR TAT - Swab, Nasopharynx    Collection Time: 04/04/22 10:50 AM    Specimen: Nasopharynx; Swab   Result Value Ref Range    ADENOVIRUS, PCR Not Detected Not Detected    Coronavirus 229E Not Detected Not Detected    Coronavirus HKU1 Not Detected Not Detected    Coronavirus NL63 Not Detected Not Detected    Coronavirus OC43 Not Detected Not Detected    COVID19 Not Detected Not Detected - Ref. Range    Human Metapneumovirus Not Detected Not Detected    Human Rhinovirus/Enterovirus Not Detected Not Detected    Influenza A PCR Not Detected Not Detected    Influenza B PCR Not Detected Not Detected    Parainfluenza Virus 1 Not Detected Not Detected    Parainfluenza Virus 2 Not Detected Not Detected    Parainfluenza Virus 3 Not Detected Not Detected    Parainfluenza Virus 4 Not Detected Not Detected    RSV, PCR Not Detected Not Detected    Bordetella pertussis pcr Not Detected Not Detected    Bordetella parapertussis PCR Not Detected Not Detected    Chlamydophila pneumoniae PCR Not Detected Not Detected    Mycoplasma pneumo by PCR Not Detected Not  "Detected   Comprehensive Metabolic Panel    Collection Time: 04/04/22 10:50 AM    Specimen: Blood   Result Value Ref Range    Glucose 98 65 - 99 mg/dL    BUN 9 8 - 23 mg/dL    Creatinine 1.10 0.76 - 1.27 mg/dL    Sodium 141 136 - 145 mmol/L    Potassium 3.8 3.5 - 5.2 mmol/L    Chloride 105 98 - 107 mmol/L    CO2 25.5 22.0 - 29.0 mmol/L    Calcium 9.2 8.6 - 10.5 mg/dL    Total Protein 7.7 6.0 - 8.5 g/dL    Albumin 4.40 3.50 - 5.20 g/dL    ALT (SGPT) 80 (H) 1 - 41 U/L    AST (SGOT) 43 (H) 1 - 40 U/L    Alkaline Phosphatase 92 39 - 117 U/L    Total Bilirubin 0.5 0.0 - 1.2 mg/dL    Globulin 3.3 gm/dL    A/G Ratio 1.3 g/dL    BUN/Creatinine Ratio 8.2 7.0 - 25.0    Anion Gap 10.5 5.0 - 15.0 mmol/L    eGFR 76.4 >60.0 mL/min/1.73   CBC Auto Differential    Collection Time: 04/04/22 10:50 AM    Specimen: Blood   Result Value Ref Range    WBC 8.43 3.40 - 10.80 10*3/mm3    RBC 4.84 4.14 - 5.80 10*6/mm3    Hemoglobin 16.0 13.0 - 17.7 g/dL    Hematocrit 47.3 37.5 - 51.0 %    MCV 97.7 (H) 79.0 - 97.0 fL    MCH 33.1 (H) 26.6 - 33.0 pg    MCHC 33.8 31.5 - 35.7 g/dL    RDW 13.0 12.3 - 15.4 %    RDW-SD 46.1 37.0 - 54.0 fl    MPV 11.9 6.0 - 12.0 fL    Platelets 161 140 - 450 10*3/mm3    Neutrophil % 58.0 42.7 - 76.0 %    Lymphocyte % 33.1 19.6 - 45.3 %    Monocyte % 5.5 5.0 - 12.0 %    Eosinophil % 2.1 0.3 - 6.2 %    Basophil % 0.6 0.0 - 1.5 %    Immature Grans % 0.7 (H) 0.0 - 0.5 %    Neutrophils, Absolute 4.89 1.70 - 7.00 10*3/mm3    Lymphocytes, Absolute 2.79 0.70 - 3.10 10*3/mm3    Monocytes, Absolute 0.46 0.10 - 0.90 10*3/mm3    Eosinophils, Absolute 0.18 0.00 - 0.40 10*3/mm3    Basophils, Absolute 0.05 0.00 - 0.20 10*3/mm3    Immature Grans, Absolute 0.06 (H) 0.00 - 0.05 10*3/mm3    nRBC 0.0 0.0 - 0.2 /100 WBC         Review of Systems    Objective     /82 (BP Location: Left arm, Patient Position: Sitting, Cuff Size: Adult)   Pulse 79   Temp 96.8 °F (36 °C) (Infrared)   Ht 180.3 cm (71\")   Wt 112 kg (248 lb)   SpO2 " 97%   BMI 34.59 kg/m²     Physical Exam  Musculoskeletal:      Right shoulder: Tenderness present. No swelling, deformity or crepitus. Decreased range of motion. Normal strength. Normal pulse.        Arms:       Cervical back: No swelling, edema, tenderness, bony tenderness or crepitus. No pain with movement. Normal range of motion.      Comments: Tender with palpation right posterior shoulder. supraspinatous pain with palpation.   Unable extend over head right side  No pain with internal or external rotation  No rash noted.          Result Review :                Assessment & Plan    Diagnoses and all orders for this visit:    1. Acute pain of right shoulder (Primary)  Comments:  referred to ortho for evalation   Orders:  -     Cancel: Ambulatory Referral to Orthopedic Surgery  -     Ambulatory Referral to Orthopedic Surgery  -     XR Spine Cervical Complete 4 or 5 View; Future    2. Radicular pain in right arm  Comments:  c spine films completed    3. Other cervical disc degeneration at C5-C6 level  Comments:  repeat xrays due to new onset right shoulder pain and radicular paininto right thumb      Patient Instructions   Do the shoulder exercises you learned from previous .  Follow up with orth  Continue meloxicam  Follow up on xrays.       Follow Up   No follow-ups on file.    Patient was given instructions and counseling regarding his condition or for health maintenance advice. Please see specific information pulled into the AVS if appropriate.     Yamilka Pascual, APRN    06/09/22

## 2022-06-09 NOTE — PATIENT INSTRUCTIONS
Do the shoulder exercises you learned from previous .  Follow up with orth  Continue meloxicam  Follow up on xrays.   Use heat or ice to area.

## 2022-06-10 ENCOUNTER — HOSPITAL ENCOUNTER (OUTPATIENT)
Dept: GENERAL RADIOLOGY | Facility: HOSPITAL | Age: 62
Discharge: HOME | End: 2022-06-10
Admitting: NURSE PRACTITIONER

## 2022-06-10 DIAGNOSIS — M25.511 ACUTE PAIN OF RIGHT SHOULDER: ICD-10-CM

## 2022-06-10 PROCEDURE — 72050 X-RAY EXAM NECK SPINE 4/5VWS: CPT

## 2022-06-12 RX ORDER — METOPROLOL SUCCINATE 25 MG/1
TABLET, EXTENDED RELEASE ORAL
Qty: 90 TABLET | Refills: 0 | Status: SHIPPED | OUTPATIENT
Start: 2022-06-12 | End: 2023-01-17

## 2022-06-12 RX ORDER — AMLODIPINE BESYLATE 10 MG/1
TABLET ORAL
Qty: 90 TABLET | Refills: 0 | Status: SHIPPED | OUTPATIENT
Start: 2022-06-12

## 2022-07-12 ENCOUNTER — APPOINTMENT (OUTPATIENT)
Dept: NUCLEAR MEDICINE | Facility: HOSPITAL | Age: 62
End: 2022-07-12

## 2022-07-12 ENCOUNTER — APPOINTMENT (OUTPATIENT)
Dept: GENERAL RADIOLOGY | Facility: HOSPITAL | Age: 62
End: 2022-07-12

## 2022-07-12 ENCOUNTER — HOSPITAL ENCOUNTER (OUTPATIENT)
Facility: HOSPITAL | Age: 62
Discharge: HOME | End: 2022-07-13
Attending: EMERGENCY MEDICINE | Admitting: EMERGENCY MEDICINE

## 2022-07-12 DIAGNOSIS — R07.9 CHEST PAIN, UNSPECIFIED TYPE: ICD-10-CM

## 2022-07-12 DIAGNOSIS — I10 BENIGN ESSENTIAL HYPERTENSION: ICD-10-CM

## 2022-07-12 DIAGNOSIS — R94.39 ABNORMAL NUCLEAR STRESS TEST: ICD-10-CM

## 2022-07-12 DIAGNOSIS — I20.0 UNSTABLE ANGINA PECTORIS: Primary | ICD-10-CM

## 2022-07-12 DIAGNOSIS — Z72.0 TOBACCO ABUSE: ICD-10-CM

## 2022-07-12 DIAGNOSIS — M54.6 THORACIC BACK PAIN, UNSPECIFIED BACK PAIN LATERALITY, UNSPECIFIED CHRONICITY: ICD-10-CM

## 2022-07-12 DIAGNOSIS — E78.5 HYPERLIPIDEMIA, UNSPECIFIED HYPERLIPIDEMIA TYPE: ICD-10-CM

## 2022-07-12 LAB
ALBUMIN SERPL-MCNC: 4.2 G/DL (ref 3.5–5.2)
ALBUMIN/GLOB SERPL: 1.4 G/DL
ALP SERPL-CCNC: 70 U/L (ref 39–117)
ALT SERPL W P-5'-P-CCNC: 23 U/L (ref 1–41)
ANION GAP SERPL CALCULATED.3IONS-SCNC: 12 MMOL/L (ref 5–15)
APTT PPP: 26 SECONDS (ref 61–76.5)
AST SERPL-CCNC: 20 U/L (ref 1–40)
BASOPHILS # BLD AUTO: 0.1 10*3/MM3 (ref 0–0.2)
BASOPHILS NFR BLD AUTO: 1 % (ref 0–1.5)
BH CV REST NUCLEAR ISOTOPE DOSE: 10 MCI
BH CV STRESS BP STAGE 1: NORMAL
BH CV STRESS COMMENTS STAGE 1: NORMAL
BH CV STRESS DOSE REGADENOSON STAGE 1: 0.4
BH CV STRESS DURATION MIN STAGE 1: 0
BH CV STRESS DURATION SEC STAGE 1: 10
BH CV STRESS HR STAGE 1: 76
BH CV STRESS NUCLEAR ISOTOPE DOSE: 30.8 MCI
BH CV STRESS PROTOCOL 1: NORMAL
BH CV STRESS RECOVERY BP: NORMAL MMHG
BH CV STRESS RECOVERY HR: 72 BPM
BH CV STRESS STAGE 1: 1
BILIRUB SERPL-MCNC: 0.3 MG/DL (ref 0–1.2)
BUN SERPL-MCNC: 12 MG/DL (ref 8–23)
BUN/CREAT SERPL: 13.5 (ref 7–25)
CALCIUM SPEC-SCNC: 8.5 MG/DL (ref 8.6–10.5)
CHLORIDE SERPL-SCNC: 105 MMOL/L (ref 98–107)
CO2 SERPL-SCNC: 23 MMOL/L (ref 22–29)
CREAT SERPL-MCNC: 0.89 MG/DL (ref 0.76–1.27)
DEPRECATED RDW RBC AUTO: 49 FL (ref 37–54)
EGFRCR SERPLBLD CKD-EPI 2021: 97.5 ML/MIN/1.73
EOSINOPHIL # BLD AUTO: 0.2 10*3/MM3 (ref 0–0.4)
EOSINOPHIL NFR BLD AUTO: 3 % (ref 0.3–6.2)
ERYTHROCYTE [DISTWIDTH] IN BLOOD BY AUTOMATED COUNT: 14.1 % (ref 12.3–15.4)
GLOBULIN UR ELPH-MCNC: 2.9 GM/DL
GLUCOSE SERPL-MCNC: 102 MG/DL (ref 65–99)
HCT VFR BLD AUTO: 48 % (ref 37.5–51)
HGB BLD-MCNC: 15.8 G/DL (ref 13–17.7)
INR PPP: 0.97 (ref 0.93–1.1)
LIPASE SERPL-CCNC: 39 U/L (ref 13–60)
LV EF NUC BP: 71 %
LYMPHOCYTES # BLD AUTO: 2.2 10*3/MM3 (ref 0.7–3.1)
LYMPHOCYTES NFR BLD AUTO: 38.6 % (ref 19.6–45.3)
MAXIMAL PREDICTED HEART RATE: 159 BPM
MCH RBC QN AUTO: 32.5 PG (ref 26.6–33)
MCHC RBC AUTO-ENTMCNC: 33 G/DL (ref 31.5–35.7)
MCV RBC AUTO: 98.7 FL (ref 79–97)
MONOCYTES # BLD AUTO: 0.4 10*3/MM3 (ref 0.1–0.9)
MONOCYTES NFR BLD AUTO: 6.4 % (ref 5–12)
NEUTROPHILS NFR BLD AUTO: 2.9 10*3/MM3 (ref 1.7–7)
NEUTROPHILS NFR BLD AUTO: 51 % (ref 42.7–76)
NRBC BLD AUTO-RTO: 0.1 /100 WBC (ref 0–0.2)
NT-PROBNP SERPL-MCNC: 63.9 PG/ML (ref 0–900)
PERCENT MAX PREDICTED HR: 54.72 %
PLATELET # BLD AUTO: 142 10*3/MM3 (ref 140–450)
PMV BLD AUTO: 9.4 FL (ref 6–12)
POTASSIUM SERPL-SCNC: 4.2 MMOL/L (ref 3.5–5.2)
PROT SERPL-MCNC: 7.1 G/DL (ref 6–8.5)
PROTHROMBIN TIME: 10 SECONDS (ref 9.6–11.7)
RBC # BLD AUTO: 4.86 10*6/MM3 (ref 4.14–5.8)
SARS-COV-2 RNA PNL SPEC NAA+PROBE: NOT DETECTED
SODIUM SERPL-SCNC: 140 MMOL/L (ref 136–145)
STRESS BASELINE BP: NORMAL MMHG
STRESS BASELINE HR: 63 BPM
STRESS PERCENT HR: 64 %
STRESS POST PEAK BP: NORMAL MMHG
STRESS POST PEAK HR: 87 BPM
STRESS TARGET HR: 135 BPM
TROPONIN T SERPL-MCNC: <0.01 NG/ML (ref 0–0.03)
TROPONIN T SERPL-MCNC: <0.01 NG/ML (ref 0–0.03)
WBC NRBC COR # BLD: 5.7 10*3/MM3 (ref 3.4–10.8)

## 2022-07-12 PROCEDURE — 93005 ELECTROCARDIOGRAM TRACING: CPT

## 2022-07-12 PROCEDURE — 83880 ASSAY OF NATRIURETIC PEPTIDE: CPT | Performed by: NURSE PRACTITIONER

## 2022-07-12 PROCEDURE — 96374 THER/PROPH/DIAG INJ IV PUSH: CPT

## 2022-07-12 PROCEDURE — 93005 ELECTROCARDIOGRAM TRACING: CPT | Performed by: EMERGENCY MEDICINE

## 2022-07-12 PROCEDURE — 93017 CV STRESS TEST TRACING ONLY: CPT

## 2022-07-12 PROCEDURE — G0378 HOSPITAL OBSERVATION PER HR: HCPCS

## 2022-07-12 PROCEDURE — 99204 OFFICE O/P NEW MOD 45 MIN: CPT | Performed by: INTERNAL MEDICINE

## 2022-07-12 PROCEDURE — A9502 TC99M TETROFOSMIN: HCPCS | Performed by: EMERGENCY MEDICINE

## 2022-07-12 PROCEDURE — 78452 HT MUSCLE IMAGE SPECT MULT: CPT

## 2022-07-12 PROCEDURE — 83690 ASSAY OF LIPASE: CPT | Performed by: NURSE PRACTITIONER

## 2022-07-12 PROCEDURE — 72072 X-RAY EXAM THORAC SPINE 3VWS: CPT

## 2022-07-12 PROCEDURE — 84484 ASSAY OF TROPONIN QUANT: CPT | Performed by: EMERGENCY MEDICINE

## 2022-07-12 PROCEDURE — C9803 HOPD COVID-19 SPEC COLLECT: HCPCS

## 2022-07-12 PROCEDURE — 85025 COMPLETE CBC W/AUTO DIFF WBC: CPT | Performed by: NURSE PRACTITIONER

## 2022-07-12 PROCEDURE — 85610 PROTHROMBIN TIME: CPT | Performed by: NURSE PRACTITIONER

## 2022-07-12 PROCEDURE — 71045 X-RAY EXAM CHEST 1 VIEW: CPT

## 2022-07-12 PROCEDURE — 25010000002 REGADENOSON 0.4 MG/5ML SOLUTION: Performed by: EMERGENCY MEDICINE

## 2022-07-12 PROCEDURE — 87635 SARS-COV-2 COVID-19 AMP PRB: CPT | Performed by: EMERGENCY MEDICINE

## 2022-07-12 PROCEDURE — 25010000002 MORPHINE PER 10 MG: Performed by: NURSE PRACTITIONER

## 2022-07-12 PROCEDURE — 85730 THROMBOPLASTIN TIME PARTIAL: CPT | Performed by: NURSE PRACTITIONER

## 2022-07-12 PROCEDURE — 84484 ASSAY OF TROPONIN QUANT: CPT | Performed by: NURSE PRACTITIONER

## 2022-07-12 PROCEDURE — 78452 HT MUSCLE IMAGE SPECT MULT: CPT | Performed by: INTERNAL MEDICINE

## 2022-07-12 PROCEDURE — 93018 CV STRESS TEST I&R ONLY: CPT | Performed by: INTERNAL MEDICINE

## 2022-07-12 PROCEDURE — 80053 COMPREHEN METABOLIC PANEL: CPT | Performed by: NURSE PRACTITIONER

## 2022-07-12 PROCEDURE — 93016 CV STRESS TEST SUPVJ ONLY: CPT | Performed by: NURSE PRACTITIONER

## 2022-07-12 PROCEDURE — 0 TECHNETIUM TETROFOSMIN KIT: Performed by: EMERGENCY MEDICINE

## 2022-07-12 PROCEDURE — 99285 EMERGENCY DEPT VISIT HI MDM: CPT

## 2022-07-12 RX ORDER — BISACODYL 10 MG
10 SUPPOSITORY, RECTAL RECTAL DAILY PRN
Status: DISCONTINUED | OUTPATIENT
Start: 2022-07-12 | End: 2022-07-13 | Stop reason: HOSPADM

## 2022-07-12 RX ORDER — ALBUTEROL SULFATE 2.5 MG/3ML
2.5 SOLUTION RESPIRATORY (INHALATION) EVERY 6 HOURS PRN
Refills: 1 | Status: DISCONTINUED | OUTPATIENT
Start: 2022-07-12 | End: 2022-07-13 | Stop reason: HOSPADM

## 2022-07-12 RX ORDER — AMOXICILLIN 250 MG
2 CAPSULE ORAL 2 TIMES DAILY
Status: DISCONTINUED | OUTPATIENT
Start: 2022-07-12 | End: 2022-07-13 | Stop reason: HOSPADM

## 2022-07-12 RX ORDER — ONDANSETRON 4 MG/1
4 TABLET, FILM COATED ORAL EVERY 6 HOURS PRN
Status: DISCONTINUED | OUTPATIENT
Start: 2022-07-12 | End: 2022-07-13 | Stop reason: HOSPADM

## 2022-07-12 RX ORDER — SODIUM CHLORIDE 0.9 % (FLUSH) 0.9 %
10 SYRINGE (ML) INJECTION EVERY 12 HOURS SCHEDULED
Status: DISCONTINUED | OUTPATIENT
Start: 2022-07-12 | End: 2022-07-13 | Stop reason: HOSPADM

## 2022-07-12 RX ORDER — DOXEPIN HYDROCHLORIDE 10 MG/1
10 CAPSULE ORAL NIGHTLY
Status: DISCONTINUED | OUTPATIENT
Start: 2022-07-12 | End: 2022-07-13 | Stop reason: HOSPADM

## 2022-07-12 RX ORDER — DIPHENHYDRAMINE HYDROCHLORIDE 50 MG/ML
50 INJECTION INTRAMUSCULAR; INTRAVENOUS ONCE
Status: CANCELLED | OUTPATIENT
Start: 2022-07-12 | End: 2022-07-12

## 2022-07-12 RX ORDER — AMLODIPINE BESYLATE 5 MG/1
10 TABLET ORAL ONCE
Status: COMPLETED | OUTPATIENT
Start: 2022-07-12 | End: 2022-07-12

## 2022-07-12 RX ORDER — METOPROLOL SUCCINATE 25 MG/1
25 TABLET, EXTENDED RELEASE ORAL DAILY
Status: DISCONTINUED | OUTPATIENT
Start: 2022-07-13 | End: 2022-07-13 | Stop reason: HOSPADM

## 2022-07-12 RX ORDER — CHOLECALCIFEROL (VITAMIN D3) 125 MCG
5 CAPSULE ORAL NIGHTLY PRN
Status: DISCONTINUED | OUTPATIENT
Start: 2022-07-12 | End: 2022-07-13 | Stop reason: HOSPADM

## 2022-07-12 RX ORDER — BISACODYL 5 MG/1
5 TABLET, DELAYED RELEASE ORAL DAILY PRN
Status: DISCONTINUED | OUTPATIENT
Start: 2022-07-12 | End: 2022-07-13 | Stop reason: HOSPADM

## 2022-07-12 RX ORDER — POLYETHYLENE GLYCOL 3350 17 G/17G
17 POWDER, FOR SOLUTION ORAL DAILY PRN
Status: DISCONTINUED | OUTPATIENT
Start: 2022-07-12 | End: 2022-07-13 | Stop reason: HOSPADM

## 2022-07-12 RX ORDER — NITROGLYCERIN 0.4 MG/1
0.4 TABLET SUBLINGUAL
Status: DISCONTINUED | OUTPATIENT
Start: 2022-07-12 | End: 2022-07-13 | Stop reason: HOSPADM

## 2022-07-12 RX ORDER — ASPIRIN 81 MG/1
324 TABLET, CHEWABLE ORAL ONCE
Status: COMPLETED | OUTPATIENT
Start: 2022-07-12 | End: 2022-07-12

## 2022-07-12 RX ORDER — ONDANSETRON 2 MG/ML
4 INJECTION INTRAMUSCULAR; INTRAVENOUS EVERY 6 HOURS PRN
Status: DISCONTINUED | OUTPATIENT
Start: 2022-07-12 | End: 2022-07-13 | Stop reason: HOSPADM

## 2022-07-12 RX ORDER — ONDANSETRON 2 MG/ML
4 INJECTION INTRAMUSCULAR; INTRAVENOUS EVERY 6 HOURS PRN
Status: DISCONTINUED | OUTPATIENT
Start: 2022-07-12 | End: 2022-07-12

## 2022-07-12 RX ORDER — SODIUM CHLORIDE 0.9 % (FLUSH) 0.9 %
10 SYRINGE (ML) INJECTION AS NEEDED
Status: DISCONTINUED | OUTPATIENT
Start: 2022-07-12 | End: 2022-07-13 | Stop reason: HOSPADM

## 2022-07-12 RX ORDER — RISPERIDONE 1 MG/1
1 TABLET ORAL DAILY
Status: DISCONTINUED | OUTPATIENT
Start: 2022-07-12 | End: 2022-07-13 | Stop reason: HOSPADM

## 2022-07-12 RX ORDER — HYDROCHLOROTHIAZIDE 25 MG/1
25 TABLET ORAL DAILY
Status: DISCONTINUED | OUTPATIENT
Start: 2022-07-13 | End: 2022-07-13 | Stop reason: HOSPADM

## 2022-07-12 RX ORDER — ATORVASTATIN CALCIUM 10 MG/1
10 TABLET, FILM COATED ORAL DAILY
Refills: 0 | Status: DISCONTINUED | OUTPATIENT
Start: 2022-07-12 | End: 2022-07-12

## 2022-07-12 RX ORDER — LAMOTRIGINE 25 MG/1
50 TABLET ORAL NIGHTLY
Status: DISCONTINUED | OUTPATIENT
Start: 2022-07-12 | End: 2022-07-13 | Stop reason: HOSPADM

## 2022-07-12 RX ORDER — AMLODIPINE BESYLATE 5 MG/1
10 TABLET ORAL DAILY
Status: DISCONTINUED | OUTPATIENT
Start: 2022-07-13 | End: 2022-07-13 | Stop reason: HOSPADM

## 2022-07-12 RX ADMIN — RISPERIDONE 1 MG: 1 TABLET ORAL at 18:17

## 2022-07-12 RX ADMIN — TETROFOSMIN 1 DOSE: 1.38 INJECTION, POWDER, LYOPHILIZED, FOR SOLUTION INTRAVENOUS at 12:41

## 2022-07-12 RX ADMIN — DOXEPIN HYDROCHLORIDE 10 MG: 10 CAPSULE ORAL at 21:22

## 2022-07-12 RX ADMIN — Medication 10 ML: at 21:23

## 2022-07-12 RX ADMIN — AMLODIPINE BESYLATE 10 MG: 5 TABLET ORAL at 07:32

## 2022-07-12 RX ADMIN — MORPHINE SULFATE 4 MG: 4 INJECTION INTRAVENOUS at 07:38

## 2022-07-12 RX ADMIN — LAMOTRIGINE 50 MG: 25 TABLET ORAL at 21:22

## 2022-07-12 RX ADMIN — ASPIRIN 324 MG: 81 TABLET, CHEWABLE ORAL at 07:32

## 2022-07-12 RX ADMIN — REGADENOSON 0.4 MG: 0.08 INJECTION, SOLUTION INTRAVENOUS at 13:45

## 2022-07-12 RX ADMIN — TETROFOSMIN 1 DOSE: 1.38 INJECTION, POWDER, LYOPHILIZED, FOR SOLUTION INTRAVENOUS at 13:45

## 2022-07-12 RX ADMIN — METOPROLOL TARTRATE 25 MG: 25 TABLET, FILM COATED ORAL at 07:32

## 2022-07-12 RX ADMIN — SENNOSIDES AND DOCUSATE SODIUM 2 TABLET: 50; 8.6 TABLET ORAL at 21:22

## 2022-07-12 NOTE — CONSULTS
HP      Name: Jagdish Rea ADMIT: 2022   : 1960  PCP: Yamilka Pascual APRN    MRN: [id number] LOS: 0 days   AGE/SEX: 61 y.o. male  ROOM: [facility]/1     Chief Complaint   Patient presents with   • Chest Pain       Subjective        History of present illness  Jagdish Rea is a 61-year-old male patient with no prior history of coronary artery disease, he does have hypertension dyslipidemia, presented to the hospital with complaints of chest tightness.  Symptoms occurred this morning he felt as pressure in his chest which was severe for about 10 minutes and then it subsided.  Patient has never experienced chest pain like that before and therefore he came to the hospital for further evaluation.  When I interviewed the patient the pain had largely subsided.    Past Medical History:   Diagnosis Date   • Allergic    • Anxiety    • Back pain    • COPD (chronic obstructive pulmonary disease) (HCC)    • GERD (gastroesophageal reflux disease)    • Hyperlipidemia    • Hypertension    • PTSD (post-traumatic stress disorder)    • Thoracic disc herniation    • Vitamin B12 deficiency      Past Surgical History:   Procedure Laterality Date   • ENDOSCOPY     • HERNIA REPAIR     • KNEE ARTHROPLASTY      x2   • SHOULDER ARTHROSCOPY W/ ROTATOR CUFF REPAIR Right 2020    Procedure: SHOULDER ARTHROSCOPY WITH ROTATOR CUFF REPAIR, extensive debridement;  Surgeon: Fredi Ritchie MD;  Location: [facility];  Service: Orthopedics;  Laterality: Right;   • TONSILLECTOMY       Family History   Problem Relation Age of Onset   • Heart disease Mother      Social History     Tobacco Use   • Smoking status: Current Every Day Smoker     Packs/day: 1.00     Years: 47.00     Pack years: 47.00     Types: Cigarettes     Start date: 1973   • Smokeless tobacco: Never Used   Vaping Use   • Vaping Use: Never used   Substance Use Topics   • Alcohol use: No   • Drug use: No     Facility-Administered Medications Prior to  Admission   Medication Dose Route Frequency Provider Last Rate Last Admin   • triamcinolone acetonide (KENALOG-40) injection 40 mg  40 mg Intra-articular Once Fredi Ritchie MD         Medications Prior to Admission   Medication Sig Dispense Refill Last Dose   • albuterol sulfate  (90 Base) MCG/ACT inhaler Inhale 2 puffs Every 4 (Four) Hours As Needed for Wheezing. 18 g 1 Past Month at Unknown time   • amLODIPine (NORVASC) 10 MG tablet TAKE 1 TABLET EVERY DAY 90 tablet 0 7/12/2022 at Unknown time   • doxepin (SINEquan) 10 MG capsule TAKE 1 CAPSULE BY MOUTH EVERY NIGHT. 90 capsule 1 7/11/2022 at Unknown time   • hydroCHLOROthiazide (MICROZIDE) 12.5 MG capsule Take 12.5 mg by mouth As Needed.   Past Month at Unknown time   • lamoTRIgine (LaMICtal) 25 MG tablet Take 2 tablets by mouth Every Night. 180 tablet 1 7/11/2022 at Unknown time   • meloxicam (MOBIC) 7.5 MG tablet TAKE ONE TABLET BY MOUTH TWICE A DAY (Patient taking differently: 15 mg.) 180 tablet 0 7/11/2022 at Unknown time   • metoprolol succinate XL (TOPROL-XL) 25 MG 24 hr tablet TAKE 1 TABLET EVERY DAY 90 tablet 0 7/12/2022 at Unknown time   • omeprazole (priLOSEC) 20 MG capsule Take 20 mg by mouth 2 (Two) Times a Day.   7/11/2022 at Unknown time   • pravastatin (PRAVACHOL) 40 MG tablet Take 1 tablet by mouth Every Night. 90 tablet 0 7/11/2022 at Unknown time   • risperiDONE (risperDAL) 1 MG tablet Take 1 tablet by mouth Daily. 90 tablet 1 7/11/2022 at Unknown time     Allergies:  Atorvastatin and Lisinopril    Review of systems    Constitutional: Negative.    Respiratory and cardiovascular: As detailed in HPI section.  Gastrointestinal: Negative for constipation, nausea and vomiting negative for abdominal distention, abdominal pain and diarrhea.   Genitourinary: Negative for difficulty urinating and flank pain.   Musculoskeletal: Negative for arthralgias, joint swelling and myalgias.   Skin: Negative for color change, rash and wound.    Neurological: Negative for dizziness, syncope, weakness and headaches.   Hematological: Negative for adenopathy.   Psychiatric/Behavioral: Negative for confusion.   All other systems reviewed and are negative.       Physical Exam  VITALS REVIEWED    General:      well developed, in no acute distress.    Head:      normocephalic and atraumatic.    Eyes:      PERRL/EOM intact, conjunctiva and sclera clear with out nystagmus.    Neck:      no masses, thyromegaly,  trachea central with normal respiratory effort and PMI displaced laterally  Lungs:      Clear to auscultation bilaterally  Heart:       Regular rate and rhythm  Msk:      no deformity or scoliosis noted of thoracic or lumbar spine.    Pulses:      pulses normal in all 4 extremities.    Extremities:       No lower extremity edema  Neurologic:      no focal deficits.   alert oriented x3  Skin:      intact without lesions or rashes.    Psych:      alert and cooperative; normal mood and affect; normal attention span and concentration.      Result Review :               Pertinent cardiac workup    1. Stress test 7/12/2022 small to medium sized ischemia in the inferior and inferolateral wall, ejection fraction 70%  2. EKG 7/12/2022 sinus rhythm normal EKG      Assessment and Plan         Chest pain      Jagdish Rea is a 61-year-old male patient who presented with chest pain with typical features of angina, felt as heaviness in his chest.  The pain occurred while he was at rest or doing minor activity.  He ruled out for myocardial infarction with negative troponin and negative EKG, however the stress test was abnormal suggestive of ischemia in the inferior and inferolateral wall.  Patient will therefore benefit from a heart catheterization to assess his coronary perfusion with intent for revascularization if needed, which will be performed tomorrow.  Patient is agreeable with the plan.        No follow-ups on file.  Patient was given instructions and counseling  regarding his condition or for health maintenance advice. Please see specific information pulled into the AVS if appropriate.

## 2022-07-12 NOTE — ED PROVIDER NOTES
Subjective   Chief complaint: Chest pain      Context: Patient is a 61-year-old male who comes in by EMS with complaints of chest pain and some difficulty catching his breath.  He states his pain is sharp substernal nonradiating.  States he had a fall a couple weeks ago landing on his right side up against the couch but denies any other trauma or injury.  No rash.  He denies any cough congestion or fever.  No unilateral leg swelling recent trauma surgery immobilization prior history of DVT PE or exogenous hormone use.  He states he does sleep on a wedge pillow due to his reflux symptoms but states these have not been worse lately.  No reported melena hematochezia.  He states he has a family history positive for his mother having of valve replaced otherwise no other history of CVD.  Had a stress test in 2019 but denies any history of heart catheterization and does not currently follow with cardiology.  He is currently being evaluated for some cervical radiculopathy and had imaging done approximately a month ago the symptoms are baseline.  Denies any other paresthesias weakness saddle anesthesia bowel or bladder incontinence or retention.  He is a smoker but no other reported IVDA recreational drug use.    Location: Chest  Duration: This morning  Timing: Waxes and wanes  Quality/Severity: Sharp  Modifying factors: Worse and reproducible with range of motion  Associated symptoms: Denies any associated nausea or diaphoresis        Additional hx provided by: Self    PCP: [facility]               Review of Systems   Constitutional: Negative for fever.   HENT: Negative.    Eyes: Negative for visual disturbance.   Respiratory: Negative for cough.    Cardiovascular: Negative for palpitations and leg swelling.   Gastrointestinal: Negative.    Genitourinary: Negative.    Musculoskeletal: Positive for back pain and neck pain.   Skin: Negative for rash.   Allergic/Immunologic: Negative for immunocompromised state.    Neurological: Negative for weakness.   Hematological: Does not bruise/bleed easily.   Psychiatric/Behavioral: Negative for confusion.       Past Medical History:   Diagnosis Date   • Allergic    • Anxiety    • Back pain    • COPD (chronic obstructive pulmonary disease) (HCC)    • GERD (gastroesophageal reflux disease)    • Hyperlipidemia    • Hypertension    • PTSD (post-traumatic stress disorder)    • Thoracic disc herniation    • Vitamin B12 deficiency        Allergies   Allergen Reactions   • Atorvastatin Swelling   • Lisinopril Unknown - High Severity     Facial paralysis        Past Surgical History:   Procedure Laterality Date   • ENDOSCOPY     • HERNIA REPAIR     • KNEE ARTHROPLASTY      x2   • SHOULDER ARTHROSCOPY W/ ROTATOR CUFF REPAIR Right 08/11/2020    Procedure: SHOULDER ARTHROSCOPY WITH ROTATOR CUFF REPAIR, extensive debridement;  Surgeon: Fredi Ritchie MD;  Location: [facility] MAIN OR;  Service: Orthopedics;  Laterality: Right;   • TONSILLECTOMY         Family History   Problem Relation Age of Onset   • Heart disease Mother        Social History     Socioeconomic History   • Marital status:    Tobacco Use   • Smoking status: Current Every Day Smoker     Packs/day: 1.00     Years: 47.00     Pack years: 47.00     Types: Cigarettes     Start date: 1/9/1973   • Smokeless tobacco: Never Used   Vaping Use   • Vaping Use: Never used   Substance and Sexual Activity   • Alcohol use: No   • Drug use: No   • Sexual activity: Defer           Objective   Physical Exam     Vital signs and traige nurse note reviewed.  Constitutional:  Awake, alert; well developed and well nourished.  Careful and uncomfortable but nontoxic in appearance, the patient is examined in hospital gown.  HEENT:  Normocephalic, atraumatic; pupils   with intact EOM; oropharynx is pink and moist without edema or erythema.  Poor dentition  Neck: Supple, full range of motion without pain;    Cardiovascular: Regular rate and  rhythm, normal S1-S2. .  Pulmonary: Respiratory effort regular, nonlabored; breath sounds CTA without wheezing or rhonchi.  Abdomen: Soft, nontender, nondistended with normoactive bowel sounds; no rebound or guarding.    Musculoskeletal: Independent range of motion of all extremities, no palpable tenderness or edema.   Back:  Spine is midline and without midline tenderness on palpation of cervical, and lumbar spine there are some midline tenderness over the T-spine without any bony step-off crepitus ecchymosis or obvious signs of trauma or infection-; paraspinal musculature is nontender and without soft tissue swelling, overlying ecchymosis or erythema. ROM is without pain,  Distal muscle strength is 5/5.  Neuro: Alert oriented x3, speech is clear and appropriate.  , negative straight leg raise BLE, strong and equal dorsiflexion of bilateral great toes L4, L5, S1 motor sensory intact.  Skin: No rash        Procedures      EKG viewed by me and interpreted by Dr. Yi, sinus rhythm rate of 81 without acute ST depression elevation or ectopy  comparison: 8/20/2020: Sinus rhythm rate of 73                  ED Course      Labs Reviewed   COMPREHENSIVE METABOLIC PANEL - Abnormal; Notable for the following components:       Result Value    Glucose 102 (*)     Calcium 8.5 (*)     All other components within normal limits    Narrative:     GFR Normal >60  Chronic Kidney Disease <60  Kidney Failure <15     APTT - Abnormal; Notable for the following components:    PTT 26.0 (*)     All other components within normal limits   CBC WITH AUTO DIFFERENTIAL - Abnormal; Notable for the following components:    MCV 98.7 (*)     All other components within normal limits   PROTIME-INR - Normal   LIPASE - Normal   TROPONIN (IN-HOUSE) - Normal    Narrative:     Troponin T Reference Range:  <= 0.03 ng/mL-   Negative for AMI  >0.03 ng/mL-     Abnormal for myocardial necrosis.  Clinicians would have to utilize clinical acumen, EKG, Troponin  and serial changes to determine if it is an Acute Myocardial Infarction or myocardial injury due to an underlying chronic condition.       Results may be falsely decreased if patient taking Biotin.     BNP (IN-HOUSE) - Normal    Narrative:     Among patients with dyspnea, NT-proBNP is highly sensitive for the detection of acute congestive heart failure. In addition NT-proBNP of <300 pg/ml effectively rules out acute congestive heart failure with 99% negative predictive value.    Results may be falsely decreased if patient taking Biotin.     COVID PRE-OP / PRE-PROCEDURE SCREENING ORDER (NO ISOLATION)    Narrative:     The following orders were created for panel order COVID PRE-OP / PRE-PROCEDURE SCREENING ORDER (NO ISOLATION) - Swab, Nasopharynx.  Procedure                               Abnormality         Status                     ---------                               -----------         ------                     COVID-19,CEPHEID/ABHIJIT,CO...[[id number]]                                                   Please view results for these tests on the individual orders.   COVID-19,CEPHEID/ABHIJIT,COR/ELISEO/PAD/COLETTE IN-HOUSE,NP SWAB IN TRANSPORT MEDIA 3-4 HR TAT, RT-PCR   TROPONIN (IN-HOUSE)   CBC AND DIFFERENTIAL    Narrative:     The following orders were created for panel order CBC & Differential.  Procedure                               Abnormality         Status                     ---------                               -----------         ------                     CBC Auto Differential[[id number]]        Abnormal            Final result                 Please view results for these tests on the individual orders.     Medications   sodium chloride 0.9 % flush 10 mL (has no administration in time range)   ondansetron (ZOFRAN) injection 4 mg (has no administration in time range)   aspirin chewable tablet 324 mg (324 mg Oral Given 7/12/22 0732)   morphine injection 4 mg (4 mg Intravenous Given 7/12/22 7938)   amLODIPine  (NORVASC) tablet 10 mg (10 mg Oral Given 7/12/22 0732)   metoprolol tartrate (LOPRESSOR) tablet 25 mg (25 mg Oral Given 7/12/22 0732)     XR Spine Thoracic 3 View    Result Date: 7/12/2022  Examination appears within normal limits.  Electronically Signed By-Williams Mora MD On:7/12/2022 8:17 AM This report was finalized on [id number] by  Williams Mora MD.    XR Chest 1 View    Result Date: 7/12/2022  No acute cardiopulmonary process identified.  Electronically Signed By-Williams Mora MD On:7/12/2022 8:18 AM This report was finalized on [id number] by  Williams Mora MD.    Prior to Admission medications    Medication Sig Start Date End Date Taking? Authorizing Provider   albuterol sulfate  (90 Base) MCG/ACT inhaler Inhale 2 puffs Every 4 (Four) Hours As Needed for Wheezing. 4/4/22   Yamilka Pascual APRN   amLODIPine (NORVASC) 10 MG tablet TAKE 1 TABLET EVERY DAY 6/12/22   Yamilka Pascual APRN   doxepin (SINEquan) 10 MG capsule TAKE 1 CAPSULE BY MOUTH EVERY NIGHT. 3/10/22   Yamilka Pascual APRN   hydroCHLOROthiazide (MICROZIDE) 12.5 MG capsule Take 12.5 mg by mouth As Needed.    Provider, MD Sulaiman   lamoTRIgine (LaMICtal) 25 MG tablet Take 2 tablets by mouth Every Night. 12/1/20   Yamilka Pascual APRN   meloxicam (MOBIC) 7.5 MG tablet TAKE ONE TABLET BY MOUTH TWICE A DAY 5/1/22   Yamilka Pascual APRN   metoprolol succinate XL (TOPROL-XL) 25 MG 24 hr tablet TAKE 1 TABLET EVERY DAY 6/12/22   Yamilka Pascual APRN   omeprazole (priLOSEC) 20 MG capsule Take 20 mg by mouth 2 (Two) Times a Day.    ProviderSulaiman MD   pravastatin (PRAVACHOL) 40 MG tablet Take 1 tablet by mouth Every Night. 10/25/21   Yamilka Pascual APRN   risperiDONE (risperDAL) 1 MG tablet Take 1 tablet by mouth Daily. 12/1/20   Pascual, Yamilka K, APRN                                          MDM  Number of Diagnoses or Management Options  Chest pain, unspecified type  Thoracic back pain, unspecified back pain  "laterality, unspecified chronicity  Diagnosis management comments: /88   Pulse 64   Temp 97.4 °F (36.3 °C) (Oral)   Resp 15   Ht 180.3 cm (71\")   Wt 109 kg (240 lb)   SpO2 94%   BMI 33.47 kg/m²      Chart review: Stress test 2019 negative      Comorbidities:  has a past medical history of Allergic, Anxiety, Back pain, COPD (chronic obstructive pulmonary disease) ([facility]), GERD (gastroesophageal reflux disease), Hyperlipidemia, Hypertension, PTSD (post-traumatic stress disorder), Thoracic disc herniation, and Vitamin B12 deficiency.  Differentials: STEMI non-STEMI esophagitis radiculopathy not all inclusive of differentials considered  Radiology interpretation:  X-rays reviewed by me and interpreted by radiologist,   XR Spine Thoracic 3 View    Result Date: 7/12/2022  Examination appears within normal limits.  Electronically Signed By-Williams Mora MD On:7/12/2022 8:17 AM This report was finalized on [id number] by  Williams Mora MD.    XR Chest 1 View    Result Date: 7/12/2022  No acute cardiopulmonary process identified.  Electronically Signed By-Williams Mora MD On:7/12/2022 8:18 AM This report was finalized on [id number] by  Williams Mora MD.    Lab interpretation:  Labs viewed by me significant for, glucose 102    Appropriate PPE worn during exam.  Patient was given aspirin and Zofran and morphine.  On reexam pain has significantly improved.  Heart score 3    i discussed findings with patient who voices understanding of being placed in the observation unit for serial troponins and cardiac rule out although I believe his symptoms could likely be due to radiculopathy.  There are no cauda equina symptoms on physical exam or HPI and further imaging will be deferred at this time.    This document is intended for medical expert use only. Reading of this document by patients and/or patient's family without participating medical staff guidance may result in misinterpretation and unintended " morbidity.  Any interpretation of such data is the responsibility of the patient and/or family member responsible for the patient in concert with their primary or specialist providers, not to be left for sources of online searches such as [employer], [employer] or similar queries. Relying on these approaches to knowledge may result in misinterpretation, misguided goals of care and even death should patients or family members try recommendations outside of the realm of professional medical care in a supervised inpatient environment.       Discussed with Jessenia ANDRES for OBS;  Discussed with Dr. Yip      Final diagnoses:   Chest pain, unspecified type   Thoracic back pain, unspecified back pain laterality, unspecified chronicity       ED Disposition  ED Disposition     ED Disposition   Decision to Admit    Condition   --    Comment   --             No follow-up provider specified.       Medication List      No changes were made to your prescriptions during this visit.          Roberta Jones APRN  07/12/22 0838       Roberta Jones APRN  07/12/22 0855

## 2022-07-12 NOTE — PLAN OF CARE
Goal Outcome Evaluation:  Plan of Care Reviewed With: patient        Progress: no change  Outcome Evaluation: Patient new admit with chest pain. Myoview today. Will monitor.

## 2022-07-12 NOTE — H&P
Counts include 234 beds at the [facility] H&P    Patient Name: Jagdish Rea  : 1960  MRN: [id number]  Primary Care Physician: Yamilka Pascual APRN  Date of admission: 2022     Patient Care Team:  Yamilka Pascual APRN as PCP - General (Nurse Practitioner)          Subjective   History Present Illness     Chief Complaint:   Chief Complaint   Patient presents with   • Chest Pain     Chest pain  Mr. Rea is a 61 y.o.  presents to [facility] complaining of chest pain      History of Present Illness    ED 22: Patient is a 61-year-old male who comes in by EMS with complaints of chest pain and some difficulty catching his breath.  He states his pain is sharp substernal nonradiating.  States he had a fall a couple weeks ago landing on his right side up against the couch but denies any other trauma or injury.  No rash.  He denies any cough congestion or fever.  No unilateral leg swelling recent trauma surgery immobilization prior history of DVT PE or exogenous hormone use.  He states he does sleep on a wedge pillow due to his reflux symptoms but states these have not been worse lately.  No reported melena hematochezia.  He states he has a family history positive for his mother having of valve replaced otherwise no other history of CVD.  Had a stress test in 2019 but denies any history of heart catheterization and does not currently follow with cardiology.  He is currently being evaluated for some cervical radiculopathy and had imaging done approximately a month ago the symptoms are baseline.  Denies any other paresthesias weakness saddle anesthesia bowel or bladder incontinence or retention.  He is a smoker but no other reported IVDA recreational drug use.    OBS 22: Patient is a 61-year-old male who presents to the hospital with chest pain and difficulty breathing.  Patient states he has a sharp midsternal chest pain that that is worse when taking a breath in.  Patient states he feels that he cannot take a  complete breath then.  Patient does not normally wear oxygen and is on 2 L nasal cannula while in hospital.  Denies recent fall or trauma.  Patient reports over the past 2 months increased numbness and tingling bilateral upper extremity.  Denies cough, fever, chills, nausea, vomiting, diaphoresis, or syncope.  Patient had a stress test in 2/20/2019 but no other cardiology related testing has been done.    Review of Systems   HENT: Negative.    Eyes: Negative.    Cardiovascular: Positive for chest pain and dyspnea on exertion.   Respiratory: Positive for shortness of breath.    Endocrine: Negative.    Hematologic/Lymphatic: Negative.    Skin: Negative.    Gastrointestinal: Negative.    Genitourinary: Negative.    Neurological: Positive for numbness.   Psychiatric/Behavioral: Negative.    Allergic/Immunologic: Negative.            Personal History     Past Medical History:   Past Medical History:   Diagnosis Date   • Allergic    • Anxiety    • Back pain    • COPD (chronic obstructive pulmonary disease) (HCC)    • GERD (gastroesophageal reflux disease)    • Hyperlipidemia    • Hypertension    • PTSD (post-traumatic stress disorder)    • Thoracic disc herniation    • Vitamin B12 deficiency        Surgical History:      Past Surgical History:   Procedure Laterality Date   • ENDOSCOPY     • HERNIA REPAIR     • KNEE ARTHROPLASTY      x2   • SHOULDER ARTHROSCOPY W/ ROTATOR CUFF REPAIR Right 08/11/2020    Procedure: SHOULDER ARTHROSCOPY WITH ROTATOR CUFF REPAIR, extensive debridement;  Surgeon: Fredi Ritchie MD;  Location: [facility];  Service: Orthopedics;  Laterality: Right;   • TONSILLECTOMY             Family History: family history includes Heart disease in his mother. Otherwise pertinent FHx was reviewed and unremarkable.     Social History:  reports that he has been smoking cigarettes. He started smoking about 49 years ago. He has a 47.00 pack-year smoking history. He has never used smokeless tobacco. He  reports that he does not drink alcohol and does not use drugs.      Medications:  Prior to Admission medications    Medication Sig Start Date End Date Taking? Authorizing Provider   albuterol sulfate  (90 Base) MCG/ACT inhaler Inhale 2 puffs Every 4 (Four) Hours As Needed for Wheezing. 4/4/22  Yes Yamilka Pascual APRN   amLODIPine (NORVASC) 10 MG tablet TAKE 1 TABLET EVERY DAY 6/12/22  Yes Yamilka Pascual APRN   doxepin (SINEquan) 10 MG capsule TAKE 1 CAPSULE BY MOUTH EVERY NIGHT. 3/10/22  Yes Yamilka Pascual APRN   hydroCHLOROthiazide (MICROZIDE) 12.5 MG capsule Take 12.5 mg by mouth As Needed.   Yes ProviderSulaiman MD   lamoTRIgine (LaMICtal) 25 MG tablet Take 2 tablets by mouth Every Night. 12/1/20  Yes Yamilka Pascual APRN   meloxicam (MOBIC) 7.5 MG tablet TAKE ONE TABLET BY MOUTH TWICE A DAY  Patient taking differently: 15 mg. 5/1/22  Yes Yamilka Pascual APRN   metoprolol succinate XL (TOPROL-XL) 25 MG 24 hr tablet TAKE 1 TABLET EVERY DAY 6/12/22  Yes Yamilka Pascual APRN   omeprazole (priLOSEC) 20 MG capsule Take 20 mg by mouth 2 (Two) Times a Day.   Yes ProviderSulaiman MD   pravastatin (PRAVACHOL) 40 MG tablet Take 1 tablet by mouth Every Night. 10/25/21  Yes Yamilka Pascual APRN   risperiDONE (risperDAL) 1 MG tablet Take 1 tablet by mouth Daily. 12/1/20  Yes Yamilka Pascual APRN       Allergies:    Allergies   Allergen Reactions   • Atorvastatin Swelling   • Lisinopril Unknown - High Severity     Facial paralysis        Objective   Objective     Vital Signs  Temp:  [97.4 °F (36.3 °C)-98.1 °F (36.7 °C)] 98.1 °F (36.7 °C)  Heart Rate:  [59-77] 62  Resp:  [15-24] 17  BP: (124-165)/() 136/88  SpO2:  [89 %-97 %] 97 %  on  Flow (L/min):  [2] 2;   Device (Oxygen Therapy): nasal cannula  Body mass index is 34.71 kg/m².    Physical Exam  Vitals and nursing note reviewed.   Constitutional:       Appearance: Normal appearance.   HENT:      Head: Normocephalic and atraumatic.      Right  Ear: External ear normal.      Left Ear: External ear normal.      Nose: Nose normal.      Mouth/Throat:      Mouth: Mucous membranes are moist.      Pharynx: Oropharynx is clear.   Eyes:      Extraocular Movements: Extraocular movements intact.      Conjunctiva/sclera: Conjunctivae normal.      Pupils: Pupils are equal, round, and reactive to light.   Cardiovascular:      Rate and Rhythm: Normal rate and regular rhythm.      Pulses: Normal pulses.      Heart sounds: Normal heart sounds.   Pulmonary:      Effort: Pulmonary effort is normal.      Breath sounds: Normal breath sounds.   Abdominal:      General: Bowel sounds are normal.      Palpations: Abdomen is soft.   Musculoskeletal:         General: Normal range of motion.      Cervical back: Normal range of motion.   Skin:     General: Skin is warm.      Capillary Refill: Capillary refill takes less than 2 seconds.   Neurological:      General: No focal deficit present.      Mental Status: He is alert and oriented to person, place, and time. Mental status is at baseline.   Psychiatric:         Mood and Affect: Mood normal.         Behavior: Behavior normal.         Thought Content: Thought content normal.         Judgment: Judgment normal.         Results Review:  I have personally reviewed most recent cardiac tracings, lab results, microbiology results and radiology images and interpretations and agree with findings, most notably: Troponin, BNP, CBC, CMP, chest x-ray, x-ray of spine, EKG, stress test.    Results from last 7 days   Lab Units 07/12/22  0734   WBC 10*3/mm3 5.70   HEMOGLOBIN g/dL 15.8   HEMATOCRIT % 48.0   PLATELETS 10*3/mm3 142   INR  0.97     Results from last 7 days   Lab Units 07/12/22  1142 07/12/22  0734   SODIUM mmol/L  --  140   POTASSIUM mmol/L  --  4.2   CHLORIDE mmol/L  --  105   CO2 mmol/L  --  23.0   BUN mg/dL  --  12   CREATININE mg/dL  --  0.89   GLUCOSE mg/dL  --  102*   CALCIUM mg/dL  --  8.5*   ALT (SGPT) U/L  --  23   AST (SGOT)  U/L  --  20   TROPONIN T ng/mL <0.010 <0.010   PROBNP pg/mL  --  63.9     Estimated Creatinine Clearance: 111.4 mL/min (by C-G formula based on SCr of 0.89 mg/dL).  Brief Urine Lab Results     None          Microbiology Results (last 10 days)     Procedure Component Value - Date/Time    COVID PRE-OP / PRE-PROCEDURE SCREENING ORDER (NO ISOLATION) - Swab, Nasopharynx [[id number]]  (Normal) Collected: 07/12/22 0858    Lab Status: Final result Specimen: Swab from Nasopharynx Updated: 07/12/22 0923    Narrative:      The following orders were created for panel order COVID PRE-OP / PRE-PROCEDURE SCREENING ORDER (NO ISOLATION) - Swab, Nasopharynx.  Procedure                               Abnormality         Status                     ---------                               -----------         ------                     COVID-19,CEPHEID/ABHIJIT,CO...[[id number]]  Normal              Final result                 Please view results for these tests on the individual orders.    COVID-19,CEPHEID/ABHIJIT,COR/ELISEO/PAD/COLETTE IN-HOUSE(OR EMERGENT/ADD-ON),NP SWAB IN TRANSPORT MEDIA 3-4 HR TAT, RT-PCR - Swab, Nasopharynx [[id number]]  (Normal) Collected: 07/12/22 0858    Lab Status: Final result Specimen: Swab from Nasopharynx Updated: 07/12/22 0923     COVID19 Not Detected    Narrative:      Fact sheet for providers: https://www.fda.gov/media/920356/download     Fact sheet for patients: https://www.fda.gov/media/250865/download  Fact sheet for providers: https://www.fda.gov/media/345735/download    Fact sheet for patients: https://www.fda.gov/media/684533/download    Test performed by PCR.          ECG/EMG Results (most recent)     Procedure Component Value Units Date/Time    ECG 12 Lead [[id number]] Collected: 07/12/22 0653     Updated: 07/12/22 0654     QT Interval 380 ms     Narrative:      HEART RATE= 81  bpm  RR Interval= 744  ms  NH Interval= 177  ms  P Horizontal Axis= -11  deg  P Front Axis= 64  deg  QRSD Interval= 80  ms  QT Interval=  380  ms  QRS Axis= 12  deg  T Wave Axis= 41  deg  - BORDERLINE ECG -  Sinus rhythm  Probable left atrial enlargement  When compared with ECG of 08-Aug-2020 8:51:36,  Significant axis, voltage or hypertrophy change  Electronically Signed By:   Date and Time of Study: 2022-07-12 06:53:15                  XR Spine Thoracic 3 View    Result Date: 7/12/2022  Examination appears within normal limits.  Electronically Signed By-Williams Mora MD On:7/12/2022 8:17 AM This report was finalized on [id number] by  Williams Mora MD.    XR Chest 1 View    Result Date: 7/12/2022  No acute cardiopulmonary process identified.  Electronically Signed By-Williams Mora MD On:7/12/2022 8:18 AM This report was finalized on [id number] by  Williams Mora MD.        Estimated Creatinine Clearance: 111.4 mL/min (by C-G formula based on SCr of 0.89 mg/dL).    Assessment & Plan   Assessment/Plan       Active Hospital Problems    Diagnosis  POA   • Chest pain [R07.9]  Yes      Resolved Hospital Problems   No resolved problems to display.       Chest pain  - CXR showed no acute cardiopulmonary process  - EKG showed normal sinus rhythm with left atrial enlargement  - Monitor serial troponin; last troponin less than 0.010  -BNP 63.9  - Continue cardiac monitoring  -Tress test in 2019 was essentially normal with EF 63  -Stress test intermittent risk  - NPO after midnight   - Hold Beta Blocker for stress test  - Continue NTG SL PRN for CP if systolic bp above 90mmHg  - Continue ASA  -Cardiology consult    Chronic essential hypertension  -Continue amlodipine and metoprolol  -Last blood pressure 136/88    Generalized anxiety disorder  -Continue doxepin, Lamictal, risperidone    GERD  -Continue PPI    VTE Prophylaxis -   Mechanical Order History:     None      Pharmalogical Order History:     None          CODE STATUS:    There are no questions and answers to display.       This patient has been examined wearing personal protective  equipment.     I discussed the patient's findings and my recommendations with patient, family, nursing staff, primary care team and consulting provider.      Signature:Electronically signed by KEILA Motta, 07/12/22, 4:29 PM EDT.

## 2022-07-13 ENCOUNTER — READMISSION MANAGEMENT (OUTPATIENT)
Dept: CALL CENTER | Facility: HOSPITAL | Age: 62
End: 2022-07-13

## 2022-07-13 VITALS
OXYGEN SATURATION: 96 % | HEART RATE: 77 BPM | TEMPERATURE: 97.9 F | BODY MASS INDEX: 34.88 KG/M2 | WEIGHT: 249.12 LBS | HEIGHT: 71 IN | DIASTOLIC BLOOD PRESSURE: 71 MMHG | RESPIRATION RATE: 18 BRPM | SYSTOLIC BLOOD PRESSURE: 114 MMHG

## 2022-07-13 PROBLEM — R94.39 ABNORMAL NUCLEAR STRESS TEST: Chronic | Status: ACTIVE | Noted: 2022-07-12

## 2022-07-13 PROBLEM — I10 BENIGN ESSENTIAL HYPERTENSION: Chronic | Status: ACTIVE | Noted: 2017-05-08

## 2022-07-13 PROBLEM — R07.9 CHEST PAIN: Chronic | Status: ACTIVE | Noted: 2022-07-12

## 2022-07-13 PROBLEM — Z72.0 TOBACCO ABUSE: Chronic | Status: ACTIVE | Noted: 2020-09-08

## 2022-07-13 PROBLEM — E78.5 HYPERLIPIDEMIA: Chronic | Status: ACTIVE | Noted: 2017-05-09

## 2022-07-13 LAB
ANION GAP SERPL CALCULATED.3IONS-SCNC: 10 MMOL/L (ref 5–15)
BASOPHILS # BLD MANUAL: 0.11 10*3/MM3 (ref 0–0.2)
BASOPHILS NFR BLD MANUAL: 2 % (ref 0–1.5)
BUN SERPL-MCNC: 13 MG/DL (ref 8–23)
BUN/CREAT SERPL: 13 (ref 7–25)
CALCIUM SPEC-SCNC: 8.8 MG/DL (ref 8.6–10.5)
CHLORIDE SERPL-SCNC: 103 MMOL/L (ref 98–107)
CO2 SERPL-SCNC: 27 MMOL/L (ref 22–29)
CREAT SERPL-MCNC: 1 MG/DL (ref 0.76–1.27)
DEPRECATED RDW RBC AUTO: 46.4 FL (ref 37–54)
EGFRCR SERPLBLD CKD-EPI 2021: 85.6 ML/MIN/1.73
EOSINOPHIL # BLD MANUAL: 0.11 10*3/MM3 (ref 0–0.4)
EOSINOPHIL NFR BLD MANUAL: 2 % (ref 0.3–6.2)
ERYTHROCYTE [DISTWIDTH] IN BLOOD BY AUTOMATED COUNT: 13.7 % (ref 12.3–15.4)
GLUCOSE SERPL-MCNC: 110 MG/DL (ref 65–99)
HCT VFR BLD AUTO: 45 % (ref 37.5–51)
HGB BLD-MCNC: 15.3 G/DL (ref 13–17.7)
LARGE PLATELETS: ABNORMAL
LYMPHOCYTES # BLD MANUAL: 2.26 10*3/MM3 (ref 0.7–3.1)
LYMPHOCYTES NFR BLD MANUAL: 3 % (ref 5–12)
MCH RBC QN AUTO: 33.1 PG (ref 26.6–33)
MCHC RBC AUTO-ENTMCNC: 33.9 G/DL (ref 31.5–35.7)
MCV RBC AUTO: 97.6 FL (ref 79–97)
MONOCYTES # BLD: 0.17 10*3/MM3 (ref 0.1–0.9)
NEUTROPHILS # BLD AUTO: 2.86 10*3/MM3 (ref 1.7–7)
NEUTROPHILS NFR BLD MANUAL: 52 % (ref 42.7–76)
PLATELET # BLD AUTO: 142 10*3/MM3 (ref 140–450)
PMV BLD AUTO: 9.2 FL (ref 6–12)
POTASSIUM SERPL-SCNC: 4.7 MMOL/L (ref 3.5–5.2)
RBC # BLD AUTO: 4.61 10*6/MM3 (ref 4.14–5.8)
RBC MORPH BLD: NORMAL
SODIUM SERPL-SCNC: 140 MMOL/L (ref 136–145)
VARIANT LYMPHS NFR BLD MANUAL: 41 % (ref 19.6–45.3)
WBC MORPH BLD: NORMAL
WBC NRBC COR # BLD: 5.5 10*3/MM3 (ref 3.4–10.8)

## 2022-07-13 PROCEDURE — 93458 L HRT ARTERY/VENTRICLE ANGIO: CPT | Performed by: INTERNAL MEDICINE

## 2022-07-13 PROCEDURE — C1894 INTRO/SHEATH, NON-LASER: HCPCS | Performed by: INTERNAL MEDICINE

## 2022-07-13 PROCEDURE — 25010000002 MIDAZOLAM PER 1 MG: Performed by: INTERNAL MEDICINE

## 2022-07-13 PROCEDURE — 80048 BASIC METABOLIC PNL TOTAL CA: CPT | Performed by: NURSE PRACTITIONER

## 2022-07-13 PROCEDURE — 85007 BL SMEAR W/DIFF WBC COUNT: CPT | Performed by: NURSE PRACTITIONER

## 2022-07-13 PROCEDURE — C1769 GUIDE WIRE: HCPCS | Performed by: INTERNAL MEDICINE

## 2022-07-13 PROCEDURE — 25010000002 FENTANYL CITRATE (PF) 100 MCG/2ML SOLUTION: Performed by: INTERNAL MEDICINE

## 2022-07-13 PROCEDURE — G0378 HOSPITAL OBSERVATION PER HR: HCPCS

## 2022-07-13 PROCEDURE — 85027 COMPLETE CBC AUTOMATED: CPT | Performed by: NURSE PRACTITIONER

## 2022-07-13 PROCEDURE — 0 IOPAMIDOL PER 1 ML: Performed by: INTERNAL MEDICINE

## 2022-07-13 PROCEDURE — C1760 CLOSURE DEV, VASC: HCPCS | Performed by: INTERNAL MEDICINE

## 2022-07-13 PROCEDURE — 99213 OFFICE O/P EST LOW 20 MIN: CPT | Performed by: NURSE PRACTITIONER

## 2022-07-13 RX ORDER — MIDAZOLAM HYDROCHLORIDE 1 MG/ML
INJECTION INTRAMUSCULAR; INTRAVENOUS AS NEEDED
Status: DISCONTINUED | OUTPATIENT
Start: 2022-07-13 | End: 2022-07-13 | Stop reason: HOSPADM

## 2022-07-13 RX ORDER — SODIUM CHLORIDE 9 MG/ML
100 INJECTION, SOLUTION INTRAVENOUS CONTINUOUS
Status: DISCONTINUED | OUTPATIENT
Start: 2022-07-13 | End: 2022-07-13 | Stop reason: HOSPADM

## 2022-07-13 RX ORDER — LIDOCAINE HYDROCHLORIDE 20 MG/ML
INJECTION, SOLUTION INFILTRATION; PERINEURAL AS NEEDED
Status: DISCONTINUED | OUTPATIENT
Start: 2022-07-13 | End: 2022-07-13 | Stop reason: HOSPADM

## 2022-07-13 RX ORDER — FENTANYL CITRATE 50 UG/ML
INJECTION, SOLUTION INTRAMUSCULAR; INTRAVENOUS AS NEEDED
Status: DISCONTINUED | OUTPATIENT
Start: 2022-07-13 | End: 2022-07-13 | Stop reason: HOSPADM

## 2022-07-13 RX ORDER — ACETAMINOPHEN 325 MG/1
650 TABLET ORAL EVERY 4 HOURS PRN
Status: DISCONTINUED | OUTPATIENT
Start: 2022-07-13 | End: 2022-07-13 | Stop reason: HOSPADM

## 2022-07-13 RX ADMIN — SENNOSIDES AND DOCUSATE SODIUM 2 TABLET: 50; 8.6 TABLET ORAL at 08:22

## 2022-07-13 RX ADMIN — HYDROCHLOROTHIAZIDE 25 MG: 25 TABLET ORAL at 08:21

## 2022-07-13 RX ADMIN — SODIUM CHLORIDE 100 ML/HR: 9 INJECTION, SOLUTION INTRAVENOUS at 10:44

## 2022-07-13 RX ADMIN — RISPERIDONE 1 MG: 1 TABLET ORAL at 08:21

## 2022-07-13 RX ADMIN — METOPROLOL SUCCINATE 25 MG: 25 TABLET, EXTENDED RELEASE ORAL at 08:22

## 2022-07-13 RX ADMIN — AMLODIPINE BESYLATE 10 MG: 5 TABLET ORAL at 08:21

## 2022-07-13 NOTE — DISCHARGE SUMMARY
[facility]    Ankur YamilkaKEILA Gong    CHIEF COMPLAINT:     Chest pain    HISTORY OF PRESENT ILLNESS:    Mr. Rea is a 61 y.o.  presents to [facility] complaining of chest pain        History of Present Illness     ED 7/12/22: Patient is a 61-year-old male who comes in by EMS with complaints of chest pain and some difficulty catching his breath.  He states his pain is sharp substernal nonradiating.  States he had a fall a couple weeks ago landing on his right side up against the couch but denies any other trauma or injury.  No rash.  He denies any cough congestion or fever.  No unilateral leg swelling recent trauma surgery immobilization prior history of DVT PE or exogenous hormone use.  He states he does sleep on a wedge pillow due to his reflux symptoms but states these have not been worse lately.  No reported melena hematochezia.  He states he has a family history positive for his mother having of valve replaced otherwise no other history of CVD.  Had a stress test in 2019 but denies any history of heart catheterization and does not currently follow with cardiology.  He is currently being evaluated for some cervical radiculopathy and had imaging done approximately a month ago the symptoms are baseline.  Denies any other paresthesias weakness saddle anesthesia bowel or bladder incontinence or retention.  He is a smoker but no other reported IVDA recreational drug use.     OBS 7/12/22: Patient is a 61-year-old male who presents to the hospital with chest pain and difficulty breathing.  Patient states he has a sharp midsternal chest pain that that is worse when taking a breath in.  Patient states he feels that he cannot take a complete breath then.  Patient does not normally wear oxygen and is on 2 L nasal cannula while in hospital.  Denies recent fall or trauma.  Patient reports over the past 2 months increased numbness and tingling bilateral upper extremity.  Denies cough, fever,  chills, nausea, vomiting, diaphoresis, or syncope.  Patient had a stress test in 2/20/2019 but no other cardiology related testing has been done.     7/13/2022: Patient reports doing well throughout the night without recurrence of symptoms.  He is aware of plan for catheterization on 7/13/2022 and anxious to proceed.        Past Medical History:   Diagnosis Date   • Allergic    • Anxiety    • Back pain    • COPD (chronic obstructive pulmonary disease) (HCC)    • GERD (gastroesophageal reflux disease)    • Hyperlipidemia    • Hypertension    • PTSD (post-traumatic stress disorder)    • Thoracic disc herniation    • Vitamin B12 deficiency      Past Surgical History:   Procedure Laterality Date   • ENDOSCOPY     • HERNIA REPAIR     • KNEE ARTHROPLASTY      x2   • SHOULDER ARTHROSCOPY W/ ROTATOR CUFF REPAIR Right 08/11/2020    Procedure: SHOULDER ARTHROSCOPY WITH ROTATOR CUFF REPAIR, extensive debridement;  Surgeon: Fredi Ritchie MD;  Location: [facility] OR;  Service: Orthopedics;  Laterality: Right;   • TONSILLECTOMY       Family History   Problem Relation Age of Onset   • Heart disease Mother      Social History     Tobacco Use   • Smoking status: Current Every Day Smoker     Packs/day: 1.00     Years: 47.00     Pack years: 47.00     Types: Cigarettes     Start date: 1/9/1973   • Smokeless tobacco: Never Used   Vaping Use   • Vaping Use: Never used   Substance Use Topics   • Alcohol use: No   • Drug use: No     Facility-Administered Medications Prior to Admission   Medication Dose Route Frequency Provider Last Rate Last Admin   • triamcinolone acetonide (KENALOG-40) injection 40 mg  40 mg Intra-articular Once Fredi Ritchie MD         Medications Prior to Admission   Medication Sig Dispense Refill Last Dose   • albuterol sulfate  (90 Base) MCG/ACT inhaler Inhale 2 puffs Every 4 (Four) Hours As Needed for Wheezing. 18 g 1 Past Month at Unknown time   • amLODIPine (NORVASC) 10 MG tablet TAKE  1 TABLET EVERY DAY 90 tablet 0 7/12/2022 at Unknown time   • doxepin (SINEquan) 10 MG capsule TAKE 1 CAPSULE BY MOUTH EVERY NIGHT. 90 capsule 1 7/11/2022 at Unknown time   • hydroCHLOROthiazide (MICROZIDE) 12.5 MG capsule Take 12.5 mg by mouth As Needed.   Past Month at Unknown time   • lamoTRIgine (LaMICtal) 25 MG tablet Take 2 tablets by mouth Every Night. 180 tablet 1 7/11/2022 at Unknown time   • meloxicam (MOBIC) 7.5 MG tablet TAKE ONE TABLET BY MOUTH TWICE A DAY (Patient taking differently: 15 mg.) 180 tablet 0 7/11/2022 at Unknown time   • metoprolol succinate XL (TOPROL-XL) 25 MG 24 hr tablet TAKE 1 TABLET EVERY DAY 90 tablet 0 7/12/2022 at Unknown time   • omeprazole (priLOSEC) 20 MG capsule Take 20 mg by mouth 2 (Two) Times a Day.   7/11/2022 at Unknown time   • pravastatin (PRAVACHOL) 40 MG tablet Take 1 tablet by mouth Every Night. 90 tablet 0 7/11/2022 at Unknown time   • risperiDONE (risperDAL) 1 MG tablet Take 1 tablet by mouth Daily. 90 tablet 1 7/11/2022 at Unknown time     Allergies:  Atorvastatin and Lisinopril    Immunization History   Administered Date(s) Administered   • COVID-19 (ACE) 03/13/2021   • Pneumococcal Polysaccharide (PPSV23) 05/13/2015, 07/13/2018   • Shingrix 07/30/2021, 10/05/2021           REVIEW OF SYSTEMS:    Review of Systems   HENT: Negative.    Eyes: Negative.    Cardiovascular: Positive for chest pain and dyspnea on exertion.   Respiratory: Positive for shortness of breath.    Endocrine: Negative.    Hematologic/Lymphatic: Negative.    Skin: Negative.    Gastrointestinal: Negative.    Genitourinary: Negative.    Neurological: Positive for numbness.   Psychiatric/Behavioral: Negative.    Allergic/Immunologic: Negative.      Vital Signs  Temp:  [97.8 °F (36.6 °C)-98.1 °F (36.7 °C)] 97.9 °F (36.6 °C)  Heart Rate:  [63-87] 77  Resp:  [18] 18  BP: (103-158)/(67-84) 114/71          Physical Exam:  Physical Exam  Vitals reviewed.   Constitutional:       General: He is not in  acute distress.     Appearance: Normal appearance. He is obese. He is not ill-appearing, toxic-appearing or diaphoretic.   HENT:      Head: Normocephalic.      Right Ear: External ear normal.      Left Ear: External ear normal.      Nose: Nose normal.      Mouth/Throat:      Mouth: Mucous membranes are moist.   Eyes:      Extraocular Movements: Extraocular movements intact.   Cardiovascular:      Rate and Rhythm: Normal rate and regular rhythm.      Pulses: Normal pulses.      Heart sounds: Normal heart sounds.   Pulmonary:      Effort: Pulmonary effort is normal.      Breath sounds: Normal breath sounds.   Abdominal:      General: Bowel sounds are normal.      Palpations: Abdomen is soft.      Tenderness: There is no abdominal tenderness.   Musculoskeletal:         General: Normal range of motion.      Cervical back: Normal range of motion.      Right lower leg: No edema.      Left lower leg: No edema.   Skin:     General: Skin is warm and dry.      Capillary Refill: Capillary refill takes less than 2 seconds.   Neurological:      General: No focal deficit present.      Mental Status: He is alert and oriented to person, place, and time.   Psychiatric:         Mood and Affect: Mood normal.         Behavior: Behavior normal.         Thought Content: Thought content normal.         Judgment: Judgment normal.         Emotional Behavior:   Normal   Debilities:   None  Results Review:    I reviewed the patient's new clinical results.  Lab Results (most recent)     Procedure Component Value Units Date/Time    CBC & Differential [[id number]]  (Abnormal) Collected: 07/13/22 0618    Specimen: Blood Updated: 07/13/22 0654    Narrative:      The following orders were created for panel order CBC & Differential.  Procedure                               Abnormality         Status                     ---------                               -----------         ------                     Manual Differential[[id number]]           Abnormal            Final result               CBC Auto Differential[[id number]]        Abnormal            Final result                 Please view results for these tests on the individual orders.    Manual Differential [[id number]]  (Abnormal) Collected: 07/13/22 0618    Specimen: Blood Updated: 07/13/22 0654     Neutrophil % 52.0 %      Lymphocyte % 41.0 %      Monocyte % 3.0 %      Eosinophil % 2.0 %      Basophil % 2.0 %      Neutrophils Absolute 2.86 10*3/mm3      Lymphocytes Absolute 2.26 10*3/mm3      Monocytes Absolute 0.17 10*3/mm3      Eosinophils Absolute 0.11 10*3/mm3      Basophils Absolute 0.11 10*3/mm3      RBC Morphology Normal     WBC Morphology Normal     Large Platelets Slight/1+    CBC Auto Differential [[id number]]  (Abnormal) Collected: 07/13/22 0618    Specimen: Blood Updated: 07/13/22 0654     WBC 5.50 10*3/mm3      RBC 4.61 10*6/mm3      Hemoglobin 15.3 g/dL      Hematocrit 45.0 %      MCV 97.6 fL      MCH 33.1 pg      MCHC 33.9 g/dL      RDW 13.7 %      RDW-SD 46.4 fl      MPV 9.2 fL      Platelets 142 10*3/mm3     Basic Metabolic Panel [[id number]]  (Abnormal) Collected: 07/13/22 0618    Specimen: Blood Updated: 07/13/22 0651     Glucose 110 mg/dL      BUN 13 mg/dL      Creatinine 1.00 mg/dL      Sodium 140 mmol/L      Potassium 4.7 mmol/L      Comment: Slight hemolysis detected by analyzer. Results may be affected.        Chloride 103 mmol/L      CO2 27.0 mmol/L      Calcium 8.8 mg/dL      BUN/Creatinine Ratio 13.0     Anion Gap 10.0 mmol/L      eGFR 85.6 mL/min/1.73      Comment: National Kidney Foundation and American Society of Nephrology (ASN) Task Force recommended calculation based on the Chronic Kidney Disease Epidemiology Collaboration (CKD-EPI) equation refit without adjustment for race.       Narrative:      GFR Normal >60  Chronic Kidney Disease <60  Kidney Failure <15      Troponin [[id number]]  (Normal) Collected: 07/12/22 1142    Specimen: Blood Updated: 07/12/22 1210      Troponin T <0.010 ng/mL     Narrative:      Troponin T Reference Range:  <= 0.03 ng/mL-   Negative for AMI  >0.03 ng/mL-     Abnormal for myocardial necrosis.  Clinicians would have to utilize clinical acumen, EKG, Troponin and serial changes to determine if it is an Acute Myocardial Infarction or myocardial injury due to an underlying chronic condition.       Results may be falsely decreased if patient taking Biotin.      COVID PRE-OP / PRE-PROCEDURE SCREENING ORDER (NO ISOLATION) - Swab, Nasopharynx [[id number]]  (Normal) Collected: 07/12/22 0858    Specimen: Swab from Nasopharynx Updated: 07/12/22 0923    Narrative:      The following orders were created for panel order COVID PRE-OP / PRE-PROCEDURE SCREENING ORDER (NO ISOLATION) - Swab, Nasopharynx.  Procedure                               Abnormality         Status                     ---------                               -----------         ------                     COVID-19,CEPHEID/ABHIJIT,CO...[[id number]]  Normal              Final result                 Please view results for these tests on the individual orders.    COVID-19,CEPHEID/ABHIJIT,COR/ELISEO/PAD/COLETTE IN-HOUSE(OR EMERGENT/ADD-ON),NP SWAB IN TRANSPORT MEDIA 3-4 HR TAT, RT-PCR - Swab, Nasopharynx [[id number]]  (Normal) Collected: 07/12/22 0858    Specimen: Swab from Nasopharynx Updated: 07/12/22 0923     COVID19 Not Detected    Narrative:      Fact sheet for providers: https://www.fda.gov/media/586526/download     Fact sheet for patients: https://www.fda.gov/media/401670/download  Fact sheet for providers: https://www.fda.gov/media/314297/download    Fact sheet for patients: https://www.fda.gov/media/988024/download    Test performed by PCR.    Comprehensive Metabolic Panel [[id number]]  (Abnormal) Collected: 07/12/22 0734    Specimen: Blood Updated: 07/12/22 0815     Glucose 102 mg/dL      BUN 12 mg/dL      Creatinine 0.89 mg/dL      Sodium 140 mmol/L      Potassium 4.2 mmol/L      Comment: Slight hemolysis  detected by analyzer. Results may be affected.        Chloride 105 mmol/L      CO2 23.0 mmol/L      Calcium 8.5 mg/dL      Total Protein 7.1 g/dL      Albumin 4.20 g/dL      ALT (SGPT) 23 U/L      AST (SGOT) 20 U/L      Comment: Slight hemolysis detected by analyzer. Results may be affected.        Alkaline Phosphatase 70 U/L      Total Bilirubin 0.3 mg/dL      Globulin 2.9 gm/dL      A/G Ratio 1.4 g/dL      BUN/Creatinine Ratio 13.5     Anion Gap 12.0 mmol/L      eGFR 97.5 mL/min/1.73      Comment: National Kidney Foundation and American Society of Nephrology (ASN) Task Force recommended calculation based on the Chronic Kidney Disease Epidemiology Collaboration (CKD-EPI) equation refit without adjustment for race.       Narrative:      GFR Normal >60  Chronic Kidney Disease <60  Kidney Failure <15      Lipase [[id number]]  (Normal) Collected: 07/12/22 0734    Specimen: Blood Updated: 07/12/22 0809     Lipase 39 U/L     Troponin [[id number]]  (Normal) Collected: 07/12/22 0734    Specimen: Blood Updated: 07/12/22 0809     Troponin T <0.010 ng/mL     Narrative:      Troponin T Reference Range:  <= 0.03 ng/mL-   Negative for AMI  >0.03 ng/mL-     Abnormal for myocardial necrosis.  Clinicians would have to utilize clinical acumen, EKG, Troponin and serial changes to determine if it is an Acute Myocardial Infarction or myocardial injury due to an underlying chronic condition.       Results may be falsely decreased if patient taking Biotin.      BNP [[id number]]  (Normal) Collected: 07/12/22 0734    Specimen: Blood Updated: 07/12/22 0807     proBNP 63.9 pg/mL     Narrative:      Among patients with dyspnea, NT-proBNP is highly sensitive for the detection of acute congestive heart failure. In addition NT-proBNP of <300 pg/ml effectively rules out acute congestive heart failure with 99% negative predictive value.    Results may be falsely decreased if patient taking Biotin.      Protime-INR [[id number]]  (Normal) Collected:  07/12/22 0734    Specimen: Blood Updated: 07/12/22 0755     Protime 10.0 Seconds      INR 0.97    aPTT [[id number]]  (Abnormal) Collected: 07/12/22 0734    Specimen: Blood Updated: 07/12/22 0755     PTT 26.0 seconds     CBC & Differential [[id number]]  (Abnormal) Collected: 07/12/22 0734    Specimen: Blood Updated: 07/12/22 0748    Narrative:      The following orders were created for panel order CBC & Differential.  Procedure                               Abnormality         Status                     ---------                               -----------         ------                     CBC Auto Differential[[id number]]        Abnormal            Final result                 Please view results for these tests on the individual orders.    CBC Auto Differential [[id number]]  (Abnormal) Collected: 07/12/22 0734    Specimen: Blood Updated: 07/12/22 0748     WBC 5.70 10*3/mm3      RBC 4.86 10*6/mm3      Hemoglobin 15.8 g/dL      Hematocrit 48.0 %      MCV 98.7 fL      MCH 32.5 pg      MCHC 33.0 g/dL      RDW 14.1 %      RDW-SD 49.0 fl      MPV 9.4 fL      Platelets 142 10*3/mm3      Neutrophil % 51.0 %      Lymphocyte % 38.6 %      Monocyte % 6.4 %      Eosinophil % 3.0 %      Basophil % 1.0 %      Neutrophils, Absolute 2.90 10*3/mm3      Lymphocytes, Absolute 2.20 10*3/mm3      Monocytes, Absolute 0.40 10*3/mm3      Eosinophils, Absolute 0.20 10*3/mm3      Basophils, Absolute 0.10 10*3/mm3      nRBC 0.1 /100 WBC           Imaging Results (Most Recent)     Procedure Component Value Units Date/Time    XR Chest 1 View [[id number]] Collected: 07/12/22 0818     Updated: 07/12/22 0820    Narrative:      XR CHEST 1 VW-     Date of Exam: 7/12/2022 7:58 AM     Indication: dyspnea.     Comparison Exams: April 4, 2022     Technique: Single AP chest radiograph     FINDINGS:  The lungs are clear. The heart and mediastinal contours appear normal.  The pulmonary vasculature appears normal. The osseous structures appear  intact.        Impression:      No acute cardiopulmonary process identified.     Electronically Signed By-Williams Mora MD On:7/12/2022 8:18 AM  This report was finalized on [id number] by  Williams Mora MD.    XR Spine Thoracic 3 View [[id number]] Collected: 07/12/22 0814     Updated: 07/12/22 0820    Narrative:      XR SPINE THORACIC 3 VW-     Date of Exam: 7/12/2022 8:00 AM     Indication: midline t spine pain with chest pain.     Comparison Exams: None available.     Technique: 3 radiographs of the thoracic spine     FINDINGS:  No acute fracture is identified. The alignment is anatomic. The  vertebral body heights appear normal. The intervertebral disc heights  appear preserved. The soft tissues are unremarkable.       Impression:      Examination appears within normal limits.     Electronically Signed By-Williams Mora MD On:7/12/2022 8:17 AM  This report was finalized on [id number] by  Williams Mora MD.        reviewed    ECG/EMG Results (most recent)     Procedure Component Value Units Date/Time    ECG 12 Lead [[id number]] Collected: 07/12/22 0653     Updated: 07/12/22 0654     QT Interval 380 ms     Narrative:      HEART RATE= 81  bpm  RR Interval= 744  ms  WI Interval= 177  ms  P Horizontal Axis= -11  deg  P Front Axis= 64  deg  QRSD Interval= 80  ms  QT Interval= 380  ms  QRS Axis= 12  deg  T Wave Axis= 41  deg  - BORDERLINE ECG -  Sinus rhythm  Probable left atrial enlargement  When compared with ECG of 08-Aug-2020 8:51:36,  Significant axis, voltage or hypertrophy change  Electronically Signed By:   Date and Time of Study: 2022-07-12 06:53:15        reviewed            Microbiology Results (last 10 days)     Procedure Component Value - Date/Time    COVID PRE-OP / PRE-PROCEDURE SCREENING ORDER (NO ISOLATION) - Swab, Nasopharynx [[id number]]  (Normal) Collected: 07/12/22 0858    Lab Status: Final result Specimen: Swab from Nasopharynx Updated: 07/12/22 0923    Narrative:      The following orders were  created for panel order COVID PRE-OP / PRE-PROCEDURE SCREENING ORDER (NO ISOLATION) - Swab, Nasopharynx.  Procedure                               Abnormality         Status                     ---------                               -----------         ------                     COVID-19,CEPHEID/ABHIJIT,CO...[[id number]]  Normal              Final result                 Please view results for these tests on the individual orders.    COVID-19,CEPHEID/ABHIJIT,COR/ELISEO/PAD/COLETTE IN-HOUSE(OR EMERGENT/ADD-ON),NP SWAB IN TRANSPORT MEDIA 3-4 HR TAT, RT-PCR - Swab, Nasopharynx [[id number]]  (Normal) Collected: 07/12/22 0858    Lab Status: Final result Specimen: Swab from Nasopharynx Updated: 07/12/22 0923     COVID19 Not Detected    Narrative:      Fact sheet for providers: https://www.fda.gov/media/194678/download     Fact sheet for patients: https://www.fda.gov/media/835485/download  Fact sheet for providers: https://www.fda.gov/media/103061/download    Fact sheet for patients: https://www.fda.gov/media/592642/download    Test performed by PCR.          Assessment & Plan     Benign essential hypertension    Hyperlipidemia    Tobacco abuse    Chest pain    Abnormal nuclear stress test       Chest pain  - CXR showed no acute cardiopulmonary process  - EKG showed normal sinus rhythm with left atrial enlargement  - Monitor serial troponin; last troponin less than 0.010  -BNP 63.9  - Continue cardiac monitoring  -Tress test in 2019 was essentially normal with EF 63  -Stress test intermittent risk  - NPO after midnight   - Hold Beta Blocker for stress test  - Continue NTG SL PRN for CP if systolic bp above 90mmHg  - Continue ASA  -Cardiology consult     Chronic essential hypertension  -Continue amlodipine and metoprolol  -Last blood pressure 136/88     Generalized anxiety disorder  -Continue doxepin, Lamictal, risperidone     GERD  -Continue PPI    I discussed the patients findings and my recommendations with patient and nursing staff.      Discharge Diagnosis:      Benign essential hypertension    Hyperlipidemia    Tobacco abuse    Chest pain    Abnormal nuclear stress test      Hospital Course  Patient is a 61 y.o. male presented with chest pain and dyspnea with an HPI noted above.  Serial troponins remain less than 0.010 and proBNP was within normal limits at 63.9.  Lipase was also assessed and found to be normal at 39.  Chest x-ray was obtained which showed no acute process and x-ray of thoracic spine was also noted is within normal limits.  EKG was obtained which showed sinus rhythm at 81 without obvious acute ST changes or ectopy with a QTC of 441 ms.  Stress testing was performed which was reported with a hyperdynamic EF greater than 70% with small to medium sized mildly severe area of ischemia in the inferior and lateral wall consistent with an intermediate risk study.  Cardiology was consulted who recommended cardiac catheterization which was performed on 7/13/2022 and showed no obstructive CAD with small vessel disease in the right PDA and the left ventricular end-diastolic pressure with recommendations to evaluate for other possible etiologies of patient's pain by performing physician.  Patient reports having EGD performed in the past 3 months with findings of hiatal hernia and esophageal dilation performed.  He will hold meloxicam for now and use Tylenol as needed for pain control and contact GI provider as well.  At this time patient is felt to be in good condition for discharge with close follow-up with his PCP as well as cardiology on an outpatient basis.  His full testing/results and plan were discussed with patient with concerning/alarm symptoms which to call 911/return to the ED.  All questions were answered he verbalizes his understanding and agreement.    Past Medical History:     Past Medical History:   Diagnosis Date   • Allergic    • Anxiety    • Back pain    • COPD (chronic obstructive pulmonary disease) (HCC)    • GERD  (gastroesophageal reflux disease)    • Hyperlipidemia    • Hypertension    • PTSD (post-traumatic stress disorder)    • Thoracic disc herniation    • Vitamin B12 deficiency        Past Surgical History:     Past Surgical History:   Procedure Laterality Date   • ENDOSCOPY     • HERNIA REPAIR     • KNEE ARTHROPLASTY      x2   • SHOULDER ARTHROSCOPY W/ ROTATOR CUFF REPAIR Right 08/11/2020    Procedure: SHOULDER ARTHROSCOPY WITH ROTATOR CUFF REPAIR, extensive debridement;  Surgeon: Fredi Ritchie MD;  Location: [facility] MAIN OR;  Service: Orthopedics;  Laterality: Right;   • TONSILLECTOMY         Social History:   Social History     Socioeconomic History   • Marital status:    Tobacco Use   • Smoking status: Current Every Day Smoker     Packs/day: 1.00     Years: 47.00     Pack years: 47.00     Types: Cigarettes     Start date: 1/9/1973   • Smokeless tobacco: Never Used   Vaping Use   • Vaping Use: Never used   Substance and Sexual Activity   • Alcohol use: No   • Drug use: No   • Sexual activity: Defer       Procedures Performed    Procedure(s):  Left Heart Cath, possible pci  -------------------       Consults:   Consults     Date and Time Order Name Status Description    7/12/2022  4:33 PM Inpatient Cardiology Consult            Condition on Discharge:     Stable    Discharge Disposition  Home or Self Care    Discharge Medications     Discharge Medications      Continue These Medications      Instructions Start Date   albuterol sulfate  (90 Base) MCG/ACT inhaler  Commonly known as: PROVENTIL HFA;VENTOLIN HFA;PROAIR HFA   2 puffs, Inhalation, Every 4 Hours PRN      amLODIPine 10 MG tablet  Commonly known as: NORVASC   TAKE 1 TABLET EVERY DAY      doxepin 10 MG capsule  Commonly known as: SINEquan   10 mg, Oral, Nightly      hydroCHLOROthiazide 12.5 MG capsule  Commonly known as: MICROZIDE   12.5 mg, Oral, As Needed      lamoTRIgine 25 MG tablet  Commonly known as: LaMICtal   50 mg, Oral, Nightly       metoprolol succinate XL 25 MG 24 hr tablet  Commonly known as: TOPROL-XL   TAKE 1 TABLET EVERY DAY      omeprazole 20 MG capsule  Commonly known as: priLOSEC   20 mg, Oral, 2 Times Daily      pravastatin 40 MG tablet  Commonly known as: PRAVACHOL   40 mg, Oral, Nightly      risperiDONE 1 MG tablet  Commonly known as: risperDAL   1 mg, Oral, Daily         Stop These Medications    meloxicam 7.5 MG tablet  Commonly known as: MOBIC            Discharge Diet:     Activity at Discharge:     Follow-up Appointments  Future Appointments   Date Time Provider Department Center   7/14/2022  8:15 AM Fredi Ritchie MD [facility]   10/11/2022  1:30 PM Yamilka Pascual APRN [facility]     Additional Instructions for the Follow-ups that You Need to Schedule     Discharge Follow-up with PCP   As directed       Currently Documented PCP:    Yamilka Pascual APRN    PCP Phone Number:    [phone]     Follow Up Details: 5 to 7 days         Discharge Follow-up with Specified Provider: Cardiology; 1 Month   As directed      To: Cardiology    Follow Up: 1 Month         Discharge Follow-up with Specified Provider: GI; 1 Month   As directed      To: GI    Follow Up: 1 Month               Test Results Pending at Discharge       Risk for Readmission (LACE) Score: 1 (7/13/2022  6:01 AM)          Edi Moses PA-C  07/13/22  13:45 EDT

## 2022-07-13 NOTE — OUTREACH NOTE
Prep Survey    Flowsheet Row Responses   [facility] patient discharged from? [facility]   Is LACE score < 7 ? Yes   Emergency Room discharge w/ pulse ox? No   Eligibility [facility]   Date of Admission 07/12/22   Date of Discharge 07/13/22   Discharge Disposition Home or Self Care   Discharge diagnosis chest pain and some difficulty catching his breath   left heart cath   Does the patient have one of the following disease processes/diagnoses(primary or secondary)? Other   Does the patient have Home health ordered? No   Is there a DME ordered? No   Prep survey completed? Yes          LINDA GLASS - Registered Nurse

## 2022-07-13 NOTE — PLAN OF CARE
Problem: Adult Inpatient Plan of Care  Goal: Plan of Care Review  Outcome: Ongoing, Progressing  Flowsheets (Taken 7/13/2022 1257)  Plan of Care Reviewed With: patient  Outcome Evaluation: pt tolerated Heart cath well no finding will discharge home today after monitoring, no complication at incision site  Goal: Patient-Specific Goal (Individualized)  Outcome: Ongoing, Progressing  Goal: Absence of Hospital-Acquired Illness or Injury  Outcome: Ongoing, Progressing  Intervention: Identify and Manage Fall Risk  Recent Flowsheet Documentation  Taken 7/13/2022 1000 by Jovana Harvey RN  Safety Promotion/Fall Prevention: safety round/check completed  Intervention: Prevent and Manage VTE (Venous Thromboembolism) Risk  Recent Flowsheet Documentation  Taken 7/13/2022 0800 by Jovana Harvey RN  Activity Management: activity adjusted per tolerance  Goal: Optimal Comfort and Wellbeing  Outcome: Ongoing, Progressing  Intervention: Provide Person-Centered Care  Recent Flowsheet Documentation  Taken 7/13/2022 1200 by Jovana Harvey RN  Trust Relationship/Rapport:   care explained   questions answered  Taken 7/13/2022 0800 by Jovana Harvey RN  Trust Relationship/Rapport:   care explained   questions answered  Goal: Readiness for Transition of Care  Outcome: Ongoing, Progressing     Problem: Asthma Comorbidity  Goal: Maintenance of Asthma Control  Outcome: Ongoing, Progressing     Problem: Behavioral Health Comorbidity  Goal: Maintenance of Behavioral Health Symptom Control  Outcome: Ongoing, Progressing     Problem: COPD (Chronic Obstructive Pulmonary Disease) Comorbidity  Goal: Maintenance of COPD Symptom Control  Outcome: Ongoing, Progressing  Intervention: Maintain COPD-Symptom Control  Recent Flowsheet Documentation  Taken 7/13/2022 0800 by Jovana Harvey RN  Supportive Measures: active listening utilized     Problem: Diabetes Comorbidity  Goal: Blood Glucose Level Within Targeted Range  Outcome: Ongoing, Progressing      Problem: Heart Failure Comorbidity  Goal: Maintenance of Heart Failure Symptom Control  Outcome: Ongoing, Progressing     Problem: Hypertension Comorbidity  Goal: Blood Pressure in Desired Range  Outcome: Ongoing, Progressing     Problem: Obstructive Sleep Apnea Risk or Actual Comorbidity Management  Goal: Unobstructed Breathing During Sleep  Outcome: Ongoing, Progressing     Problem: Osteoarthritis Comorbidity  Goal: Maintenance of Osteoarthritis Symptom Control  Outcome: Ongoing, Progressing  Intervention: Maintain Osteoarthritis Symptom Control  Recent Flowsheet Documentation  Taken 7/13/2022 0800 by Jovana Harvey RN  Activity Management: activity adjusted per tolerance     Problem: Pain Chronic (Persistent) (Comorbidity Management)  Goal: Acceptable Pain Control and Functional Ability  Outcome: Ongoing, Progressing  Intervention: Optimize Psychosocial Wellbeing  Recent Flowsheet Documentation  Taken 7/13/2022 1200 by Jovana Harvey RN  Diversional Activities:   television   smartphone  Family/Support System Care: support provided  Taken 7/13/2022 0800 by Jovana Harvey RN  Supportive Measures: active listening utilized  Diversional Activities:   television   smartphone  Family/Support System Care: support provided     Problem: Seizure Disorder Comorbidity  Goal: Maintenance of Seizure Control  Outcome: Ongoing, Progressing     Problem: Chest Pain  Goal: Resolution of Chest Pain Symptoms  Outcome: Ongoing, Progressing   Goal Outcome Evaluation:  Plan of Care Reviewed With: patient           Outcome Evaluation: pt tolerated Heart cath well no finding will discharge home today after monitoring, no complication at incision site

## 2022-07-13 NOTE — CONSULTS
Patient does not qualify for Cardiac Rehab due to no recent MI, OHS, coronary intervention, or EF less than 35%.

## 2022-07-13 NOTE — CASE MANAGEMENT/SOCIAL WORK
Social Work Assessment  [facility]     Patient Name: Jagdish Rea  MRN: [id number]  Today's Date: 7/13/2022    Admit Date: 7/12/2022     Substance Abuse     Row Name 07/13/22 1153       Substance Use    Substance Use Status current tobacco use    Substance Use Comment SW reviewed pt’s chart and saw pt continues to smoke in the setting of HTN. SW met with pt in room 104 to discuss further. Pt lying in bed eating French fries, dtr present. Pt gave verbal permission to converse with family in the room. Pt affirmed smoking 0.75-1ppd, up from 0.5ppd previously. Pt stated his stressors increased, but when SW inquired further, pt denied needing assistance. He began smoking when he was 13 y.o. Pt stated he has quit 3X in the past, with his longest quit being a year. He reports previously using NRT - gum and patches - but says they don’t work. SW informed pt of two additional options that aren’t NRT; however, pt adamant that he is disinterested in quitting at this time.              Met with patient in room wearing PPE: mask.      Maintained distance greater than six feet and spent less than 15 minutes in the room.      MAYE Montgomery, [facility]  Medical Social Worker  Ph [phone]  Fax [phone]  [email or web address]

## 2022-07-13 NOTE — PROGRESS NOTES
[facility]   PROGRESS NOTE    Reason for follow-up: chest pain     Patient Care Team:  Yamilka Pascual APRN as PCP - General (Nurse Practitioner)    Subjective    Patient resting in bed after heart cath this morning. Cath site clean, non-tender, no bleeding or hematoma.     Review of Systems   All other systems reviewed and are negative.    HPI:  Jagdish Rea is a 61-year-old male patient with no prior history of coronary artery disease, he does have hypertension dyslipidemia, presented to the hospital with complaints of chest tightness.  Symptoms occurred this morning he felt as pressure in his chest which was severe for about 10 minutes and then it subsided.  Patient has never experienced chest pain like that before and therefore he came to the hospital for further evaluation.  When I interviewed the patient the pain had largely subsided.    Allergies:  Atorvastatin and Lisinopril    Scheduled Meds:  amLODIPine, 10 mg, Oral, Daily  doxepin, 10 mg, Oral, Nightly  hydroCHLOROthiazide, 25 mg, Oral, Daily  lamoTRIgine, 50 mg, Oral, Nightly  metoprolol succinate XL, 25 mg, Oral, Daily  risperiDONE, 1 mg, Oral, Daily  senna-docusate sodium, 2 tablet, Oral, BID  sodium chloride, 10 mL, Intravenous, Q12H      Continuous Infusions:  sodium chloride, 100 mL/hr      PRN Meds:  •  acetaminophen  •  albuterol  •  senna-docusate sodium **AND** polyethylene glycol **AND** bisacodyl **AND** bisacodyl  •  melatonin  •  nitroglycerin  •  ondansetron **OR** ondansetron  •  [COMPLETED] Insert peripheral IV **AND** sodium chloride  •  sodium chloride    Objective     VITAL SIGNS  Vitals:    07/12/22 1239 07/12/22 1820 07/12/22 2231 07/13/22 0542   BP: 136/88 158/84 112/71 103/67   BP Location: Left arm Left arm Right arm Right arm   Patient Position: Lying Lying Lying Lying   Pulse:  71 63 75   Resp: 17 18 18 18   Temp: 98.1 °F (36.7 °C) 97.8 °F (36.6 °C) 97.8 °F (36.6 °C) 98.1 °F (36.7 °C)  "  TempSrc: Oral Oral Oral Oral   SpO2: 97% 97% 94% 95%   Weight: 113 kg (248 lb 14.4 oz)   113 kg (249 lb 1.9 oz)   Height: 180.3 cm (71\")        Flowsheet Rows    Flowsheet Row First Filed Value   Admission Height 180.3 cm (71\") Documented at 07/12/2022 0641   Admission Weight 109 kg (240 lb) Documented at 07/12/2022 0641           TELEMETRY: sinus    Physical Exam:  Vitals reviewed.   Constitutional:       General: Awake.      Appearance: Not in distress. Obese.   Eyes:      Conjunctiva/sclera: Conjunctivae normal.   Pulmonary:      Effort: Pulmonary effort is normal.      Breath sounds: Normal breath sounds.   Cardiovascular:      Normal rate. Regular rhythm. Normal S1. Normal S2.      Murmurs: There is no murmur.      Comments: Left groin site clean, non-tender, no hematoma or bleeding  Pulses:     Intact distal pulses.   Edema:     Peripheral edema absent.   Abdominal:      General: Bowel sounds are normal.      Palpations: Abdomen is soft.   Skin:     General: Skin is warm and dry.   Neurological:      General: No focal deficit present.      Mental Status: Alert and oriented to person, place and time.   Psychiatric:         Behavior: Behavior is cooperative.          LAB RESULTS (LAST 7 DAYS)  I have reviewed new clinical results.    CBC  Results from last 7 days   Lab Units 07/13/22  0618 07/12/22  0734   WBC 10*3/mm3 5.50 5.70   RBC 10*6/mm3 4.61 4.86   HEMOGLOBIN g/dL 15.3 15.8   HEMATOCRIT % 45.0 48.0   MCV fL 97.6* 98.7*   PLATELETS 10*3/mm3 142 142     CMP   Results from last 7 days   Lab Units 07/13/22  0618 07/12/22  0734   SODIUM mmol/L 140 140   POTASSIUM mmol/L 4.7 4.2   CHLORIDE mmol/L 103 105   CO2 mmol/L 27.0 23.0   BUN mg/dL 13 12   CREATININE mg/dL 1.00 0.89   GLUCOSE mg/dL 110* 102*   ALBUMIN g/dL  --  4.20   BILIRUBIN mg/dL  --  0.3   ALK PHOS U/L  --  70   AST (SGOT) U/L  --  20   ALT (SGPT) U/L  --  23   LIPASE U/L  --  39     ProBNP      TROPONIN  Results from last 7 days   Lab Units " 07/12/22  1142   TROPONIN T ng/mL <0.010     CoAg  Results from last 7 days   Lab Units 07/12/22  0734   INR  0.97   APTT seconds 26.0*     Creatinine Clearance  Estimated Creatinine Clearance: 99.2 mL/min (by C-G formula based on SCr of 1 mg/dL).    Radiology  XR Spine Thoracic 3 View    Result Date: 7/12/2022  Examination appears within normal limits.  Electronically Signed By-Williams Mora MD On:7/12/2022 8:17 AM This report was finalized on [id number] by  Williams Mora MD.    XR Chest 1 View    Result Date: 7/12/2022  No acute cardiopulmonary process identified.  Electronically Signed By-Williams Mora MD On:7/12/2022 8:18 AM This report was finalized on [id number] by  Williams Mora MD.      EKG    I personally viewed and interpreted the patient's EKG/Telemetry data:    ECHOCARDIOGRAM:      STRESS MYOVIEW:  Results from 7/12/2022 study:  · Left ventricular ejection fraction is hyperdynamic (Calculated EF > 70%). .  · Myocardial perfusion imaging indicates a small-to-medium-sized, mildly severe area of ischemia located in the inferior wall and lateral wall.  · Impressions are consistent with an intermediate risk study.        CARDIAC CATHETERIZATION:  Results from 7/13/2022 study:    1. No obstructive CAD and major branches  2. Small vessel disease in the right PDA  3. Low LVEDP      OTHER:       ASSESSMENT/PLAN      Benign essential hypertension    Hyperlipidemia    Tobacco abuse    Chest pain    Abnormal nuclear stress test      PLAN  Recommend evaluation of other etiologies of chest pain  Follow up with cardiologist - patient wishes to see Dr. King  Continue current cardiac medications    I discussed the patients findings and my recommendations with patient and nurse.  Further recommendations per [employer].    KEILA Nicholson  07/13/22  10:03 EDT   Electronically signed by KEILA Nicholson, 07/13/22, 10:10 AM EDT.

## 2022-07-14 ENCOUNTER — TRANSITIONAL CARE MANAGEMENT TELEPHONE ENCOUNTER (OUTPATIENT)
Dept: CALL CENTER | Facility: HOSPITAL | Age: 62
End: 2022-07-14

## 2022-07-14 ENCOUNTER — OFFICE VISIT (OUTPATIENT)
Dept: FAMILY MEDICINE CLINIC | Facility: CLINIC | Age: 62
End: 2022-07-14

## 2022-07-14 ENCOUNTER — OFFICE VISIT (OUTPATIENT)
Dept: ORTHOPEDIC SURGERY | Facility: CLINIC | Age: 62
End: 2022-07-14

## 2022-07-14 VITALS
WEIGHT: 249 LBS | SYSTOLIC BLOOD PRESSURE: 137 MMHG | HEART RATE: 93 BPM | BODY MASS INDEX: 34.86 KG/M2 | HEIGHT: 71 IN | DIASTOLIC BLOOD PRESSURE: 88 MMHG

## 2022-07-14 VITALS
BODY MASS INDEX: 33.88 KG/M2 | HEIGHT: 71 IN | SYSTOLIC BLOOD PRESSURE: 137 MMHG | WEIGHT: 242 LBS | HEART RATE: 102 BPM | DIASTOLIC BLOOD PRESSURE: 81 MMHG | OXYGEN SATURATION: 97 % | TEMPERATURE: 96.9 F

## 2022-07-14 DIAGNOSIS — Z72.0 TOBACCO ABUSE: Chronic | ICD-10-CM

## 2022-07-14 DIAGNOSIS — R94.39 ABNORMAL NUCLEAR STRESS TEST: Chronic | ICD-10-CM

## 2022-07-14 DIAGNOSIS — M25.561 ACUTE PAIN OF RIGHT KNEE: Primary | ICD-10-CM

## 2022-07-14 DIAGNOSIS — M25.511 RIGHT SHOULDER PAIN, UNSPECIFIED CHRONICITY: ICD-10-CM

## 2022-07-14 DIAGNOSIS — I10 BENIGN ESSENTIAL HYPERTENSION: Chronic | ICD-10-CM

## 2022-07-14 DIAGNOSIS — I20.0 UNSTABLE ANGINA PECTORIS: Primary | Chronic | ICD-10-CM

## 2022-07-14 LAB — QT INTERVAL: 380 MS

## 2022-07-14 PROCEDURE — 99213 OFFICE O/P EST LOW 20 MIN: CPT | Performed by: ORTHOPAEDIC SURGERY

## 2022-07-14 PROCEDURE — 99495 TRANSJ CARE MGMT MOD F2F 14D: CPT | Performed by: NURSE PRACTITIONER

## 2022-07-14 PROCEDURE — 1111F DSCHRG MED/CURRENT MED MERGE: CPT | Performed by: NURSE PRACTITIONER

## 2022-07-14 PROCEDURE — 20610 DRAIN/INJ JOINT/BURSA W/O US: CPT | Performed by: ORTHOPAEDIC SURGERY

## 2022-07-14 RX ORDER — MELOXICAM 15 MG/1
15 TABLET ORAL DAILY
COMMUNITY
End: 2022-07-14

## 2022-07-14 RX ORDER — DIAZEPAM 5 MG/1
5 TABLET ORAL 2 TIMES DAILY PRN
Qty: 2 TABLET | Refills: 0 | Status: SHIPPED | OUTPATIENT
Start: 2022-07-14 | End: 2022-07-26

## 2022-07-14 RX ORDER — TRIAMCINOLONE ACETONIDE 40 MG/ML
80 INJECTION, SUSPENSION INTRA-ARTICULAR; INTRAMUSCULAR ONCE
Status: COMPLETED | OUTPATIENT
Start: 2022-07-14 | End: 2022-07-14

## 2022-07-14 RX ADMIN — TRIAMCINOLONE ACETONIDE 80 MG: 40 INJECTION, SUSPENSION INTRA-ARTICULAR; INTRAMUSCULAR at 11:05

## 2022-07-14 NOTE — PROGRESS NOTES
Patient ID: Jagdish Rea is a 61 y.o. male.  Right shoulder pain  8/11/20 right shoulder arthroscopy with supraspinatus repair.  Has been doing well but having several months of some pain in the posterior    And anterior of the shoulder.  Also has a longstanding history of knee pain worse in the last 6 months.  Has a history received knee arthroscopy x2.  Having some pain and swelling in the knee noted this is improved with oral medication    Review of Systems:  Right knee pain  Right shoulder pain      Objective:    There were no vitals taken for this visit.    Physical Examination:  Incisions are healed no sign of infection passive elevation 170 abduction 130 external rotation 40 and some mild weakness on supraspinatus testing Speed and [place] are negative belly press and liftoff are 5/5.  Right knee has healed portals mild effusion mild to moderate medial joint line tenderness range of motion 2 to 120 degrees no instability pain on medial Bassam Leroy negative sensory and motor exam are intact in all distributions. Dorsalis pedis and posterior tibialis pulses are palpable and capillary refill is less than two seconds to all digits.       Imaging:   right Knee X-Ray  Indication: Knee pain history of arthroscopy  AP, Lateral views, [place]  Findings: Mild to moderate medial compartment arthritis  no bony lesion  Soft tissues normal  decreased joint spaces  Hardware appropriately positioned not applicable      no prior studies available for comparison.    Assessment:    Right knee pain with degenerative joint disease  Right shoulder pain history of cuff repair    Plan:   I recommend MRI of the right shoulder.  For the knee, I recomend injection after today's evaluation.  Risks and benefits were discussed. Under sterile technique and after timeout and verbal consent I injected 80 mg of Kenalog and 2 mL of 1% Lidocaine plain into the knee. It was well tolerated. Postinjection instructions were  given.      Procedures          Disclaimer: Part of this note may be an electronic transcription/translation of spoken language to printed text using the Dragon Dictation System

## 2022-07-14 NOTE — PROGRESS NOTES
Subjective        Jagdish Rea is a 61 y.o. male.     Chief Complaint   Patient presents with   • Hospital Follow Up Visit     tcm       History of Present Illness  patient is here for hospital follow up . Admitted on 7/12 from the ED. He was having chest pain and difficulty breathing sharp substernal nonradiating pain. He was sent to the 23 hr unit for observation and further testing.   Chest xray no aut process. Xray thoracic spine within normal limits.   EG sinus rhythm at 81 without obvious acute sT changes or ectopy . Stess testing : hyperdynamic EF greater than 70% with small to medium sized mildly severe area of ischemia in inferior wall and lateral wall. Cardiac cath 7/13: showed no obstructive CAD with small vessel disease in the right PDA and the left ventricular end diastolic pressure with recommendation to evaluate for other etiologies of patient pain. He was to follow up with [facility] and Cardiology.  Stop meloxicam.     The following portions of the patient's history were reviewed and updated as appropriate: allergies, current medications, past family history, past medical history, past social history, past surgical history and problem list.      Current Outpatient Medications:   •  albuterol sulfate  (90 Base) MCG/ACT inhaler, Inhale 2 puffs Every 4 (Four) Hours As Needed for Wheezing., Disp: 18 g, Rfl: 1  •  amLODIPine (NORVASC) 10 MG tablet, TAKE 1 TABLET EVERY DAY, Disp: 90 tablet, Rfl: 0  •  diazePAM (Valium) 5 MG tablet, Take 1 tablet by mouth 2 (Two) Times a Day As Needed for Anxiety (take one before leaving home, then repeat as needed at MRI). (Patient taking differently: Take 5 mg by mouth 2 (Two) Times a Day As Needed for Anxiety (take one before leaving home, then repeat as needed at MRI). ONLY FOR MRI), Disp: 2 tablet, Rfl: 0  •  doxepin (SINEquan) 10 MG capsule, TAKE 1 CAPSULE BY MOUTH EVERY NIGHT., Disp: 90 capsule, Rfl: 1  •  hydroCHLOROthiazide (MICROZIDE) 12.5 MG capsule, Take 12.5  mg by mouth As Needed., Disp: , Rfl:   •  lamoTRIgine (LaMICtal) 25 MG tablet, Take 2 tablets by mouth Every Night., Disp: 180 tablet, Rfl: 1  •  metoprolol succinate XL (TOPROL-XL) 25 MG 24 hr tablet, TAKE 1 TABLET EVERY DAY, Disp: 90 tablet, Rfl: 0  •  omeprazole (priLOSEC) 20 MG capsule, Take 20 mg by mouth 2 (Two) Times a Day., Disp: , Rfl:   •  pravastatin (PRAVACHOL) 40 MG tablet, Take 1 tablet by mouth Every Night., Disp: 90 tablet, Rfl: 0  •  risperiDONE (risperDAL) 1 MG tablet, Take 1 tablet by mouth Daily., Disp: 90 tablet, Rfl: 1  No current facility-administered medications for this visit.    Recent Results (from the past 4032 hour(s))   POCT SARS-CoV-2 Antigen TRIPP + Flu    Collection Time: 04/04/22 10:38 AM    Specimen: Swab   Result Value Ref Range    SARS Antigen Not Detected Not Detected    Influenza A Antigen TRIPP Not Detected     Influenza B Antigen TRIPP Not Detected     Internal Control Passed Passed    Lot Number [id number]     Expiration Date 10/28/2022    Procalcitonin    Collection Time: 04/04/22 10:50 AM    Specimen: Blood   Result Value Ref Range    Procalcitonin 0.07 0.00 - 0.25 ng/mL   Respiratory Panel PCR w/COVID-19(SARS-CoV-2) ISAEL/ALEXIA/ELISEO/PAD/COR/MAD/ARNOLD In-House, NP Swab in [facility], 3-4 HR TAT - Swab, Nasopharynx    Collection Time: 04/04/22 10:50 AM    Specimen: Nasopharynx; Swab   Result Value Ref Range    ADENOVIRUS, PCR Not Detected Not Detected    Coronavirus 229E Not Detected Not Detected    Coronavirus HKU1 Not Detected Not Detected    Coronavirus NL63 Not Detected Not Detected    Coronavirus OC43 Not Detected Not Detected    COVID19 Not Detected Not Detected - Ref. Range    Human Metapneumovirus Not Detected Not Detected    Human Rhinovirus/Enterovirus Not Detected Not Detected    Influenza A PCR Not Detected Not Detected    Influenza B PCR Not Detected Not Detected    Parainfluenza Virus 1 Not Detected Not Detected    Parainfluenza Virus 2 Not Detected Not Detected     Parainfluenza Virus 3 Not Detected Not Detected    Parainfluenza Virus 4 Not Detected Not Detected    RSV, PCR Not Detected Not Detected    Bordetella pertussis pcr Not Detected Not Detected    Bordetella parapertussis PCR Not Detected Not Detected    Chlamydophila pneumoniae PCR Not Detected Not Detected    Mycoplasma pneumo by PCR Not Detected Not Detected   Comprehensive Metabolic Panel    Collection Time: 04/04/22 10:50 AM    Specimen: Blood   Result Value Ref Range    Glucose 98 65 - 99 mg/dL    BUN 9 8 - 23 mg/dL    Creatinine 1.10 0.76 - 1.27 mg/dL    Sodium 141 136 - 145 mmol/L    Potassium 3.8 3.5 - 5.2 mmol/L    Chloride 105 98 - 107 mmol/L    CO2 25.5 22.0 - 29.0 mmol/L    Calcium 9.2 8.6 - 10.5 mg/dL    Total Protein 7.7 6.0 - 8.5 g/dL    Albumin 4.40 3.50 - 5.20 g/dL    ALT (SGPT) 80 (H) 1 - 41 U/L    AST (SGOT) 43 (H) 1 - 40 U/L    Alkaline Phosphatase 92 39 - 117 U/L    Total Bilirubin 0.5 0.0 - 1.2 mg/dL    Globulin 3.3 gm/dL    A/G Ratio 1.3 g/dL    BUN/Creatinine Ratio 8.2 7.0 - 25.0    Anion Gap 10.5 5.0 - 15.0 mmol/L    eGFR 76.4 >60.0 mL/min/1.73   CBC Auto Differential    Collection Time: 04/04/22 10:50 AM    Specimen: Blood   Result Value Ref Range    WBC 8.43 3.40 - 10.80 10*3/mm3    RBC 4.84 4.14 - 5.80 10*6/mm3    Hemoglobin 16.0 13.0 - 17.7 g/dL    Hematocrit 47.3 37.5 - 51.0 %    MCV 97.7 (H) 79.0 - 97.0 fL    MCH 33.1 (H) 26.6 - 33.0 pg    MCHC 33.8 31.5 - 35.7 g/dL    RDW 13.0 12.3 - 15.4 %    RDW-SD 46.1 37.0 - 54.0 fl    MPV 11.9 6.0 - 12.0 fL    Platelets 161 140 - 450 10*3/mm3    Neutrophil % 58.0 42.7 - 76.0 %    Lymphocyte % 33.1 19.6 - 45.3 %    Monocyte % 5.5 5.0 - 12.0 %    Eosinophil % 2.1 0.3 - 6.2 %    Basophil % 0.6 0.0 - 1.5 %    Immature Grans % 0.7 (H) 0.0 - 0.5 %    Neutrophils, Absolute 4.89 1.70 - 7.00 10*3/mm3    Lymphocytes, Absolute 2.79 0.70 - 3.10 10*3/mm3    Monocytes, Absolute 0.46 0.10 - 0.90 10*3/mm3    Eosinophils, Absolute 0.18 0.00 - 0.40 10*3/mm3     Basophils, Absolute 0.05 0.00 - 0.20 10*3/mm3    Immature Grans, Absolute 0.06 (H) 0.00 - 0.05 10*3/mm3    nRBC 0.0 0.0 - 0.2 /100 WBC   ECG 12 Lead    Collection Time: 07/12/22  6:53 AM   Result Value Ref Range    QT Interval 380 ms   Comprehensive Metabolic Panel    Collection Time: 07/12/22  7:34 AM    Specimen: Blood   Result Value Ref Range    Glucose 102 (H) 65 - 99 mg/dL    BUN 12 8 - 23 mg/dL    Creatinine 0.89 0.76 - 1.27 mg/dL    Sodium 140 136 - 145 mmol/L    Potassium 4.2 3.5 - 5.2 mmol/L    Chloride 105 98 - 107 mmol/L    CO2 23.0 22.0 - 29.0 mmol/L    Calcium 8.5 (L) 8.6 - 10.5 mg/dL    Total Protein 7.1 6.0 - 8.5 g/dL    Albumin 4.20 3.50 - 5.20 g/dL    ALT (SGPT) 23 1 - 41 U/L    AST (SGOT) 20 1 - 40 U/L    Alkaline Phosphatase 70 39 - 117 U/L    Total Bilirubin 0.3 0.0 - 1.2 mg/dL    Globulin 2.9 gm/dL    A/G Ratio 1.4 g/dL    BUN/Creatinine Ratio 13.5 7.0 - 25.0    Anion Gap 12.0 5.0 - 15.0 mmol/L    eGFR 97.5 >60.0 mL/min/1.73   Protime-INR    Collection Time: 07/12/22  7:34 AM    Specimen: Blood   Result Value Ref Range    Protime 10.0 9.6 - 11.7 Seconds    INR 0.97 0.93 - 1.10   aPTT    Collection Time: 07/12/22  7:34 AM    Specimen: Blood   Result Value Ref Range    PTT 26.0 (L) 61.0 - 76.5 seconds   Lipase    Collection Time: 07/12/22  7:34 AM    Specimen: Blood   Result Value Ref Range    Lipase 39 13 - 60 U/L   Troponin    Collection Time: 07/12/22  7:34 AM    Specimen: Blood   Result Value Ref Range    Troponin T <0.010 0.000 - 0.030 ng/mL   BNP    Collection Time: 07/12/22  7:34 AM    Specimen: Blood   Result Value Ref Range    proBNP 63.9 0.0 - 900.0 pg/mL   CBC Auto Differential    Collection Time: 07/12/22  7:34 AM    Specimen: Blood   Result Value Ref Range    WBC 5.70 3.40 - 10.80 10*3/mm3    RBC 4.86 4.14 - 5.80 10*6/mm3    Hemoglobin 15.8 13.0 - 17.7 g/dL    Hematocrit 48.0 37.5 - 51.0 %    MCV 98.7 (H) 79.0 - 97.0 fL    MCH 32.5 26.6 - 33.0 pg    MCHC 33.0 31.5 - 35.7 g/dL    RDW  14.1 12.3 - 15.4 %    RDW-SD 49.0 37.0 - 54.0 fl    MPV 9.4 6.0 - 12.0 fL    Platelets 142 140 - 450 10*3/mm3    Neutrophil % 51.0 42.7 - 76.0 %    Lymphocyte % 38.6 19.6 - 45.3 %    Monocyte % 6.4 5.0 - 12.0 %    Eosinophil % 3.0 0.3 - 6.2 %    Basophil % 1.0 0.0 - 1.5 %    Neutrophils, Absolute 2.90 1.70 - 7.00 10*3/mm3    Lymphocytes, Absolute 2.20 0.70 - 3.10 10*3/mm3    Monocytes, Absolute 0.40 0.10 - 0.90 10*3/mm3    Eosinophils, Absolute 0.20 0.00 - 0.40 10*3/mm3    Basophils, Absolute 0.10 0.00 - 0.20 10*3/mm3    nRBC 0.1 0.0 - 0.2 /100 WBC   COVID-19,CEPHEID/ABHIJIT,COR/ELISEO/PAD/COLETTE IN-HOUSE(OR EMERGENT/ADD-ON),NP SWAB IN TRANSPORT MEDIA 3-4 HR TAT, RT-PCR - Swab, Nasopharynx    Collection Time: 07/12/22  8:58 AM    Specimen: Nasopharynx; Swab   Result Value Ref Range    COVID19 Not Detected Not Detected - Ref. Range   Troponin    Collection Time: 07/12/22 11:42 AM    Specimen: Blood   Result Value Ref Range    Troponin T <0.010 0.000 - 0.030 ng/mL   Stress Test With Myocardial Perfusion One Day    Collection Time: 07/12/22  2:17 PM   Result Value Ref Range    Target HR (85%) 135 bpm    Max. Pred. HR (100%) 159 bpm    BH CV STRESS PROTOCOL 1 Pharmacologic     Stage 1 1     HR Stage 1 76     BP Stage 1 135/86     Duration Min Stage 1 0     Duration Sec Stage 1 10     Stress Dose Regadenoson Stage 1 0.4     Stress Comments Stage 1 10 sec bolus injection     Baseline HR 63 bpm    Baseline /90 mmHg    Peak HR 87 bpm    Percent Max Pred HR 54.72 %    Percent Target HR 64 %    Peak /90 mmHg    Recovery HR 72 bpm    Recovery /84 mmHg    BH CV REST NUCLEAR ISOTOPE DOSE 10.0 mCi    BH CV STRESS NUCLEAR ISOTOPE DOSE 30.8 mCi    Nuc Stress EF 71 %   Basic Metabolic Panel    Collection Time: 07/13/22  6:18 AM    Specimen: Blood   Result Value Ref Range    Glucose 110 (H) 65 - 99 mg/dL    BUN 13 8 - 23 mg/dL    Creatinine 1.00 0.76 - 1.27 mg/dL    Sodium 140 136 - 145 mmol/L    Potassium 4.7 3.5 - 5.2  "mmol/L    Chloride 103 98 - 107 mmol/L    CO2 27.0 22.0 - 29.0 mmol/L    Calcium 8.8 8.6 - 10.5 mg/dL    BUN/Creatinine Ratio 13.0 7.0 - 25.0    Anion Gap 10.0 5.0 - 15.0 mmol/L    eGFR 85.6 >60.0 mL/min/1.73   Manual Differential    Collection Time: 07/13/22  6:18 AM    Specimen: Blood   Result Value Ref Range    Neutrophil % 52.0 42.7 - 76.0 %    Lymphocyte % 41.0 19.6 - 45.3 %    Monocyte % 3.0 (L) 5.0 - 12.0 %    Eosinophil % 2.0 0.3 - 6.2 %    Basophil % 2.0 (H) 0.0 - 1.5 %    Neutrophils Absolute 2.86 1.70 - 7.00 10*3/mm3    Lymphocytes Absolute 2.26 0.70 - 3.10 10*3/mm3    Monocytes Absolute 0.17 0.10 - 0.90 10*3/mm3    Eosinophils Absolute 0.11 0.00 - 0.40 10*3/mm3    Basophils Absolute 0.11 0.00 - 0.20 10*3/mm3    RBC Morphology Normal Normal    WBC Morphology Normal Normal    Large Platelets Slight/1+ None Seen   CBC Auto Differential    Collection Time: 07/13/22  6:18 AM    Specimen: Blood   Result Value Ref Range    WBC 5.50 3.40 - 10.80 10*3/mm3    RBC 4.61 4.14 - 5.80 10*6/mm3    Hemoglobin 15.3 13.0 - 17.7 g/dL    Hematocrit 45.0 37.5 - 51.0 %    MCV 97.6 (H) 79.0 - 97.0 fL    MCH 33.1 (H) 26.6 - 33.0 pg    MCHC 33.9 31.5 - 35.7 g/dL    RDW 13.7 12.3 - 15.4 %    RDW-SD 46.4 37.0 - 54.0 fl    MPV 9.2 6.0 - 12.0 fL    Platelets 142 140 - 450 10*3/mm3         Review of Systems    Objective     /81 (BP Location: Left arm, Patient Position: Sitting, Cuff Size: Adult)   Pulse 102   Temp 96.9 °F (36.1 °C) (Infrared)   Ht 180.3 cm (71\")   Wt 110 kg (242 lb)   SpO2 97%   BMI 33.75 kg/m²     Physical Exam  Vitals and nursing note reviewed.   Constitutional:       Appearance: Normal appearance.   HENT:      Head: Normocephalic.      Right Ear: External ear normal.      Left Ear: External ear normal.      Nose: Nose normal.      Mouth/Throat:      Mouth: Mucous membranes are moist.   Eyes:      Conjunctiva/sclera: Conjunctivae normal.      Pupils: Pupils are equal, round, and reactive to light. "   Cardiovascular:      Rate and Rhythm: Normal rate and regular rhythm.      Pulses: Normal pulses.      Heart sounds: Normal heart sounds.   Pulmonary:      Effort: Pulmonary effort is normal.      Breath sounds: Normal breath sounds.   Abdominal:      General: Bowel sounds are normal.      Palpations: Abdomen is soft.   Musculoskeletal:         General: Normal range of motion.      Cervical back: Neck supple.   Skin:     General: Skin is warm and dry.      Capillary Refill: Capillary refill takes less than 2 seconds.   Neurological:      General: No focal deficit present.      Mental Status: He is alert and oriented to person, place, and time.   Psychiatric:         Mood and Affect: Mood normal.         Behavior: Behavior normal.         Thought Content: Thought content normal.         Judgment: Judgment normal.         Result Review :                Assessment & Plan    Diagnoses and all orders for this visit:    1. Unstable angina pectoris (HCC) (Primary)  Comments:  stable at this time no complaints will f/u with gastroenterology and cardiology  Orders:  -     Ambulatory Referral to Cardiology    2. Benign essential hypertension  Comments:  stable  Orders:  -     Ambulatory Referral to Cardiology    3. Abnormal nuclear stress test  Comments:  completed heart cath in hosptial referral to cardiology  Orders:  -     Ambulatory Referral to Cardiology    4. Tobacco abuse  Comments:  is trying to cut down recommend he stop  Orders:  -     Ambulatory Referral to Cardiology      Patient Instructions   If you dont hear from cardiology let me know  Make appointment with Gastroenterology  Eat health  Stop smoking  Can take tums as well as PPI>       Follow Up   Return if symptoms worsen or fail to improve, for Next scheduled follow up.    Patient was given instructions and counseling regarding his condition or for health maintenance advice. Please see specific information pulled into the AVS if appropriate.     Yamilka ARMSTRONG  Ankur, KEILA    07/14/22

## 2022-07-14 NOTE — PATIENT INSTRUCTIONS
If you dont hear from cardiology let me know  Make appointment with Gastroenterology  Eat health  Stop smoking  Can take tums as well as PPI>

## 2022-07-14 NOTE — OUTREACH NOTE
Call Center TCM Note    Flowsheet Row Responses   [facility] patient discharged from? [facility]   Does the patient have one of the following disease processes/diagnoses(primary or secondary)? Other   TCM attempt successful? Yes   Call start time 1126   Call end time 1128   Discharge diagnosis chest pain and some difficulty catching his breath   left heart cath   Meds reviewed with patient/caregiver? Yes   Is the patient having any side effects they believe may be caused by any medication additions or changes? No   Does the patient have all medications ordered at discharge? N/A   Is the patient taking all medications as directed (includes completed medication regime)? Yes   Does the patient have a primary care provider?  Yes   Does the patient have an appointment with their PCP within 7 days of discharge? Yes   Comments regarding PCP Hospital d/c f/u appt on 7/14/22 @3:15pm   Has the patient kept scheduled appointments due by today? N/A   Has home health visited the patient within 72 hours of discharge? N/A   Psychosocial issues? No   Did the patient receive a copy of their discharge instructions? Yes   Nursing interventions Reviewed instructions with patient   What is the patient's perception of their health status since discharge? Improving   Is the patient/caregiver able to teach back the hierarchy of who to call/visit for symptoms/problems? PCP, Specialist, Home health nurse, Urgent Care, ED, 911 Yes   If the patient is a current smoker, are they able to teach back resources for cessation? Smoking cessation medications   TCM call completed? Yes          ANTONELLA PATE RN    7/14/2022, 11:29 EDT

## 2022-07-14 NOTE — PATIENT INSTRUCTIONS
MRI follow-up instructions    Today at your office visit, Dr. Ritchie recommended an MRI (magnetic resonance imaging) to evaluate your joint pain.  This requires a precertification process, which our office will do, and then we will contact you when it is approved and go over scheduling options.  We typically recommend these to be performed at [facility] or [facility].  If for some reason it is performed elsewhere please arrange to have that facility give you a disc with your images on it so Dr. Ricthie can review it at your follow-up visit.    When checking out today we recommend making an appointment to go over your results in approximately two weeks.  If your MRI is done sooner than that we would be happy to schedule you sooner to go over your results, just contact us at [phone] or through the [employer] portal to let us know your MRI is completed.  Seeing you in person for the results gives us the best opportunity to look at your images together and explain the diagnosis and treatment options to best help you.

## 2022-07-26 ENCOUNTER — OFFICE VISIT (OUTPATIENT)
Dept: CARDIOLOGY | Facility: CLINIC | Age: 62
End: 2022-07-26

## 2022-07-26 VITALS
DIASTOLIC BLOOD PRESSURE: 88 MMHG | HEIGHT: 71 IN | HEART RATE: 84 BPM | BODY MASS INDEX: 35.22 KG/M2 | WEIGHT: 251.6 LBS | RESPIRATION RATE: 18 BRPM | SYSTOLIC BLOOD PRESSURE: 128 MMHG

## 2022-07-26 DIAGNOSIS — R07.89 CHEST PAIN, ATYPICAL: Primary | ICD-10-CM

## 2022-07-26 PROCEDURE — 99214 OFFICE O/P EST MOD 30 MIN: CPT | Performed by: INTERNAL MEDICINE

## 2022-07-26 NOTE — PROGRESS NOTES
"Cardiology Clinic Note  Andres Valadez MD, PhD    Subjective:     Encounter Date:07/26/2022      Patient ID: Jagdish Rea is a 61 y.o. male.    Chief Complaint:  No chief complaint on file.      HPI:  I the pleasure to see this 61-year-old gentleman today in hospital follow-up.  Hypertension hyperlipidemia, history of heart cath with nonobstructive mild luminal irregularities, preserved EF with normal filling pressures.  He is on amlodipine HCTZ metoprolol and pravastatin as his CV medicines.  Blood pressures well controlled at 128/88 with heart rates in the 80s.  He has no further chest discomfort which was his initial complaint on presentation to the ER.  LV gram demonstrated EF 55%.  He is reassured by these findings.  We discussed primary prevention goals, diet exercise heart healthy diet and referral back to primary care which she is amenable for.  He did not get a 2D echo which is indicated with prior episodes of chest pain for complete cardiac work-up.  He is amenable for this today    Review of systems otherwise negative x14 point review of systems except as mentioned above      Historical data copied forward from previous encounters in EMR including the history, exam, and assessment/plan has been reviewed and is unchanged unless noted otherwise.    Cardiac medicines reviewed with risk, benefits, and necessity of each discussed.    Risk and benefit of cardiac testing reviewed including death heart attack stroke pain bleeding infection need for vascular /cardiovascular surgery were discussed and the patient     Objective:         /88 (BP Location: Left arm, Patient Position: Sitting)   Pulse 84   Resp 18   Ht 180.3 cm (71\")   Wt 114 kg (251 lb 9.6 oz)   BMI 35.09 kg/m²     Physical Exam  Regular rate and rhythm no rubs gallops heave or lift  1 out of 6 systolic ejection murmur left sternal border  Well compensated euvolemic appearing with no edema  Normal pulses next normal cap refill  No carotid " bruits or JVD  Intact grossly  Clear to auscultation    Assessment:         Diagnoses and all orders for this visit:    1. Chest pain, atypical (Primary)  -     Adult Transthoracic Echo Complete W/ Cont if Necessary Per Protocol; Future           Plan:      Atypical chest pain  Faint murmur on exam  2D echo for structural functional evaluation as  Nonobstructive CAD  Preserved LVEF by LV gram  Hyperlipidemia, atypical chest pain  Tobacco abuse, recommend cessation but no quit date established  Essential hypertension controlled today on encounter  On a beta-blocker with low-dose metoprolol to be continued  Continue pravastatin    See him back in 1 year unless echo is abnormal        The pleasure to be involved in this patient's cardiovascular care.  Please call with any questions or concerns  Andres Valadez MD, PhD    Most recent EKG as reviewed and interpreted by me:  Procedures     Most recent echo as reviewed and interpreted by me:      Most recent stress test as reviewed and interpreted by me:  Results for orders placed during the hospital encounter of 22    Stress Test With Myocardial Perfusion One Day    Interpretation Summary  · Left ventricular ejection fraction is hyperdynamic (Calculated EF > 70%). .  · Myocardial perfusion imaging indicates a small-to-medium-sized, mildly severe area of ischemia located in the inferior wall and lateral wall.  · Impressions are consistent with an intermediate risk study.      Most recent cardiac catheterization as reviewed interpreted by me:  Results for orders placed during the hospital encounter of 22    Cardiac Catheterization/Vascular Study    Narrative  2022      Heart Cath Report    NAME:              Jagdish Rea  :                1960  AGE/SEX:        61 y.o. male  MRN:                [id number]        Procedures Performed    1. Left heart catheterization  2. Selective coronary angiography  3. Left ventriculography  4.  Mynx    :   Prieto Sidhu MD    Vascular Access Site: Femoral    Indication for procedure: Recurrent prolonged substernal chest pain, abnormal stress test, hypertension, dyslipidemia, cigarette smoker, obesity with BMI over 30      Procedure Note    After discussing the risks, benefits, and alternatives of the procedure, informed consent was obtained.  Timeout was done before the procedure.  Moderate conscious sedation was given utilizing IV Versed and fentanyl administered by RN with continuous EKG oximetry and hemodynamic monitoring supervised by me throughout the entire case, conscious sedation time was 30 minutes.  I was present with the patient for the duration of moderate sedation and supervised staff who had no other duties and monitored the patient for the entire procedure patient had Grimes 2-3 sedation scale. the vascular access site was prepped and draped in the usual sterile fashion.  2% lidocaine was used for local anesthesia. Appropriate landmarks were assessed.  A 6 Turkmen short sheath was inserted in the artery using the modified Seldinger technique.    Selective coronary angiography was performed with JL4 and JR4 diagnostic catheters. Left ventriculogram  was performed with an angled pigtail catheter.  All exchanges were performed over the wire.  No specimens were removed.  There were no apparent acute or early complications.  The patient tolerated the procedure well and was transferred to the recovery area in stable condition.    Closure device: Mynx device was deployed successfully after right iliofemoral angiogram was performed. Good hemostasis was achieved.    Complications:  None  Blood Loss: minimal    Hemodynamics    Pressures    Ao:    120/71 mmHg  LV:    110/2 mmHg  End-diastolic pressure:  2  mmHg  No significant aortic valvular gradient on pullback    Coronary Angiography    Left Main :  The left main   is without disease    Left Anterior Descending : Is without  disease    Left Circumflex : Is without disease    Right Coronary Artery :  The right coronary artery   is dominant vessel.  Distal PDA has diffuse tapering with severe disease in distal tips, consistent with small vessel disease    Dominance:  []  Left  [x]  Right  []  Co-Dominant      Left Ventriculography:    Estimated Ejection Fraction: 55%  Wall motion abnormalities:  None  Mitral Regurgitation:  None    Impression:    1. No obstructive CAD and major branches  2. Small vessel disease in the right PDA  3. Low LVEDP    Recommendations:    1. Evaluate other etiologies of patient's chest pain        I sincerely appreciate the opportunity to participate in your patient's care. Please feel free to contact me anytime if I can be of assistance in this or any other way.      Pertinent History    Past Medical History:  Diagnosis Date  • Allergic  • Anxiety  • Back pain  • COPD (chronic obstructive pulmonary disease) (HCC)  • GERD (gastroesophageal reflux disease)  • Hyperlipidemia  • Hypertension  • PTSD (post-traumatic stress disorder)  • Thoracic disc herniation  • Vitamin B12 deficiency    Past Surgical History:  Procedure Laterality Date  • ENDOSCOPY  • HERNIA REPAIR  • KNEE ARTHROPLASTY  x2  • SHOULDER ARTHROSCOPY W/ ROTATOR CUFF REPAIR Right 08/11/2020  Procedure: SHOULDER ARTHROSCOPY WITH ROTATOR CUFF REPAIR, extensive debridement;  Surgeon: Fredi Ritchie MD;  Location: [facility] MAIN OR;  Service: Orthopedics;  Laterality: Right;  • TONSILLECTOMY    Prior to Admission medications  Medication Sig Start Date End Date Taking? Authorizing Provider  albuterol sulfate  (90 Base) MCG/ACT inhaler Inhale 2 puffs Every 4 (Four) Hours As Needed for Wheezing. 4/4/22  Yes Yamilka Pascual APRN  amLODIPine (NORVASC) 10 MG tablet TAKE 1 TABLET EVERY DAY 6/12/22  Yes Yamilka Pascual APRN  doxepin (SINEquan) 10 MG capsule TAKE 1 CAPSULE BY MOUTH EVERY NIGHT. 3/10/22  Yes Yamilka Pascual APRN  hydroCHLOROthiazide  (MICROZIDE) 12.5 MG capsule Take 12.5 mg by mouth As Needed.   Yes Provider, MD Sulaiman  lamoTRIgine (LaMICtal) 25 MG tablet Take 2 tablets by mouth Every Night. 12/1/20  Yes Yamilka Pascual APRN  meloxicam (MOBIC) 7.5 MG tablet TAKE ONE TABLET BY MOUTH TWICE A DAY  Patient taking differently: 15 mg. 5/1/22  Yes Yamilka Pascual APRN  metoprolol succinate XL (TOPROL-XL) 25 MG 24 hr tablet TAKE 1 TABLET EVERY DAY 6/12/22  Yes Yamilka Pascual APRN  omeprazole (priLOSEC) 20 MG capsule Take 20 mg by mouth 2 (Two) Times a Day.   Yes Provider, MD Sulaiman  pravastatin (PRAVACHOL) 40 MG tablet Take 1 tablet by mouth Every Night. 10/25/21  Yes Yamilka Pascual APRN  risperiDONE (risperDAL) 1 MG tablet Take 1 tablet by mouth Daily. 12/1/20  Yes Yamilka Pascual APRN      Pre-Procedure Notes  H&P Performed  [x]  Yes []  No       []  N/A    Indications:  []  ACS <= 24 HRS  []  ACS >24 HRS  []  New Onset Angina <= 2 mos  []  Worsening Angina  []  Resuscitated Cardiac Arrest  []  Angina on Exertion:  []  Suspected CAD  []  Valvular Disease  []  Pericardial Disease  []  Cardiac Arrythmia  []  Cardiomyopathy  []  LV Dysfunction  []  Syncope  []  Post Cardiac Transplant  []  Eval. For Exercise Clearance  []  Other  []  Pre-Operative Evaluation  If Pre-Op Eval:  Evaluation for Surgery Type:  []  Cardiac Surgery   []  Non-Cardiac Surgery  Functional Capacity:  []  <4 METS  []  >=4 METS w/o symptoms  []  >= 4 METS with symptoms  []  Unknown  Surgical Risk:  []  Low  []  Intermediate  []  High Risk: Vascular  []  High Risk Non-Vascular    Risks, Benefits, & Complications Discussed:  [x]  Yes  []  No  []  N/A    Questions Answered:  [x]  Yes  []  No  []  N/A    Consent Obtained:  [x]  Yes  []  No  []  N/A    CHF: []  Yes  [x]  No  If Yes:  Newly Diagnosed?  []  Yes  []  No  If Yes:  HF Type:  []  Diastolic  []  Systolic  []  Unknown      Prieto Sidhu MD  7/13/2022  09:08 EDT  Electronically signed by Prieto Richardson  MD Nahum, 07/13/22, 9:08 AM EDT.    The following portions of the patient's history were reviewed and updated as appropriate: allergies, current medications, past family history, past medical history, past social history, past surgical history and problem list.      ROS:  14 point review of systems negative except as mentioned above    Current Outpatient Medications:   •  albuterol sulfate  (90 Base) MCG/ACT inhaler, Inhale 2 puffs Every 4 (Four) Hours As Needed for Wheezing., Disp: 18 g, Rfl: 1  •  amLODIPine (NORVASC) 10 MG tablet, TAKE 1 TABLET EVERY DAY, Disp: 90 tablet, Rfl: 0  •  doxepin (SINEquan) 10 MG capsule, TAKE 1 CAPSULE BY MOUTH EVERY NIGHT., Disp: 90 capsule, Rfl: 1  •  hydroCHLOROthiazide (MICROZIDE) 12.5 MG capsule, Take 12.5 mg by mouth As Needed., Disp: , Rfl:   •  lamoTRIgine (LaMICtal) 25 MG tablet, Take 2 tablets by mouth Every Night., Disp: 180 tablet, Rfl: 1  •  metoprolol succinate XL (TOPROL-XL) 25 MG 24 hr tablet, TAKE 1 TABLET EVERY DAY, Disp: 90 tablet, Rfl: 0  •  omeprazole (priLOSEC) 20 MG capsule, Take 20 mg by mouth 2 (Two) Times a Day., Disp: , Rfl:   •  pravastatin (PRAVACHOL) 40 MG tablet, Take 1 tablet by mouth Every Night., Disp: 90 tablet, Rfl: 0  •  risperiDONE (risperDAL) 1 MG tablet, Take 1 tablet by mouth Daily., Disp: 90 tablet, Rfl: 1    Problem List:  Patient Active Problem List   Diagnosis   • Anxiety   • Benign essential hypertension   • Chronic low back pain   • Hyperlipidemia   • Other cervical disc degeneration at C5-C6 level   • Peripheral neuropathy   • Thoracic disc herniation   • Vitamin B12 deficiency   • Right shoulder pain   • Fall   • Radicular pain in right arm   • Acute pain of right shoulder   • Tobacco abuse   • Chronic pain of both knees   • Initial Medicare annual wellness visit   • Pneumonia of both lungs due to infectious organism   • Chest pain   • Abnormal nuclear stress test     Past Medical History:  Past Medical History:   Diagnosis  Date   • Allergic    • Anxiety    • Back pain    • COPD (chronic obstructive pulmonary disease) ([facility])    • GERD (gastroesophageal reflux disease)    • Hyperlipidemia    • Hypertension    • PTSD (post-traumatic stress disorder)    • Thoracic disc herniation    • Vitamin B12 deficiency      Past Surgical History:  Past Surgical History:   Procedure Laterality Date   • CARDIAC CATHETERIZATION N/A 7/13/2022    Procedure: Left Heart Cath, possible pci;  Surgeon: Prieto Sidhu MD;  Location: [facility] CATH INVASIVE LOCATION;  Service: Cardiovascular;  Laterality: N/A;   • ENDOSCOPY     • HERNIA REPAIR     • KNEE ARTHROPLASTY      x2   • SHOULDER ARTHROSCOPY W/ ROTATOR CUFF REPAIR Right 08/11/2020    Procedure: SHOULDER ARTHROSCOPY WITH ROTATOR CUFF REPAIR, extensive debridement;  Surgeon: Fredi Ritchie MD;  Location: [facility] MAIN OR;  Service: Orthopedics;  Laterality: Right;   • TONSILLECTOMY       Social History:  Social History     Socioeconomic History   • Marital status:    Tobacco Use   • Smoking status: Current Every Day Smoker     Packs/day: 1.00     Years: 47.00     Pack years: 47.00     Types: Cigarettes     Start date: 1/9/1973   • Smokeless tobacco: Never Used   Vaping Use   • Vaping Use: Never used   Substance and Sexual Activity   • Alcohol use: No   • Drug use: No   • Sexual activity: Defer     Allergies:  Allergies   Allergen Reactions   • Atorvastatin Swelling   • Lisinopril Unknown - High Severity     Facial paralysis      Immunizations:  Immunization History   Administered Date(s) Administered   • COVID-19 (ACE) 03/13/2021   • Pneumococcal Polysaccharide (PPSV23) 05/13/2015, 07/13/2018   • Shingrix 07/30/2021, 10/05/2021            In-Office Procedure(s):  No orders to display        ASCVD RIsk Score::  The 10-year ASCVD risk score ([place] KALEE Saleh., et al., 2013) is: 26.2%    Values used to calculate the score:      Age: 61 years      Sex: Male      Is Non-   American: Yes      Diabetic: No      Tobacco smoker: Yes      Systolic Blood Pressure: 128 mmHg      Is BP treated: Yes      HDL Cholesterol: 27 mg/dL      Total Cholesterol: 180 mg/dL    Imaging:    Results for orders placed in visit on 07/14/22    XR Knee 3 View Right    Narrative  right Knee X-Ray  Indication: Knee pain history of arthroscopy  AP, Lateral views, [place]  Findings: Mild to moderate medial compartment arthritis  no bony lesion  Soft tissues normal  decreased joint spaces  Hardware appropriately positioned not applicable      no prior studies available for comparison.      X-RAY was ordered and reviewed by Fredi Ritchie MD       Results for orders placed during the hospital encounter of 05/24/21    CT Head Without Contrast    Narrative  CT HEAD WO CONTRAST-    Date of Exam: 5/24/2021 12:26 PM    Indication: trauma back head.    Comparison: CT head without contrast 6/17/2018.    Technique:  Without contrast, contiguous axial CT images of the head  were obtained from skull base to vertex.  Coronal and sagittal  reconstructions were performed.  Automated exposure control and  iterative reconstruction methods were used.    FINDINGS  Mild bifrontal atrophy appears similar to the prior examination. The  ventricular configuration is normal. Gray matter-white matter junction  seems appears preserved without CT evidence of acute or evolving  infarct. No mass lesion, mass effect or midline shift is seen. No  intracranial hemorrhage is evident. Calvarium is within normal limits.  Paranasal sinuses and mastoid air cells are clear.    Impression  No acute intracranial finding. No significant  change from 6/17/2018.    Electronically Signed By-Carli Scott MD On:5/24/2021 12:52 PM  This report was finalized on [id number] by  Carli Scott MD.      Results for orders placed during the hospital encounter of 05/24/21    CT Head Without Contrast    Narrative  CT HEAD WO CONTRAST-    Date of Exam:  5/24/2021 12:26 PM    Indication: trauma back head.    Comparison: CT head without contrast 6/17/2018.    Technique:  Without contrast, contiguous axial CT images of the head  were obtained from skull base to vertex.  Coronal and sagittal  reconstructions were performed.  Automated exposure control and  iterative reconstruction methods were used.    FINDINGS  Mild bifrontal atrophy appears similar to the prior examination. The  ventricular configuration is normal. Gray matter-white matter junction  seems appears preserved without CT evidence of acute or evolving  infarct. No mass lesion, mass effect or midline shift is seen. No  intracranial hemorrhage is evident. Calvarium is within normal limits.  Paranasal sinuses and mastoid air cells are clear.    Impression  No acute intracranial finding. No significant  change from 6/17/2018.    Electronically Signed By-Carli Scott MD On:5/24/2021 12:52 PM  This report was finalized on [id number] by  Carli Scott MD.      Lab Review:   Admission on 07/12/2022, Discharged on 07/13/2022   Component Date Value   • QT Interval 07/12/2022 380    • Glucose 07/12/2022 102 (A)   • BUN 07/12/2022 12    • Creatinine 07/12/2022 0.89    • Sodium 07/12/2022 140    • Potassium 07/12/2022 4.2    • Chloride 07/12/2022 105    • CO2 07/12/2022 23.0    • Calcium 07/12/2022 8.5 (A)   • Total Protein 07/12/2022 7.1    • Albumin 07/12/2022 4.20    • ALT (SGPT) 07/12/2022 23    • AST (SGOT) 07/12/2022 20    • Alkaline Phosphatase 07/12/2022 70    • Total Bilirubin 07/12/2022 0.3    • Globulin 07/12/2022 2.9    • A/G Ratio 07/12/2022 1.4    • BUN/Creatinine Ratio 07/12/2022 13.5    • Anion Gap 07/12/2022 12.0    • eGFR 07/12/2022 97.5    • Protime 07/12/2022 10.0    • INR 07/12/2022 0.97    • PTT 07/12/2022 26.0 (A)   • Lipase 07/12/2022 39    • Troponin T 07/12/2022 <0.010    • proBNP 07/12/2022 63.9    • WBC 07/12/2022 5.70    • RBC 07/12/2022 4.86    • Hemoglobin 07/12/2022 15.8    •  Hematocrit 07/12/2022 48.0    • MCV 07/12/2022 98.7 (A)   • MCH 07/12/2022 32.5    • MCHC 07/12/2022 33.0    • RDW 07/12/2022 14.1    • RDW-SD 07/12/2022 49.0    • MPV 07/12/2022 9.4    • Platelets 07/12/2022 142    • Neutrophil % 07/12/2022 51.0    • Lymphocyte % 07/12/2022 38.6    • Monocyte % 07/12/2022 6.4    • Eosinophil % 07/12/2022 3.0    • Basophil % 07/12/2022 1.0    • Neutrophils, Absolute 07/12/2022 2.90    • Lymphocytes, Absolute 07/12/2022 2.20    • Monocytes, Absolute 07/12/2022 0.40    • Eosinophils, Absolute 07/12/2022 0.20    • Basophils, Absolute 07/12/2022 0.10    • nRBC 07/12/2022 0.1    • Target HR (85%) 07/12/2022 135    • Max. Pred. HR (100%) 07/12/2022 159    •  CV STRESS PROTOCOL 1 07/12/2022 Pharmacologic    • Stage 1 07/12/2022 1    • HR Stage 1 07/12/2022 76    • BP Stage 1 07/12/2022 135/86    • Duration Min Stage 1 07/12/2022 0    • Duration Sec Stage 1 07/12/2022 10    • Stress Dose Regadenoson * 07/12/2022 0.4    • Stress Comments Stage 1 07/12/2022 10 sec bolus injection    • Baseline HR 07/12/2022 63    • Baseline BP 07/12/2022 146/90    • Peak HR 07/12/2022 87    • Percent Max Pred HR 07/12/2022 54.72    • Percent Target HR 07/12/2022 64    • Peak BP 07/12/2022 146/90    • Recovery HR 07/12/2022 72    • Recovery BP 07/12/2022 117/84    •  CV REST NUCLEAR ISOTO* 07/12/2022 10.0    • BH CV STRESS NUCLEAR ISO* 07/12/2022 30.8    • Nuc Stress EF 07/12/2022 71    • COVID19 07/12/2022 Not Detected    • Troponin T 07/12/2022 <0.010    • Glucose 07/13/2022 110 (A)   • BUN 07/13/2022 13    • Creatinine 07/13/2022 1.00    • Sodium 07/13/2022 140    • Potassium 07/13/2022 4.7    • Chloride 07/13/2022 103    • CO2 07/13/2022 27.0    • Calcium 07/13/2022 8.8    • BUN/Creatinine Ratio 07/13/2022 13.0    • Anion Gap 07/13/2022 10.0    • eGFR 07/13/2022 85.6    • Neutrophil % 07/13/2022 52.0    • Lymphocyte % 07/13/2022 41.0    • Monocyte % 07/13/2022 3.0 (A)   • Eosinophil % 07/13/2022 2.0     • Basophil % 07/13/2022 2.0 (A)   • Neutrophils Absolute 07/13/2022 2.86    • Lymphocytes Absolute 07/13/2022 2.26    • Monocytes Absolute 07/13/2022 0.17    • Eosinophils Absolute 07/13/2022 0.11    • Basophils Absolute 07/13/2022 0.11    • RBC Morphology 07/13/2022 Normal    • WBC Morphology 07/13/2022 Normal    • Large Platelets 07/13/2022 Slight/1+    • WBC 07/13/2022 5.50    • RBC 07/13/2022 4.61    • Hemoglobin 07/13/2022 15.3    • Hematocrit 07/13/2022 45.0    • MCV 07/13/2022 97.6 (A)   • MCH 07/13/2022 33.1 (A)   • MCHC 07/13/2022 33.9    • RDW 07/13/2022 13.7    • RDW-SD 07/13/2022 46.4    • MPV 07/13/2022 9.2    • Platelets 07/13/2022 142    Lab on 04/04/2022   Component Date Value   • Procalcitonin 04/04/2022 0.07    • ADENOVIRUS, PCR 04/04/2022 Not Detected    • Coronavirus 229E 04/04/2022 Not Detected    • Coronavirus HKU1 04/04/2022 Not Detected    • Coronavirus NL63 04/04/2022 Not Detected    • Coronavirus OC43 04/04/2022 Not Detected    • COVID19 04/04/2022 Not Detected    • Human Metapneumovirus 04/04/2022 Not Detected    • Human Rhinovirus/Enterov* 04/04/2022 Not Detected    • Influenza A PCR 04/04/2022 Not Detected    • Influenza B PCR 04/04/2022 Not Detected    • Parainfluenza Virus 1 04/04/2022 Not Detected    • Parainfluenza Virus 2 04/04/2022 Not Detected    • Parainfluenza Virus 3 04/04/2022 Not Detected    • Parainfluenza Virus 4 04/04/2022 Not Detected    • RSV, PCR 04/04/2022 Not Detected    • Bordetella pertussis pcr 04/04/2022 Not Detected    • Bordetella parapertussis* 04/04/2022 Not Detected    • Chlamydophila pneumoniae* 04/04/2022 Not Detected    • Mycoplasma pneumo by PCR 04/04/2022 Not Detected    • Glucose 04/04/2022 98    • BUN 04/04/2022 9    • Creatinine 04/04/2022 1.10    • Sodium 04/04/2022 141    • Potassium 04/04/2022 3.8    • Chloride 04/04/2022 105    • CO2 04/04/2022 25.5    • Calcium 04/04/2022 9.2    • Total Protein 04/04/2022 7.7    • Albumin 04/04/2022 4.40    •  ALT (SGPT) 04/04/2022 80 (A)   • AST (SGOT) 04/04/2022 43 (A)   • Alkaline Phosphatase 04/04/2022 92    • Total Bilirubin 04/04/2022 0.5    • Globulin 04/04/2022 3.3    • A/G Ratio 04/04/2022 1.3    • BUN/Creatinine Ratio 04/04/2022 8.2    • Anion Gap 04/04/2022 10.5    • eGFR 04/04/2022 76.4    • WBC 04/04/2022 8.43    • RBC 04/04/2022 4.84    • Hemoglobin 04/04/2022 16.0    • Hematocrit 04/04/2022 47.3    • MCV 04/04/2022 97.7 (A)   • MCH 04/04/2022 33.1 (A)   • MCHC 04/04/2022 33.8    • RDW 04/04/2022 13.0    • RDW-SD 04/04/2022 46.1    • MPV 04/04/2022 11.9    • Platelets 04/04/2022 161    • Neutrophil % 04/04/2022 58.0    • Lymphocyte % 04/04/2022 33.1    • Monocyte % 04/04/2022 5.5    • Eosinophil % 04/04/2022 2.1    • Basophil % 04/04/2022 0.6    • Immature Grans % 04/04/2022 0.7 (A)   • Neutrophils, Absolute 04/04/2022 4.89    • Lymphocytes, Absolute 04/04/2022 2.79    • Monocytes, Absolute 04/04/2022 0.46    • Eosinophils, Absolute 04/04/2022 0.18    • Basophils, Absolute 04/04/2022 0.05    • Immature Grans, Absolute 04/04/2022 0.06 (A)   • nRBC 04/04/2022 0.0    Office Visit on 04/04/2022   Component Date Value   • SARS Antigen 04/04/2022 Not Detected    • Influenza A Antigen TRIPP 04/04/2022 Not Detected    • Influenza B Antigen TRIPP 04/04/2022 Not Detected    • Internal Control 04/04/2022 Passed    • Lot Number 04/04/2022 [id number]    • Expiration Date 04/04/2022 10/28/2022      Recent labs reviewed and interpreted for clinical significance and application            Level of Care:           Andres Valadez MD  07/26/22  .

## 2022-08-02 ENCOUNTER — OFFICE (AMBULATORY)
Dept: URBAN - METROPOLITAN AREA CLINIC 64 | Facility: CLINIC | Age: 62
End: 2022-08-02

## 2022-08-02 VITALS
SYSTOLIC BLOOD PRESSURE: 125 MMHG | HEIGHT: 71 IN | WEIGHT: 250 LBS | HEART RATE: 81 BPM | DIASTOLIC BLOOD PRESSURE: 85 MMHG

## 2022-08-02 DIAGNOSIS — R07.9 CHEST PAIN, UNSPECIFIED: ICD-10-CM

## 2022-08-02 DIAGNOSIS — K44.9 DIAPHRAGMATIC HERNIA WITHOUT OBSTRUCTION OR GANGRENE: ICD-10-CM

## 2022-08-02 PROCEDURE — 99213 OFFICE O/P EST LOW 20 MIN: CPT | Performed by: NURSE PRACTITIONER

## 2022-08-02 RX ORDER — PANTOPRAZOLE SODIUM 40 MG/1
40 TABLET, DELAYED RELEASE ORAL
Qty: 90 | Refills: 3 | Status: ACTIVE
Start: 2022-08-02

## 2022-08-02 RX ORDER — OMEPRAZOLE 20 MG/1
20 CAPSULE, DELAYED RELEASE ORAL
Refills: 0 | Status: COMPLETED
End: 2022-08-02

## 2022-08-10 ENCOUNTER — TELEPHONE (OUTPATIENT)
Dept: FAMILY MEDICINE CLINIC | Facility: CLINIC | Age: 62
End: 2022-08-10

## 2022-08-10 NOTE — TELEPHONE ENCOUNTER
Caller: Jagdish Rea    Relationship to patient: Self    Best call back number: [phone]    Date of exposure:     Date of positive COVID19 test:     Date if possible COVID19 exposure:     COVID19 symptoms:     Date of initial quarantine:     Additional information or concerns: PATIENT STATES HIS DAUGHTER HAS COVID. HE BELIEVES HE HAS COVID ALSO. ASKING WHAT HE SHOULD DO  PLEASE ADVISE     What is the patients preferred pharmacy:

## 2022-08-10 NOTE — TELEPHONE ENCOUNTER
I can have him tested but he needs treat symptoms  Mucinex, hydration if very important with water and electrolytes  Take tylenol for aches pain and fever.  If short of air go to the hospital

## 2022-08-16 ENCOUNTER — HOSPITAL ENCOUNTER (OUTPATIENT)
Dept: CARDIOLOGY | Facility: HOSPITAL | Age: 62
Discharge: HOME | End: 2022-08-16
Admitting: INTERNAL MEDICINE

## 2022-08-16 DIAGNOSIS — R07.89 CHEST PAIN, ATYPICAL: ICD-10-CM

## 2022-08-16 LAB
BH CV ECHO MEAS - ACS: 2.16 CM
BH CV ECHO MEAS - AO MAX PG: 7.5 MMHG
BH CV ECHO MEAS - AO MEAN PG: 3.8 MMHG
BH CV ECHO MEAS - AO ROOT DIAM: 3 CM
BH CV ECHO MEAS - AO V2 MAX: 136.8 CM/SEC
BH CV ECHO MEAS - AO V2 VTI: 26.5 CM
BH CV ECHO MEAS - AVA(I,D): 3.2 CM2
BH CV ECHO MEAS - EDV(CUBED): 101 ML
BH CV ECHO MEAS - EDV(MOD-SP4): 63.4 ML
BH CV ECHO MEAS - EF(MOD-BP): 46 %
BH CV ECHO MEAS - EF(MOD-SP4): 46.1 %
BH CV ECHO MEAS - ESV(CUBED): 32.4 ML
BH CV ECHO MEAS - ESV(MOD-SP4): 34.2 ML
BH CV ECHO MEAS - FS: 31.5 %
BH CV ECHO MEAS - IVS/LVPW: 0.97 CM
BH CV ECHO MEAS - IVSD: 1.07 CM
BH CV ECHO MEAS - LA A2CS (ATRIAL LENGTH): 4.4 CM
BH CV ECHO MEAS - LV DIASTOLIC VOL/BSA (35-75): 27.3 CM2
BH CV ECHO MEAS - LV MASS(C)D: 181.9 GRAMS
BH CV ECHO MEAS - LV MAX PG: 3.7 MMHG
BH CV ECHO MEAS - LV MEAN PG: 1.91 MMHG
BH CV ECHO MEAS - LV SYSTOLIC VOL/BSA (12-30): 14.8 CM2
BH CV ECHO MEAS - LV V1 MAX: 96 CM/SEC
BH CV ECHO MEAS - LV V1 VTI: 20.6 CM
BH CV ECHO MEAS - LVIDD: 4.7 CM
BH CV ECHO MEAS - LVIDS: 3.2 CM
BH CV ECHO MEAS - LVOT AREA: 4.1 CM2
BH CV ECHO MEAS - LVOT DIAM: 2.28 CM
BH CV ECHO MEAS - LVPWD: 1.11 CM
BH CV ECHO MEAS - MR MAX PG: 97.1 MMHG
BH CV ECHO MEAS - MR MAX VEL: 492.7 CM/SEC
BH CV ECHO MEAS - MV A MAX VEL: 79.6 CM/SEC
BH CV ECHO MEAS - MV DEC SLOPE: 483.6 CM/SEC2
BH CV ECHO MEAS - MV DEC TIME: 0.17 MSEC
BH CV ECHO MEAS - MV E MAX VEL: 82.4 CM/SEC
BH CV ECHO MEAS - MV E/A: 1.03
BH CV ECHO MEAS - MV MAX PG: 2.43 MMHG
BH CV ECHO MEAS - MV MEAN PG: 1.31 MMHG
BH CV ECHO MEAS - MV V2 VTI: 21.8 CM
BH CV ECHO MEAS - MVA(VTI): 3.9 CM2
BH CV ECHO MEAS - PA V2 MAX: 80.5 CM/SEC
BH CV ECHO MEAS - QP/QS: 0.7
BH CV ECHO MEAS - RAP SYSTOLE: 3 MMHG
BH CV ECHO MEAS - RV MAX PG: 1.63 MMHG
BH CV ECHO MEAS - RV V1 MAX: 63.8 CM/SEC
BH CV ECHO MEAS - RV V1 VTI: 15.8 CM
BH CV ECHO MEAS - RVDD: 3.2 CM
BH CV ECHO MEAS - RVOT DIAM: 2.17 CM
BH CV ECHO MEAS - RVSP: 16.7 MMHG
BH CV ECHO MEAS - SI(MOD-SP4): 12.6 ML/M2
BH CV ECHO MEAS - SV(LVOT): 83.9 ML
BH CV ECHO MEAS - SV(MOD-SP4): 29.2 ML
BH CV ECHO MEAS - SV(RVOT): 58.4 ML
BH CV ECHO MEAS - TR MAX PG: 13.7 MMHG
BH CV ECHO MEAS - TR MAX VEL: 184.8 CM/SEC
MAXIMAL PREDICTED HEART RATE: 159 BPM
STRESS TARGET HR: 135 BPM

## 2022-08-16 PROCEDURE — 93306 TTE W/DOPPLER COMPLETE: CPT | Performed by: INTERNAL MEDICINE

## 2022-08-16 PROCEDURE — 93306 TTE W/DOPPLER COMPLETE: CPT

## 2022-08-17 ENCOUNTER — TELEPHONE (OUTPATIENT)
Dept: CARDIOLOGY | Facility: CLINIC | Age: 62
End: 2022-08-17

## 2022-08-17 NOTE — TELEPHONE ENCOUNTER
Patient needs a telephone visit next Tuesday with dr Valadez to discuss echo      Hub can release this information

## 2022-08-23 ENCOUNTER — OFFICE VISIT (OUTPATIENT)
Dept: CARDIOLOGY | Facility: CLINIC | Age: 62
End: 2022-08-23

## 2022-08-23 VITALS — HEIGHT: 71 IN | BODY MASS INDEX: 35.14 KG/M2 | WEIGHT: 251 LBS

## 2022-08-23 DIAGNOSIS — E78.5 HYPERLIPIDEMIA, UNSPECIFIED HYPERLIPIDEMIA TYPE: ICD-10-CM

## 2022-08-23 DIAGNOSIS — I10 BENIGN ESSENTIAL HYPERTENSION: ICD-10-CM

## 2022-08-23 DIAGNOSIS — R07.89 CHEST PAIN, ATYPICAL: Primary | ICD-10-CM

## 2022-08-23 DIAGNOSIS — R94.39 ABNORMAL NUCLEAR STRESS TEST: ICD-10-CM

## 2022-08-23 PROCEDURE — 99443 PR PHYS/QHP TELEPHONE EVALUATION 21-30 MIN: CPT | Performed by: INTERNAL MEDICINE

## 2022-08-23 NOTE — PROGRESS NOTES
Cardiology Clinic Note  Andres Valadez MD, PhD    Subjective:     Encounter Date:08/23/2022      Patient ID: Jagdish Rea is a 61 y.o. male.    Chief Complaint:  Chief Complaint   Patient presents with   • Hyperlipidemia   • Hypertension   • Follow-up   • telephone visit       HPI:    Verbal consent for telehealth obtained  Unable to complete visit using a video connection to the patient. A phone visit was used to complete this visits. Total time of discussion was 14 minutes with additional time and charting total encounter time greater than 25 minutes.     HPI:  I the pleasure to see this 61-year-old gentleman today in hospital follow-up.  Hypertension hyperlipidemia, history of heart cath with nonobstructive mild luminal irregularities, preserved EF with normal filling pressures.  He is on amlodipine HCTZ metoprolol and pravastatin as his CV medicines.  Blood pressures well controlled at 128/88 with heart rates in the 80s.  He has no further chest discomfort which was his initial complaint on presentation to the ER.  LV gram demonstrated EF 55%.  He is reassured by these findings.  We discussed primary prevention goals, diet exercise heart healthy diet and referral back to primary care which she is amenable for.    2D echo ultimately revealed preserved EF 55%, trace to mild valvular insufficiency, grade 1 diastolic dysfunction with normal pulmonary pressures.  He has no other complaints today.  We did review his cath films and report which were visualized by me     Review of systems otherwise negative x14 point review of systems except as mentioned above        Historical data copied forward from previous encounters in EMR including the history, exam, and assessment/plan has been reviewed and is unchanged unless noted otherwise.     Cardiac medicines reviewed with risk, benefits, and necessity of each discussed.     Risk and benefit of cardiac testing reviewed including death heart attack stroke pain bleeding  "infection need for vascular /cardiovascular surgery were discussed and the patient      Objective:         Objective          No vitals today     Physical Exam  Previously on encounter  Regular rate and rhythm no rubs gallops heave or lift  1 out of 6 systolic ejection murmur left sternal border  Well compensated euvolemic appearing with no edema  Normal pulses next normal cap refill  No carotid bruits or JVD  Intact grossly  Clear to auscultation     Assessment:         Assessment          Diagnoses and all orders for this visit:     1. Chest pain, atypical (Primary)                   Plan:      Atypical chest pain  Faint murmur on exam  2D echo for structural functional evaluation, preserved EF 55%  Nonobstructive CAD, mild luminal regularities at the most 10% broadly with preserved EF and normal filling pressures  Preserved LVEF by LV gram  Hyperlipidemia, atypical chest pain  Tobacco abuse, recommend cessation but no quit date established  Essential hypertension controlled today on encounter  On a beta-blocker with low-dose metoprolol to be continued  Continue pravastatin    Focus on primary prevention  No restrictions  Diet and exercise per [place] guidelines     See him back in 1 year.     Follow-up primary care in the interim     The pleasure to be involved in this patient's cardiovascular care.  Please call with any questions or concerns  Andres Valadez MD, PhD      Historical data copied forward from previous encounters in EMR including the history, exam, and assessment/plan has been reviewed and is unchanged unless noted otherwise.    Cardiac medicines reviewed with risk, benefits, and necessity of each discussed.    Risk and benefit of cardiac testing reviewed including death heart attack stroke pain bleeding infection need for vascular /cardiovascular surgery were discussed and the patient     Objective:         Ht 180.3 cm (70.98\")   Wt 114 kg (251 lb)   BMI 35.02 kg/m²     Physical Exam    Assessment:     "     There are no diagnoses linked to this encounter.       Plan:              The pleasure to be involved in this patient's cardiovascular care.  Please call with any questions or concerns  Andres Valadez MD, PhD    Most recent EKG as reviewed and interpreted by me:  Procedures     Most recent echo as reviewed and interpreted by me:  Results for orders placed during the hospital encounter of 22    Adult Transthoracic Echo Complete W/ Cont if Necessary Per Protocol    Interpretation Summary  · Estimated left ventricular EF was in disagreement with the calculated left ventricular EF. Left ventricular ejection fraction appears to be 51 - 55%. Left ventricular systolic function is low normal.  · Left ventricular diastolic function is consistent with (grade I) impaired relaxation.  · Estimated right ventricular systolic pressure from tricuspid regurgitation is normal (<35 mmHg).      Most recent stress test as reviewed and interpreted by me:  Results for orders placed during the hospital encounter of 22    Stress Test With Myocardial Perfusion One Day    Interpretation Summary  · Left ventricular ejection fraction is hyperdynamic (Calculated EF > 70%). .  · Myocardial perfusion imaging indicates a small-to-medium-sized, mildly severe area of ischemia located in the inferior wall and lateral wall.  · Impressions are consistent with an intermediate risk study.      Most recent cardiac catheterization as reviewed interpreted by me:  Results for orders placed during the hospital encounter of 22    Cardiac Catheterization/Vascular Study    Narrative  2022      Heart Cath Report    NAME:              Jagdish Rea  :                1960  AGE/SEX:        61 y.o. male  MRN:                [id number]        Procedures Performed    1. Left heart catheterization  2. Selective coronary angiography  3. Left ventriculography  4. Mynx    :   Prieto Sidhu MD    Vascular Access  Site: Femoral    Indication for procedure: Recurrent prolonged substernal chest pain, abnormal stress test, hypertension, dyslipidemia, cigarette smoker, obesity with BMI over 30      Procedure Note    After discussing the risks, benefits, and alternatives of the procedure, informed consent was obtained.  Timeout was done before the procedure.  Moderate conscious sedation was given utilizing IV Versed and fentanyl administered by RN with continuous EKG oximetry and hemodynamic monitoring supervised by me throughout the entire case, conscious sedation time was 30 minutes.  I was present with the patient for the duration of moderate sedation and supervised staff who had no other duties and monitored the patient for the entire procedure patient had Grimes 2-3 sedation scale. the vascular access site was prepped and draped in the usual sterile fashion.  2% lidocaine was used for local anesthesia. Appropriate landmarks were assessed.  A 6 Wolof short sheath was inserted in the artery using the modified Seldinger technique.    Selective coronary angiography was performed with JL4 and JR4 diagnostic catheters. Left ventriculogram  was performed with an angled pigtail catheter.  All exchanges were performed over the wire.  No specimens were removed.  There were no apparent acute or early complications.  The patient tolerated the procedure well and was transferred to the recovery area in stable condition.    Closure device: Mynx device was deployed successfully after right iliofemoral angiogram was performed. Good hemostasis was achieved.    Complications:  None  Blood Loss: minimal    Hemodynamics    Pressures    Ao:    120/71 mmHg  LV:    110/2 mmHg  End-diastolic pressure:  2  mmHg  No significant aortic valvular gradient on pullback    Coronary Angiography    Left Main :  The left main   is without disease    Left Anterior Descending : Is without disease    Left Circumflex : Is without disease    Right Coronary Artery :   The right coronary artery   is dominant vessel.  Distal PDA has diffuse tapering with severe disease in distal tips, consistent with small vessel disease    Dominance:  []  Left  [x]  Right  []  Co-Dominant      Left Ventriculography:    Estimated Ejection Fraction: 55%  Wall motion abnormalities:  None  Mitral Regurgitation:  None    Impression:    1. No obstructive CAD and major branches  2. Small vessel disease in the right PDA  3. Low LVEDP    Recommendations:    1. Evaluate other etiologies of patient's chest pain        I sincerely appreciate the opportunity to participate in your patient's care. Please feel free to contact me anytime if I can be of assistance in this or any other way.      Pertinent History    Past Medical History:  Diagnosis Date  • Allergic  • Anxiety  • Back pain  • COPD (chronic obstructive pulmonary disease) (HCC)  • GERD (gastroesophageal reflux disease)  • Hyperlipidemia  • Hypertension  • PTSD (post-traumatic stress disorder)  • Thoracic disc herniation  • Vitamin B12 deficiency    Past Surgical History:  Procedure Laterality Date  • ENDOSCOPY  • HERNIA REPAIR  • KNEE ARTHROPLASTY  x2  • SHOULDER ARTHROSCOPY W/ ROTATOR CUFF REPAIR Right 08/11/2020  Procedure: SHOULDER ARTHROSCOPY WITH ROTATOR CUFF REPAIR, extensive debridement;  Surgeon: Fredi Ritchie MD;  Location: [facility] OR;  Service: Orthopedics;  Laterality: Right;  • TONSILLECTOMY    Prior to Admission medications  Medication Sig Start Date End Date Taking? Authorizing Provider  albuterol sulfate  (90 Base) MCG/ACT inhaler Inhale 2 puffs Every 4 (Four) Hours As Needed for Wheezing. 4/4/22  Yes Yamilka Pascual APRN  amLODIPine (NORVASC) 10 MG tablet TAKE 1 TABLET EVERY DAY 6/12/22  Yes Yamilka Pascual APRN  doxepin (SINEquan) 10 MG capsule TAKE 1 CAPSULE BY MOUTH EVERY NIGHT. 3/10/22  Yes Yamilka Pascual APRN  hydroCHLOROthiazide (MICROZIDE) 12.5 MG capsule Take 12.5 mg by mouth As Needed.   Yes Provider,  MD Sulaiman  lamoTRIgine (LaMICtal) 25 MG tablet Take 2 tablets by mouth Every Night. 12/1/20  Yes aYmilka Pascual APRN  meloxicam (MOBIC) 7.5 MG tablet TAKE ONE TABLET BY MOUTH TWICE A DAY  Patient taking differently: 15 mg. 5/1/22  Yes Yamilka Pascual APRN  metoprolol succinate XL (TOPROL-XL) 25 MG 24 hr tablet TAKE 1 TABLET EVERY DAY 6/12/22  Yes Yamilka Pascual APRN  omeprazole (priLOSEC) 20 MG capsule Take 20 mg by mouth 2 (Two) Times a Day.   Yes Provider, MD Sulaiman  pravastatin (PRAVACHOL) 40 MG tablet Take 1 tablet by mouth Every Night. 10/25/21  Yes Yamilka Pascual APRN  risperiDONE (risperDAL) 1 MG tablet Take 1 tablet by mouth Daily. 12/1/20  Yes Yamilka Pascual APRN      Pre-Procedure Notes  H&P Performed  [x]  Yes []  No       []  N/A    Indications:  []  ACS <= 24 HRS  []  ACS >24 HRS  []  New Onset Angina <= 2 mos  []  Worsening Angina  []  Resuscitated Cardiac Arrest  []  Angina on Exertion:  []  Suspected CAD  []  Valvular Disease  []  Pericardial Disease  []  Cardiac Arrythmia  []  Cardiomyopathy  []  LV Dysfunction  []  Syncope  []  Post Cardiac Transplant  []  Eval. For Exercise Clearance  []  Other  []  Pre-Operative Evaluation  If Pre-Op Eval:  Evaluation for Surgery Type:  []  Cardiac Surgery   []  Non-Cardiac Surgery  Functional Capacity:  []  <4 METS  []  >=4 METS w/o symptoms  []  >= 4 METS with symptoms  []  Unknown  Surgical Risk:  []  Low  []  Intermediate  []  High Risk: Vascular  []  High Risk Non-Vascular    Risks, Benefits, & Complications Discussed:  [x]  Yes  []  No  []  N/A    Questions Answered:  [x]  Yes  []  No  []  N/A    Consent Obtained:  [x]  Yes  []  No  []  N/A    CHF: []  Yes  [x]  No  If Yes:  Newly Diagnosed?  []  Yes  []  No  If Yes:  HF Type:  []  Diastolic  []  Systolic  []  Unknown      Prieto Sidhu MD  7/13/2022  09:08 EDT  Electronically signed by Prieto Sidhu MD, 07/13/22, 9:08 AM EDT.    The following portions of the  patient's history were reviewed and updated as appropriate: allergies, current medications, past family history, past medical history, past social history, past surgical history and problem list.      ROS:  14 point review of systems negative except as mentioned above    Current Outpatient Medications:   •  albuterol sulfate  (90 Base) MCG/ACT inhaler, Inhale 2 puffs Every 4 (Four) Hours As Needed for Wheezing., Disp: 18 g, Rfl: 1  •  amLODIPine (NORVASC) 10 MG tablet, TAKE 1 TABLET EVERY DAY, Disp: 90 tablet, Rfl: 0  •  doxepin (SINEquan) 10 MG capsule, TAKE 1 CAPSULE BY MOUTH EVERY NIGHT., Disp: 90 capsule, Rfl: 1  •  hydroCHLOROthiazide (MICROZIDE) 12.5 MG capsule, Take 12.5 mg by mouth As Needed., Disp: , Rfl:   •  lamoTRIgine (LaMICtal) 25 MG tablet, Take 2 tablets by mouth Every Night., Disp: 180 tablet, Rfl: 1  •  metoprolol succinate XL (TOPROL-XL) 25 MG 24 hr tablet, TAKE 1 TABLET EVERY DAY, Disp: 90 tablet, Rfl: 0  •  pravastatin (PRAVACHOL) 40 MG tablet, Take 1 tablet by mouth Every Night., Disp: 90 tablet, Rfl: 0  •  risperiDONE (risperDAL) 1 MG tablet, Take 1 tablet by mouth Daily., Disp: 90 tablet, Rfl: 1    Problem List:  Patient Active Problem List   Diagnosis   • Anxiety   • Benign essential hypertension   • Chronic low back pain   • Hyperlipidemia   • Other cervical disc degeneration at C5-C6 level   • Peripheral neuropathy   • Thoracic disc herniation   • Vitamin B12 deficiency   • Right shoulder pain   • Fall   • Radicular pain in right arm   • Acute pain of right shoulder   • Tobacco abuse   • Chronic pain of both knees   • Initial Medicare annual wellness visit   • Pneumonia of both lungs due to infectious organism   • Chest pain   • Abnormal nuclear stress test     Past Medical History:  Past Medical History:   Diagnosis Date   • Allergic    • Anxiety    • Back pain    • COPD (chronic obstructive pulmonary disease) (HCC)    • GERD (gastroesophageal reflux disease)    • Hyperlipidemia     • Hypertension    • PTSD (post-traumatic stress disorder)    • Thoracic disc herniation    • Vitamin B12 deficiency      Past Surgical History:  Past Surgical History:   Procedure Laterality Date   • CARDIAC CATHETERIZATION N/A 7/13/2022    Procedure: Left Heart Cath, possible pci;  Surgeon: Prieto Sidhu MD;  Location: [facility] CATH INVASIVE LOCATION;  Service: Cardiovascular;  Laterality: N/A;   • ENDOSCOPY     • HERNIA REPAIR     • KNEE ARTHROPLASTY      x2   • SHOULDER ARTHROSCOPY W/ ROTATOR CUFF REPAIR Right 08/11/2020    Procedure: SHOULDER ARTHROSCOPY WITH ROTATOR CUFF REPAIR, extensive debridement;  Surgeon: Fredi Ritchie MD;  Location: [facility] MAIN OR;  Service: Orthopedics;  Laterality: Right;   • TONSILLECTOMY       Social History:  Social History     Socioeconomic History   • Marital status:    Tobacco Use   • Smoking status: Current Every Day Smoker     Packs/day: 1.00     Years: 47.00     Pack years: 47.00     Types: Cigarettes     Start date: 1/9/1973   • Smokeless tobacco: Never Used   Vaping Use   • Vaping Use: Never used   Substance and Sexual Activity   • Alcohol use: No   • Drug use: No   • Sexual activity: Defer     Allergies:  Allergies   Allergen Reactions   • Atorvastatin Swelling   • Lisinopril Unknown - High Severity     Facial paralysis      Immunizations:  Immunization History   Administered Date(s) Administered   • COVID-19 (ACE) 03/13/2021   • Pneumococcal Polysaccharide (PPSV23) 05/13/2015, 07/13/2018   • Shingrix 07/30/2021, 10/05/2021            In-Office Procedure(s):  No orders to display        ASCVD RIsk Score::  The 10-year ASCVD risk score (Bryanttiffany GRANDA Jr., et al., 2013) is: 26.2%    Values used to calculate the score:      Age: 61 years      Sex: Male      Is Non- : Yes      Diabetic: No      Tobacco smoker: Yes      Systolic Blood Pressure: 128 mmHg      Is BP treated: Yes      HDL Cholesterol: 27 mg/dL      Total  Cholesterol: 180 mg/dL    Imaging:    Results for orders placed in visit on 07/14/22    XR Knee 3 View Right    Narrative  right Knee X-Ray  Indication: Knee pain history of arthroscopy  AP, Lateral views, [place]  Findings: Mild to moderate medial compartment arthritis  no bony lesion  Soft tissues normal  decreased joint spaces  Hardware appropriately positioned not applicable      no prior studies available for comparison.      X-RAY was ordered and reviewed by Fredi Ritchie MD       Results for orders placed during the hospital encounter of 05/24/21    CT Head Without Contrast    Narrative  CT HEAD WO CONTRAST-    Date of Exam: 5/24/2021 12:26 PM    Indication: trauma back head.    Comparison: CT head without contrast 6/17/2018.    Technique:  Without contrast, contiguous axial CT images of the head  were obtained from skull base to vertex.  Coronal and sagittal  reconstructions were performed.  Automated exposure control and  iterative reconstruction methods were used.    FINDINGS  Mild bifrontal atrophy appears similar to the prior examination. The  ventricular configuration is normal. Gray matter-white matter junction  seems appears preserved without CT evidence of acute or evolving  infarct. No mass lesion, mass effect or midline shift is seen. No  intracranial hemorrhage is evident. Calvarium is within normal limits.  Paranasal sinuses and mastoid air cells are clear.    Impression  No acute intracranial finding. No significant  change from 6/17/2018.    Electronically Signed By-Carli Scott MD On:5/24/2021 12:52 PM  This report was finalized on [id number] by  Carli Scott MD.      Results for orders placed during the hospital encounter of 05/24/21    CT Head Without Contrast    Narrative  CT HEAD WO CONTRAST-    Date of Exam: 5/24/2021 12:26 PM    Indication: trauma back head.    Comparison: CT head without contrast 6/17/2018.    Technique:  Without contrast, contiguous axial CT images of the  head  were obtained from skull base to vertex.  Coronal and sagittal  reconstructions were performed.  Automated exposure control and  iterative reconstruction methods were used.    FINDINGS  Mild bifrontal atrophy appears similar to the prior examination. The  ventricular configuration is normal. Gray matter-white matter junction  seems appears preserved without CT evidence of acute or evolving  infarct. No mass lesion, mass effect or midline shift is seen. No  intracranial hemorrhage is evident. Calvarium is within normal limits.  Paranasal sinuses and mastoid air cells are clear.    Impression  No acute intracranial finding. No significant  change from 6/17/2018.    Electronically Signed By-Carli Scott MD On:5/24/2021 12:52 PM  This report was finalized on [id number] by  Carli Scott MD.      Lab Review:   Hospital Outpatient Visit on 08/16/2022   Component Date Value   • Target HR (85%) 08/16/2022 135    • Max. Pred. HR (100%) 08/16/2022 159    • ACS 08/16/2022 2.16    • Ao root diam 08/16/2022 3.0    • Ao pk roland 08/16/2022 136.8    • Ao V2 VTI 08/16/2022 26.5    • SARY(I,D) 08/16/2022 3.2    • EDV(cubed) 08/16/2022 101.0    • EDV(MOD-sp4) 08/16/2022 63.4    • EF(MOD-bp) 08/16/2022 46.0    • EF(MOD-sp4) 08/16/2022 46.1    • ESV(cubed) 08/16/2022 32.4    • ESV(MOD-sp4) 08/16/2022 34.2    • IVS/LVPW 08/16/2022 0.97    • LV mass(C)d 08/16/2022 181.9    • LV V1 max PG 08/16/2022 3.7    • LV V1 mean PG 08/16/2022 1.91    • LV V1 max 08/16/2022 96.0    • LVPWd 08/16/2022 1.11    • MR max PG 08/16/2022 97.1    • MV dec slope 08/16/2022 483.6    • MV dec time 08/16/2022 0.17    • MV V2 VTI 08/16/2022 21.8    • MVA(VTI) 08/16/2022 3.9    • PA V2 max 08/16/2022 80.5    • RAP systole 08/16/2022 3.0    • RV V1 max PG 08/16/2022 1.63    • RV V1 max 08/16/2022 63.8    • RV V1 VTI 08/16/2022 15.8    • RVIDd 08/16/2022 3.2    • RVSP(TR) 08/16/2022 16.7    • SI(MOD-sp4) 08/16/2022 12.6    • SV(LVOT) 08/16/2022 83.9    •  SV(MOD-sp4) 08/16/2022 29.2    • SV(RVOT) 08/16/2022 58.4    • TR max PG 08/16/2022 13.7    • Ao max PG 08/16/2022 7.5    • Ao mean PG 08/16/2022 3.8    • FS 08/16/2022 31.5    • IVSd 08/16/2022 1.07    • LV V1 VTI 08/16/2022 20.6    • LVIDd 08/16/2022 4.7    • LVIDs 08/16/2022 3.2    • LVOT area 08/16/2022 4.1    • LVOT diam 08/16/2022 2.28    • MV E/A 08/16/2022 1.03    • MV max PG 08/16/2022 2.43    • MV mean PG 08/16/2022 1.31    • Qp/Qs 08/16/2022 0.70    • RVOT diam 08/16/2022 2.17    • MR max roland 08/16/2022 492.7    • MV A max roland 08/16/2022 79.6    • MV E max roland 08/16/2022 82.4    • TR max roland 08/16/2022 184.8    • LV Mattson Vol (BSA correct* 08/16/2022 27.3    • LV Sys Vol (BSA correcte* 08/16/2022 14.8    • LA length (A2C) 08/16/2022 4.4    Admission on 07/12/2022, Discharged on 07/13/2022   Component Date Value   • QT Interval 07/12/2022 380    • Glucose 07/12/2022 102 (A)   • BUN 07/12/2022 12    • Creatinine 07/12/2022 0.89    • Sodium 07/12/2022 140    • Potassium 07/12/2022 4.2    • Chloride 07/12/2022 105    • CO2 07/12/2022 23.0    • Calcium 07/12/2022 8.5 (A)   • Total Protein 07/12/2022 7.1    • Albumin 07/12/2022 4.20    • ALT (SGPT) 07/12/2022 23    • AST (SGOT) 07/12/2022 20    • Alkaline Phosphatase 07/12/2022 70    • Total Bilirubin 07/12/2022 0.3    • Globulin 07/12/2022 2.9    • A/G Ratio 07/12/2022 1.4    • BUN/Creatinine Ratio 07/12/2022 13.5    • Anion Gap 07/12/2022 12.0    • eGFR 07/12/2022 97.5    • Protime 07/12/2022 10.0    • INR 07/12/2022 0.97    • PTT 07/12/2022 26.0 (A)   • Lipase 07/12/2022 39    • Troponin T 07/12/2022 <0.010    • proBNP 07/12/2022 63.9    • WBC 07/12/2022 5.70    • RBC 07/12/2022 4.86    • Hemoglobin 07/12/2022 15.8    • Hematocrit 07/12/2022 48.0    • MCV 07/12/2022 98.7 (A)   • MCH 07/12/2022 32.5    • MCHC 07/12/2022 33.0    • RDW 07/12/2022 14.1    • RDW-SD 07/12/2022 49.0    • MPV 07/12/2022 9.4    • Platelets 07/12/2022 142    • Neutrophil % 07/12/2022  51.0    • Lymphocyte % 07/12/2022 38.6    • Monocyte % 07/12/2022 6.4    • Eosinophil % 07/12/2022 3.0    • Basophil % 07/12/2022 1.0    • Neutrophils, Absolute 07/12/2022 2.90    • Lymphocytes, Absolute 07/12/2022 2.20    • Monocytes, Absolute 07/12/2022 0.40    • Eosinophils, Absolute 07/12/2022 0.20    • Basophils, Absolute 07/12/2022 0.10    • nRBC 07/12/2022 0.1    • Target HR (85%) 07/12/2022 135    • Max. Pred. HR (100%) 07/12/2022 159    •  CV STRESS PROTOCOL 1 07/12/2022 Pharmacologic    • Stage 1 07/12/2022 1    • HR Stage 1 07/12/2022 76    • BP Stage 1 07/12/2022 135/86    • Duration Min Stage 1 07/12/2022 0    • Duration Sec Stage 1 07/12/2022 10    • Stress Dose Regadenoson * 07/12/2022 0.4    • Stress Comments Stage 1 07/12/2022 10 sec bolus injection    • Baseline HR 07/12/2022 63    • Baseline BP 07/12/2022 146/90    • Peak HR 07/12/2022 87    • Percent Max Pred HR 07/12/2022 54.72    • Percent Target HR 07/12/2022 64    • Peak BP 07/12/2022 146/90    • Recovery HR 07/12/2022 72    • Recovery BP 07/12/2022 117/84    •  CV REST NUCLEAR ISOTO* 07/12/2022 10.0    •  CV STRESS NUCLEAR ISO* 07/12/2022 30.8    • Nuc Stress EF 07/12/2022 71    • COVID19 07/12/2022 Not Detected    • Troponin T 07/12/2022 <0.010    • Glucose 07/13/2022 110 (A)   • BUN 07/13/2022 13    • Creatinine 07/13/2022 1.00    • Sodium 07/13/2022 140    • Potassium 07/13/2022 4.7    • Chloride 07/13/2022 103    • CO2 07/13/2022 27.0    • Calcium 07/13/2022 8.8    • BUN/Creatinine Ratio 07/13/2022 13.0    • Anion Gap 07/13/2022 10.0    • eGFR 07/13/2022 85.6    • Neutrophil % 07/13/2022 52.0    • Lymphocyte % 07/13/2022 41.0    • Monocyte % 07/13/2022 3.0 (A)   • Eosinophil % 07/13/2022 2.0    • Basophil % 07/13/2022 2.0 (A)   • Neutrophils Absolute 07/13/2022 2.86    • Lymphocytes Absolute 07/13/2022 2.26    • Monocytes Absolute 07/13/2022 0.17    • Eosinophils Absolute 07/13/2022 0.11    • Basophils Absolute 07/13/2022 0.11    •  RBC Morphology 07/13/2022 Normal    • WBC Morphology 07/13/2022 Normal    • Large Platelets 07/13/2022 Slight/1+    • WBC 07/13/2022 5.50    • RBC 07/13/2022 4.61    • Hemoglobin 07/13/2022 15.3    • Hematocrit 07/13/2022 45.0    • MCV 07/13/2022 97.6 (A)   • MCH 07/13/2022 33.1 (A)   • MCHC 07/13/2022 33.9    • RDW 07/13/2022 13.7    • RDW-SD 07/13/2022 46.4    • MPV 07/13/2022 9.2    • Platelets 07/13/2022 142    Lab on 04/04/2022   Component Date Value   • Procalcitonin 04/04/2022 0.07    • ADENOVIRUS, PCR 04/04/2022 Not Detected    • Coronavirus 229E 04/04/2022 Not Detected    • Coronavirus HKU1 04/04/2022 Not Detected    • Coronavirus NL63 04/04/2022 Not Detected    • Coronavirus OC43 04/04/2022 Not Detected    • COVID19 04/04/2022 Not Detected    • Human Metapneumovirus 04/04/2022 Not Detected    • Human Rhinovirus/Enterov* 04/04/2022 Not Detected    • Influenza A PCR 04/04/2022 Not Detected    • Influenza B PCR 04/04/2022 Not Detected    • Parainfluenza Virus 1 04/04/2022 Not Detected    • Parainfluenza Virus 2 04/04/2022 Not Detected    • Parainfluenza Virus 3 04/04/2022 Not Detected    • Parainfluenza Virus 4 04/04/2022 Not Detected    • RSV, PCR 04/04/2022 Not Detected    • Bordetella pertussis pcr 04/04/2022 Not Detected    • Bordetella parapertussis* 04/04/2022 Not Detected    • Chlamydophila pneumoniae* 04/04/2022 Not Detected    • Mycoplasma pneumo by PCR 04/04/2022 Not Detected    • Glucose 04/04/2022 98    • BUN 04/04/2022 9    • Creatinine 04/04/2022 1.10    • Sodium 04/04/2022 141    • Potassium 04/04/2022 3.8    • Chloride 04/04/2022 105    • CO2 04/04/2022 25.5    • Calcium 04/04/2022 9.2    • Total Protein 04/04/2022 7.7    • Albumin 04/04/2022 4.40    • ALT (SGPT) 04/04/2022 80 (A)   • AST (SGOT) 04/04/2022 43 (A)   • Alkaline Phosphatase 04/04/2022 92    • Total Bilirubin 04/04/2022 0.5    • Globulin 04/04/2022 3.3    • A/G Ratio 04/04/2022 1.3    • BUN/Creatinine Ratio 04/04/2022 8.2    •  Anion Gap 04/04/2022 10.5    • eGFR 04/04/2022 76.4    • WBC 04/04/2022 8.43    • RBC 04/04/2022 4.84    • Hemoglobin 04/04/2022 16.0    • Hematocrit 04/04/2022 47.3    • MCV 04/04/2022 97.7 (A)   • MCH 04/04/2022 33.1 (A)   • MCHC 04/04/2022 33.8    • RDW 04/04/2022 13.0    • RDW-SD 04/04/2022 46.1    • MPV 04/04/2022 11.9    • Platelets 04/04/2022 161    • Neutrophil % 04/04/2022 58.0    • Lymphocyte % 04/04/2022 33.1    • Monocyte % 04/04/2022 5.5    • Eosinophil % 04/04/2022 2.1    • Basophil % 04/04/2022 0.6    • Immature Grans % 04/04/2022 0.7 (A)   • Neutrophils, Absolute 04/04/2022 4.89    • Lymphocytes, Absolute 04/04/2022 2.79    • Monocytes, Absolute 04/04/2022 0.46    • Eosinophils, Absolute 04/04/2022 0.18    • Basophils, Absolute 04/04/2022 0.05    • Immature Grans, Absolute 04/04/2022 0.06 (A)   • nRBC 04/04/2022 0.0    Office Visit on 04/04/2022   Component Date Value   • SARS Antigen 04/04/2022 Not Detected    • Influenza A Antigen TRIPP 04/04/2022 Not Detected    • Influenza B Antigen TRPIP 04/04/2022 Not Detected    • Internal Control 04/04/2022 Passed    • Lot Number 04/04/2022 [id number]    • Expiration Date 04/04/2022 10/28/2022      Recent labs reviewed and interpreted for clinical significance and application            Level of Care:           Andres Valadez MD  08/23/22  .

## 2022-09-01 ENCOUNTER — OFFICE VISIT (OUTPATIENT)
Dept: FAMILY MEDICINE CLINIC | Facility: CLINIC | Age: 62
End: 2022-09-01

## 2022-09-01 VITALS
BODY MASS INDEX: 35.16 KG/M2 | OXYGEN SATURATION: 97 % | TEMPERATURE: 96.8 F | WEIGHT: 252 LBS | HEART RATE: 75 BPM | DIASTOLIC BLOOD PRESSURE: 86 MMHG | SYSTOLIC BLOOD PRESSURE: 125 MMHG

## 2022-09-01 DIAGNOSIS — H61.21 IMPACTED CERUMEN OF RIGHT EAR: ICD-10-CM

## 2022-09-01 DIAGNOSIS — H93.11 TINNITUS OF RIGHT EAR: Primary | ICD-10-CM

## 2022-09-01 PROCEDURE — 99213 OFFICE O/P EST LOW 20 MIN: CPT | Performed by: NURSE PRACTITIONER

## 2022-09-01 NOTE — PROGRESS NOTES
"Subjective        Jagdish Rea is a 61 y.o. male.     Chief Complaint   Patient presents with   • Earache     R ear \"itching and ringing\"       History of Present Illness  Patient reports has right ear pain below the ear is itching and ear ringing. Started 2 weeks ago. He thinks he had covid 3 weeks ago also.  He said like not equalizing. Has fluid in it.   He said he can click his ear usually but not this time.  No drainage from ear.     The following portions of the patient's history were reviewed and updated as appropriate: allergies, current medications, past family history, past medical history, past social history, past surgical history and problem list.      Current Outpatient Medications:   •  albuterol sulfate  (90 Base) MCG/ACT inhaler, Inhale 2 puffs Every 4 (Four) Hours As Needed for Wheezing., Disp: 18 g, Rfl: 1  •  amLODIPine (NORVASC) 10 MG tablet, TAKE 1 TABLET EVERY DAY, Disp: 90 tablet, Rfl: 0  •  doxepin (SINEquan) 10 MG capsule, TAKE 1 CAPSULE BY MOUTH EVERY NIGHT., Disp: 90 capsule, Rfl: 1  •  hydroCHLOROthiazide (MICROZIDE) 12.5 MG capsule, Take 12.5 mg by mouth As Needed., Disp: , Rfl:   •  lamoTRIgine (LaMICtal) 25 MG tablet, Take 2 tablets by mouth Every Night. (Patient taking differently: Take 50 mg by mouth Every Night. PT TAKING 3 TABS NIGHTLY TO TOTAL 75 MG NIGHTLY), Disp: 180 tablet, Rfl: 1  •  metoprolol succinate XL (TOPROL-XL) 25 MG 24 hr tablet, TAKE 1 TABLET EVERY DAY, Disp: 90 tablet, Rfl: 0  •  pravastatin (PRAVACHOL) 40 MG tablet, Take 1 tablet by mouth Every Night., Disp: 90 tablet, Rfl: 0  •  risperiDONE (risperDAL) 1 MG tablet, Take 1 tablet by mouth Daily. (Patient taking differently: Take 2 mg by mouth Daily.), Disp: 90 tablet, Rfl: 1    Recent Results (from the past 4032 hour(s))   POCT SARS-CoV-2 Antigen TRIPP + Flu    Collection Time: 04/04/22 10:38 AM    Specimen: Swab   Result Value Ref Range    SARS Antigen Not Detected Not Detected    Influenza A Antigen TRIPP " Not Detected     Influenza B Antigen TRIPP Not Detected     Internal Control Passed Passed    Lot Number [id number]     Expiration Date 10/28/2022    Procalcitonin    Collection Time: 04/04/22 10:50 AM    Specimen: Blood   Result Value Ref Range    Procalcitonin 0.07 0.00 - 0.25 ng/mL   Respiratory Panel PCR w/COVID-19(SARS-CoV-2) ISAEL/ALEXIA/ELISEO/PAD/[facility]OLD In-House, NP Swab in UTM/VTM, 3-4 HR TAT - Swab, Nasopharynx    Collection Time: 04/04/22 10:50 AM    Specimen: Nasopharynx; Swab   Result Value Ref Range    ADENOVIRUS, PCR Not Detected Not Detected    Coronavirus 229E Not Detected Not Detected    Coronavirus HKU1 Not Detected Not Detected    Coronavirus NL63 Not Detected Not Detected    Coronavirus OC43 Not Detected Not Detected    COVID19 Not Detected Not Detected - Ref. Range    Human Metapneumovirus Not Detected Not Detected    Human Rhinovirus/Enterovirus Not Detected Not Detected    Influenza A PCR Not Detected Not Detected    Influenza B PCR Not Detected Not Detected    Parainfluenza Virus 1 Not Detected Not Detected    Parainfluenza Virus 2 Not Detected Not Detected    Parainfluenza Virus 3 Not Detected Not Detected    Parainfluenza Virus 4 Not Detected Not Detected    RSV, PCR Not Detected Not Detected    Bordetella pertussis pcr Not Detected Not Detected    Bordetella parapertussis PCR Not Detected Not Detected    Chlamydophila pneumoniae PCR Not Detected Not Detected    Mycoplasma pneumo by PCR Not Detected Not Detected   Comprehensive Metabolic Panel    Collection Time: 04/04/22 10:50 AM    Specimen: Blood   Result Value Ref Range    Glucose 98 65 - 99 mg/dL    BUN 9 8 - 23 mg/dL    Creatinine 1.10 0.76 - 1.27 mg/dL    Sodium 141 136 - 145 mmol/L    Potassium 3.8 3.5 - 5.2 mmol/L    Chloride 105 98 - 107 mmol/L    CO2 25.5 22.0 - 29.0 mmol/L    Calcium 9.2 8.6 - 10.5 mg/dL    Total Protein 7.7 6.0 - 8.5 g/dL    Albumin 4.40 3.50 - 5.20 g/dL    ALT (SGPT) 80 (H) 1 - 41 U/L    AST (SGOT) 43 (H) 1 - 40  U/L    Alkaline Phosphatase 92 39 - 117 U/L    Total Bilirubin 0.5 0.0 - 1.2 mg/dL    Globulin 3.3 gm/dL    A/G Ratio 1.3 g/dL    BUN/Creatinine Ratio 8.2 7.0 - 25.0    Anion Gap 10.5 5.0 - 15.0 mmol/L    eGFR 76.4 >60.0 mL/min/1.73   CBC Auto Differential    Collection Time: 04/04/22 10:50 AM    Specimen: Blood   Result Value Ref Range    WBC 8.43 3.40 - 10.80 10*3/mm3    RBC 4.84 4.14 - 5.80 10*6/mm3    Hemoglobin 16.0 13.0 - 17.7 g/dL    Hematocrit 47.3 37.5 - 51.0 %    MCV 97.7 (H) 79.0 - 97.0 fL    MCH 33.1 (H) 26.6 - 33.0 pg    MCHC 33.8 31.5 - 35.7 g/dL    RDW 13.0 12.3 - 15.4 %    RDW-SD 46.1 37.0 - 54.0 fl    MPV 11.9 6.0 - 12.0 fL    Platelets 161 140 - 450 10*3/mm3    Neutrophil % 58.0 42.7 - 76.0 %    Lymphocyte % 33.1 19.6 - 45.3 %    Monocyte % 5.5 5.0 - 12.0 %    Eosinophil % 2.1 0.3 - 6.2 %    Basophil % 0.6 0.0 - 1.5 %    Immature Grans % 0.7 (H) 0.0 - 0.5 %    Neutrophils, Absolute 4.89 1.70 - 7.00 10*3/mm3    Lymphocytes, Absolute 2.79 0.70 - 3.10 10*3/mm3    Monocytes, Absolute 0.46 0.10 - 0.90 10*3/mm3    Eosinophils, Absolute 0.18 0.00 - 0.40 10*3/mm3    Basophils, Absolute 0.05 0.00 - 0.20 10*3/mm3    Immature Grans, Absolute 0.06 (H) 0.00 - 0.05 10*3/mm3    nRBC 0.0 0.0 - 0.2 /100 WBC   ECG 12 Lead    Collection Time: 07/12/22  6:53 AM   Result Value Ref Range    QT Interval 380 ms   Comprehensive Metabolic Panel    Collection Time: 07/12/22  7:34 AM    Specimen: Blood   Result Value Ref Range    Glucose 102 (H) 65 - 99 mg/dL    BUN 12 8 - 23 mg/dL    Creatinine 0.89 0.76 - 1.27 mg/dL    Sodium 140 136 - 145 mmol/L    Potassium 4.2 3.5 - 5.2 mmol/L    Chloride 105 98 - 107 mmol/L    CO2 23.0 22.0 - 29.0 mmol/L    Calcium 8.5 (L) 8.6 - 10.5 mg/dL    Total Protein 7.1 6.0 - 8.5 g/dL    Albumin 4.20 3.50 - 5.20 g/dL    ALT (SGPT) 23 1 - 41 U/L    AST (SGOT) 20 1 - 40 U/L    Alkaline Phosphatase 70 39 - 117 U/L    Total Bilirubin 0.3 0.0 - 1.2 mg/dL    Globulin 2.9 gm/dL    A/G Ratio 1.4 g/dL     BUN/Creatinine Ratio 13.5 7.0 - 25.0    Anion Gap 12.0 5.0 - 15.0 mmol/L    eGFR 97.5 >60.0 mL/min/1.73   Protime-INR    Collection Time: 07/12/22  7:34 AM    Specimen: Blood   Result Value Ref Range    Protime 10.0 9.6 - 11.7 Seconds    INR 0.97 0.93 - 1.10   aPTT    Collection Time: 07/12/22  7:34 AM    Specimen: Blood   Result Value Ref Range    PTT 26.0 (L) 61.0 - 76.5 seconds   Lipase    Collection Time: 07/12/22  7:34 AM    Specimen: Blood   Result Value Ref Range    Lipase 39 13 - 60 U/L   Troponin    Collection Time: 07/12/22  7:34 AM    Specimen: Blood   Result Value Ref Range    Troponin T <0.010 0.000 - 0.030 ng/mL   BNP    Collection Time: 07/12/22  7:34 AM    Specimen: Blood   Result Value Ref Range    proBNP 63.9 0.0 - 900.0 pg/mL   CBC Auto Differential    Collection Time: 07/12/22  7:34 AM    Specimen: Blood   Result Value Ref Range    WBC 5.70 3.40 - 10.80 10*3/mm3    RBC 4.86 4.14 - 5.80 10*6/mm3    Hemoglobin 15.8 13.0 - 17.7 g/dL    Hematocrit 48.0 37.5 - 51.0 %    MCV 98.7 (H) 79.0 - 97.0 fL    MCH 32.5 26.6 - 33.0 pg    MCHC 33.0 31.5 - 35.7 g/dL    RDW 14.1 12.3 - 15.4 %    RDW-SD 49.0 37.0 - 54.0 fl    MPV 9.4 6.0 - 12.0 fL    Platelets 142 140 - 450 10*3/mm3    Neutrophil % 51.0 42.7 - 76.0 %    Lymphocyte % 38.6 19.6 - 45.3 %    Monocyte % 6.4 5.0 - 12.0 %    Eosinophil % 3.0 0.3 - 6.2 %    Basophil % 1.0 0.0 - 1.5 %    Neutrophils, Absolute 2.90 1.70 - 7.00 10*3/mm3    Lymphocytes, Absolute 2.20 0.70 - 3.10 10*3/mm3    Monocytes, Absolute 0.40 0.10 - 0.90 10*3/mm3    Eosinophils, Absolute 0.20 0.00 - 0.40 10*3/mm3    Basophils, Absolute 0.10 0.00 - 0.20 10*3/mm3    nRBC 0.1 0.0 - 0.2 /100 WBC   COVID-19,CEPHEID/ABHIJIT,COR/ELISEO/PAD/[facility] IN-HOUSE(OR EMERGENT/ADD-ON),NP SWAB IN TRANSPORT MEDIA 3-4 HR TAT, RT-PCR - Swab, Nasopharynx    Collection Time: 07/12/22  8:58 AM    Specimen: Nasopharynx; Swab   Result Value Ref Range    COVID19 Not Detected Not Detected - Ref. Range   Troponin     Collection Time: 07/12/22 11:42 AM    Specimen: Blood   Result Value Ref Range    Troponin T <0.010 0.000 - 0.030 ng/mL   Stress Test With Myocardial Perfusion One Day    Collection Time: 07/12/22  2:17 PM   Result Value Ref Range    Target HR (85%) 135 bpm    Max. Pred. HR (100%) 159 bpm     CV STRESS PROTOCOL 1 Pharmacologic     Stage 1 1     HR Stage 1 76     BP Stage 1 135/86     Duration Min Stage 1 0     Duration Sec Stage 1 10     Stress Dose Regadenoson Stage 1 0.4     Stress Comments Stage 1 10 sec bolus injection     Baseline HR 63 bpm    Baseline /90 mmHg    Peak HR 87 bpm    Percent Max Pred HR 54.72 %    Percent Target HR 64 %    Peak /90 mmHg    Recovery HR 72 bpm    Recovery /84 mmHg     CV REST NUCLEAR ISOTOPE DOSE 10.0 mCi    BH CV STRESS NUCLEAR ISOTOPE DOSE 30.8 mCi    Nuc Stress EF 71 %   Basic Metabolic Panel    Collection Time: 07/13/22  6:18 AM    Specimen: Blood   Result Value Ref Range    Glucose 110 (H) 65 - 99 mg/dL    BUN 13 8 - 23 mg/dL    Creatinine 1.00 0.76 - 1.27 mg/dL    Sodium 140 136 - 145 mmol/L    Potassium 4.7 3.5 - 5.2 mmol/L    Chloride 103 98 - 107 mmol/L    CO2 27.0 22.0 - 29.0 mmol/L    Calcium 8.8 8.6 - 10.5 mg/dL    BUN/Creatinine Ratio 13.0 7.0 - 25.0    Anion Gap 10.0 5.0 - 15.0 mmol/L    eGFR 85.6 >60.0 mL/min/1.73   Manual Differential    Collection Time: 07/13/22  6:18 AM    Specimen: Blood   Result Value Ref Range    Neutrophil % 52.0 42.7 - 76.0 %    Lymphocyte % 41.0 19.6 - 45.3 %    Monocyte % 3.0 (L) 5.0 - 12.0 %    Eosinophil % 2.0 0.3 - 6.2 %    Basophil % 2.0 (H) 0.0 - 1.5 %    Neutrophils Absolute 2.86 1.70 - 7.00 10*3/mm3    Lymphocytes Absolute 2.26 0.70 - 3.10 10*3/mm3    Monocytes Absolute 0.17 0.10 - 0.90 10*3/mm3    Eosinophils Absolute 0.11 0.00 - 0.40 10*3/mm3    Basophils Absolute 0.11 0.00 - 0.20 10*3/mm3    RBC Morphology Normal Normal    WBC Morphology Normal Normal    Large Platelets Slight/1+ None Seen   CBC Auto  Differential    Collection Time: 07/13/22  6:18 AM    Specimen: Blood   Result Value Ref Range    WBC 5.50 3.40 - 10.80 10*3/mm3    RBC 4.61 4.14 - 5.80 10*6/mm3    Hemoglobin 15.3 13.0 - 17.7 g/dL    Hematocrit 45.0 37.5 - 51.0 %    MCV 97.6 (H) 79.0 - 97.0 fL    MCH 33.1 (H) 26.6 - 33.0 pg    MCHC 33.9 31.5 - 35.7 g/dL    RDW 13.7 12.3 - 15.4 %    RDW-SD 46.4 37.0 - 54.0 fl    MPV 9.2 6.0 - 12.0 fL    Platelets 142 140 - 450 10*3/mm3   Adult Transthoracic Echo Complete W/ Cont if Necessary Per Protocol    Collection Time: 08/16/22  3:20 PM   Result Value Ref Range    Target HR (85%) 135 bpm    Max. Pred. HR (100%) 159 bpm    ACS 2.16 cm    Ao root diam 3.0 cm    Ao pk roland 136.8 cm/sec    Ao V2 VTI 26.5 cm    SARY(I,D) 3.2 cm2    EDV(cubed) 101.0 ml    EDV(MOD-sp4) 63.4 ml    EF(MOD-bp) 46.0 %    EF(MOD-sp4) 46.1 %    ESV(cubed) 32.4 ml    ESV(MOD-sp4) 34.2 ml    IVS/LVPW 0.97 cm    LV mass(C)d 181.9 grams    LV V1 max PG 3.7 mmHg    LV V1 mean PG 1.91 mmHg    LV V1 max 96.0 cm/sec    LVPWd 1.11 cm    MR max PG 97.1 mmHg    MV dec slope 483.6 cm/sec2    MV dec time 0.17 msec    MV V2 VTI 21.8 cm    MVA(VTI) 3.9 cm2    PA V2 max 80.5 cm/sec    RAP systole 3.0 mmHg    RV V1 max PG 1.63 mmHg    RV V1 max 63.8 cm/sec    RV V1 VTI 15.8 cm    RVIDd 3.2 cm    RVSP(TR) 16.7 mmHg    SI(MOD-sp4) 12.6 ml/m2    SV(LVOT) 83.9 ml    SV(MOD-sp4) 29.2 ml    SV(RVOT) 58.4 ml    TR max PG 13.7 mmHg    Ao max PG 7.5 mmHg    Ao mean PG 3.8 mmHg    FS 31.5 %    IVSd 1.07 cm    LV V1 VTI 20.6 cm    LVIDd 4.7 cm    LVIDs 3.2 cm    LVOT area 4.1 cm2    LVOT diam 2.28 cm    MV E/A 1.03     MV max PG 2.43 mmHg    MV mean PG 1.31 mmHg    Qp/Qs 0.70     RVOT diam 2.17 cm    MR max roland 492.7 cm/sec    MV A max roland 79.6 cm/sec    MV E max roland 82.4 cm/sec    TR max roland 184.8 cm/sec    LV Mattson Vol (BSA corrected) 27.3 cm2    LV Sys Vol (BSA corrected) 14.8 cm2    LA length (A2C) 4.4 cm         Review of Systems    Objective     /86 (BP  Location: Left arm, Patient Position: Sitting, Cuff Size: Adult)   Pulse 75   Temp 96.8 °F (36 °C) (Infrared)   Wt 114 kg (252 lb)   SpO2 97%   BMI 35.16 kg/m²     Physical Exam  HENT:      Right Ear: Hearing normal. No decreased hearing noted. No drainage. A middle ear effusion is present. There is impacted cerumen.      Ears:      Comments: Cerumen removed with irrigation. Found to have fluid right TM.      Mouth/Throat:      Mouth: Mucous membranes are moist.   Eyes:      Pupils: Pupils are equal, round, and reactive to light.   Cardiovascular:      Rate and Rhythm: Normal rate.      Pulses: Normal pulses.   Pulmonary:      Effort: Pulmonary effort is normal.   Abdominal:      General: Bowel sounds are normal.      Palpations: Abdomen is soft.   Musculoskeletal:         General: Normal range of motion.   Skin:     General: Skin is warm and dry.      Capillary Refill: Capillary refill takes less than 2 seconds.   Neurological:      General: No focal deficit present.      Mental Status: He is oriented to person, place, and time.   Psychiatric:         Mood and Affect: Mood normal.         Thought Content: Thought content normal.         Result Review :                Assessment & Plan    Diagnoses and all orders for this visit:    1. Tinnitus of right ear (Primary)  -     Ambulatory Referral to ENT (Otolaryngology)    2. Impacted cerumen of right ear  Comments:  flushed right ear with peroxide and warm water.       Patient Instructions   Contact ENT for evalaution of right ear for tinnitus.       Follow Up   Return follow up with ENT, for Next scheduled follow up.    Patient was given instructions and counseling regarding his condition or for health maintenance advice. Please see specific information pulled into the AVS if appropriate.     Yamilka Pascual, APRN    09/01/22

## 2022-09-26 RX ORDER — DOXEPIN HYDROCHLORIDE 10 MG/1
CAPSULE ORAL
Qty: 90 CAPSULE | Refills: 1 | Status: SHIPPED | OUTPATIENT
Start: 2022-09-26

## 2022-10-06 ENCOUNTER — OFFICE VISIT (OUTPATIENT)
Dept: FAMILY MEDICINE CLINIC | Facility: CLINIC | Age: 62
End: 2022-10-06

## 2022-10-06 VITALS
HEIGHT: 71 IN | RESPIRATION RATE: 16 BRPM | HEART RATE: 84 BPM | TEMPERATURE: 97 F | OXYGEN SATURATION: 94 % | DIASTOLIC BLOOD PRESSURE: 88 MMHG | WEIGHT: 254 LBS | BODY MASS INDEX: 35.56 KG/M2 | SYSTOLIC BLOOD PRESSURE: 138 MMHG

## 2022-10-06 DIAGNOSIS — M50.322 OTHER CERVICAL DISC DEGENERATION AT C5-C6 LEVEL: ICD-10-CM

## 2022-10-06 DIAGNOSIS — M25.511 RIGHT SHOULDER PAIN, UNSPECIFIED CHRONICITY: Chronic | ICD-10-CM

## 2022-10-06 DIAGNOSIS — R20.2 PARESTHESIA OF BOTH HANDS: Primary | ICD-10-CM

## 2022-10-06 PROCEDURE — 99213 OFFICE O/P EST LOW 20 MIN: CPT | Performed by: NURSE PRACTITIONER

## 2022-10-06 RX ORDER — RISPERIDONE 2 MG/1
2 TABLET ORAL
COMMUNITY
Start: 2022-08-24

## 2022-10-06 NOTE — PROGRESS NOTES
Subjective   {CC  Problem List  Visit Diagnosis   Encounters  Notes  Medications  Labs  Result Review Imaging  Media :23}     Jagdish Rea is a 61 y.o. male.     Chief Complaint   Patient presents with   • Numbness     Tingling in both hands, been going on for awhile       History of Present Illness  Patient is here for dull ache right shoulder and tingling in both hands . He never completed the MRI of his right shoulder. but says not having same trouble with his shoulder. When he holds his mouse the shoulder aches and gets the tingling and side of hand tingles and goes numb.   He is right handed is normally on computer for one hour or longer.  Reviewed xray results cervical spine from 6/2022  Degenerative disc and endplate changes at C5-6 with bony neural foraminal stenosis on left C5-6  No acute findings.   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    The following portions of the patient's history were reviewed and updated as appropriate: allergies, current medications, past family history, past medical history, past social history, past surgical history and problem list.      Current Outpatient Medications:   •  albuterol sulfate  (90 Base) MCG/ACT inhaler, Inhale 2 puffs Every 4 (Four) Hours As Needed for Wheezing., Disp: 18 g, Rfl: 1  •  amLODIPine (NORVASC) 10 MG tablet, TAKE 1 TABLET EVERY DAY, Disp: 90 tablet, Rfl: 0  •  doxepin (SINEquan) 10 MG capsule, TAKE 1 CAPSULE EVERY NIGHT., Disp: 90 capsule, Rfl: 1  •  hydroCHLOROthiazide (MICROZIDE) 12.5 MG capsule, Take 12.5 mg by mouth As Needed., Disp: , Rfl:   •  metoprolol succinate XL (TOPROL-XL) 25 MG 24 hr tablet, TAKE 1 TABLET EVERY DAY, Disp: 90 tablet, Rfl: 0  •  pravastatin (PRAVACHOL) 40 MG tablet, Take 1 tablet by mouth Every Night., Disp: 90 tablet, Rfl: 0  •  risperiDONE (risperDAL) 2 MG tablet, 2 mg., Disp: , Rfl:   •  lamoTRIgine (LaMICtal) 25 MG tablet, Take 2 tablets by mouth Every Night. (Patient taking differently: Take 50 mg  by mouth Every Night. PT TAKING 3 TABS NIGHTLY TO TOTAL 75 MG NIGHTLY), Disp: 180 tablet, Rfl: 1  •  risperiDONE (risperDAL) 1 MG tablet, Take 1 tablet by mouth Daily. (Patient taking differently: Take 2 mg by mouth Daily.), Disp: 90 tablet, Rfl: 1    Recent Results (from the past 4032 hour(s))   ECG 12 Lead    Collection Time: 07/12/22  6:53 AM   Result Value Ref Range    QT Interval 380 ms   Comprehensive Metabolic Panel    Collection Time: 07/12/22  7:34 AM    Specimen: Blood   Result Value Ref Range    Glucose 102 (H) 65 - 99 mg/dL    BUN 12 8 - 23 mg/dL    Creatinine 0.89 0.76 - 1.27 mg/dL    Sodium 140 136 - 145 mmol/L    Potassium 4.2 3.5 - 5.2 mmol/L    Chloride 105 98 - 107 mmol/L    CO2 23.0 22.0 - 29.0 mmol/L    Calcium 8.5 (L) 8.6 - 10.5 mg/dL    Total Protein 7.1 6.0 - 8.5 g/dL    Albumin 4.20 3.50 - 5.20 g/dL    ALT (SGPT) 23 1 - 41 U/L    AST (SGOT) 20 1 - 40 U/L    Alkaline Phosphatase 70 39 - 117 U/L    Total Bilirubin 0.3 0.0 - 1.2 mg/dL    Globulin 2.9 gm/dL    A/G Ratio 1.4 g/dL    BUN/Creatinine Ratio 13.5 7.0 - 25.0    Anion Gap 12.0 5.0 - 15.0 mmol/L    eGFR 97.5 >60.0 mL/min/1.73   Protime-INR    Collection Time: 07/12/22  7:34 AM    Specimen: Blood   Result Value Ref Range    Protime 10.0 9.6 - 11.7 Seconds    INR 0.97 0.93 - 1.10   aPTT    Collection Time: 07/12/22  7:34 AM    Specimen: Blood   Result Value Ref Range    PTT 26.0 (L) 61.0 - 76.5 seconds   Lipase    Collection Time: 07/12/22  7:34 AM    Specimen: Blood   Result Value Ref Range    Lipase 39 13 - 60 U/L   Troponin    Collection Time: 07/12/22  7:34 AM    Specimen: Blood   Result Value Ref Range    Troponin T <0.010 0.000 - 0.030 ng/mL   BNP    Collection Time: 07/12/22  7:34 AM    Specimen: Blood   Result Value Ref Range    proBNP 63.9 0.0 - 900.0 pg/mL   CBC Auto Differential    Collection Time: 07/12/22  7:34 AM    Specimen: Blood   Result Value Ref Range    WBC 5.70 3.40 - 10.80 10*3/mm3    RBC 4.86 4.14 - 5.80 10*6/mm3     Hemoglobin 15.8 13.0 - 17.7 g/dL    Hematocrit 48.0 37.5 - 51.0 %    MCV 98.7 (H) 79.0 - 97.0 fL    MCH 32.5 26.6 - 33.0 pg    MCHC 33.0 31.5 - 35.7 g/dL    RDW 14.1 12.3 - 15.4 %    RDW-SD 49.0 37.0 - 54.0 fl    MPV 9.4 6.0 - 12.0 fL    Platelets 142 140 - 450 10*3/mm3    Neutrophil % 51.0 42.7 - 76.0 %    Lymphocyte % 38.6 19.6 - 45.3 %    Monocyte % 6.4 5.0 - 12.0 %    Eosinophil % 3.0 0.3 - 6.2 %    Basophil % 1.0 0.0 - 1.5 %    Neutrophils, Absolute 2.90 1.70 - 7.00 10*3/mm3    Lymphocytes, Absolute 2.20 0.70 - 3.10 10*3/mm3    Monocytes, Absolute 0.40 0.10 - 0.90 10*3/mm3    Eosinophils, Absolute 0.20 0.00 - 0.40 10*3/mm3    Basophils, Absolute 0.10 0.00 - 0.20 10*3/mm3    nRBC 0.1 0.0 - 0.2 /100 WBC   COVID-19,CEPHEID/ABHIJIT,COR/ELISEO/PAD/COLETTE IN-HOUSE(OR EMERGENT/ADD-ON),NP SWAB IN TRANSPORT MEDIA 3-4 HR TAT, RT-PCR - Swab, Nasopharynx    Collection Time: 07/12/22  8:58 AM    Specimen: Nasopharynx; Swab   Result Value Ref Range    COVID19 Not Detected Not Detected - Ref. Range   Troponin    Collection Time: 07/12/22 11:42 AM    Specimen: Blood   Result Value Ref Range    Troponin T <0.010 0.000 - 0.030 ng/mL   Stress Test With Myocardial Perfusion One Day    Collection Time: 07/12/22  2:17 PM   Result Value Ref Range    Target HR (85%) 135 bpm    Max. Pred. HR (100%) 159 bpm    BH CV STRESS PROTOCOL 1 Pharmacologic     Stage 1 1     HR Stage 1 76     BP Stage 1 135/86     Duration Min Stage 1 0     Duration Sec Stage 1 10     Stress Dose Regadenoson Stage 1 0.4     Stress Comments Stage 1 10 sec bolus injection     Baseline HR 63 bpm    Baseline /90 mmHg    Peak HR 87 bpm    Percent Max Pred HR 54.72 %    Percent Target HR 64 %    Peak /90 mmHg    Recovery HR 72 bpm    Recovery /84 mmHg    BH CV REST NUCLEAR ISOTOPE DOSE 10.0 mCi    BH CV STRESS NUCLEAR ISOTOPE DOSE 30.8 mCi    Nuc Stress EF 71 %   Basic Metabolic Panel    Collection Time: 07/13/22  6:18 AM    Specimen: Blood   Result Value Ref  Range    Glucose 110 (H) 65 - 99 mg/dL    BUN 13 8 - 23 mg/dL    Creatinine 1.00 0.76 - 1.27 mg/dL    Sodium 140 136 - 145 mmol/L    Potassium 4.7 3.5 - 5.2 mmol/L    Chloride 103 98 - 107 mmol/L    CO2 27.0 22.0 - 29.0 mmol/L    Calcium 8.8 8.6 - 10.5 mg/dL    BUN/Creatinine Ratio 13.0 7.0 - 25.0    Anion Gap 10.0 5.0 - 15.0 mmol/L    eGFR 85.6 >60.0 mL/min/1.73   Manual Differential    Collection Time: 07/13/22  6:18 AM    Specimen: Blood   Result Value Ref Range    Neutrophil % 52.0 42.7 - 76.0 %    Lymphocyte % 41.0 19.6 - 45.3 %    Monocyte % 3.0 (L) 5.0 - 12.0 %    Eosinophil % 2.0 0.3 - 6.2 %    Basophil % 2.0 (H) 0.0 - 1.5 %    Neutrophils Absolute 2.86 1.70 - 7.00 10*3/mm3    Lymphocytes Absolute 2.26 0.70 - 3.10 10*3/mm3    Monocytes Absolute 0.17 0.10 - 0.90 10*3/mm3    Eosinophils Absolute 0.11 0.00 - 0.40 10*3/mm3    Basophils Absolute 0.11 0.00 - 0.20 10*3/mm3    RBC Morphology Normal Normal    WBC Morphology Normal Normal    Large Platelets Slight/1+ None Seen   CBC Auto Differential    Collection Time: 07/13/22  6:18 AM    Specimen: Blood   Result Value Ref Range    WBC 5.50 3.40 - 10.80 10*3/mm3    RBC 4.61 4.14 - 5.80 10*6/mm3    Hemoglobin 15.3 13.0 - 17.7 g/dL    Hematocrit 45.0 37.5 - 51.0 %    MCV 97.6 (H) 79.0 - 97.0 fL    MCH 33.1 (H) 26.6 - 33.0 pg    MCHC 33.9 31.5 - 35.7 g/dL    RDW 13.7 12.3 - 15.4 %    RDW-SD 46.4 37.0 - 54.0 fl    MPV 9.2 6.0 - 12.0 fL    Platelets 142 140 - 450 10*3/mm3   Adult Transthoracic Echo Complete W/ Cont if Necessary Per Protocol    Collection Time: 08/16/22  3:20 PM   Result Value Ref Range    Target HR (85%) 135 bpm    Max. Pred. HR (100%) 159 bpm    ACS 2.16 cm    Ao root diam 3.0 cm    Ao pk roland 136.8 cm/sec    Ao V2 VTI 26.5 cm    SARY(I,D) 3.2 cm2    EDV(cubed) 101.0 ml    EDV(MOD-sp4) 63.4 ml    EF(MOD-bp) 46.0 %    EF(MOD-sp4) 46.1 %    ESV(cubed) 32.4 ml    ESV(MOD-sp4) 34.2 ml    IVS/LVPW 0.97 cm    LV mass(C)d 181.9 grams    LV V1 max PG 3.7 mmHg     "LV V1 mean PG 1.91 mmHg    LV V1 max 96.0 cm/sec    LVPWd 1.11 cm    MR max PG 97.1 mmHg    MV dec slope 483.6 cm/sec2    MV dec time 0.17 msec    MV V2 VTI 21.8 cm    MVA(VTI) 3.9 cm2    PA V2 max 80.5 cm/sec    RAP systole 3.0 mmHg    RV V1 max PG 1.63 mmHg    RV V1 max 63.8 cm/sec    RV V1 VTI 15.8 cm    RVIDd 3.2 cm    RVSP(TR) 16.7 mmHg    SI(MOD-sp4) 12.6 ml/m2    SV(LVOT) 83.9 ml    SV(MOD-sp4) 29.2 ml    SV(RVOT) 58.4 ml    TR max PG 13.7 mmHg    Ao max PG 7.5 mmHg    Ao mean PG 3.8 mmHg    FS 31.5 %    IVSd 1.07 cm    LV V1 VTI 20.6 cm    LVIDd 4.7 cm    LVIDs 3.2 cm    LVOT area 4.1 cm2    LVOT diam 2.28 cm    MV E/A 1.03     MV max PG 2.43 mmHg    MV mean PG 1.31 mmHg    Qp/Qs 0.70     RVOT diam 2.17 cm    MR max roland 492.7 cm/sec    MV A max roland 79.6 cm/sec    MV E max roland 82.4 cm/sec    TR max roland 184.8 cm/sec    LV Mattson Vol (BSA corrected) 27.3 cm2    LV Sys Vol (BSA corrected) 14.8 cm2    LA length (A2C) 4.4 cm         Review of Systems    Objective     /88 (BP Location: Left arm, Patient Position: Sitting, Cuff Size: Large Adult)   Pulse 84   Temp 97 °F (36.1 °C) (Infrared)   Resp 16   Ht 180.3 cm (70.98\")   Wt 115 kg (254 lb)   SpO2 94%   BMI 35.45 kg/m²     Physical Exam  Vitals and nursing note reviewed.   Constitutional:       Appearance: He is obese.   HENT:      Head: Normocephalic.      Right Ear: External ear normal.      Left Ear: External ear normal.      Nose: Nose normal.      Mouth/Throat:      Mouth: Mucous membranes are moist.   Eyes:      Pupils: Pupils are equal, round, and reactive to light.   Cardiovascular:      Rate and Rhythm: Normal rate and regular rhythm.      Heart sounds: Normal heart sounds.   Pulmonary:      Effort: Pulmonary effort is normal.      Breath sounds: Normal breath sounds.   Musculoskeletal:      Cervical back: Neck supple. No rigidity.   Lymphadenopathy:      Cervical: No cervical adenopathy.   Skin:     General: Skin is warm.      Capillary " Refill: Capillary refill takes less than 2 seconds.   Neurological:      General: No focal deficit present.      Mental Status: He is alert and oriented to person, place, and time.      GCS: GCS eye subscore is 4. GCS verbal subscore is 5. GCS motor subscore is 6.      Sensory: Sensation is intact.      Motor: Motor function is intact. No weakness or tremor.      Coordination: Coordination is intact.      Comments:  niko equal   Strengths niko and equal     Psychiatric:         Mood and Affect: Mood normal.         Behavior: Behavior normal.         Thought Content: Thought content normal.         Judgment: Judgment normal.         Result Review :                Assessment & Plan    Diagnoses and all orders for this visit:    1. Paresthesia of both hands (Primary)  Comments:  referred to hand surgery for evauluaiton for cubital or carpal tunnel syndrome.   Orders:  -     Ambulatory Referral to Hand Surgery    2. Right shoulder pain, unspecified chronicity  Comments:  recomend he go back to ortho for evaution and completion of MRI.     3. Other cervical disc degeneration at C5-C6 level  Comments:  having hand parasthesia right worse than left. has right shoulder pain chronic. has neg neurological findings.       There are no Patient Instructions on file for this visit.    Follow Up   No follow-ups on file.    Patient was given instructions and counseling regarding his condition or for health maintenance advice. Please see specific information pulled into the AVS if appropriate.     Yamilka Pascual, APRN    10/06/22

## 2022-10-10 ENCOUNTER — TELEPHONE (OUTPATIENT)
Dept: ORTHOPEDIC SURGERY | Facility: CLINIC | Age: 62
End: 2022-10-10

## 2022-10-10 NOTE — TELEPHONE ENCOUNTER
Provider: JAYLA  Caller: LING  Phone Number: [phone]  Reason for Call:  PT IS CALLING TO ASK IF YOU WOULD SEND THE ORDERS FOR HIS MRI TO AN OPEN MRI DUE TO HIS ANXIETY

## 2022-10-12 NOTE — TELEPHONE ENCOUNTER
I authorized MRI for Opensided MRI and faxed order.  Patient was advised to bring disc to next appointment and to call for MRI appointment.  He already has a Dr Ritchie appointment.

## 2022-10-25 ENCOUNTER — LAB (OUTPATIENT)
Dept: LAB | Facility: HOSPITAL | Age: 62
End: 2022-10-25

## 2022-10-25 ENCOUNTER — OFFICE VISIT (OUTPATIENT)
Dept: FAMILY MEDICINE CLINIC | Facility: CLINIC | Age: 62
End: 2022-10-25

## 2022-10-25 VITALS
BODY MASS INDEX: 35.88 KG/M2 | TEMPERATURE: 96.6 F | SYSTOLIC BLOOD PRESSURE: 124 MMHG | HEART RATE: 87 BPM | OXYGEN SATURATION: 96 % | RESPIRATION RATE: 16 BRPM | HEIGHT: 71 IN | WEIGHT: 256.3 LBS | DIASTOLIC BLOOD PRESSURE: 83 MMHG

## 2022-10-25 DIAGNOSIS — I10 BENIGN ESSENTIAL HYPERTENSION: Chronic | ICD-10-CM

## 2022-10-25 DIAGNOSIS — Z72.0 TOBACCO USE: ICD-10-CM

## 2022-10-25 DIAGNOSIS — Z11.59 NEED FOR HEPATITIS C SCREENING TEST: ICD-10-CM

## 2022-10-25 DIAGNOSIS — E78.2 MIXED HYPERLIPIDEMIA: Chronic | ICD-10-CM

## 2022-10-25 DIAGNOSIS — Z87.891 PERSONAL HISTORY OF NICOTINE DEPENDENCE: ICD-10-CM

## 2022-10-25 DIAGNOSIS — E78.2 MIXED HYPERLIPIDEMIA: Primary | Chronic | ICD-10-CM

## 2022-10-25 DIAGNOSIS — Z23 NEED FOR IMMUNIZATION AGAINST INFLUENZA: ICD-10-CM

## 2022-10-25 LAB
ALBUMIN SERPL-MCNC: 4.3 G/DL (ref 3.5–5.2)
ALBUMIN/GLOB SERPL: 1.5 G/DL
ALP SERPL-CCNC: 77 U/L (ref 39–117)
ALT SERPL W P-5'-P-CCNC: 24 U/L (ref 1–41)
ANION GAP SERPL CALCULATED.3IONS-SCNC: 11.7 MMOL/L (ref 5–15)
AST SERPL-CCNC: 19 U/L (ref 1–40)
BILIRUB SERPL-MCNC: 0.3 MG/DL (ref 0–1.2)
BUN SERPL-MCNC: 8 MG/DL (ref 8–23)
BUN/CREAT SERPL: 7.6 (ref 7–25)
CALCIUM SPEC-SCNC: 9.4 MG/DL (ref 8.6–10.5)
CHLORIDE SERPL-SCNC: 104 MMOL/L (ref 98–107)
CHOLEST SERPL-MCNC: 186 MG/DL (ref 0–200)
CO2 SERPL-SCNC: 28.3 MMOL/L (ref 22–29)
CREAT SERPL-MCNC: 1.05 MG/DL (ref 0.76–1.27)
EGFRCR SERPLBLD CKD-EPI 2021: 80.8 ML/MIN/1.73
GLOBULIN UR ELPH-MCNC: 2.9 GM/DL
GLUCOSE SERPL-MCNC: 117 MG/DL (ref 65–99)
HCV AB SER DONR QL: NORMAL
HDLC SERPL-MCNC: 34 MG/DL (ref 40–60)
LDLC SERPL CALC-MCNC: 101 MG/DL (ref 0–100)
LDLC/HDLC SERPL: 2.69 {RATIO}
POTASSIUM SERPL-SCNC: 3.9 MMOL/L (ref 3.5–5.2)
PROT SERPL-MCNC: 7.2 G/DL (ref 6–8.5)
SODIUM SERPL-SCNC: 144 MMOL/L (ref 136–145)
TRIGL SERPL-MCNC: 302 MG/DL (ref 0–150)
VLDLC SERPL-MCNC: 51 MG/DL (ref 5–40)

## 2022-10-25 PROCEDURE — G0439 PPPS, SUBSEQ VISIT: HCPCS | Performed by: NURSE PRACTITIONER

## 2022-10-25 PROCEDURE — 80061 LIPID PANEL: CPT

## 2022-10-25 PROCEDURE — 1170F FXNL STATUS ASSESSED: CPT | Performed by: NURSE PRACTITIONER

## 2022-10-25 PROCEDURE — G0008 ADMIN INFLUENZA VIRUS VAC: HCPCS | Performed by: NURSE PRACTITIONER

## 2022-10-25 PROCEDURE — 1160F RVW MEDS BY RX/DR IN RCRD: CPT | Performed by: NURSE PRACTITIONER

## 2022-10-25 PROCEDURE — 96160 PT-FOCUSED HLTH RISK ASSMT: CPT | Performed by: NURSE PRACTITIONER

## 2022-10-25 PROCEDURE — 1125F AMNT PAIN NOTED PAIN PRSNT: CPT | Performed by: NURSE PRACTITIONER

## 2022-10-25 PROCEDURE — 86803 HEPATITIS C AB TEST: CPT

## 2022-10-25 PROCEDURE — 36415 COLL VENOUS BLD VENIPUNCTURE: CPT

## 2022-10-25 PROCEDURE — 90686 IIV4 VACC NO PRSV 0.5 ML IM: CPT | Performed by: NURSE PRACTITIONER

## 2022-10-25 PROCEDURE — 80053 COMPREHEN METABOLIC PANEL: CPT

## 2022-10-25 RX ORDER — LAMOTRIGINE 25 MG/1
75 TABLET ORAL
COMMUNITY

## 2022-10-25 RX ORDER — DIVALPROEX SODIUM 500 MG/1
TABLET, EXTENDED RELEASE ORAL
COMMUNITY
Start: 2022-10-06

## 2022-10-25 NOTE — SIGNIFICANT NOTE
Discussed with patient to keep cane, walker or assistive device , keep rugs off floor.pay attention to surroundings make sure plenty of light.

## 2022-10-25 NOTE — PROGRESS NOTES
The ABCs of the Annual Wellness Visit  Subsequent Medicare Wellness Visit    Chief Complaint   Patient presents with   • Medicare Wellness-subsequent      Subjective    History of Present Illness:  Jagdish Rea is a 61 y.o. male who presents for a Subsequent Medicare Wellness Visit.    The following portions of the patient's history were reviewed and   updated as appropriate: allergies, current medications, past family history, past medical history, past social history, past surgical history and problem list.    Compared to one year ago, the patient feels his physical   health is the same.    Compared to one year ago, the patient feels his mental   health is better.    Recent Hospitalizations:  He was not admitted to the hospital during the last year.       Current Medical Providers:  Patient Care Team:  Yamilka Pascual APRN as PCP - General (Nurse Practitioner)  David Boyd MD as Consulting Physician (Otolaryngology)  Fredi Ritchie MD as Consulting Physician (Orthopedic Surgery)  Prieto Sidhu MD as Consulting Physician (Cardiology)    Outpatient Medications Prior to Visit   Medication Sig Dispense Refill   • albuterol sulfate  (90 Base) MCG/ACT inhaler Inhale 2 puffs Every 4 (Four) Hours As Needed for Wheezing. 18 g 1   • amLODIPine (NORVASC) 10 MG tablet TAKE 1 TABLET EVERY DAY 90 tablet 0   • divalproex (DEPAKOTE ER) 500 MG 24 hr tablet      • doxepin (SINEquan) 10 MG capsule TAKE 1 CAPSULE EVERY NIGHT. 90 capsule 1   • hydroCHLOROthiazide (MICROZIDE) 12.5 MG capsule Take 12.5 mg by mouth As Needed.     • lamoTRIgine (LaMICtal) 25 MG tablet Take 3 tablets by mouth every night at bedtime.     • metoprolol succinate XL (TOPROL-XL) 25 MG 24 hr tablet TAKE 1 TABLET EVERY DAY 90 tablet 0   • pravastatin (PRAVACHOL) 40 MG tablet Take 1 tablet by mouth Every Night. 90 tablet 0   • risperiDONE (risperDAL) 2 MG tablet 2 mg.     • lamoTRIgine (LaMICtal) 25 MG tablet Take 2 tablets by  mouth Every Night. (Patient taking differently: Take 2 tablets by mouth Every Night. PT TAKING 3 TABS NIGHTLY TO TOTAL 75 MG NIGHTLY) 180 tablet 1   • risperiDONE (risperDAL) 1 MG tablet Take 1 tablet by mouth Daily. (Patient taking differently: Take 2 tablets by mouth Daily.) 90 tablet 1     No facility-administered medications prior to visit.       No opioid medication identified on active medication list. I have reviewed chart for other potential  high risk medication/s and harmful drug interactions in the elderly.          Aspirin is not on active medication list.  Aspirin use is not indicated based on review of current medical condition/s. Risk of harm outweighs potential benefits.  .    Patient Active Problem List   Diagnosis   • Anxiety   • Benign essential hypertension   • Chronic low back pain   • Hyperlipidemia   • Other cervical disc degeneration at C5-C6 level   • Peripheral neuropathy   • Thoracic disc herniation   • Vitamin B12 deficiency   • Right shoulder pain   • Fall   • Radicular pain in right arm   • Acute pain of right shoulder   • Tobacco abuse   • Chronic pain of both knees   • Initial Medicare annual wellness visit   • Pneumonia of both lungs due to infectious organism   • Chest pain   • Abnormal nuclear stress test   • Tinnitus of right ear   • Impacted cerumen of right ear   • Paresthesia of both hands     Advance Care Planning  Advance Directive is not on file.  ACP discussion was held with the patient during this visit. Patient does not have an advance directive, information provided.    Review of Systems   HENT: Negative for dental problem.         Decreased [facility].   Has cavity has make appointement with dentist   Eyes:        Last eye exam couple years. Wears glasses.  11/9/2022   Cardiovascular: Negative for chest pain, palpitations and leg swelling.   Gastrointestinal: Negative for anal bleeding, blood in stool, constipation and diarrhea.  "  Genitourinary: Negative.  Negative for difficulty urinating, dysuria, enuresis and flank pain.   Musculoskeletal: Negative.    Skin:        No skin cancer no poor wound healing has skin tags.    Allergic/Immunologic: Negative.    Hematological: Negative.    Psychiatric/Behavioral: Negative for decreased concentration, dysphoric mood, hallucinations and suicidal ideas. The patient is not nervous/anxious.         Objective    Vitals:    10/25/22 1304   BP: 124/83   Pulse: 87   Resp: 16   Temp: 96.6 °F (35.9 °C)   TempSrc: Infrared   SpO2: 96%   Weight: 116 kg (256 lb 4.8 oz)   Height: 180.3 cm (71\")   PainSc:   4   PainLoc: Shoulder     Estimated body mass index is 35.75 kg/m² as calculated from the following:    Height as of this encounter: 180.3 cm (71\").    Weight as of this encounter: 116 kg (256 lb 4.8 oz).    Class 2 Severe Obesity (BMI >=35 and <=39.9). Obesity-related health conditions include the following: dyslipidemias. Obesity is unchanged. BMI is is above average; BMI management plan is completed. We discussed portion control and increasing exercise.      Does the patient have evidence of cognitive impairment? No    Physical Exam            HEALTH RISK ASSESSMENT    Smoking Status:  Social History     Tobacco Use   Smoking Status Every Day   • Packs/day: 1.00   • Years: 47.00   • Pack years: 47.00   • Types: Cigarettes   • Start date: 1/9/1973   Smokeless Tobacco Never     Alcohol Consumption:  Social History     Substance and Sexual Activity   Alcohol Use No     Fall Risk Screen:    STEADI Fall Risk Assessment was completed, and patient is at HIGH risk for falls. Assessment completed on:10/25/2022    Depression Screening:  PHQ-2/PHQ-9 Depression Screening 10/25/2022   Retired PHQ-9 Total Score -   Retired Total Score -   Little Interest or Pleasure in Doing Things 0-->not at all   Feeling Down, Depressed or Hopeless 0-->not at all   PHQ-9: Brief Depression Severity Measure Score 0       Health Habits " and Functional and Cognitive Screening:  Functional & Cognitive Status 10/25/2022   Do you have difficulty preparing food and eating? No   Do you have difficulty bathing yourself, getting dressed or grooming yourself? No   Do you have difficulty using the toilet? No   Do you have difficulty moving around from place to place? No   Do you have trouble with steps or getting out of a bed or a chair? No   Current Diet Well Balanced Diet   Dental Exam Not up to date   Eye Exam Not up to date   Exercise (times per week) 0 times per week   Current Exercises Include -   Do you need help using the phone?  No   Are you deaf or do you have serious difficulty hearing?  No   Do you need help with transportation? No   Do you need help shopping? No   Do you need help preparing meals?  No   Do you need help with housework?  No   Do you need help with laundry? No   Do you need help taking your medications? No   Do you need help managing money? No   Do you ever drive or ride in a car without wearing a seat belt? No   Have you felt unusual stress, anger or loneliness in the last month? Yes   Who do you live with? Spouse   If you need help, do you have trouble finding someone available to you? No   Have you been bothered in the last four weeks by sexual problems? No   Do you have difficulty concentrating, remembering or making decisions? No       Age-appropriate Screening Schedule:  Refer to the list below for future screening recommendations based on patient's age, sex and/or medical conditions. Orders for these recommended tests are listed in the plan section. The patient has been provided with a written plan.    Health Maintenance   Topic Date Due   • TDAP/TD VACCINES (1 - Tdap) Never done   • LIPID PANEL  07/30/2022   • INFLUENZA VACCINE  08/01/2022   • ZOSTER VACCINE  Completed              Assessment & Plan   CMS Preventative Services Quick Reference  Risk Factors Identified During Encounter  hearing deficit  The above  risks/problems have been discussed with the patient.  Follow up actions/plans if indicated are seen below in the Assessment/Plan Section.  Pertinent information has been shared with the patient in the After Visit Summary.    Diagnoses and all orders for this visit:    1. Mixed hyperlipidemia (Primary)  -     Lipid Panel; Future  -     Comprehensive Metabolic Panel; Future    2. Benign essential hypertension  -     Lipid Panel; Future  -     Comprehensive Metabolic Panel; Future    3. Need for hepatitis C screening test  -     Hepatitis C antibody; Future    4. Tobacco use  -     CT Chest Low Dose Wo; Future    5. Personal history of nicotine dependence  -     CT Chest Low Dose Wo; Future        Follow Up:   No follow-ups on file.     An After Visit Summary and PPPS were made available to the patient.

## 2022-10-25 NOTE — PATIENT INSTRUCTIONS
You will need pneumonia vaccine and covid booster.  Follow up on lab results  You should hear about when to set up CT scan.

## 2022-10-28 RX ORDER — PRAVASTATIN SODIUM 80 MG/1
80 TABLET ORAL NIGHTLY
Qty: 90 TABLET | Refills: 1 | Status: SHIPPED | OUTPATIENT
Start: 2022-10-28

## 2022-11-07 ENCOUNTER — OFFICE VISIT (OUTPATIENT)
Dept: ORTHOPEDIC SURGERY | Facility: CLINIC | Age: 62
End: 2022-11-07

## 2022-11-07 VITALS — BODY MASS INDEX: 35.7 KG/M2 | HEIGHT: 71 IN | WEIGHT: 255 LBS | HEART RATE: 83 BPM

## 2022-11-07 DIAGNOSIS — M25.511 RIGHT SHOULDER PAIN, UNSPECIFIED CHRONICITY: Primary | ICD-10-CM

## 2022-11-07 PROCEDURE — 99213 OFFICE O/P EST LOW 20 MIN: CPT | Performed by: ORTHOPAEDIC SURGERY

## 2022-11-07 NOTE — PROGRESS NOTES
"     Patient ID: Jagdish Rea is a 61 y.o. male.  Right shoulder pain  History of cuff repair August 2020    Review of Systems:        Objective:    Pulse 83   Ht 180.3 cm (71\")   Wt 116 kg (255 lb)   BMI 35.57 kg/m²     Physical Examination:   Incisions are healed no sign of infection passive elevation 170 abduction 130 external rotation 40 and some mild weakness on supraspinatus testing [place] and [place] are negative belly press and liftoff are 5       Imaging:   MRI demonstrates healed rotator cuff repair    Assessment:    Right shoulder pain    Plan:   I recommend therapy see me as needed      Procedures          Disclaimer: Part of this note may be an electronic transcription/translation of spoken language to printed text using the Dragon Dictation System  "

## 2022-11-10 ENCOUNTER — OFFICE (AMBULATORY)
Dept: URBAN - METROPOLITAN AREA CLINIC 64 | Facility: CLINIC | Age: 62
End: 2022-11-10

## 2022-11-10 VITALS
HEIGHT: 71 IN | WEIGHT: 253 LBS | HEART RATE: 83 BPM | SYSTOLIC BLOOD PRESSURE: 130 MMHG | DIASTOLIC BLOOD PRESSURE: 92 MMHG

## 2022-11-10 DIAGNOSIS — R13.10 DYSPHAGIA, UNSPECIFIED: ICD-10-CM

## 2022-11-10 PROBLEM — K22.2 ESOPHAGEAL OBSTRUCTION: Status: ACTIVE | Noted: 2022-05-19

## 2022-11-10 PROCEDURE — 99212 OFFICE O/P EST SF 10 MIN: CPT | Performed by: NURSE PRACTITIONER

## 2022-11-23 ENCOUNTER — HOSPITAL ENCOUNTER (OUTPATIENT)
Dept: CT IMAGING | Facility: HOSPITAL | Age: 62
Discharge: HOME | End: 2022-11-23
Admitting: NURSE PRACTITIONER

## 2022-11-23 DIAGNOSIS — Z72.0 TOBACCO USE: ICD-10-CM

## 2022-11-23 DIAGNOSIS — Z87.891 PERSONAL HISTORY OF NICOTINE DEPENDENCE: ICD-10-CM

## 2022-11-23 PROCEDURE — 71271 CT THORAX LUNG CANCER SCR C-: CPT

## 2022-12-31 ENCOUNTER — OFFICE (AMBULATORY)
Dept: URBAN - METROPOLITAN AREA CLINIC 64 | Facility: CLINIC | Age: 62
End: 2022-12-31
Payer: MEDICARE

## 2022-12-31 DIAGNOSIS — K21.9 GASTRO-ESOPHAGEAL REFLUX DISEASE WITHOUT ESOPHAGITIS: ICD-10-CM

## 2022-12-31 DIAGNOSIS — I10 ESSENTIAL (PRIMARY) HYPERTENSION: ICD-10-CM

## 2022-12-31 DIAGNOSIS — E66.9 OBESITY, UNSPECIFIED: ICD-10-CM

## 2022-12-31 DIAGNOSIS — K21.00 GASTRO-ESOPHAGEAL REFLUX DISEASE WITH ESOPHAGITIS, WITHOUT B: ICD-10-CM

## 2022-12-31 DIAGNOSIS — E78.00 PURE HYPERCHOLESTEROLEMIA, UNSPECIFIED: ICD-10-CM

## 2022-12-31 PROCEDURE — 99457 RPM TX MGMT 1ST 20 MIN: CPT | Performed by: INTERNAL MEDICINE

## 2022-12-31 PROCEDURE — 99490 CHRNC CARE MGMT STAFF 1ST 20: CPT | Performed by: INTERNAL MEDICINE

## 2023-01-17 ENCOUNTER — TELEPHONE (OUTPATIENT)
Dept: FAMILY MEDICINE CLINIC | Facility: CLINIC | Age: 63
End: 2023-01-17
Payer: MEDICARE

## 2023-01-17 RX ORDER — METOPROLOL SUCCINATE 25 MG/1
TABLET, EXTENDED RELEASE ORAL
Qty: 90 TABLET | Refills: 0 | Status: SHIPPED | OUTPATIENT
Start: 2023-01-17

## 2023-01-17 RX ORDER — PANTOPRAZOLE SODIUM 40 MG/1
TABLET, DELAYED RELEASE ORAL
COMMUNITY
Start: 2022-10-28 | End: 2023-01-17 | Stop reason: SDUPTHER

## 2023-01-17 RX ORDER — PANTOPRAZOLE SODIUM 40 MG/1
40 TABLET, DELAYED RELEASE ORAL DAILY
Qty: 90 TABLET | Refills: 1 | Status: SHIPPED | OUTPATIENT
Start: 2023-01-17

## 2023-01-30 ENCOUNTER — OFFICE (AMBULATORY)
Dept: URBAN - METROPOLITAN AREA CLINIC 64 | Facility: CLINIC | Age: 63
End: 2023-01-30
Payer: MEDICARE

## 2023-01-30 DIAGNOSIS — E66.9 OBESITY, UNSPECIFIED: ICD-10-CM

## 2023-01-30 DIAGNOSIS — I10 ESSENTIAL (PRIMARY) HYPERTENSION: ICD-10-CM

## 2023-01-30 DIAGNOSIS — E78.00 PURE HYPERCHOLESTEROLEMIA, UNSPECIFIED: ICD-10-CM

## 2023-01-30 DIAGNOSIS — K21.9 GASTRO-ESOPHAGEAL REFLUX DISEASE WITHOUT ESOPHAGITIS: ICD-10-CM

## 2023-01-30 PROCEDURE — 99490 CHRNC CARE MGMT STAFF 1ST 20: CPT | Performed by: INTERNAL MEDICINE

## 2023-02-07 NOTE — TELEPHONE ENCOUNTER
Caller: [facility] - [place], [place] - [address] - [phone] [facility] [phone] FX    Relationship: Pharmacy    Best call back number: [phone]    Requested Prescriptions:   Requested Prescriptions     Pending Prescriptions Disp Refills   • divalproex (DEPAKOTE ER) 500 MG 24 hr tablet          Pharmacy where request should be sent: [facility] - [place], [place] - [address] - [phone] [facility] [phone] FX     Additional details provided by patient: PHARMACY IS NOT SURE HOW MANY TABLETS THE PATIENT HAS LEFT     Does the patient have less than a 3 day supply:  [] Yes  [] No    Would you like a call back once the refill request has been completed: [] Yes [x] No    If the office needs to give you a call back, can they leave a voicemail: [] Yes [x] No    Bonita Clifford Rep   02/07/23 13:46 EST

## 2023-02-08 RX ORDER — DIVALPROEX SODIUM 500 MG/1
TABLET, EXTENDED RELEASE ORAL
OUTPATIENT
Start: 2023-02-08

## 2023-03-14 ENCOUNTER — HOSPITAL ENCOUNTER (OUTPATIENT)
Dept: GENERAL RADIOLOGY | Facility: HOSPITAL | Age: 63
Discharge: HOME | End: 2023-03-14
Admitting: NURSE PRACTITIONER
Payer: MEDICARE

## 2023-03-14 ENCOUNTER — OFFICE VISIT (OUTPATIENT)
Dept: FAMILY MEDICINE CLINIC | Facility: CLINIC | Age: 63
End: 2023-03-14
Payer: MEDICARE

## 2023-03-14 VITALS
SYSTOLIC BLOOD PRESSURE: 118 MMHG | DIASTOLIC BLOOD PRESSURE: 78 MMHG | OXYGEN SATURATION: 95 % | BODY MASS INDEX: 35.9 KG/M2 | HEIGHT: 71 IN | TEMPERATURE: 96.6 F | WEIGHT: 256.4 LBS | RESPIRATION RATE: 24 BRPM | HEART RATE: 83 BPM

## 2023-03-14 DIAGNOSIS — M54.41 CHRONIC BILATERAL LOW BACK PAIN WITH RIGHT-SIDED SCIATICA: ICD-10-CM

## 2023-03-14 DIAGNOSIS — R20.2 PARESTHESIA OF LOWER EXTREMITY: Primary | ICD-10-CM

## 2023-03-14 DIAGNOSIS — G89.29 CHRONIC BILATERAL LOW BACK PAIN WITH RIGHT-SIDED SCIATICA: ICD-10-CM

## 2023-03-14 PROCEDURE — 3074F SYST BP LT 130 MM HG: CPT | Performed by: NURSE PRACTITIONER

## 2023-03-14 PROCEDURE — 72114 X-RAY EXAM L-S SPINE BENDING: CPT

## 2023-03-14 PROCEDURE — 99214 OFFICE O/P EST MOD 30 MIN: CPT | Performed by: NURSE PRACTITIONER

## 2023-03-14 PROCEDURE — 3078F DIAST BP <80 MM HG: CPT | Performed by: NURSE PRACTITIONER

## 2023-03-14 NOTE — PATIENT INSTRUCTIONS
Complete xrays today  Mri will have let you know  You should hear from PT in week if not let me know.  Heat before strecthes ice when done can use otc pain rubs.

## 2023-03-14 NOTE — PROGRESS NOTES
Subjective        Jagdish Rea is a 62 y.o. male.     Chief Complaint   Patient presents with   • Numbness     Right thigh numbness when standing.    • Back Pain       History of Present Illness  Patient is here for numbness right thigh worse when standing or walking, then low back aches.then can't feel outside right thigh.   Won't subside until he lays down.   This has been going on couple months off and on.   He said has not caused difficulty with walking feels acward   But can still walk.   Back hurts first. Has lean over to relieve pain. He can only walk so far then knees hurt. Has had large weight gain in past years.   Denies any bowel or bladder incontinence.           The following portions of the patient's history were reviewed and updated as appropriate: allergies, current medications, past family history, past medical history, past social history, past surgical history and problem list.      Current Outpatient Medications:   •  albuterol sulfate  (90 Base) MCG/ACT inhaler, Inhale 2 puffs Every 4 (Four) Hours As Needed for Wheezing., Disp: 18 g, Rfl: 1  •  amLODIPine (NORVASC) 10 MG tablet, TAKE 1 TABLET EVERY DAY, Disp: 90 tablet, Rfl: 0  •  divalproex (DEPAKOTE ER) 500 MG 24 hr tablet, , Disp: , Rfl:   •  doxepin (SINEquan) 10 MG capsule, TAKE 1 CAPSULE EVERY NIGHT., Disp: 90 capsule, Rfl: 1  •  hydroCHLOROthiazide (MICROZIDE) 12.5 MG capsule, Take 1 capsule by mouth As Needed., Disp: , Rfl:   •  metoprolol succinate XL (TOPROL-XL) 25 MG 24 hr tablet, TAKE 1 TABLET EVERY DAY, Disp: 90 tablet, Rfl: 0  •  pantoprazole (PROTONIX) 40 MG EC tablet, Take 1 tablet by mouth Daily., Disp: 90 tablet, Rfl: 1  •  pravastatin (PRAVACHOL) 80 MG tablet, Take 1 tablet by mouth Every Night., Disp: 90 tablet, Rfl: 1  •  risperiDONE (risperDAL) 2 MG tablet, 1 tablet., Disp: , Rfl:   •  lamoTRIgine (LaMICtal) 25 MG tablet, Take 3 tablets by mouth every night at bedtime. (Patient not taking: Reported on 3/14/2023),  "Disp: , Rfl:     Recent Results (from the past 4032 hour(s))   Lipid Panel    Collection Time: 10/25/22  2:13 PM    Specimen: Blood   Result Value Ref Range    Total Cholesterol 186 0 - 200 mg/dL    Triglycerides 302 (H) 0 - 150 mg/dL    HDL Cholesterol 34 (L) 40 - 60 mg/dL    LDL Cholesterol  101 (H) 0 - 100 mg/dL    VLDL Cholesterol 51 (H) 5 - 40 mg/dL    LDL/HDL Ratio 2.69    Comprehensive Metabolic Panel    Collection Time: 10/25/22  2:13 PM    Specimen: Blood   Result Value Ref Range    Glucose 117 (H) 65 - 99 mg/dL    BUN 8 8 - 23 mg/dL    Creatinine 1.05 0.76 - 1.27 mg/dL    Sodium 144 136 - 145 mmol/L    Potassium 3.9 3.5 - 5.2 mmol/L    Chloride 104 98 - 107 mmol/L    CO2 28.3 22.0 - 29.0 mmol/L    Calcium 9.4 8.6 - 10.5 mg/dL    Total Protein 7.2 6.0 - 8.5 g/dL    Albumin 4.30 3.50 - 5.20 g/dL    ALT (SGPT) 24 1 - 41 U/L    AST (SGOT) 19 1 - 40 U/L    Alkaline Phosphatase 77 39 - 117 U/L    Total Bilirubin 0.3 0.0 - 1.2 mg/dL    Globulin 2.9 gm/dL    A/G Ratio 1.5 g/dL    BUN/Creatinine Ratio 7.6 7.0 - 25.0    Anion Gap 11.7 5.0 - 15.0 mmol/L    eGFR 80.8 >60.0 mL/min/1.73   Hepatitis C antibody    Collection Time: 10/25/22  2:13 PM    Specimen: Blood   Result Value Ref Range    Hepatitis C Ab Non-Reactive Non-Reactive         Review of Systems    Objective     /78   Pulse 83   Temp 96.6 °F (35.9 °C) (Infrared)   Resp 24   Ht 180.3 cm (71\")   Wt 116 kg (256 lb 6.4 oz)   SpO2 95%   BMI 35.76 kg/m²     Physical Exam  Vitals and nursing note reviewed.   Constitutional:       Appearance: He is obese.   HENT:      Head: Normocephalic.      Right Ear: External ear normal.      Left Ear: External ear normal.      Nose: Nose normal.      Mouth/Throat:      Mouth: Mucous membranes are moist.   Eyes:      Pupils: Pupils are equal, round, and reactive to light.   Cardiovascular:      Rate and Rhythm: Normal rate and regular rhythm.      Heart sounds: Normal heart sounds.   Pulmonary:      Effort: " Pulmonary effort is normal.      Breath sounds: Normal breath sounds.   Abdominal:      General: Bowel sounds are normal.      Palpations: Abdomen is soft.   Musculoskeletal:      Lumbar back: Bony tenderness present. No swelling or deformity. Decreased range of motion. Positive right straight leg raise test. No scoliosis.        Back:       Comments: Toe walk normal  Heel walk unable  Side to side pain right   Unable squat can't get up  Backward lean pain in low back  Can touch toes pain upon rising.   Skin:     General: Skin is warm.   Neurological:      General: No focal deficit present.      Mental Status: He is alert and oriented to person, place, and time.   Psychiatric:         Mood and Affect: Mood normal.         Thought Content: Thought content normal.         Result Review :                Assessment & Plan    Diagnoses and all orders for this visit:    1. Paresthesia of lower extremity (Primary)  Comments:  testing ordered further treatment pending results.     2. Chronic bilateral low back pain with right-sided sciatica  Comments:  testing ordered PT ordered further treatment pending results  Orders:  -     MRI Lumbar Spine With & Without Contrast; Future  -     Ambulatory Referral to Physical Therapy Evaluate and treat  -     XR Spine Lumbar Complete With Flex & Ext; Future      Patient Instructions   Complete xrays today  Mri will have let you know  You should hear from PT in week if not let me know.  Heat before strecthes ice when done can use otc pain rubs.         Follow Up   Return if symptoms worsen or fail to improve.    Patient was given instructions and counseling regarding his condition or for health maintenance advice. Please see specific information pulled into the AVS if appropriate.     Yamilka Pascual, APRN    03/14/23

## 2023-03-30 ENCOUNTER — TELEPHONE (OUTPATIENT)
Dept: FAMILY MEDICINE CLINIC | Facility: CLINIC | Age: 63
End: 2023-03-30

## 2023-03-30 DIAGNOSIS — F41.9 ANXIETY DUE TO INVASIVE PROCEDURE: ICD-10-CM

## 2023-03-30 DIAGNOSIS — F41.9 ANXIETY DUE TO INVASIVE PROCEDURE: Primary | ICD-10-CM

## 2023-03-30 RX ORDER — LORAZEPAM 0.5 MG/1
TABLET ORAL
Qty: 2 TABLET | Refills: 0 | Status: CANCELLED | OUTPATIENT
Start: 2023-03-30

## 2023-03-30 RX ORDER — LORAZEPAM 0.5 MG/1
TABLET ORAL
Qty: 2 TABLET | Refills: 0 | Status: SHIPPED | OUTPATIENT
Start: 2023-03-30 | End: 2023-03-30 | Stop reason: SDUPTHER

## 2023-03-30 RX ORDER — LORAZEPAM 0.5 MG/1
TABLET ORAL
Qty: 2 TABLET | Refills: 0 | Status: SHIPPED | OUTPATIENT
Start: 2023-03-30

## 2023-03-30 NOTE — TELEPHONE ENCOUNTER
Caller: Jagdish Rea    Relationship: Self    Best call back number: [phone]      Requested Prescriptions:   Requested Prescriptions     Pending Prescriptions Disp Refills   • LORazepam (ATIVAN) 0.5 MG tablet 2 tablet 0     Sig: Take one at least 1/2 hr to scan . Can repeat dose if needed.        Pharmacy where request should be sent: [facility] [id number] - [place], [place] - [address] - [phone]  - [phone] FX     Last office visit with prescribing clinician: 3/14/2023   Last telemedicine visit with prescribing clinician: 4/25/2023   Next office visit with prescribing clinician: 4/25/2023     Additional details provided by patient: WAS SENT TO [facility] AND IT WILL NOT REACH HIM IN TIME.    WOULD LIKE THE PRESCRIPTION SENT HERE.     Does the patient have less than a 3 day supply:  [x] Yes  [] No    Would you like a call back once the refill request has been completed: [x] Yes [] No    If the office needs to give you a call back, can they leave a voicemail: [x] Yes [] No    Bonita Louis Rep   03/30/23 10:57 EDT

## 2023-03-30 NOTE — TELEPHONE ENCOUNTER
Caller: Jagdish Rea    Relationship: Self    Best call back number: [phone]    What medication are you requesting: PATIENT IS NEEDING THIS TO HELP WHEN HE DOES HIS MRI DUE TO HIM BEING CLAUSTROPHOBIC    If a prescription is needed, what is your preferred pharmacy and phone number: [facility] [id number] - [place], [place] - [address] - [phone]  - [phone] FX     Additional notes: PLEASE CALL TO LET PATIENT KNOW.

## 2023-04-06 ENCOUNTER — HOSPITAL ENCOUNTER (OUTPATIENT)
Dept: MRI IMAGING | Facility: HOSPITAL | Age: 63
Discharge: HOME | End: 2023-04-06
Admitting: NURSE PRACTITIONER
Payer: MEDICARE

## 2023-04-06 DIAGNOSIS — M54.41 CHRONIC BILATERAL LOW BACK PAIN WITH RIGHT-SIDED SCIATICA: ICD-10-CM

## 2023-04-06 DIAGNOSIS — G89.29 CHRONIC BILATERAL LOW BACK PAIN WITH RIGHT-SIDED SCIATICA: ICD-10-CM

## 2023-04-06 LAB
CREAT BLDA-MCNC: 1 MG/DL (ref 0.6–1.3)
EGFRCR SERPLBLD CKD-EPI 2021: 85.1 ML/MIN/1.73

## 2023-04-06 PROCEDURE — 82565 ASSAY OF CREATININE: CPT

## 2023-04-06 PROCEDURE — A9579 GAD-BASE MR CONTRAST NOS,1ML: HCPCS | Performed by: NURSE PRACTITIONER

## 2023-04-06 PROCEDURE — 72158 MRI LUMBAR SPINE W/O & W/DYE: CPT

## 2023-04-06 PROCEDURE — 25010000002 GADOTERIDOL PER 1 ML: Performed by: NURSE PRACTITIONER

## 2023-04-06 RX ADMIN — GADOTERIDOL 20 ML: 279.3 INJECTION, SOLUTION INTRAVENOUS at 12:24

## 2023-04-09 RX ORDER — PRAVASTATIN SODIUM 80 MG/1
TABLET ORAL
Qty: 90 TABLET | Refills: 1 | Status: SHIPPED | OUTPATIENT
Start: 2023-04-09

## 2023-04-09 RX ORDER — AMLODIPINE BESYLATE 10 MG/1
TABLET ORAL
Qty: 90 TABLET | Refills: 0 | Status: SHIPPED | OUTPATIENT
Start: 2023-04-09

## 2023-04-10 DIAGNOSIS — M71.38 SYNOVIAL CYST OF LUMBAR SPINE: Primary | ICD-10-CM

## 2023-04-12 NOTE — PROGRESS NOTES
Neurosurgical Consultation      Jagdish Rea is a 62 y.o. male is being seen for consultation today at the request of KEILA Kaur for synovial cyst of lumbar spine. Today patient reports low back pain with numbness in his right thigh.    Chief Complaint   Patient presents with   • Back Pain     New evaluation        Previous treatment: Ibuprofen,     HPI: This is a 62-year-old gentleman with a BMI of 37 as well as a history of of posttraumatic stress disorder who presents to the neurosurgery clinic for evaluation of back pain.  He has chronic longstanding back pain.  This was adequately managed with conservative measures and did not require any surgery.  His pain has been worsening.  His back pain is diffuse across the lumbar spine.  His back pain is worsened with standing.  He does get some improvement in his back pain with flexion.  He does get relief with lying down.  When his back pain is severe he also has associated right lateral thigh numbness.  This is worsened with standing too long or walking too far.  He also gets relief in his leg with lying down.  The distribution of the numbness is most consistent with a lateral femoral cutaneous nerve.  He is currently engaged with physical therapy but does not appreciate significant improvement.  He does take over-the-counter ibuprofen and does find that this does improve his symptoms.    Of note he has have a history of meningitis from 2008 and did undergo 2 lumbar punctures at that time.  He does not know if he has persistent residual issues with his meningitis.    Past Medical History:   Diagnosis Date   • Allergic    • Anxiety    • Arthritis 06/2005   • Back pain    • Claustrophobia 1978   • COPD (chronic obstructive pulmonary disease)    • CTS (carpal tunnel syndrome) 10/2022   • GERD (gastroesophageal reflux disease)    • Hyperlipidemia    • Hypertension    • Low back pain    • PTSD (post-traumatic stress disorder)    • Thoracic disc herniation    •  Vitamin B12 deficiency         Past Surgical History:   Procedure Laterality Date   • CARDIAC CATHETERIZATION N/A 07/13/2022    Procedure: Left Heart Cath, possible pci;  Surgeon: Prieto Sidhu MD;  Location: [facility] CATH INVASIVE LOCATION;  Service: Cardiovascular;  Laterality: N/A;   • ENDOSCOPY     • HERNIA REPAIR     • KNEE ARTHROPLASTY      x2   • SHOULDER ARTHROSCOPY W/ ROTATOR CUFF REPAIR Right 08/11/2020    Procedure: SHOULDER ARTHROSCOPY WITH ROTATOR CUFF REPAIR, extensive debridement;  Surgeon: Fredi Ritchie MD;  Location: [facility] MAIN OR;  Service: Orthopedics;  Laterality: Right;   • TONSILLECTOMY          Current Outpatient Medications on File Prior to Visit   Medication Sig Dispense Refill   • albuterol sulfate  (90 Base) MCG/ACT inhaler Inhale 2 puffs Every 4 (Four) Hours As Needed for Wheezing. 18 g 1   • amLODIPine (NORVASC) 10 MG tablet TAKE 1 TABLET EVERY DAY 90 tablet 0   • divalproex (DEPAKOTE ER) 500 MG 24 hr tablet      • doxepin (SINEquan) 10 MG capsule TAKE 1 CAPSULE EVERY NIGHT. 90 capsule 1   • hydroCHLOROthiazide (MICROZIDE) 12.5 MG capsule Take 1 capsule by mouth As Needed.     • metoprolol succinate XL (TOPROL-XL) 25 MG 24 hr tablet TAKE 1 TABLET EVERY DAY 90 tablet 0   • pantoprazole (PROTONIX) 40 MG EC tablet Take 1 tablet by mouth Daily. 90 tablet 1   • pravastatin (PRAVACHOL) 80 MG tablet TAKE 1 TABLET EVERY NIGHT 90 tablet 1   • risperiDONE (risperDAL) 2 MG tablet 1 tablet.     • [DISCONTINUED] lamoTRIgine (LaMICtal) 25 MG tablet Take 3 tablets by mouth every night at bedtime. (Patient not taking: Reported on 3/14/2023)     • [DISCONTINUED] LORazepam (ATIVAN) 0.5 MG tablet Take one at least 1/2 hr to scan . Can repeat dose if needed. 2 tablet 0     No current facility-administered medications on file prior to visit.        Allergies   Allergen Reactions   • Atorvastatin Swelling   • Lisinopril Unknown - High Severity     Facial paralysis         Social  "History     Socioeconomic History   • Marital status:    Tobacco Use   • Smoking status: Every Day     Packs/day: 1.00     Years: 47.00     Pack years: 47.00     Types: Cigarettes     Start date: 1/9/1973   • Smokeless tobacco: Never   • Tobacco comments:     Smoked a half a pack a day for 42 years didn't start to smoke a whole pack until 2017   Vaping Use   • Vaping Use: Never used   Substance and Sexual Activity   • Alcohol use: No   • Drug use: No   • Sexual activity: Yes     Partners: Female     Birth control/protection: None          Review of Systems   Constitutional: Positive for activity change.   HENT: Negative.    Eyes: Negative.    Respiratory: Negative.    Cardiovascular: Negative.    Gastrointestinal: Negative.    Endocrine: Negative.    Genitourinary: Negative.    Musculoskeletal: Positive for arthralgias, back pain and myalgias.   Skin: Negative.    Allergic/Immunologic: Negative.    Neurological: Positive for numbness (right thigh).   Hematological: Negative.    Psychiatric/Behavioral: Negative.         Physical Examination:     Vitals:    04/14/23 1036   BP: 132/89   Pulse: 72   SpO2: 97%   Weight: 119 kg (262 lb 6.4 oz)   Height: 180.3 cm (71\")   PainSc:   4        Physical Exam  Eyes:      General: Lids are normal.      Extraocular Movements: Extraocular movements intact.      Pupils: Pupils are equal, round, and reactive to light.   Neurological:      Motor: Motor strength is normal.      Deep Tendon Reflexes:      Reflex Scores:       Patellar reflexes are 0 on the right side and 0 on the left side.       Achilles reflexes are 0 on the right side and 0 on the left side.  Psychiatric:         Speech: Speech normal.          Neurological Exam  Mental Status  Awake, alert and oriented to person, place and time. Speech is normal. Language is fluent with no aphasia.    Cranial Nerves  CN II: Visual fields full to confrontation.  CN III, IV, VI: Extraocular movements intact bilaterally. Normal " lids and orbits bilaterally. Pupils equal round and reactive to light bilaterally.  CN V: Facial sensation is normal.  CN VII: Full and symmetric facial movement.  CN IX, X: Palate elevates symmetrically. Normal gag reflex.  CN XI: Shoulder shrug strength is normal.  CN XII: Tongue midline without atrophy or fasciculations.    Motor  Normal muscle bulk throughout. No fasciculations present. Strength is 5/5 throughout all four extremities.    Sensory  Right: Loss of sensation in the lateral cutaneous thigh nerve distribution.    Reflexes                                            Right                      Left  Patellar                                0                         0  Achilles                                0                         0    Right pathological reflexes: Katie's absent.  Left pathological reflexes: Katie's absent.     Negative straight leg raise bilaterally.  Negative SI joint evocative maneuvers.    Result Review  The following data was reviewed by: Jagdish Gruber MD on 04/14/2023:    Data reviewed: Radiologic studies MRI of the lumbar spine from April 6, 2023 relatively unremarkable.  He does not show any profound central canal stenosis.  There is not significant neuroforaminal stenosis.  There is a small and neuroforaminal right sided L5-S1 synovial cyst.  On sagittal imaging it does not appear to contact the L5 nerve root.  Flexion and extension x-rays of the lumbar spine do not suggest any dynamic instability or pelvic incidence of lumbar lordosis mismatch.     Assessment/plan:  This is a 62-year-old gentleman with chronic back pain that is worsened with standing or walking.  He has new right lateral thigh numbness which is consistent with the distribution of the lateral femoral cutaneous nerve.  The radiologist read a right sided L5 foraminal synovial cyst however I do not feel as though this is significantly impinging the L5 nerve.  The patient's symptoms are not consistent with  an L5 radiculopathy.  I recommend referral to pain management for possible blocking of the lateral femoral cutaneous nerve versus facet or medial branch blocks.  He will return to see me in approximately 3 months for further evaluation.  I have encouraged him to call with any questions or concerns.    Diagnoses and all orders for this visit:    1. Meralgia paresthetica of right side (Primary)  -     Ambulatory Referral to Pain Management Clinic         Return in about 3 months (around 7/14/2023).            Jagdish Gruber MD

## 2023-04-14 ENCOUNTER — OFFICE VISIT (OUTPATIENT)
Dept: NEUROSURGERY | Facility: CLINIC | Age: 63
End: 2023-04-14
Payer: MEDICARE

## 2023-04-14 VITALS
OXYGEN SATURATION: 97 % | SYSTOLIC BLOOD PRESSURE: 132 MMHG | DIASTOLIC BLOOD PRESSURE: 89 MMHG | BODY MASS INDEX: 36.73 KG/M2 | HEART RATE: 72 BPM | HEIGHT: 71 IN | WEIGHT: 262.4 LBS

## 2023-04-14 DIAGNOSIS — G57.11 MERALGIA PARESTHETICA OF RIGHT SIDE: Primary | ICD-10-CM

## 2023-04-14 PROCEDURE — 99204 OFFICE O/P NEW MOD 45 MIN: CPT | Performed by: NEUROLOGICAL SURGERY

## 2023-04-25 ENCOUNTER — OFFICE VISIT (OUTPATIENT)
Dept: PAIN MEDICINE | Facility: CLINIC | Age: 63
End: 2023-04-25
Payer: MEDICARE

## 2023-04-25 VITALS
OXYGEN SATURATION: 96 % | HEART RATE: 77 BPM | DIASTOLIC BLOOD PRESSURE: 86 MMHG | RESPIRATION RATE: 16 BRPM | SYSTOLIC BLOOD PRESSURE: 147 MMHG

## 2023-04-25 DIAGNOSIS — M47.817 LUMBOSACRAL SPONDYLOSIS WITHOUT MYELOPATHY: ICD-10-CM

## 2023-04-25 DIAGNOSIS — G89.4 CHRONIC PAIN SYNDROME: Primary | ICD-10-CM

## 2023-04-25 DIAGNOSIS — M54.16 LUMBAR RADICULITIS: ICD-10-CM

## 2023-04-25 RX ORDER — DOXEPIN HYDROCHLORIDE 10 MG/1
CAPSULE ORAL
COMMUNITY
Start: 2023-04-09

## 2023-04-25 RX ORDER — AMLODIPINE BESYLATE 10 MG/1
TABLET ORAL
COMMUNITY
Start: 2023-04-10

## 2023-04-25 RX ORDER — PRAVASTATIN SODIUM 80 MG/1
TABLET ORAL
COMMUNITY
Start: 2023-04-10

## 2023-04-25 RX ORDER — METOPROLOL SUCCINATE 25 MG/1
TABLET, EXTENDED RELEASE ORAL
COMMUNITY
Start: 2023-04-09

## 2023-04-25 NOTE — PROGRESS NOTES
Subjective   CC back pain, right leg pain  Jagdish Rea is a 62 y.o. male with chronic back pain here for initial valuation.  Referred by neurosurgery.  Complains of several months of worsening right-sided back pain and now recently radiating to lateral thigh with numbness and tingling in the thigh.  Back pain is constant but worse with prolonged standing walking or activity.  Denies injury, saddle anesthesia, bladder bowel continence.  He has tried home exercise program, anti-inflammatories without relief.  Pain is interfering with ADL.  Seen neurosurgery, referred to try injections.     Imaging reviewed  L-spine MRI 3/2023 Mild multilevel disc degeneration and moderate multilevel facet arthropathy.There appears to be mild canal and foraminal narrowing in the lower lumbar spine. Suspected small synovial cyst on the right at L5-S1 encroaches upon the exiting right L5 nerve. Stable sclerotic focus at the inferior aspect of S1, suspected to represent a benign bone island.    Pain Assessment   Location of Pain: Lower Back, R Hip, L Hip, R Leg,   Description of Pain: Dull/Aching, Throbbing, Stabbing  Previous Pain Rating :3  Current Pain Rating: 3  Aggravating Factors: Activity  Alleviating Factors: Rest, Medication  Pain onset over 12 weeks  Interferes with ADL's.   [place] back pain disability scale on chart    PEG Assessment   What number best describes your pain on average in the past week?3  What number best describes how, during the past week, pain has interfered with your enjoyment of life?3  What number best describes how, during the past week, pain has interfered with your general activity?  8    The following portions of the patient's history were reviewed and updated as appropriate: allergies, current medications, past family history, past medical history, past social history, past surgical history and problem list.     has a past medical history of Allergic, Anxiety, Arthritis (06/2005), Back pain,  Claustrophobia (1978), COPD (chronic obstructive pulmonary disease), CTS (carpal tunnel syndrome) (10/2022), GERD (gastroesophageal reflux disease), Hyperlipidemia, Hypertension, Knee pain, Low back pain, PTSD (post-traumatic stress disorder), Thoracic disc herniation, and Vitamin B12 deficiency.   has a past surgical history that includes Tonsillectomy; Hernia repair; Knee Arthroplasty; Shoulder arthroscopy w/ rotator cuff repair (Right, 08/11/2020); Esophagogastroduodenoscopy; and Cardiac catheterization (N/A, 07/13/2022).  family history includes Alcohol abuse in his brother; Arthritis in his paternal grandmother; Asthma in his brother; Cancer in his father; Depression in his brother; Drug abuse in his brother; Early death in his brother and mother; Heart disease in his brother and mother; Hyperlipidemia in his brother, brother, and mother; Hypertension in his brother, brother, and mother; Learning disabilities in his brother; Mental illness in his brother; Stroke in his father and paternal grandfather; Thyroid disease in his mother; Vision loss in his father.  Social History     Tobacco Use   • Smoking status: Every Day     Packs/day: 1.00     Years: 47.00     Pack years: 47.00     Types: Cigarettes     Start date: 1/9/1973   • Smokeless tobacco: Never   • Tobacco comments:     Smoked a half a pack a day for 42 years didn't start to smoke a whole pack until 2017   Substance Use Topics   • Alcohol use: No       Review of Systems   Musculoskeletal: Positive for back pain.        Right leg pain   All other systems reviewed and are negative.    Objective   Physical Exam  Vitals reviewed.   Constitutional:       General: He is not in acute distress.  Pulmonary:      Effort: Pulmonary effort is normal.   Musculoskeletal:      Lumbar back: Tenderness present. Decreased range of motion. Positive right straight leg raise test.      Comments: Lumbar loading positive, pain on extension of low back past 5 degrees.  TTP on  the lumbar facets noted.         /86   Pulse 77   Resp 16   SpO2 96%     PHQ 9 on chart  Opioid risk tool low risk      Assessment & Plan   Diagnoses and all orders for this visit:    1. Chronic pain syndrome (Primary)    2. Lumbosacral spondylosis without myelopathy    3. Lumbar radiculitis  -     Epidural Block      Summary  Jagdish Rea is a 62 y.o. male with chronic back pain here for initial valuation.  Referred by neurosurgery.  Complains of several months of worsening right-sided back pain and now recently radiating to lateral thigh with numbness and tingling in the thigh.  Back pain is constant but worse with prolonged standing walking or activity.  Denies injury, saddle anesthesia, bladder bowel continence.  He has tried home exercise program, anti-inflammatories without relief.  Pain is interfering with ADL.  Seen neurosurgery, referred to try injections.     Symptoms are consistent with back pain with right lower extremity radicular pain however there could be a component of peripheral nerve pain/lateral femoral cutaneous neuralgia however that would not explain concurrent back pain.  L-spine MRI with degenerative changes small synovial cyst on the right at L5 with possible encroachment of L5.  We will schedule for LESI (right parasagittal).    RTC for procedure    Note is dictated utilizing voice recognition software. Unfortunately this leads to occasional typographical errors. I apologize in advance if the situation occurs. If questions occur please do not hesitate to call our office.

## 2023-05-15 ENCOUNTER — HOSPITAL ENCOUNTER (OUTPATIENT)
Dept: PAIN MEDICINE | Facility: HOSPITAL | Age: 63
Discharge: HOME | End: 2023-05-15
Payer: MEDICARE

## 2023-05-15 VITALS
OXYGEN SATURATION: 94 % | DIASTOLIC BLOOD PRESSURE: 88 MMHG | HEART RATE: 71 BPM | WEIGHT: 262 LBS | BODY MASS INDEX: 36.68 KG/M2 | TEMPERATURE: 98.4 F | HEIGHT: 71 IN | SYSTOLIC BLOOD PRESSURE: 129 MMHG | RESPIRATION RATE: 16 BRPM

## 2023-05-15 DIAGNOSIS — R52 PAIN: ICD-10-CM

## 2023-05-15 DIAGNOSIS — M54.16 LUMBAR RADICULITIS: Primary | ICD-10-CM

## 2023-05-15 PROCEDURE — 77003 FLUOROGUIDE FOR SPINE INJECT: CPT

## 2023-05-15 PROCEDURE — 62323 NJX INTERLAMINAR LMBR/SAC: CPT | Performed by: ANESTHESIOLOGY

## 2023-05-15 PROCEDURE — 25510000001 IOPAMIDOL 41 % SOLUTION: Performed by: ANESTHESIOLOGY

## 2023-05-15 PROCEDURE — 25010000002 METHYLPREDNISOLONE PER 40 MG: Performed by: ANESTHESIOLOGY

## 2023-05-15 RX ORDER — METHYLPREDNISOLONE ACETATE 40 MG/ML
40 INJECTION, SUSPENSION INTRA-ARTICULAR; INTRALESIONAL; INTRAMUSCULAR; SOFT TISSUE ONCE
Status: COMPLETED | OUTPATIENT
Start: 2023-05-15 | End: 2023-05-15

## 2023-05-15 RX ADMIN — IOPAMIDOL 2 ML: 408 INJECTION, SOLUTION INTRATHECAL at 09:51

## 2023-05-15 RX ADMIN — METHYLPREDNISOLONE ACETATE 40 MG: 40 INJECTION, SUSPENSION INTRA-ARTICULAR; INTRALESIONAL; INTRAMUSCULAR; INTRASYNOVIAL; SOFT TISSUE at 09:51

## 2023-05-15 NOTE — DISCHARGE INSTRUCTIONS

## 2023-05-15 NOTE — PROCEDURES
"Subjective   CC back pain right leg pain  Jagdish Rea is a 62 y.o. male with lumbar radiculitis here for LESI.  No anticoagulation    Pain Assessment   Location of Pain: Lower Back, R Hip, L Hip, R Leg,   Description of Pain: Dull/Aching, Throbbing, Stabbing  Previous Pain Rating :3  Current Pain Rating: 3  Aggravating Factors: Activity  Alleviating Factors: Rest, Medication  Pain onset over 12 weeks  Pain interferes with ADL's    The following portions of the patient's history were reviewed and updated as appropriate: allergies, current medications, past family history, past medical history, past social history, past surgical history and problem list.      Review of Systems  As in HPI  Objective   Physical Exam  Vitals reviewed.   Constitutional:       General: He is not in acute distress.  Pulmonary:      Effort: Pulmonary effort is normal.       /88 (BP Location: Right arm, Patient Position: Sitting)   Pulse 71   Temp 98.4 °F (36.9 °C) (Oral)   Resp 16   Ht 180.3 cm (71\")   Wt 119 kg (262 lb)   SpO2 94%   BMI 36.54 kg/m²     Assessment & Plan    underwent LESI    RTC 4-6 weeks or as needed for repeat    DATE OF PROCEDURE: 5/15/2023    PREOPERATIVE DIAGNOSIS: lumbar DDD/radiculitis    POSTOPERATIVE DIAGNOSIS: same    PROCEDURE PERFORMED:  lumbar Epidural Steroid Injection    The patient presents with a history of   lumbar degenerative disc disease with  radiculitis. The patient presents today for a  lumbar epidural steroid injection at level  L5/S1.   The patient was seen in the preoperative area.  Patient's consent was obtained and updated.  Vitals were taken.  Patient was then brought to the procedure suite and placed in a prone position. The appropriate anatomic area was widely prepped with Chloraprep and draped in a sterile fashion. Noninvasive monitoring per routine anesthesia protocol was placed.  Under fluoroscopic guidance using  AP view a 20 gauge styleted tuohy needle was passed through " skin anesthetized with 1% Lidocaine without epinephrine.  The needle was advanced using the continuous loss of resistance to saline technique into the L5 epidural space. Needle tip placement in the epidural space was confirmed by loss of resistance and injection of 1 mL of  preservative free contrast.  Following this 8 mL of a solution containing  1 mL of 40 mg Depo-Medrol and 7 mL of preservative-free saline was carefully administered in the epidural space.  A sterile dressing was placed over the puncture site.    The patient tolerated the procedure with no complications . They were then brought to the post procedure area where they recovered nicely.

## 2023-05-16 ENCOUNTER — TELEPHONE (OUTPATIENT)
Dept: PAIN MEDICINE | Facility: HOSPITAL | Age: 63
End: 2023-05-16
Payer: MEDICARE

## 2023-05-25 ENCOUNTER — OFFICE (AMBULATORY)
Dept: URBAN - METROPOLITAN AREA CLINIC 64 | Facility: CLINIC | Age: 63
End: 2023-05-25

## 2023-05-25 VITALS
HEART RATE: 80 BPM | HEIGHT: 71 IN | SYSTOLIC BLOOD PRESSURE: 133 MMHG | DIASTOLIC BLOOD PRESSURE: 90 MMHG | WEIGHT: 263 LBS

## 2023-05-25 DIAGNOSIS — K21.00 GASTRO-ESOPHAGEAL REFLUX DISEASE WITH ESOPHAGITIS, WITHOUT B: ICD-10-CM

## 2023-05-25 DIAGNOSIS — R13.10 DYSPHAGIA, UNSPECIFIED: ICD-10-CM

## 2023-05-25 DIAGNOSIS — K22.2 ESOPHAGEAL OBSTRUCTION: ICD-10-CM

## 2023-05-25 PROCEDURE — 99213 OFFICE O/P EST LOW 20 MIN: CPT | Performed by: NURSE PRACTITIONER

## 2023-06-09 ENCOUNTER — OFFICE VISIT (OUTPATIENT)
Dept: FAMILY MEDICINE CLINIC | Facility: CLINIC | Age: 63
End: 2023-06-09
Payer: MEDICARE

## 2023-06-09 VITALS
SYSTOLIC BLOOD PRESSURE: 127 MMHG | TEMPERATURE: 98.3 F | HEIGHT: 71 IN | DIASTOLIC BLOOD PRESSURE: 84 MMHG | WEIGHT: 263 LBS | HEART RATE: 75 BPM | BODY MASS INDEX: 36.82 KG/M2 | OXYGEN SATURATION: 93 %

## 2023-06-09 DIAGNOSIS — I10 BENIGN ESSENTIAL HYPERTENSION: Primary | Chronic | ICD-10-CM

## 2023-06-09 DIAGNOSIS — F41.9 ANXIETY: Chronic | ICD-10-CM

## 2023-06-09 DIAGNOSIS — E78.5 HYPERLIPIDEMIA, UNSPECIFIED HYPERLIPIDEMIA TYPE: Chronic | ICD-10-CM

## 2023-06-09 DIAGNOSIS — E66.01 CLASS 2 SEVERE OBESITY WITH SERIOUS COMORBIDITY AND BODY MASS INDEX (BMI) OF 36.0 TO 36.9 IN ADULT, UNSPECIFIED OBESITY TYPE: Chronic | ICD-10-CM

## 2023-06-09 PROBLEM — H61.21 IMPACTED CERUMEN OF RIGHT EAR: Status: RESOLVED | Noted: 2022-09-01 | Resolved: 2023-06-09

## 2023-06-09 PROBLEM — E66.812 CLASS 2 SEVERE OBESITY WITH SERIOUS COMORBIDITY AND BODY MASS INDEX (BMI) OF 36.0 TO 36.9 IN ADULT: Status: ACTIVE | Noted: 2023-06-09

## 2023-06-09 NOTE — PROGRESS NOTES
Subjective        Jagdish Rea is a 62 y.o. male.     Chief Complaint   Patient presents with    Hyperlipidemia    Hypertension     6 month f/u       Hyperlipidemia    Hypertension    Patient is here for management of his chronic medical problems: hypertension, hyperlipidemia, gERd,     Hypertension: amlodipine 10 mg daily, hctz 12.5 mg , metoprolol succinate XL 25 mg daily.    Hyperlipidemia: pravastatin 80 mg daily.     Anxiety: risperidone 2 mg daily, doxepin 10 mg daily, depakote  mg. Daily.     GERD pantoprazole 40 mg daily. Recommend see dentist .         The following portions of the patient's history were reviewed and updated as appropriate: allergies, current medications, past family history, past medical history, past social history, past surgical history and problem list.      Current Outpatient Medications:     albuterol sulfate  (90 Base) MCG/ACT inhaler, Inhale 2 puffs Every 4 (Four) Hours As Needed for Wheezing., Disp: 18 g, Rfl: 1    amLODIPine (NORVASC) 10 MG tablet, TAKE 1 TABLET EVERY DAY, Disp: 90 tablet, Rfl: 0    divalproex (DEPAKOTE ER) 500 MG 24 hr tablet, , Disp: , Rfl:     doxepin (SINEquan) 10 MG capsule, TAKE 1 CAPSULE EVERY NIGHT., Disp: 90 capsule, Rfl: 1    hydroCHLOROthiazide (MICROZIDE) 12.5 MG capsule, Take 1 capsule by mouth As Needed., Disp: , Rfl:     metoprolol succinate XL (TOPROL-XL) 25 MG 24 hr tablet, TAKE 1 TABLET EVERY DAY, Disp: 90 tablet, Rfl: 0    pantoprazole (PROTONIX) 40 MG EC tablet, Take 1 tablet by mouth Daily., Disp: 90 tablet, Rfl: 1    pravastatin (PRAVACHOL) 80 MG tablet, TAKE 1 TABLET EVERY NIGHT, Disp: 90 tablet, Rfl: 1    risperiDONE (risperDAL) 2 MG tablet, 1 tablet., Disp: , Rfl:     Recent Results (from the past 4032 hour(s))   POC Creatinine    Collection Time: 04/06/23 12:01 PM    Specimen: Venous Blood   Result Value Ref Range    Creatinine 1.00 0.60 - 1.30 mg/dL    eGFR 85.1 >60.0 mL/min/1.73         Review of Systems    Objective  "    /84 (BP Location: Left arm, Patient Position: Sitting, Cuff Size: Adult)   Pulse 75   Temp 98.3 °F (36.8 °C) (Infrared)   Ht 180.3 cm (71\")   Wt 119 kg (263 lb)   SpO2 93%   BMI 36.68 kg/m²     Physical Exam  Vitals and nursing note reviewed.   Constitutional:       Appearance: He is obese.   HENT:      Head: Normocephalic.      Right Ear: External ear normal.      Left Ear: External ear normal.      Nose: Nose normal.      Mouth/Throat:      Mouth: Mucous membranes are moist.   Eyes:      Pupils: Pupils are equal, round, and reactive to light.   Cardiovascular:      Rate and Rhythm: Normal rate and regular rhythm.      Pulses: Normal pulses.      Heart sounds: Normal heart sounds.   Pulmonary:      Effort: Pulmonary effort is normal.      Breath sounds: Normal breath sounds.   Abdominal:      General: Bowel sounds are normal.      Palpations: Abdomen is soft.   Musculoskeletal:      Cervical back: Neck supple.   Skin:     General: Skin is warm.      Capillary Refill: Capillary refill takes less than 2 seconds.   Neurological:      General: No focal deficit present.      Mental Status: He is alert and oriented to person, place, and time.   Psychiatric:         Mood and Affect: Mood normal.         Behavior: Behavior normal.         Thought Content: Thought content normal.       Result Review :                Assessment & Plan    Diagnoses and all orders for this visit:    1. Benign essential hypertension (Primary)  Comments:  stable    2. Hyperlipidemia, unspecified hyperlipidemia type  Comments:  discussed life style changes    3. Anxiety  Comments:  stable    4. Class 2 severe obesity with serious comorbidity and body mass index (BMI) of 36.0 to 36.9 in adult, unspecified obesity type  Comments:  discussed daily exercise balance exercise  Assessment & Plan:  Patient's (Body mass index is 36.68 kg/m².) indicates that they are morbidly/severely obese (BMI > 40 or > 35 with obesity - related health " condition) with health conditions that include hypertension, dyslipidemias, and GERD . Weight is unchanged. BMI  is above average; BMI management plan is completed. We discussed low calorie, low carb based diet program, portion control, and increasing exercise.       Other orders  -     Pneumococcal Conjugate Vaccine 20-Valent (PCV20)      There are no Patient Instructions on file for this visit.    Follow Up   No follow-ups on file.    Patient was given instructions and counseling regarding his condition or for health maintenance advice. Please see specific information pulled into the AVS if appropriate.     Yamilka Pascual, APRN    06/09/23

## 2023-06-09 NOTE — ASSESSMENT & PLAN NOTE
Patient's (Body mass index is 36.68 kg/m².) indicates that they are morbidly/severely obese (BMI > 40 or > 35 with obesity - related health condition) with health conditions that include hypertension, dyslipidemias, and GERD . Weight is unchanged. BMI  is above average; BMI management plan is completed. We discussed low calorie, low carb based diet program, portion control, and increasing exercise.    Additional EKG Note...

## 2023-06-15 ENCOUNTER — OFFICE VISIT (OUTPATIENT)
Dept: PAIN MEDICINE | Facility: CLINIC | Age: 63
End: 2023-06-15
Payer: MEDICARE

## 2023-06-15 VITALS
HEART RATE: 69 BPM | SYSTOLIC BLOOD PRESSURE: 150 MMHG | DIASTOLIC BLOOD PRESSURE: 79 MMHG | RESPIRATION RATE: 16 BRPM | OXYGEN SATURATION: 96 %

## 2023-06-15 DIAGNOSIS — G89.4 CHRONIC PAIN SYNDROME: Primary | ICD-10-CM

## 2023-06-15 DIAGNOSIS — M54.16 LUMBAR RADICULITIS: ICD-10-CM

## 2023-06-15 DIAGNOSIS — M47.817 LUMBOSACRAL SPONDYLOSIS WITHOUT MYELOPATHY: ICD-10-CM

## 2023-06-15 RX ORDER — DIVALPROEX SODIUM 500 MG/1
TABLET, EXTENDED RELEASE ORAL
COMMUNITY
Start: 2023-06-01

## 2023-06-15 RX ORDER — RISPERIDONE 2 MG/1
TABLET ORAL
COMMUNITY
Start: 2023-06-01

## 2023-06-15 NOTE — PROGRESS NOTES
Subjective   CC back pain, right leg pain  Jagdish Rea is a 62 y.o. male with chronic back pain here for follow-up.  Reports 80% relief from LESI with functional benefits, now able to stand to do dishes and walking longer.  Continues to have some L4 radicular pain but improved and more tolerable.    Chronic right-sided back pain and now recently radiating to lateral thigh with numbness and tingling in the thigh.  Back pain is constant but worse with prolonged standing walking or activity.  Denies injury, saddle anesthesia, bladder bowel continence.  He has tried home exercise program, anti-inflammatories without relief.  Pain is interfering with ADL.  Seen neurosurgery, referred to try injections.     Imaging reviewed  L-spine MRI 3/2023 Mild multilevel disc degeneration and moderate multilevel facet arthropathy.There appears to be mild canal and foraminal narrowing in the lower lumbar spine. Suspected small synovial cyst on the right at L5-S1 encroaches upon the exiting right L5 nerve. Stable sclerotic focus at the inferior aspect of S1, suspected to represent a benign bone island.    Pain Assessment   Location of Pain: Lower Back, R Hip, L Hip, R Leg,   Description of Pain: Dull/Aching, Throbbing, Stabbing  Previous Pain Rating :3  Current Pain Ratin  Aggravating Factors: Activity  Alleviating Factors: Rest, Medication  Pain onset over 12 weeks  Interferes with ADL's.   [place] back pain disability scale on chart    PEG Assessment   What number best describes your pain on average in the past week?3  What number best describes how, during the past week, pain has interfered with your enjoyment of life?0  What number best describes how, during the past week, pain has interfered with your general activity? 6    The following portions of the patient's history were reviewed and updated as appropriate: allergies, current medications, past family history, past medical history, past social history, past surgical  history and problem list.     has a past medical history of Allergic, Anxiety, Arthritis (06/2005), Back pain, Claustrophobia (1978), COPD (chronic obstructive pulmonary disease), CTS (carpal tunnel syndrome) (10/2022), GERD (gastroesophageal reflux disease), Hyperlipidemia, Hypertension, Knee pain, Low back pain, Obesity, Pneumonia, PTSD (post-traumatic stress disorder), Thoracic disc herniation, and Vitamin B12 deficiency.   has a past surgical history that includes Tonsillectomy; Hernia repair; Knee Arthroplasty; Shoulder arthroscopy w/ rotator cuff repair (Right, 08/11/2020); Esophagogastroduodenoscopy; and Cardiac catheterization (N/A, 07/13/2022).  family history includes Alcohol abuse in his brother; Arthritis in his paternal grandmother; Asthma in his brother; Cancer in his father; Depression in his brother; Drug abuse in his brother; Early death in his brother and mother; Heart disease in his brother and mother; Hyperlipidemia in his brother, brother, and mother; Hypertension in his brother, brother, and mother; Learning disabilities in his brother; Mental illness in his brother; Stroke in his father and paternal grandfather; Thyroid disease in his mother; Vision loss in his father.  Social History     Tobacco Use    Smoking status: Every Day     Packs/day: 1.00     Years: 47.00     Pack years: 47.00     Types: Cigarettes     Start date: 1/9/1973    Smokeless tobacco: Never    Tobacco comments:     Smoked a half a pack a day for 42 years didn't start to smoke a whole pack until 2017   Substance Use Topics    Alcohol use: No       Review of Systems   Musculoskeletal:  Positive for back pain.        Right leg pain   All other systems reviewed and are negative.  Objective   Physical Exam  Vitals reviewed.   Constitutional:       General: He is not in acute distress.  Pulmonary:      Effort: Pulmonary effort is normal.   Musculoskeletal:      Lumbar back: Tenderness present. Decreased range of motion. Positive  right straight leg raise test.      Comments: Lumbar loading positive, pain on extension of low back past 5 degrees.  TTP on the lumbar facets noted.       /79   Pulse 69   Resp 16   SpO2 96%     PHQ 9 on chart  Opioid risk tool low risk      Assessment & Plan   Diagnoses and all orders for this visit:    1. Chronic pain syndrome (Primary)    2. Lumbosacral spondylosis without myelopathy    3. Lumbar radiculitis      Summary  Jagdish Rea is a 62 y.o. male with chronic back pain here for follow-up.  Referred by neurosurgery.  Chronic pain from lumbar DDD spondylosis with right lower extremity radicular pain.   He has tried home exercise program, anti-inflammatories without relief.  Pain is interfering with ADL.  Seen neurosurgery, referred to try injections.     Reports 80% relief from LESI with functional benefits, now able to stand to do dishes and walking longer.  Continues to have some L4 radicular pain but improved and more tolerable.    We will repeat LESI as needed.  Continue over-the-counter Tylenol and ibuprofen as needed.    RTC for procedure    Note is dictated utilizing voice recognition software. Unfortunately this leads to occasional typographical errors. I apologize in advance if the situation occurs. If questions occur please do not hesitate to call our office.

## 2023-07-14 ENCOUNTER — TELEPHONE (OUTPATIENT)
Dept: PAIN MEDICINE | Facility: CLINIC | Age: 63
End: 2023-07-14

## 2023-07-24 ENCOUNTER — OFFICE VISIT (OUTPATIENT)
Dept: CARDIOLOGY | Facility: CLINIC | Age: 63
End: 2023-07-24
Payer: MEDICARE

## 2023-07-24 VITALS
BODY MASS INDEX: 36.96 KG/M2 | WEIGHT: 264 LBS | HEIGHT: 71 IN | SYSTOLIC BLOOD PRESSURE: 108 MMHG | DIASTOLIC BLOOD PRESSURE: 78 MMHG | RESPIRATION RATE: 18 BRPM | HEART RATE: 71 BPM

## 2023-07-24 DIAGNOSIS — R07.89 CHEST PAIN, ATYPICAL: Primary | ICD-10-CM

## 2023-07-24 PROCEDURE — 3074F SYST BP LT 130 MM HG: CPT | Performed by: INTERNAL MEDICINE

## 2023-07-24 PROCEDURE — 99214 OFFICE O/P EST MOD 30 MIN: CPT | Performed by: INTERNAL MEDICINE

## 2023-07-24 PROCEDURE — 3078F DIAST BP <80 MM HG: CPT | Performed by: INTERNAL MEDICINE

## 2023-07-24 PROCEDURE — 93000 ELECTROCARDIOGRAM COMPLETE: CPT | Performed by: INTERNAL MEDICINE

## 2023-07-24 RX ORDER — OMEGA-3 FATTY ACIDS CAP DELAYED RELEASE 1000 MG 1000 MG
1000 CAPSULE DELAYED RELEASE ORAL 2 TIMES DAILY
Qty: 180 CAPSULE | Refills: 3 | Status: SHIPPED | OUTPATIENT
Start: 2023-07-24 | End: 2023-10-22

## 2023-07-24 NOTE — PROGRESS NOTES
Cardiology Clinic Note  Andres Valadez MD, PhD    Subjective:     Encounter Date:07/24/2023      Patient ID: Jagdish Rea is a 62 y.o. male.    Chief Complaint:  Chief Complaint   Patient presents with    Follow-up       HPI:      I the pleasure to see this 62-year-old gentleman today in follow-up, hypertension hyperlipidemia, history of heart cath with nonobstructive mild luminal irregularities, preserved EF with normal filling pressures.  He is on amlodipine HCTZ metoprolol and pravastatin as his CV medicines.  Blood pressures well controlled at 128/88 with heart rates in the 80s.  He has no further chest discomfort which was his initial complaint on presentation to the ER.  LV gram demonstrated EF 55%.  He is reassured by these findings.  We discussed primary prevention goals, diet exercise heart healthy diet and referral back to primary care which she is amenable for. 2D echo ultimately revealed preserved EF 55%, trace to mild valvular insufficiency, grade 1 diastolic dysfunction with normal pulmonary pressures.  He has no other complaints today.  We did review his cath films and report which were visualized by me previously  Today on repeat encounter he is otherwise doing well.  No new chest pain shortness of breath heart failure signs or symptoms.  Smoking cessation counseling was performed but he is not interested in quitting.  EKG is unchanged no new issues.      Review of systems otherwise negative x14 point review of systems except as mentioned above        Historical data copied forward from previous encounters in EMR including the history, exam, and assessment/plan has been reviewed and is unchanged unless noted otherwise.     Cardiac medicines reviewed with risk, benefits, and necessity of each discussed.     Risk and benefit of cardiac testing reviewed including death heart attack stroke pain bleeding infection need for vascular /cardiovascular surgery were discussed and the patient      Objective:     "     Objective          No vitals today     Physical Exam  Previously on encounter  Regular rate and rhythm no rubs gallops heave or lift  1 out of 6 systolic ejection murmur left sternal border  Well compensated euvolemic appearing with no edema  Normal pulses next normal cap refill  No carotid bruits or JVD  Intact grossly  Clear to auscultation     Assessment:         Tobacco abuse  Atypical chest pain  Faint murmur on exam  2D echo for structural functional evaluation, preserved EF 55%  Nonobstructive CAD, mild luminal regularities at the most 10% broadly with preserved EF and normal filling pressures  Preserved LVEF by LV gram  Hyperlipidemia, atypical chest pain  Tobacco abuse, recommend cessation but no quit date established  Essential hypertension controlled today on encounter  On a beta-blocker with low-dose metoprolol to be continued  Continue pravastatin  No new heart failure signs or symptoms     Focus on primary prevention with the patient today  No restrictions  Diet and exercise per [place] guidelines     See him back in 1 year.    No new issues on follow-up, not interested in smoking cessation, blood pressure and heart rate are stable, he is up around 10 pounds, diet and exercise discussed extensively  Start fish oil 1 g twice daily with hypertriglyceridemia  Sugar and carb restriction     Follow-up primary care in the interim     The pleasure to be involved in this patient's cardiovascular care.  Please call with any questions or concerns  Andres Valadez MD, PhD    Objective:         /78 (BP Location: Left arm, Patient Position: Sitting)   Pulse 71   Resp 18   Ht 180.3 cm (71\")   Wt 120 kg (264 lb)   BMI 36.82 kg/m²           The pleasure to be involved in this patient's cardiovascular care.  Please call with any questions or concerns  Andres Valadez MD, PhD    Most recent EKG as reviewed and interpreted by me:    ECG 12 Lead    Date/Time: 7/24/2023 5:48 PM  Performed by: Andres Valadez " MD Be[place]  Authorized by: Andres Valadez MD   Comparison: not compared with previous ECG   Previous ECG: no previous ECG available  Rhythm: sinus rhythm  Rate: normal  Other findings: non-specific ST-T wave changes    Clinical impression: non-specific ECG         Most recent echo as reviewed and interpreted by me:  Results for orders placed during the hospital encounter of 22    Adult Transthoracic Echo Complete W/ Cont if Necessary Per Protocol    Interpretation Summary  · Estimated left ventricular EF was in disagreement with the calculated left ventricular EF. Left ventricular ejection fraction appears to be 51 - 55%. Left ventricular systolic function is low normal.  · Left ventricular diastolic function is consistent with (grade I) impaired relaxation.  · Estimated right ventricular systolic pressure from tricuspid regurgitation is normal (<35 mmHg).      Most recent stress test as reviewed and interpreted by me:  Results for orders placed during the hospital encounter of 22    Stress Test With Myocardial Perfusion One Day    Interpretation Summary  · Left ventricular ejection fraction is hyperdynamic (Calculated EF > 70%). .  · Myocardial perfusion imaging indicates a small-to-medium-sized, mildly severe area of ischemia located in the inferior wall and lateral wall.  · Impressions are consistent with an intermediate risk study.      Most recent cardiac catheterization as reviewed interpreted by me:  Results for orders placed during the hospital encounter of 22    Cardiac Catheterization/Vascular Study    Narrative  Table formatting from the original result was not included.  2022      Heart Cath Report    NAME:              Jagdish Rea  :                1960  AGE/SEX:        61 y.o. male  MRN:                [id number]        Procedures Performed    1. Left heart catheterization  2. Selective coronary angiography  3. Left ventriculography  4. Mynx    :    Prieto Sidhu MD    Vascular Access Site: Femoral    Indication for procedure: Recurrent prolonged substernal chest pain, abnormal stress test, hypertension, dyslipidemia, cigarette smoker, obesity with BMI over 30      Procedure Note    After discussing the risks, benefits, and alternatives of the procedure, informed consent was obtained.  Timeout was done before the procedure.  Moderate conscious sedation was given utilizing IV Versed and fentanyl administered by RN with continuous EKG oximetry and hemodynamic monitoring supervised by me throughout the entire case, conscious sedation time was 30 minutes.  I was present with the patient for the duration of moderate sedation and supervised staff who had no other duties and monitored the patient for the entire procedure patient had Grimes 2-3 sedation scale. the vascular access site was prepped and draped in the usual sterile fashion.  2% lidocaine was used for local anesthesia. Appropriate landmarks were assessed.  A 6 [place] short sheath was inserted in the artery using the modified Seldinger technique.    Selective coronary angiography was performed with JL4 and JR4 diagnostic catheters. Left ventriculogram  was performed with an angled pigtail catheter.  All exchanges were performed over the wire.  No specimens were removed.  There were no apparent acute or early complications.  The patient tolerated the procedure well and was transferred to the recovery area in stable condition.    Closure device: Mynx device was deployed successfully after right iliofemoral angiogram was performed. Good hemostasis was achieved.    Complications:  None  Blood Loss: minimal    Hemodynamics    Pressures    Ao:    120/71 mmHg  LV:    110/2 mmHg  End-diastolic pressure:  2  mmHg  No significant aortic valvular gradient on pullback    Coronary Angiography    Left Main :  The left main   is without disease    Left Anterior Descending : Is without disease    Left Circumflex  : Is without disease    Right Coronary Artery :  The right coronary artery   is dominant vessel.  Distal PDA has diffuse tapering with severe disease in distal tips, consistent with small vessel disease    Dominance:  []  Left  [x]  Right  []  Co-Dominant      Left Ventriculography:    Estimated Ejection Fraction: 55%  Wall motion abnormalities:  None  Mitral Regurgitation:  None    Impression:    1. No obstructive CAD and major branches  2. Small vessel disease in the right PDA  3. Low LVEDP    Recommendations:    1. Evaluate other etiologies of patient's chest pain        I sincerely appreciate the opportunity to participate in your patient's care. Please feel free to contact me anytime if I can be of assistance in this or any other way.      Pertinent History    Past Medical History:  Diagnosis Date   Allergic   Anxiety   Back pain   COPD (chronic obstructive pulmonary disease) (HCC)   GERD (gastroesophageal reflux disease)   Hyperlipidemia   Hypertension   PTSD (post-traumatic stress disorder)   Thoracic disc herniation   Vitamin B12 deficiency    Past Surgical History:  Procedure Laterality Date   ENDOSCOPY   HERNIA REPAIR   KNEE ARTHROPLASTY  x2   SHOULDER ARTHROSCOPY W/ ROTATOR CUFF REPAIR Right 08/11/2020  Procedure: SHOULDER ARTHROSCOPY WITH ROTATOR CUFF REPAIR, extensive debridement;  Surgeon: Fredi Ritchie MD;  Location: [facility];  Service: Orthopedics;  Laterality: Right;   TONSILLECTOMY    Prior to Admission medications  Medication Sig Start Date End Date Taking? Authorizing Provider  albuterol sulfate  (90 Base) MCG/ACT inhaler Inhale 2 puffs Every 4 (Four) Hours As Needed for Wheezing. 4/4/22  Yes Yamilka Pascual APRN  amLODIPine (NORVASC) 10 MG tablet TAKE 1 TABLET EVERY DAY 6/12/22  Yes Yamilka Pascual APRN  doxepin (SINEquan) 10 MG capsule TAKE 1 CAPSULE BY MOUTH EVERY NIGHT. 3/10/22  Yes Yamilka Pascual APRN  hydroCHLOROthiazide (MICROZIDE) 12.5 MG capsule Take 12.5 mg by  mouth As Needed.   Yes Provider, MD Sulaiman  lamoTRIgine (LaMICtal) 25 MG tablet Take 2 tablets by mouth Every Night. 12/1/20  Yes Yamilka Pascual APRN  meloxicam (MOBIC) 7.5 MG tablet TAKE ONE TABLET BY MOUTH TWICE A DAY  Patient taking differently: 15 mg. 5/1/22  Yes Yamilka Pascual APRN  metoprolol succinate XL (TOPROL-XL) 25 MG 24 hr tablet TAKE 1 TABLET EVERY DAY 6/12/22  Yes Yamilka Pascual APRN  omeprazole (priLOSEC) 20 MG capsule Take 20 mg by mouth 2 (Two) Times a Day.   Yes Provider, MD Sulaiman  pravastatin (PRAVACHOL) 40 MG tablet Take 1 tablet by mouth Every Night. 10/25/21  Yes Yamilka Pascual APRN  risperiDONE (risperDAL) 1 MG tablet Take 1 tablet by mouth Daily. 12/1/20  Yes Yamilka Pascual APRN      Pre-Procedure Notes  H&P Performed  [x]  Yes []  No       []  N/A    Indications:  []  ACS <= 24 HRS  []  ACS >24 HRS  []  New Onset Angina <= 2 mos  []  Worsening Angina  []  Resuscitated Cardiac Arrest  []  Angina on Exertion:  []  Suspected CAD  []  Valvular Disease  []  Pericardial Disease  []  Cardiac Arrythmia  []  Cardiomyopathy  []  LV Dysfunction  []  Syncope  []  Post Cardiac Transplant  []  Eval. For Exercise Clearance  []  Other  []  Pre-Operative Evaluation  If Pre-Op Eval:  Evaluation for Surgery Type:  []  Cardiac Surgery   []  Non-Cardiac Surgery  Functional Capacity:  []  <4 METS  []  >=4 METS w/o symptoms  []  >= 4 METS with symptoms  []  Unknown  Surgical Risk:  []  Low  []  Intermediate  []  High Risk: Vascular  []  High Risk Non-Vascular    Risks, Benefits, & Complications Discussed:  [x]  Yes  []  No  []  N/A    Questions Answered:  [x]  Yes  []  No  []  N/A    Consent Obtained:  [x]  Yes  []  No  []  N/A    CHF: []  Yes  [x]  No  If Yes:  Newly Diagnosed?  []  Yes  []  No  If Yes:  HF Type:  []  Diastolic  []  Systolic  []  Unknown      Prieto Sidhu MD  7/13/2022  09:08 EDT  Electronically signed by Prieto Sidhu MD, 07/13/22, 9:08 AM EDT.    The  following portions of the patient's history were reviewed and updated as appropriate: allergies, current medications, past family history, past medical history, past social history, past surgical history, and problem list.      ROS:  14 point review of systems negative except as mentioned above    Current Outpatient Medications:     albuterol sulfate  (90 Base) MCG/ACT inhaler, Inhale 2 puffs Every 4 (Four) Hours As Needed for Wheezing., Disp: 18 g, Rfl: 1    amLODIPine (NORVASC) 10 MG tablet, TAKE 1 TABLET EVERY DAY, Disp: 90 tablet, Rfl: 0    divalproex (DEPAKOTE ER) 500 MG 24 hr tablet, 1 tablet Daily., Disp: , Rfl:     doxepin (SINEquan) 10 MG capsule, TAKE 1 CAPSULE EVERY NIGHT., Disp: 90 capsule, Rfl: 1    hydroCHLOROthiazide (MICROZIDE) 12.5 MG capsule, Take 1 capsule by mouth As Needed., Disp: , Rfl:     metoprolol succinate XL (TOPROL-XL) 25 MG 24 hr tablet, TAKE 1 TABLET EVERY DAY, Disp: 90 tablet, Rfl: 0    pantoprazole (PROTONIX) 40 MG EC tablet, Take 1 tablet by mouth Daily., Disp: 90 tablet, Rfl: 1    pravastatin (PRAVACHOL) 80 MG tablet, TAKE 1 TABLET EVERY NIGHT, Disp: 90 tablet, Rfl: 1    risperiDONE (risperDAL) 2 MG tablet, Take 1 tablet by mouth Daily., Disp: , Rfl:     Omega-3 Fatty Acids (Fish Oil) 1000 MG capsule delayed-release, Take 1 capsule by mouth 2 (Two) Times a Day for 90 days., Disp: 180 capsule, Rfl: 3    Problem List:  Patient Active Problem List   Diagnosis    Anxiety    Benign essential hypertension    Chronic low back pain    Hyperlipidemia    Other cervical disc degeneration at C5-C6 level    Peripheral neuropathy    Thoracic disc herniation    Vitamin B12 deficiency    Right shoulder pain    Fall    Radicular pain in right arm    Acute pain of right shoulder    Tobacco abuse    Chronic pain of both knees    Initial Medicare annual wellness visit    Pneumonia of both lungs due to infectious organism    Chest pain    Abnormal nuclear stress test    Tinnitus of right ear     Paresthesia of lower extremity    Class 2 severe obesity with serious comorbidity and body mass index (BMI) of 36.0 to 36.9 in adult     Past Medical History:  Past Medical History:   Diagnosis Date    Allergic     Anxiety     Arthritis 06/2005    Back pain     Claustrophobia 1978    COPD (chronic obstructive pulmonary disease)     CTS (carpal tunnel syndrome) 10/2022    GERD (gastroesophageal reflux disease)     Hyperlipidemia     Hypertension     Knee pain     rt    Low back pain     Obesity     Pneumonia     PTSD (post-traumatic stress disorder)     Thoracic disc herniation     Vitamin B12 deficiency      Past Surgical History:  Past Surgical History:   Procedure Laterality Date    CARDIAC CATHETERIZATION N/A 07/13/2022    Procedure: Left Heart Cath, possible pci;  Surgeon: Prieto Sidhu MD;  Location: [facility] CATH INVASIVE LOC[facility];  Service: Cardiovascular;  Laterality: N/A;    ENDOSCOPY      HERNIA REPAIR      KNEE ARTHROPLASTY      x2    SHOULDER ARTHROSCOPY W/ ROTATOR CUFF REPAIR Right 08/11/2020    Procedure: SHOULDER ARTHROSCOPY WITH ROTATOR CUFF REPAIR, extensive debridement;  Surgeon: Fredi Ritchie MD;  Location: [facility] MAIN OR;  Service: Orthopedics;  Laterality: Right;    TONSILLECTOMY       Social History:  Social History     Socioeconomic History    Marital status:    Tobacco Use    Smoking status: Every Day     Packs/day: 1.00     Years: 47.00     Pack years: 47.00     Types: Cigarettes     Start date: 1/9/1973    Smokeless tobacco: Never    Tobacco comments:     Smoked a half a pack a day for 42 years didn't start to smoke a whole pack until 2017   Vaping Use    Vaping Use: Never used   Substance and Sexual Activity    Alcohol use: No    Drug use: No    Sexual activity: Yes     Partners: Female     Birth control/protection: None     Allergies:  Allergies   Allergen Reactions    Atorvastatin Swelling    Lisinopril Unknown - High Severity     Facial paralysis       Immunizations:  Immunization History   Administered Date(s) Administered    COVID-19 (ACE) 03/13/2021    FluLaval/Fluzone >6mos 10/25/2022    Pneumococcal Conjugate 20-Valent (PCV20) 06/09/2023    Pneumococcal Polysaccharide (PPSV23) 05/13/2015, 07/13/2018    Shingrix 07/30/2021, 10/05/2021            In-Office Procedure(s):  No orders to display        ASCVD RIsk Score::  The 10-year ASCVD risk score (Jase ADEN, et al., 2019) is: 19.3%    Values used to calculate the score:      Age: 62 years      Sex: Male      Is Non- : Yes      Diabetic: No      Tobacco smoker: Yes      Systolic Blood Pressure: 108 mmHg      Is BP treated: Yes      HDL Cholesterol: 34 mg/dL      Total Cholesterol: 186 mg/dL    Imaging:    Results for orders placed during the hospital encounter of 03/14/23    XR Spine Lumbar Complete With Flex & Ext    Narrative  XR SPINE LUMBAR COMPLETE W FLEX EXT    Date of Exam: 3/14/2023 1:19 PM EDT    Indication: low back pain and numbness right thigh.    Comparison: Flexion-extension views of the lumbar spine 3/11/2021.    Findings:  The lumbar vertebral bodies maintain normal height and normal alignment. There is accentuation of the normal lumbar lordosis in the standing neutral position. There is no indication of anterolisthesis or retrolisthesis with the patient performing flexion  and extension maneuvers. The disc space heights appear preserved. Tiny anterior marginal osteophytes are present at L2-L4. No spondylolytic defects are identified. Sacroiliac joints appear within normal limits. The described sclerotic lesion within the  S1 vertebral segment is unchanged. No new osseous lesions are identified. Suspected mild facet arthropathy at L4-5 and L5-S1.    Impression  Impression:    1. Sclerotic lesion within the S1 sacral segment is unchanged from the prior examination, and may represent a benign chronic finding such as a bone island. It is also seen on the previous MRI  lumbar spine from 2017. No new osseous lesions.  2. No evidence of lumbar spine instability on flexion or extension.    Electronically Signed: Carli Scott  3/15/2023 1:27 PM EDT  Workstation ID: [id number]       Results for orders placed during the hospital encounter of 11/23/22    CT Chest Low Dose Cancer Screening WO    Narrative  DATE OF EXAM:  11/23/2022 9:29 AM    PROCEDURE:  CT CHEST LOW DOSE CANCER SCREENING WO-    INDICATIONS:  Lung cancer screening, >= 20 pk-yr smoking history (Age >= 50y);  Z72.0-Tobacco use; Z87.891-Personal history of nicotine dependence    COMPARISON:  CT chest without contrast 02/04/2019    TECHNIQUE:  Low dose continuous axial images obtained of the chest according to  routine low-dose lung cancer screening CT. Coronal and sagittal  reconstructions were performed. Automated exposure control and iterative  reconstruction was utilized for reduction of CT dose.    FINDINGS:  Visualized soft tissues of the lower neck are without acute abnormality.  Heart size normal. No pericardial or pleural effusion. Negative for  mediastinal, hilar, or axillary adenopathy. Mild coronary artery  calcification.    Trachea and mainstem bronchi patent. Moderate emphysema. No  consolidation or findings of pneumonia. Mild linear atelectasis at left  lower lobe and lingula. Scarring at lung apices stable from prior study.  Small area of groundglass opacity involving the right upper lobe  abutting the pulmonary fissure on image 69 stable measuring 11 mm.  Subpleural left lower lobe 5 mm nodule on image 98 stable.    Upper abdomen demonstrates normal noncontrast visualized portions of the  liver, spleen, adrenal glands. No free fluid in the upper abdomen. No  aggressive osseous lesion or fracture.    Impression  1. No new pulmonary nodule. Recommend continued low-dose screening CT in  12 months.  2. Stable pulmonary nodules above.  3. Moderate emphysema.  4. Mild coronary artery calcification.    Lung RADS  category 2 - Benign.    Electronically Signed By-Americo Connell MD On:11/23/2022 11:05 AM  This report was finalized on [id number] by  Americo Connell MD.      Results for orders placed during the hospital encounter of 11/23/22    CT Chest Low Dose Cancer Screening WO    Narrative  DATE OF EXAM:  11/23/2022 9:29 AM    PROCEDURE:  CT CHEST LOW DOSE CANCER SCREENING WO-    INDICATIONS:  Lung cancer screening, >= 20 pk-yr smoking history (Age >= 50y);  Z72.0-Tobacco use; Z87.891-Personal history of nicotine dependence    COMPARISON:  CT chest without contrast 02/04/2019    TECHNIQUE:  Low dose continuous axial images obtained of the chest according to  routine low-dose lung cancer screening CT. Coronal and sagittal  reconstructions were performed. Automated exposure control and iterative  reconstruction was utilized for reduction of CT dose.    FINDINGS:  Visualized soft tissues of the lower neck are without acute abnormality.  Heart size normal. No pericardial or pleural effusion. Negative for  mediastinal, hilar, or axillary adenopathy. Mild coronary artery  calcification.    Trachea and mainstem bronchi patent. Moderate emphysema. No  consolidation or findings of pneumonia. Mild linear atelectasis at left  lower lobe and lingula. Scarring at lung apices stable from prior study.  Small area of groundglass opacity involving the right upper lobe  abutting the pulmonary fissure on image 69 stable measuring 11 mm.  Subpleural left lower lobe 5 mm nodule on image 98 stable.    Upper abdomen demonstrates normal noncontrast visualized portions of the  liver, spleen, adrenal glands. No free fluid in the upper abdomen. No  aggressive osseous lesion or fracture.    Impression  1. No new pulmonary nodule. Recommend continued low-dose screening CT in  12 months.  2. Stable pulmonary nodules above.  3. Moderate emphysema.  4. Mild coronary artery calcification.    Lung RADS category 2 - Benign.    Electronically Signed By-Americo  MD Becki On:11/23/2022 11:05 AM  This report was finalized on [id number] by  Americo Connell MD.      Lab Review:   Hospital Outpatient Visit on 04/06/2023   Component Date Value    Creatinine 04/06/2023 1.00     eGFR 04/06/2023 85.1      Recent labs reviewed and interpreted for clinical significance and application            Level of Care:           Andres Valadez MD  07/24/23  .

## 2023-08-21 ENCOUNTER — HOSPITAL ENCOUNTER (OUTPATIENT)
Dept: PAIN MEDICINE | Facility: HOSPITAL | Age: 63
Discharge: HOME | End: 2023-08-21
Payer: MEDICARE

## 2023-08-21 VITALS
OXYGEN SATURATION: 94 % | DIASTOLIC BLOOD PRESSURE: 99 MMHG | WEIGHT: 264 LBS | TEMPERATURE: 97.1 F | SYSTOLIC BLOOD PRESSURE: 144 MMHG | RESPIRATION RATE: 16 BRPM | BODY MASS INDEX: 36.96 KG/M2 | HEIGHT: 71 IN | HEART RATE: 70 BPM

## 2023-08-21 DIAGNOSIS — M54.16 LUMBAR RADICULITIS: Primary | ICD-10-CM

## 2023-08-21 DIAGNOSIS — R52 PAIN: ICD-10-CM

## 2023-08-21 PROCEDURE — 25510000001 IOPAMIDOL 41 % SOLUTION: Performed by: ANESTHESIOLOGY

## 2023-08-21 PROCEDURE — 25010000002 METHYLPREDNISOLONE PER 40 MG: Performed by: ANESTHESIOLOGY

## 2023-08-21 PROCEDURE — 62323 NJX INTERLAMINAR LMBR/SAC: CPT | Performed by: ANESTHESIOLOGY

## 2023-08-21 PROCEDURE — 77003 FLUOROGUIDE FOR SPINE INJECT: CPT

## 2023-08-21 RX ORDER — METHYLPREDNISOLONE ACETATE 40 MG/ML
40 INJECTION, SUSPENSION INTRA-ARTICULAR; INTRALESIONAL; INTRAMUSCULAR; SOFT TISSUE ONCE
Status: COMPLETED | OUTPATIENT
Start: 2023-08-21 | End: 2023-08-21

## 2023-08-21 RX ADMIN — IOPAMIDOL 3 ML: 408 INJECTION, SOLUTION INTRATHECAL at 10:20

## 2023-08-21 RX ADMIN — METHYLPREDNISOLONE ACETATE 40 MG: 40 INJECTION, SUSPENSION INTRA-ARTICULAR; INTRALESIONAL; INTRAMUSCULAR; INTRASYNOVIAL; SOFT TISSUE at 10:21

## 2023-08-21 NOTE — DISCHARGE INSTRUCTIONS

## 2023-08-21 NOTE — PROCEDURES
"Subjective   CC back pain right leg pain  Jagdish Rea is a 62 y.o. male with lumbar radiculitis here for repeat LESI.  No anticoagulation    Pain Assessment   Location of Pain: Lower Back, R Hip, L Hip, R Leg,   Description of Pain: Dull/Aching, Throbbing, Stabbing  Previous Pain Rating :3  Current Pain Rating: 3  Aggravating Factors: Activity  Alleviating Factors: Rest, Medication  Pain onset over 12 weeks  Pain interferes with ADL's    The following portions of the patient's history were reviewed and updated as appropriate: allergies, current medications, past family history, past medical history, past social history, past surgical history and problem list.      Review of Systems  As in HPI  Objective   Physical Exam  Vitals reviewed.   Constitutional:       General: He is not in acute distress.  Pulmonary:      Effort: Pulmonary effort is normal.     /99 (BP Location: Left arm, Patient Position: Sitting)   Pulse 70   Temp 97.1 øF (36.2 øC) (Skin)   Resp 16   Ht 180.3 cm (71\")   Wt 120 kg (264 lb)   SpO2 94%   BMI 36.82 kg/mý     Assessment & Plan    underwent repeat LESI    RTC 4-6 weeks or as needed for repeat    DATE OF PROCEDURE: 8/21/2023    PREOPERATIVE DIAGNOSIS: lumbar DDD/radiculitis    POSTOPERATIVE DIAGNOSIS: same    PROCEDURE PERFORMED:  lumbar Epidural Steroid Injection    The patient presents with a history of   lumbar degenerative disc disease with  radiculitis. The patient presents today for a  lumbar epidural steroid injection at level  L5/S1.   The patient was seen in the preoperative area.  Patient's consent was obtained and updated.  Vitals were taken.  Patient was then brought to the procedure suite and placed in a prone position. The appropriate anatomic area was widely prepped with Chloraprep and draped in a sterile fashion. Noninvasive monitoring per routine anesthesia protocol was placed.  Under fluoroscopic guidance using  AP view a 20 gauge styleted tuohy needle was passed " through skin anesthetized with 1% Lidocaine without epinephrine.  The needle was advanced using the continuous loss of resistance to saline technique into the L5 epidural space. Needle tip placement in the epidural space was confirmed by loss of resistance and injection of 1 mL of  preservative free contrast.  Following this 8 mL of a solution containing  1 mL of 40 mg Depo-Medrol and 7 mL of preservative-free saline was carefully administered in the epidural space.  A sterile dressing was placed over the puncture site.    The patient tolerated the procedure with no complications . They were then brought to the post procedure area where they recovered nicely.

## 2023-08-22 ENCOUNTER — TELEPHONE (OUTPATIENT)
Dept: PAIN MEDICINE | Facility: HOSPITAL | Age: 63
End: 2023-08-22
Payer: MEDICARE

## 2023-08-22 NOTE — TELEPHONE ENCOUNTER
Post procedure phone call completed.  Pt states they are doing good and denies questions or concerns. Patient states pain is a #2 today.

## 2023-09-04 RX ORDER — AMLODIPINE BESYLATE 10 MG/1
TABLET ORAL
Qty: 90 TABLET | Refills: 0 | Status: SHIPPED | OUTPATIENT
Start: 2023-09-04

## 2023-09-12 PROBLEM — K44.9 DIAPHRAGMATIC HERNIA WITHOUT OBSTRUCTION OR GANGRENE: Status: ACTIVE | Noted: 2022-05-19

## 2023-09-12 PROBLEM — F43.10 POST TRAUMATIC STRESS DISORDER (PTSD): Status: ACTIVE | Noted: 2023-09-12

## 2023-09-12 PROBLEM — H92.02 OTALGIA, LEFT EAR: Status: ACTIVE | Noted: 2023-09-12

## 2023-09-12 PROBLEM — K21.9 GASTRO-ESOPHAGEAL REFLUX DISEASE WITHOUT ESOPHAGITIS: Status: ACTIVE | Noted: 2023-09-12

## 2023-09-12 PROBLEM — K22.2 ESOPHAGEAL OBSTRUCTION: Status: ACTIVE | Noted: 2022-05-19

## 2023-09-12 PROBLEM — K31.89 OTHER DISEASES OF STOMACH AND DUODENUM: Status: ACTIVE | Noted: 2022-05-19

## 2023-09-12 PROBLEM — K44.9 HIATAL HERNIA: Status: ACTIVE | Noted: 2023-09-12

## 2023-09-12 PROBLEM — M19.90 ARTHRITIS: Status: ACTIVE | Noted: 2023-09-12

## 2023-09-12 PROBLEM — K21.00 GASTRO-ESOPHAGEAL REFLUX DISEASE WITH ESOPHAGITIS: Status: ACTIVE | Noted: 2023-09-12

## 2023-09-12 PROBLEM — E78.00 PURE HYPERCHOLESTEROLEMIA: Status: ACTIVE | Noted: 2023-09-12

## 2023-09-12 PROBLEM — R13.10 DYSPHAGIA: Status: ACTIVE | Noted: 2023-09-12

## 2023-09-12 PROBLEM — I10 HIGH BLOOD PRESSURE: Status: ACTIVE | Noted: 2023-09-12

## 2023-09-12 PROBLEM — M54.9 BACK PAIN: Status: ACTIVE | Noted: 2023-09-12

## 2023-09-12 PROBLEM — G47.00 INSOMNIA: Status: ACTIVE | Noted: 2023-09-12

## 2023-09-12 NOTE — PROGRESS NOTES
Neurosurgical Consultation      Jagdish Rea is a 62 y.o. male is here today for follow-up for back pain. In the office today patient reports of resolved weakness and numbness with minimal back pain.     Chief Complaint   Patient presents with    Back Pain     Follow up         Previous treatment: Lumbar Injection 8/21, LESI 5/15/23     HPI: This is a 62-year-old gentleman who presents in follow-up to the neurosurgery clinic for evaluation of back and right leg pain.  He has back more than leg pain.  His back pain is worsened with axial loading.  He does have a history of some right leg numbness which initially resemble the lateral femoral cutaneous nerve however upon further discussion with him appear to resemble an L4 and L5 distribution.  He has undergone lumbar epidural steroid injections on May 15 as well as August 21 of this year.  He has obtained significant relief from both of these injections.  In fact this last injection he feels as though he no longer has any leg pain.  He is able to axial load for an extended period of time.    Past Medical History:   Diagnosis Date    Allergic     Anxiety     Arthritis 06/2005    Back pain     Claustrophobia 1978    COPD (chronic obstructive pulmonary disease)     CTS (carpal tunnel syndrome) 10/2022    GERD (gastroesophageal reflux disease)     Hyperlipidemia     Hypertension     Knee pain     rt    Low back pain     Obesity     Pneumonia     PTSD (post-traumatic stress disorder)     Thoracic disc herniation     Vitamin B12 deficiency         Past Surgical History:   Procedure Laterality Date    CARDIAC CATHETERIZATION N/A 07/13/2022    Procedure: Left Heart Cath, possible pci;  Surgeon: Prieto Sidhu MD;  Location: [facility] CATH INVASIVE LOCATION;  Service: Cardiovascular;  Laterality: N/A;    ENDOSCOPY      HERNIA REPAIR      KNEE ARTHROPLASTY      x2    SHOULDER ARTHROSCOPY W/ ROTATOR CUFF REPAIR Right 08/11/2020    Procedure: SHOULDER ARTHROSCOPY WITH  ROTATOR CUFF REPAIR, extensive debridement;  Surgeon: Fredi Ritchei MD;  Location: [facility] MAIN OR;  Service: Orthopedics;  Laterality: Right;    TONSILLECTOMY          Current Outpatient Medications on File Prior to Visit   Medication Sig Dispense Refill    albuterol sulfate  (90 Base) MCG/ACT inhaler Inhale 2 puffs Every 4 (Four) Hours As Needed for Wheezing. 18 g 1    amLODIPine (NORVASC) 10 MG tablet TAKE 1 TABLET EVERY DAY 90 tablet 0    divalproex (DEPAKOTE ER) 500 MG 24 hr tablet 1 tablet Daily.      doxepin (SINEquan) 10 MG capsule TAKE 1 CAPSULE EVERY NIGHT. 90 capsule 1    hydroCHLOROthiazide (MICROZIDE) 12.5 MG capsule Take 1 capsule by mouth As Needed.      metoprolol succinate XL (TOPROL-XL) 25 MG 24 hr tablet TAKE 1 TABLET EVERY DAY 90 tablet 0    Omega-3 Fatty Acids (Fish Oil) 1000 MG capsule delayed-release Take 1 capsule by mouth 2 (Two) Times a Day for 90 days. 180 capsule 3    pantoprazole (PROTONIX) 40 MG EC tablet Take 1 tablet by mouth Daily. 90 tablet 1    pravastatin (PRAVACHOL) 80 MG tablet TAKE 1 TABLET EVERY NIGHT 90 tablet 1    risperiDONE (risperDAL) 2 MG tablet Take 1 tablet by mouth Daily.       No current facility-administered medications on file prior to visit.        Allergies   Allergen Reactions    Atorvastatin Swelling    Lisinopril Unknown - High Severity     Facial paralysis         Social History     Socioeconomic History    Marital status:    Tobacco Use    Smoking status: Every Day     Packs/day: 1.00     Years: 47.00     Pack years: 47.00     Types: Cigarettes     Start date: 1/9/1973    Smokeless tobacco: Never    Tobacco comments:     Smoked a half a pack a day for 42 years didn't start to smoke a whole pack until 2017   Vaping Use    Vaping Use: Never used   Substance and Sexual Activity    Alcohol use: No    Drug use: No    Sexual activity: Yes     Partners: Female     Birth control/protection: None          Review of Systems   Constitutional:   "Positive for activity change.   HENT: Negative.     Eyes: Negative.    Respiratory: Negative.     Cardiovascular: Negative.    Gastrointestinal: Negative.    Endocrine: Negative.    Genitourinary: Negative.    Musculoskeletal:  Positive for back pain.   Skin: Negative.    Allergic/Immunologic: Negative.    Neurological:  Negative for weakness and numbness.   Hematological: Negative.    Psychiatric/Behavioral:  Negative for sleep disturbance.       Physical Examination:     Vitals:    09/14/23 0905   BP: 119/85   BP Location: Right arm   Patient Position: Sitting   Cuff Size: Large Adult   Pulse: 68   Resp: 18   Weight: 117 kg (258 lb 3.2 oz)   Height: 180.3 cm (71\")   PainSc: 0-No pain   PainLoc: Back        Physical Exam        Vitals:    09/14/23 0905   PainSc: 0-No pain   PainLoc: Back            Neurological Exam   Neurological examination is improved compared to my last evaluation with less numbness in his right leg.    Result Review  The following data was reviewed by: Jagdish Gruber MD on 09/14/2023:    Data reviewed : Radiologic studies flexion and extension x-rays without any indication of dynamic spondylolisthesis.      Assessment/plan:  This is a 62-year-old gentleman with back more than leg pain who has undergone lumbar epidural steroid injections with some relatively significant relief.  He does have a small right-sided L5-S1 synovial cyst on MRI.  If his leg pain was radiculopathy it would most consistent with L4-L5.  He does not have any dynamic spondylolisthesis.  He has completed multiple courses of physical therapy.  At this juncture I recommend continuing to monitor his extent of relief to figure out whether or not chronic injections is a true treatment modality that can be considered.  If not I would have to reevaluate whether or not spinal cord stimulation or structural spinal surgery would be indicated.    Diagnoses and all orders for this visit:    1. Lumbar radiculopathy, chronic " (Primary)    2. Lumbar facet joint pain         Return if symptoms worsen or fail to improve.            Jagdish Gruber MD

## 2023-09-14 ENCOUNTER — TELEPHONE (OUTPATIENT)
Dept: FAMILY MEDICINE CLINIC | Facility: CLINIC | Age: 63
End: 2023-09-14

## 2023-09-14 ENCOUNTER — OFFICE VISIT (OUTPATIENT)
Dept: NEUROSURGERY | Facility: CLINIC | Age: 63
End: 2023-09-14
Payer: MEDICARE

## 2023-09-14 VITALS
SYSTOLIC BLOOD PRESSURE: 119 MMHG | BODY MASS INDEX: 36.15 KG/M2 | HEIGHT: 71 IN | DIASTOLIC BLOOD PRESSURE: 85 MMHG | WEIGHT: 258.2 LBS | HEART RATE: 68 BPM | RESPIRATION RATE: 18 BRPM

## 2023-09-14 DIAGNOSIS — M54.59 LUMBAR FACET JOINT PAIN: ICD-10-CM

## 2023-09-14 DIAGNOSIS — M54.16 LUMBAR RADICULOPATHY, CHRONIC: Primary | ICD-10-CM

## 2023-09-14 NOTE — TELEPHONE ENCOUNTER
Caller: Jagdish Rea    Relationship to patient: Self    Best call back number: [phone]    Patient is needing:     WAS EXPOSED TO TWO OF HIS GRAND KIDS WHO ARE POSITIVE FOR STREP.    HE IS NOT SHOWING SYMPTOMS BUT WOULD LIKE TO KNOW IF HE SHOULD COME IN AS A PRECAUTIONARY AND GET TESTED.

## 2023-09-21 ENCOUNTER — ANESTHESIA EVENT (OUTPATIENT)
Dept: GASTROENTEROLOGY | Facility: HOSPITAL | Age: 63
End: 2023-09-21
Payer: MEDICARE

## 2023-09-21 ENCOUNTER — HOSPITAL ENCOUNTER (OUTPATIENT)
Facility: HOSPITAL | Age: 63
Setting detail: HOSPITAL OUTPATIENT SURGERY
Discharge: HOME | End: 2023-09-21
Attending: INTERNAL MEDICINE | Admitting: INTERNAL MEDICINE
Payer: MEDICARE

## 2023-09-21 ENCOUNTER — ANESTHESIA (OUTPATIENT)
Dept: GASTROENTEROLOGY | Facility: HOSPITAL | Age: 63
End: 2023-09-21
Payer: MEDICARE

## 2023-09-21 ENCOUNTER — ON CAMPUS - OUTPATIENT (AMBULATORY)
Dept: URBAN - METROPOLITAN AREA HOSPITAL 85 | Facility: HOSPITAL | Age: 63
End: 2023-09-21

## 2023-09-21 VITALS
DIASTOLIC BLOOD PRESSURE: 81 MMHG | HEART RATE: 63 BPM | WEIGHT: 257.06 LBS | HEIGHT: 71 IN | TEMPERATURE: 98.3 F | SYSTOLIC BLOOD PRESSURE: 127 MMHG | RESPIRATION RATE: 14 BRPM | BODY MASS INDEX: 35.99 KG/M2 | OXYGEN SATURATION: 96 %

## 2023-09-21 DIAGNOSIS — K22.2 ESOPHAGEAL STRICTURE: ICD-10-CM

## 2023-09-21 DIAGNOSIS — K44.9 DIAPHRAGMATIC HERNIA WITHOUT OBSTRUCTION OR GANGRENE: ICD-10-CM

## 2023-09-21 DIAGNOSIS — K29.70 GASTRITIS, UNSPECIFIED, WITHOUT BLEEDING: ICD-10-CM

## 2023-09-21 DIAGNOSIS — R13.10 DYSPHAGIA: ICD-10-CM

## 2023-09-21 DIAGNOSIS — R13.10 DYSPHAGIA, UNSPECIFIED: ICD-10-CM

## 2023-09-21 DIAGNOSIS — K22.2 ESOPHAGEAL OBSTRUCTION: ICD-10-CM

## 2023-09-21 PROCEDURE — 88305 TISSUE EXAM BY PATHOLOGIST: CPT | Performed by: INTERNAL MEDICINE

## 2023-09-21 PROCEDURE — 43239 EGD BIOPSY SINGLE/MULTIPLE: CPT | Performed by: INTERNAL MEDICINE

## 2023-09-21 PROCEDURE — 43450 DILATE ESOPHAGUS 1/MULT PASS: CPT | Performed by: INTERNAL MEDICINE

## 2023-09-21 PROCEDURE — 25010000002 PROPOFOL 500 MG/50ML EMULSION: Performed by: NURSE ANESTHETIST, CERTIFIED REGISTERED

## 2023-09-21 PROCEDURE — 88342 IMHCHEM/IMCYTCHM 1ST ANTB: CPT | Performed by: INTERNAL MEDICINE

## 2023-09-21 RX ORDER — LIDOCAINE HYDROCHLORIDE 10 MG/ML
INJECTION, SOLUTION EPIDURAL; INFILTRATION; INTRACAUDAL; PERINEURAL AS NEEDED
Status: DISCONTINUED | OUTPATIENT
Start: 2023-09-21 | End: 2023-09-21 | Stop reason: SURG

## 2023-09-21 RX ORDER — ONDANSETRON 2 MG/ML
4 INJECTION INTRAMUSCULAR; INTRAVENOUS ONCE AS NEEDED
Status: DISCONTINUED | OUTPATIENT
Start: 2023-09-21 | End: 2023-09-21 | Stop reason: HOSPADM

## 2023-09-21 RX ORDER — SODIUM CHLORIDE 9 MG/ML
INJECTION, SOLUTION INTRAVENOUS CONTINUOUS PRN
Status: DISCONTINUED | OUTPATIENT
Start: 2023-09-21 | End: 2023-09-21 | Stop reason: SURG

## 2023-09-21 RX ORDER — SODIUM CHLORIDE 9 MG/ML
9 INJECTION, SOLUTION INTRAVENOUS ONCE
Status: COMPLETED | OUTPATIENT
Start: 2023-09-21 | End: 2023-09-21

## 2023-09-21 RX ORDER — PROPOFOL 10 MG/ML
INJECTION, EMULSION INTRAVENOUS AS NEEDED
Status: DISCONTINUED | OUTPATIENT
Start: 2023-09-21 | End: 2023-09-21 | Stop reason: SURG

## 2023-09-21 RX ORDER — GLYCOPYRROLATE 0.2 MG/ML
INJECTION INTRAMUSCULAR; INTRAVENOUS AS NEEDED
Status: DISCONTINUED | OUTPATIENT
Start: 2023-09-21 | End: 2023-09-21 | Stop reason: SURG

## 2023-09-21 RX ADMIN — GLYCOPYRROLATE 0.1 MG: 0.2 INJECTION INTRAMUSCULAR; INTRAVENOUS at 08:36

## 2023-09-21 RX ADMIN — SODIUM CHLORIDE 9 ML/HR: 9 INJECTION, SOLUTION INTRAVENOUS at 07:58

## 2023-09-21 RX ADMIN — SODIUM CHLORIDE: 9 INJECTION, SOLUTION INTRAVENOUS at 08:30

## 2023-09-21 RX ADMIN — LIDOCAINE HYDROCHLORIDE 50 MG: 10 INJECTION, SOLUTION EPIDURAL; INFILTRATION; INTRACAUDAL; PERINEURAL at 08:37

## 2023-09-21 RX ADMIN — PROPOFOL 150 MG: 10 INJECTION, EMULSION INTRAVENOUS at 08:37

## 2023-09-21 NOTE — H&P
GI CONSULT  NOTE:    Referring Provider:    Yamilka Pascual APRN  [unfilled]    Chief complaint: Dysphagia    History of present illness:      Jagdish Rea is a 62 y.o. male who presents today for Procedure(s):  ESOPHAGOGASTRODUODENOSCOPY WITH DILATION for the indications listed below.     The updated Patient Profile was reviewed prior to the procedure, in conjunction with the Physical Exam, including medical conditions, surgical procedures, medications, allergies, family history and social history.     Pre-operatively, I reviewed the indication(s) for the procedure, the risks of the procedure [including but not limited to: unexpected bleeding possibly requiring hospitalization and/or unplanned repeat procedures, perforation possibly requiring surgical treatment, missed lesions and complications of sedation/MAC (also explained by anesthesia staff)].     I have evaluated the patient for risks associated with the planned anesthesia and the procedure to be performed and find the patient an acceptable candidate for IV sedation.    Multiple opportunities were provided for any questions or concerns, and all questions were answered satisfactorily before any anesthesia was administered. We will proceed with the planned procedure.    Past Medical History:  Past Medical History:   Diagnosis Date    Allergic     Anxiety     Arthritis 06/2005    Back pain     Claustrophobia 1978    COPD (chronic obstructive pulmonary disease)     CTS (carpal tunnel syndrome) 10/2022    Dysphagia     Esophageal stricture     GERD (gastroesophageal reflux disease)     Hyperlipidemia     Hypertension     Knee pain     rt    Low back pain     Obesity     Pneumonia     PTSD (post-traumatic stress disorder)     Thoracic disc herniation     Vitamin B12 deficiency        Past Surgical History:  Past Surgical History:   Procedure Laterality Date    CARDIAC CATHETERIZATION N/A 07/13/2022    Procedure: Left Heart Cath, possible pci;  Surgeon:  Prieto Sidhu MD;  Location: [facility] CATH INVASIVE LOCATION;  Service: Cardiovascular;  Laterality: N/A;    ENDOSCOPY      HERNIA REPAIR      KNEE ARTHROPLASTY      x2    SHOULDER ARTHROSCOPY W/ ROTATOR CUFF REPAIR Right 08/11/2020    Procedure: SHOULDER ARTHROSCOPY WITH ROTATOR CUFF REPAIR, extensive debridement;  Surgeon: Fredi Ritchie MD;  Location: [facility] MAIN OR;  Service: Orthopedics;  Laterality: Right;    TONSILLECTOMY         Social History:  Social History     Tobacco Use    Smoking status: Every Day     Packs/day: 1.00     Years: 47.00     Pack years: 47.00     Types: Cigarettes     Start date: 1/9/1973    Smokeless tobacco: Never    Tobacco comments:     Smoked a half a pack a day for 42 years didn't start to smoke a whole pack until 2017   Vaping Use    Vaping Use: Never used   Substance Use Topics    Alcohol use: No    Drug use: No       Family History:  Family History   Problem Relation Age of Onset    Heart disease Mother     Early death Mother     Hyperlipidemia Mother     Hypertension Mother     Thyroid disease Mother     Cancer Father     Stroke Father     Vision loss Father     Stroke Paternal Grandfather     Arthritis Paternal Grandmother     Alcohol abuse Brother     Asthma Brother     Depression Brother     Hyperlipidemia Brother     Hypertension Brother     Learning disabilities Brother     Mental illness Brother     Drug abuse Brother     Early death Brother     Heart disease Brother     Hyperlipidemia Brother     Hypertension Brother        Medications:  Medications Prior to Admission   Medication Sig Dispense Refill Last Dose    albuterol sulfate  (90 Base) MCG/ACT inhaler Inhale 2 puffs Every 4 (Four) Hours As Needed for Wheezing. 18 g 1     amLODIPine (NORVASC) 10 MG tablet TAKE 1 TABLET EVERY DAY 90 tablet 0     divalproex (DEPAKOTE ER) 500 MG 24 hr tablet 1 tablet Daily.       doxepin (SINEquan) 10 MG capsule TAKE 1 CAPSULE EVERY NIGHT. 90 capsule 1      "hydroCHLOROthiazide (MICROZIDE) 12.5 MG capsule Take 1 capsule by mouth As Needed.       metoprolol succinate XL (TOPROL-XL) 25 MG 24 hr tablet TAKE 1 TABLET EVERY DAY 90 tablet 0     pantoprazole (PROTONIX) 40 MG EC tablet Take 1 tablet by mouth Daily. 90 tablet 1     pravastatin (PRAVACHOL) 80 MG tablet TAKE 1 TABLET EVERY NIGHT 90 tablet 1     risperiDONE (risperDAL) 2 MG tablet Take 1 tablet by mouth Daily.       Omega-3 Fatty Acids (Fish Oil) 1000 MG capsule delayed-release Take 1 capsule by mouth 2 (Two) Times a Day for 90 days. 180 capsule 3        Scheduled Meds:  Continuous Infusions:No current facility-administered medications for this encounter.    PRN Meds:.    ALLERGIES:  Atorvastatin and Lisinopril    ROS:  The following systems were reviewed and negative;   Constitution:  No fevers, chills, no unintentional weight loss  Skin: no rash, no jaundice  Eyes:  No blurry vision, no eye pain  HENT:  No change in hearing or smell  Resp:  No dyspnea or cough  CV:  No chest pain or palpitations  :  No dysuria, hematuria  Musculoskeletal:  No leg cramps or arthralgias  Neuro:  No tremor, no numbness  Psych:  No depression or confusion    Objective     Vital Signs:   Vitals:    09/14/23 1218   Weight: 117 kg (258 lb)   Height: 180.3 cm (71\")       Physical Exam:       General Appearance:    Awake and alert, in no acute distress   Head:    Normocephalic, without obvious abnormality, atraumatic   Throat:   No oral lesions, no thrush, oral mucosa moist   Lungs:     respirations regular, even and unlabored   Skin:   No rash, no jaundice       Results Review:  Lab Results (last 24 hours)       ** No results found for the last 24 hours. **            Imaging Results (Last 24 Hours)       ** No results found for the last 24 hours. **             I reviewed the patient's labs and imaging.    ASSESSMENT AND PLAN:      Principal Problem:    Dysphagia       Procedure(s):  ESOPHAGOGASTRODUODENOSCOPY WITH DILATION      I " discussed the patients findings and my recommendations with the patient.    Electronically signed by Andres Concepcion MD, 09/21/23, 6:35 AM EDT.

## 2023-09-21 NOTE — ANESTHESIA POSTPROCEDURE EVALUATION
Patient: Jagdish Rea    Procedure Summary       Date: 09/21/23 Room / Location: [facility] ENDOSCOPY 4 / [facility] ENDOSCOPY    Anesthesia Start: 0830 Anesthesia Stop: 0847    Procedure: ESOPHAGOGASTRODUODENOSCOPY WITH non guided esophageal dialtion 54 Fr. Bougie and  cold forcep biopsy x1 area Diagnosis:       Dysphagia      Esophageal stricture      (Dysphagia [R13.10])      (Esophageal stricture [K22.2])    Surgeons: Andres Concepcion MD Provider: Kristian Osullivan MD    Anesthesia Type: general ASA Status: 3            Anesthesia Type: general    Vitals  Vitals Value Taken Time   /81 09/21/23 0906   Temp     Pulse 65 09/21/23 0908   Resp 14 09/21/23 0905   SpO2 89 % 09/21/23 0908   Vitals shown include unvalidated device data.        Post Anesthesia Care and Evaluation    Patient location during evaluation: PACU  Patient participation: complete - patient participated  Level of consciousness: awake  Pain scale: See nurse's notes for pain score.  Pain management: adequate    Airway patency: patent  Anesthetic complications: No anesthetic complications  PONV Status: none  Cardiovascular status: acceptable  Respiratory status: acceptable and spontaneous ventilation  Hydration status: acceptable    Comments: Patient seen and examined postoperatively; vital signs stable; SpO2 greater than or equal to 90%; cardiopulmonary status stable; nausea/vomiting adequately controlled; pain adequately controlled; no apparent anesthesia complications; patient discharged from anesthesia care when discharge criteria were met

## 2023-09-21 NOTE — OP NOTE
ESOPHAGOGASTRODUODENOSCOPY Procedure Report    Patient Name:  Jagdish Rea  YOB: 1960    Date of Surgery:  9/21/2023     Preoperative diagnosis:  Dysphagia    Postoperative diagnosis:  Distal esophageal stricture  Small hiatal hernia  Erosive gastritis        Procedure(s):  ESOPHAGOGASTRODUODENOSCOPY WITH NONGUIDED BOUGIE DILATION (#54 Ivorian) and cold forceps biopsy x1 area    Staff:  Surgeon(s):  Andres Concepcion MD      Anesthesia: Monitored Anesthesia Care    Implants:    Nothing was implanted during the procedure    Specimen:        See Below    Estimated blood loss: Minimal     Complications:  None    Description of Procedure:  Informed consent was obtained for the procedure, including sedation.  Risks of perforation, hemorrhage, adverse drug reaction and aspiration were discussed.  The patient was brought into the endoscopy suite. Continuous cardiopulmonary monitoring was performed. The patient was placed in the left lateral decubitus position.  The bite block was inserted into the patient's mouth. After adequate sedation was attained, the Olympus gastroscope was inserted into the patient's mouth and advanced to the second portion of the duodenum without difficulty.  Circumferential examination was performed. A retroflex exam was performed in the patient's stomach.  On completion of the exam, the bowel was decompressed, the scope was removed from the patient, the patient tolerated the procedure well, there were no immediate post-operative complications.     Examination of the esophagus: A benign-appearing distal esophageal stricture was seen at the GE junction.  A 54 Ivorian nonguided bougie was advanced blindly to the esophagus with mild resistance noted.  Second look revealed effective dilation of the stricture.  A 2 cm hiatal hernia was noted from 43 to 45 cm.  Examination of the stomach: Multiple erosions and erythema was seen in the stomach antrum consistent with erosive  gastritis.  Cold forceps biopsies were obtained of the antrum and body for histology and to rule out H. pylori.  Examination of the duodenum: Normal to second duodenum    Impression:  EGD shows a distal esophageal stricture dilated to 54 French, small hiatal hernia, erosive gastritis    Recommendations:  Follow-up on pathology  If H. pylori is positive treat with twice daily PPI and Pylera x14 days  Pantoprazole 40 mg every morning  Repeat EGD as needed for dysphagia  Avoid NSAIDs  Follow-up with GI nurse practitioner in 2 months    We appreciate the referral    Electronically signed by Andres Concepcion MD, 09/21/23, 8:49 AM EDT.

## 2023-09-21 NOTE — ANESTHESIA PREPROCEDURE EVALUATION
Anesthesia Evaluation     Patient summary reviewed and Nursing notes reviewed   no history of anesthetic complications:   NPO Solid Status: > 8 hours  NPO Liquid Status: > 8 hours           Airway   Mallampati: II  TM distance: >3 FB  Neck ROM: full  Possible difficult intubation  Dental          Pulmonary - normal exam   (+) a smoker Current Abstained day of surgery, COPD mild,  Cardiovascular - normal exam  Exercise tolerance: good (4-7 METS)    (+) hypertension, hyperlipidemia      Neuro/Psych  (+) numbness, psychiatric history Anxiety and PTSD  GI/Hepatic/Renal/Endo    (+) obesity, hiatal hernia, GERD    Musculoskeletal     (+) back pain  Abdominal  - normal exam   Substance History      OB/GYN          Other   arthritis,         Phys Exam Other: beard                Anesthesia Plan    ASA 3     general     intravenous induction     Anesthetic plan, risks, benefits, and alternatives have been provided, discussed and informed consent has been obtained with: patient.    Plan discussed with CRNA.

## 2023-09-21 NOTE — DISCHARGE INSTRUCTIONS
A responsible adult should stay with you and you should rest quietly for the rest of the day.    Do not drink alcohol, drive, operate any heavy machinery or power tools or make any legal/important decisions for the next 24 hours.     Progress your diet as tolerated.  If you begin to experience severe pain, increased shortness of breath, racing heartbeat or a fever above 101 F, seek immediate medical attention.     Follow up with MD as instructed. Call office for results in 3 to 5 days if needed.   Impression:  EGD shows a distal esophageal stricture dilated to 54 French, small hiatal hernia, erosive gastritis     Recommendations:  Follow-up on pathology  If H. pylori is positive treat with twice daily PPI and Pylera x14 days  Pantoprazole 40 mg every morning  Repeat EGD as needed for dysphagia  Avoid NSAIDs  Follow-up with GI nurse practitioner in 2 months     We appreciate the referral

## 2023-09-22 LAB
LAB AP CASE REPORT: NORMAL
PATH REPORT.FINAL DX SPEC: NORMAL
PATH REPORT.GROSS SPEC: NORMAL

## 2023-09-29 RX ORDER — PANTOPRAZOLE SODIUM 40 MG/1
TABLET, DELAYED RELEASE ORAL
Qty: 90 TABLET | Refills: 1 | Status: SHIPPED | OUTPATIENT
Start: 2023-09-29

## 2023-10-29 ENCOUNTER — APPOINTMENT (OUTPATIENT)
Dept: GENERAL RADIOLOGY | Facility: HOSPITAL | Age: 63
DRG: 193 | End: 2023-10-29
Payer: MEDICARE

## 2023-10-29 ENCOUNTER — HOSPITAL ENCOUNTER (INPATIENT)
Facility: HOSPITAL | Age: 63
LOS: 5 days | Discharge: HOME | DRG: 193 | End: 2023-11-06
Attending: EMERGENCY MEDICINE | Admitting: EMERGENCY MEDICINE
Payer: MEDICARE

## 2023-10-29 DIAGNOSIS — B34.8 RHINOVIRUS INFECTION: ICD-10-CM

## 2023-10-29 DIAGNOSIS — T17.800A MULTIPLE TRACHEOBRONCHIAL MUCUS PLUGS: ICD-10-CM

## 2023-10-29 DIAGNOSIS — J44.1 CHRONIC OBSTRUCTIVE PULMONARY DISEASE WITH ACUTE EXACERBATION: ICD-10-CM

## 2023-10-29 DIAGNOSIS — R06.03 ACUTE RESPIRATORY DISTRESS: Primary | ICD-10-CM

## 2023-10-29 PROBLEM — R06.00 DYSPNEA: Status: ACTIVE | Noted: 2023-10-29

## 2023-10-29 LAB
ALBUMIN SERPL-MCNC: 4.3 G/DL (ref 3.5–5.2)
ALBUMIN/GLOB SERPL: 1.3 G/DL
ALP SERPL-CCNC: 85 U/L (ref 39–117)
ALT SERPL W P-5'-P-CCNC: 36 U/L (ref 1–41)
ANION GAP SERPL CALCULATED.3IONS-SCNC: 12 MMOL/L (ref 5–15)
AST SERPL-CCNC: 30 U/L (ref 1–40)
B PARAPERT DNA SPEC QL NAA+PROBE: NOT DETECTED
B PERT DNA SPEC QL NAA+PROBE: NOT DETECTED
BASOPHILS # BLD AUTO: 0.1 10*3/MM3 (ref 0–0.2)
BASOPHILS NFR BLD AUTO: 1 % (ref 0–1.5)
BILIRUB SERPL-MCNC: 0.4 MG/DL (ref 0–1.2)
BUN SERPL-MCNC: 8 MG/DL (ref 8–23)
BUN/CREAT SERPL: 8.5 (ref 7–25)
C PNEUM DNA NPH QL NAA+NON-PROBE: NOT DETECTED
CALCIUM SPEC-SCNC: 9.2 MG/DL (ref 8.6–10.5)
CHLORIDE SERPL-SCNC: 107 MMOL/L (ref 98–107)
CO2 SERPL-SCNC: 23 MMOL/L (ref 22–29)
CREAT SERPL-MCNC: 0.94 MG/DL (ref 0.76–1.27)
DEPRECATED RDW RBC AUTO: 49 FL (ref 37–54)
EGFRCR SERPLBLD CKD-EPI 2021: 91.7 ML/MIN/1.73
EOSINOPHIL # BLD AUTO: 0.1 10*3/MM3 (ref 0–0.4)
EOSINOPHIL NFR BLD AUTO: 1.9 % (ref 0.3–6.2)
ERYTHROCYTE [DISTWIDTH] IN BLOOD BY AUTOMATED COUNT: 13.6 % (ref 12.3–15.4)
FLUAV SUBTYP SPEC NAA+PROBE: NOT DETECTED
FLUBV RNA ISLT QL NAA+PROBE: NOT DETECTED
GEN 5 2HR TROPONIN T REFLEX: 7 NG/L
GLOBULIN UR ELPH-MCNC: 3.2 GM/DL
GLUCOSE SERPL-MCNC: 95 MG/DL (ref 65–99)
HADV DNA SPEC NAA+PROBE: NOT DETECTED
HCOV 229E RNA SPEC QL NAA+PROBE: NOT DETECTED
HCOV HKU1 RNA SPEC QL NAA+PROBE: NOT DETECTED
HCOV NL63 RNA SPEC QL NAA+PROBE: NOT DETECTED
HCOV OC43 RNA SPEC QL NAA+PROBE: NOT DETECTED
HCT VFR BLD AUTO: 48.9 % (ref 37.5–51)
HGB BLD-MCNC: 16.9 G/DL (ref 13–17.7)
HMPV RNA NPH QL NAA+NON-PROBE: NOT DETECTED
HPIV1 RNA ISLT QL NAA+PROBE: NOT DETECTED
HPIV2 RNA SPEC QL NAA+PROBE: NOT DETECTED
HPIV3 RNA NPH QL NAA+PROBE: NOT DETECTED
HPIV4 P GENE NPH QL NAA+PROBE: NOT DETECTED
LYMPHOCYTES # BLD AUTO: 3 10*3/MM3 (ref 0.7–3.1)
LYMPHOCYTES NFR BLD AUTO: 39.5 % (ref 19.6–45.3)
M PNEUMO IGG SER IA-ACNC: NOT DETECTED
MCH RBC QN AUTO: 33.9 PG (ref 26.6–33)
MCHC RBC AUTO-ENTMCNC: 34.6 G/DL (ref 31.5–35.7)
MCV RBC AUTO: 98.1 FL (ref 79–97)
MONOCYTES # BLD AUTO: 0.5 10*3/MM3 (ref 0.1–0.9)
MONOCYTES NFR BLD AUTO: 6.5 % (ref 5–12)
NEUTROPHILS NFR BLD AUTO: 3.8 10*3/MM3 (ref 1.7–7)
NEUTROPHILS NFR BLD AUTO: 51.1 % (ref 42.7–76)
NRBC BLD AUTO-RTO: 0.1 /100 WBC (ref 0–0.2)
NT-PROBNP SERPL-MCNC: 50.4 PG/ML (ref 0–900)
PLATELET # BLD AUTO: 155 10*3/MM3 (ref 140–450)
PMV BLD AUTO: 10.3 FL (ref 6–12)
POTASSIUM SERPL-SCNC: 3.2 MMOL/L (ref 3.5–5.2)
PROT SERPL-MCNC: 7.5 G/DL (ref 6–8.5)
RBC # BLD AUTO: 4.99 10*6/MM3 (ref 4.14–5.8)
RHINOVIRUS RNA SPEC NAA+PROBE: DETECTED
RSV RNA NPH QL NAA+NON-PROBE: NOT DETECTED
SARS-COV-2 RNA NPH QL NAA+NON-PROBE: NOT DETECTED
SODIUM SERPL-SCNC: 142 MMOL/L (ref 136–145)
TROPONIN T DELTA: -1 NG/L
TROPONIN T SERPL HS-MCNC: 8 NG/L
WBC NRBC COR # BLD: 7.5 10*3/MM3 (ref 3.4–10.8)

## 2023-10-29 PROCEDURE — 94799 UNLISTED PULMONARY SVC/PX: CPT

## 2023-10-29 PROCEDURE — 83880 ASSAY OF NATRIURETIC PEPTIDE: CPT | Performed by: NURSE PRACTITIONER

## 2023-10-29 PROCEDURE — 25810000003 SODIUM CHLORIDE 0.9 % SOLUTION: Performed by: EMERGENCY MEDICINE

## 2023-10-29 PROCEDURE — 93005 ELECTROCARDIOGRAM TRACING: CPT | Performed by: NURSE PRACTITIONER

## 2023-10-29 PROCEDURE — 0202U NFCT DS 22 TRGT SARS-COV-2: CPT | Performed by: NURSE PRACTITIONER

## 2023-10-29 PROCEDURE — G0378 HOSPITAL OBSERVATION PER HR: HCPCS

## 2023-10-29 PROCEDURE — 71045 X-RAY EXAM CHEST 1 VIEW: CPT

## 2023-10-29 PROCEDURE — 80053 COMPREHEN METABOLIC PANEL: CPT | Performed by: NURSE PRACTITIONER

## 2023-10-29 PROCEDURE — 84484 ASSAY OF TROPONIN QUANT: CPT | Performed by: NURSE PRACTITIONER

## 2023-10-29 PROCEDURE — 99285 EMERGENCY DEPT VISIT HI MDM: CPT

## 2023-10-29 PROCEDURE — 36415 COLL VENOUS BLD VENIPUNCTURE: CPT

## 2023-10-29 PROCEDURE — 85025 COMPLETE CBC W/AUTO DIFF WBC: CPT | Performed by: NURSE PRACTITIONER

## 2023-10-29 RX ORDER — SODIUM CHLORIDE 9 MG/ML
100 INJECTION, SOLUTION INTRAVENOUS CONTINUOUS
Status: DISCONTINUED | OUTPATIENT
Start: 2023-10-29 | End: 2023-11-05

## 2023-10-29 RX ORDER — SODIUM CHLORIDE 0.9 % (FLUSH) 0.9 %
10 SYRINGE (ML) INJECTION AS NEEDED
Status: DISCONTINUED | OUTPATIENT
Start: 2023-10-29 | End: 2023-11-06 | Stop reason: HOSPADM

## 2023-10-29 RX ORDER — ALBUTEROL SULFATE 2.5 MG/3ML
2.5 SOLUTION RESPIRATORY (INHALATION) ONCE
Status: COMPLETED | OUTPATIENT
Start: 2023-10-29 | End: 2023-10-29

## 2023-10-29 RX ORDER — NALOXONE HCL 0.4 MG/ML
0.4 VIAL (ML) INJECTION
Status: DISCONTINUED | OUTPATIENT
Start: 2023-10-29 | End: 2023-11-06 | Stop reason: HOSPADM

## 2023-10-29 RX ORDER — BISACODYL 10 MG
10 SUPPOSITORY, RECTAL RECTAL DAILY PRN
Status: DISCONTINUED | OUTPATIENT
Start: 2023-10-29 | End: 2023-11-06 | Stop reason: HOSPADM

## 2023-10-29 RX ORDER — METHYLPREDNISOLONE SODIUM SUCCINATE 125 MG/2ML
60 INJECTION, POWDER, LYOPHILIZED, FOR SOLUTION INTRAMUSCULAR; INTRAVENOUS EVERY 12 HOURS
Status: DISCONTINUED | OUTPATIENT
Start: 2023-10-29 | End: 2023-11-06 | Stop reason: HOSPADM

## 2023-10-29 RX ORDER — MORPHINE SULFATE 2 MG/ML
1 INJECTION, SOLUTION INTRAMUSCULAR; INTRAVENOUS EVERY 4 HOURS PRN
Status: DISPENSED | OUTPATIENT
Start: 2023-10-29 | End: 2023-11-05

## 2023-10-29 RX ORDER — POLYETHYLENE GLYCOL 3350 17 G/17G
17 POWDER, FOR SOLUTION ORAL DAILY PRN
Status: DISCONTINUED | OUTPATIENT
Start: 2023-10-29 | End: 2023-11-06 | Stop reason: HOSPADM

## 2023-10-29 RX ORDER — SODIUM CHLORIDE 0.9 % (FLUSH) 0.9 %
10 SYRINGE (ML) INJECTION EVERY 12 HOURS SCHEDULED
Status: DISCONTINUED | OUTPATIENT
Start: 2023-10-29 | End: 2023-11-06 | Stop reason: HOSPADM

## 2023-10-29 RX ORDER — IPRATROPIUM BROMIDE AND ALBUTEROL SULFATE 2.5; .5 MG/3ML; MG/3ML
3 SOLUTION RESPIRATORY (INHALATION) EVERY 6 HOURS PRN
Status: DISCONTINUED | OUTPATIENT
Start: 2023-10-29 | End: 2023-10-30

## 2023-10-29 RX ORDER — NITROGLYCERIN 0.4 MG/1
0.4 TABLET SUBLINGUAL
Status: DISCONTINUED | OUTPATIENT
Start: 2023-10-29 | End: 2023-10-29 | Stop reason: SDUPTHER

## 2023-10-29 RX ORDER — AMOXICILLIN 250 MG
2 CAPSULE ORAL 2 TIMES DAILY
Status: DISCONTINUED | OUTPATIENT
Start: 2023-10-29 | End: 2023-11-06 | Stop reason: HOSPADM

## 2023-10-29 RX ORDER — METHYLPREDNISOLONE SODIUM SUCCINATE 125 MG/2ML
125 INJECTION, POWDER, LYOPHILIZED, FOR SOLUTION INTRAMUSCULAR; INTRAVENOUS ONCE
Status: COMPLETED | OUTPATIENT
Start: 2023-10-29 | End: 2023-10-30

## 2023-10-29 RX ORDER — BISACODYL 5 MG/1
5 TABLET, DELAYED RELEASE ORAL DAILY PRN
Status: DISCONTINUED | OUTPATIENT
Start: 2023-10-29 | End: 2023-11-06 | Stop reason: HOSPADM

## 2023-10-29 RX ORDER — SODIUM CHLORIDE 9 MG/ML
40 INJECTION, SOLUTION INTRAVENOUS AS NEEDED
Status: DISCONTINUED | OUTPATIENT
Start: 2023-10-29 | End: 2023-11-06 | Stop reason: HOSPADM

## 2023-10-29 RX ORDER — NITROGLYCERIN 0.4 MG/1
0.4 TABLET SUBLINGUAL
Status: DISCONTINUED | OUTPATIENT
Start: 2023-10-29 | End: 2023-11-06 | Stop reason: HOSPADM

## 2023-10-29 RX ORDER — IPRATROPIUM BROMIDE AND ALBUTEROL SULFATE 2.5; .5 MG/3ML; MG/3ML
3 SOLUTION RESPIRATORY (INHALATION) ONCE
Status: COMPLETED | OUTPATIENT
Start: 2023-10-29 | End: 2023-10-29

## 2023-10-29 RX ADMIN — SODIUM CHLORIDE 100 ML/HR: 9 INJECTION, SOLUTION INTRAVENOUS at 23:55

## 2023-10-29 RX ADMIN — Medication 10 ML: at 23:55

## 2023-10-29 RX ADMIN — ALBUTEROL SULFATE 2.5 MG: 2.5 SOLUTION RESPIRATORY (INHALATION) at 22:04

## 2023-10-29 RX ADMIN — IPRATROPIUM BROMIDE AND ALBUTEROL SULFATE 3 ML: .5; 3 SOLUTION RESPIRATORY (INHALATION) at 22:00

## 2023-10-29 NOTE — ED NOTES
Pt verbalized understanding not to leave ED lobby with IV in place or to tamper with IV and to have ED staff remove IV if the pt wishes to leave.

## 2023-10-29 NOTE — ED PROVIDER NOTES
Subjective     Provider in Triage Note  Patient is a 62-year-old gentleman who comes in with a history of COPD GERD hypertension hyperlipidemia obesity and history of pneumonia who states he has had cough congestion for the last week and has developed some chest pain with the cough he states he has frothy sputum from the cough-he does not have any ill exposures that he is aware of.  He does have some sinus congestion.  Denies history of fever or chills.    He reports that Yamilka Pascual is his primary care provider.    Due to significant overcrowding in the emergency department patient was initially seen and evaluated in triage.  Provider in triage recommended patient placement in the treatment area to initiate therapy and movement to an ER bed as soon as possible.    History of Present Illness  Provider in triage note reviewed by me and agreed.  Patient has been ill for about a week with cough and congestion nonproductive no fever chills he said some tightness in chest but no neck arm or jaw pain.  Patient denies any leg pain or swelling no recent long car ride plane ride immobilization or foreign travels.  No injury.  No one at home with similar illness.  Worse with exertion and better with rest.      Review of Systems   Constitutional:  Negative for chills and fever.   HENT:  Positive for congestion.    Respiratory:  Positive for cough, chest tightness and shortness of breath.    Cardiovascular:  Negative for chest pain and palpitations.   Gastrointestinal:  Negative for abdominal pain and vomiting.   Musculoskeletal:  Negative for back pain and neck pain.   Skin:  Negative for rash and wound.   Neurological:  Negative for dizziness and light-headedness.   Psychiatric/Behavioral:  Negative for confusion.        Past Medical History:   Diagnosis Date    Allergic     Anxiety     Arthritis 06/2005    Back pain     Claustrophobia 1978    COPD (chronic obstructive pulmonary disease)     CTS (carpal tunnel syndrome)  10/2022    Dysphagia     Esophageal stricture     GERD (gastroesophageal reflux disease)     Hyperlipidemia     Hypertension     Knee pain     rt    Low back pain     Obesity     Pneumonia     PTSD (post-traumatic stress disorder)     Thoracic disc herniation     Vitamin B12 deficiency        Allergies   Allergen Reactions    Atorvastatin Swelling    Lisinopril Other (See Comments)     Facial paralysis        Past Surgical History:   Procedure Laterality Date    CARDIAC CATHETERIZATION N/A 07/13/2022    Procedure: Left Heart Cath, possible pci;  Surgeon: Prieto Sidhu MD;  Location: [facility] CATH INVASIVE LOCATION;  Service: Cardiovascular;  Laterality: N/A;    ENDOSCOPY      ENDOSCOPY N/A 9/21/2023    Procedure: ESOPHAGOGASTRODUODENOSCOPY WITH non guided esophageal dialtion 54 Fr. Bougie and  cold forcep biopsy x1 area;  Surgeon: Andres Concepcion MD;  Location: [facility] ENDOSCOPY;  Service: Gastroenterology;  Laterality: N/A;  Post- erosive gastritis, hiatal hernia, esophageal stricture    HERNIA REPAIR      KNEE ARTHROPLASTY      x2    SHOULDER ARTHROSCOPY W/ ROTATOR CUFF REPAIR Right 08/11/2020    Procedure: SHOULDER ARTHROSCOPY WITH ROTATOR CUFF REPAIR, extensive debridement;  Surgeon: Fredi Ritchie MD;  Location: [facility] MAIN OR;  Service: Orthopedics;  Laterality: Right;    TONSILLECTOMY         Family History   Problem Relation Age of Onset    Heart disease Mother     Early death Mother     Hyperlipidemia Mother     Hypertension Mother     Thyroid disease Mother     Cancer Father     Stroke Father     Vision loss Father     Stroke Paternal Grandfather     Arthritis Paternal Grandmother     Alcohol abuse Brother     Asthma Brother     Depression Brother     Hyperlipidemia Brother     Hypertension Brother     Learning disabilities Brother     Mental illness Brother     Drug abuse Brother     Early death Brother     Heart disease Brother     Hyperlipidemia Brother     Hypertension  Brother        Social History     Socioeconomic History    Marital status:    Tobacco Use    Smoking status: Every Day     Packs/day: 1.00     Years: 47.00     Additional pack years: 0.00     Total pack years: 47.00     Types: Cigarettes     Start date: 1/9/1973    Smokeless tobacco: Never    Tobacco comments:     Smoked a half a pack a day for 42 years didn't start to smoke a whole pack until 2017   Vaping Use    Vaping Use: Never used   Substance and Sexual Activity    Alcohol use: No    Drug use: No    Sexual activity: Defer     Prior to Admission medications    Medication Sig Start Date End Date Taking? Authorizing Provider   albuterol sulfate  (90 Base) MCG/ACT inhaler Inhale 2 puffs Every 4 (Four) Hours As Needed for Wheezing. 4/4/22   Yamilka Pascual APRN   amLODIPine (NORVASC) 10 MG tablet TAKE 1 TABLET EVERY DAY 9/4/23   Yamilka Pascual APRN   divalproex (DEPAKOTE ER) 500 MG 24 hr tablet 1 tablet Daily. 6/1/23   Sulaiman Gibson MD   doxepin (SINEquan) 10 MG capsule TAKE 1 CAPSULE EVERY NIGHT. 6/21/23   Yamilka Pascual APRN   hydroCHLOROthiazide (MICROZIDE) 12.5 MG capsule Take 1 capsule by mouth As Needed.    Sulaiman Gibson MD   metoprolol succinate XL (TOPROL-XL) 25 MG 24 hr tablet TAKE 1 TABLET EVERY DAY 6/21/23   Yamilka Pascual APRN   pantoprazole (PROTONIX) 40 MG EC tablet TAKE 1 TABLET EVERY DAY 9/29/23   Yamilka Pascual APRN   pravastatin (PRAVACHOL) 80 MG tablet TAKE 1 TABLET EVERY NIGHT 4/9/23   Yamilka Pascual APRN   risperiDONE (risperDAL) 2 MG tablet Take 1 tablet by mouth Daily. 6/1/23   Sulaiman Gibson MD          Objective   Physical Exam  Constitutional 62-year-old male awake alert no acute distress triage vital signs reviewed.  HEENT extraocular muscles are intact pupils equal round react sclera clear mouth clear neck supple no adenopathy no JV no bruits lungs scattered wheezes throughout no retraction no use of accessories heart regular without murmur  abdomen soft nontender good bowel sounds no peritoneal findings or pulsatile masses extremities pulses equal upper and lower extremities no edema cords or Homans' sign or evidence of DVT skin warm and dry without rashes or cellulitic change neurologic awake alert follows commands monitorings normal without focal weakness.  Procedures           ED Course      Results for orders placed or performed during the hospital encounter of 10/29/23   Respiratory Panel PCR w/COVID-19(SARS-CoV-2) ISAEL/ALEXIA/ELISEO/PAD/COR/MAD/ARNOLD In-House, NP Swab in UTM/VTM, 3-4 HR TAT - Swab, Nasopharynx    Specimen: Nasopharynx; Swab   Result Value Ref Range    ADENOVIRUS, PCR Not Detected Not Detected    Coronavirus 229E Not Detected Not Detected    Coronavirus HKU1 Not Detected Not Detected    Coronavirus NL63 Not Detected Not Detected    Coronavirus OC43 Not Detected Not Detected    COVID19 Not Detected Not Detected - Ref. Range    Human Metapneumovirus Not Detected Not Detected    Human Rhinovirus/Enterovirus Detected (A) Not Detected    Influenza A PCR Not Detected Not Detected    Influenza B PCR Not Detected Not Detected    Parainfluenza Virus 1 Not Detected Not Detected    Parainfluenza Virus 2 Not Detected Not Detected    Parainfluenza Virus 3 Not Detected Not Detected    Parainfluenza Virus 4 Not Detected Not Detected    RSV, PCR Not Detected Not Detected    Bordetella pertussis pcr Not Detected Not Detected    Bordetella parapertussis PCR Not Detected Not Detected    Chlamydophila pneumoniae PCR Not Detected Not Detected    Mycoplasma pneumo by PCR Not Detected Not Detected   High Sensitivity Troponin T    Specimen: Blood   Result Value Ref Range    HS Troponin T 8 <15 ng/L   Comprehensive Metabolic Panel    Specimen: Blood   Result Value Ref Range    Glucose 95 65 - 99 mg/dL    BUN 8 8 - 23 mg/dL    Creatinine 0.94 0.76 - 1.27 mg/dL    Sodium 142 136 - 145 mmol/L    Potassium 3.2 (L) 3.5 - 5.2 mmol/L    Chloride 107 98 - 107 mmol/L     CO2 23.0 22.0 - 29.0 mmol/L    Calcium 9.2 8.6 - 10.5 mg/dL    Total Protein 7.5 6.0 - 8.5 g/dL    Albumin 4.3 3.5 - 5.2 g/dL    ALT (SGPT) 36 1 - 41 U/L    AST (SGOT) 30 1 - 40 U/L    Alkaline Phosphatase 85 39 - 117 U/L    Total Bilirubin 0.4 0.0 - 1.2 mg/dL    Globulin 3.2 gm/dL    A/G Ratio 1.3 g/dL    BUN/Creatinine Ratio 8.5 7.0 - 25.0    Anion Gap 12.0 5.0 - 15.0 mmol/L    eGFR 91.7 >60.0 mL/min/1.73   BNP    Specimen: Blood   Result Value Ref Range    proBNP 50.4 0.0 - 900.0 pg/mL   CBC Auto Differential    Specimen: Blood   Result Value Ref Range    WBC 7.50 3.40 - 10.80 10*3/mm3    RBC 4.99 4.14 - 5.80 10*6/mm3    Hemoglobin 16.9 13.0 - 17.7 g/dL    Hematocrit 48.9 37.5 - 51.0 %    MCV 98.1 (H) 79.0 - 97.0 fL    MCH 33.9 (H) 26.6 - 33.0 pg    MCHC 34.6 31.5 - 35.7 g/dL    RDW 13.6 12.3 - 15.4 %    RDW-SD 49.0 37.0 - 54.0 fl    MPV 10.3 6.0 - 12.0 fL    Platelets 155 140 - 450 10*3/mm3    Neutrophil % 51.1 42.7 - 76.0 %    Lymphocyte % 39.5 19.6 - 45.3 %    Monocyte % 6.5 5.0 - 12.0 %    Eosinophil % 1.9 0.3 - 6.2 %    Basophil % 1.0 0.0 - 1.5 %    Neutrophils, Absolute 3.80 1.70 - 7.00 10*3/mm3    Lymphocytes, Absolute 3.00 0.70 - 3.10 10*3/mm3    Monocytes, Absolute 0.50 0.10 - 0.90 10*3/mm3    Eosinophils, Absolute 0.10 0.00 - 0.40 10*3/mm3    Basophils, Absolute 0.10 0.00 - 0.20 10*3/mm3    nRBC 0.1 0.0 - 0.2 /100 WBC   ECG 12 Lead Dyspnea   Result Value Ref Range    QT Interval 328 ms    QTC Interval 407 ms     XR Chest 1 View    Result Date: 10/29/2023  1.Mild opacities in the left base which could represent atelectasis, pneumonia, or other infiltrate. 2.Emphysema. Electronically Signed: Andres Alatorre  10/29/2023 7:04 PM EDT  Workstation ID: [id number]   Medications   sodium chloride 0.9 % flush 10 mL (has no administration in time range)   ipratropium-albuterol (DUO-NEB) nebulizer solution 3 mL (has no administration in time range)   albuterol (PROVENTIL) nebulizer solution 0.083% 2.5 mg/3mL (has no  administration in time range)   methylPREDNISolone sodium succinate (SOLU-Medrol) injection 125 mg (has no administration in time range)          EKG my interpretation normal sinus rhythm rate 93 normal axis hypertrophy QTc of 407 normal EKG really unchanged since 7/12/2022                                  Medical Decision Making  Medical decision making.  IV established monitor placement review of sinus rhythm EKG obtained my independent review normal sinus rhythm rate 93 normal axis hypertrophy QTc of 407 normal EKG unchanged from 7/12/2022.  Chest x-ray my independent review do not see pneumonia pneumothorax or acute findings radiology review mild opacities in the left base could be atelectasis or pneumonia the patient has emphysema.  Patient was given a DuoNeb and albuterol her 25 mg Solu-Medrol IV.  Respiratory panel was positive for rhinovirus.  The first troponin was negative.  Comprehensive metabolic profile unremarkable other than potassium 3.2 proBNP normal CBC was unremarkable.  All labs independent reviewed by me.  Patient repeat exam still had diffuse scattered wheezes throughout.  I do not see evidence to suggest acute bacterial pneumonia pneumothorax on seems DVT pulmonary embolism or congestive heart failure myocardial infarction or pericardial effusion although not a complete list of all possibilities.  He will be placed in observation for breathing treatments and steroids and further monitoring.  I suspect his COPD is increasing activity secondary to rhinovirus.  Stable otherwise unremarkable ER course.    Problems Addressed:  Acute respiratory distress: complicated acute illness or injury  Chronic obstructive pulmonary disease with acute exacerbation: complicated acute illness or injury  Rhinovirus infection: complicated acute illness or injury    Amount and/or Complexity of Data Reviewed  Labs: ordered.  Radiology: ordered.  ECG/medicine tests: ordered.    Risk  Prescription drug  management.  Decision regarding hospitalization.        Final diagnoses:   Acute respiratory distress   Rhinovirus infection   Chronic obstructive pulmonary disease with acute exacerbation       ED Disposition  ED Disposition       ED Disposition   Decision to Admit    Condition   --    Comment   --               No follow-up provider specified.       Medication List      No changes were made to your prescriptions during this visit.            Neymar Musa MD  10/29/23 2053

## 2023-10-30 LAB
ANION GAP SERPL CALCULATED.3IONS-SCNC: 12 MMOL/L (ref 5–15)
BUN SERPL-MCNC: 8 MG/DL (ref 8–23)
BUN/CREAT SERPL: 11.6 (ref 7–25)
CALCIUM SPEC-SCNC: 9 MG/DL (ref 8.6–10.5)
CHLORIDE SERPL-SCNC: 107 MMOL/L (ref 98–107)
CO2 SERPL-SCNC: 23 MMOL/L (ref 22–29)
CREAT SERPL-MCNC: 0.69 MG/DL (ref 0.76–1.27)
DEPRECATED RDW RBC AUTO: 46.8 FL (ref 37–54)
EGFRCR SERPLBLD CKD-EPI 2021: 104.6 ML/MIN/1.73
ERYTHROCYTE [DISTWIDTH] IN BLOOD BY AUTOMATED COUNT: 13.5 % (ref 12.3–15.4)
GLUCOSE SERPL-MCNC: 116 MG/DL (ref 65–99)
HBA1C MFR BLD: 6.1 % (ref 4.8–5.6)
HCT VFR BLD AUTO: 47.7 % (ref 37.5–51)
HGB BLD-MCNC: 16 G/DL (ref 13–17.7)
MCH RBC QN AUTO: 33.9 PG (ref 26.6–33)
MCHC RBC AUTO-ENTMCNC: 33.6 G/DL (ref 31.5–35.7)
MCV RBC AUTO: 100.7 FL (ref 79–97)
PLATELET # BLD AUTO: 148 10*3/MM3 (ref 140–450)
PMV BLD AUTO: 10.5 FL (ref 6–12)
POTASSIUM SERPL-SCNC: 3.5 MMOL/L (ref 3.5–5.2)
QT INTERVAL: 328 MS
QT INTERVAL: 342 MS
QTC INTERVAL: 407 MS
QTC INTERVAL: 424 MS
RBC # BLD AUTO: 4.74 10*6/MM3 (ref 4.14–5.8)
S PNEUM AG SPEC QL LA: NEGATIVE
SODIUM SERPL-SCNC: 142 MMOL/L (ref 136–145)
WBC NRBC COR # BLD: 10 10*3/MM3 (ref 3.4–10.8)

## 2023-10-30 PROCEDURE — 93005 ELECTROCARDIOGRAM TRACING: CPT | Performed by: EMERGENCY MEDICINE

## 2023-10-30 PROCEDURE — 25810000003 SODIUM CHLORIDE 0.9 % SOLUTION: Performed by: EMERGENCY MEDICINE

## 2023-10-30 PROCEDURE — G0378 HOSPITAL OBSERVATION PER HR: HCPCS

## 2023-10-30 PROCEDURE — 25010000002 MORPHINE PER 10 MG: Performed by: EMERGENCY MEDICINE

## 2023-10-30 PROCEDURE — 93010 ELECTROCARDIOGRAM REPORT: CPT | Performed by: INTERNAL MEDICINE

## 2023-10-30 PROCEDURE — 87899 AGENT NOS ASSAY W/OPTIC: CPT | Performed by: NURSE PRACTITIONER

## 2023-10-30 PROCEDURE — 85027 COMPLETE CBC AUTOMATED: CPT | Performed by: EMERGENCY MEDICINE

## 2023-10-30 PROCEDURE — 83036 HEMOGLOBIN GLYCOSYLATED A1C: CPT | Performed by: NURSE PRACTITIONER

## 2023-10-30 PROCEDURE — 80048 BASIC METABOLIC PNL TOTAL CA: CPT | Performed by: EMERGENCY MEDICINE

## 2023-10-30 PROCEDURE — 25010000002 METHYLPREDNISOLONE PER 125 MG: Performed by: EMERGENCY MEDICINE

## 2023-10-30 RX ORDER — DOXEPIN HYDROCHLORIDE 10 MG/1
10 CAPSULE ORAL NIGHTLY
Status: DISCONTINUED | OUTPATIENT
Start: 2023-10-30 | End: 2023-11-06 | Stop reason: HOSPADM

## 2023-10-30 RX ORDER — CETIRIZINE HYDROCHLORIDE 10 MG/1
10 TABLET ORAL DAILY
Status: DISCONTINUED | OUTPATIENT
Start: 2023-10-30 | End: 2023-11-06 | Stop reason: HOSPADM

## 2023-10-30 RX ORDER — SODIUM CHLORIDE 0.9 % (FLUSH) 0.9 %
10 SYRINGE (ML) INJECTION EVERY 12 HOURS SCHEDULED
Status: DISCONTINUED | OUTPATIENT
Start: 2023-10-30 | End: 2023-11-06 | Stop reason: HOSPADM

## 2023-10-30 RX ORDER — GUAIFENESIN 600 MG/1
600 TABLET, EXTENDED RELEASE ORAL EVERY 12 HOURS SCHEDULED
Status: DISCONTINUED | OUTPATIENT
Start: 2023-10-30 | End: 2023-11-01

## 2023-10-30 RX ORDER — IPRATROPIUM BROMIDE AND ALBUTEROL SULFATE 2.5; .5 MG/3ML; MG/3ML
3 SOLUTION RESPIRATORY (INHALATION) EVERY 4 HOURS PRN
Status: DISCONTINUED | OUTPATIENT
Start: 2023-10-30 | End: 2023-10-31

## 2023-10-30 RX ORDER — ACETAMINOPHEN 325 MG/1
650 TABLET ORAL EVERY 4 HOURS PRN
Status: DISCONTINUED | OUTPATIENT
Start: 2023-10-30 | End: 2023-11-06 | Stop reason: HOSPADM

## 2023-10-30 RX ORDER — IPRATROPIUM BROMIDE AND ALBUTEROL SULFATE 2.5; .5 MG/3ML; MG/3ML
3 SOLUTION RESPIRATORY (INHALATION)
Status: DISCONTINUED | OUTPATIENT
Start: 2023-10-30 | End: 2023-10-30

## 2023-10-30 RX ORDER — ONDANSETRON 2 MG/ML
4 INJECTION INTRAMUSCULAR; INTRAVENOUS EVERY 6 HOURS PRN
Status: DISCONTINUED | OUTPATIENT
Start: 2023-10-30 | End: 2023-11-06 | Stop reason: HOSPADM

## 2023-10-30 RX ORDER — ALBUTEROL SULFATE 2.5 MG/3ML
2.5 SOLUTION RESPIRATORY (INHALATION) EVERY 6 HOURS PRN
Status: DISCONTINUED | OUTPATIENT
Start: 2023-10-30 | End: 2023-11-06 | Stop reason: HOSPADM

## 2023-10-30 RX ORDER — BENZONATATE 100 MG/1
100 CAPSULE ORAL 3 TIMES DAILY PRN
Status: DISCONTINUED | OUTPATIENT
Start: 2023-10-30 | End: 2023-11-06 | Stop reason: HOSPADM

## 2023-10-30 RX ORDER — METOPROLOL SUCCINATE 25 MG/1
25 TABLET, EXTENDED RELEASE ORAL DAILY
Status: DISCONTINUED | OUTPATIENT
Start: 2023-10-30 | End: 2023-11-06 | Stop reason: HOSPADM

## 2023-10-30 RX ORDER — PANTOPRAZOLE SODIUM 40 MG/1
40 TABLET, DELAYED RELEASE ORAL DAILY
Status: DISCONTINUED | OUTPATIENT
Start: 2023-10-30 | End: 2023-11-06 | Stop reason: HOSPADM

## 2023-10-30 RX ORDER — AMLODIPINE BESYLATE 5 MG/1
10 TABLET ORAL DAILY
Status: DISCONTINUED | OUTPATIENT
Start: 2023-10-30 | End: 2023-11-03

## 2023-10-30 RX ORDER — SODIUM CHLORIDE 0.9 % (FLUSH) 0.9 %
10 SYRINGE (ML) INJECTION AS NEEDED
Status: DISCONTINUED | OUTPATIENT
Start: 2023-10-30 | End: 2023-11-06 | Stop reason: HOSPADM

## 2023-10-30 RX ORDER — ONDANSETRON 4 MG/1
4 TABLET, FILM COATED ORAL EVERY 6 HOURS PRN
Status: DISCONTINUED | OUTPATIENT
Start: 2023-10-30 | End: 2023-11-06 | Stop reason: HOSPADM

## 2023-10-30 RX ORDER — SODIUM CHLORIDE 9 MG/ML
40 INJECTION, SOLUTION INTRAVENOUS AS NEEDED
Status: DISCONTINUED | OUTPATIENT
Start: 2023-10-30 | End: 2023-11-06 | Stop reason: HOSPADM

## 2023-10-30 RX ADMIN — SODIUM CHLORIDE 100 ML/HR: 9 INJECTION, SOLUTION INTRAVENOUS at 17:38

## 2023-10-30 RX ADMIN — AMLODIPINE BESYLATE 10 MG: 5 TABLET ORAL at 08:09

## 2023-10-30 RX ADMIN — DOXEPIN HYDROCHLORIDE 10 MG: 10 CAPSULE ORAL at 01:38

## 2023-10-30 RX ADMIN — GUAIFENESIN 600 MG: 600 TABLET, MULTILAYER, EXTENDED RELEASE ORAL at 20:27

## 2023-10-30 RX ADMIN — DOXEPIN HYDROCHLORIDE 10 MG: 10 CAPSULE ORAL at 20:31

## 2023-10-30 RX ADMIN — METHYLPREDNISOLONE SODIUM SUCCINATE 125 MG: 125 INJECTION, POWDER, FOR SOLUTION INTRAMUSCULAR; INTRAVENOUS at 00:15

## 2023-10-30 RX ADMIN — MORPHINE SULFATE 1 MG: 2 INJECTION, SOLUTION INTRAMUSCULAR; INTRAVENOUS at 20:26

## 2023-10-30 RX ADMIN — MORPHINE SULFATE 1 MG: 2 INJECTION, SOLUTION INTRAMUSCULAR; INTRAVENOUS at 14:44

## 2023-10-30 RX ADMIN — CETIRIZINE HYDROCHLORIDE 10 MG: 10 TABLET, FILM COATED ORAL at 08:09

## 2023-10-30 RX ADMIN — PANTOPRAZOLE SODIUM 40 MG: 40 TABLET, DELAYED RELEASE ORAL at 08:09

## 2023-10-30 RX ADMIN — METHYLPREDNISOLONE SODIUM SUCCINATE 60 MG: 125 INJECTION, POWDER, FOR SOLUTION INTRAMUSCULAR; INTRAVENOUS at 08:08

## 2023-10-30 RX ADMIN — METHYLPREDNISOLONE SODIUM SUCCINATE 60 MG: 125 INJECTION, POWDER, FOR SOLUTION INTRAMUSCULAR; INTRAVENOUS at 20:26

## 2023-10-30 RX ADMIN — METOPROLOL SUCCINATE 25 MG: 25 TABLET, EXTENDED RELEASE ORAL at 08:09

## 2023-10-30 RX ADMIN — Medication 10 ML: at 08:08

## 2023-10-30 RX ADMIN — MORPHINE SULFATE 1 MG: 2 INJECTION, SOLUTION INTRAMUSCULAR; INTRAVENOUS at 08:08

## 2023-10-30 RX ADMIN — GUAIFENESIN 600 MG: 600 TABLET, MULTILAYER, EXTENDED RELEASE ORAL at 08:09

## 2023-10-30 RX ADMIN — Medication 10 ML: at 20:26

## 2023-10-30 RX ADMIN — SODIUM CHLORIDE 100 ML/HR: 9 INJECTION, SOLUTION INTRAVENOUS at 08:16

## 2023-10-30 NOTE — H&P
PATRIC[facility] Observation Unit H&P    Patient Name: Jagdish Rea  : 1960  MRN: [id number]  Primary Care Physician: Yamilka Pascual APRN  Date of admission: 10/29/2023     Patient Care Team:  Yamilka Pascual APRN as PCP - General (Nurse Practitioner)  David Boyd MD as Consulting Physician (Otolaryngology)  Fredi Ritchie MD as Consulting Physician (Orthopedic Surgery)  Prieto Sidhu MD as Consulting Physician (Cardiology)          Subjective   History Present Illness     Chief Complaint:   Chief Complaint   Patient presents with    Cough     Pt reports progressively worsening cough for the past week.  Pt reports chest wall pain with coughing and deep breathing.  Pt reports his inhaler has not been helping.      Cough and shortness of breath     Mr. Rea is a 62 y.o.  presents to [facility] complaining of cough and shortness of breath       History of Present Illness    ED 10/29/23: Patient is a 62-year-old gentleman who comes in with a history of COPD GERD hypertension hyperlipidemia obesity and history of pneumonia who states he has had cough congestion for the last week and has developed some chest pain with the cough he states he has frothy sputum from the cough-he does not have any ill exposures that he is aware of.  He does have some sinus congestion.  Denies history of fever or chills.   Patient has been ill for about a week with cough and congestion nonproductive no fever chills he said some tightness in chest but no neck arm or jaw pain.  Patient denies any leg pain or swelling no recent long car ride plane ride immobilization or foreign travels.  No injury.  No one at home with similar illness.  Worse with exertion and better with rest.    Observation 10/30/23: Patient 62-year-old male presenting to the hospital with complaints of cough and shortness of breath.  Patient has had cough and congestion for the past week with worsening symptoms.  Patient denies fever,  nausea, vomiting, chest pain or syncope.  Patient does have history of COPD but states symptoms are worsen than normal COPD exacerbation.     Review of Systems   Constitutional: Positive for malaise/fatigue.   HENT: Negative.     Eyes: Negative.    Cardiovascular:  Positive for dyspnea on exertion.   Respiratory:  Positive for cough.    Endocrine: Negative.    Hematologic/Lymphatic: Negative.    Skin: Negative.    Gastrointestinal: Negative.    Genitourinary: Negative.    Neurological: Negative.    Psychiatric/Behavioral: Negative.     Allergic/Immunologic: Negative.            Personal History     Past Medical History:   Past Medical History:   Diagnosis Date    Allergic     Anxiety     Arthritis 06/2005    Back pain     Claustrophobia 1978    COPD (chronic obstructive pulmonary disease)     CTS (carpal tunnel syndrome) 10/2022    Dysphagia     Esophageal stricture     GERD (gastroesophageal reflux disease)     Hyperlipidemia     Hypertension     Knee pain     rt    Low back pain     Obesity     Pneumonia     PTSD (post-traumatic stress disorder)     Thoracic disc herniation     Vitamin B12 deficiency        Surgical History:      Past Surgical History:   Procedure Laterality Date    CARDIAC CATHETERIZATION N/A 07/13/2022    Procedure: Left Heart Cath, possible pci;  Surgeon: Prieto Sidhu MD;  Location: [facility] CATH INVASIVE LOCATION;  Service: Cardiovascular;  Laterality: N/A;    ENDOSCOPY      ENDOSCOPY N/A 9/21/2023    Procedure: ESOPHAGOGASTRODUODENOSCOPY WITH non guided esophageal dialtion 54 Fr. Bougie and  cold forcep biopsy x1 area;  Surgeon: Andres Concepcion MD;  Location: [facility] ENDOSCOPY;  Service: Gastroenterology;  Laterality: N/A;  Post- erosive gastritis, hiatal hernia, esophageal stricture    HERNIA REPAIR      KNEE ARTHROPLASTY      x2    SHOULDER ARTHROSCOPY W/ ROTATOR CUFF REPAIR Right 08/11/2020    Procedure: SHOULDER ARTHROSCOPY WITH ROTATOR CUFF REPAIR, extensive  debridement;  Surgeon: Fredi Ritchie MD;  Location: [facility] MAIN OR;  Service: Orthopedics;  Laterality: Right;    TONSILLECTOMY             Family History: family history includes Alcohol abuse in his brother; Arthritis in his paternal grandmother; Asthma in his brother; Cancer in his father; Depression in his brother; Drug abuse in his brother; Early death in his brother and mother; Heart disease in his brother and mother; Hyperlipidemia in his brother, brother, and mother; Hypertension in his brother, brother, and mother; Learning disabilities in his brother; Mental illness in his brother; Stroke in his father and paternal grandfather; Thyroid disease in his mother; Vision loss in his father. Otherwise pertinent FHx was reviewed and unremarkable.     Social History:  reports that he has been smoking cigarettes. He started smoking about 50 years ago. He has a 47.00 pack-year smoking history. He has never used smokeless tobacco. He reports that he does not drink alcohol and does not use drugs.      Medications:  Prior to Admission medications    Medication Sig Start Date End Date Taking? Authorizing Provider   albuterol sulfate  (90 Base) MCG/ACT inhaler Inhale 2 puffs Every 4 (Four) Hours As Needed for Wheezing. 4/4/22  Yes Yamilka Pascual APRN   amLODIPine (NORVASC) 10 MG tablet TAKE 1 TABLET EVERY DAY 9/4/23  Yes Yamilka Pascual APRN   doxepin (SINEquan) 10 MG capsule TAKE 1 CAPSULE EVERY NIGHT. 6/21/23  Yes Yamilka Pascual APRN   metoprolol succinate XL (TOPROL-XL) 25 MG 24 hr tablet TAKE 1 TABLET EVERY DAY 6/21/23  Yes Yamilka Pascual APRN   pantoprazole (PROTONIX) 40 MG EC tablet TAKE 1 TABLET EVERY DAY 9/29/23  Yes Yamilka Pascual APRN   pravastatin (PRAVACHOL) 80 MG tablet TAKE 1 TABLET EVERY NIGHT 4/9/23  Yes Yamilka Pascual APRN   hydroCHLOROthiazide (MICROZIDE) 12.5 MG capsule Take 1 capsule by mouth As Needed.    Provider, MD Sulaiman       Allergies:    Allergies   Allergen  Reactions    Atorvastatin Swelling    Lisinopril Other (See Comments)     Facial paralysis        Objective   Objective     Vital Signs  Temp:  [97.9 °F (36.6 °C)-98.3 °F (36.8 °C)] 98 °F (36.7 °C)  Heart Rate:  [] 100  Resp:  [16-20] 16  BP: (118-163)/(62-93) 118/74  SpO2:  [90 %-97 %] 90 %  on   ;   Device (Oxygen Therapy): room air  Body mass index is 33.79 kg/m².    Physical Exam  Vitals and nursing note reviewed.   Constitutional:       Appearance: Normal appearance.   HENT:      Head: Normocephalic and atraumatic.      Right Ear: External ear normal.      Left Ear: External ear normal.      Nose: Congestion present.      Mouth/Throat:      Pharynx: Oropharynx is clear.   Eyes:      Extraocular Movements: Extraocular movements intact.      Conjunctiva/sclera: Conjunctivae normal.      Pupils: Pupils are equal, round, and reactive to light.   Cardiovascular:      Rate and Rhythm: Normal rate and regular rhythm.      Pulses: Normal pulses.      Heart sounds: Normal heart sounds.   Pulmonary:      Effort: Pulmonary effort is normal.      Breath sounds: Wheezing present.   Abdominal:      General: Bowel sounds are normal.      Palpations: Abdomen is soft.   Musculoskeletal:         General: Normal range of motion.      Cervical back: Normal range of motion.   Skin:     General: Skin is warm.      Capillary Refill: Capillary refill takes less than 2 seconds.   Neurological:      Mental Status: He is alert and oriented to person, place, and time.      Motor: Weakness present.   Psychiatric:         Mood and Affect: Mood normal.         Behavior: Behavior normal.         Thought Content: Thought content normal.         Judgment: Judgment normal.           Results Review:  I have personally reviewed most recent cardiac tracings, lab results, microbiology results, and radiology images and interpretations and agree with findings, most notably: CBC, CMP, chest x-ray, EKG.    Results from last 7 days   Lab Units  10/30/23  0359   WBC 10*3/mm3 10.00   HEMOGLOBIN g/dL 16.0   HEMATOCRIT % 47.7   PLATELETS 10*3/mm3 148     Results from last 7 days   Lab Units 10/30/23  0359 10/29/23  2232 10/29/23  1920   SODIUM mmol/L 142  --  142   POTASSIUM mmol/L 3.5  --  3.2*   CHLORIDE mmol/L 107  --  107   CO2 mmol/L 23.0  --  23.0   BUN mg/dL 8  --  8   CREATININE mg/dL 0.69*  --  0.94   GLUCOSE mg/dL 116*  --  95   CALCIUM mg/dL 9.0  --  9.2   ALK PHOS U/L  --   --  85   ALT (SGPT) U/L  --   --  36   AST (SGOT) U/L  --   --  30   HSTROP T ng/L  --  7 8   PROBNP pg/mL  --   --  50.4     Estimated Creatinine Clearance: 144.1 mL/min (A) (by C-G formula based on SCr of 0.69 mg/dL (L)).  Brief Urine Lab Results       None            Microbiology Results (last 10 days)       Procedure Component Value - Date/Time    S. Pneumo Ag Urine or CSF - Urine, Urine, Clean Catch [[id number]]  (Normal) Collected: 10/30/23 0747    Lab Status: Final result Specimen: Urine, Clean Catch Updated: 10/30/23 0851     Strep Pneumo Ag Negative    Respiratory Panel PCR w/COVID-19(SARS-CoV-2) ISAEL/ALEXIA/ELISEO/PAD/COR/MAD/ARNOLD In-House, NP Swab in UTM/VTM, 3-4 HR TAT - Swab, Nasopharynx [[id number]]  (Abnormal) Collected: 10/29/23 1906    Lab Status: Final result Specimen: Swab from Nasopharynx Updated: 10/29/23 2044     ADENOVIRUS, PCR Not Detected     Coronavirus 229E Not Detected     Coronavirus HKU1 Not Detected     Coronavirus NL63 Not Detected     Coronavirus OC43 Not Detected     COVID19 Not Detected     Human Metapneumovirus Not Detected     Human Rhinovirus/Enterovirus Detected     Influenza A PCR Not Detected     Influenza B PCR Not Detected     Parainfluenza Virus 1 Not Detected     Parainfluenza Virus 2 Not Detected     Parainfluenza Virus 3 Not Detected     Parainfluenza Virus 4 Not Detected     RSV, PCR Not Detected     Bordetella pertussis pcr Not Detected     Bordetella parapertussis PCR Not Detected     Chlamydophila pneumoniae PCR Not Detected      Mycoplasma pneumo by PCR Not Detected    Narrative:      In the setting of a positive respiratory panel with a viral infection PLUS a negative procalcitonin without other underlying concern for bacterial infection, consider observing off antibiotics or discontinuation of antibiotics and continue supportive care. If the respiratory panel is positive for atypical bacterial infection (Bordetella pertussis, Chlamydophila pneumoniae, or Mycoplasma pneumoniae), consider antibiotic de-escalation to target atypical bacterial infection.            ECG/EMG Results (most recent)       Procedure Component Value Units Date/Time    SCANNED - TELEMETRY   [[id number]] Resulted: 10/29/23     Updated: 10/30/23 1328    SCANNED - TELEMETRY   [[id number]] Resulted: 10/29/23     Updated: 10/30/23 1328    ECG 12 Lead Dyspnea [[id number]] Collected: 10/29/23 1821     Updated: 10/30/23 1338     QT Interval 328 ms      QTC Interval 407 ms     Narrative:      HEART RATE= 93  bpm  RR Interval= 648  ms  NM Interval= 163  ms  P Horizontal Axis= 2  deg  P Front Axis= 57  deg  QRSD Interval= 78  ms  QT Interval= 328  ms  QTcB= 407  ms  QRS Axis= -7  deg  T Wave Axis=   deg  - NORMAL ECG -  Sinus rhythm  Electronically Signed By: Neymar Musa (ELISEO) 30-Oct-2023 13:38:05  Date and Time of Study: 2023-10-29 18:21:45                Results for orders placed during the hospital encounter of 08/16/22    Adult Transthoracic Echo Complete W/ Cont if Necessary Per Protocol    Interpretation Summary  · Estimated left ventricular EF was in disagreement with the calculated left ventricular EF. Left ventricular ejection fraction appears to be 51 - 55%. Left ventricular systolic function is low normal.  · Left ventricular diastolic function is consistent with (grade I) impaired relaxation.  · Estimated right ventricular systolic pressure from tricuspid regurgitation is normal (<35 mmHg).      XR Chest 1 View    Result Date: 10/29/2023  1.Mild opacities in the left  base which could represent atelectasis, pneumonia, or other infiltrate. 2.Emphysema. Electronically Signed: Andres Alatorre  10/29/2023 7:04 PM EDT  Workstation ID: [id number]       Estimated Creatinine Clearance: 144.1 mL/min (A) (by C-G formula based on SCr of 0.69 mg/dL (L)).    Assessment & Plan   Assessment/Plan       Active Hospital Problems    Diagnosis  POA    **Dyspnea [R06.00]  Yes      Resolved Hospital Problems   No resolved problems to display.     Dyspnea   Lab Results   Component Value Date    WBC 10.00 10/30/2023    EOSABS 0.10 10/29/2023   -Troponin and BNP negative   -Breathing treatments, Mucinex, Tessalon and incentive spirometry ordered  -Chest x-ray: mild opacities in the left base which could represent atelectasis, pneumonia, or other infiltrate,emphysema  -IV fluids  -Step pneumo negative   -Respiratory panel positive for rhinovirus   -EKG: Sinus rhythm with no ST changes  -Telemetry and pulse oximetry    Hypertension  BP Readings from Last 1 Encounters:   10/30/23 118/74   - Continue metoprolol, hydrochlorothiazide, and norvasc  - Monitor while admitted    GERD  -Continue PPI      VTE Prophylaxis -   Mechanical Order History:        Ordered        10/30/23 0655  Place Sequential Compression Device  Once            10/30/23 0655  Maintain Sequential Compression Device  Continuous                          Pharmalogical Order History:       None            CODE STATUS:    Code Status and Medical Interventions:   Ordered at: 10/30/23 0655     Level Of Support Discussed With:    Patient     Code Status (Patient has no pulse and is not breathing):    CPR (Attempt to Resuscitate)     Medical Interventions (Patient has pulse or is breathing):    Full Support       This patient has been examined wearing personal protective equipment.     I discussed the patient's findings and my recommendations with patient, family, nursing staff, primary care team, and consulting provider.      Signature:Electronically  signed by KEILA Motta, 10/30/23, 4:01 PM EDT.          I spent 35 minutes caring for Jagdish on this date of service. This time includes time spent by me in the following activities: reviewing tests, obtaining and/or reviewing a separately obtained history, performing a medically appropriate examination and/or evaluation, counseling and educating the patient/family/caregiver, ordering medications, tests, or procedures, referring and communicating with other health care professionals, documenting information in the medical record, independently interpreting results and communicating that information with the patient/family/caregiver, and care coordination.

## 2023-10-30 NOTE — PLAN OF CARE
Goal Outcome Evaluation:  Plan of Care Reviewed With: patient        Progress: improving  Outcome Evaluation: Pt A&O x4, room air, up ad minal.  Pt has had some complaints of pain today in his chest that comes with his cough. Gave IV morphine 1 mg  x2, intervention is effective. Normal saline at 100 in right AC. Pt is in isolation for Rhinovirus. Pt continues to have productive cough. Pt receiving IV steroids and breathing treatments. Continuing to monitor patient.

## 2023-10-30 NOTE — PLAN OF CARE
Goal Outcome Evaluation:  Plan of Care Reviewed With: patient        Progress: no change  Outcome Evaluation: Pt rested throughout the night without complaints, continues on IVF's and is in isolation for Rhinovirus Pt continues to have productive cough will await further orders from MD

## 2023-10-30 NOTE — CASE MANAGEMENT/SOCIAL WORK
Discharge Planning Assessment  [facility]     Patient Name: Jagdish Rea  MRN: [id number]  Today's Date: 10/30/2023    Admit Date: 10/29/2023    Plan: Return home with family.   Discharge Needs Assessment       Row Name 10/30/23 1258       Living Environment    People in Home child(paloma), adult;spouse    Name(s) of People in Home ronnie Arora and son Jason    Current Living Arrangements home    Potentially Unsafe Housing Conditions none    Primary Care Provided by self    Provides Primary Care For no one    Family Caregiver if Needed spouse    Family Caregiver Names ronnie Arora    Quality of Family Relationships helpful;involved;supportive    Able to Return to Prior Arrangements yes       Resource/Environmental Concerns    Transportation Concerns none       Transition Planning    Patient/Family Anticipates Transition to home with family    Patient/Family Anticipated Services at Transition none    Transportation Anticipated car, drives self;family or friend will provide       Discharge Needs Assessment    Readmission Within the Last 30 Days no previous admission in last 30 days    Equipment Currently Used at Home cane, straight    Concerns to be Addressed denies needs/concerns at this time;no discharge needs identified                   Discharge Plan       Row Name 10/30/23 1510       Plan    Plan Return home with family.    Plan Comments CM met with patient at the bedside. Confirmed PCP, insurance, and pharmacy. Patient denies any difficulty affording medications. Patient is not current with any HHC/OPPT/OT services.Patient lives at home with wife and son, is IADLS, and drives. Patient's wife will provide transportation for patient at discharge. DC Barriers: monitor and replace electrolytes.                     Demographic Summary       Row Name 10/30/23 1256       General Information    Admission Type observation    Arrived From emergency department    Referral Source admission list    Reason for Consult care  coordination/care conference;discharge planning    Preferred Language English       Contact Information    Permission Granted to Share Info With     Contact Information Obtained for                    Functional Status       Row Name 10/30/23 1257       Functional Status    Usual Activity Tolerance moderate    Current Activity Tolerance moderate       Functional Status, IADL    Medications independent    Meal Preparation independent    Housekeeping independent    Laundry independent    Shopping independent           Jerzy Naik RN      Cell number [phone]  Office number [phone]

## 2023-10-31 LAB
ANION GAP SERPL CALCULATED.3IONS-SCNC: 10 MMOL/L (ref 5–15)
BASOPHILS # BLD AUTO: 0 10*3/MM3 (ref 0–0.2)
BASOPHILS NFR BLD AUTO: 0.2 % (ref 0–1.5)
BUN SERPL-MCNC: 11 MG/DL (ref 8–23)
BUN/CREAT SERPL: 15.3 (ref 7–25)
CALCIUM SPEC-SCNC: 9.1 MG/DL (ref 8.6–10.5)
CHLORIDE SERPL-SCNC: 109 MMOL/L (ref 98–107)
CO2 SERPL-SCNC: 22 MMOL/L (ref 22–29)
CREAT SERPL-MCNC: 0.72 MG/DL (ref 0.76–1.27)
DEPRECATED RDW RBC AUTO: 48.1 FL (ref 37–54)
EGFRCR SERPLBLD CKD-EPI 2021: 103.3 ML/MIN/1.73
EOSINOPHIL # BLD AUTO: 0 10*3/MM3 (ref 0–0.4)
EOSINOPHIL NFR BLD AUTO: 0 % (ref 0.3–6.2)
ERYTHROCYTE [DISTWIDTH] IN BLOOD BY AUTOMATED COUNT: 13.7 % (ref 12.3–15.4)
GLUCOSE SERPL-MCNC: 161 MG/DL (ref 65–99)
HCT VFR BLD AUTO: 43.7 % (ref 37.5–51)
HGB BLD-MCNC: 14.6 G/DL (ref 13–17.7)
LYMPHOCYTES # BLD AUTO: 1.1 10*3/MM3 (ref 0.7–3.1)
LYMPHOCYTES NFR BLD AUTO: 6.5 % (ref 19.6–45.3)
MCH RBC QN AUTO: 33.8 PG (ref 26.6–33)
MCHC RBC AUTO-ENTMCNC: 33.3 G/DL (ref 31.5–35.7)
MCV RBC AUTO: 101.6 FL (ref 79–97)
MONOCYTES # BLD AUTO: 0.6 10*3/MM3 (ref 0.1–0.9)
MONOCYTES NFR BLD AUTO: 3.3 % (ref 5–12)
NEUTROPHILS NFR BLD AUTO: 15.3 10*3/MM3 (ref 1.7–7)
NEUTROPHILS NFR BLD AUTO: 90 % (ref 42.7–76)
NRBC BLD AUTO-RTO: 0 /100 WBC (ref 0–0.2)
PLATELET # BLD AUTO: 157 10*3/MM3 (ref 140–450)
PMV BLD AUTO: 10.5 FL (ref 6–12)
POTASSIUM SERPL-SCNC: 4.1 MMOL/L (ref 3.5–5.2)
RBC # BLD AUTO: 4.3 10*6/MM3 (ref 4.14–5.8)
SODIUM SERPL-SCNC: 141 MMOL/L (ref 136–145)
WBC NRBC COR # BLD: 17.1 10*3/MM3 (ref 3.4–10.8)

## 2023-10-31 PROCEDURE — 94761 N-INVAS EAR/PLS OXIMETRY MLT: CPT

## 2023-10-31 PROCEDURE — 80048 BASIC METABOLIC PNL TOTAL CA: CPT | Performed by: NURSE PRACTITIONER

## 2023-10-31 PROCEDURE — 85025 COMPLETE CBC W/AUTO DIFF WBC: CPT | Performed by: NURSE PRACTITIONER

## 2023-10-31 PROCEDURE — 94640 AIRWAY INHALATION TREATMENT: CPT

## 2023-10-31 PROCEDURE — G0378 HOSPITAL OBSERVATION PER HR: HCPCS

## 2023-10-31 PROCEDURE — 25810000003 SODIUM CHLORIDE 0.9 % SOLUTION: Performed by: EMERGENCY MEDICINE

## 2023-10-31 PROCEDURE — 25010000002 MORPHINE PER 10 MG: Performed by: EMERGENCY MEDICINE

## 2023-10-31 PROCEDURE — 94618 PULMONARY STRESS TESTING: CPT

## 2023-10-31 PROCEDURE — 94799 UNLISTED PULMONARY SVC/PX: CPT

## 2023-10-31 PROCEDURE — 25010000002 METHYLPREDNISOLONE PER 125 MG: Performed by: EMERGENCY MEDICINE

## 2023-10-31 RX ORDER — METHYLPREDNISOLONE 4 MG/1
1 TABLET ORAL DAILY
Qty: 21 TABLET | Refills: 0 | Status: SHIPPED | OUTPATIENT
Start: 2023-10-31 | End: 2023-11-06 | Stop reason: HOSPADM

## 2023-10-31 RX ORDER — CETIRIZINE HYDROCHLORIDE 10 MG/1
10 TABLET ORAL DAILY
Qty: 30 TABLET | Refills: 0 | Status: SHIPPED | OUTPATIENT
Start: 2023-11-01

## 2023-10-31 RX ORDER — IPRATROPIUM BROMIDE AND ALBUTEROL SULFATE 2.5; .5 MG/3ML; MG/3ML
3 SOLUTION RESPIRATORY (INHALATION)
Status: DISCONTINUED | OUTPATIENT
Start: 2023-10-31 | End: 2023-11-02

## 2023-10-31 RX ORDER — IPRATROPIUM BROMIDE AND ALBUTEROL SULFATE 2.5; .5 MG/3ML; MG/3ML
3 SOLUTION RESPIRATORY (INHALATION)
Qty: 360 ML | Refills: 0 | Status: SHIPPED | OUTPATIENT
Start: 2023-10-31 | End: 2023-10-31 | Stop reason: SDUPTHER

## 2023-10-31 RX ORDER — BENZONATATE 100 MG/1
100 CAPSULE ORAL 3 TIMES DAILY PRN
Qty: 30 CAPSULE | Refills: 0 | Status: SHIPPED | OUTPATIENT
Start: 2023-10-31 | End: 2023-10-31 | Stop reason: SDUPTHER

## 2023-10-31 RX ORDER — IPRATROPIUM BROMIDE AND ALBUTEROL SULFATE 2.5; .5 MG/3ML; MG/3ML
3 SOLUTION RESPIRATORY (INHALATION)
Qty: 360 ML | Refills: 0 | Status: SHIPPED | OUTPATIENT
Start: 2023-10-31

## 2023-10-31 RX ORDER — GUAIFENESIN 600 MG/1
600 TABLET, EXTENDED RELEASE ORAL EVERY 12 HOURS SCHEDULED
Qty: 14 TABLET | Refills: 0 | Status: SHIPPED | OUTPATIENT
Start: 2023-10-31 | End: 2023-11-07

## 2023-10-31 RX ORDER — BENZONATATE 100 MG/1
100 CAPSULE ORAL 3 TIMES DAILY PRN
Qty: 30 CAPSULE | Refills: 0 | Status: SHIPPED | OUTPATIENT
Start: 2023-10-31

## 2023-10-31 RX ORDER — METHYLPREDNISOLONE 4 MG/1
1 TABLET ORAL DAILY
Qty: 21 TABLET | Refills: 0 | Status: SHIPPED | OUTPATIENT
Start: 2023-10-31 | End: 2023-10-31 | Stop reason: SDUPTHER

## 2023-10-31 RX ORDER — CETIRIZINE HYDROCHLORIDE 10 MG/1
10 TABLET ORAL DAILY
Qty: 30 TABLET | Refills: 0 | Status: SHIPPED | OUTPATIENT
Start: 2023-11-01 | End: 2023-10-31 | Stop reason: SDUPTHER

## 2023-10-31 RX ORDER — GUAIFENESIN 600 MG/1
600 TABLET, EXTENDED RELEASE ORAL EVERY 12 HOURS SCHEDULED
Qty: 14 TABLET | Refills: 0 | Status: SHIPPED | OUTPATIENT
Start: 2023-10-31 | End: 2023-10-31 | Stop reason: SDUPTHER

## 2023-10-31 RX ADMIN — Medication 10 ML: at 08:58

## 2023-10-31 RX ADMIN — MORPHINE SULFATE 1 MG: 2 INJECTION, SOLUTION INTRAMUSCULAR; INTRAVENOUS at 21:54

## 2023-10-31 RX ADMIN — IPRATROPIUM BROMIDE AND ALBUTEROL SULFATE 3 ML: .5; 3 SOLUTION RESPIRATORY (INHALATION) at 11:57

## 2023-10-31 RX ADMIN — Medication 10 ML: at 15:38

## 2023-10-31 RX ADMIN — DOXEPIN HYDROCHLORIDE 10 MG: 10 CAPSULE ORAL at 21:54

## 2023-10-31 RX ADMIN — MORPHINE SULFATE 1 MG: 2 INJECTION, SOLUTION INTRAMUSCULAR; INTRAVENOUS at 08:57

## 2023-10-31 RX ADMIN — AMLODIPINE BESYLATE 10 MG: 5 TABLET ORAL at 08:57

## 2023-10-31 RX ADMIN — SODIUM CHLORIDE 100 ML/HR: 9 INJECTION, SOLUTION INTRAVENOUS at 13:17

## 2023-10-31 RX ADMIN — DOCUSATE SODIUM 50 MG AND SENNOSIDES 8.6 MG 2 TABLET: 8.6; 5 TABLET, FILM COATED ORAL at 21:54

## 2023-10-31 RX ADMIN — PANTOPRAZOLE SODIUM 40 MG: 40 TABLET, DELAYED RELEASE ORAL at 08:57

## 2023-10-31 RX ADMIN — GUAIFENESIN 600 MG: 600 TABLET, MULTILAYER, EXTENDED RELEASE ORAL at 08:57

## 2023-10-31 RX ADMIN — METOPROLOL SUCCINATE 25 MG: 25 TABLET, EXTENDED RELEASE ORAL at 08:57

## 2023-10-31 RX ADMIN — SODIUM CHLORIDE 100 ML/HR: 9 INJECTION, SOLUTION INTRAVENOUS at 23:10

## 2023-10-31 RX ADMIN — Medication 10 ML: at 21:54

## 2023-10-31 RX ADMIN — GUAIFENESIN 600 MG: 600 TABLET, MULTILAYER, EXTENDED RELEASE ORAL at 21:54

## 2023-10-31 RX ADMIN — METHYLPREDNISOLONE SODIUM SUCCINATE 60 MG: 125 INJECTION, POWDER, FOR SOLUTION INTRAMUSCULAR; INTRAVENOUS at 21:53

## 2023-10-31 RX ADMIN — MORPHINE SULFATE 1 MG: 2 INJECTION, SOLUTION INTRAMUSCULAR; INTRAVENOUS at 04:34

## 2023-10-31 RX ADMIN — MORPHINE SULFATE 1 MG: 2 INJECTION, SOLUTION INTRAMUSCULAR; INTRAVENOUS at 15:38

## 2023-10-31 RX ADMIN — SODIUM CHLORIDE 100 ML/HR: 9 INJECTION, SOLUTION INTRAVENOUS at 03:00

## 2023-10-31 RX ADMIN — CETIRIZINE HYDROCHLORIDE 10 MG: 10 TABLET, FILM COATED ORAL at 08:57

## 2023-10-31 RX ADMIN — METHYLPREDNISOLONE SODIUM SUCCINATE 60 MG: 125 INJECTION, POWDER, FOR SOLUTION INTRAMUSCULAR; INTRAVENOUS at 08:57

## 2023-10-31 NOTE — PLAN OF CARE
Goal Outcome Evaluation:  Plan of Care Reviewed With: patient        Progress: improving  Outcome Evaluation: Patient has no complaints of shortness of breath. Patient on 2.5L oxygen per nasal cannula. Breathing treatments, mucinex, IV morphine, Normal saline at 100mL/hr. continue to monitor

## 2023-10-31 NOTE — PLAN OF CARE
Problem: Adult Inpatient Plan of Care  Goal: Plan of Care Review  Outcome: Ongoing, Progressing  Goal: Patient-Specific Goal (Individualized)  Outcome: Ongoing, Progressing  Goal: Absence of Hospital-Acquired Illness or Injury  Outcome: Ongoing, Progressing  Intervention: Identify and Manage Fall Risk  Recent Flowsheet Documentation  Taken 10/31/2023 0550 by Anais Ortiz RN  Safety Promotion/Fall Prevention:   safety round/check completed   room organization consistent   nonskid shoes/slippers when out of bed   lighting adjusted   clutter free environment maintained   assistive device/personal items within reach  Taken 10/31/2023 0300 by Anais Ortiz RN  Safety Promotion/Fall Prevention:   safety round/check completed   room organization consistent   nonskid shoes/slippers when out of bed   assistive device/personal items within reach   clutter free environment maintained   lighting adjusted  Taken 10/31/2023 0149 by Anais Ortiz RN  Safety Promotion/Fall Prevention:   safety round/check completed   room organization consistent   nonskid shoes/slippers when out of bed   lighting adjusted   clutter free environment maintained   assistive device/personal items within reach  Intervention: Prevent Skin Injury  Recent Flowsheet Documentation  Taken 10/31/2023 0300 by Anais Ortiz RN  Body Position: position changed independently  Intervention: Prevent and Manage VTE (Venous Thromboembolism) Risk  Recent Flowsheet Documentation  Taken 10/31/2023 0300 by Anais Ortiz RN  Activity Management: up ad minal  VTE Prevention/Management: patient refused intervention  Intervention: Prevent Infection  Recent Flowsheet Documentation  Taken 10/31/2023 0550 by Anais Ortiz RN  Infection Prevention:   hand hygiene promoted   personal protective equipment utilized   rest/sleep promoted   single patient room provided  Taken 10/31/2023 0300 by Anais Ortiz RN  Infection Prevention:   hand hygiene  promoted   personal protective equipment utilized   rest/sleep promoted   single patient room provided  Taken 10/31/2023 0149 by Anais Ortiz, RN  Infection Prevention:   hand hygiene promoted   personal protective equipment utilized   rest/sleep promoted   single patient room provided  Goal: Optimal Comfort and Wellbeing  Outcome: Ongoing, Progressing  Goal: Readiness for Transition of Care  Outcome: Ongoing, Progressing   Goal Outcome Evaluation:

## 2023-10-31 NOTE — PROGRESS NOTES
Exercise Oximetry    Patient Name:Jagdish Rea   MRN: [id number]   Date: 10/31/23             ROOM AIR BASELINE   SpO2%   87   Heart Rate   84   Blood Pressure      EXERCISE ON ROOM AIR SpO2% EXERCISE ON O2 @   5 LPM SpO2%   1 MINUTE  1 MINUTE   94   2 MINUTES  2 MINUTES   94   3 MINUTES  3 MINUTES   93   4 MINUTES  4 MINUTES   93   5 MINUTES  5 MINUTES   91   6 MINUTES  6 MINUTES   90              Distance Walked   Distance Walked  500 feet   Dyspnea (Ranjan Scale)   Dyspnea (Ranjan Scale)  2   Fatigue (Ranjan Scale)   Fatigue (Ranjan Scale)  7   SpO2% Post Exercise   SpO2% Post Exercise  93   HR Post Exercise   HR Post Exercise  80   Time to Recovery   Time to Recovery  2 minutes     Comments: 87 % Sat on room air, placed pt on 2 lpm NaO2.  Sat increased to 94% on 2 lpm NaO2.  With activity Sats dropped to 85%  Increased O2 to3 lpm NaO2.  Sat increased to 92% on 3 lpm NaO2.  With activity Sats dropped to 86%  Increased O2 to 4 lpm.  Sat increased to 93% on 4 lpm NaO2., With activity Sats dropped to 87%  Increased O2 to 5 lpm.Sats maintained above 90%.      Pt requests home O2 be arranged through [facility] [place].

## 2023-10-31 NOTE — PROGRESS NOTES
[facility]ation [facility] Progress Note     Patient Name: Jagdish Rea  : 1960  MRN: [id number]  Primary Care Physician: Yamilka Pascual APRN  Date of admission: 10/29/2023     Patient Care Team:  Yamilka Pascual APRN as PCP - General (Nurse Practitioner)  David Boyd MD as Consulting Physician (Otolaryngology)  Fredi Ritchie MD as Consulting Physician (Orthopedic Surgery)  Prieto Sidhu MD as Consulting Physician (Cardiology)          Subjective   History Present Illness     Chief Complaint:   Chief Complaint   Patient presents with    Cough     Pt reports progressively worsening cough for the past week.  Pt reports chest wall pain with coughing and deep breathing.  Pt reports his inhaler has not been helping.      Cough     Mr. Rea is a 62 y.o.  presents to [facility] complaining of cough       History of Present Illness    ED 10/29/23: Patient is a 62-year-old gentleman who comes in with a history of COPD GERD hypertension hyperlipidemia obesity and history of pneumonia who states he has had cough congestion for the last week and has developed some chest pain with the cough he states he has frothy sputum from the cough-he does not have any ill exposures that he is aware of.  He does have some sinus congestion.  Denies history of fever or chills.   Patient has been ill for about a week with cough and congestion nonproductive no fever chills he said some tightness in chest but no neck arm or jaw pain.  Patient denies any leg pain or swelling no recent long car ride plane ride immobilization or foreign travels.  No injury.  No one at home with similar illness.  Worse with exertion and better with rest.     Observation 10/30/23: Patient 62-year-old male presenting to the hospital with complaints of cough and shortness of breath.  Patient has had cough and congestion for the past week with worsening symptoms.  Patient denies fever, nausea, vomiting, chest pain or  syncope.  Patient does have history of COPD but states symptoms are worsen than normal COPD exacerbation.     10/31/23: Patient reports nonproductive cough and improvement in breathing today.  Patient dates he did have some low oxygen overnight and supplement with oxygen.    ROS        Personal History     Past Medical History:   Past Medical History:   Diagnosis Date    Allergic     Anxiety     Arthritis 06/2005    Back pain     Claustrophobia 1978    COPD (chronic obstructive pulmonary disease)     CTS (carpal tunnel syndrome) 10/2022    Dysphagia     Esophageal stricture     GERD (gastroesophageal reflux disease)     Hyperlipidemia     Hypertension     Knee pain     rt    Low back pain     Obesity     Pneumonia     PTSD (post-traumatic stress disorder)     Thoracic disc herniation     Vitamin B12 deficiency        Surgical History:      Past Surgical History:   Procedure Laterality Date    CARDIAC CATHETERIZATION N/A 07/13/2022    Procedure: Left Heart Cath, possible pci;  Surgeon: Prieto Sidhu MD;  Location: [facility] CATH INVASIVE LOCATION;  Service: Cardiovascular;  Laterality: N/A;    ENDOSCOPY      ENDOSCOPY N/A 9/21/2023    Procedure: ESOPHAGOGASTRODUODENOSCOPY WITH non guided esophageal dialtion 54 Fr. Bougie and  cold forcep biopsy x1 area;  Surgeon: Andres Concepcion MD;  Location: [facility] ENDOSCOPY;  Service: Gastroenterology;  Laterality: N/A;  Post- erosive gastritis, hiatal hernia, esophageal stricture    HERNIA REPAIR      KNEE ARTHROPLASTY      x2    SHOULDER ARTHROSCOPY W/ ROTATOR CUFF REPAIR Right 08/11/2020    Procedure: SHOULDER ARTHROSCOPY WITH ROTATOR CUFF REPAIR, extensive debridement;  Surgeon: Fredi Ritchie MD;  Location: [facility] MAIN OR;  Service: Orthopedics;  Laterality: Right;    TONSILLECTOMY             Family History: family history includes Alcohol abuse in his brother; Arthritis in his paternal grandmother; Asthma in his brother; Cancer in his father;  Depression in his brother; Drug abuse in his brother; Early death in his brother and mother; Heart disease in his brother and mother; Hyperlipidemia in his brother, brother, and mother; Hypertension in his brother, brother, and mother; Learning disabilities in his brother; Mental illness in his brother; Stroke in his father and paternal grandfather; Thyroid disease in his mother; Vision loss in his father. Otherwise pertinent FHx was reviewed and unremarkable.     Social History:  reports that he has been smoking cigarettes. He started smoking about 50 years ago. He has a 47.00 pack-year smoking history. He has never used smokeless tobacco. He reports that he does not drink alcohol and does not use drugs.      Medications:  Prior to Admission medications    Medication Sig Start Date End Date Taking? Authorizing Provider   albuterol sulfate  (90 Base) MCG/ACT inhaler Inhale 2 puffs Every 4 (Four) Hours As Needed for Wheezing. 4/4/22  Yes Yamilka Pascual APRN   amLODIPine (NORVASC) 10 MG tablet TAKE 1 TABLET EVERY DAY 9/4/23  Yes Yamilka Pascual APRN   doxepin (SINEquan) 10 MG capsule TAKE 1 CAPSULE EVERY NIGHT. 6/21/23  Yes Yamilka Pascual APRN   metoprolol succinate XL (TOPROL-XL) 25 MG 24 hr tablet TAKE 1 TABLET EVERY DAY 6/21/23  Yes Yamilka Pascual APRN   pantoprazole (PROTONIX) 40 MG EC tablet TAKE 1 TABLET EVERY DAY 9/29/23  Yes Yamilka Pascual APRN   pravastatin (PRAVACHOL) 80 MG tablet TAKE 1 TABLET EVERY NIGHT 4/9/23  Yes Yamilka Pascual APRN   hydroCHLOROthiazide (MICROZIDE) 12.5 MG capsule Take 1 capsule by mouth As Needed.    Provider, Sulaiman, MD       Allergies:    Allergies   Allergen Reactions    Atorvastatin Swelling    Lisinopril Other (See Comments)     Facial paralysis        Objective   Objective     Vital Signs  Temp:  [97.6 °F (36.4 °C)-98.5 °F (36.9 °C)] 98.4 °F (36.9 °C)  Heart Rate:  [74-88] 85  Resp:  [17-18] 18  BP: (105-143)/(57-88) 105/59  SpO2:  [88 %-93 %] 91 %  on   Flow (L/min):  [1-2] 2;   Device (Oxygen Therapy): nasal cannula  Body mass index is 33.79 kg/m².    Physical Exam  Vitals and nursing note reviewed.   Constitutional:       Appearance: Normal appearance.   HENT:      Head: Normocephalic and atraumatic.      Right Ear: External ear normal.      Left Ear: External ear normal.      Nose: Nose normal.      Mouth/Throat:      Pharynx: Oropharynx is clear.   Eyes:      Extraocular Movements: Extraocular movements intact.      Conjunctiva/sclera: Conjunctivae normal.      Pupils: Pupils are equal, round, and reactive to light.   Cardiovascular:      Rate and Rhythm: Normal rate and regular rhythm.      Pulses: Normal pulses.      Heart sounds: Normal heart sounds.   Pulmonary:      Effort: Pulmonary effort is normal.      Breath sounds: Normal breath sounds.   Abdominal:      General: Bowel sounds are normal.      Palpations: Abdomen is soft.   Musculoskeletal:         General: Normal range of motion.      Cervical back: Normal range of motion.   Skin:     General: Skin is warm.      Capillary Refill: Capillary refill takes less than 2 seconds.   Neurological:      Mental Status: He is alert and oriented to person, place, and time.   Psychiatric:         Mood and Affect: Mood normal.         Behavior: Behavior normal.         Judgment: Judgment normal.           Results Review:  I have personally reviewed most recent cardiac tracings, lab results, microbiology results, and radiology images and interpretations and agree with findings, most notably: CBC, CMP, chest x-ray, EKG.    Results from last 7 days   Lab Units 10/31/23  1116   WBC 10*3/mm3 17.10*   HEMOGLOBIN g/dL 14.6   HEMATOCRIT % 43.7   PLATELETS 10*3/mm3 157     Results from last 7 days   Lab Units 10/31/23  1116 10/30/23  0359 10/29/23  2232 10/29/23  1920   SODIUM mmol/L 141   < >  --  142   POTASSIUM mmol/L 4.1   < >  --  3.2*   CHLORIDE mmol/L 109*   < >  --  107   CO2 mmol/L 22.0   < >  --  23.0   BUN mg/dL  11   < >  --  8   CREATININE mg/dL 0.72*   < >  --  0.94   GLUCOSE mg/dL 161*   < >  --  95   CALCIUM mg/dL 9.1   < >  --  9.2   ALK PHOS U/L  --   --   --  85   ALT (SGPT) U/L  --   --   --  36   AST (SGOT) U/L  --   --   --  30   HSTROP T ng/L  --   --  7 8   PROBNP pg/mL  --   --   --  50.4    < > = values in this interval not displayed.     Estimated Creatinine Clearance: 138.1 mL/min (A) (by C-G formula based on SCr of 0.72 mg/dL (L)).  Brief Urine Lab Results       None            Microbiology Results (last 10 days)       Procedure Component Value - Date/Time    S. Pneumo Ag Urine or CSF - Urine, Urine, Clean Catch [[id number]]  (Normal) Collected: 10/30/23 0747    Lab Status: Final result Specimen: Urine, Clean Catch Updated: 10/30/23 0851     Strep Pneumo Ag Negative    Respiratory Panel PCR w/COVID-19(SARS-CoV-2) ISAEL/ALEXIA/ELISEO/PAD/COR/MAD/ARNOLD In-House, NP Swab in UTM/VTM, 3-4 HR TAT - Swab, Nasopharynx [[id number]]  (Abnormal) Collected: 10/29/23 1906    Lab Status: Final result Specimen: Swab from Nasopharynx Updated: 10/29/23 2044     ADENOVIRUS, PCR Not Detected     Coronavirus 229E Not Detected     Coronavirus HKU1 Not Detected     Coronavirus NL63 Not Detected     Coronavirus OC43 Not Detected     COVID19 Not Detected     Human Metapneumovirus Not Detected     Human Rhinovirus/Enterovirus Detected     Influenza A PCR Not Detected     Influenza B PCR Not Detected     Parainfluenza Virus 1 Not Detected     Parainfluenza Virus 2 Not Detected     Parainfluenza Virus 3 Not Detected     Parainfluenza Virus 4 Not Detected     RSV, PCR Not Detected     Bordetella pertussis pcr Not Detected     Bordetella parapertussis PCR Not Detected     Chlamydophila pneumoniae PCR Not Detected     Mycoplasma pneumo by PCR Not Detected    Narrative:      In the setting of a positive respiratory panel with a viral infection PLUS a negative procalcitonin without other underlying concern for bacterial infection, consider observing  off antibiotics or discontinuation of antibiotics and continue supportive care. If the respiratory panel is positive for atypical bacterial infection (Bordetella pertussis, Chlamydophila pneumoniae, or Mycoplasma pneumoniae), consider antibiotic de-escalation to target atypical bacterial infection.            ECG/EMG Results (most recent)       Procedure Component Value Units Date/Time    SCANNED - TELEMETRY   [[id number]] Resulted: 10/29/23     Updated: 10/30/23 1328    SCANNED - TELEMETRY   [[id number]] Resulted: 10/29/23     Updated: 10/30/23 1328    ECG 12 Lead Dyspnea [[id number]] Collected: 10/29/23 1821     Updated: 10/30/23 1338     QT Interval 328 ms      QTC Interval 407 ms     Narrative:      HEART RATE= 93  bpm  RR Interval= 648  ms  TN Interval= 163  ms  P Horizontal Axis= 2  deg  P Front Axis= 57  deg  QRSD Interval= 78  ms  QT Interval= 328  ms  QTcB= 407  ms  QRS Axis= -7  deg  T Wave Axis=   deg  - NORMAL ECG -  Sinus rhythm  Electronically Signed By: Neymar Musa ([facility]) 30-Oct-2023 13:38:05  Date and Time of Study: 2023-10-29 18:21:45    ECG 12 Lead Chest Pain [[id number]] Collected: 10/30/23 0547     Updated: 10/30/23 2054     QT Interval 342 ms      QTC Interval 424 ms     Narrative:      HEART RATE= 92  bpm  RR Interval= 652  ms  TN Interval= 162  ms  P Horizontal Axis= -15  deg  P Front Axis= 65  deg  QRSD Interval= 81  ms  QT Interval= 342  ms  QTcB= 424  ms  QRS Axis= 11  deg  T Wave Axis=   deg  - NORMAL ECG -  Sinus rhythm  When compared with ECG of 29-Oct-2023 18:21:45,  No significant change  Electronically Signed By: Jamey Quinn ([facility]) 30-Oct-2023 20:53:58  Date and Time of Study: 2023-10-30 05:47:04    SCANNED - TELEMETRY   [[id number]] Resulted: 10/29/23     Updated: 10/31/23 0859    SCANNED - TELEMETRY   [[id number]] Resulted: 10/29/23     Updated: 10/31/23 0901    SCANNED - TELEMETRY   [[id number]] Resulted: 10/29/23     Updated: 10/31/23 0907    SCANNED - TELEMETRY   [[id number]] Resulted:  10/29/23     Updated: 10/31/23 0908    SCANNED - TELEMETRY   [[id number]] Resulted: 10/29/23     Updated: 10/31/23 0908                Results for orders placed during the hospital encounter of 08/16/22    Adult Transthoracic Echo Complete W/ Cont if Necessary Per Protocol    Interpretation Summary  · Estimated left ventricular EF was in disagreement with the calculated left ventricular EF. Left ventricular ejection fraction appears to be 51 - 55%. Left ventricular systolic function is low normal.  · Left ventricular diastolic function is consistent with (grade I) impaired relaxation.  · Estimated right ventricular systolic pressure from tricuspid regurgitation is normal (<35 mmHg).      XR Chest 1 View    Result Date: 10/29/2023  1.Mild opacities in the left base which could represent atelectasis, pneumonia, or other infiltrate. 2.Emphysema. Electronically Signed: Andres Alatorre  10/29/2023 7:04 PM EDT  Workstation ID: [id number]       Estimated Creatinine Clearance: 138.1 mL/min (A) (by C-G formula based on SCr of 0.72 mg/dL (L)).    Assessment & Plan   Assessment/Plan       Active Hospital Problems    Diagnosis  POA    **Dyspnea [R06.00]  Yes      Resolved Hospital Problems   No resolved problems to display.     Dyspnea         Lab Results   Component Value Date     WBC 10.00 10/30/2023     EOSABS 0.10 10/29/2023   -Troponin and BNP negative   -Breathing treatments, steroids,Mucinex, Tessalon and incentive spirometry ordered  -Chest x-ray: mild opacities in the left base which could represent atelectasis, pneumonia, or other infiltrate,emphysema  -IV fluids  -Step pneumo negative   -Respiratory panel positive for rhinovirus   -EKG: Sinus rhythm with no ST changes  -Telemetry and pulse oximetry     Hypertension      BP Readings from Last 1 Encounters:   10/31/23 105/59   - Continue metoprolol, hydrochlorothiazide, and norvasc  - Monitor while admitted     GERD  -Continue PPI        VTE Prophylaxis -   Mechanical Order  History:        Ordered        10/30/23 0655  Place Sequential Compression Device  Once            10/30/23 0655  Maintain Sequential Compression Device  Continuous                          Pharmalogical Order History:       None            CODE STATUS:    Code Status and Medical Interventions:   Ordered at: 10/30/23 0655     Level Of Support Discussed With:    Patient     Code Status (Patient has no pulse and is not breathing):    CPR (Attempt to Resuscitate)     Medical Interventions (Patient has pulse or is breathing):    Full Support       This patient has been examined wearing personal protective equipment.     I discussed the patient's findings and my recommendations with patient, family, nursing staff, primary care team, and consulting provider.      Signature:Electronically signed by KEILA Motta, 10/31/23, 4:20 PM EDT.

## 2023-11-01 LAB
ANION GAP SERPL CALCULATED.3IONS-SCNC: 12 MMOL/L (ref 5–15)
BUN SERPL-MCNC: 13 MG/DL (ref 8–23)
BUN/CREAT SERPL: 18.1 (ref 7–25)
CALCIUM SPEC-SCNC: 9.2 MG/DL (ref 8.6–10.5)
CHLORIDE SERPL-SCNC: 108 MMOL/L (ref 98–107)
CO2 SERPL-SCNC: 22 MMOL/L (ref 22–29)
CREAT SERPL-MCNC: 0.72 MG/DL (ref 0.76–1.27)
D DIMER PPP FEU-MCNC: <0.19 MG/L (FEU) (ref 0–0.62)
DEPRECATED RDW RBC AUTO: 49.9 FL (ref 37–54)
EGFRCR SERPLBLD CKD-EPI 2021: 103.3 ML/MIN/1.73
ERYTHROCYTE [DISTWIDTH] IN BLOOD BY AUTOMATED COUNT: 13.7 % (ref 12.3–15.4)
GLUCOSE BLDC GLUCOMTR-MCNC: 188 MG/DL (ref 70–105)
GLUCOSE SERPL-MCNC: 153 MG/DL (ref 65–99)
HCT VFR BLD AUTO: 45 % (ref 37.5–51)
HGB BLD-MCNC: 15 G/DL (ref 13–17.7)
LYMPHOCYTES # BLD MANUAL: 2.32 10*3/MM3 (ref 0.7–3.1)
LYMPHOCYTES NFR BLD MANUAL: 4 % (ref 5–12)
MCH RBC QN AUTO: 33.1 PG (ref 26.6–33)
MCHC RBC AUTO-ENTMCNC: 33.4 G/DL (ref 31.5–35.7)
MCV RBC AUTO: 99 FL (ref 79–97)
MONOCYTES # BLD: 0.66 10*3/MM3 (ref 0.1–0.9)
NEUTROPHILS # BLD AUTO: 13.61 10*3/MM3 (ref 1.7–7)
NEUTROPHILS NFR BLD MANUAL: 82 % (ref 42.7–76)
PLAT MORPH BLD: NORMAL
PLATELET # BLD AUTO: 162 10*3/MM3 (ref 140–450)
PMV BLD AUTO: 10.2 FL (ref 6–12)
POTASSIUM SERPL-SCNC: 3.6 MMOL/L (ref 3.5–5.2)
RBC # BLD AUTO: 4.55 10*6/MM3 (ref 4.14–5.8)
RBC MORPH BLD: NORMAL
SCAN SLIDE: NORMAL
SODIUM SERPL-SCNC: 142 MMOL/L (ref 136–145)
VARIANT LYMPHS NFR BLD MANUAL: 1 % (ref 0–5)
VARIANT LYMPHS NFR BLD MANUAL: 13 % (ref 19.6–45.3)
WBC MORPH BLD: NORMAL
WBC NRBC COR # BLD: 16.6 10*3/MM3 (ref 3.4–10.8)

## 2023-11-01 PROCEDURE — 25010000002 MORPHINE PER 10 MG: Performed by: EMERGENCY MEDICINE

## 2023-11-01 PROCEDURE — 25010000002 ENOXAPARIN PER 10 MG: Performed by: INTERNAL MEDICINE

## 2023-11-01 PROCEDURE — 25010000002 CEFTRIAXONE PER 250 MG: Performed by: INTERNAL MEDICINE

## 2023-11-01 PROCEDURE — 25010000002 DROPERIDOL PER 5 MG: Performed by: PHYSICIAN ASSISTANT

## 2023-11-01 PROCEDURE — 94799 UNLISTED PULMONARY SVC/PX: CPT

## 2023-11-01 PROCEDURE — 85007 BL SMEAR W/DIFF WBC COUNT: CPT | Performed by: NURSE PRACTITIONER

## 2023-11-01 PROCEDURE — 94664 DEMO&/EVAL PT USE INHALER: CPT

## 2023-11-01 PROCEDURE — 82948 REAGENT STRIP/BLOOD GLUCOSE: CPT

## 2023-11-01 PROCEDURE — 85025 COMPLETE CBC W/AUTO DIFF WBC: CPT | Performed by: NURSE PRACTITIONER

## 2023-11-01 PROCEDURE — 84443 ASSAY THYROID STIM HORMONE: CPT | Performed by: NURSE PRACTITIONER

## 2023-11-01 PROCEDURE — 85379 FIBRIN DEGRADATION QUANT: CPT

## 2023-11-01 PROCEDURE — 25810000003 SODIUM CHLORIDE 0.9 % SOLUTION: Performed by: EMERGENCY MEDICINE

## 2023-11-01 PROCEDURE — 80048 BASIC METABOLIC PNL TOTAL CA: CPT | Performed by: NURSE PRACTITIONER

## 2023-11-01 PROCEDURE — 25010000002 METHYLPREDNISOLONE PER 125 MG: Performed by: EMERGENCY MEDICINE

## 2023-11-01 RX ORDER — BUDESONIDE 0.5 MG/2ML
0.5 INHALANT ORAL
Status: DISCONTINUED | OUTPATIENT
Start: 2023-11-01 | End: 2023-11-06 | Stop reason: HOSPADM

## 2023-11-01 RX ORDER — GUAIFENESIN 600 MG/1
1200 TABLET, EXTENDED RELEASE ORAL EVERY 12 HOURS SCHEDULED
Status: DISCONTINUED | OUTPATIENT
Start: 2023-11-01 | End: 2023-11-02

## 2023-11-01 RX ORDER — ENOXAPARIN SODIUM 100 MG/ML
40 INJECTION SUBCUTANEOUS EVERY 24 HOURS
Status: DISCONTINUED | OUTPATIENT
Start: 2023-11-01 | End: 2023-11-06 | Stop reason: HOSPADM

## 2023-11-01 RX ORDER — DROPERIDOL 2.5 MG/ML
1.25 INJECTION, SOLUTION INTRAMUSCULAR; INTRAVENOUS ONCE
Status: COMPLETED | OUTPATIENT
Start: 2023-11-01 | End: 2023-11-01

## 2023-11-01 RX ADMIN — IPRATROPIUM BROMIDE AND ALBUTEROL SULFATE 3 ML: .5; 3 SOLUTION RESPIRATORY (INHALATION) at 14:48

## 2023-11-01 RX ADMIN — SODIUM CHLORIDE 100 ML/HR: 9 INJECTION, SOLUTION INTRAVENOUS at 08:58

## 2023-11-01 RX ADMIN — ENOXAPARIN SODIUM 40 MG: 100 INJECTION SUBCUTANEOUS at 15:55

## 2023-11-01 RX ADMIN — Medication 10 ML: at 08:59

## 2023-11-01 RX ADMIN — METHYLPREDNISOLONE SODIUM SUCCINATE 60 MG: 125 INJECTION, POWDER, FOR SOLUTION INTRAMUSCULAR; INTRAVENOUS at 20:44

## 2023-11-01 RX ADMIN — METHYLPREDNISOLONE SODIUM SUCCINATE 60 MG: 125 INJECTION, POWDER, FOR SOLUTION INTRAMUSCULAR; INTRAVENOUS at 08:58

## 2023-11-01 RX ADMIN — BUDESONIDE 0.5 MG: 0.5 INHALANT RESPIRATORY (INHALATION) at 20:30

## 2023-11-01 RX ADMIN — MORPHINE SULFATE 1 MG: 2 INJECTION, SOLUTION INTRAMUSCULAR; INTRAVENOUS at 07:47

## 2023-11-01 RX ADMIN — IPRATROPIUM BROMIDE AND ALBUTEROL SULFATE 3 ML: .5; 3 SOLUTION RESPIRATORY (INHALATION) at 06:55

## 2023-11-01 RX ADMIN — METOPROLOL SUCCINATE 25 MG: 25 TABLET, EXTENDED RELEASE ORAL at 08:58

## 2023-11-01 RX ADMIN — Medication 10 ML: at 20:45

## 2023-11-01 RX ADMIN — CETIRIZINE HYDROCHLORIDE 10 MG: 10 TABLET, FILM COATED ORAL at 08:59

## 2023-11-01 RX ADMIN — DOCUSATE SODIUM 50 MG AND SENNOSIDES 8.6 MG 2 TABLET: 8.6; 5 TABLET, FILM COATED ORAL at 11:24

## 2023-11-01 RX ADMIN — CEFTRIAXONE 2000 MG: 2 INJECTION, POWDER, FOR SOLUTION INTRAMUSCULAR; INTRAVENOUS at 11:19

## 2023-11-01 RX ADMIN — SODIUM CHLORIDE 100 ML/HR: 9 INJECTION, SOLUTION INTRAVENOUS at 20:48

## 2023-11-01 RX ADMIN — IPRATROPIUM BROMIDE AND ALBUTEROL SULFATE 3 ML: .5; 3 SOLUTION RESPIRATORY (INHALATION) at 20:25

## 2023-11-01 RX ADMIN — MORPHINE SULFATE 1 MG: 2 INJECTION, SOLUTION INTRAMUSCULAR; INTRAVENOUS at 17:28

## 2023-11-01 RX ADMIN — GUAIFENESIN 1200 MG: 600 TABLET, EXTENDED RELEASE ORAL at 20:45

## 2023-11-01 RX ADMIN — AMLODIPINE BESYLATE 10 MG: 5 TABLET ORAL at 08:58

## 2023-11-01 RX ADMIN — IPRATROPIUM BROMIDE AND ALBUTEROL SULFATE 3 ML: .5; 3 SOLUTION RESPIRATORY (INHALATION) at 11:00

## 2023-11-01 RX ADMIN — PANTOPRAZOLE SODIUM 40 MG: 40 TABLET, DELAYED RELEASE ORAL at 08:58

## 2023-11-01 RX ADMIN — DOCUSATE SODIUM 50 MG AND SENNOSIDES 8.6 MG 2 TABLET: 8.6; 5 TABLET, FILM COATED ORAL at 20:45

## 2023-11-01 RX ADMIN — DROPERIDOL 1.25 MG: 2.5 INJECTION, SOLUTION INTRAMUSCULAR; INTRAVENOUS at 08:58

## 2023-11-01 RX ADMIN — GUAIFENESIN 600 MG: 600 TABLET, MULTILAYER, EXTENDED RELEASE ORAL at 08:58

## 2023-11-01 RX ADMIN — DOXEPIN HYDROCHLORIDE 10 MG: 10 CAPSULE ORAL at 20:44

## 2023-11-01 NOTE — DISCHARGE PLACEMENT REQUEST
"Álvaro Jagdish NGUYEN (62 y.o. Male)       Date of Birth   1960    Social Security Number       Address   [address] [place] [address]    Home Phone   [phone]    MRN   [id number]       [place]   Christianity    Marital Status                               Admission Date   10/29/23    Admission Type   Emergency    Admitting Provider   Neymar Musa MD    Attending Provider   Neymar Musa MD    Department, Room/Bed   [facility] OBSERVATION, 103/1       Discharge Date       Discharge Disposition       Discharge Destination                                 Attending Provider: Neymar Musa MD    Allergies: Atorvastatin, Lisinopril    Isolation: Droplet   Infection: Rhinovirus  (10/29/23)   Code Status: CPR    Ht: 182.9 cm (72\")   Wt: 113 kg (249 lb 1.9 oz)    Admission Cmt: None   Principal Problem: Dyspnea [R06.00]                   Active Insurance as of 10/29/2023       Primary Coverage       Payor Plan Insurance Group Employer/Plan Group    HUMANA MEDICARE REPLACEMENT HUMANA MEDICARE REPLACEMENT [id number]       Payor Plan Address Payor Plan Phone Number Payor Plan Fax Number Effective Dates    [address] [address] [phone]  1/1/2021 - None Entered    [facility] [address]         Subscriber Name Subscriber Birth Date Member ID       JAGDISH REA 1960 [id number]               Secondary Coverage       Payor Plan Insurance Group Employer/Plan Group    [place]        Payor Plan Address Payor Plan Phone Number Payor Plan Fax Number Effective Dates    [address]   9/8/2020 - None Entered    [place] [address]         Subscriber Name Subscriber Birth Date Member ID       TROY REALAUREANO NGUYEN 1960 [id number]                     Emergency Contacts        (Rel.) Home Phone Work Phone Mobile Phone    Samples,Laina (Daughter) -- -- [phone]    Maite Rea (Spouse) [phone] -- [phone]    BINH MCGHEE (Relative) " [phone] -- [phone]

## 2023-11-01 NOTE — CONSULTS
[facility]: [facility]         CONSULT NOTE    Patient Identification:  Jagdish Rea  62 y.o.  male  1960  [id number]            Requesting physician: Attending physician    Reason for Consultation: COPD, rhinovirus infection, hypoxemia      History of Present Illness:  62-year-old male with history of COPD, GERD with esophageal stricture, HTN, HLD and an active tobacco smoker who presented 10/29/2023 with complaints of 1 week of non-productive cough, shortness of breath and sinus congestion.  Patient is afebrile and hemodynamically stable.  He does not use oxygen at home but here he is requiring up to 3 L of oxygen.  WBC 17.1, Respiratory panel is positive for human rhinovirus.  Chest x-ray showed mild opacity in left base with underlying emphysema.    Assessment:  Dyspnea  Hypoxemia  Left lower lobe pneumonia due to unspecified pathogen  Rhinovirus infection  COPD with acute exacerbation  Tobacco smoker  HLD  HTN  GERD with history of esophageal stricture    Recommendations:  Antibiotics: Rocephin  Oxygen supplement and titration to maintain saturation 90 to 95%: Currently requiring 3 L per nasal cannula  Bronchodilators  Add inhaled corticosteroids  Encourage use of incentive spirometry  IV steroids  Increase Mucinex to 1200 mg twice daily  Amlodipine and metoprolol  Smoking cessation counseling  DVT/GI prophylaxis          Review of Sytems:  Constitutional: Negative for chills, fever and malaise/fatigue.   HENT: Negative.    Eyes: Negative.    Cardiovascular: Negative.    Respiratory: Positive for cough and shortness of breath.    Skin: Negative.    Musculoskeletal: Negative.    Gastrointestinal: Negative.    Genitourinary: Negative.    Neurological: Negative.    Psychiatric/Behavioral: Negative.    Past Medical History:  Past Medical History:   Diagnosis Date    Allergic     Anxiety     Arthritis 06/2005    Back pain     Claustrophobia 1978    COPD (chronic obstructive pulmonary disease)     CTS  (carpal tunnel syndrome) 10/2022    Dysphagia     Esophageal stricture     GERD (gastroesophageal reflux disease)     Hyperlipidemia     Hypertension     Knee pain     rt    Low back pain     Obesity     Pneumonia     PTSD (post-traumatic stress disorder)     Thoracic disc herniation     Vitamin B12 deficiency        Past Surgical History:  Past Surgical History:   Procedure Laterality Date    CARDIAC CATHETERIZATION N/A 07/13/2022    Procedure: Left Heart Cath, possible pci;  Surgeon: Prieto Sidhu MD;  Location: [facility] CATH INVASIVE LOCATION;  Service: Cardiovascular;  Laterality: N/A;    ENDOSCOPY      ENDOSCOPY N/A 9/21/2023    Procedure: ESOPHAGOGASTRODUODENOSCOPY WITH non guided esophageal dialtion 54 Fr. Bougie and  cold forcep biopsy x1 area;  Surgeon: Andres Concepcion MD;  Location: [facility] ENDOSCOPY;  Service: Gastroenterology;  Laterality: N/A;  Post- erosive gastritis, hiatal hernia, esophageal stricture    HERNIA REPAIR      KNEE ARTHROPLASTY      x2    SHOULDER ARTHROSCOPY W/ ROTATOR CUFF REPAIR Right 08/11/2020    Procedure: SHOULDER ARTHROSCOPY WITH ROTATOR CUFF REPAIR, extensive debridement;  Surgeon: Fredi Ritchie MD;  Location: [facility] MAIN OR;  Service: Orthopedics;  Laterality: Right;    TONSILLECTOMY          Home Meds:  Medications Prior to Admission   Medication Sig Dispense Refill Last Dose    albuterol sulfate  (90 Base) MCG/ACT inhaler Inhale 2 puffs Every 4 (Four) Hours As Needed for Wheezing. 18 g 1 10/29/2023    amLODIPine (NORVASC) 10 MG tablet TAKE 1 TABLET EVERY DAY 90 tablet 0 10/29/2023    doxepin (SINEquan) 10 MG capsule TAKE 1 CAPSULE EVERY NIGHT. 90 capsule 1 10/28/2023    metoprolol succinate XL (TOPROL-XL) 25 MG 24 hr tablet TAKE 1 TABLET EVERY DAY 90 tablet 0 10/29/2023    pantoprazole (PROTONIX) 40 MG EC tablet TAKE 1 TABLET EVERY DAY 90 tablet 1 10/29/2023    pravastatin (PRAVACHOL) 80 MG tablet TAKE 1 TABLET EVERY NIGHT 90 tablet 1  "10/28/2023    hydroCHLOROthiazide (MICROZIDE) 12.5 MG capsule Take 1 capsule by mouth As Needed.   More than a month       Allergies:  Allergies   Allergen Reactions    Atorvastatin Swelling    Lisinopril Other (See Comments)     Facial paralysis        Social History:   Social History     Socioeconomic History    Marital status:    Tobacco Use    Smoking status: Every Day     Packs/day: 1.00     Years: 47.00     Additional pack years: 0.00     Total pack years: 47.00     Types: Cigarettes     Start date: 1/9/1973    Smokeless tobacco: Never    Tobacco comments:     Smoked a half a pack a day for 42 years didn't start to smoke a whole pack until 2017   Vaping Use    Vaping Use: Never used   Substance and Sexual Activity    Alcohol use: No    Drug use: No    Sexual activity: Defer       Family History:  Family History   Problem Relation Age of Onset    Heart disease Mother     Early death Mother     Hyperlipidemia Mother     Hypertension Mother     Thyroid disease Mother     Cancer Father     Stroke Father     Vision loss Father     Stroke Paternal Grandfather     Arthritis Paternal Grandmother     Alcohol abuse Brother     Asthma Brother     Depression Brother     Hyperlipidemia Brother     Hypertension Brother     Learning disabilities Brother     Mental illness Brother     Drug abuse Brother     Early death Brother     Heart disease Brother     Hyperlipidemia Brother     Hypertension Brother        Physical Exam:  /60 (BP Location: Right arm, Patient Position: Lying)   Pulse 71   Temp 97.8 °F (36.6 °C) (Oral)   Resp 17   Ht 182.9 cm (72\")   Wt 113 kg (249 lb 1.9 oz)   SpO2 90%   BMI 33.79 kg/m²  Body mass index is 33.79 kg/m². 90% 113 kg (249 lb 1.9 oz)  General Appearance:  Alert   HEENT:  Normocephalic, without obvious abnormality, Conjunctiva/corneas clear,.   Nares normal, no drainage     Neck:  Supple, symmetrical, trachea midline. No JVD.  Lungs /Chest wall:   Wheezing and bilateral basal " rhonchi, respirations unlabored, symmetrical wall movement.     Heart:  Regular rate and rhythm, S1 S2 normal  Abdomen: Soft, non-tender, no masses, no organomegaly.    Extremities: No edema, no clubbing or cyanosis    LABS:  Lab Results   Component Value Date    CALCIUM 9.1 10/31/2023     Results from last 7 days   Lab Units 10/31/23  1116 10/30/23  0359 10/29/23  1920   SODIUM mmol/L 141 142 142   POTASSIUM mmol/L 4.1 3.5 3.2*   CHLORIDE mmol/L 109* 107 107   CO2 mmol/L 22.0 23.0 23.0   BUN mg/dL 11 8 8   CREATININE mg/dL 0.72* 0.69* 0.94   GLUCOSE mg/dL 161* 116* 95   CALCIUM mg/dL 9.1 9.0 9.2   WBC 10*3/mm3 17.10* 10.00 7.50   HEMOGLOBIN g/dL 14.6 16.0 16.9   PLATELETS 10*3/mm3 157 148 155   ALT (SGPT) U/L  --   --  36   AST (SGOT) U/L  --   --  30   PROBNP pg/mL  --   --  50.4     Lab Results   Component Value Date    TROPONINI <0.030 09/29/2019    TROPONINT 7 10/29/2023     Results from last 7 days   Lab Units 10/29/23  2232 10/29/23  1920   HSTROP T ng/L 7 8                 Results from last 7 days   Lab Units 10/29/23  1906   ADENOVIRUS DETECTION BY PCR  Not Detected   CORONAVIRUS 229E  Not Detected   CORONAVIRUS HKU1  Not Detected   CORONAVIRUS NL63  Not Detected   CORONAVIRUS OC43  Not Detected   HUMAN METAPNEUMOVIRUS  Not Detected   HUMAN RHINOVIRUS/ENTEROVIRUS  Detected*   INFLUENZA B PCR  Not Detected   PARAINFLUENZA 1  Not Detected   PARAINFLUENZA VIRUS 2  Not Detected   PARAINFLUENZA VIRUS 3  Not Detected   PARAINFLUENZA VIRUS 4  Not Detected   BORDETELLA PERTUSSIS PCR  Not Detected   CHLAMYDOPHILA PNEUMONIAE PCR  Not Detected   MYCOPLAMA PNEUMO PCR  Not Detected   INFLUENZA A PCR  Not Detected   RSV, PCR  Not Detected             Lab Results   Component Value Date    TSH 1.28 05/08/2017     Estimated Creatinine Clearance: 138.1 mL/min (A) (by C-G formula based on SCr of 0.72 mg/dL (L)).         Imaging:  Imaging Results (Last 24 Hours)       ** No results found for the last 24 hours. **               Current Meds:   SCHEDULE  amLODIPine, 10 mg, Oral, Daily  cetirizine, 10 mg, Oral, Daily  doxepin, 10 mg, Oral, Nightly  guaiFENesin, 600 mg, Oral, Q12H  ipratropium-albuterol, 3 mL, Nebulization, 4x Daily - RT  methylPREDNISolone sodium succinate, 60 mg, Intravenous, Q12H  metoprolol succinate XL, 25 mg, Oral, Daily  pantoprazole, 40 mg, Oral, Daily  senna-docusate sodium, 2 tablet, Oral, BID  sodium chloride, 10 mL, Intravenous, Q12H  sodium chloride, 10 mL, Intravenous, Q12H      Infusions  sodium chloride, 100 mL/hr, Last Rate: 100 mL/hr (11/01/23 0858)      PRNs    acetaminophen    albuterol    benzonatate    senna-docusate sodium **AND** polyethylene glycol **AND** bisacodyl **AND** bisacodyl    Morphine **AND** naloxone    nitroglycerin    ondansetron **OR** ondansetron    sodium chloride    sodium chloride    sodium chloride    sodium chloride    sodium chloride        Kolton Brewer MD  11/1/2023  10:29 EDT      Much of this encounter note is an electronic transcription/translation of spoken language to printed text using Dragon Software.

## 2023-11-01 NOTE — PROGRESS NOTES
[facility] Observation Unit Progress Note     Patient Name: Jagdish Rea  : 1960  MRN: [id number]  Primary Care Physician: Yamilka Pascual APRN  Date of admission: 10/29/2023     Patient Care Team:  Yamilka Pascual APRN as PCP - General (Nurse Practitioner)  David Boyd MD as Consulting Physician (Otolaryngology)  Fredi Ritchie MD as Consulting Physician (Orthopedic Surgery)  Prieto Sidhu MD as Consulting Physician (Cardiology)          Subjective   History Present Illness     Chief Complaint:   Chief Complaint   Patient presents with    Cough     Pt reports progressively worsening cough for the past week.  Pt reports chest wall pain with coughing and deep breathing.  Pt reports his inhaler has not been helping.      Cough     Mr. Rea is a 62 y.o.  presents to [facility] complaining of cough       History of Present Illness    ED 10/29/23: Patient is a 62-year-old gentleman who comes in with a history of COPD GERD hypertension hyperlipidemia obesity and history of pneumonia who states he has had cough congestion for the last week and has developed some chest pain with the cough he states he has frothy sputum from the cough-he does not have any ill exposures that he is aware of.  He does have some sinus congestion.  Denies history of fever or chills.   Patient has been ill for about a week with cough and congestion nonproductive no fever chills he said some tightness in chest but no neck arm or jaw pain.  Patient denies any leg pain or swelling no recent long car ride plane ride immobilization or foreign travels.  No injury.  No one at home with similar illness.  Worse with exertion and better with rest.     Observation 10/30/23: Patient 62-year-old male presenting to the hospital with complaints of cough and shortness of breath.  Patient has had cough and congestion for the past week with worsening symptoms.  Patient denies fever, nausea, vomiting, chest pain or  syncope.  Patient does have history of COPD but states symptoms are worsen than normal COPD exacerbation.     10/31/23: Patient reports nonproductive cough and improvement in breathing today.  Patient dates he did have some low oxygen overnight and supplement with oxygen.    11/1/2023:  Patient reports some continued dyspnea which she notes was worse with exertion the previous day.  Oxygen levels continue to drop to the high 80s while in bed especially when sleeping.    ROS  Constitutional: Positive for malaise/fatigue.   HENT: Negative.     Eyes: Negative.    Cardiovascular:  Positive for dyspnea on exertion.   Respiratory:  Positive for cough.    Endocrine: Negative.    Hematologic/Lymphatic: Negative.    Skin: Negative.    Gastrointestinal: Negative.    Genitourinary: Negative.    Neurological: Negative.    Psychiatric/Behavioral: Negative.     Allergic/Immunologic: Negative.        Personal History     Past Medical History:   Past Medical History:   Diagnosis Date    Allergic     Anxiety     Arthritis 06/2005    Back pain     Claustrophobia 1978    COPD (chronic obstructive pulmonary disease)     CTS (carpal tunnel syndrome) 10/2022    Dysphagia     Esophageal stricture     GERD (gastroesophageal reflux disease)     Hyperlipidemia     Hypertension     Knee pain     rt    Low back pain     Obesity     Pneumonia     PTSD (post-traumatic stress disorder)     Thoracic disc herniation     Vitamin B12 deficiency        Surgical History:      Past Surgical History:   Procedure Laterality Date    CARDIAC CATHETERIZATION N/A 07/13/2022    Procedure: Left Heart Cath, possible pci;  Surgeon: Prieto Sidhu MD;  Location: [facility] CATH INVASIVE LOCATION;  Service: Cardiovascular;  Laterality: N/A;    ENDOSCOPY      ENDOSCOPY N/A 9/21/2023    Procedure: ESOPHAGOGASTRODUODENOSCOPY WITH non guided esophageal dialtion 54 Fr. Bougie and  cold forcep biopsy x1 area;  Surgeon: Andres Concepcion MD;  Location:   [facility] ENDOSCOPY;  Service: Gastroenterology;  Laterality: N/A;  Post- erosive gastritis, hiatal hernia, esophageal stricture    HERNIA REPAIR      KNEE ARTHROPLASTY      x2    SHOULDER ARTHROSCOPY W/ ROTATOR CUFF REPAIR Right 08/11/2020    Procedure: SHOULDER ARTHROSCOPY WITH ROTATOR CUFF REPAIR, extensive debridement;  Surgeon: Fredi Ritchie MD;  Location: [facility] MAIN OR;  Service: Orthopedics;  Laterality: Right;    TONSILLECTOMY             Family History: family history includes Alcohol abuse in his brother; Arthritis in his paternal grandmother; Asthma in his brother; Cancer in his father; Depression in his brother; Drug abuse in his brother; Early death in his brother and mother; Heart disease in his brother and mother; Hyperlipidemia in his brother, brother, and mother; Hypertension in his brother, brother, and mother; Learning disabilities in his brother; Mental illness in his brother; Stroke in his father and paternal grandfather; Thyroid disease in his mother; Vision loss in his father. Otherwise pertinent FHx was reviewed and unremarkable.     Social History:  reports that he has been smoking cigarettes. He started smoking about 50 years ago. He has a 47.00 pack-year smoking history. He has never used smokeless tobacco. He reports that he does not drink alcohol and does not use drugs.      Medications:  Prior to Admission medications    Medication Sig Start Date End Date Taking? Authorizing Provider   albuterol sulfate  (90 Base) MCG/ACT inhaler Inhale 2 puffs Every 4 (Four) Hours As Needed for Wheezing. 4/4/22  Yes Yamilka Pascual APRN   amLODIPine (NORVASC) 10 MG tablet TAKE 1 TABLET EVERY DAY 9/4/23  Yes Yamilka Pascual APRN   doxepin (SINEquan) 10 MG capsule TAKE 1 CAPSULE EVERY NIGHT. 6/21/23  Yes Yamilka Pascual APRN   metoprolol succinate XL (TOPROL-XL) 25 MG 24 hr tablet TAKE 1 TABLET EVERY DAY 6/21/23  Yes Yamilka Pascual APRN   pantoprazole (PROTONIX) 40 MG EC tablet TAKE 1  TABLET EVERY DAY 9/29/23  Yes Yamilka Pascual APRN   pravastatin (PRAVACHOL) 80 MG tablet TAKE 1 TABLET EVERY NIGHT 4/9/23  Yes Yamilka Pascual APRN   hydroCHLOROthiazide (MICROZIDE) 12.5 MG capsule Take 1 capsule by mouth As Needed.    Provider, Historical, MD       Allergies:    Allergies   Allergen Reactions    Atorvastatin Swelling    Lisinopril Other (See Comments)     Facial paralysis        Objective   Objective     Vital Signs  Temp:  [97.4 °F (36.3 °C)-98.4 °F (36.9 °C)] 97.8 °F (36.6 °C)  Heart Rate:  [] 83  Resp:  [17-20] 20  BP: (101-129)/(59-77) 101/60  SpO2:  [89 %-94 %] 90 %  on  Flow (L/min):  [1-3] 3;   Device (Oxygen Therapy): nasal cannula  Body mass index is 33.79 kg/m².    Physical Exam  Vitals and nursing note reviewed.   Constitutional:       Appearance: Normal appearance.   HENT:      Head: Normocephalic and atraumatic.      Right Ear: External ear normal.      Left Ear: External ear normal.      Nose: Nose normal.      Mouth/Throat:      Pharynx: Oropharynx is clear.   Eyes:      Extraocular Movements: Extraocular movements intact.      Conjunctiva/sclera: Conjunctivae normal.      Pupils: Pupils are equal, round, and reactive to light.   Cardiovascular:      Rate and Rhythm: Normal rate and regular rhythm.      Pulses: Normal pulses.      Heart sounds: Normal heart sounds.   Pulmonary:      Breath sounds: Wheezing present.      Comments: Increased effort with prolonged conversation  Abdominal:      General: Bowel sounds are normal.      Palpations: Abdomen is soft.   Musculoskeletal:         General: Normal range of motion.      Cervical back: Normal range of motion.   Skin:     General: Skin is warm.      Capillary Refill: Capillary refill takes less than 2 seconds.   Neurological:      Mental Status: He is alert and oriented to person, place, and time.   Psychiatric:         Mood and Affect: Mood normal.         Behavior: Behavior normal.         Judgment: Judgment normal.            Results Review:  I have personally reviewed most recent cardiac tracings, lab results, microbiology results, and radiology images and interpretations and agree with findings, most notably: CBC, CMP, chest x-ray, EKG.    Results from last 7 days   Lab Units 10/31/23  1116   WBC 10*3/mm3 17.10*   HEMOGLOBIN g/dL 14.6   HEMATOCRIT % 43.7   PLATELETS 10*3/mm3 157     Results from last 7 days   Lab Units 10/31/23  1116 10/30/23  0359 10/29/23  2232 10/29/23  1920   SODIUM mmol/L 141   < >  --  142   POTASSIUM mmol/L 4.1   < >  --  3.2*   CHLORIDE mmol/L 109*   < >  --  107   CO2 mmol/L 22.0   < >  --  23.0   BUN mg/dL 11   < >  --  8   CREATININE mg/dL 0.72*   < >  --  0.94   GLUCOSE mg/dL 161*   < >  --  95   CALCIUM mg/dL 9.1   < >  --  9.2   ALK PHOS U/L  --   --   --  85   ALT (SGPT) U/L  --   --   --  36   AST (SGOT) U/L  --   --   --  30   HSTROP T ng/L  --   --  7 8   PROBNP pg/mL  --   --   --  50.4    < > = values in this interval not displayed.     Estimated Creatinine Clearance: 138.1 mL/min (A) (by C-G formula based on SCr of 0.72 mg/dL (L)).  Brief Urine Lab Results       None            Microbiology Results (last 10 days)       Procedure Component Value - Date/Time    S. Pneumo Ag Urine or CSF - Urine, Urine, Clean Catch [[id number]]  (Normal) Collected: 10/30/23 0747    Lab Status: Final result Specimen: Urine, Clean Catch Updated: 10/30/23 0851     Strep Pneumo Ag Negative    Respiratory Panel PCR w/COVID-19(SARS-CoV-2) ISAEL/ALEXIA/ELISEO/PAD/COR/MAD/ARNOLD In-House, NP Swab in [facility], 3-4 HR TAT - Swab, Nasopharynx [[id number]]  (Abnormal) Collected: 10/29/23 1906    Lab Status: Final result Specimen: Swab from Nasopharynx Updated: 10/29/23 2044     ADENOVIRUS, PCR Not Detected     Coronavirus 229E Not Detected     Coronavirus HKU1 Not Detected     Coronavirus NL63 Not Detected     Coronavirus OC43 Not Detected     COVID19 Not Detected     Human Metapneumovirus Not Detected     Human  Rhinovirus/Enterovirus Detected     Influenza A PCR Not Detected     Influenza B PCR Not Detected     Parainfluenza Virus 1 Not Detected     Parainfluenza Virus 2 Not Detected     Parainfluenza Virus 3 Not Detected     Parainfluenza Virus 4 Not Detected     RSV, PCR Not Detected     Bordetella pertussis pcr Not Detected     Bordetella parapertussis PCR Not Detected     Chlamydophila pneumoniae PCR Not Detected     Mycoplasma pneumo by PCR Not Detected    Narrative:      In the setting of a positive respiratory panel with a viral infection PLUS a negative procalcitonin without other underlying concern for bacterial infection, consider observing off antibiotics or discontinuation of antibiotics and continue supportive care. If the respiratory panel is positive for atypical bacterial infection (Bordetella pertussis, Chlamydophila pneumoniae, or Mycoplasma pneumoniae), consider antibiotic de-escalation to target atypical bacterial infection.            ECG/EMG Results (most recent)       Procedure Component Value Units Date/Time    SCANNED - TELEMETRY   [[id number]] Resulted: 10/29/23     Updated: 10/30/23 1328    SCANNED - TELEMETRY   [[id number]] Resulted: 10/29/23     Updated: 10/30/23 1328    ECG 12 Lead Dyspnea [[id number]] Collected: 10/29/23 1821     Updated: 10/30/23 1338     QT Interval 328 ms      QTC Interval 407 ms     Narrative:      HEART RATE= 93  bpm  RR Interval= 648  ms  TX Interval= 163  ms  P Horizontal Axis= 2  deg  P Front Axis= 57  deg  QRSD Interval= 78  ms  QT Interval= 328  ms  QTcB= 407  ms  QRS Axis= -7  deg  T Wave Axis=   deg  - NORMAL ECG -  Sinus rhythm  Electronically Signed By: Neymar Musa) 30-Oct-2023 13:38:05  Date and Time of Study: 2023-10-29 18:21:45    ECG 12 Lead Chest Pain [[id number]] Collected: 10/30/23 0547     Updated: 10/30/23 2054     QT Interval 342 ms      QTC Interval 424 ms     Narrative:      HEART RATE= 92  bpm  RR Interval= 652  ms  TX Interval= 162  ms  P  Horizontal Axis= -15  deg  P Front Axis= 65  deg  QRSD Interval= 81  ms  QT Interval= 342  ms  QTcB= 424  ms  QRS Axis= 11  deg  T Wave Axis=   deg  - NORMAL ECG -  Sinus rhythm  When compared with ECG of 29-Oct-2023 18:21:45,  No significant change  Electronically Signed By: Jamey Quinn ([facility]) 30-Oct-2023 20:53:58  Date and Time of Study: 2023-10-30 05:47:04    SCANNED - TELEMETRY   [[id number]] Resulted: 10/29/23     Updated: 10/31/23 0859    SCANNED - TELEMETRY   [[id number]] Resulted: 10/29/23     Updated: 10/31/23 0901    SCANNED - TELEMETRY   [[id number]] Resulted: 10/29/23     Updated: 10/31/23 0907    SCANNED - TELEMETRY   [[id number]] Resulted: 10/29/23     Updated: 10/31/23 0908    SCANNED - TELEMETRY   [[id number]] Resulted: 10/29/23     Updated: 10/31/23 0908    SCANNED - TELEMETRY   [[id number]] Resulted: 10/29/23     Updated: 11/01/23 0906    SCANNED - TELEMETRY   [[id number]] Resulted: 10/29/23     Updated: 11/01/23 0906    SCANNED - TELEMETRY   [[id number]] Resulted: 10/29/23     Updated: 11/01/23 0929    SCANNED - TELEMETRY   [[id number]] Resulted: 10/29/23     Updated: 11/01/23 0930    SCANNED - TELEMETRY   [[id number]] Resulted: 10/29/23     Updated: 11/01/23 0930                Results for orders placed during the hospital encounter of 08/16/22    Adult Transthoracic Echo Complete W/ Cont if Necessary Per Protocol    Interpretation Summary  · Estimated left ventricular EF was in disagreement with the calculated left ventricular EF. Left ventricular ejection fraction appears to be 51 - 55%. Left ventricular systolic function is low normal.  · Left ventricular diastolic function is consistent with (grade I) impaired relaxation.  · Estimated right ventricular systolic pressure from tricuspid regurgitation is normal (<35 mmHg).      XR Chest 1 View    Result Date: 10/29/2023  1.Mild opacities in the left base which could represent atelectasis, pneumonia, or other infiltrate. 2.Emphysema. Electronically Signed:  Andres Celi  10/29/2023 7:04 PM EDT  Workstation ID: [id number]       Estimated Creatinine Clearance: 138.1 mL/min (A) (by C-G formula based on SCr of 0.72 mg/dL (L)).    Assessment & Plan   Assessment/Plan       Active Hospital Problems    Diagnosis  POA    **Dyspnea [R06.00]  Yes      Resolved Hospital Problems   No resolved problems to display.     Dyspnea         Lab Results   Component Value Date     WBC 10.00 10/30/2023     EOSABS 0.10 10/29/2023   -WBCs trended up to 17.10 in the setting of IV steroid administration  -Troponin and BNP negative   -Breathing treatments, steroids,Mucinex, Tessalon and incentive spirometry ordered  -Chest x-ray: mild opacities in the left base which could represent atelectasis, pneumonia, or other infiltrate,emphysema  -IV fluids  -Step pneumo negative   -Respiratory panel positive for rhinovirus   -EKG: Sinus rhythm with no ST changes  -Telemetry and pulse oximetry  -Walking oximetry performed on 10/31 requiring 5 L supplemental oxygen  -Pulmonology consulted who evaluated patient recommending continued IV steroids and possible bronchoscopy if symptoms fail to improve  -Hospitalist consulted     Hypertension      BP Readings from Last 1 Encounters:   10/31/23 105/59   - Continue metoprolol, hydrochlorothiazide, and norvasc  - Monitor while admitted     GERD  -Continue PPI        VTE Prophylaxis -   Mechanical Order History:        Ordered        10/30/23 0655  Place Sequential Compression Device  Once            10/30/23 0655  Maintain Sequential Compression Device  Continuous                          Pharmalogical Order History:       None            CODE STATUS:    Code Status and Medical Interventions:   Ordered at: 10/30/23 0655     Level Of Support Discussed With:    Patient     Code Status (Patient has no pulse and is not breathing):    CPR (Attempt to Resuscitate)     Medical Interventions (Patient has pulse or is breathing):    Full Support       This patient has been  examined wearing personal protective equipment.     I discussed the patient's findings and my recommendations with patient, family, nursing staff, primary care team, and consulting provider.      Signature:Electronically signed by KEILA Motta, 10/31/23, 4:20 PM EDT.

## 2023-11-01 NOTE — DISCHARGE PLACEMENT REQUEST
"Álvaro Jagdish NGUYEN (62 y.o. Male)       Date of Birth   1960    Social Security Number       Address   [address] [place] [address]    Home Phone   [phone]    MRN   [id number]       Scientology   Protestant    Marital Status                               Admission Date   10/29/23    Admission Type   Emergency    Admitting Provider   Neymar Musa MD    Attending Provider   Neymar Musa MD    Department, Room/Bed   [facility] OBSERVATION, 103/1       Discharge Date       Discharge Disposition       Discharge Destination                                 Attending Provider: Neymar Musa MD    Allergies: Atorvastatin, Lisinopril    Isolation: Droplet   Infection: Rhinovirus  (10/29/23)   Code Status: CPR    Ht: 182.9 cm (72\")   Wt: 113 kg (249 lb 1.9 oz)    Admission Cmt: None   Principal Problem: Dyspnea [R06.00]                   Active Insurance as of 10/29/2023       Primary Coverage       Payor Plan Insurance Group Employer/Plan Group    HUMANA MEDICARE REPLACEMENT HUMANA MEDICARE REPLACEMENT [id number]       Payor Plan Address Payor Plan Phone Number Payor Plan Fax Number Effective Dates    [address] [address] [phone]  1/1/2021 - None Entered    [facility] [address]         Subscriber Name Subscriber Birth Date Member ID       JAGDISH REA 1960 [id number]               Secondary Coverage       Payor Plan Insurance Group Employer/Plan Group    [place]        Payor Plan Address Payor Plan Phone Number Payor Plan Fax Number Effective Dates    [address]   9/8/2020 - None Entered    [place] [address]         Subscriber Name Subscriber Birth Date Member ID       TROY REALAUREANO NGUYEN 1960 [id number]                     Emergency Contacts        (Rel.) Home Phone Work Phone Mobile Phone    Samples,Laina (Daughter) -- -- [phone]    Miate Rea (Spouse) [phone] -- [phone]    BINH MCGHEE (Relative) " "[phone] -- [phone]          [facility] OBSERVATION  [address] [facility] [place] [address]  Dept. Phone:  [phone]  Dept. Fax:  [phone] Date Ordered: 2023         Patient:  Jagdish Rea MRN:  [id number]   [address] [facility] [place] [address] :  1960  SSN:    Phone: [phone] Sex:  M     Weight: 113 kg (249 lb 1.9 oz)         Ht Readings from Last 1 Encounters:   10/29/23 182.9 cm (72\")         Oxygen Therapy         (Order ID: [id number])    Diagnosis:  Acute respiratory distress (R06.03 [ICD-10-CM] 518.82 [ICD-9-CM])  Rhinovirus infection (B34.8 [ICD-10-CM] 079.3 [ICD-9-CM])  Chronic obstructive pulmonary disease with acute exacerbation (J44.1 [ICD-10-CM] 491.21 [ICD-9-CM])   Quantity:  1     Delivery Modality: Nasal Cannula  Liters Per Minute: 5  Duration: Continuous  Equipment:  Oxygen Concentrator &  &  Portable Gaseous Oxygen System & Portable Oxygen Contents Gaseous &  Conserving Regulator  The face to face evaluation was performed on: 10/31/2023  Which [employer] company is this being sent to? [facility] [[id number]]  Length of Need (99 Months = Lifetime): 99 Months = Lifetime        Authorizing Provider's Phone: [phone]  Verbal Order Mode: Verbal with readback   Authorizing Provider: Neymar Musa MD  Authorizing Provider's NPI: [id number]     Order Entered By: Jerzy Naik RN 2023  8:32 AM     Electronically signed by:         Respiratory Therapy Notes (last 24 hours)        Meena Bowens, ARIADNE at 10/31/23 1617          Exercise Oximetry    Patient Name:Jagdish Rea   MRN: [id number]   Date: 10/31/23             ROOM AIR BASELINE   SpO2%   87   Heart Rate   84   Blood Pressure      EXERCISE ON ROOM AIR SpO2% EXERCISE ON O2 @   5 LPM SpO2%   1 MINUTE  1 MINUTE   94   2 MINUTES  2 MINUTES   94   3 MINUTES  3 MINUTES   93   4 MINUTES  4 MINUTES   93   5 MINUTES  5 MINUTES   91   6 MINUTES  6 MINUTES   90 "              Distance Walked   Distance Walked  500 feet   Dyspnea (Ranjan Scale)   Dyspnea (Ranjan Scale)  2   Fatigue (Ranjan Scale)   Fatigue (Ranjan Scale)  7   SpO2% Post Exercise   SpO2% Post Exercise  93   HR Post Exercise   HR Post Exercise  80   Time to Recovery   Time to Recovery  2 minutes     Comments: 87 % Sat on room air, placed pt on 2 lpm NaO2.  Sat increased to 94% on 2 lpm NaO2.  With activity Sats dropped to 85%  Increased O2 to3 lpm NaO2.  Sat increased to 92% on 3 lpm NaO2.  With activity Sats dropped to 86%  Increased O2 to 4 lpm.  Sat increased to 93% on 4 lpm NaO2., With activity Sats dropped to 87%  Increased O2 to 5 lpm.Sats maintained above 90%.      Pt requests home O2 be arranged [facility].    Electronically signed by Meena Bowens, ARIADNE at 10/31/23 5453

## 2023-11-01 NOTE — CASE MANAGEMENT/SOCIAL WORK
Continued Stay Note   [facility]     Patient Name: Jagdish Rea  MRN: [id number]  Today's Date: 11/1/2023    Admit Date: 10/29/2023    Plan: Home w/ family. Referral to [facility] for new home O2 5L per NC   Discharge Plan       Row Name 11/01/23 0812       Plan    Plan Home w/ family. Referral to [facility] for new home O2 5L per NC    Plan Comments DC Barriers: IVF, increased SOA, IV pain medication, referral to [facility] for new home O2 requirements.                  Jerzy Naik RN      Cell number [phone]  Office number [phone]

## 2023-11-01 NOTE — PLAN OF CARE
Goal Outcome Evaluation:     Problem: Pain Chronic (Persistent) (Comorbidity Management)  Goal: Acceptable Pain Control and Functional Ability  Outcome: Ongoing, Progressing  Intervention: Manage Persistent Pain  Recent Flowsheet Documentation  Taken 10/31/2023 2042 by Saritha Blanc RN  Sleep/Rest Enhancement: regular sleep/rest pattern promoted  Intervention: Develop Pain Management Plan  Recent Flowsheet Documentation  Taken 10/31/2023 2154 by Saritha Blanc RN  Pain Management Interventions: see MAR  Intervention: Optimize Psychosocial Wellbeing  Recent Flowsheet Documentation  Taken 10/31/2023 2042 by Saritha Blanc RN  Supportive Measures: active listening utilized  Diversional Activities: television  Family/Support System Care:   support provided   self-care encouraged     Problem: Fall Injury Risk  Goal: Absence of Fall and Fall-Related Injury  Outcome: Ongoing, Progressing  Intervention: Promote Injury-Free Environment  Recent Flowsheet Documentation  Taken 11/1/2023 0400 by Saritha Blanc RN  Safety Promotion/Fall Prevention:   safety round/check completed   room organization consistent   clutter free environment maintained  Taken 11/1/2023 0200 by Saritha Blanc RN  Safety Promotion/Fall Prevention:   safety round/check completed   room organization consistent   clutter free environment maintained  Taken 11/1/2023 0000 by Saritha Blanc RN  Safety Promotion/Fall Prevention:   safety round/check completed   room organization consistent   clutter free environment maintained  Taken 10/31/2023 2200 by Saritha Blanc RN  Safety Promotion/Fall Prevention:   room organization consistent   safety round/check completed   clutter free environment maintained  Taken 10/31/2023 2042 by Saritha Blanc RN  Safety Promotion/Fall Prevention:   safety round/check completed   room organization consistent   clutter free environment maintained

## 2023-11-01 NOTE — PLAN OF CARE
Goal Outcome Evaluation:  Plan of Care Reviewed With: patient        Progress: improving    Outcome Evaluation: Pt A&O x4, 3L nasal cannula, up ad minal.  Pt has had some complaints of pain today in his chest that comes with his cough. Gave IV morphine 1 mg  x2, intervention is effective. Normal saline at 100 in right AC. Pt is in isolation for Rhinovirus. Pt continues to have productive cough. Pt receiving IV steroids, breathing treatments, mucinex, bronchodilators. Pt received IV rocephin 2g IV today. Pulmonology consult put in, Daniella saw pt today. Now inpatient as well, MCKENNA Irene is now assigned to patient. Continuing to monitor. No complaints at this time.

## 2023-11-01 NOTE — CONSULTS
[facility] Medicine Services   Consult Note    Patient Name: Jagdish Rea  : 1960  MRN: [id number]  Primary Care Physician:  Yamilka Pascual APRN  Referring Physician: KEILA Kaur  Date of admission: 10/29/2023  Date and Time of Care: 2023     Inpatient Hospitalist Consult  Consult performed by: Christy Irene APRN  Consult ordered by: Edi Moses PA-C          Subjective      Reason for Consult/ Chief Complaint: Medical Management     Consult Requested By: Edi Moses PA-C    History of Present Illness: Jagdish Rea is a 62 y.o. male who comes in with a history of COPD GERD hypertension hyperlipidemia obesity and history of pneumonia who states he has had cough congestion for the last week and has developed some chest pain with the cough he states he has frothy sputum from the cough-he does not have any ill exposures that he is aware of.  He does have some sinus congestion.  Denies history of fever or chills.   Patient has been ill for about a week with cough and congestion nonproductive no fever chills he said some tightness in chest but no neck arm or jaw pain.  Patient denies any leg pain or swelling no recent long car ride plane ride immobilization or foreign travels.  No injury.  No one at home with similar illness.  Worse with exertion and better with rest.     Observation 10/30/23: Patient 62-year-old male presenting to the hospital with complaints of cough and shortness of breath.  Patient has had cough and congestion for the past week with worsening symptoms.  Patient denies fever, nausea, vomiting, chest pain or syncope.  Patient does have history of COPD but states symptoms are worsen than normal COPD exacerbation.      Observation 10/31/23: Patient reports nonproductive cough and improvement in breathing today.  Patient dates he did have some low oxygen overnight and supplement with oxygen.     Observation 2023:  Patient reports some  continued dyspnea which she notes was worse with exertion the previous day.  Oxygen levels continue to drop to the high 80s while in bed especially when sleeping.    Inpatient 11/01/2023:  Patient seen and evaluated while in bed. Patient denies any complaints at this time, he denies shortness of breath. Discussed plan of care with patient and all questions and concerns addressed.     12 point ROS reviewed and negative except as mentioned above.     Personal History     Past Medical History:   Diagnosis Date    Allergic     Anxiety     Arthritis 06/2005    Back pain     Claustrophobia 1978    COPD (chronic obstructive pulmonary disease)     CTS (carpal tunnel syndrome) 10/2022    Dysphagia     Esophageal stricture     GERD (gastroesophageal reflux disease)     Hyperlipidemia     Hypertension     Knee pain     rt    Low back pain     Obesity     Pneumonia     PTSD (post-traumatic stress disorder)     Thoracic disc herniation     Vitamin B12 deficiency        Past Surgical History:   Procedure Laterality Date    CARDIAC CATHETERIZATION N/A 07/13/2022    Procedure: Left Heart Cath, possible pci;  Surgeon: Prieto Sidhu MD;  Location: [facility] CATH INVASIVE LOCATION;  Service: Cardiovascular;  Laterality: N/A;    ENDOSCOPY      ENDOSCOPY N/A 9/21/2023    Procedure: ESOPHAGOGASTRODUODENOSCOPY WITH non guided esophageal dialtion 54 Fr. Bougie and  cold forcep biopsy x1 area;  Surgeon: Andres Concepcion MD;  Location: [facility] ENDOSCOPY;  Service: Gastroenterology;  Laterality: N/A;  Post- erosive gastritis, hiatal hernia, esophageal stricture    HERNIA REPAIR      KNEE ARTHROPLASTY      x2    SHOULDER ARTHROSCOPY W/ ROTATOR CUFF REPAIR Right 08/11/2020    Procedure: SHOULDER ARTHROSCOPY WITH ROTATOR CUFF REPAIR, extensive debridement;  Surgeon: Fredi Ritchie MD;  Location: [facility] MAIN OR;  Service: Orthopedics;  Laterality: Right;    TONSILLECTOMY         Family History: family history includes  Alcohol abuse in his brother; Arthritis in his paternal grandmother; Asthma in his brother; Cancer in his father; Depression in his brother; Drug abuse in his brother; Early death in his brother and mother; Heart disease in his brother and mother; Hyperlipidemia in his brother, brother, and mother; Hypertension in his brother, brother, and mother; Learning disabilities in his brother; Mental illness in his brother; Stroke in his father and paternal grandfather; Thyroid disease in his mother; Vision loss in his father. Otherwise pertinent FHx was reviewed and not pertinent to current issue.    Social History:  reports that he has been smoking cigarettes. He started smoking about 50 years ago. He has a 47.00 pack-year smoking history. He has never used smokeless tobacco. He reports that he does not drink alcohol and does not use drugs.    Home Medications:   albuterol sulfate HFA, amLODIPine, benzonatate, cetirizine, doxepin, guaiFENesin, hydroCHLOROthiazide, ipratropium-albuterol, methylPREDNISolone, metoprolol succinate XL, pantoprazole, and pravastatin    Allergies:  Allergies   Allergen Reactions    Atorvastatin Swelling    Lisinopril Other (See Comments)     Facial paralysis          Objective      Vitals:  Temp:  [97.4 °F (36.3 °C)-98.4 °F (36.9 °C)] 97.8 °F (36.6 °C)  Heart Rate:  [] 83  Resp:  [17-20] 20  BP: (101-129)/(59-77) 101/60  Flow (L/min):  [2-3] 3    Physical Exam  Vitals reviewed.   Constitutional:       General: He is awake.   HENT:      Head: Normocephalic and atraumatic.      Mouth/Throat:      Mouth: Mucous membranes are moist.      Pharynx: Oropharynx is clear.   Eyes:      Extraocular Movements: Extraocular movements intact.      Pupils: Pupils are equal, round, and reactive to light.   Cardiovascular:      Rate and Rhythm: Normal rate and regular rhythm.      Pulses: Normal pulses.      Heart sounds: Normal heart sounds.   Pulmonary:      Effort: Pulmonary effort is normal.      Breath  sounds: Rhonchi and rales present.      Comments: Requiring supplemental 02 via Nasal cannula   Abdominal:      General: Abdomen is flat. Bowel sounds are normal.      Palpations: Abdomen is soft.   Musculoskeletal:         General: Normal range of motion.      Cervical back: Normal range of motion.   Skin:     General: Skin is warm and dry.   Neurological:      General: No focal deficit present.      Mental Status: He is alert and oriented to person, place, and time.   Psychiatric:         Mood and Affect: Mood normal.         Behavior: Behavior normal. Behavior is cooperative.          Result Review    Result Review:  I have personally reviewed the results from the time of this admission to 11/1/2023 14:15 EDT and agree with these findings:  []  Laboratory  []  Microbiology  []  Radiology  []  EKG/Telemetry   []  Cardiology/Vascular   []  Pathology  []  Old records  []  Other:  Most notable findings include:     CBC:      Lab 11/01/23  1316 10/31/23  1116 10/30/23  0359 10/29/23  1920   WBC 16.60* 17.10* 10.00 7.50   HEMOGLOBIN 15.0 14.6 16.0 16.9   HEMATOCRIT 45.0 43.7 47.7 48.9   PLATELETS 162 157 148 155   NEUTROS ABS  --  15.30*  --  3.80   LYMPHS ABS  --  1.10  --  3.00   MONOS ABS  --  0.60  --  0.50   EOS ABS  --  0.00  --  0.10   MCV 99.0* 101.6* 100.7* 98.1*      CMP:        Lab 11/01/23  1316 10/31/23  1116 10/30/23  0359 10/29/23  1920   SODIUM 142 141 142 142   POTASSIUM 3.6 4.1 3.5 3.2*   CHLORIDE 108* 109* 107 107   CO2 22.0 22.0 23.0 23.0   ANION GAP 12.0 10.0 12.0 12.0   BUN 13 11 8 8   CREATININE 0.72* 0.72* 0.69* 0.94   EGFR 103.3 103.3 104.6 91.7   GLUCOSE 153* 161* 116* 95   CALCIUM 9.2 9.1 9.0 9.2   TOTAL PROTEIN  --   --   --  7.5   ALBUMIN  --   --   --  4.3   GLOBULIN  --   --   --  3.2   ALT (SGPT)  --   --   --  36   AST (SGOT)  --   --   --  30   BILIRUBIN  --   --   --  0.4   ALK PHOS  --   --   --  85        Assessment & Plan        Active Hospital Problems:  Active Hospital Problems     Diagnosis     **Dyspnea      Plan:     Dyspnea   -WBCs trended up to 17.10 in the setting of IV steroid administration  -Troponin and BNP negative   -Breathing treatments, steroids,Mucinex, Tessalon and incentive spirometry ordered  -Chest x-ray: mild opacities in the left base which could represent atelectasis, pneumonia, or other infiltrate,emphysema  -IV fluids  -Step pneumo negative   -Respiratory panel positive for rhinovirus   -EKG: Sinus rhythm with no ST changes  -D-dimer <0.19  -Telemetry and pulse oximetry  -Walking oximetry performed on 10/31 requiring 5 L supplemental oxygen  -Pulmonology consulted who evaluated patient recommending continued IV steroids and possible bronchoscopy if symptoms fail to improve     Hypertension  - Continue metoprolol, hydrochlorothiazide, and norvasc  - Monitor while admitted     GERD  -Continue PPI      Signature: Electronically signed by KEILA Wadsworth, 11/01/23, 14:15 EDT.  [facility] Hospitalist Team

## 2023-11-02 ENCOUNTER — APPOINTMENT (OUTPATIENT)
Dept: GENERAL RADIOLOGY | Facility: HOSPITAL | Age: 63
DRG: 193 | End: 2023-11-02
Payer: MEDICARE

## 2023-11-02 PROBLEM — T17.800A MULTIPLE TRACHEOBRONCHIAL MUCUS PLUGS: Status: ACTIVE | Noted: 2023-10-29

## 2023-11-02 LAB
ANION GAP SERPL CALCULATED.3IONS-SCNC: 12 MMOL/L (ref 5–15)
BUN SERPL-MCNC: 12 MG/DL (ref 8–23)
BUN/CREAT SERPL: 18.5 (ref 7–25)
CALCIUM SPEC-SCNC: 9.2 MG/DL (ref 8.6–10.5)
CHLORIDE SERPL-SCNC: 107 MMOL/L (ref 98–107)
CO2 SERPL-SCNC: 23 MMOL/L (ref 22–29)
CREAT SERPL-MCNC: 0.65 MG/DL (ref 0.76–1.27)
DEPRECATED RDW RBC AUTO: 48.1 FL (ref 37–54)
EGFRCR SERPLBLD CKD-EPI 2021: 106.5 ML/MIN/1.73
ERYTHROCYTE [DISTWIDTH] IN BLOOD BY AUTOMATED COUNT: 13.8 % (ref 12.3–15.4)
GLUCOSE SERPL-MCNC: 125 MG/DL (ref 65–99)
HCT VFR BLD AUTO: 45.3 % (ref 37.5–51)
HGB BLD-MCNC: 15.1 G/DL (ref 13–17.7)
LARGE PLATELETS: ABNORMAL
LYMPHOCYTES # BLD MANUAL: 1.69 10*3/MM3 (ref 0.7–3.1)
LYMPHOCYTES NFR BLD MANUAL: 9 % (ref 5–12)
MCH RBC QN AUTO: 33.3 PG (ref 26.6–33)
MCHC RBC AUTO-ENTMCNC: 33.3 G/DL (ref 31.5–35.7)
MCV RBC AUTO: 100.2 FL (ref 79–97)
MONOCYTES # BLD: 1.39 10*3/MM3 (ref 0.1–0.9)
NEUTROPHILS # BLD AUTO: 12.32 10*3/MM3 (ref 1.7–7)
NEUTROPHILS NFR BLD MANUAL: 76 % (ref 42.7–76)
NEUTS BAND NFR BLD MANUAL: 4 % (ref 0–5)
PLATELET # BLD AUTO: 155 10*3/MM3 (ref 140–450)
PMV BLD AUTO: 10.6 FL (ref 6–12)
POTASSIUM SERPL-SCNC: 3.5 MMOL/L (ref 3.5–5.2)
RBC # BLD AUTO: 4.52 10*6/MM3 (ref 4.14–5.8)
RBC MORPH BLD: NORMAL
SCAN SLIDE: NORMAL
SODIUM SERPL-SCNC: 142 MMOL/L (ref 136–145)
VARIANT LYMPHS NFR BLD MANUAL: 4 % (ref 0–5)
VARIANT LYMPHS NFR BLD MANUAL: 7 % (ref 19.6–45.3)
WBC MORPH BLD: NORMAL
WBC NRBC COR # BLD: 15.4 10*3/MM3 (ref 3.4–10.8)

## 2023-11-02 PROCEDURE — 94799 UNLISTED PULMONARY SVC/PX: CPT

## 2023-11-02 PROCEDURE — 93005 ELECTROCARDIOGRAM TRACING: CPT | Performed by: STUDENT IN AN ORGANIZED HEALTH CARE EDUCATION/TRAINING PROGRAM

## 2023-11-02 PROCEDURE — 25010000002 CEFTRIAXONE PER 250 MG: Performed by: INTERNAL MEDICINE

## 2023-11-02 PROCEDURE — 25010000002 ENOXAPARIN PER 10 MG: Performed by: INTERNAL MEDICINE

## 2023-11-02 PROCEDURE — 85007 BL SMEAR W/DIFF WBC COUNT: CPT | Performed by: NURSE PRACTITIONER

## 2023-11-02 PROCEDURE — 93010 ELECTROCARDIOGRAM REPORT: CPT | Performed by: INTERNAL MEDICINE

## 2023-11-02 PROCEDURE — 71045 X-RAY EXAM CHEST 1 VIEW: CPT

## 2023-11-02 PROCEDURE — 25010000002 METHYLPREDNISOLONE PER 125 MG: Performed by: EMERGENCY MEDICINE

## 2023-11-02 PROCEDURE — 25810000003 SODIUM CHLORIDE 0.9 % SOLUTION: Performed by: EMERGENCY MEDICINE

## 2023-11-02 PROCEDURE — 94664 DEMO&/EVAL PT USE INHALER: CPT

## 2023-11-02 PROCEDURE — 80048 BASIC METABOLIC PNL TOTAL CA: CPT | Performed by: NURSE PRACTITIONER

## 2023-11-02 PROCEDURE — 94761 N-INVAS EAR/PLS OXIMETRY MLT: CPT

## 2023-11-02 PROCEDURE — 25010000002 MORPHINE PER 10 MG: Performed by: EMERGENCY MEDICINE

## 2023-11-02 PROCEDURE — 85025 COMPLETE CBC W/AUTO DIFF WBC: CPT | Performed by: NURSE PRACTITIONER

## 2023-11-02 RX ORDER — IPRATROPIUM BROMIDE AND ALBUTEROL SULFATE 2.5; .5 MG/3ML; MG/3ML
3 SOLUTION RESPIRATORY (INHALATION) EVERY 6 HOURS PRN
Status: DISCONTINUED | OUTPATIENT
Start: 2023-11-02 | End: 2023-11-06 | Stop reason: HOSPADM

## 2023-11-02 RX ORDER — OXYCODONE HYDROCHLORIDE 5 MG/1
5 TABLET ORAL EVERY 4 HOURS PRN
Status: DISCONTINUED | OUTPATIENT
Start: 2023-11-02 | End: 2023-11-06 | Stop reason: HOSPADM

## 2023-11-02 RX ORDER — GUAIFENESIN/DEXTROMETHORPHAN 100-10MG/5
5 SYRUP ORAL EVERY 4 HOURS PRN
Status: DISCONTINUED | OUTPATIENT
Start: 2023-11-02 | End: 2023-11-06 | Stop reason: HOSPADM

## 2023-11-02 RX ORDER — DILTIAZEM HYDROCHLORIDE 5 MG/ML
10 INJECTION INTRAVENOUS ONCE
Status: COMPLETED | OUTPATIENT
Start: 2023-11-02 | End: 2023-11-02

## 2023-11-02 RX ORDER — ACETYLCYSTEINE 200 MG/ML
2 SOLUTION ORAL; RESPIRATORY (INHALATION)
Status: DISCONTINUED | OUTPATIENT
Start: 2023-11-02 | End: 2023-11-05

## 2023-11-02 RX ADMIN — CEFTRIAXONE 2000 MG: 2 INJECTION, POWDER, FOR SOLUTION INTRAMUSCULAR; INTRAVENOUS at 11:40

## 2023-11-02 RX ADMIN — Medication 10 ML: at 21:02

## 2023-11-02 RX ADMIN — METHYLPREDNISOLONE SODIUM SUCCINATE 60 MG: 125 INJECTION, POWDER, FOR SOLUTION INTRAMUSCULAR; INTRAVENOUS at 21:01

## 2023-11-02 RX ADMIN — SODIUM CHLORIDE 100 ML/HR: 9 INJECTION, SOLUTION INTRAVENOUS at 17:29

## 2023-11-02 RX ADMIN — GUAIFENESIN 1200 MG: 600 TABLET, EXTENDED RELEASE ORAL at 09:30

## 2023-11-02 RX ADMIN — AMLODIPINE BESYLATE 10 MG: 5 TABLET ORAL at 09:30

## 2023-11-02 RX ADMIN — ACETYLCYSTEINE 2 ML: 200 SOLUTION ORAL; RESPIRATORY (INHALATION) at 19:34

## 2023-11-02 RX ADMIN — IPRATROPIUM BROMIDE AND ALBUTEROL SULFATE 3 ML: .5; 3 SOLUTION RESPIRATORY (INHALATION) at 12:05

## 2023-11-02 RX ADMIN — GUAIFENESIN SYRUP AND DEXTROMETHORPHAN 5 ML: 100; 10 SYRUP ORAL at 13:05

## 2023-11-02 RX ADMIN — MORPHINE SULFATE 1 MG: 2 INJECTION, SOLUTION INTRAMUSCULAR; INTRAVENOUS at 09:40

## 2023-11-02 RX ADMIN — IPRATROPIUM BROMIDE AND ALBUTEROL SULFATE 3 ML: .5; 3 SOLUTION RESPIRATORY (INHALATION) at 07:30

## 2023-11-02 RX ADMIN — OXYCODONE 5 MG: 5 TABLET ORAL at 13:05

## 2023-11-02 RX ADMIN — DOCUSATE SODIUM 50 MG AND SENNOSIDES 8.6 MG 2 TABLET: 8.6; 5 TABLET, FILM COATED ORAL at 09:30

## 2023-11-02 RX ADMIN — METHYLPREDNISOLONE SODIUM SUCCINATE 60 MG: 125 INJECTION, POWDER, FOR SOLUTION INTRAMUSCULAR; INTRAVENOUS at 09:30

## 2023-11-02 RX ADMIN — METOPROLOL SUCCINATE 25 MG: 25 TABLET, EXTENDED RELEASE ORAL at 09:30

## 2023-11-02 RX ADMIN — CETIRIZINE HYDROCHLORIDE 10 MG: 10 TABLET, FILM COATED ORAL at 09:30

## 2023-11-02 RX ADMIN — BUDESONIDE 0.5 MG: 0.5 INHALANT RESPIRATORY (INHALATION) at 07:35

## 2023-11-02 RX ADMIN — OXYCODONE 5 MG: 5 TABLET ORAL at 21:08

## 2023-11-02 RX ADMIN — SODIUM CHLORIDE 100 ML/HR: 9 INJECTION, SOLUTION INTRAVENOUS at 06:04

## 2023-11-02 RX ADMIN — DOXEPIN HYDROCHLORIDE 10 MG: 10 CAPSULE ORAL at 21:02

## 2023-11-02 RX ADMIN — ENOXAPARIN SODIUM 40 MG: 100 INJECTION SUBCUTANEOUS at 18:04

## 2023-11-02 RX ADMIN — PANTOPRAZOLE SODIUM 40 MG: 40 TABLET, DELAYED RELEASE ORAL at 09:30

## 2023-11-02 RX ADMIN — BUDESONIDE 0.5 MG: 0.5 INHALANT RESPIRATORY (INHALATION) at 19:39

## 2023-11-02 RX ADMIN — DILTIAZEM HYDROCHLORIDE 10 MG: 5 INJECTION INTRAVENOUS at 23:07

## 2023-11-02 NOTE — PROGRESS NOTES
Daily Progress Note          Assessment    Dyspnea  Hypoxemia  Left lower lobe pneumonia due to unspecified pathogen  Rhinovirus infection  COPD with acute exacerbation  Tobacco smoker  HLD  HTN  GERD with history of esophageal stricture     Recommendations:  Patient is unable to clear secretions, schedule bronchoscopy 11/3/2023   antibiotics: Rocephin  Oxygen supplement and titration to maintain saturation 90 to 95%: Currently requiring 4 L per nasal cannula  Bronchodilators  Inhaled corticosteroids  Encourage use of incentive spirometry  IV steroids  Mucinex to 1200 mg twice daily  Amlodipine and metoprolol  Smoking cessation counseling  DVT/GI prophylaxis                      LOS: 1 day     Subjective     Patient reports cough and shortness of breath    Objective     Vital signs for last 24 hours:  Vitals:    11/02/23 0037 11/02/23 0205 11/02/23 0615 11/02/23 0730   BP: 128/88 117/69 135/83    BP Location: Right arm Right arm Right arm    Patient Position:  Lying Lying    Pulse:  80 66 82   Resp: 22 20 18 19   Temp: 98 °F (36.7 °C) 98 °F (36.7 °C) 98 °F (36.7 °C)    TempSrc: Oral Oral Oral    SpO2: 94% 93% 94% 92%   Weight:       Height:           Intake/Output last 3 shifts:  I/O last 3 completed shifts:  In: 1492 [P.O.:1492]  Out: 4480 [Urine:4480]  Intake/Output this shift:  I/O this shift:  In: -   Out: 300 [Urine:300]      Radiology  Imaging Results (Last 24 Hours)       Procedure Component Value Units Date/Time    XR Chest 1 View [[id number]] Collected: 11/02/23 0720     Updated: 11/02/23 0723    Narrative:      XR CHEST 1 VW    Date of Exam: 11/2/2023 5:35 AM EDT    Indication: Pneumonia    Comparison: 10/29/2023    Findings:  Heart size normal. Mild bibasilar airspace disease left greater than right which may relate to atelectasis and/or pneumonia, mild worsening at the left lung base. No pneumothorax. No significant effusion. Osseous structures grossly intact.      Impression:      Impression:  Persistent  bibasilar airspace disease left greater than right which may relate to atelectasis and/or pneumonia.        Electronically Signed: Americo Connell MD    11/2/2023 7:21 AM EDT    Workstation ID: [id number]            Labs:  Results from last 7 days   Lab Units 11/01/23  1316   WBC 10*3/mm3 16.60*   HEMOGLOBIN g/dL 15.0   HEMATOCRIT % 45.0   PLATELETS 10*3/mm3 162     Results from last 7 days   Lab Units 11/01/23  1316 10/30/23  0359 10/29/23  1920   SODIUM mmol/L 142   < > 142   POTASSIUM mmol/L 3.6   < > 3.2*   CHLORIDE mmol/L 108*   < > 107   CO2 mmol/L 22.0   < > 23.0   BUN mg/dL 13   < > 8   CREATININE mg/dL 0.72*   < > 0.94   CALCIUM mg/dL 9.2   < > 9.2   BILIRUBIN mg/dL  --   --  0.4   ALK PHOS U/L  --   --  85   ALT (SGPT) U/L  --   --  36   AST (SGOT) U/L  --   --  30   GLUCOSE mg/dL 153*   < > 95    < > = values in this interval not displayed.         Results from last 7 days   Lab Units 10/29/23  1920   ALBUMIN g/dL 4.3     Results from last 7 days   Lab Units 10/29/23  2232 10/29/23  1920   HSTROP T ng/L 7 8                           Meds:   SCHEDULE  amLODIPine, 10 mg, Oral, Daily  budesonide, 0.5 mg, Nebulization, BID - RT  cefTRIAXone, 2,000 mg, Intravenous, Q24H  cetirizine, 10 mg, Oral, Daily  doxepin, 10 mg, Oral, Nightly  enoxaparin, 40 mg, Subcutaneous, Q24H  guaiFENesin, 1,200 mg, Oral, Q12H  ipratropium-albuterol, 3 mL, Nebulization, 4x Daily - RT  methylPREDNISolone sodium succinate, 60 mg, Intravenous, Q12H  metoprolol succinate XL, 25 mg, Oral, Daily  pantoprazole, 40 mg, Oral, Daily  senna-docusate sodium, 2 tablet, Oral, BID  sodium chloride, 10 mL, Intravenous, Q12H  sodium chloride, 10 mL, Intravenous, Q12H      Infusions  sodium chloride, 100 mL/hr, Last Rate: 100 mL/hr (11/02/23 0604)      PRNs    acetaminophen    albuterol    benzonatate    senna-docusate sodium **AND** polyethylene glycol **AND** bisacodyl **AND** bisacodyl    Morphine **AND** naloxone    nitroglycerin    ondansetron **OR**  ondansetron    sodium chloride    sodium chloride    sodium chloride    sodium chloride    sodium chloride    Physical Exam:  General Appearance:  Alert   HEENT:  Normocephalic, without obvious abnormality, Conjunctiva/corneas clear,.   Nares normal, no drainage     Neck:  Supple, symmetrical, trachea midline.   Lungs /Chest wall:   Bilateral basal rhonchi, respirations unlabored, symmetrical wall movement.     Heart:  Regular rate and rhythm, S1 S2 normal  Abdomen: Soft, non-tender, no masses, no organomegaly.    Extremities: No edema, no clubbing or cyanosis     ROS  Constitutional: Negative for chills, fever and malaise/fatigue.   HENT: Negative.    Eyes: Negative.    Cardiovascular: Negative.    Respiratory: Positive for cough and shortness of breath.    Skin: Negative.    Musculoskeletal: Negative.    Gastrointestinal: Negative.    Genitourinary: Negative.    Neurological: Negative.    Psychiatric/Behavioral: Negative.      I reviewed the recent clinical results    Part of this note may be an electronic transcription/translation of spoken language to printed text using the Dragon Dictation System.

## 2023-11-02 NOTE — PLAN OF CARE
Goal Outcome Evaluation:    IVF infusing. IV abx continued daily and IV steroids continued BID per MAR. Plan for bronchoscopy 11/3 with Dr. Brewer.

## 2023-11-03 ENCOUNTER — APPOINTMENT (OUTPATIENT)
Dept: CARDIOLOGY | Facility: HOSPITAL | Age: 63
DRG: 193 | End: 2023-11-03
Payer: MEDICARE

## 2023-11-03 ENCOUNTER — ANESTHESIA (OUTPATIENT)
Dept: GASTROENTEROLOGY | Facility: HOSPITAL | Age: 63
End: 2023-11-03
Payer: MEDICARE

## 2023-11-03 ENCOUNTER — ANESTHESIA EVENT (OUTPATIENT)
Dept: GASTROENTEROLOGY | Facility: HOSPITAL | Age: 63
End: 2023-11-03
Payer: MEDICARE

## 2023-11-03 LAB
B PARAPERT DNA SPEC QL NAA+PROBE: NOT DETECTED
B PERT DNA SPEC QL NAA+PROBE: NOT DETECTED
C PNEUM DNA NPH QL NAA+NON-PROBE: NOT DETECTED
FLUAV SUBTYP SPEC NAA+PROBE: NOT DETECTED
FLUBV RNA ISLT QL NAA+PROBE: NOT DETECTED
HADV DNA SPEC NAA+PROBE: NOT DETECTED
HCOV 229E RNA SPEC QL NAA+PROBE: NOT DETECTED
HCOV HKU1 RNA SPEC QL NAA+PROBE: NOT DETECTED
HCOV NL63 RNA SPEC QL NAA+PROBE: NOT DETECTED
HCOV OC43 RNA SPEC QL NAA+PROBE: NOT DETECTED
HMPV RNA NPH QL NAA+NON-PROBE: NOT DETECTED
HPIV1 RNA ISLT QL NAA+PROBE: NOT DETECTED
HPIV2 RNA SPEC QL NAA+PROBE: NOT DETECTED
HPIV3 RNA NPH QL NAA+PROBE: NOT DETECTED
HPIV4 P GENE NPH QL NAA+PROBE: NOT DETECTED
M PNEUMO IGG SER IA-ACNC: NOT DETECTED
QTC INTERVAL: NORMAL MS
RHINOVIRUS RNA SPEC NAA+PROBE: DETECTED
RSV RNA NPH QL NAA+NON-PROBE: NOT DETECTED
SARS-COV-2 RNA NPH QL NAA+NON-PROBE: NOT DETECTED
TSH SERPL DL<=0.05 MIU/L-ACNC: 1.02 UIU/ML (ref 0.27–4.2)

## 2023-11-03 PROCEDURE — 87070 CULTURE OTHR SPECIMN AEROBIC: CPT | Performed by: INTERNAL MEDICINE

## 2023-11-03 PROCEDURE — 94799 UNLISTED PULMONARY SVC/PX: CPT

## 2023-11-03 PROCEDURE — 87205 SMEAR GRAM STAIN: CPT | Performed by: INTERNAL MEDICINE

## 2023-11-03 PROCEDURE — 0202U NFCT DS 22 TRGT SARS-COV-2: CPT | Performed by: INTERNAL MEDICINE

## 2023-11-03 PROCEDURE — 93005 ELECTROCARDIOGRAM TRACING: CPT | Performed by: NURSE PRACTITIONER

## 2023-11-03 PROCEDURE — 87206 SMEAR FLUORESCENT/ACID STAI: CPT | Performed by: INTERNAL MEDICINE

## 2023-11-03 PROCEDURE — 87798 DETECT AGENT NOS DNA AMP: CPT | Performed by: INTERNAL MEDICINE

## 2023-11-03 PROCEDURE — 94761 N-INVAS EAR/PLS OXIMETRY MLT: CPT

## 2023-11-03 PROCEDURE — 25010000002 ENOXAPARIN PER 10 MG: Performed by: INTERNAL MEDICINE

## 2023-11-03 PROCEDURE — 25010000002 PROPOFOL 200 MG/20ML EMULSION: Performed by: NURSE ANESTHETIST, CERTIFIED REGISTERED

## 2023-11-03 PROCEDURE — 87116 MYCOBACTERIA CULTURE: CPT | Performed by: INTERNAL MEDICINE

## 2023-11-03 PROCEDURE — 87102 FUNGUS ISOLATION CULTURE: CPT | Performed by: INTERNAL MEDICINE

## 2023-11-03 PROCEDURE — 93306 TTE W/DOPPLER COMPLETE: CPT

## 2023-11-03 PROCEDURE — 25810000003 SODIUM CHLORIDE 0.9 % SOLUTION: Performed by: EMERGENCY MEDICINE

## 2023-11-03 PROCEDURE — 25010000002 METHYLPREDNISOLONE PER 125 MG: Performed by: EMERGENCY MEDICINE

## 2023-11-03 PROCEDURE — 94664 DEMO&/EVAL PT USE INHALER: CPT

## 2023-11-03 PROCEDURE — 88108 CYTOPATH CONCENTRATE TECH: CPT | Performed by: INTERNAL MEDICINE

## 2023-11-03 PROCEDURE — 93306 TTE W/DOPPLER COMPLETE: CPT | Performed by: INTERNAL MEDICINE

## 2023-11-03 PROCEDURE — 25010000002 PIPERACILLIN SOD-TAZOBACTAM PER 1 G: Performed by: INTERNAL MEDICINE

## 2023-11-03 PROCEDURE — 25010000002 PROPOFOL 10 MG/ML EMULSION: Performed by: NURSE ANESTHETIST, CERTIFIED REGISTERED

## 2023-11-03 PROCEDURE — 25010000002 HYDROMORPHONE 1 MG/ML SOLUTION: Performed by: STUDENT IN AN ORGANIZED HEALTH CARE EDUCATION/TRAINING PROGRAM

## 2023-11-03 PROCEDURE — 3E1F88Z IRRIGATION OF RESPIRATORY TRACT USING IRRIGATING SUBSTANCE, VIA NATURAL OR ARTIFICIAL OPENING ENDOSCOPIC: ICD-10-PCS | Performed by: INTERNAL MEDICINE

## 2023-11-03 RX ORDER — LIDOCAINE 50 MG/G
OINTMENT TOPICAL AS NEEDED
Status: DISCONTINUED | OUTPATIENT
Start: 2023-11-03 | End: 2023-11-03 | Stop reason: HOSPADM

## 2023-11-03 RX ORDER — FLUMAZENIL 0.1 MG/ML
0.1 INJECTION INTRAVENOUS AS NEEDED
Status: DISCONTINUED | OUTPATIENT
Start: 2023-11-03 | End: 2023-11-03 | Stop reason: HOSPADM

## 2023-11-03 RX ORDER — LIDOCAINE HYDROCHLORIDE 20 MG/ML
INJECTION, SOLUTION INFILTRATION; PERINEURAL AS NEEDED
Status: DISCONTINUED | OUTPATIENT
Start: 2023-11-03 | End: 2023-11-03 | Stop reason: HOSPADM

## 2023-11-03 RX ORDER — LABETALOL HYDROCHLORIDE 5 MG/ML
5 INJECTION, SOLUTION INTRAVENOUS
Status: DISCONTINUED | OUTPATIENT
Start: 2023-11-03 | End: 2023-11-03 | Stop reason: HOSPADM

## 2023-11-03 RX ORDER — PROCHLORPERAZINE EDISYLATE 5 MG/ML
10 INJECTION INTRAMUSCULAR; INTRAVENOUS ONCE AS NEEDED
Status: DISCONTINUED | OUTPATIENT
Start: 2023-11-03 | End: 2023-11-03 | Stop reason: HOSPADM

## 2023-11-03 RX ORDER — DIPHENHYDRAMINE HYDROCHLORIDE 50 MG/ML
12.5 INJECTION INTRAMUSCULAR; INTRAVENOUS
Status: DISCONTINUED | OUTPATIENT
Start: 2023-11-03 | End: 2023-11-03 | Stop reason: HOSPADM

## 2023-11-03 RX ORDER — ONDANSETRON 2 MG/ML
4 INJECTION INTRAMUSCULAR; INTRAVENOUS ONCE AS NEEDED
Status: DISCONTINUED | OUTPATIENT
Start: 2023-11-03 | End: 2023-11-03 | Stop reason: HOSPADM

## 2023-11-03 RX ORDER — ACETAMINOPHEN 325 MG/1
650 TABLET ORAL ONCE AS NEEDED
Status: DISCONTINUED | OUTPATIENT
Start: 2023-11-03 | End: 2023-11-03 | Stop reason: HOSPADM

## 2023-11-03 RX ORDER — ACETAMINOPHEN 650 MG/1
325 SUPPOSITORY RECTAL EVERY 4 HOURS PRN
Status: DISCONTINUED | OUTPATIENT
Start: 2023-11-03 | End: 2023-11-03 | Stop reason: HOSPADM

## 2023-11-03 RX ORDER — DILTIAZEM HCL/D5W 125 MG/125
5-15 PLASTIC BAG, INJECTION (ML) INTRAVENOUS
Status: DISCONTINUED | OUTPATIENT
Start: 2023-11-03 | End: 2023-11-05

## 2023-11-03 RX ORDER — ALBUTEROL SULFATE 2.5 MG/3ML
2.5 SOLUTION RESPIRATORY (INHALATION) ONCE AS NEEDED
Status: COMPLETED | OUTPATIENT
Start: 2023-11-03 | End: 2023-11-03

## 2023-11-03 RX ORDER — PROPOFOL 10 MG/ML
INJECTION, EMULSION INTRAVENOUS AS NEEDED
Status: DISCONTINUED | OUTPATIENT
Start: 2023-11-03 | End: 2023-11-03 | Stop reason: SURG

## 2023-11-03 RX ORDER — HYDRALAZINE HYDROCHLORIDE 20 MG/ML
5 INJECTION INTRAMUSCULAR; INTRAVENOUS
Status: DISCONTINUED | OUTPATIENT
Start: 2023-11-03 | End: 2023-11-03 | Stop reason: HOSPADM

## 2023-11-03 RX ORDER — GUAIFENESIN 600 MG/1
1200 TABLET, EXTENDED RELEASE ORAL EVERY 12 HOURS SCHEDULED
Status: DISCONTINUED | OUTPATIENT
Start: 2023-11-03 | End: 2023-11-06 | Stop reason: HOSPADM

## 2023-11-03 RX ADMIN — BUDESONIDE 0.5 MG: 0.5 INHALANT RESPIRATORY (INHALATION) at 07:18

## 2023-11-03 RX ADMIN — ACETYLCYSTEINE 2 ML: 200 SOLUTION ORAL; RESPIRATORY (INHALATION) at 07:13

## 2023-11-03 RX ADMIN — PROPOFOL 30 MG: 10 INJECTION, EMULSION INTRAVENOUS at 09:07

## 2023-11-03 RX ADMIN — ACETAMINOPHEN 650 MG: 325 TABLET, FILM COATED ORAL at 22:19

## 2023-11-03 RX ADMIN — ENOXAPARIN SODIUM 40 MG: 100 INJECTION SUBCUTANEOUS at 17:27

## 2023-11-03 RX ADMIN — GUAIFENESIN 1200 MG: 600 TABLET, EXTENDED RELEASE ORAL at 20:26

## 2023-11-03 RX ADMIN — HYDROMORPHONE HYDROCHLORIDE 1 MG: 1 INJECTION, SOLUTION INTRAMUSCULAR; INTRAVENOUS; SUBCUTANEOUS at 10:22

## 2023-11-03 RX ADMIN — DOXEPIN HYDROCHLORIDE 10 MG: 10 CAPSULE ORAL at 20:27

## 2023-11-03 RX ADMIN — Medication 10 ML: at 20:36

## 2023-11-03 RX ADMIN — ACETYLCYSTEINE 2 ML: 200 SOLUTION ORAL; RESPIRATORY (INHALATION) at 20:53

## 2023-11-03 RX ADMIN — SODIUM CHLORIDE 100 ML/HR: 9 INJECTION, SOLUTION INTRAVENOUS at 17:25

## 2023-11-03 RX ADMIN — METHYLPREDNISOLONE SODIUM SUCCINATE 60 MG: 125 INJECTION, POWDER, FOR SOLUTION INTRAMUSCULAR; INTRAVENOUS at 20:26

## 2023-11-03 RX ADMIN — PANTOPRAZOLE SODIUM 40 MG: 40 TABLET, DELAYED RELEASE ORAL at 10:22

## 2023-11-03 RX ADMIN — PROPOFOL 100 MG: 10 INJECTION, EMULSION INTRAVENOUS at 09:00

## 2023-11-03 RX ADMIN — Medication 10 ML: at 20:37

## 2023-11-03 RX ADMIN — Medication 5 MG/HR: at 01:24

## 2023-11-03 RX ADMIN — PIPERACILLIN AND TAZOBACTAM 4.5 G: 4; .5 INJECTION, POWDER, FOR SOLUTION INTRAVENOUS at 19:29

## 2023-11-03 RX ADMIN — SODIUM CHLORIDE 100 ML/HR: 9 INJECTION, SOLUTION INTRAVENOUS at 07:09

## 2023-11-03 RX ADMIN — ALBUTEROL SULFATE 2.5 MG: 2.5 SOLUTION RESPIRATORY (INHALATION) at 07:13

## 2023-11-03 RX ADMIN — PROPOFOL 40 MG: 10 INJECTION, EMULSION INTRAVENOUS at 09:04

## 2023-11-03 RX ADMIN — PIPERACILLIN AND TAZOBACTAM 3.38 G: 3; .375 INJECTION, POWDER, FOR SOLUTION INTRAVENOUS at 12:40

## 2023-11-03 RX ADMIN — ALBUTEROL SULFATE 2.5 MG: 2.5 SOLUTION RESPIRATORY (INHALATION) at 09:24

## 2023-11-03 RX ADMIN — DOCUSATE SODIUM 50 MG AND SENNOSIDES 8.6 MG 2 TABLET: 8.6; 5 TABLET, FILM COATED ORAL at 20:26

## 2023-11-03 RX ADMIN — ALBUTEROL SULFATE 2.5 MG: 2.5 SOLUTION RESPIRATORY (INHALATION) at 20:53

## 2023-11-03 RX ADMIN — CETIRIZINE HYDROCHLORIDE 10 MG: 10 TABLET, FILM COATED ORAL at 10:21

## 2023-11-03 RX ADMIN — BUDESONIDE 0.5 MG: 0.5 INHALANT RESPIRATORY (INHALATION) at 20:58

## 2023-11-03 RX ADMIN — METOPROLOL SUCCINATE 25 MG: 25 TABLET, EXTENDED RELEASE ORAL at 10:21

## 2023-11-03 RX ADMIN — Medication 10 ML: at 10:22

## 2023-11-03 RX ADMIN — METHYLPREDNISOLONE SODIUM SUCCINATE 60 MG: 125 INJECTION, POWDER, FOR SOLUTION INTRAMUSCULAR; INTRAVENOUS at 10:22

## 2023-11-03 RX ADMIN — PROPOFOL 200 MCG/KG/MIN: 10 INJECTION, EMULSION INTRAVENOUS at 09:02

## 2023-11-03 NOTE — PROGRESS NOTES
Daily Progress Note          Assessment    Dyspnea  Hypoxemia  Left lower lobe pneumonia due to unspecified pathogen  Rhinovirus infection  COPD with acute exacerbation  Tobacco smoker  HLD  HTN  GERD with history of esophageal stricture     Recommendations:  Patient is unable to clear secretions, schedule bronchoscopy today  antibiotics: Rocephin  Oxygen supplement and titration to maintain saturation 90 to 95%: Currently requiring 5 L per nasal cannula  Bronchodilators  Inhaled corticosteroids  Encourage use of incentive spirometry  IV steroids  Mucinex to 1200 mg twice daily  Amlodipine and metoprolol  Smoking cessation counseling  DVT/GI prophylaxis                      LOS: 2 days     Subjective     Patient reports cough and shortness of breath    Objective     Vital signs for last 24 hours:  Vitals:    11/03/23 0713 11/03/23 0717 11/03/23 0718 11/03/23 0721   BP:       BP Location:       Patient Position:       Pulse: 86 104 94 96   Resp: 18 18 16 16   Temp:       TempSrc:       SpO2: 96% 95% 94% 94%   Weight:       Height:           Intake/Output last 3 shifts:  I/O last 3 completed shifts:  In: 840 [P.O.:840]  Out: 3975 [Urine:3975]  Intake/Output this shift:  No intake/output data recorded.      Radiology  Imaging Results (Last 24 Hours)       ** No results found for the last 24 hours. **            Labs:  Results from last 7 days   Lab Units 11/02/23  0814   WBC 10*3/mm3 15.40*   HEMOGLOBIN g/dL 15.1   HEMATOCRIT % 45.3   PLATELETS 10*3/mm3 155     Results from last 7 days   Lab Units 11/02/23  0814 10/30/23  0359 10/29/23  1920   SODIUM mmol/L 142   < > 142   POTASSIUM mmol/L 3.5   < > 3.2*   CHLORIDE mmol/L 107   < > 107   CO2 mmol/L 23.0   < > 23.0   BUN mg/dL 12   < > 8   CREATININE mg/dL 0.65*   < > 0.94   CALCIUM mg/dL 9.2   < > 9.2   BILIRUBIN mg/dL  --   --  0.4   ALK PHOS U/L  --   --  85   ALT (SGPT) U/L  --   --  36   AST (SGOT) U/L  --   --  30   GLUCOSE mg/dL 125*   < > 95    < > = values in  this interval not displayed.         Results from last 7 days   Lab Units 10/29/23  1920   ALBUMIN g/dL 4.3     Results from last 7 days   Lab Units 10/29/23  2232 10/29/23  1920   HSTROP T ng/L 7 8                           Meds:   SCHEDULE  acetylcysteine, 2 mL, Nebulization, BID - RT  budesonide, 0.5 mg, Nebulization, BID - RT  cefTRIAXone, 2,000 mg, Intravenous, Q24H  cetirizine, 10 mg, Oral, Daily  doxepin, 10 mg, Oral, Nightly  enoxaparin, 40 mg, Subcutaneous, Q24H  methylPREDNISolone sodium succinate, 60 mg, Intravenous, Q12H  metoprolol succinate XL, 25 mg, Oral, Daily  pantoprazole, 40 mg, Oral, Daily  senna-docusate sodium, 2 tablet, Oral, BID  sodium chloride, 10 mL, Intravenous, Q12H  sodium chloride, 10 mL, Intravenous, Q12H      Infusions  dilTIAZem, 5-15 mg/hr, Last Rate: Stopped (11/03/23 0708)  sodium chloride, 100 mL/hr, Last Rate: 100 mL/hr (11/03/23 0709)      PRNs    acetaminophen    albuterol    benzonatate    senna-docusate sodium **AND** polyethylene glycol **AND** bisacodyl **AND** bisacodyl    guaiFENesin-dextromethorphan    HYDROmorphone    ipratropium-albuterol    Morphine **AND** naloxone    nitroglycerin    ondansetron **OR** ondansetron    oxyCODONE    sodium chloride    sodium chloride    sodium chloride    sodium chloride    sodium chloride    Physical Exam:  General Appearance:  Alert   HEENT:  Normocephalic, without obvious abnormality, Conjunctiva/corneas clear,.   Nares normal, no drainage     Neck:  Supple, symmetrical, trachea midline.   Lungs /Chest wall:   Bilateral basal rhonchi, respirations unlabored, symmetrical wall movement.     Heart:  Regular rate and rhythm, S1 S2 normal  Abdomen: Soft, non-tender, no masses, no organomegaly.    Extremities: No edema, no clubbing or cyanosis     ROS  Constitutional: Negative for chills, fever and malaise/fatigue.   HENT: Negative.    Eyes: Negative.    Cardiovascular: Negative.    Respiratory: Positive for cough and shortness of  breath.    Skin: Negative.    Musculoskeletal: Negative.    Gastrointestinal: Negative.    Genitourinary: Negative.    Neurological: Negative.    Psychiatric/Behavioral: Negative.      I reviewed the recent clinical results    Part of this note may be an electronic transcription/translation of spoken language to printed text using the Dragon Dictation System.

## 2023-11-03 NOTE — NURSING NOTE
Pt monitor indicated pt went into afib. Hr running 110-130s. Asymptomatic. Confirmed with monitor tech and they confirmed pt wqent into afib. Stat EKG being obtained.  Will notify md

## 2023-11-03 NOTE — PROGRESS NOTES
[facility] Medicine Services   Daily Progress Note    Patient Name: Jagdish Rea  : 1960  MRN: [id number]  Primary Care Physician:  Yamilka Pascual, KEILA  Date of admission: 10/29/2023  Date and Time of Service: 23 at 0950      Subjective      Chief Complaint: Productive cough    Patient Reports that he is still having a hard time getting phlegm out.  Is having excessinve coughing.  Nursing reports tachycardia when patient has bouts of coughing.    Review of Systems   Constitutional:  Negative for chills and fever.   HENT:  Negative for congestion, rhinorrhea and sore throat.    Eyes:  Negative for visual disturbance.   Respiratory:  Positive for cough and chest tightness. Negative for shortness of breath.    Cardiovascular:  Negative for chest pain and palpitations.   Gastrointestinal:  Negative for abdominal pain, diarrhea, nausea and vomiting.   Endocrine: Negative for polyuria.   Genitourinary:  Negative for difficulty urinating and dysuria.   Musculoskeletal:  Negative for back pain and myalgias.   Skin:  Negative for rash and wound.   Neurological:  Negative for dizziness, weakness and numbness.   Hematological:  Does not bruise/bleed easily.   Psychiatric/Behavioral:  Negative for agitation, behavioral problems and confusion.           Objective      Vital Signs  Temp:  [97.6 °F (36.4 °C)-98.2 °F (36.8 °C)] 97.6 °F (36.4 °C)  Heart Rate:  [] 109  Resp:  [16-22] 22  BP: (117-152)/(69-88) 152/81  Flow (L/min):  [4-5] 5   Body mass index is 33.79 kg/m².    Physical Exam  Physical Exam  Constitutional:       General: He is not in acute distress.  HENT:      Head: Normocephalic and atraumatic.      Right Ear: External ear normal.      Left Ear: External ear normal.   Cardiovascular:      Rate and Rhythm: Normal rate and regular rhythm.      Heart sounds: Normal heart sounds.   Pulmonary:      Effort: Pulmonary effort is normal. No respiratory distress.      Breath sounds: Rhonchi  present.      Comments: Frequent coughing.  Abdominal:      General: Bowel sounds are normal.      Palpations: Abdomen is soft.      Tenderness: There is no abdominal tenderness.   Musculoskeletal:      Right lower leg: No edema.      Left lower leg: No edema.   Skin:     General: Skin is warm and dry.   Neurological:      Mental Status: He is alert.      Comments: Moving all extremities   Psychiatric:         Mood and Affect: Mood normal.         Behavior: Behavior normal.         Scheduled Meds   acetylcysteine, 2 mL, Nebulization, BID - RT  amLODIPine, 10 mg, Oral, Daily  budesonide, 0.5 mg, Nebulization, BID - RT  cefTRIAXone, 2,000 mg, Intravenous, Q24H  cetirizine, 10 mg, Oral, Daily  dilTIAZem, 10 mg, Intravenous, Once  doxepin, 10 mg, Oral, Nightly  enoxaparin, 40 mg, Subcutaneous, Q24H  methylPREDNISolone sodium succinate, 60 mg, Intravenous, Q12H  metoprolol succinate XL, 25 mg, Oral, Daily  pantoprazole, 40 mg, Oral, Daily  senna-docusate sodium, 2 tablet, Oral, BID  sodium chloride, 10 mL, Intravenous, Q12H  sodium chloride, 10 mL, Intravenous, Q12H       PRN Meds     acetaminophen    albuterol    benzonatate    senna-docusate sodium **AND** polyethylene glycol **AND** bisacodyl **AND** bisacodyl    guaiFENesin-dextromethorphan    HYDROmorphone    ipratropium-albuterol    Morphine **AND** naloxone    nitroglycerin    ondansetron **OR** ondansetron    oxyCODONE    sodium chloride    sodium chloride    sodium chloride    sodium chloride    sodium chloride      Diagnostic Data    Results from last 7 days   Lab Units 11/02/23  0814 10/30/23  0359 10/29/23  1920   WBC 10*3/mm3 15.40*   < > 7.50   HEMOGLOBIN g/dL 15.1   < > 16.9   HEMATOCRIT % 45.3   < > 48.9   PLATELETS 10*3/mm3 155   < > 155   GLUCOSE mg/dL 125*   < > 95   CREATININE mg/dL 0.65*   < > 0.94   BUN mg/dL 12   < > 8   SODIUM mmol/L 142   < > 142   POTASSIUM mmol/L 3.5   < > 3.2*   AST (SGOT) U/L  --   --  30   ALT (SGPT) U/L  --   --  36   ALK  PHOS U/L  --   --  85   BILIRUBIN mg/dL  --   --  0.4   ANION GAP mmol/L 12.0   < > 12.0    < > = values in this interval not displayed.       XR Chest 1 View    Result Date: 11/2/2023  Impression: Persistent bibasilar airspace disease left greater than right which may relate to atelectasis and/or pneumonia. Electronically Signed: Americo Connell MD  11/2/2023 7:21 AM EDT  Workstation ID: [id number]       I reviewed the patient's new clinical results.          Assessment & Plan      Brief Patient Summary:  Jagdish Rea is a 62 y.o. male with the following problems:      acetylcysteine, 2 mL, Nebulization, BID - RT  amLODIPine, 10 mg, Oral, Daily  budesonide, 0.5 mg, Nebulization, BID - RT  cefTRIAXone, 2,000 mg, Intravenous, Q24H  cetirizine, 10 mg, Oral, Daily  dilTIAZem, 10 mg, Intravenous, Once  doxepin, 10 mg, Oral, Nightly  enoxaparin, 40 mg, Subcutaneous, Q24H  methylPREDNISolone sodium succinate, 60 mg, Intravenous, Q12H  metoprolol succinate XL, 25 mg, Oral, Daily  pantoprazole, 40 mg, Oral, Daily  senna-docusate sodium, 2 tablet, Oral, BID  sodium chloride, 10 mL, Intravenous, Q12H  sodium chloride, 10 mL, Intravenous, Q12H       sodium chloride, 100 mL/hr, Last Rate: 100 mL/hr (11/02/23 1729)         Active and Resolved Problems  Active Hospital Problems    Diagnosis  POA    **Dyspnea [R06.00]  Yes    Multiple tracheobronchial mucus plugs [T17.800A]  Unknown      Resolved Hospital Problems   No resolved problems to display.       Dyspnea   -WBCs trended up to 17.10 in the setting of IV steroid administration  -Troponin and BNP negative   -Breathing treatments, steroids,Mucinex, Tessalon and incentive spirometry ordered  -Chest x-ray: mild opacities in the left base which could represent atelectasis, pneumonia, or other infiltrate,emphysema  -IV fluids  -Step pneumo negative   -Respiratory panel positive for rhinovirus   -EKG: Sinus rhythm with no ST changes  -D-dimer <0.19  -Telemetry and pulse  oximetry  -Walking oximetry performed on 10/31 requiring 5 L supplemental oxygen  -Pulmonology consulted who evaluated patient recommending continued IV steroids and possible bronchoscopy if symptoms fail to improve  -added mucomyst to patient's regimen in hopes of clearing out some congestion     Hypertension  - Continue metoprolol, hydrochlorothiazide, and norvasc  - Monitor while admitted     GERD  -Continue PPI      Plan for disposition:Home in 1-2 days      DVT prophylaxis:  Medical and mechanical DVT prophylaxis orders are present.    CODE STATUS:    Level Of Support Discussed With: Patient  Code Status (Patient has no pulse and is not breathing): CPR (Attempt to Resuscitate)  Medical Interventions (Patient has pulse or is breathing): Full Support          Rommel Campuzano M.D.    This document has been electronically signed by Rommel Campuzano MD on November 2, 2023 23:06 EDT   [facility]

## 2023-11-03 NOTE — CONSULTS
[facility] [place]        Subjective:     Encounter Date:10/29/2023      Patient ID: Jagdish Rea is a 62 y.o. male.  Chief Complaint: cough, shortness of breath  Cardiology Consult: Afib    Referring Physician: Edie Juarez MD     HPI:  Jagdish Rea is a 62 y.o. male who presents with cough, shortness of breath.  Mr. Rea is a patient of Dr. Valadez. Pmh includes hypertension hyperlipidemia, history of heart cath with nonobstructive mild luminal irregularities, preserved EF with normal filling pressures, COPD, tobacco use.     Mr. Rea presented with cough, shortness of breath, congestion that has been worsening the last 1 week. He tested positive for rhinovirus, CXR showed mild opacity in left lung base. He was planned for a bronchoscopy today. He went into afib with RVR overnight. He was placed on cardizem gtt.       Past Medical History:   Diagnosis Date    Allergic     Anxiety     Arthritis 06/2005    Back pain     Claustrophobia 1978    COPD (chronic obstructive pulmonary disease)     CTS (carpal tunnel syndrome) 10/2022    Dysphagia     Esophageal stricture     GERD (gastroesophageal reflux disease)     Hyperlipidemia     Hypertension     Knee pain     rt    Low back pain     Obesity     Pneumonia     PTSD (post-traumatic stress disorder)     Thoracic disc herniation     Vitamin B12 deficiency        Past Surgical History:   Procedure Laterality Date    CARDIAC CATHETERIZATION N/A 07/13/2022    Procedure: Left Heart Cath, possible pci;  Surgeon: Prieto Sidhu MD;  Location: [facility] CATH INVASIVE LOCATION;  Service: Cardiovascular;  Laterality: N/A;    ENDOSCOPY      ENDOSCOPY N/A 9/21/2023    Procedure: ESOPHAGOGASTRODUODENOSCOPY WITH non guided esophageal dialtion [address] and  cold forcep biopsy x1 area;  Surgeon: Andres Concepcion MD;  Location: [facility] ENDOSCOPY;  Service: Gastroenterology;  Laterality: N/A;  Post- erosive gastritis, hiatal hernia, esophageal stricture     HERNIA REPAIR      KNEE ARTHROPLASTY      x2    SHOULDER ARTHROSCOPY W/ ROTATOR CUFF REPAIR Right 08/11/2020    Procedure: SHOULDER ARTHROSCOPY WITH ROTATOR CUFF REPAIR, extensive debridement;  Surgeon: Fredi Ritchie MD;  Location: [facility] MAIN OR;  Service: Orthopedics;  Laterality: Right;    TONSILLECTOMY         Family History   Problem Relation Age of Onset    Heart disease Mother     Early death Mother     Hyperlipidemia Mother     Hypertension Mother     Thyroid disease Mother     Cancer Father     Stroke Father     Vision loss Father     Stroke Paternal Grandfather     Arthritis Paternal Grandmother     Alcohol abuse Brother     Asthma Brother     Depression Brother     Hyperlipidemia Brother     Hypertension Brother     Learning disabilities Brother     Mental illness Brother     Drug abuse Brother     Early death Brother     Heart disease Brother     Hyperlipidemia Brother     Hypertension Brother        Social History     Socioeconomic History    Marital status:    Tobacco Use    Smoking status: Every Day     Packs/day: 1.00     Years: 47.00     Additional pack years: 0.00     Total pack years: 47.00     Types: Cigarettes     Start date: 1/9/1973    Smokeless tobacco: Never    Tobacco comments:     Smoked a half a pack a day for 42 years didn't start to smoke a whole pack until 2017   Vaping Use    Vaping Use: Never used   Substance and Sexual Activity    Alcohol use: No    Drug use: No    Sexual activity: Defer         Allergies   Allergen Reactions    Atorvastatin Swelling    Lisinopril Other (See Comments)     Facial paralysis        Current Medications:   Scheduled Meds:acetylcysteine, 2 mL, Nebulization, BID - RT  budesonide, 0.5 mg, Nebulization, BID - RT  cetirizine, 10 mg, Oral, Daily  doxepin, 10 mg, Oral, Nightly  enoxaparin, 40 mg, Subcutaneous, Q24H  guaiFENesin, 1,200 mg, Oral, Q12H  methylPREDNISolone sodium succinate, 60 mg, Intravenous, Q12H  metoprolol succinate XL, 25  "mg, Oral, Daily  pantoprazole, 40 mg, Oral, Daily  piperacillin-tazobactam, 4.5 g, Intravenous, Q8H  senna-docusate sodium, 2 tablet, Oral, BID  sodium chloride, 10 mL, Intravenous, Q12H  sodium chloride, 10 mL, Intravenous, Q12H      Continuous Infusions:dilTIAZem, 5-15 mg/hr, Last Rate: Stopped (11/03/23 0708)  sodium chloride, 100 mL/hr, Last Rate: 100 mL/hr (11/03/23 1441)        Review of Systems   Constitutional: Negative for chills, diaphoresis and malaise/fatigue.   HENT:  Positive for congestion.    Cardiovascular:  Positive for dyspnea on exertion. Negative for chest pain, irregular heartbeat, leg swelling, near-syncope, orthopnea, palpitations, paroxysmal nocturnal dyspnea and syncope.   Respiratory:  Positive for cough and shortness of breath. Negative for sleep disturbances due to breathing and sputum production.    Gastrointestinal:  Negative for change in bowel habit.   Genitourinary:  Negative for urgency.   Neurological:  Negative for dizziness and headaches.   Psychiatric/Behavioral:  Negative for altered mental status.           Objective:         /65 (BP Location: Right arm, Patient Position: Lying)   Pulse 65   Temp 98.2 °F (36.8 °C) (Oral)   Resp 18   Ht 182.9 cm (72\")   Wt 113 kg (249 lb)   SpO2 92%   BMI 33.77 kg/m²     Physical Exam:  General Appearance:    Alert, cooperative, in no acute distress                                Head: Atraumatic, normocephalic, PERRLA               Neck:   supple, no JVD   Lungs:     Supplemental O2, scattered rhonchi    Heart:    Regular rhythm and normal rate, normal S1 and S2,murmur   Abdomen:     Normal bowel sounds, no masses, no organomegaly, soft  nontender, nondistended, no guarding, no rebound  tenderness   Extremities:   Moves all extremities well, no edema, no cyanosis, no  redness   Pulses:   Pulses palpable and equal bilaterally   Skin:   No bleeding, bruising or rash   Neurologic:   Awake, alert, oriented x3                 ASCVD " "Risk Score::  The 10-year ASCVD risk score (Jase ADEN, et al., 2019) is: 17.5%    Values used to calculate the score:      Age: 62 years      Sex: Male      Is Non- : Yes      Diabetic: No      Tobacco smoker: Yes      Systolic Blood Pressure: 102 mmHg      Is BP treated: Yes      HDL Cholesterol: 34 mg/dL      Total Cholesterol: 186 mg/dL      Lab Review:     Results from last 7 days   Lab Units 11/02/23  0814 11/01/23  1316 10/30/23  0359 10/29/23  1920   SODIUM mmol/L 142 142   < > 142   POTASSIUM mmol/L 3.5 3.6   < > 3.2*   CHLORIDE mmol/L 107 108*   < > 107   CO2 mmol/L 23.0 22.0   < > 23.0   BUN mg/dL 12 13   < > 8   CREATININE mg/dL 0.65* 0.72*   < > 0.94   GLUCOSE mg/dL 125* 153*   < > 95   CALCIUM mg/dL 9.2 9.2   < > 9.2   AST (SGOT) U/L  --   --   --  30   ALT (SGPT) U/L  --   --   --  36    < > = values in this interval not displayed.     Results from last 7 days   Lab Units 10/29/23  2232 10/29/23  1920   HSTROP T ng/L 7 8     Results from last 7 days   Lab Units 11/02/23  0814 11/01/23  1316   WBC 10*3/mm3 15.40* 16.60*   HEMOGLOBIN g/dL 15.1 15.0   HEMATOCRIT % 45.3 45.0   PLATELETS 10*3/mm3 155 162                   Invalid input(s): \"LDLCALC\"  Results from last 7 days   Lab Units 10/29/23  1920   PROBNP pg/mL 50.4           Recent Radiology:  Imaging Results (Most Recent)       Procedure Component Value Units Date/Time    XR Chest 1 View [[id number]] Collected: 11/02/23 0720     Updated: 11/02/23 0723    Narrative:      XR CHEST 1 VW    Date of Exam: 11/2/2023 5:35 AM EDT    Indication: Pneumonia    Comparison: 10/29/2023    Findings:  Heart size normal. Mild bibasilar airspace disease left greater than right which may relate to atelectasis and/or pneumonia, mild worsening at the left lung base. No pneumothorax. No significant effusion. Osseous structures grossly intact.      Impression:      Impression:  Persistent bibasilar airspace disease left greater than right which may " relate to atelectasis and/or pneumonia.        Electronically Signed: Americo Connell MD    11/2/2023 7:21 AM EDT    Workstation ID: [id number]    XR Chest 1 View [[id number]] Collected: 10/29/23 1903     Updated: 10/29/23 1906    Narrative:        XR CHEST 1 VW    Date of Exam: 10/29/2023 6:31 PM EDT    Indication: CHF/COPD Protocol  CHF/COPD Protocol    Comparison: July 12, 2022, CT November 23, 2022    FINDINGS:  Mild opacities are seen in the left base. There are findings of emphysema as seen on prior CT. No other definite pulmonary infiltrate. No pneumothorax or significant pleural effusion.  Heart size and mediastinal contour appear within normal limits.  No   definite osseous abnormality is seen on this limited single view.      Impression:      1.Mild opacities in the left base which could represent atelectasis, pneumonia, or other infiltrate.  2.Emphysema.        Electronically Signed: Andres Alatorre    10/29/2023 7:04 PM EDT    Workstation ID: [id number]              ECHOCARDIOGRAM:    Results for orders placed during the hospital encounter of 08/16/22    Adult Transthoracic Echo Complete W/ Cont if Necessary Per Protocol    Interpretation Summary  · Estimated left ventricular EF was in disagreement with the calculated left ventricular EF. Left ventricular ejection fraction appears to be 51 - 55%. Left ventricular systolic function is low normal.  · Left ventricular diastolic function is consistent with (grade I) impaired relaxation.  · Estimated right ventricular systolic pressure from tricuspid regurgitation is normal (<35 mmHg).                  Assessment:         Active Hospital Problems    Diagnosis  POA    **Dyspnea [R06.00]  Yes    Multiple tracheobronchial mucus plugs [T17.800A]  Unknown     Shortness of breath / hypoxia / rhinovirus  Atrial fibrillation, new onset  Leukocytosis  H/o COPD  Hypertension  Hyperlipidemia  history of heart cath with nonobstructive mild luminal irregularities, preserved EF with  normal filling pressures  H/o tobacco use     Plan:   S/p bronchoscopy  New onset atrial fibrillation, getting ECG to see if patient back in SR, sounds regular, but he is not on telemetry unfortunately  Check TSH  CHADS Vasc at least 1- if afib reoccurs recommend a/c  Monitor at discharge  Restart Toprol XL  ECHO pending             Aislinn Tang, APRN  11/03/23  14:53 EDT

## 2023-11-03 NOTE — PROGRESS NOTES
[facility] Medicine Services   Daily Progress Note      Patient Name: Jagdish Rea  : 1960  MRN: [id number]  Primary Care Physician:  Yamilka Pascual APRN  Date of admission: 10/29/2023      Subjective      Chief Complaint: Shortness of breath    Patient seen and examined this morning.  Taking over care today.  Shortness of breath still about the same as yesterday.  Not worsened.  Did develop A-fib overnight and required IV Cardizem drip briefly, cardiology consulted.  Going for bronchoscopy today.  No other complaints.    Pertinent positives as noted in HPI/subjective.  All other systems were reviewed and are negative.      Objective      Vitals:   Temp:  [97.6 °F (36.4 °C)-98.2 °F (36.8 °C)] 98.2 °F (36.8 °C)  Heart Rate:  [] 101  Resp:  [16-31] 21  BP: (117-152)/(70-87) 117/71  Flow (L/min):  [4-10] 5    Physical Exam:    General: Awake, alert, morbidly obese male, NAD  Eyes: PERRL, EOMI, conjunctivae are clear  Cardiovascular: Regular rate and rhythm, no murmurs  Respiratory: Wheezing and rales bilaterally, unlabored breathing  Abdomen: Soft, nontender, positive bowel sounds, no guarding  Neurologic: A&O, CN grossly intact, moves all extremities spontaneously  Musculoskeletal: Normal range of motion, no other gross deformities  Skin: Warm, dry, intact         Result Review    Result Review:  I have personally reviewed the results from the time of this admission to 11/3/2023 09:56 EDT and agree with these findings:  [x]  Laboratory  [x]  Microbiology  [x]  Radiology  [x]  EKG/Telemetry   [x]  Cardiology/Vascular   []  Pathology  [x]  Old records  []  Other:          Assessment & Plan      Brief Patient Summary:  Jagdish Rea is a 62 y.o. male who       acetylcysteine, 2 mL, Nebulization, BID - RT  budesonide, 0.5 mg, Nebulization, BID - RT  cetirizine, 10 mg, Oral, Daily  doxepin, 10 mg, Oral, Nightly  enoxaparin, 40 mg, Subcutaneous, Q24H  guaiFENesin, 1,200 mg, Oral,  Q12H  methylPREDNISolone sodium succinate, 60 mg, Intravenous, Q12H  metoprolol succinate XL, 25 mg, Oral, Daily  pantoprazole, 40 mg, Oral, Daily  piperacillin-tazobactam, 4.5 g, Intravenous, Once  piperacillin-tazobactam, 4.5 g, Intravenous, Q8H  senna-docusate sodium, 2 tablet, Oral, BID  sodium chloride, 10 mL, Intravenous, Q12H  sodium chloride, 10 mL, Intravenous, Q12H       dilTIAZem, 5-15 mg/hr, Last Rate: Stopped (11/03/23 0708)  sodium chloride, 100 mL/hr, Last Rate: 100 mL/hr (11/03/23 0709)         I have utilized all available, immediate resources to obtain, update, or review the patient's current medications including all prescriptions, over-the-counter products, herbals, cannabis/cannabidiol products, and vitamin.mineral/dietary (nutritional) supplements.    Active Hospital Problems:  Active Hospital Problems    Diagnosis     **Dyspnea     Multiple tracheobronchial mucus plugs      Plan:     Acute hypoxemic respiratory failure  Left lower lobe pneumonia  Rhinovirus infection  COPD with exacerbation  -Imaging findings noted  -Respiratory viral panel positive for rhinovirus  -Respiratory status does not improve, patient underwent bronchoscopy on 11/3 with multiple thick mucous plug removal  -On IV Zosyn for antibiotics per pulmonology  -Continue steroids and bronchodilators  -Remains on 5 L nasal cannula, wean down to room air as tolerated  -Pulmonology following    New onset A-fib  -Briefly had RVR but appears to be converted to NSR  -Previously had cardiac work-up with cath which was negative   -Patient denies any history of A-fib in the past  -Likely related to respiratory issues  -Continue beta-blocker  -Cardiology consulted    Hypertension  -Controlled, continue metoprolol  -Hold amlodipine and HCTZ for now    DVT prophylaxis  -Lovenox    CODE STATUS:    Level Of Support Discussed With: Patient  Code Status (Patient has no pulse and is not breathing): CPR (Attempt to Resuscitate)  Medical  Interventions (Patient has pulse or is breathing): Full Support      Disposition: Pending improvement    Electronically signed by Adam Nesbitt DO, 11/03/23, 09:56 EDT.  [facility] Team      Part of this note may be an electronic transcription/translation of spoken language to printed text using the Dragon Dictation System.

## 2023-11-03 NOTE — ANESTHESIA PREPROCEDURE EVALUATION
Anesthesia Evaluation     NPO Solid Status: > 8 hours  NPO Liquid Status: > 8 hours           Airway   Mallampati: II  TM distance: >3 FB  Neck ROM: full  No difficulty expected  Dental - normal exam     Pulmonary - normal exam   (+) COPD,  Cardiovascular - normal exam    (+) hypertension, hyperlipidemia    ROS comment: · Estimated left ventricular EF was in disagreement with the calculated left ventricular EF. Left ventricular ejection fraction appears to be 51 - 55%. Left ventricular systolic function is low normal.  · Left ventricular diastolic function is consistent with (grade I) impaired relaxation.  · Estimated right ventricular systolic pressure from tricuspid regurgitation is normal (<35 mmHg).      Neuro/Psych  (+) psychiatric history Anxiety  GI/Hepatic/Renal/Endo    (+) obesity, GERD    Musculoskeletal     Abdominal  - normal exam    Bowel sounds: normal.   Substance History      OB/GYN          Other                    Anesthesia Plan    ASA 3     MAC   total IV anesthesia  intravenous induction     Anesthetic plan, risks, benefits, and alternatives have been provided, discussed and informed consent has been obtained with: patient.  Pre-procedure education provided  Plan discussed with CRNA.    CODE STATUS:    Level Of Support Discussed With: Patient  Code Status (Patient has no pulse and is not breathing): CPR (Attempt to Resuscitate)  Medical Interventions (Patient has pulse or is breathing): Full Support

## 2023-11-03 NOTE — OP NOTE
Bronchoscopy Procedure Note    Procedure:  Bronchoscopy, Diagnostic  Bronchoscopy, Therapeutic lavage of multiple mucous plugs  Bilateral bronchial washing    Pre-Operative Diagnosis: Pneumonia multiple mucous plugs    Post-Operative Diagnosis: Same    Indication: Pneumonia, multiple mucous plugs, patient unable to clear secretions    Anesthesia: Monitored Anesthesia Care (MAC)    Procedure Details: Patient was consented for the procedure with all risk and benefit of the procedure explained in detail.  Patient was given the opportunity to ask questions and all concerns were answered.    Timeout was done in the standard manner   the bronchoscope was inserted into the main airway via the oropharynx. An anatomical survey was done of the main airways and the subsegmental bronchus of the 5 lobes.  The findings are consistent of multiple thick mucous plugs from the trachea down to the 5 lobes, dark yellow to greenish in color.  A therapeutic lavage was performed using aliquots of normal saline instilled into the airways then aspirated back until clear.    Estimated Blood Loss: None           Specimens: Bilateral bronchial washing                Complications:  None; patient tolerated the procedure well.           Disposition: PACU - hemodynamically stable.    Post op plan:  Resume p.o. after 2 hours  Follow-up results  Change antibiotics to Zosyn    Patient tolerated the procedure well.

## 2023-11-03 NOTE — PROGRESS NOTES
Patient with new onset A-fib with RVR.  Patient was given 10 mg IV Cardizem but still in A-fib with RVR. Start Cardizem drip. Discontinue amlodipine.  Obtain echocardiogram.  Consult cardiology.

## 2023-11-03 NOTE — PLAN OF CARE
Goal Outcome Evaluation:  Plan of Care Reviewed With: patient        Progress: improving    Pt A&O x4. Up ad minal. On 5L NC. NS going at 100 mL/hr in right forearm. Pt had bronch today and reports feeling much better. Pt has not asked for any pain meds since getting back from bronch. Cardiology consulted, EKG ordered and showed normal sinus rhythm. Echo done this morning, still has not been read. Pt still receiving IV antibiotics. Patient has no complaints at this time.

## 2023-11-03 NOTE — ANESTHESIA POSTPROCEDURE EVALUATION
Patient: Jagdish Rea    Procedure Summary       Date: 11/03/23 Room / Location: [facility] ENDOSCOPY 3 / [facility] ENDOSCOPY    Anesthesia Start: 0858 Anesthesia Stop: 0928    Procedure: BRONCHOSCOPY with bilateral lung washing's (Bronchus) Diagnosis:       Multiple tracheobronchial mucus plugs      (Multiple tracheobronchial mucus plugs [T17.800A])    Surgeons: Kolton Brewer MD Provider: Kalpesh Nesbitt MD    Anesthesia Type: MAC ASA Status: 3            Anesthesia Type: MAC    Vitals  Vitals Value Taken Time   /71 11/03/23 0944   Temp     Pulse 103 11/03/23 0948   Resp 21 11/03/23 0944   SpO2 93 % 11/03/23 0948   Vitals shown include unfiled device data.        Post Anesthesia Care and Evaluation    Patient location during evaluation: PACU  Patient participation: complete - patient participated  Level of consciousness: awake  Pain scale: See nurse's notes for pain score.  Pain management: adequate    Airway patency: patent  Anesthetic complications: No anesthetic complications  PONV Status: none  Cardiovascular status: acceptable  Respiratory status: acceptable and spontaneous ventilation  Hydration status: acceptable    Comments: Patient seen and examined postoperatively; vital signs stable; SpO2 greater than or equal to 90%; cardiopulmonary status stable; nausea/vomiting adequately controlled; pain adequately controlled; no apparent anesthesia complications; patient discharged from anesthesia care when discharge criteria were met

## 2023-11-03 NOTE — PLAN OF CARE
Problem: Pain Chronic (Persistent) (Comorbidity Management)  Goal: Acceptable Pain Control and Functional Ability  Outcome: Ongoing, Progressing  Intervention: Manage Persistent Pain  Recent Flowsheet Documentation  Taken 11/3/2023 0400 by Krystin Ardon RN  Medication Review/Management: medications reviewed  Taken 11/3/2023 0200 by Krystin Ardon RN  Medication Review/Management: medications reviewed  Taken 11/2/2023 2000 by Krystin Ardon RN  Medication Review/Management: medications reviewed  Goal: Acceptable Pain Control and Functional Ability  Outcome: Ongoing, Progressing  Intervention: Manage Persistent Pain  Recent Flowsheet Documentation  Taken 11/3/2023 0400 by Krystin Ardon RN  Medication Review/Management: medications reviewed  Taken 11/3/2023 0200 by Krystin Ardon RN  Medication Review/Management: medications reviewed  Taken 11/2/2023 2000 by Krystin Ardon RN  Medication Review/Management: medications reviewed     Problem: Fall Injury Risk  Goal: Absence of Fall and Fall-Related Injury  Outcome: Ongoing, Progressing  Intervention: Identify and Manage Contributors  Recent Flowsheet Documentation  Taken 11/3/2023 0400 by Krystin Ardon RN  Medication Review/Management: medications reviewed  Taken 11/3/2023 0200 by Krystin Ardon RN  Medication Review/Management: medications reviewed  Taken 11/2/2023 2000 by Krystin Ardon RN  Medication Review/Management: medications reviewed  Intervention: Promote Injury-Free Environment  Recent Flowsheet Documentation  Taken 11/3/2023 0400 by Krystin Ardon RN  Safety Promotion/Fall Prevention:   safety round/check completed   nonskid shoes/slippers when out of bed   lighting adjusted  Taken 11/3/2023 0200 by Krystin Ardon RN  Safety Promotion/Fall Prevention:   safety round/check completed   nonskid shoes/slippers when out of bed   lighting adjusted  Taken 11/3/2023 0000 by Krystin Ardon RN  Safety  Promotion/Fall Prevention:   safety round/check completed   nonskid shoes/slippers when out of bed   lighting adjusted  Taken 11/2/2023 2200 by Krystin Ardon, RN  Safety Promotion/Fall Prevention:   safety round/check completed   nonskid shoes/slippers when out of bed   lighting adjusted  Taken 11/2/2023 2000 by Krystin Ardon, RN  Safety Promotion/Fall Prevention:   safety round/check completed   nonskid shoes/slippers when out of bed   lighting adjusted     Problem: Activity and Energy Impairment (Anxiety Signs/Symptoms)  Goal: Optimized Energy Level (Anxiety Signs/Symptoms)  Outcome: Ongoing, Progressing  Flowsheets (Taken 11/3/2023 0503)  Individualized Action Step (Optimized Energy Level): pt had run of afib. put on a cardiezem gtt. hr now in 90 to low 100s, npo for bronch.this am  Goal: Optimized Energy Level (Anxiety Signs/Symptoms)  Outcome: Ongoing, Progressing  Flowsheets (Taken 11/3/2023 0503)  Individualized Action Step (Optimized Energy Level): pt had run of afib. put on a cardiezem gtt. hr now in 90 to low 100s, npo for bronch.this am     Problem: Cognitive Impairment (Anxiety Signs/Symptoms)  Goal: Optimized Cognitive Function (Anxiety Signs/Symptoms)  Outcome: Ongoing, Progressing  Goal: Optimized Cognitive Function (Anxiety Signs/Symptoms)  Outcome: Ongoing, Progressing     Problem: Mood Impairment (Anxiety Signs/Symptoms)  Goal: Improved Mood Symptoms (Anxiety Signs/Symptoms)  Outcome: Ongoing, Progressing  Goal: Improved Mood Symptoms (Anxiety Signs/Symptoms)  Outcome: Ongoing, Progressing     Problem: Sleep Impairment (Anxiety Signs/Symptoms)  Goal: Improved Sleep (Anxiety Signs/Symptoms)  Outcome: Ongoing, Progressing  Goal: Improved Sleep (Anxiety Signs/Symptoms)  Outcome: Ongoing, Progressing     Problem: Social, Occupational or Functional Impairment (Anxiety Signs/Symptoms)  Goal: Enhanced Social, Occupational or Functional Skills (Anxiety Signs/Symptoms)  Outcome: Ongoing,  Progressing  Goal: Enhanced Social, Occupational or Functional Skills (Anxiety Signs/Symptoms)  Outcome: Ongoing, Progressing     Problem: Somatic Disturbance (Anxiety Signs/Symptoms)  Goal: Improved Somatic Symptoms (Anxiety Signs/Symptoms)  Outcome: Ongoing, Progressing  Goal: Improved Somatic Symptoms (Anxiety Signs/Symptoms)  Outcome: Ongoing, Progressing     Problem: Fluid Imbalance (Pneumonia)  Goal: Fluid Balance  Outcome: Ongoing, Progressing     Problem: Infection (Pneumonia)  Goal: Resolution of Infection Signs and Symptoms  Outcome: Ongoing, Progressing  Intervention: Prevent Infection Progression  Recent Flowsheet Documentation  Taken 11/3/2023 0200 by Krystin Ardon RN  Isolation Precautions: droplet  Taken 11/2/2023 2000 by Krystin Ardon RN  Isolation Precautions: droplet     Problem: Respiratory Compromise (Pneumonia)  Goal: Effective Oxygenation and Ventilation  Outcome: Ongoing, Progressing  Intervention: Promote Airway Secretion Clearance  Recent Flowsheet Documentation  Taken 11/2/2023 2000 by Krystin Ardon RN  Cough And Deep Breathing: done independently per patient  Intervention: Optimize Oxygenation and Ventilation  Recent Flowsheet Documentation  Taken 11/3/2023 0000 by Krystin Ardon RN  Head of Bed (HOB) Positioning: HOB at 30 degrees  Taken 11/2/2023 2000 by Krystin Ardon RN  Head of Bed (HOB) Positioning: HOB at 20-30 degrees     Problem: Asthma Comorbidity  Goal: Maintenance of Asthma Control  Outcome: Ongoing, Progressing  Intervention: Maintain Asthma Symptom Control  Recent Flowsheet Documentation  Taken 11/3/2023 0400 by Kyrstin Ardon RN  Medication Review/Management: medications reviewed  Taken 11/3/2023 0200 by Krystin Ardon RN  Medication Review/Management: medications reviewed  Taken 11/2/2023 2000 by Krystin Ardon RN  Medication Review/Management: medications reviewed     Problem: Behavioral Health Comorbidity  Goal: Maintenance of  Behavioral Health Symptom Control  Outcome: Ongoing, Progressing  Intervention: Maintain Behavioral Health Symptom Control  Recent Flowsheet Documentation  Taken 11/3/2023 0400 by Krystin Ardon RN  Medication Review/Management: medications reviewed  Taken 11/3/2023 0200 by Krystin Ardon RN  Medication Review/Management: medications reviewed  Taken 11/2/2023 2000 by Krystin Ardon RN  Medication Review/Management: medications reviewed     Problem: COPD (Chronic Obstructive Pulmonary Disease) Comorbidity  Goal: Maintenance of COPD Symptom Control  Outcome: Ongoing, Progressing  Intervention: Maintain COPD-Symptom Control  Recent Flowsheet Documentation  Taken 11/3/2023 0400 by Krystin Ardon RN  Medication Review/Management: medications reviewed  Taken 11/3/2023 0200 by Krystin Ardon RN  Medication Review/Management: medications reviewed  Taken 11/2/2023 2000 by Krystin Ardon RN  Medication Review/Management: medications reviewed     Problem: Diabetes Comorbidity  Goal: Blood Glucose Level Within Targeted Range  Outcome: Ongoing, Progressing     Problem: Heart Failure Comorbidity  Goal: Maintenance of Heart Failure Symptom Control  Outcome: Ongoing, Progressing  Intervention: Maintain Heart Failure-Management  Recent Flowsheet Documentation  Taken 11/3/2023 0400 by Krystin Ardon RN  Medication Review/Management: medications reviewed  Taken 11/3/2023 0200 by Krystin Ardon RN  Medication Review/Management: medications reviewed  Taken 11/2/2023 2000 by Krystin Ardon RN  Medication Review/Management: medications reviewed     Problem: Hypertension Comorbidity  Goal: Blood Pressure in Desired Range  Outcome: Ongoing, Progressing  Intervention: Maintain Blood Pressure Management  Recent Flowsheet Documentation  Taken 11/3/2023 0400 by Krystin Ardon RN  Medication Review/Management: medications reviewed  Taken 11/3/2023 0200 by Krystin Ardon RN  Medication  Review/Management: medications reviewed  Taken 11/2/2023 2000 by Krystin Ardon RN  Medication Review/Management: medications reviewed     Problem: Obstructive Sleep Apnea Risk or Actual Comorbidity Management  Goal: Unobstructed Breathing During Sleep  Outcome: Ongoing, Progressing     Problem: Osteoarthritis Comorbidity  Goal: Maintenance of Osteoarthritis Symptom Control  Outcome: Ongoing, Progressing  Intervention: Maintain Osteoarthritis Symptom Control  Recent Flowsheet Documentation  Taken 11/3/2023 0400 by Krystin Ardon RN  Activity Management: up ad minal  Medication Review/Management: medications reviewed  Taken 11/3/2023 0200 by Krystin Ardon RN  Medication Review/Management: medications reviewed  Taken 11/3/2023 0000 by Krystin Ardon RN  Activity Management: up ad minal  Taken 11/2/2023 2200 by Krystin Ardon RN  Activity Management: up ad minal  Taken 11/2/2023 2000 by Krystin Ardon RN  Activity Management: up ad minal  Medication Review/Management: medications reviewed     Problem: Seizure Disorder Comorbidity  Goal: Maintenance of Seizure Control  Outcome: Ongoing, Progressing   Goal Outcome Evaluation:

## 2023-11-04 LAB
ANION GAP SERPL CALCULATED.3IONS-SCNC: 9 MMOL/L (ref 5–15)
BUN SERPL-MCNC: 17 MG/DL (ref 8–23)
BUN/CREAT SERPL: 21.3 (ref 7–25)
CALCIUM SPEC-SCNC: 8.1 MG/DL (ref 8.6–10.5)
CHLORIDE SERPL-SCNC: 109 MMOL/L (ref 98–107)
CO2 SERPL-SCNC: 24 MMOL/L (ref 22–29)
CREAT SERPL-MCNC: 0.8 MG/DL (ref 0.76–1.27)
DEPRECATED RDW RBC AUTO: 46.4 FL (ref 37–54)
EGFRCR SERPLBLD CKD-EPI 2021: 100.1 ML/MIN/1.73
ERYTHROCYTE [DISTWIDTH] IN BLOOD BY AUTOMATED COUNT: 13.4 % (ref 12.3–15.4)
GLUCOSE SERPL-MCNC: 196 MG/DL (ref 65–99)
HCT VFR BLD AUTO: 41.1 % (ref 37.5–51)
HGB BLD-MCNC: 13.4 G/DL (ref 13–17.7)
LARGE PLATELETS: ABNORMAL
LYMPHOCYTES # BLD MANUAL: 1.46 10*3/MM3 (ref 0.7–3.1)
LYMPHOCYTES NFR BLD MANUAL: 6 % (ref 5–12)
MCH RBC QN AUTO: 32.6 PG (ref 26.6–33)
MCHC RBC AUTO-ENTMCNC: 32.6 G/DL (ref 31.5–35.7)
MCV RBC AUTO: 99.9 FL (ref 79–97)
MONOCYTES # BLD: 0.8 10*3/MM3 (ref 0.1–0.9)
MYELOCYTES NFR BLD MANUAL: 3 % (ref 0–0)
NEUTROPHILS # BLD AUTO: 10.64 10*3/MM3 (ref 1.7–7)
NEUTROPHILS NFR BLD MANUAL: 77 % (ref 42.7–76)
NEUTS BAND NFR BLD MANUAL: 3 % (ref 0–5)
PLATELET # BLD AUTO: 142 10*3/MM3 (ref 140–450)
PMV BLD AUTO: 10.3 FL (ref 6–12)
POTASSIUM SERPL-SCNC: 3.3 MMOL/L (ref 3.5–5.2)
POTASSIUM SERPL-SCNC: 4 MMOL/L (ref 3.5–5.2)
RBC # BLD AUTO: 4.12 10*6/MM3 (ref 4.14–5.8)
RBC MORPH BLD: NORMAL
SCAN SLIDE: NORMAL
SODIUM SERPL-SCNC: 142 MMOL/L (ref 136–145)
VARIANT LYMPHS NFR BLD MANUAL: 11 % (ref 19.6–45.3)
WBC MORPH BLD: NORMAL
WBC NRBC COR # BLD: 13.3 10*3/MM3 (ref 3.4–10.8)

## 2023-11-04 PROCEDURE — 25810000003 SODIUM CHLORIDE 0.9 % SOLUTION: Performed by: EMERGENCY MEDICINE

## 2023-11-04 PROCEDURE — 25010000002 ENOXAPARIN PER 10 MG: Performed by: INTERNAL MEDICINE

## 2023-11-04 PROCEDURE — 85025 COMPLETE CBC W/AUTO DIFF WBC: CPT | Performed by: INTERNAL MEDICINE

## 2023-11-04 PROCEDURE — 94761 N-INVAS EAR/PLS OXIMETRY MLT: CPT

## 2023-11-04 PROCEDURE — 80048 BASIC METABOLIC PNL TOTAL CA: CPT | Performed by: INTERNAL MEDICINE

## 2023-11-04 PROCEDURE — 85007 BL SMEAR W/DIFF WBC COUNT: CPT | Performed by: INTERNAL MEDICINE

## 2023-11-04 PROCEDURE — 25010000002 PIPERACILLIN SOD-TAZOBACTAM PER 1 G: Performed by: INTERNAL MEDICINE

## 2023-11-04 PROCEDURE — 94799 UNLISTED PULMONARY SVC/PX: CPT

## 2023-11-04 PROCEDURE — 84132 ASSAY OF SERUM POTASSIUM: CPT | Performed by: PHYSICIAN ASSISTANT

## 2023-11-04 PROCEDURE — 25010000002 METHYLPREDNISOLONE PER 125 MG: Performed by: EMERGENCY MEDICINE

## 2023-11-04 PROCEDURE — 99232 SBSQ HOSP IP/OBS MODERATE 35: CPT | Performed by: INTERNAL MEDICINE

## 2023-11-04 PROCEDURE — 94664 DEMO&/EVAL PT USE INHALER: CPT

## 2023-11-04 PROCEDURE — 84145 PROCALCITONIN (PCT): CPT | Performed by: INTERNAL MEDICINE

## 2023-11-04 RX ORDER — POTASSIUM CHLORIDE 20 MEQ/1
40 TABLET, EXTENDED RELEASE ORAL EVERY 4 HOURS
Status: COMPLETED | OUTPATIENT
Start: 2023-11-04 | End: 2023-11-04

## 2023-11-04 RX ADMIN — ACETYLCYSTEINE 2 ML: 200 SOLUTION ORAL; RESPIRATORY (INHALATION) at 19:01

## 2023-11-04 RX ADMIN — POTASSIUM CHLORIDE 40 MEQ: 1500 TABLET, EXTENDED RELEASE ORAL at 10:52

## 2023-11-04 RX ADMIN — ENOXAPARIN SODIUM 40 MG: 100 INJECTION SUBCUTANEOUS at 16:46

## 2023-11-04 RX ADMIN — BUDESONIDE 0.5 MG: 0.5 INHALANT RESPIRATORY (INHALATION) at 19:01

## 2023-11-04 RX ADMIN — CETIRIZINE HYDROCHLORIDE 10 MG: 10 TABLET, FILM COATED ORAL at 09:09

## 2023-11-04 RX ADMIN — BUDESONIDE 0.5 MG: 0.5 INHALANT RESPIRATORY (INHALATION) at 06:45

## 2023-11-04 RX ADMIN — DOXEPIN HYDROCHLORIDE 10 MG: 10 CAPSULE ORAL at 21:51

## 2023-11-04 RX ADMIN — GUAIFENESIN 1200 MG: 600 TABLET, EXTENDED RELEASE ORAL at 21:51

## 2023-11-04 RX ADMIN — POTASSIUM CHLORIDE 40 MEQ: 1500 TABLET, EXTENDED RELEASE ORAL at 08:45

## 2023-11-04 RX ADMIN — OXYCODONE 5 MG: 5 TABLET ORAL at 12:53

## 2023-11-04 RX ADMIN — ACETYLCYSTEINE 2 ML: 200 SOLUTION ORAL; RESPIRATORY (INHALATION) at 06:55

## 2023-11-04 RX ADMIN — PIPERACILLIN AND TAZOBACTAM 4.5 G: 4; .5 INJECTION, POWDER, FOR SOLUTION INTRAVENOUS at 02:53

## 2023-11-04 RX ADMIN — PIPERACILLIN AND TAZOBACTAM 4.5 G: 4; .5 INJECTION, POWDER, FOR SOLUTION INTRAVENOUS at 18:00

## 2023-11-04 RX ADMIN — Medication 10 ML: at 09:11

## 2023-11-04 RX ADMIN — GUAIFENESIN 1200 MG: 600 TABLET, EXTENDED RELEASE ORAL at 09:09

## 2023-11-04 RX ADMIN — METHYLPREDNISOLONE SODIUM SUCCINATE 60 MG: 125 INJECTION, POWDER, FOR SOLUTION INTRAMUSCULAR; INTRAVENOUS at 09:09

## 2023-11-04 RX ADMIN — SODIUM CHLORIDE 100 ML/HR: 9 INJECTION, SOLUTION INTRAVENOUS at 18:00

## 2023-11-04 RX ADMIN — SODIUM CHLORIDE 100 ML/HR: 9 INJECTION, SOLUTION INTRAVENOUS at 13:49

## 2023-11-04 RX ADMIN — POLYETHYLENE GLYCOL 3350 17 G: 17 POWDER, FOR SOLUTION ORAL at 10:52

## 2023-11-04 RX ADMIN — METHYLPREDNISOLONE SODIUM SUCCINATE 60 MG: 125 INJECTION, POWDER, FOR SOLUTION INTRAMUSCULAR; INTRAVENOUS at 21:51

## 2023-11-04 RX ADMIN — METOPROLOL SUCCINATE 25 MG: 25 TABLET, EXTENDED RELEASE ORAL at 09:09

## 2023-11-04 RX ADMIN — Medication 10 ML: at 21:51

## 2023-11-04 RX ADMIN — ALBUTEROL SULFATE 2.5 MG: 2.5 SOLUTION RESPIRATORY (INHALATION) at 19:01

## 2023-11-04 RX ADMIN — IPRATROPIUM BROMIDE AND ALBUTEROL SULFATE 3 ML: .5; 3 SOLUTION RESPIRATORY (INHALATION) at 06:50

## 2023-11-04 RX ADMIN — DOCUSATE SODIUM 50 MG AND SENNOSIDES 8.6 MG 2 TABLET: 8.6; 5 TABLET, FILM COATED ORAL at 10:52

## 2023-11-04 RX ADMIN — OXYCODONE 5 MG: 5 TABLET ORAL at 22:34

## 2023-11-04 RX ADMIN — PANTOPRAZOLE SODIUM 40 MG: 40 TABLET, DELAYED RELEASE ORAL at 09:09

## 2023-11-04 RX ADMIN — PIPERACILLIN AND TAZOBACTAM 4.5 G: 4; .5 INJECTION, POWDER, FOR SOLUTION INTRAVENOUS at 10:51

## 2023-11-04 NOTE — PROGRESS NOTES
Cardiology Progress Note      PATIENT IDENTIFICATION    Name: Jagdish Rea  Age: 62 y.o. Sex: male : 1960  MRN: [id number]    Requesting Provider    Adam Nesbitt DO     LOS: 3 days       Reason For Followup:  Paroxysmal atrial fibrillation      Subjective:    Interval History:  Seen and examined.  Chart and labs reviewed.  Patient denies any chest pain pressure heaviness or tightness.  Still reports shortness of breath.  Remains in sinus rhythm.    Review of Systems:  A complete review of systems was undertaken with the pertinent cardiovascular findings listed in history of present illness and all other systems being negative.     Assessment & Plan    Impressions:  New onset atrial fibrillation with rapid ventricular response  Acute respiratory failure secondary to rhinovirus and pneumonia  History of COPD  Hypertension  Hyperlipidemia    Recommendations:  Prior catheterization in 2022 with nonobstructive coronary artery disease normal EF.  Patient has returned to sinus rhythm.  [id number] score is 1.  Would recommend aspirin only at the present time.     Consider oral anticoagulation if A-fib recurs  2D echocardiogram with normal LV systolic function.  Cardiology status would be appropriate for discharge to next destination of care when okay with other services.  No further cardiac work-up planned.  We will see as needed for the remainder of the weekend.  Primary cardiology to  care tomorrow.          Objective:    Medication Review:   Scheduled Meds:acetylcysteine, 2 mL, Nebulization, BID - RT  budesonide, 0.5 mg, Nebulization, BID - RT  cetirizine, 10 mg, Oral, Daily  doxepin, 10 mg, Oral, Nightly  enoxaparin, 40 mg, Subcutaneous, Q24H  guaiFENesin, 1,200 mg, Oral, Q12H  methylPREDNISolone sodium succinate, 60 mg, Intravenous, Q12H  metoprolol succinate XL, 25 mg, Oral, Daily  pantoprazole, 40 mg, Oral, Daily  piperacillin-tazobactam, 4.5 g, Intravenous, Q8H  senna-docusate sodium, 2  tablet, Oral, BID  sodium chloride, 10 mL, Intravenous, Q12H  sodium chloride, 10 mL, Intravenous, Q12H      Continuous Infusions:dilTIAZem, 5-15 mg/hr, Last Rate: Stopped (11/03/23 0708)  sodium chloride, 100 mL/hr, Last Rate: 100 mL/hr (11/04/23 1800)      PRN Meds:.  acetaminophen    albuterol    benzonatate    senna-docusate sodium **AND** polyethylene glycol **AND** bisacodyl **AND** bisacodyl    Calcium Replacement - Follow Nurse / BPA Driven Protocol    guaiFENesin-dextromethorphan    HYDROmorphone    ipratropium-albuterol    Magnesium Standard Dose Replacement - Follow Nurse / BPA Driven Protocol    Morphine **AND** naloxone    nitroglycerin    ondansetron **OR** ondansetron    oxyCODONE    Phosphorus Replacement - Follow Nurse / BPA Driven Protocol    Potassium Replacement - Follow Nurse / BPA Driven Protocol    sodium chloride    sodium chloride    sodium chloride    sodium chloride    sodium chloride      Dyspnea    Multiple tracheobronchial mucus plugs         Physical Exam:    General: Alert, cooperative, no distress, appears stated age  Head:  Normocephalic, atraumatic, mucous membranes moist  Eyes:  Conjunctivae/corneas clear, EOMs intact     Neck:  Supple,  no bruit  Lungs:  Coarse and diminished with scattered wheezing.  Chest wall: No tenderness  Heart::  Regular rate and rhythm, S1 and S2 normal, 1/6 holosystolic murmur.  No rub or gallop  Abdomen: Soft, nontender, nondistended, bowel sounds active  Extremities: No cyanosis, clubbing, or edema  Pulses: 2+ and symmetric all extremities  Skin:  No rashes or lesions  Neuro/psych: A&O x3. CN II through XII are grossly intact with appropriate affect    Vital Signs:  Vitals:    11/04/23 0658 11/04/23 1050 11/04/23 1512 11/04/23 1654   BP:  119/63 172/89 128/87   BP Location:  Right arm Right arm Right arm   Patient Position:  Lying Sitting Sitting   Pulse: 72 67 92    Resp: 20 20 20 20   Temp:  98.3 °F (36.8 °C) 98.5 °F (36.9 °C) 98.5 °F (36.9 °C)  "  TempSrc:  Oral Oral Oral   SpO2: 94% 97% 94%    Weight:       Height:         Wt Readings from Last 1 Encounters:   11/03/23 113 kg (249 lb)       Intake/Output Summary (Last 24 hours) at 11/4/2023 1807  Last data filed at 11/4/2023 1315  Gross per 24 hour   Intake 1022 ml   Output 2750 ml   Net -1728 ml         Results Review:     CBC    Results from last 7 days   Lab Units 11/04/23  0410 11/02/23  0814 11/01/23  1316 10/31/23  1116 10/30/23  0359 10/29/23  1920   WBC 10*3/mm3 13.30* 15.40* 16.60* 17.10* 10.00 7.50   HEMOGLOBIN g/dL 13.4 15.1 15.0 14.6 16.0 16.9   PLATELETS 10*3/mm3 142 155 162 157 148 155     Cr Clearance Estimated Creatinine Clearance: 124.3 mL/min (by C-G formula based on SCr of 0.8 mg/dL).  Coag     HbA1C   Lab Results   Component Value Date    HGBA1C 6.10 (H) 10/30/2023     Blood Glucose No results found for: \"POCGLU\"  Infection   Results from last 7 days   Lab Units 11/03/23  0911   RESPCX  Rare The culture consists of normal respiratory victor m. This is a preliminary report; final report to follow.     CMP   Results from last 7 days   Lab Units 11/04/23  1642 11/04/23  0410 11/02/23  0814 11/01/23  1316 10/31/23  1116 10/30/23  0359 10/29/23  1920   SODIUM mmol/L  --  142 142 142 141 142 142   POTASSIUM mmol/L 4.0 3.3* 3.5 3.6 4.1 3.5 3.2*   CHLORIDE mmol/L  --  109* 107 108* 109* 107 107   CO2 mmol/L  --  24.0 23.0 22.0 22.0 23.0 23.0   BUN mg/dL  --  17 12 13 11 8 8   CREATININE mg/dL  --  0.80 0.65* 0.72* 0.72* 0.69* 0.94   GLUCOSE mg/dL  --  196* 125* 153* 161* 116* 95   ALBUMIN g/dL  --   --   --   --   --   --  4.3   BILIRUBIN mg/dL  --   --   --   --   --   --  0.4   ALK PHOS U/L  --   --   --   --   --   --  85   AST (SGOT) U/L  --   --   --   --   --   --  30   ALT (SGPT) U/L  --   --   --   --   --   --  36     ABG      UA      BABITA  No results found for: \"POCMETH\", \"POCAMPHET\", \"POCBARBITUR\", \"POCBENZO\", \"POCCOCAINE\", \"POCOPIATES\", \"POCOXYCODO\", \"POCPHENCYC\", \"POCPROPOXY\", " "\"POCTHC\", \"POCTRICYC\"  Lysis Labs   Results from last 7 days   Lab Units 11/04/23  0410 11/02/23  0814 11/01/23  1316 10/31/23  1116 10/30/23  0359 10/29/23  1920   HEMOGLOBIN g/dL 13.4 15.1 15.0 14.6 16.0 16.9   PLATELETS 10*3/mm3 142 155 162 157 148 155   CREATININE mg/dL 0.80 0.65* 0.72* 0.72* 0.69* 0.94     Radiology(recent) No radiology results for the last day      Results from last 7 days   Lab Units 10/29/23  2232   HSTROP T ng/L 7       Imaging Results (Last 24 Hours)       ** No results found for the last 24 hours. **            Cardiac Studies:  Echo- Results for orders placed during the hospital encounter of 08/16/22    Adult Transthoracic Echo Complete W/ Cont if Necessary Per Protocol    Interpretation Summary  · Estimated left ventricular EF was in disagreement with the calculated left ventricular EF. Left ventricular ejection fraction appears to be 51 - 55%. Left ventricular systolic function is low normal.  · Left ventricular diastolic function is consistent with (grade I) impaired relaxation.  · Estimated right ventricular systolic pressure from tricuspid regurgitation is normal (<35 mmHg).    Stress Myoview-  Cath-        Edi Thompson DO  11/04/23  18:07 EDT  "

## 2023-11-04 NOTE — PROGRESS NOTES
Daily Progress Note          Assessment    Dyspnea  Hypoxemia  Left lower lobe pneumonia due to unspecified pathogen  Rhinovirus infection  COPD with acute exacerbation  Tobacco smoker  HLD  HTN  GERD with history of esophageal stricture     Recommendations:  Status post bronchoscopy 11/3/2023 with removal of multiple thick purulent mucous plugs  Patient is improving but not ready to be discharged yet  antibiotics: Rocephin was changed to Zosyn 11/3/2023  Oxygen supplement and titration to maintain saturation 90 to 95%: Currently requiring 4 L per nasal cannula, he will need a 6-minute walk before discharge to assess need for home oxygen  Bronchodilators  Inhaled corticosteroids  Encourage use of incentive spirometry  IV steroids  Mucinex to 1200 mg twice daily  Amlodipine and metoprolol  Smoking cessation counseling  DVT/GI prophylaxis                      LOS: 3 days     Subjective     Patient reports cough and shortness of breath    Objective     Vital signs for last 24 hours:  Vitals:    11/04/23 0653 11/04/23 0655 11/04/23 0658 11/04/23 1050   BP:    119/63   BP Location:    Right arm   Patient Position:    Lying   Pulse: 71 76 72 67   Resp: 20 21 20 20   Temp:    98.3 °F (36.8 °C)   TempSrc:    Oral   SpO2: 94% 94% 94% 97%   Weight:       Height:           Intake/Output last 3 shifts:  I/O last 3 completed shifts:  In: 990 [P.O.:480; I.V.:310; IV Piggyback:200]  Out: 2850 [Urine:2500; Stool:350]  Intake/Output this shift:  I/O this shift:  In: 360 [P.O.:360]  Out: 500 [Urine:500]      Radiology  Imaging Results (Last 24 Hours)       ** No results found for the last 24 hours. **            Labs:  Results from last 7 days   Lab Units 11/04/23  0410   WBC 10*3/mm3 13.30*   HEMOGLOBIN g/dL 13.4   HEMATOCRIT % 41.1   PLATELETS 10*3/mm3 142     Results from last 7 days   Lab Units 11/04/23  0410 10/30/23  0359 10/29/23  1920   SODIUM mmol/L 142   < > 142   POTASSIUM mmol/L 3.3*   < > 3.2*   CHLORIDE mmol/L 109*   <  > 107   CO2 mmol/L 24.0   < > 23.0   BUN mg/dL 17   < > 8   CREATININE mg/dL 0.80   < > 0.94   CALCIUM mg/dL 8.1*   < > 9.2   BILIRUBIN mg/dL  --   --  0.4   ALK PHOS U/L  --   --  85   ALT (SGPT) U/L  --   --  36   AST (SGOT) U/L  --   --  30   GLUCOSE mg/dL 196*   < > 95    < > = values in this interval not displayed.         Results from last 7 days   Lab Units 10/29/23  1920   ALBUMIN g/dL 4.3     Results from last 7 days   Lab Units 10/29/23  2232 10/29/23  1920   HSTROP T ng/L 7 8                 Results from last 7 days   Lab Units 11/01/23  1316   TSH uIU/mL 1.020           Meds:   SCHEDULE  acetylcysteine, 2 mL, Nebulization, BID - RT  budesonide, 0.5 mg, Nebulization, BID - RT  cetirizine, 10 mg, Oral, Daily  doxepin, 10 mg, Oral, Nightly  enoxaparin, 40 mg, Subcutaneous, Q24H  guaiFENesin, 1,200 mg, Oral, Q12H  methylPREDNISolone sodium succinate, 60 mg, Intravenous, Q12H  metoprolol succinate XL, 25 mg, Oral, Daily  pantoprazole, 40 mg, Oral, Daily  piperacillin-tazobactam, 4.5 g, Intravenous, Q8H  senna-docusate sodium, 2 tablet, Oral, BID  sodium chloride, 10 mL, Intravenous, Q12H  sodium chloride, 10 mL, Intravenous, Q12H      Infusions  dilTIAZem, 5-15 mg/hr, Last Rate: Stopped (11/03/23 0708)  sodium chloride, 100 mL/hr, Last Rate: 100 mL/hr (11/03/23 1725)      PRNs    acetaminophen    albuterol    benzonatate    senna-docusate sodium **AND** polyethylene glycol **AND** bisacodyl **AND** bisacodyl    Calcium Replacement - Follow Nurse / BPA Driven Protocol    guaiFENesin-dextromethorphan    HYDROmorphone    ipratropium-albuterol    Magnesium Standard Dose Replacement - Follow Nurse / BPA Driven Protocol    Morphine **AND** naloxone    nitroglycerin    ondansetron **OR** ondansetron    oxyCODONE    Phosphorus Replacement - Follow Nurse / BPA Driven Protocol    Potassium Replacement - Follow Nurse / BPA Driven Protocol    sodium chloride    sodium chloride    sodium chloride    sodium chloride     sodium chloride    Physical Exam:  General Appearance:  Alert   HEENT:  Normocephalic, without obvious abnormality, Conjunctiva/corneas clear,.   Nares normal, no drainage     Neck:  Supple, symmetrical, trachea midline.   Lungs /Chest wall: Partial improvement in air entry with mild decrease in the bilateral basal rhonchi, respirations unlabored, symmetrical wall movement.     Heart:  Regular rate and rhythm, S1 S2 normal  Abdomen: Soft, non-tender, no masses, no organomegaly.    Extremities: No edema, no clubbing or cyanosis     ROS  Constitutional: Negative for chills, fever and malaise/fatigue.   HENT: Negative.    Eyes: Negative.    Cardiovascular: Negative.    Respiratory: Positive for cough and shortness of breath.    Skin: Negative.    Musculoskeletal: Negative.    Gastrointestinal: Negative.    Genitourinary: Negative.    Neurological: Negative.    Psychiatric/Behavioral: Negative.      I reviewed the recent clinical results    Part of this note may be an electronic transcription/translation of spoken language to printed text using the Dragon Dictation System.

## 2023-11-04 NOTE — NURSING NOTE
Patient was a transfer from [facility]. Patient is currently positive for RSV and on 4L of oxygen. Patient is independent and alert and oriented. On NS running at 100mL/hr. On IV zosyn. WBC elevated at 13.3. Echo pending. Pulmonology and cardiology following. Cultures from bronch pending. On IV steroids. Respiratory walk has been ordered. Head to toe completed. Belongings charted. No other issues at this time. Continuing to monitor.

## 2023-11-04 NOTE — PROGRESS NOTES
[facility] Medicine Services   Daily Progress Note      Patient Name: Jagdish Rea  : 1960  MRN: [id number]  Primary Care Physician:  Yamilka Pascual APRN  Date of admission: 10/29/2023      Subjective      Chief Complaint: Shortness of breath    Patient seen and examined this morning.  Doing well, feeling much better after bronchoscopy yesterday.  No acute events overnight.  No other complaints.    Pertinent positives as noted in HPI/subjective.  All other systems were reviewed and are negative.      Objective      Vitals:   Temp:  [97 °F (36.1 °C)-98.3 °F (36.8 °C)] 98.3 °F (36.8 °C)  Heart Rate:  [] 67  Resp:  [18-24] 20  BP: (102-147)/(53-87) 119/63  Flow (L/min):  [4-6] 4    Physical Exam:    General: Awake, alert, morbidly obese male, NAD  Eyes: PERRL, EOMI, conjunctivae are clear  Cardiovascular: Regular rate and rhythm, no murmurs  Respiratory: Wheezing and rales bilaterally, unlabored breathing  Abdomen: Soft, nontender, positive bowel sounds, no guarding  Neurologic: A&O, CN grossly intact, moves all extremities spontaneously  Musculoskeletal: Normal range of motion, no other gross deformities  Skin: Warm, dry, intact         Result Review    Result Review:  I have personally reviewed the results from the time of this admission to 2023 11:11 EDT and agree with these findings:  [x]  Laboratory  [x]  Microbiology  [x]  Radiology  []  EKG/Telemetry   []  Cardiology/Vascular   []  Pathology  []  Old records  []  Other:          Assessment & Plan      Brief Patient Summary:  Jagdish Rea is a 62 y.o. male who       acetylcysteine, 2 mL, Nebulization, BID - RT  budesonide, 0.5 mg, Nebulization, BID - RT  cetirizine, 10 mg, Oral, Daily  doxepin, 10 mg, Oral, Nightly  enoxaparin, 40 mg, Subcutaneous, Q24H  guaiFENesin, 1,200 mg, Oral, Q12H  methylPREDNISolone sodium succinate, 60 mg, Intravenous, Q12H  metoprolol succinate XL, 25 mg, Oral, Daily  pantoprazole, 40 mg, Oral,  Daily  piperacillin-tazobactam, 4.5 g, Intravenous, Q8H  senna-docusate sodium, 2 tablet, Oral, BID  sodium chloride, 10 mL, Intravenous, Q12H  sodium chloride, 10 mL, Intravenous, Q12H       dilTIAZem, 5-15 mg/hr, Last Rate: Stopped (11/03/23 0708)  sodium chloride, 100 mL/hr, Last Rate: 100 mL/hr (11/03/23 1725)         I have utilized all available, immediate resources to obtain, update, or review the patient's current medications including all prescriptions, over-the-counter products, herbals, cannabis/cannabidiol products, and vitamin.mineral/dietary (nutritional) supplements.    Active Hospital Problems:  Active Hospital Problems    Diagnosis     **Dyspnea     Multiple tracheobronchial mucus plugs      Plan:     Acute hypoxemic respiratory failure  Left lower lobe pneumonia  Rhinovirus infection  COPD with exacerbation  -Imaging findings noted  -Respiratory viral panel positive for rhinovirus  -Respiratory status did not improve, patient underwent bronchoscopy on 11/3 with multiple thick mucous plug removal  -On IV Zosyn for antibiotics per pulmonology  -Continue steroids and bronchodilators  -Remains on 4 L nasal cannula, wean down to room air as tolerated  -Follow-up on [facility] cultures  -Pulmonology following    New onset A-fib  -Briefly had RVR but appears to be converted to NSR  -Previously had cardiac work-up with cath which was negative   -Patient denies any history of A-fib in the past  -Likely related to respiratory issues  -Continue beta-blocker  -Cardiology following, echo pending    Hypertension  -Controlled, continue metoprolol  -Hold amlodipine and HCTZ for now    DVT prophylaxis  -Lovenox    CODE STATUS:    Level Of Support Discussed With: Patient  Code Status (Patient has no pulse and is not breathing): CPR (Attempt to Resuscitate)  Medical Interventions (Patient has pulse or is breathing): Full Support      Disposition: Pending improvement    Electronically signed by Adam Nesbitt DO, 11/04/23,  11:11 EDT.  [facility]      Part of this note may be an electronic transcription/translation of spoken language to printed text using the Dragon Dictation System.

## 2023-11-05 LAB
ANION GAP SERPL CALCULATED.3IONS-SCNC: 10 MMOL/L (ref 5–15)
BACTERIA SPEC RESP CULT: NORMAL
BUN SERPL-MCNC: 18 MG/DL (ref 8–23)
BUN/CREAT SERPL: 24.3 (ref 7–25)
CALCIUM SPEC-SCNC: 8.5 MG/DL (ref 8.6–10.5)
CHLORIDE SERPL-SCNC: 106 MMOL/L (ref 98–107)
CO2 SERPL-SCNC: 23 MMOL/L (ref 22–29)
CREAT SERPL-MCNC: 0.74 MG/DL (ref 0.76–1.27)
DEPRECATED RDW RBC AUTO: 47.3 FL (ref 37–54)
EGFRCR SERPLBLD CKD-EPI 2021: 102.4 ML/MIN/1.73
ERYTHROCYTE [DISTWIDTH] IN BLOOD BY AUTOMATED COUNT: 13.6 % (ref 12.3–15.4)
GLUCOSE SERPL-MCNC: 231 MG/DL (ref 65–99)
GRAM STN SPEC: NORMAL
GRAM STN SPEC: NORMAL
HCT VFR BLD AUTO: 45.7 % (ref 37.5–51)
HGB BLD-MCNC: 15.1 G/DL (ref 13–17.7)
LARGE PLATELETS: ABNORMAL
LYMPHOCYTES # BLD MANUAL: 1.21 10*3/MM3 (ref 0.7–3.1)
LYMPHOCYTES NFR BLD MANUAL: 6 % (ref 5–12)
MCH RBC QN AUTO: 33.1 PG (ref 26.6–33)
MCHC RBC AUTO-ENTMCNC: 33.1 G/DL (ref 31.5–35.7)
MCV RBC AUTO: 99.9 FL (ref 79–97)
MONOCYTES # BLD: 0.91 10*3/MM3 (ref 0.1–0.9)
MYELOCYTES NFR BLD MANUAL: 4 % (ref 0–0)
NEUTROPHILS # BLD AUTO: 12.38 10*3/MM3 (ref 1.7–7)
NEUTROPHILS NFR BLD MANUAL: 80 % (ref 42.7–76)
NEUTS BAND NFR BLD MANUAL: 2 % (ref 0–5)
PLATELET # BLD AUTO: 157 10*3/MM3 (ref 140–450)
PMV BLD AUTO: 10.4 FL (ref 6–12)
POTASSIUM SERPL-SCNC: 4 MMOL/L (ref 3.5–5.2)
PROCALCITONIN SERPL-MCNC: 0.04 NG/ML (ref 0–0.25)
RBC # BLD AUTO: 4.58 10*6/MM3 (ref 4.14–5.8)
RBC MORPH BLD: NORMAL
SCAN SLIDE: NORMAL
SODIUM SERPL-SCNC: 139 MMOL/L (ref 136–145)
VARIANT LYMPHS NFR BLD MANUAL: 8 % (ref 19.6–45.3)
WBC MORPH BLD: NORMAL
WBC NRBC COR # BLD: 15.1 10*3/MM3 (ref 3.4–10.8)

## 2023-11-05 PROCEDURE — 25810000003 SODIUM CHLORIDE 0.9 % SOLUTION: Performed by: EMERGENCY MEDICINE

## 2023-11-05 PROCEDURE — 94799 UNLISTED PULMONARY SVC/PX: CPT

## 2023-11-05 PROCEDURE — 94664 DEMO&/EVAL PT USE INHALER: CPT

## 2023-11-05 PROCEDURE — 25010000002 METHYLPREDNISOLONE PER 125 MG: Performed by: EMERGENCY MEDICINE

## 2023-11-05 PROCEDURE — 80048 BASIC METABOLIC PNL TOTAL CA: CPT | Performed by: INTERNAL MEDICINE

## 2023-11-05 PROCEDURE — 25010000002 ENOXAPARIN PER 10 MG: Performed by: INTERNAL MEDICINE

## 2023-11-05 PROCEDURE — 25010000002 PIPERACILLIN SOD-TAZOBACTAM PER 1 G: Performed by: INTERNAL MEDICINE

## 2023-11-05 RX ADMIN — PIPERACILLIN AND TAZOBACTAM 4.5 G: 4; .5 INJECTION, POWDER, FOR SOLUTION INTRAVENOUS at 01:34

## 2023-11-05 RX ADMIN — CETIRIZINE HYDROCHLORIDE 10 MG: 10 TABLET, FILM COATED ORAL at 08:30

## 2023-11-05 RX ADMIN — SODIUM CHLORIDE 100 ML/HR: 9 INJECTION, SOLUTION INTRAVENOUS at 08:30

## 2023-11-05 RX ADMIN — GUAIFENESIN 1200 MG: 600 TABLET, EXTENDED RELEASE ORAL at 08:30

## 2023-11-05 RX ADMIN — METHYLPREDNISOLONE SODIUM SUCCINATE 60 MG: 125 INJECTION, POWDER, FOR SOLUTION INTRAMUSCULAR; INTRAVENOUS at 08:29

## 2023-11-05 RX ADMIN — PIPERACILLIN AND TAZOBACTAM 4.5 G: 4; .5 INJECTION, POWDER, FOR SOLUTION INTRAVENOUS at 19:09

## 2023-11-05 RX ADMIN — METOPROLOL SUCCINATE 25 MG: 25 TABLET, EXTENDED RELEASE ORAL at 08:30

## 2023-11-05 RX ADMIN — PANTOPRAZOLE SODIUM 40 MG: 40 TABLET, DELAYED RELEASE ORAL at 08:30

## 2023-11-05 RX ADMIN — ACETYLCYSTEINE 2 ML: 200 SOLUTION ORAL; RESPIRATORY (INHALATION) at 07:49

## 2023-11-05 RX ADMIN — PIPERACILLIN AND TAZOBACTAM 4.5 G: 4; .5 INJECTION, POWDER, FOR SOLUTION INTRAVENOUS at 10:45

## 2023-11-05 RX ADMIN — Medication 10 ML: at 08:31

## 2023-11-05 RX ADMIN — OXYCODONE 5 MG: 5 TABLET ORAL at 22:08

## 2023-11-05 RX ADMIN — BUDESONIDE 0.5 MG: 0.5 INHALANT RESPIRATORY (INHALATION) at 07:49

## 2023-11-05 RX ADMIN — Medication 10 ML: at 21:58

## 2023-11-05 RX ADMIN — DOCUSATE SODIUM 50 MG AND SENNOSIDES 8.6 MG 2 TABLET: 8.6; 5 TABLET, FILM COATED ORAL at 08:30

## 2023-11-05 RX ADMIN — ENOXAPARIN SODIUM 40 MG: 100 INJECTION SUBCUTANEOUS at 15:16

## 2023-11-05 RX ADMIN — OXYCODONE 5 MG: 5 TABLET ORAL at 08:34

## 2023-11-05 RX ADMIN — Medication 10 ML: at 08:30

## 2023-11-05 RX ADMIN — BUDESONIDE 0.5 MG: 0.5 INHALANT RESPIRATORY (INHALATION) at 20:29

## 2023-11-05 RX ADMIN — METHYLPREDNISOLONE SODIUM SUCCINATE 60 MG: 125 INJECTION, POWDER, FOR SOLUTION INTRAMUSCULAR; INTRAVENOUS at 22:00

## 2023-11-05 RX ADMIN — ALBUTEROL SULFATE 2.5 MG: 2.5 SOLUTION RESPIRATORY (INHALATION) at 07:49

## 2023-11-05 RX ADMIN — DOXEPIN HYDROCHLORIDE 10 MG: 10 CAPSULE ORAL at 21:59

## 2023-11-05 RX ADMIN — GUAIFENESIN 1200 MG: 600 TABLET, EXTENDED RELEASE ORAL at 22:00

## 2023-11-05 RX ADMIN — GUAIFENESIN SYRUP AND DEXTROMETHORPHAN 5 ML: 100; 10 SYRUP ORAL at 22:08

## 2023-11-05 NOTE — PROGRESS NOTES
Daily Progress Note          Assessment    Dyspnea  Hypoxemia  Left lower lobe pneumonia due to unspecified pathogen  Rhinovirus infection  COPD with acute exacerbation  Tobacco smoker  HLD  HTN  GERD with history of esophageal stricture     Recommendations:  Status post bronchoscopy 11/3/2023 with removal of multiple thick purulent mucous plugs  Patient is not ready to be discharged yet  antibiotics: Rocephin was changed to Zosyn 11/3/2023  Oxygen supplement and titration to maintain saturation 90 to 95%: Currently requiring 4 L per nasal cannula, he will need a 6-minute walk before discharge to assess need for home oxygen  Bronchodilators  Inhaled corticosteroids  Encourage use of incentive spirometry  IV steroids  Mucinex to 1200 mg twice daily  Amlodipine and metoprolol  Smoking cessation counseling  DVT/GI prophylaxis                      LOS: 4 days     Subjective     Patient reports cough and shortness of breath    Objective     Vital signs for last 24 hours:  Vitals:    11/05/23 0752 11/05/23 0753 11/05/23 0756 11/05/23 0957   BP:   110/86    BP Location:   Left arm    Patient Position:   Lying    Pulse: 98 97 97    Resp: 12 12 16    Temp:       TempSrc:       SpO2: 95% 96% 95% 95%   Weight:       Height:           Intake/Output last 3 shifts:  I/O last 3 completed shifts:  In: 2068 [P.O.:1302; I.V.:566; IV Piggyback:200]  Out: 4400 [Urine:4050; Stool:350]  Intake/Output this shift:  I/O this shift:  In: 5115 [P.O.:600; I.V.:4515]  Out: 650 [Urine:650]      Radiology  Imaging Results (Last 24 Hours)       ** No results found for the last 24 hours. **            Labs:  Results from last 7 days   Lab Units 11/04/23 2343   WBC 10*3/mm3 15.10*   HEMOGLOBIN g/dL 15.1   HEMATOCRIT % 45.7   PLATELETS 10*3/mm3 157     Results from last 7 days   Lab Units 11/04/23  2343 10/30/23  0359 10/29/23  1920   SODIUM mmol/L 139   < > 142   POTASSIUM mmol/L 4.0   < > 3.2*   CHLORIDE mmol/L 106   < > 107   CO2 mmol/L 23.0   <  > 23.0   BUN mg/dL 18   < > 8   CREATININE mg/dL 0.74*   < > 0.94   CALCIUM mg/dL 8.5*   < > 9.2   BILIRUBIN mg/dL  --   --  0.4   ALK PHOS U/L  --   --  85   ALT (SGPT) U/L  --   --  36   AST (SGOT) U/L  --   --  30   GLUCOSE mg/dL 231*   < > 95    < > = values in this interval not displayed.         Results from last 7 days   Lab Units 10/29/23  1920   ALBUMIN g/dL 4.3     Results from last 7 days   Lab Units 10/29/23  2232 10/29/23  1920   HSTROP T ng/L 7 8                 Results from last 7 days   Lab Units 11/01/23  1316   TSH uIU/mL 1.020           Meds:   SCHEDULE  acetylcysteine, 2 mL, Nebulization, BID - RT  budesonide, 0.5 mg, Nebulization, BID - RT  cetirizine, 10 mg, Oral, Daily  doxepin, 10 mg, Oral, Nightly  enoxaparin, 40 mg, Subcutaneous, Q24H  guaiFENesin, 1,200 mg, Oral, Q12H  methylPREDNISolone sodium succinate, 60 mg, Intravenous, Q12H  metoprolol succinate XL, 25 mg, Oral, Daily  pantoprazole, 40 mg, Oral, Daily  piperacillin-tazobactam, 4.5 g, Intravenous, Q8H  senna-docusate sodium, 2 tablet, Oral, BID  sodium chloride, 10 mL, Intravenous, Q12H  sodium chloride, 10 mL, Intravenous, Q12H      Infusions       PRNs    acetaminophen    albuterol    benzonatate    senna-docusate sodium **AND** polyethylene glycol **AND** bisacodyl **AND** bisacodyl    Calcium Replacement - Follow Nurse / BPA Driven Protocol    guaiFENesin-dextromethorphan    HYDROmorphone    ipratropium-albuterol    Magnesium Standard Dose Replacement - Follow Nurse / BPA Driven Protocol    Morphine **AND** naloxone    nitroglycerin    ondansetron **OR** ondansetron    oxyCODONE    Phosphorus Replacement - Follow Nurse / BPA Driven Protocol    Potassium Replacement - Follow Nurse / BPA Driven Protocol    sodium chloride    sodium chloride    sodium chloride    sodium chloride    sodium chloride    Physical Exam:  General Appearance:  Alert   HEENT:  Normocephalic, without obvious abnormality, Conjunctiva/corneas clear,.   Nares  normal, no drainage     Neck:  Supple, symmetrical, trachea midline.   Lungs /Chest wall: Partial improvement in air entry with mild decrease in the bilateral basal rhonchi, respirations unlabored, symmetrical wall movement.     Heart:  Regular rate and rhythm, S1 S2 normal  Abdomen: Soft, non-tender, no masses, no organomegaly.    Extremities: No edema, no clubbing or cyanosis     ROS  Constitutional: Negative for chills, fever and malaise/fatigue.   HENT: Negative.    Eyes: Negative.    Cardiovascular: Negative.    Respiratory: Positive for cough and shortness of breath.    Skin: Negative.    Musculoskeletal: Negative.    Gastrointestinal: Negative.    Genitourinary: Negative.    Neurological: Negative.    Psychiatric/Behavioral: Negative.      I reviewed the recent clinical results    Part of this note may be an electronic transcription/translation of spoken language to printed text using the Dragon Dictation System.

## 2023-11-05 NOTE — PLAN OF CARE
Goal Outcome Evaluation:  Plan of Care Reviewed With: patient              Pt a/ox4, up ad minal to bathroom, continues on high falls risk, reviewed plan of care with pt, call light in reach, clean and dry.   Continues on droplet precautions for rhino virus+   Plan is to continue IV antibiotics

## 2023-11-05 NOTE — PLAN OF CARE
Goal Outcome Evaluation:  Plan of Care Reviewed With: patient           Outcome Evaluation: Patient on O2 at 4L.  When sleeping patient drops to low 90's to high 80's on sat monitor. Patient denies SOA.

## 2023-11-05 NOTE — PROGRESS NOTES
[facility] Medicine Services   Daily Progress Note      Patient Name: Jagdish Rea  : 1960  MRN: [id number]  Primary Care Physician:  Yamilka Pascual APRN  Date of admission: 10/29/2023      Subjective      Chief Complaint: Shortness of breath    Patient seen and examined this morning.  Continues to improve, breathing continues to improve but remains on 4 L nasal cannula, weaning down.  No other complaints.    Pertinent positives as noted in HPI/subjective.  All other systems were reviewed and are negative.      Objective      Vitals:   Temp:  [98.3 °F (36.8 °C)-98.7 °F (37.1 °C)] 98.7 °F (37.1 °C)  Heart Rate:  [] 97  Resp:  [11-20] 16  BP: (104-172)/(63-94) 110/86  Flow (L/min):  [4] 4    Physical Exam:    General: Awake, alert, morbidly obese male, NAD  Eyes: PERRL, EOMI, conjunctivae are clear  Cardiovascular: Regular rate and rhythm, no murmurs  Respiratory: Wheezing and rales bilaterally, unlabored breathing  Abdomen: Soft, nontender, positive bowel sounds, no guarding  Neurologic: A&O, CN grossly intact, moves all extremities spontaneously  Musculoskeletal: Normal range of motion, no other gross deformities  Skin: Warm, dry, intact         Result Review    Result Review:  I have personally reviewed the results from the time of this admission to 2023 09:46 EST and agree with these findings:  [x]  Laboratory  [x]  Microbiology  [x]  Radiology  []  EKG/Telemetry   []  Cardiology/Vascular   []  Pathology  []  Old records  []  Other:          Assessment & Plan      Brief Patient Summary:  Jagdish Rea is a 62 y.o. male who       acetylcysteine, 2 mL, Nebulization, BID - RT  budesonide, 0.5 mg, Nebulization, BID - RT  cetirizine, 10 mg, Oral, Daily  doxepin, 10 mg, Oral, Nightly  enoxaparin, 40 mg, Subcutaneous, Q24H  guaiFENesin, 1,200 mg, Oral, Q12H  methylPREDNISolone sodium succinate, 60 mg, Intravenous, Q12H  metoprolol succinate XL, 25 mg, Oral, Daily  pantoprazole, 40 mg,  Oral, Daily  piperacillin-tazobactam, 4.5 g, Intravenous, Q8H  senna-docusate sodium, 2 tablet, Oral, BID  sodium chloride, 10 mL, Intravenous, Q12H  sodium chloride, 10 mL, Intravenous, Q12H               I have utilized all available, immediate resources to obtain, update, or review the patient's current medications including all prescriptions, over-the-counter products, herbals, cannabis/cannabidiol products, and vitamin.mineral/dietary (nutritional) supplements.    Active Hospital Problems:  Active Hospital Problems    Diagnosis     **Dyspnea     Multiple tracheobronchial mucus plugs      Plan:     Acute hypoxemic respiratory failure  Left lower lobe pneumonia  Rhinovirus infection  COPD with exacerbation  -Imaging findings noted  -Respiratory viral panel positive for rhinovirus  -Respiratory status did not improve, patient underwent bronchoscopy on 11/3 with multiple thick mucous plug removal  -On IV Zosyn for antibiotics per pulmonology  -Continue steroids and bronchodilators  -Remains on 4 L nasal cannula, wean down to room air as tolerated  -Follow-up on bron cultures  -Pulmonology following    New onset A-fib  -Briefly had RVR but appears to be converted to NSR  -Previously had cardiac work-up with cath which was negative   -Patient denies any history of A-fib in the past  -Likely related to respiratory issues  -Continue beta-blocker  -C2V is only 1 so far, cardiology recommending baby aspirin for anticoagulation for now  -Cardiology following, echo results pending    Hypertension  -Controlled, continue metoprolol  -Hold amlodipine and HCTZ for now    DVT prophylaxis  -Lovenox    CODE STATUS:    Level Of Support Discussed With: Patient  Code Status (Patient has no pulse and is not breathing): CPR (Attempt to Resuscitate)  Medical Interventions (Patient has pulse or is breathing): Full Support      Disposition: Pending improvement    Electronically signed by Adam Nesbitt DO, 11/05/23, 09:46 EST.  [facility] Team      Part of this note may be an electronic transcription/translation of spoken language to printed text using the Dragon Dictation System.

## 2023-11-06 ENCOUNTER — READMISSION MANAGEMENT (OUTPATIENT)
Dept: CALL CENTER | Facility: HOSPITAL | Age: 63
End: 2023-11-06
Payer: MEDICARE

## 2023-11-06 ENCOUNTER — APPOINTMENT (OUTPATIENT)
Dept: RESPIRATORY THERAPY | Facility: HOSPITAL | Age: 63
DRG: 193 | End: 2023-11-06
Payer: MEDICARE

## 2023-11-06 VITALS
TEMPERATURE: 97.7 F | HEIGHT: 72 IN | HEART RATE: 71 BPM | SYSTOLIC BLOOD PRESSURE: 128 MMHG | DIASTOLIC BLOOD PRESSURE: 84 MMHG | OXYGEN SATURATION: 96 % | RESPIRATION RATE: 9 BRPM | BODY MASS INDEX: 35.53 KG/M2 | WEIGHT: 262.35 LBS

## 2023-11-06 PROBLEM — B34.8 RHINOVIRUS INFECTION: Status: ACTIVE | Noted: 2023-11-06

## 2023-11-06 PROBLEM — E66.9 OBESITY (BMI 30-39.9): Chronic | Status: ACTIVE | Noted: 2023-11-06

## 2023-11-06 PROBLEM — J96.01 ACUTE HYPOXEMIC RESPIRATORY FAILURE: Status: ACTIVE | Noted: 2023-11-06

## 2023-11-06 PROBLEM — I48.0 PAROXYSMAL ATRIAL FIBRILLATION WITH RAPID VENTRICULAR RESPONSE: Status: ACTIVE | Noted: 2023-11-06

## 2023-11-06 PROBLEM — K22.2 GERD WITH STRICTURE: Status: ACTIVE | Noted: 2023-09-12

## 2023-11-06 PROBLEM — R73.03 PREDIABETES: Chronic | Status: ACTIVE | Noted: 2023-11-06

## 2023-11-06 LAB
ANION GAP SERPL CALCULATED.3IONS-SCNC: 13 MMOL/L (ref 5–15)
BUN SERPL-MCNC: 18 MG/DL (ref 8–23)
BUN/CREAT SERPL: 21.7 (ref 7–25)
CALCIUM SPEC-SCNC: 8.8 MG/DL (ref 8.6–10.5)
CHLORIDE SERPL-SCNC: 102 MMOL/L (ref 98–107)
CO2 SERPL-SCNC: 21 MMOL/L (ref 22–29)
CREAT SERPL-MCNC: 0.83 MG/DL (ref 0.76–1.27)
DEPRECATED RDW RBC AUTO: 49 FL (ref 37–54)
DX PRELIMINARY: NORMAL
EGFRCR SERPLBLD CKD-EPI 2021: 99 ML/MIN/1.73
ERYTHROCYTE [DISTWIDTH] IN BLOOD BY AUTOMATED COUNT: 13.6 % (ref 12.3–15.4)
GLUCOSE SERPL-MCNC: 253 MG/DL (ref 65–99)
HCT VFR BLD AUTO: 45.1 % (ref 37.5–51)
HGB BLD-MCNC: 15.3 G/DL (ref 13–17.7)
LAB AP CASE REPORT: NORMAL
LYMPHOCYTES # BLD MANUAL: 1.19 10*3/MM3 (ref 0.7–3.1)
LYMPHOCYTES NFR BLD MANUAL: 2 % (ref 5–12)
MCH RBC QN AUTO: 33.5 PG (ref 26.6–33)
MCHC RBC AUTO-ENTMCNC: 34 G/DL (ref 31.5–35.7)
MCV RBC AUTO: 98.3 FL (ref 79–97)
MONOCYTES # BLD: 0.34 10*3/MM3 (ref 0.1–0.9)
MYELOCYTES NFR BLD MANUAL: 2 % (ref 0–0)
NEUTROPHILS # BLD AUTO: 15.13 10*3/MM3 (ref 1.7–7)
NEUTROPHILS NFR BLD MANUAL: 87 % (ref 42.7–76)
NEUTS BAND NFR BLD MANUAL: 2 % (ref 0–5)
PATH REPORT.FINAL DX SPEC: NORMAL
PATH REPORT.GROSS SPEC: NORMAL
PLAT MORPH BLD: NORMAL
PLATELET # BLD AUTO: 143 10*3/MM3 (ref 140–450)
PMV BLD AUTO: 9.9 FL (ref 6–12)
POTASSIUM SERPL-SCNC: 4.1 MMOL/L (ref 3.5–5.2)
RBC # BLD AUTO: 4.59 10*6/MM3 (ref 4.14–5.8)
RBC MORPH BLD: NORMAL
SCAN SLIDE: NORMAL
SODIUM SERPL-SCNC: 136 MMOL/L (ref 136–145)
VARIANT LYMPHS NFR BLD MANUAL: 7 % (ref 19.6–45.3)
WBC MORPH BLD: NORMAL
WBC NRBC COR # BLD: 17 10*3/MM3 (ref 3.4–10.8)

## 2023-11-06 PROCEDURE — 94618 PULMONARY STRESS TESTING: CPT

## 2023-11-06 PROCEDURE — 85007 BL SMEAR W/DIFF WBC COUNT: CPT | Performed by: INTERNAL MEDICINE

## 2023-11-06 PROCEDURE — 25010000002 METHYLPREDNISOLONE PER 125 MG: Performed by: EMERGENCY MEDICINE

## 2023-11-06 PROCEDURE — 85025 COMPLETE CBC W/AUTO DIFF WBC: CPT | Performed by: INTERNAL MEDICINE

## 2023-11-06 PROCEDURE — 94799 UNLISTED PULMONARY SVC/PX: CPT

## 2023-11-06 PROCEDURE — 25010000002 PIPERACILLIN SOD-TAZOBACTAM PER 1 G: Performed by: INTERNAL MEDICINE

## 2023-11-06 RX ORDER — PREDNISONE 50 MG/1
50 TABLET ORAL DAILY
Qty: 5 TABLET | Refills: 0 | Status: SHIPPED | OUTPATIENT
Start: 2023-11-06

## 2023-11-06 RX ORDER — AMOXICILLIN AND CLAVULANATE POTASSIUM 875; 125 MG/1; MG/1
1 TABLET, FILM COATED ORAL 2 TIMES DAILY
Qty: 5 TABLET | Refills: 0 | Status: SHIPPED | OUTPATIENT
Start: 2023-11-06

## 2023-11-06 RX ORDER — ASPIRIN 81 MG/1
81 TABLET ORAL DAILY
Start: 2023-11-06

## 2023-11-06 RX ADMIN — BUDESONIDE 0.5 MG: 0.5 INHALANT RESPIRATORY (INHALATION) at 06:50

## 2023-11-06 RX ADMIN — DOCUSATE SODIUM 50 MG AND SENNOSIDES 8.6 MG 2 TABLET: 8.6; 5 TABLET, FILM COATED ORAL at 07:56

## 2023-11-06 RX ADMIN — PIPERACILLIN AND TAZOBACTAM 4.5 G: 4; .5 INJECTION, POWDER, FOR SOLUTION INTRAVENOUS at 03:30

## 2023-11-06 RX ADMIN — GUAIFENESIN 1200 MG: 600 TABLET, EXTENDED RELEASE ORAL at 07:56

## 2023-11-06 RX ADMIN — PANTOPRAZOLE SODIUM 40 MG: 40 TABLET, DELAYED RELEASE ORAL at 07:56

## 2023-11-06 RX ADMIN — METHYLPREDNISOLONE SODIUM SUCCINATE 60 MG: 125 INJECTION, POWDER, FOR SOLUTION INTRAMUSCULAR; INTRAVENOUS at 07:57

## 2023-11-06 RX ADMIN — Medication 10 ML: at 07:57

## 2023-11-06 RX ADMIN — METOPROLOL SUCCINATE 25 MG: 25 TABLET, EXTENDED RELEASE ORAL at 07:57

## 2023-11-06 RX ADMIN — PIPERACILLIN AND TAZOBACTAM 4.5 G: 4; .5 INJECTION, POWDER, FOR SOLUTION INTRAVENOUS at 11:41

## 2023-11-06 RX ADMIN — CETIRIZINE HYDROCHLORIDE 10 MG: 10 TABLET, FILM COATED ORAL at 07:56

## 2023-11-06 NOTE — DISCHARGE PLACEMENT REQUEST
"  DCP Report sent to [employer] for [employer], message sent to Beena with [employer]. Voiced understanding, will need to go thru [employer] due to insurance and will be sent to patients home. Bedside nurse updated.            Jagdish Rea (62 y.o. Male)       Date of Birth   1960    Social Security Number       Address   [address] [place] [address]    Home Phone   [phone]    MRN   [id number]       [employer]ian   Orthodox    Marital Status                               Admission Date   10/29/23    Admission Type   Emergency    Admitting Provider   Neymar Musa MD    Attending Provider   Abraham Sidhu MD    Department, Room/Bed   [facility] 3C MEDICAL INPATIENT, 378/1       Discharge Date       Discharge Disposition   Home or Self Care    Discharge Destination   Home                              Attending Provider: Abraham Sidhu MD    Allergies: Atorvastatin, Lisinopril    Isolation: Droplet   Infection: Rhinovirus  (10/29/23)   Code Status: CPR    Ht: 182.9 cm (72\")   Wt: 119 kg (262 lb 5.6 oz)    Admission Cmt: None   Principal Problem: Dyspnea [R06.00]                   Active Insurance as of 10/29/2023       Primary Coverage       Payor Plan Insurance Group Employer/Plan Group    HUMANA MEDICARE REPLACEMENT HUMANA MEDICARE REPLACEMENT [id number]       Payor Plan Address Payor Plan Phone Number Payor Plan Fax Number Effective Dates    [address] [phone]  1/1/2021 - None Entered    [facility] [address]         Subscriber Name Subscriber Birth Date Member ID       TROY REALAUREANO NGUYEN 1960 [id number]               Secondary Coverage       Payor Plan Insurance Group Employer/Plan Group    [place]        Payor Plan Address Payor Plan Phone Number Payor Plan Fax Number Effective Dates    [address]   9/8/2020 - None Entered    [place] [address]         Subscriber Name Subscriber Birth Date Member ID       ZOËJAGDISH NGUYEN 1960 " [id number]                     Emergency Contacts        (Rel.) Home Phone Work Phone Mobile Phone    Edda,Laina (Daughter) -- -- [phone]    Maite Rea (Spouse) [phone] -- [phone]    BINH MCGHEE (Relative) [phone] -- [phone]

## 2023-11-06 NOTE — NURSING NOTE
RN placed call to patient about left headphones. Patient aware and will return to . Left with nametag at nurses station

## 2023-11-06 NOTE — PROGRESS NOTES
Exercise Oximetry    Patient Name:Jagdish Rea   MRN: [id number]   Date: 11/06/23             ROOM AIR BASELINE   SpO2% 95   Heart Rate    Blood Pressure      EXERCISE ON ROOM AIR SpO2% EXERCISE ON O2 @  LPM SpO2%   1 MINUTE  1 MINUTE    2 MINUTES  2 MINUTES    3 MINUTES  3 MINUTES    4 MINUTES  4 MINUTES    5 MINUTES  5 MINUTES    6 MINUTES  6 MINUTES               Distance Walked   Distance Walked   Dyspnea (Ranjan Scale)   Dyspnea (Ranjan Scale)   Fatigue (Ranjan Scale)   Fatigue (Ranjan Scale)   SpO2% Post Exercise   SpO2% Post Exercise   HR Post Exercise   HR Post Exercise   Time to Recovery   Time to Recovery     Comments: Room air spo2 at rest was 95%, patient walked for a full six minutes on room air, spo2 never dropped below 93%

## 2023-11-06 NOTE — DISCHARGE SUMMARY
[facility] Medicine Services  Discharge Summary    Date of Service: 2023  Patient Name: Jagdish Rea  : 1960  MRN: [id number]    Date of Admission: 10/29/2023  Discharge Diagnosis: see below  Date of Discharge:  2023  Primary Care Physician: Yamilka Pascual APRN    Presenting Problem:   Acute respiratory distress [R06.03]  Dyspnea [R06.00]  Rhinovirus infection [B34.8]  Chronic obstructive pulmonary disease with acute exacerbation [J44.1]    Active and Resolved Hospital Problems:  Active Hospital Problems    Diagnosis POA    **Dyspnea [R06.00] Yes    Obesity (BMI 30-39.9) [E66.9] Yes    Rhinovirus infection [B34.8] Yes    Acute hypoxemic respiratory failure [J96.01] Yes    Prediabetes [R73.03] Yes    Paroxysmal atrial fibrillation with rapid ventricular response [I48.0] No    Multiple tracheobronchial mucus plugs [T17.800A] Yes    GERD with stricture [K21.9, K22.2] Yes    Post traumatic stress disorder (PTSD) [F43.10] Yes    Left lower lobe pneumonia [J18.9] Yes    Tobacco abuse [Z72.0] Yes    Chronic obstructive pulmonary disease with acute exacerbation [J44.1] Yes    Anxiety [F41.9] Yes    Chronic low back pain [M54.50, G89.29] Yes    Hyperlipidemia [E78.5] Yes    Benign essential hypertension [I10] Yes    Peripheral neuropathy [G62.9] Yes      Resolved Hospital Problems   No resolved problems to display.     Hospital Course     HPI:  Jagdish Rea is a 62 y.o. male who comes in with a history of COPD GERD hypertension hyperlipidemia obesity and history of pneumonia who states he has had cough congestion for the last week and has developed some chest pain with the cough he states he has frothy sputum from the cough-he does not have any ill exposures that he is aware of.  He does have some sinus congestion.  Denies history of fever or chills.   Patient has been ill for about a week with cough and congestion nonproductive no fever chills he said some tightness in chest but  no neck arm or jaw pain.  Patient denies any leg pain or swelling no recent long car ride plane ride immobilization or foreign travels.  No injury.  No one at home with similar illness.  Worse with exertion and better with rest.     Observation 10/30/23: Patient 62-year-old male presenting to the hospital with complaints of cough and shortness of breath.  Patient has had cough and congestion for the past week with worsening symptoms.  Patient denies fever, nausea, vomiting, chest pain or syncope.  Patient does have history of COPD but states symptoms are worsen than normal COPD exacerbation.      Observation 10/31/23: Patient reports nonproductive cough and improvement in breathing today.  Patient dates he did have some low oxygen overnight and supplement with oxygen.     Observation 11/1/2023:  Patient reports some continued dyspnea which she notes was worse with exertion the previous day.  Oxygen levels continue to drop to the high 80s while in bed especially when sleeping.     Inpatient 11/01/2023:  Patient seen and evaluated while in bed. Patient denies any complaints at this time, he denies shortness of breath. Discussed plan of care with patient and all questions and concerns addressed.     Hospital course:  He was admitted in consultation with cardiology and pulmonology for further valuation treatment.  He underwent bronchoscopy on 11/3 with improvement in his symptoms.  Both his admission respiratory screening as well as his bronchoscopy screening were positive for rhinovirus.  He is on aspirin only for his history of atrial fibrillation.  There was some concern for A-fib during this admission, and cardiology discharged him on an outpatient monitor.  Smoking cessation was highly recommended.  He did not qualify for any supplemental oxygen at the time of discharge.  He will discharge home today with close outpatient primary care and multispecialty follow-up.    Day of Discharge     Vital Signs:  Temp:  [97 °F  (36.1 °C)-98.2 °F (36.8 °C)] 97.7 °F (36.5 °C)  Heart Rate:  [71-83] 71  Resp:  [9-21] 9  BP: (107-128)/(70-84) 128/84  Flow (L/min):  [2-4] 2    [id number] Score: 1 (11/6/2023  1:39 PM)    Physical Exam:  GEN: WDWN, no acute distress  HEENT: NCAT, PERRLA, moist mucous membranes  NECK: Supple, midline trachea  CARD: RRR, no appreciable M/R/G, no peripheral edema  PULM: Minimally coarse and diminished bibasilar breath sounds, otherwise fairly clear to auscultation and non-distressed  ABD: soft, NTND, normoactive bowel sounds throughout  NEURO: Grossly intact, non-focal exam  PSYCH: Pleasant    Pertinent  and/or Most Recent Results     LAB RESULTS:      Lab 11/06/23  0347 11/04/23  2343 11/04/23  0410 11/02/23  0814 11/01/23  1316 10/31/23  1116   WBC 17.00* 15.10* 13.30* 15.40* 16.60* 17.10*   HEMOGLOBIN 15.3 15.1 13.4 15.1 15.0 14.6   HEMATOCRIT 45.1 45.7 41.1 45.3 45.0 43.7   PLATELETS 143 157 142 155 162 157   NEUTROS ABS 15.13* 12.38* 10.64* 12.32* 13.61* 15.30*   LYMPHS ABS  --   --   --   --   --  1.10   MONOS ABS  --   --   --   --   --  0.60   EOS ABS  --   --   --   --   --  0.00   MCV 98.3* 99.9* 99.9* 100.2* 99.0* 101.6*   PROCALCITONIN  --  0.04  --   --   --   --          Lab 11/05/23  2325 11/04/23  2343 11/04/23  1642 11/04/23  0410 11/02/23  0814 11/01/23  1316   SODIUM 136 139  --  142 142 142   POTASSIUM 4.1 4.0 4.0 3.3* 3.5 3.6   CHLORIDE 102 106  --  109* 107 108*   CO2 21.0* 23.0  --  24.0 23.0 22.0   ANION GAP 13.0 10.0  --  9.0 12.0 12.0   BUN 18 18  --  17 12 13   CREATININE 0.83 0.74*  --  0.80 0.65* 0.72*   EGFR 99.0 102.4  --  100.1 106.5 103.3   GLUCOSE 253* 231*  --  196* 125* 153*   CALCIUM 8.8 8.5*  --  8.1* 9.2 9.2   TSH  --   --   --   --   --  1.020                         Brief Urine Lab Results       None          Microbiology Results (last 10 days)       Procedure Component Value - Date/Time    AFB Culture - Wash, Bronchus [[id number]] Collected: 11/03/23 0911    Lab Status:  Preliminary result Specimen: Wash from Bronchus Updated: 11/04/23 1010     AFB Stain No acid fast bacilli seen on concentrated smear    Respiratory Culture - Wash, Bronchus [[id number]] Collected: 11/03/23 0911    Lab Status: Final result Specimen: Wash from Bronchus Updated: 11/05/23 1026     Respiratory Culture Rare Normal respiratory victor m. No S. aureus or Pseudomonas aeruginosa detected. Final report.     Gram Stain Moderate (3+) WBCs seen      No organisms seen    Respiratory Panel PCR w/COVID-19(SARS-CoV-2) ISAEL/ALEXIA/ELISEO/PAD/COR/MAD/[facility]OLD In-House, NP Swab in UTM/VTM, 3-4 HR TAT - Wash, Bronchus [[id number]]  (Abnormal) Collected: 11/03/23 0911    Lab Status: Final result Specimen: Wash from Bronchus Updated: 11/03/23 1026     ADENOVIRUS, PCR Not Detected     Coronavirus 229E Not Detected     Coronavirus HKU1 Not Detected     Coronavirus NL63 Not Detected     Coronavirus OC43 Not Detected     COVID19 Not Detected     Human Metapneumovirus Not Detected     Human Rhinovirus/Enterovirus Detected     Influenza A PCR Not Detected     Influenza B PCR Not Detected     Parainfluenza Virus 1 Not Detected     Parainfluenza Virus 2 Not Detected     Parainfluenza Virus 3 Not Detected     Parainfluenza Virus 4 Not Detected     RSV, PCR Not Detected     Bordetella pertussis pcr Not Detected     Bordetella parapertussis PCR Not Detected     Chlamydophila pneumoniae PCR Not Detected     Mycoplasma pneumo by PCR Not Detected    Narrative:      In the setting of a positive respiratory panel with a viral infection PLUS a negative procalcitonin without other underlying concern for bacterial infection, consider observing off antibiotics or discontinuation of antibiotics and continue supportive care. If the respiratory panel is positive for atypical bacterial infection (Bordetella pertussis, Chlamydophila pneumoniae, or Mycoplasma pneumoniae), consider antibiotic de-escalation to target atypical bacterial infection.    S. Pneumo Ag Urine  or CSF - Urine, Urine, Clean Catch [[id number]]  (Normal) Collected: 10/30/23 0747    Lab Status: Final result Specimen: Urine, Clean Catch Updated: 10/30/23 0851     Strep Pneumo Ag Negative    Respiratory Panel PCR w/COVID-19(SARS-CoV-2) ISAEL/ALEXIA/ELISEO/PAD/COR/MAD/ARNOLD In-House, NP Swab in UTM/VTM, 3-4 HR TAT - Swab, Nasopharynx [[id number]]  (Abnormal) Collected: 10/29/23 1906    Lab Status: Final result Specimen: Swab from Nasopharynx Updated: 10/29/23 2044     ADENOVIRUS, PCR Not Detected     Coronavirus 229E Not Detected     Coronavirus HKU1 Not Detected     Coronavirus NL63 Not Detected     Coronavirus OC43 Not Detected     COVID19 Not Detected     Human Metapneumovirus Not Detected     Human Rhinovirus/Enterovirus Detected     Influenza A PCR Not Detected     Influenza B PCR Not Detected     Parainfluenza Virus 1 Not Detected     Parainfluenza Virus 2 Not Detected     Parainfluenza Virus 3 Not Detected     Parainfluenza Virus 4 Not Detected     RSV, PCR Not Detected     Bordetella pertussis pcr Not Detected     Bordetella parapertussis PCR Not Detected     Chlamydophila pneumoniae PCR Not Detected     Mycoplasma pneumo by PCR Not Detected    Narrative:      In the setting of a positive respiratory panel with a viral infection PLUS a negative procalcitonin without other underlying concern for bacterial infection, consider observing off antibiotics or discontinuation of antibiotics and continue supportive care. If the respiratory panel is positive for atypical bacterial infection (Bordetella pertussis, Chlamydophila pneumoniae, or Mycoplasma pneumoniae), consider antibiotic de-escalation to target atypical bacterial infection.            XR Chest 1 View    Result Date: 11/2/2023  Impression: Impression: Persistent bibasilar airspace disease left greater than right which may relate to atelectasis and/or pneumonia. Electronically Signed: Americo Connell MD  11/2/2023 7:21 AM EDT  Workstation ID: [id number]    XR Chest 1  View    Result Date: 10/29/2023  Impression: 1.Mild opacities in the left base which could represent atelectasis, pneumonia, or other infiltrate. 2.Emphysema. Electronically Signed: Andres Alatorre  10/29/2023 7:04 PM EDT  Workstation ID: [id number]           Results for orders placed during the hospital encounter of 08/16/22    Adult Transthoracic Echo Complete W/ Cont if Necessary Per Protocol    Interpretation Summary  · Estimated left ventricular EF was in disagreement with the calculated left ventricular EF. Left ventricular ejection fraction appears to be 51 - 55%. Left ventricular systolic function is low normal.  · Left ventricular diastolic function is consistent with (grade I) impaired relaxation.  · Estimated right ventricular systolic pressure from tricuspid regurgitation is normal (<35 mmHg).      Labs Pending at Discharge:  Pending Labs       Order Current Status    Fungus Culture - Wash, Bronchus In process    Pneumocystis PCR - Wash, Bronchus In process    AFB Culture - Wash, Bronchus Preliminary result          Procedures Performed  Procedure(s):  BRONCHOSCOPY with bilateral lung washing's    Case Time: Procedures: Surgeons:   11/03/23 0908 BRONCHOSCOPY with bilateral lung washing's    Kolton Brewer MD               Signed         Bronchoscopy Procedure Note     Procedure:  Bronchoscopy, Diagnostic  Bronchoscopy, Therapeutic lavage of multiple mucous plugs  Bilateral bronchial washing     Pre-Operative Diagnosis: Pneumonia multiple mucous plugs     Post-Operative Diagnosis: Same     Indication: Pneumonia, multiple mucous plugs, patient unable to clear secretions     Anesthesia: Monitored Anesthesia Care (MAC)     Procedure Details: Patient was consented for the procedure with all risk and benefit of the procedure explained in detail.  Patient was given the opportunity to ask questions and all concerns were answered.    Timeout was done in the standard manner   the bronchoscope was inserted into the  main airway via the oropharynx. An anatomical survey was done of the main airways and the subsegmental bronchus of the 5 lobes.  The findings are consistent of multiple thick mucous plugs from the trachea down to the 5 lobes, dark yellow to greenish in color.  A therapeutic lavage was performed using aliquots of normal saline instilled into the airways then aspirated back until clear.     Estimated Blood Loss: None           Specimens: Bilateral bronchial washing                Complications:  None; patient tolerated the procedure well.           Disposition: PACU - hemodynamically stable.     Post op plan:  Resume p.o. after 2 hours  Follow-up results  Change antibiotics to Zosyn     Patient tolerated the procedure well.              ===============================================================================      Consults:   Consults       Date and Time Order Name Status Description    11/3/2023 12:27 AM Inpatient Cardiology Consult Completed     11/1/2023 11:20 AM Inpatient Hospitalist Consult Completed     11/1/2023  8:26 AM Inpatient Pulmonology Consult Completed           Discharge Details        Discharge Medications        New Medications        Instructions Start Date   amoxicillin-clavulanate 875-125 MG per tablet  Commonly known as: AUGMENTIN   1 tablet, Oral, 2 Times Daily      aspirin 81 MG EC tablet   81 mg, Oral, Daily      benzonatate 100 MG capsule  Commonly known as: TESSALON   100 mg, Oral, 3 Times Daily PRN      cetirizine 10 MG tablet  Commonly known as: zyrTEC   10 mg, Oral, Daily      ipratropium-albuterol 0.5-2.5 mg/3 ml nebulizer  Commonly known as: DUO-NEB   3 mL, Nebulization, 4 Times Daily - RT      Mucus Relief 600 MG 12 hr tablet  Generic drug: guaiFENesin   600 mg, Oral, Every 12 Hours Scheduled      predniSONE 50 MG tablet  Commonly known as: DELTASONE   50 mg, Oral, Daily             Continue These Medications        Instructions Start Date   albuterol sulfate  (90 Base)  MCG/ACT inhaler  Commonly known as: PROVENTIL HFA;VENTOLIN HFA;PROAIR HFA   2 puffs, Inhalation, Every 4 Hours PRN      amLODIPine 10 MG tablet  Commonly known as: NORVASC   TAKE 1 TABLET EVERY DAY      doxepin 10 MG capsule  Commonly known as: SINEquan   TAKE 1 CAPSULE EVERY NIGHT.      hydroCHLOROthiazide 12.5 MG capsule  Commonly known as: MICROZIDE   12.5 mg, Oral, As Needed      metoprolol succinate XL 25 MG 24 hr tablet  Commonly known as: TOPROL-XL   TAKE 1 TABLET EVERY DAY      pantoprazole 40 MG EC tablet  Commonly known as: PROTONIX   TAKE 1 TABLET EVERY DAY      pravastatin 80 MG tablet  Commonly known as: PRAVACHOL   TAKE 1 TABLET EVERY NIGHT               Allergies   Allergen Reactions    Atorvastatin Swelling    Lisinopril Other (See Comments)     Facial paralysis      Discharge Disposition:   Home or Self Care    Diet:  Hospital:  Diet Order   Procedures    Diet: Cardiac Diets, Diabetic Diets; Healthy Heart (2-3 Na+); Consistent Carbohydrate; Texture: Regular Texture (IDDSI 7); Fluid Consistency: Thin (IDDSI 0)       Discharge Activity:   Activity Instructions       Activity as Tolerated      Measure Blood Pressure              CODE STATUS:  Code Status and Medical Interventions:   Ordered at: 10/30/23 0655     Level Of Support Discussed With:    Patient     Code Status (Patient has no pulse and is not breathing):    CPR (Attempt to Resuscitate)     Medical Interventions (Patient has pulse or is breathing):    Full Support       Future Appointments   Date Time Provider Department Center   12/18/2023  1:15 PM Yamilka Pascual APRN [facility]   7/22/2024 10:15 AM Andres Valadez MD [facility]       Additional Instructions for the Follow-ups that You Need to Schedule       Call MD With Problems / Concerns   As directed      Instructions: PCM vs Pulmonology as appropriate    Order Comments: Instructions: PCM vs Pulmonology as appropriate         Discharge Follow-up with PCP   As  directed       Currently Documented PCP:    Yamilka Pascual APRN    PCP Phone Number:    [phone]     Follow Up Details: 7-10 days        Discharge Follow-up with PCP   As directed       Currently Documented PCP:    Yamilka Pascual APRN    PCP Phone Number:    [phone]     Follow Up Details: within one week of discharge        Discharge Follow-up with Specialty: Pulmonology and Cardiology follow up as per their respective services   As directed      Specialty: Pulmonology and Cardiology follow up as per their respective services              Time spent on Discharge including face to face service: 35 minutes    Signature: Electronically signed by Abraham Sidhu MD, 11/06/23, 13:35 EST.  [facility] Hospitalist Team

## 2023-11-06 NOTE — PLAN OF CARE
Goal Outcome Evaluation:                          Patient discharging home today. Patient does not require oxygen on discharge. RN placed call for [facility] on discharge.

## 2023-11-06 NOTE — PROGRESS NOTES
Daily Progress Note          Assessment    Dyspnea  Hypoxemia  Left lower lobe pneumonia due to unspecified pathogen  Rhinovirus infection      Status post bronchoscopy 11/3/2023 with removal of multiple thick purulent mucous plugs    COPD with acute exacerbation  Pulmonary function test 2018, FEV1 3.98 L which is 120%, FEV1/FVC ratio 79, total lung capacity 170%, residual of 121% DLCO 63%    Tobacco smoker  HLD  HTN  GERD with history of esophageal stricture     Recommendations:    antibiotics: Rocephin was changed to Zosyn 11/3/2023  Oxygen supplement and titration to maintain saturation 90 to 95%: Currently requiring 4 L per nasal cannula, he will need a 6-minute walk before discharge to assess need for home oxygen  Bronchodilators  Inhaled corticosteroids  Encourage use of incentive spirometry  IV steroids  Mucinex to 1200 mg twice daily  Amlodipine and metoprolol  Smoking cessation counseling  DVT/GI prophylaxis                      LOS: 5 days     Subjective     Patient reports cough and shortness of breath    Objective     Vital signs for last 24 hours:  Vitals:    11/06/23 0439 11/06/23 0650 11/06/23 0654 11/06/23 0828   BP: 119/70   128/84   BP Location: Left arm   Left arm   Patient Position: Lying   Sitting   Pulse: 83 77 71    Resp: 17 18 18 16   Temp: 98 °F (36.7 °C)   97 °F (36.1 °C)   TempSrc: Oral   Axillary   SpO2: 93% 92% 96%    Weight: 119 kg (262 lb 5.6 oz)      Height:           Intake/Output last 3 shifts:  I/O last 3 completed shifts:  In: 7041 [P.O.:1560; I.V.:5081; IV Piggyback:400]  Out: 4800 [Urine:4800]  Intake/Output this shift:  I/O this shift:  In: -   Out: 700 [Urine:700]      Radiology  Imaging Results (Last 24 Hours)       ** No results found for the last 24 hours. **            Labs:  Results from last 7 days   Lab Units 11/06/23  0347   WBC 10*3/mm3 17.00*   HEMOGLOBIN g/dL 15.3   HEMATOCRIT % 45.1   PLATELETS 10*3/mm3 143     Results from last 7 days   Lab Units 11/05/23  3698    SODIUM mmol/L 136   POTASSIUM mmol/L 4.1   CHLORIDE mmol/L 102   CO2 mmol/L 21.0*   BUN mg/dL 18   CREATININE mg/dL 0.83   CALCIUM mg/dL 8.8   GLUCOSE mg/dL 253*                                 Results from last 7 days   Lab Units 23  1316   TSH uIU/mL 1.020           Meds:   SCHEDULE  budesonide, 0.5 mg, Nebulization, BID - RT  cetirizine, 10 mg, Oral, Daily  doxepin, 10 mg, Oral, Nightly  enoxaparin, 40 mg, Subcutaneous, Q24H  guaiFENesin, 1,200 mg, Oral, Q12H  methylPREDNISolone sodium succinate, 60 mg, Intravenous, Q12H  metoprolol succinate XL, 25 mg, Oral, Daily  pantoprazole, 40 mg, Oral, Daily  piperacillin-tazobactam, 4.5 g, Intravenous, Q8H  senna-docusate sodium, 2 tablet, Oral, BID  sodium chloride, 10 mL, Intravenous, Q12H  sodium chloride, 10 mL, Intravenous, Q12H      Infusions       PRNs    acetaminophen    albuterol    benzonatate    senna-docusate sodium **AND** polyethylene glycol **AND** bisacodyl **AND** bisacodyl    Calcium Replacement - Follow Nurse / BPA Driven Protocol    guaiFENesin-dextromethorphan    HYDROmorphone    ipratropium-albuterol    Magnesium Standard Dose Replacement - Follow Nurse / BPA Driven Protocol    [] Morphine **AND** naloxone    nitroglycerin    ondansetron **OR** ondansetron    oxyCODONE    Phosphorus Replacement - Follow Nurse / BPA Driven Protocol    Potassium Replacement - Follow Nurse / BPA Driven Protocol    sodium chloride    sodium chloride    sodium chloride    sodium chloride    sodium chloride    Physical Exam:  General Appearance:  Alert   HEENT:  Normocephalic, without obvious abnormality, Conjunctiva/corneas clear,.   Nares normal, no drainage     Neck:  Supple, symmetrical, trachea midline.   Lungs /Chest wall: Partial improvement in air entry with mild decrease in the bilateral basal rhonchi, respirations unlabored, symmetrical wall movement.     Heart:  Regular rate and rhythm, S1 S2 normal  Abdomen: Soft, non-tender, no masses, no  organomegaly.    Extremities: No edema, no clubbing or cyanosis     ROS  Constitutional: Negative for chills, fever and malaise/fatigue.   HENT: Negative.    Eyes: Negative.    Cardiovascular: Negative.    Respiratory: Positive for cough and shortness of breath.    Skin: Negative.    Musculoskeletal: Negative.    Gastrointestinal: Negative.    Genitourinary: Negative.    Neurological: Negative.    Psychiatric/Behavioral: Negative.      I reviewed the recent clinical results    Part of this note may be an electronic transcription/translation of spoken language to printed text using the Dragon Dictation System.

## 2023-11-06 NOTE — PLAN OF CARE
Goal Outcome Evaluation:      Pt stated breathing was much easier. RN described medication pt was prescribed and how it worked. Pt was also able to cough productively. RN educated pt on benefit of spitting sputum out rather than swallowing it.

## 2023-11-06 NOTE — OUTREACH NOTE
Prep Survey      Flowsheet Row Responses   [facility] patient discharged from? [facility]   Is LACE score < 7 ? Yes   Eligibility [facility]   Date of Admission 10/29/23   Date of Discharge 11/06/23   Discharge Disposition Home or Self Care   Discharge diagnosis Chronic obstructive pulmonary disease with acute exacerbation   Does the patient have one of the following disease processes/diagnoses(primary or secondary)? COPD   Does the patient have Home health ordered? No   Is there a DME ordered? Yes   What DME was ordered? [facility] home O2   Prep survey completed? Yes            Nydia MANZO - Registered Nurse

## 2023-11-07 ENCOUNTER — TRANSITIONAL CARE MANAGEMENT TELEPHONE ENCOUNTER (OUTPATIENT)
Dept: CALL CENTER | Facility: HOSPITAL | Age: 63
End: 2023-11-07
Payer: MEDICARE

## 2023-11-07 LAB
P JIROVECII DNA L RESP QL NAA+NON-PROBE: NEGATIVE
REF LAB TEST METHOD: NORMAL

## 2023-11-07 NOTE — OUTREACH NOTE
Call Center TCM Note      Flowsheet Row Responses   [facility] patient discharged from? [facility]   Does the patient have one of the following disease processes/diagnoses(primary or secondary)? COPD   TCM attempt successful? Yes  [verbal release for Laina Bañuelos, child]   Call start time 1254   Unsuccessful attempts Attempt 1   Call end time 1301   Discharge diagnosis Chronic obstructive pulmonary disease with acute exacerbation   Meds reviewed with patient/caregiver? Yes   Is the patient having any side effects they believe may be caused by any medication additions or changes? No   Does the patient have all medications ordered at discharge? Yes   Is the patient taking all medications as directed (includes completed medication regime)? Yes   Medication comments Encouraged completion of atbs and steroids as ordered   Comments Pt will not be able to schedule his appt with PCP at this time due to insurance issues with [employer]--his plan is to see another MD in the meantime and hopes to resume with his regular PCP once issues resolved.   Does the patient have an appointment with their PCP within 7-14 days of discharge? No   Nursing Interventions Routed TCM call to PCP office   Has home health visited the patient within 72 hours of discharge? N/A   Has all DME been delivered? No   DME interventions Other  [Sent a message to in-pt CM]   DME comments AVS indicates neb and oxygen ordered--pt has solution but no machine.  Pt reports he passed his walk test but unable to validate as cannot locate walk test results on chart.  Will send a mesage to CM to review and f/u.   Pulse Ox monitoring Intermittent   Pulse Ox device source Patient   O2 Sat comments Pt is monitoring his sats and have been staying in the 90s   O2 Sat: education provided Sat levels, When to seek care   Psychosocial issues? No   Comments Cardiac monitor in place--pt to f/u with both cardio and pulmonary but reports he will be unable to complete these visits  due to insurane issues   Did the patient receive a copy of their discharge instructions? Yes   Nursing interventions Reviewed instructions with patient   What is the patient's perception of their health status since discharge? Improving  [Reports he is doing better but still has a cough, denies SOA, sats have stayed in the 90s. Pt with cardiac monitor for new afib,  needs f/u with cardiology but insurance issues may present a issue.  Pt aware to seek care for issues or concerns.]   Nursing Interventions Nurse provided patient education   Is the patient/caregiver able to teach back the hierarchy of who to call/visit for symptoms/problems? PCP, Specialist, Home health nurse, Urgent Care, ED, 911 Yes   TCM call completed? Yes   Call end time 1301   Would this patient benefit from a Referral to [facility] Social Work? Yes   Reason for Social Work Referral Other Social Barriers to Care  [message sent to innaga  CM regarding neb machine and O2 f/u,  pt with insurance issues that impact f/u with providers]            Sarah Ordonez RN    11/7/2023, 13:20 EST

## 2023-11-07 NOTE — CASE MANAGEMENT/SOCIAL WORK
Case Management Discharge Note      Final Note: Routine home.         Selected Continued Care - Discharged on 11/6/2023 Admission date: 10/29/2023 - Discharge disposition: Home or Self Care           Transportation Services  Private: Car    Final Discharge Disposition Code: 01 - home or self-care

## 2023-11-08 ENCOUNTER — PATIENT OUTREACH (OUTPATIENT)
Dept: CASE MANAGEMENT | Facility: OTHER | Age: 63
End: 2023-11-08
Payer: MEDICARE

## 2023-11-08 NOTE — OUTREACH NOTE
"AMBULATORY CASE MANAGEMENT NOTE    Name and Relationship of Patient/Support Person: Jagdish Rea K - Self    Patient Outreach    Received referral from [facility], requesting follow up on pt receiving nebulizer machine and O2. Chart review from 10/29/23 [facility] admission notes nebulizer machine was ordered through [employer] and will be delivered to pt's home. 11/6/23 pulmonology note states \"he (pt) will need a 6-minute walk before discharge to assess need for home oxygen\". 11/6/23 RT note indicates pt passed 6 minute oximetry walk test and would not qualify for home O2, stating \"Room air spo2 at rest was 95%, patient walked for a full six minutes on room air, spo2 never dropped below 93%\". 11/6/23 Nursing Plan of Care note states \"Patient discharging home today. Patient does not require oxygen on discharge\".     RN-ACM outreach call made to pt. Explained role of RN-LUZM and reason for call. Pt states nebulizer machine was delivered to his home last night, he has been doing nebulizer treatments as directed. RN-ACM advised pt home O2 was not needed per chart review. Pt reports SOA with ambulation. He denies CP, cough, or fever. Pt states his wife is on oxygen so he has been checking his O2 sat with her monitor, his O2 sat has remained 90% or higher. Disease education provided regarding COPD. Handouts also sent to pt via [employer]. Pt reports compliant with medications. Smoking cessation resources provided.     Discussed follow up. Pt states he is switching to a [employer] Replacement plan, will be active starting 12/1/23 and he will see his PCP after that. RN-ACM informed pt he could check with his Medicaid plan regarding coverage and possibly see his PCP sooner. Pt states he will see.     Reviewed SDOH. Pt denies any needs. He reports to have good family support. No other needs or questions per pt at this time. Pt engaged in HRCM services. Care plan created this call. Advised pt to call RN-THANG or Alessandra 24 hour " nurse line with any needs. Follow up outreach scheduled for 2-3 weeks.     Adult Patient Profile  Questions/Answers      Flowsheet Row Most Recent Value   Symptoms/Conditions Managed at Home respiratory   Respiratory Symptoms/Conditions COPD   Respiratory Management Strategies medication therapy   Barriers to Taking Medication as Prescribed none   Equipment Currently Used at Home nebulizer   Primary Source of Support/Comfort child(paloma), spouse   People in Home spouse, child(paloma), adult   Current Living Arrangements home          Send Education  Questions/Answers      Flowsheet Row Most Recent Value   Other Patient Education/Resources  24/7 [facility] Nurse Call Line   24/7 Nurse Call Line Education Method Verbal          SDOH updated and reviewed with the patient during this program:  Financial Resource Strain: Low Risk  (11/8/2023)    Overall Financial Resource Strain (CARDIA)     Difficulty of Paying Living Expenses: Not very hard      Food Insecurity: No Food Insecurity (11/8/2023)    Hunger Vital Sign     Worried About Running Out of Food in the Last Year: Never true     Ran Out of Food in the Last Year: Never true      Transportation Needs: No Transportation Needs (11/8/2023)    PRAPARE - Transportation     Lack of Transportation (Medical): No     Lack of Transportation (Non-Medical): No       Education Documentation  Tobacco Use, Smoke Exposure, taught by Henri Kenney, RN at 11/8/2023  1:29 PM.  Learner: Patient  Readiness: Acceptance  Method: Explanation  Response: Verbalizes Understanding    Provider Follow-Up, taught by Henri Kenney, RN at 11/8/2023  1:29 PM.  Learner: Patient  Readiness: Acceptance  Method: Explanation  Response: Verbalizes Understanding    Energy Conservation, taught by Henri Kenney, RN at 11/8/2023  1:29 PM.  Learner: Patient  Readiness: Acceptance  Method: Explanation  Response: Verbalizes Understanding          Henri FALK  Ambulatory Case Management    11/8/2023, 13:29  EST

## 2023-11-10 LAB
BH CV ECHO MEAS - ACS: 2.2 CM
BH CV ECHO MEAS - AI P1/2T: 1191 MSEC
BH CV ECHO MEAS - AO MAX PG: 8.1 MMHG
BH CV ECHO MEAS - AO MEAN PG: 4 MMHG
BH CV ECHO MEAS - AO ROOT DIAM: 3.6 CM
BH CV ECHO MEAS - AO V2 MAX: 142 CM/SEC
BH CV ECHO MEAS - AO V2 VTI: 30 CM
BH CV ECHO MEAS - AVA(I,D): 2.31 CM2
BH CV ECHO MEAS - EDV(CUBED): 97.3 ML
BH CV ECHO MEAS - EDV(MOD-SP2): 75.9 ML
BH CV ECHO MEAS - EDV(MOD-SP4): 69.2 ML
BH CV ECHO MEAS - EF(MOD-BP): 66.5 %
BH CV ECHO MEAS - EF(MOD-SP2): 70.4 %
BH CV ECHO MEAS - EF(MOD-SP4): 63.7 %
BH CV ECHO MEAS - ESV(MOD-SP2): 22.5 ML
BH CV ECHO MEAS - ESV(MOD-SP4): 25.1 ML
BH CV ECHO MEAS - FS: 37 %
BH CV ECHO MEAS - IVS/LVPW: 1 CM
BH CV ECHO MEAS - IVSD: 1.1 CM
BH CV ECHO MEAS - LA DIMENSION: 3.3 CM
BH CV ECHO MEAS - LAT PEAK E' VEL: 10.3 CM/SEC
BH CV ECHO MEAS - LV DIASTOLIC VOL/BSA (35-75): 29.6 CM2
BH CV ECHO MEAS - LV MASS(C)D: 181.2 GRAMS
BH CV ECHO MEAS - LV MAX PG: 3.9 MMHG
BH CV ECHO MEAS - LV MEAN PG: 2 MMHG
BH CV ECHO MEAS - LV SYSTOLIC VOL/BSA (12-30): 10.7 CM2
BH CV ECHO MEAS - LV V1 MAX: 98.2 CM/SEC
BH CV ECHO MEAS - LV V1 VTI: 22.1 CM
BH CV ECHO MEAS - LVIDD: 4.6 CM
BH CV ECHO MEAS - LVIDS: 2.9 CM
BH CV ECHO MEAS - LVOT AREA: 3.1 CM2
BH CV ECHO MEAS - LVOT DIAM: 2 CM
BH CV ECHO MEAS - LVPWD: 1.1 CM
BH CV ECHO MEAS - MED PEAK E' VEL: 8.9 CM/SEC
BH CV ECHO MEAS - MR MAX PG: 86.5 MMHG
BH CV ECHO MEAS - MR MAX VEL: 465 CM/SEC
BH CV ECHO MEAS - MV A DUR: 0.11 SEC
BH CV ECHO MEAS - MV A MAX VEL: 77 CM/SEC
BH CV ECHO MEAS - MV DEC SLOPE: 514 CM/SEC2
BH CV ECHO MEAS - MV DEC TIME: 0.17 SEC
BH CV ECHO MEAS - MV E MAX VEL: 74.9 CM/SEC
BH CV ECHO MEAS - MV E/A: 0.97
BH CV ECHO MEAS - MV MAX PG: 2.9 MMHG
BH CV ECHO MEAS - MV MEAN PG: 1 MMHG
BH CV ECHO MEAS - MV P1/2T: 45.4 MSEC
BH CV ECHO MEAS - MV V2 VTI: 28.8 CM
BH CV ECHO MEAS - MVA(P1/2T): 4.9 CM2
BH CV ECHO MEAS - MVA(VTI): 2.41 CM2
BH CV ECHO MEAS - PA V2 MAX: 85.5 CM/SEC
BH CV ECHO MEAS - PULM A REVS DUR: 0.12 SEC
BH CV ECHO MEAS - PULM A REVS VEL: 27.9 CM/SEC
BH CV ECHO MEAS - PULM DIAS VEL: 36.3 CM/SEC
BH CV ECHO MEAS - PULM S/D: 0.71
BH CV ECHO MEAS - PULM SYS VEL: 25.8 CM/SEC
BH CV ECHO MEAS - RAP SYSTOLE: 8 MMHG
BH CV ECHO MEAS - RV MAX PG: 2.35 MMHG
BH CV ECHO MEAS - RV V1 MAX: 76.6 CM/SEC
BH CV ECHO MEAS - RV V1 VTI: 13.9 CM
BH CV ECHO MEAS - RVSP: 28.8 MMHG
BH CV ECHO MEAS - SI(MOD-SP2): 22.8 ML/M2
BH CV ECHO MEAS - SI(MOD-SP4): 18.9 ML/M2
BH CV ECHO MEAS - SV(LVOT): 69.4 ML
BH CV ECHO MEAS - SV(MOD-SP2): 53.4 ML
BH CV ECHO MEAS - SV(MOD-SP4): 44.1 ML
BH CV ECHO MEAS - TR MAX PG: 20.8 MMHG
BH CV ECHO MEAS - TR MAX VEL: 228 CM/SEC
BH CV ECHO MEASUREMENTS AVERAGE E/E' RATIO: 7.8
BH CV XLRA - RV BASE: 3.7 CM
BH CV XLRA - RV LENGTH: 6 CM
BH CV XLRA - RV MID: 2.5 CM
BH CV XLRA - TDI S': 9.5 CM/SEC
FUNGUS WND CULT: NORMAL
LEFT ATRIUM VOLUME INDEX: 25.8 ML/M2
MYCOBACTERIUM SPEC CULT: NORMAL
NIGHT BLUE STAIN TISS: NORMAL
SINUS: 3.1 CM
STJ: 2.9 CM

## 2023-11-11 LAB
QT INTERVAL: 357 MS
QTC INTERVAL: 483 MS

## 2023-11-14 ENCOUNTER — PATIENT OUTREACH (OUTPATIENT)
Dept: CASE MANAGEMENT | Facility: CLINIC | Age: 63
End: 2023-11-14
Payer: MEDICARE

## 2023-11-14 NOTE — OUTREACH NOTE
Patient Outreach    SW received referral via RN-ACM re: SDOH concerns (food insecurity). SW called and spoke to patient via telephone. Patient denies any food insecurity and reports no need for social intervention. SW to discharge as patient not interested in services / no needs identified. Please re consult SW if additional needs arise.     Lulu PEACOCK -   Ambulatory Case Management    11/14/2023, 12:29 EST

## 2023-11-16 NOTE — PROGRESS NOTES
Enter Query Response Below      Query Response:       Bacterial pneumonia unspecified        If applicable, please update the problem list.

## 2023-11-17 ENCOUNTER — HOSPITAL ENCOUNTER (OUTPATIENT)
Facility: HOSPITAL | Age: 63
Setting detail: OBSERVATION
LOS: 1 days | Discharge: HOME | End: 2023-11-18
Attending: EMERGENCY MEDICINE | Admitting: INTERNAL MEDICINE
Payer: MEDICARE

## 2023-11-17 ENCOUNTER — APPOINTMENT (OUTPATIENT)
Dept: GENERAL RADIOLOGY | Facility: HOSPITAL | Age: 63
End: 2023-11-17
Payer: MEDICARE

## 2023-11-17 ENCOUNTER — APPOINTMENT (OUTPATIENT)
Dept: ULTRASOUND IMAGING | Facility: HOSPITAL | Age: 63
End: 2023-11-17
Payer: MEDICARE

## 2023-11-17 DIAGNOSIS — R10.9 ABDOMINAL PAIN, UNSPECIFIED ABDOMINAL LOCATION: ICD-10-CM

## 2023-11-17 DIAGNOSIS — I48.91 ATRIAL FIBRILLATION WITH RAPID VENTRICULAR RESPONSE: Primary | ICD-10-CM

## 2023-11-17 LAB
ALBUMIN SERPL-MCNC: 3.8 G/DL (ref 3.5–5.2)
ALBUMIN/GLOB SERPL: 1.2 G/DL
ALP SERPL-CCNC: 146 U/L (ref 39–117)
ALT SERPL W P-5'-P-CCNC: 141 U/L (ref 1–41)
ANION GAP SERPL CALCULATED.3IONS-SCNC: 12 MMOL/L (ref 5–15)
AST SERPL-CCNC: 43 U/L (ref 1–40)
BASOPHILS # BLD AUTO: 0 10*3/MM3 (ref 0–0.2)
BASOPHILS NFR BLD AUTO: 0.4 % (ref 0–1.5)
BILIRUB SERPL-MCNC: 0.6 MG/DL (ref 0–1.2)
BUN SERPL-MCNC: 15 MG/DL (ref 8–23)
BUN/CREAT SERPL: 16.5 (ref 7–25)
CALCIUM SPEC-SCNC: 9.7 MG/DL (ref 8.6–10.5)
CHLORIDE SERPL-SCNC: 104 MMOL/L (ref 98–107)
CO2 SERPL-SCNC: 23 MMOL/L (ref 22–29)
CREAT SERPL-MCNC: 0.91 MG/DL (ref 0.76–1.27)
DEPRECATED RDW RBC AUTO: 52.5 FL (ref 37–54)
EGFRCR SERPLBLD CKD-EPI 2021: 94.7 ML/MIN/1.73
EOSINOPHIL # BLD AUTO: 0.1 10*3/MM3 (ref 0–0.4)
EOSINOPHIL NFR BLD AUTO: 1 % (ref 0.3–6.2)
ERYTHROCYTE [DISTWIDTH] IN BLOOD BY AUTOMATED COUNT: 14.5 % (ref 12.3–15.4)
FUNGUS WND CULT: NORMAL
GEN 5 2HR TROPONIN T REFLEX: 11 NG/L
GLOBULIN UR ELPH-MCNC: 3.3 GM/DL
GLUCOSE BLDC GLUCOMTR-MCNC: 239 MG/DL (ref 70–105)
GLUCOSE SERPL-MCNC: 256 MG/DL (ref 65–99)
HCT VFR BLD AUTO: 50.8 % (ref 37.5–51)
HGB BLD-MCNC: 17.1 G/DL (ref 13–17.7)
HOLD SPECIMEN: NORMAL
LYMPHOCYTES # BLD AUTO: 2.9 10*3/MM3 (ref 0.7–3.1)
LYMPHOCYTES NFR BLD AUTO: 32.3 % (ref 19.6–45.3)
MAGNESIUM SERPL-MCNC: 2 MG/DL (ref 1.6–2.4)
MCH RBC QN AUTO: 33 PG (ref 26.6–33)
MCHC RBC AUTO-ENTMCNC: 33.7 G/DL (ref 31.5–35.7)
MCV RBC AUTO: 97.9 FL (ref 79–97)
MONOCYTES # BLD AUTO: 0.7 10*3/MM3 (ref 0.1–0.9)
MONOCYTES NFR BLD AUTO: 7.8 % (ref 5–12)
MYCOBACTERIUM SPEC CULT: NORMAL
NEUTROPHILS NFR BLD AUTO: 5.2 10*3/MM3 (ref 1.7–7)
NEUTROPHILS NFR BLD AUTO: 58.5 % (ref 42.7–76)
NIGHT BLUE STAIN TISS: NORMAL
NRBC BLD AUTO-RTO: 0.2 /100 WBC (ref 0–0.2)
NT-PROBNP SERPL-MCNC: 73.4 PG/ML (ref 0–900)
PLATELET # BLD AUTO: 106 10*3/MM3 (ref 140–450)
PMV BLD AUTO: 9.3 FL (ref 6–12)
POTASSIUM SERPL-SCNC: 4.1 MMOL/L (ref 3.5–5.2)
PROT SERPL-MCNC: 7.1 G/DL (ref 6–8.5)
RBC # BLD AUTO: 5.19 10*6/MM3 (ref 4.14–5.8)
SODIUM SERPL-SCNC: 139 MMOL/L (ref 136–145)
TROPONIN T DELTA: 1 NG/L
TROPONIN T SERPL HS-MCNC: 10 NG/L
WBC NRBC COR # BLD AUTO: 8.9 10*3/MM3 (ref 3.4–10.8)

## 2023-11-17 PROCEDURE — 96365 THER/PROPH/DIAG IV INF INIT: CPT

## 2023-11-17 PROCEDURE — 96375 TX/PRO/DX INJ NEW DRUG ADDON: CPT

## 2023-11-17 PROCEDURE — 83735 ASSAY OF MAGNESIUM: CPT | Performed by: HOSPITALIST

## 2023-11-17 PROCEDURE — 36415 COLL VENOUS BLD VENIPUNCTURE: CPT | Performed by: NURSE PRACTITIONER

## 2023-11-17 PROCEDURE — 76705 ECHO EXAM OF ABDOMEN: CPT

## 2023-11-17 PROCEDURE — 82948 REAGENT STRIP/BLOOD GLUCOSE: CPT

## 2023-11-17 PROCEDURE — 80053 COMPREHEN METABOLIC PANEL: CPT | Performed by: NURSE PRACTITIONER

## 2023-11-17 PROCEDURE — 85025 COMPLETE CBC W/AUTO DIFF WBC: CPT | Performed by: NURSE PRACTITIONER

## 2023-11-17 PROCEDURE — 83880 ASSAY OF NATRIURETIC PEPTIDE: CPT | Performed by: NURSE PRACTITIONER

## 2023-11-17 PROCEDURE — 84484 ASSAY OF TROPONIN QUANT: CPT | Performed by: NURSE PRACTITIONER

## 2023-11-17 PROCEDURE — 93005 ELECTROCARDIOGRAM TRACING: CPT | Performed by: NURSE PRACTITIONER

## 2023-11-17 PROCEDURE — 99284 EMERGENCY DEPT VISIT MOD MDM: CPT

## 2023-11-17 PROCEDURE — 96366 THER/PROPH/DIAG IV INF ADDON: CPT

## 2023-11-17 PROCEDURE — 71045 X-RAY EXAM CHEST 1 VIEW: CPT

## 2023-11-17 PROCEDURE — 93005 ELECTROCARDIOGRAM TRACING: CPT | Performed by: EMERGENCY MEDICINE

## 2023-11-17 PROCEDURE — 25010000002 ONDANSETRON PER 1 MG: Performed by: EMERGENCY MEDICINE

## 2023-11-17 PROCEDURE — 25010000002 MORPHINE PER 10 MG: Performed by: EMERGENCY MEDICINE

## 2023-11-17 RX ORDER — DILTIAZEM HCL/D5W 125 MG/125
5-15 PLASTIC BAG, INJECTION (ML) INTRAVENOUS CONTINUOUS
Status: DISCONTINUED | OUTPATIENT
Start: 2023-11-17 | End: 2023-11-18

## 2023-11-17 RX ORDER — SODIUM CHLORIDE 0.9 % (FLUSH) 0.9 %
10 SYRINGE (ML) INJECTION AS NEEDED
Status: DISCONTINUED | OUTPATIENT
Start: 2023-11-17 | End: 2023-11-18 | Stop reason: HOSPADM

## 2023-11-17 RX ORDER — PRAVASTATIN SODIUM 80 MG/1
TABLET ORAL
Qty: 90 TABLET | Refills: 10 | Status: SHIPPED | OUTPATIENT
Start: 2023-11-17

## 2023-11-17 RX ORDER — ONDANSETRON 2 MG/ML
4 INJECTION INTRAMUSCULAR; INTRAVENOUS ONCE
Status: COMPLETED | OUTPATIENT
Start: 2023-11-17 | End: 2023-11-17

## 2023-11-17 RX ORDER — SODIUM CHLORIDE 9 MG/ML
40 INJECTION, SOLUTION INTRAVENOUS AS NEEDED
Status: DISCONTINUED | OUTPATIENT
Start: 2023-11-17 | End: 2023-11-18 | Stop reason: HOSPADM

## 2023-11-17 RX ORDER — BISACODYL 10 MG
10 SUPPOSITORY, RECTAL RECTAL DAILY PRN
Status: DISCONTINUED | OUTPATIENT
Start: 2023-11-17 | End: 2023-11-18 | Stop reason: HOSPADM

## 2023-11-17 RX ORDER — AMOXICILLIN 250 MG
2 CAPSULE ORAL 2 TIMES DAILY
Status: DISCONTINUED | OUTPATIENT
Start: 2023-11-17 | End: 2023-11-18 | Stop reason: HOSPADM

## 2023-11-17 RX ORDER — BISACODYL 5 MG/1
5 TABLET, DELAYED RELEASE ORAL DAILY PRN
Status: DISCONTINUED | OUTPATIENT
Start: 2023-11-17 | End: 2023-11-18 | Stop reason: HOSPADM

## 2023-11-17 RX ORDER — ONDANSETRON 2 MG/ML
4 INJECTION INTRAMUSCULAR; INTRAVENOUS EVERY 6 HOURS PRN
Status: DISCONTINUED | OUTPATIENT
Start: 2023-11-17 | End: 2023-11-18 | Stop reason: HOSPADM

## 2023-11-17 RX ORDER — SODIUM CHLORIDE 0.9 % (FLUSH) 0.9 %
10 SYRINGE (ML) INJECTION EVERY 12 HOURS SCHEDULED
Status: DISCONTINUED | OUTPATIENT
Start: 2023-11-17 | End: 2023-11-18 | Stop reason: HOSPADM

## 2023-11-17 RX ORDER — POLYETHYLENE GLYCOL 3350 17 G/17G
17 POWDER, FOR SOLUTION ORAL DAILY PRN
Status: DISCONTINUED | OUTPATIENT
Start: 2023-11-17 | End: 2023-11-18 | Stop reason: HOSPADM

## 2023-11-17 RX ORDER — HEPARIN SODIUM 5000 [USP'U]/ML
5000 INJECTION, SOLUTION INTRAVENOUS; SUBCUTANEOUS EVERY 8 HOURS SCHEDULED
Status: DISCONTINUED | OUTPATIENT
Start: 2023-11-17 | End: 2023-11-18

## 2023-11-17 RX ADMIN — ONDANSETRON 4 MG: 2 INJECTION INTRAMUSCULAR; INTRAVENOUS at 20:26

## 2023-11-17 RX ADMIN — Medication 10 ML: at 22:50

## 2023-11-17 RX ADMIN — Medication 5 MG/HR: at 17:51

## 2023-11-17 RX ADMIN — MORPHINE SULFATE 4 MG: 4 INJECTION, SOLUTION INTRAMUSCULAR; INTRAVENOUS at 20:26

## 2023-11-17 NOTE — Clinical Note
Level of Care: Telemetry [5]   Admitting Physician: CB CAMPA [[id number]]   Attending Physician: CB CAMPA [[id number]]   Bed Request Comments: QUE

## 2023-11-17 NOTE — ED PROVIDER NOTES
Subjective   History of Present Illness  Chief complaint: Palpitations    63-year-old male with a history of atrial fibrillation presents with palpitations.  Patient states his heart has been racing today.  He reports some shortness of breath as well.  He states he has had some muscle spasms today.  He denies chest pain.  He denies any alleviating or exacerbating factors.    History provided by:  Patient      Review of Systems   Constitutional:  Negative for fever.   HENT:  Negative for congestion.    Respiratory:  Positive for shortness of breath. Negative for cough.    Cardiovascular:  Positive for palpitations. Negative for chest pain.   Gastrointestinal:  Negative for abdominal pain and vomiting.   Musculoskeletal:  Negative for back pain.   Neurological:  Negative for headaches.   Psychiatric/Behavioral:  Negative for confusion.        Past Medical History:   Diagnosis Date    Acute hypoxemic respiratory failure 11/06/2023    Allergic     Anxiety     Arthritis 06/2005    Back pain     Claustrophobia 1978    COPD (chronic obstructive pulmonary disease)     CTS (carpal tunnel syndrome) 10/2022    Dysphagia     Esophageal stricture     GERD (gastroesophageal reflux disease)     Hyperlipidemia     Hypertension     Knee pain     rt    Low back pain     Obesity     Obesity (BMI 30-39.9) 11/06/2023    Other cervical disc degeneration at C5-C6 level 08/22/2018    Paroxysmal atrial fibrillation with rapid ventricular response 11/06/2023    Pneumonia     Prediabetes 11/06/2023    PTSD (post-traumatic stress disorder)     Rhinovirus infection 11/06/2023    Thoracic disc herniation     Vitamin B12 deficiency        Allergies   Allergen Reactions    Atorvastatin Swelling    Lisinopril Other (See Comments)     Facial paralysis        Past Surgical History:   Procedure Laterality Date    BRONCHOSCOPY N/A 11/3/2023    Procedure: BRONCHOSCOPY with bilateral lung washing's;  Surgeon: Kolton Brewer MD;  Location: [facility]  ENDOSCOPY;  Service: Pulmonary;  Laterality: N/A;    CARDIAC CATHETERIZATION N/A 07/13/2022    Procedure: Left Heart Cath, possible pci;  Surgeon: Prieto Sidhu MD;  Location: [facility] CATH INVASIVE LOCATION;  Service: Cardiovascular;  Laterality: N/A;    ENDOSCOPY      ENDOSCOPY N/A 9/21/2023    Procedure: ESOPHAGOGASTRODUODENOSCOPY WITH non guided esophageal dialtion 54 Fr. Bougie and  cold forcep biopsy x1 area;  Surgeon: Andres Concepcion MD;  Location: [facility] ENDOSCOPY;  Service: Gastroenterology;  Laterality: N/A;  Post- erosive gastritis, hiatal hernia, esophageal stricture    HERNIA REPAIR      KNEE ARTHROPLASTY      x2    SHOULDER ARTHROSCOPY W/ ROTATOR CUFF REPAIR Right 08/11/2020    Procedure: SHOULDER ARTHROSCOPY WITH ROTATOR CUFF REPAIR, extensive debridement;  Surgeon: Fredi Ritchie MD;  Location: [facility] MAIN OR;  Service: Orthopedics;  Laterality: Right;    TONSILLECTOMY         Family History   Problem Relation Age of Onset    Heart disease Mother     Early death Mother     Hyperlipidemia Mother     Hypertension Mother     Thyroid disease Mother     Cancer Father     Stroke Father     Vision loss Father     Stroke Paternal Grandfather     Arthritis Paternal Grandmother     Alcohol abuse Brother     Asthma Brother     Depression Brother     Hyperlipidemia Brother     Hypertension Brother     Learning disabilities Brother     Mental illness Brother     Drug abuse Brother     Early death Brother     Heart disease Brother     Hyperlipidemia Brother     Hypertension Brother        Social History     Socioeconomic History    Marital status:    Tobacco Use    Smoking status: Every Day     Packs/day: 1.00     Years: 47.00     Additional pack years: 0.00     Total pack years: 47.00     Types: Cigarettes     Start date: 1/9/1973    Smokeless tobacco: Never    Tobacco comments:     Smoked a half a pack a day for 42 years didn't start to smoke a whole pack until 2017   Vaping  "Use    Vaping Use: Never used   Substance and Sexual Activity    Alcohol use: No    Drug use: No    Sexual activity: Defer       /91   Pulse 96   Temp 97.8 °F (36.6 °C) (Oral)   Resp 18   Ht 182.9 cm (72\")   Wt 120 kg (265 lb)   SpO2 91%   BMI 35.94 kg/m²       Objective   Physical Exam  Vitals and nursing note reviewed.   Constitutional:       Appearance: Normal appearance.   HENT:      Head: Normocephalic and atraumatic.      Mouth/Throat:      Mouth: Mucous membranes are moist.   Cardiovascular:      Rate and Rhythm: Tachycardia present. Rhythm irregular.      Heart sounds: Normal heart sounds.   Pulmonary:      Effort: Pulmonary effort is normal. No respiratory distress.      Breath sounds: Normal breath sounds.   Abdominal:      Palpations: Abdomen is soft.      Tenderness: There is no abdominal tenderness.   Musculoskeletal:         General: No deformity or signs of injury.   Skin:     General: Skin is warm and dry.   Neurological:      Mental Status: He is alert and oriented to person, place, and time.         Procedures           ED Course      My interpretation of EKG shows atrial fibrillation, rate of 165, no ST elevation     Results for orders placed or performed during the hospital encounter of 11/17/23   Comprehensive Metabolic Panel    Specimen: Arm, Left; Blood   Result Value Ref Range    Glucose 256 (H) 65 - 99 mg/dL    BUN 15 8 - 23 mg/dL    Creatinine 0.91 0.76 - 1.27 mg/dL    Sodium 139 136 - 145 mmol/L    Potassium 4.1 3.5 - 5.2 mmol/L    Chloride 104 98 - 107 mmol/L    CO2 23.0 22.0 - 29.0 mmol/L    Calcium 9.7 8.6 - 10.5 mg/dL    Total Protein 7.1 6.0 - 8.5 g/dL    Albumin 3.8 3.5 - 5.2 g/dL    ALT (SGPT) 141 (H) 1 - 41 U/L    AST (SGOT) 43 (H) 1 - 40 U/L    Alkaline Phosphatase 146 (H) 39 - 117 U/L    Total Bilirubin 0.6 0.0 - 1.2 mg/dL    Globulin 3.3 gm/dL    A/G Ratio 1.2 g/dL    BUN/Creatinine Ratio 16.5 7.0 - 25.0    Anion Gap 12.0 5.0 - 15.0 mmol/L    eGFR 94.7 >60.0 " mL/min/1.73   BNP    Specimen: Arm, Left; Blood   Result Value Ref Range    proBNP 73.4 0.0 - 900.0 pg/mL   High Sensitivity Troponin T    Specimen: Arm, Left; Blood   Result Value Ref Range    HS Troponin T 10 <22 ng/L   CBC Auto Differential    Specimen: Blood   Result Value Ref Range    WBC 8.90 3.40 - 10.80 10*3/mm3    RBC 5.19 4.14 - 5.80 10*6/mm3    Hemoglobin 17.1 13.0 - 17.7 g/dL    Hematocrit 50.8 37.5 - 51.0 %    MCV 97.9 (H) 79.0 - 97.0 fL    MCH 33.0 26.6 - 33.0 pg    MCHC 33.7 31.5 - 35.7 g/dL    RDW 14.5 12.3 - 15.4 %    RDW-SD 52.5 37.0 - 54.0 fl    MPV 9.3 6.0 - 12.0 fL    Platelets 106 (L) 140 - 450 10*3/mm3    Neutrophil % 58.5 42.7 - 76.0 %    Lymphocyte % 32.3 19.6 - 45.3 %    Monocyte % 7.8 5.0 - 12.0 %    Eosinophil % 1.0 0.3 - 6.2 %    Basophil % 0.4 0.0 - 1.5 %    Neutrophils, Absolute 5.20 1.70 - 7.00 10*3/mm3    Lymphocytes, Absolute 2.90 0.70 - 3.10 10*3/mm3    Monocytes, Absolute 0.70 0.10 - 0.90 10*3/mm3    Eosinophils, Absolute 0.10 0.00 - 0.40 10*3/mm3    Basophils, Absolute 0.00 0.00 - 0.20 10*3/mm3    nRBC 0.2 0.0 - 0.2 /100 WBC   ECG 12 Lead Tachycardia   Result Value Ref Range    QT Interval 279 ms    QTC Interval 463 ms   ECG 12 Lead Rhythm Change   Result Value Ref Range    QT Interval 348 ms    QTC Interval 438 ms   [facility] - [facility]   Result Value Ref Range    Extra Tube Hold for add-ons.      US Abdomen Limited    Result Date: 11/17/2023  Impression: 1.The gallbladder is not completely distended and is borderline thick walled. No sludge or stones are demonstrated. No biliary dilatation. 2.Diffusely echogenic liver compatible with diffuse fatty infiltration. Electronically Signed: Taye Bradshaw DO  11/17/2023 8:19 PM EST  Workstation ID: [id number]    XR Chest 1 View    Result Date: 11/17/2023  Impression: Plate atelectasis in the left lung base. No active cardiopulmonary disease. Electronically Signed: Taye Bradshaw DO  11/17/2023 6:19 PM EST  Workstation ID: [id number]                                    Medical Decision Making  Amount and/or Complexity of Data Reviewed  Radiology: ordered.  ECG/medicine tests: ordered.    Risk  Prescription drug management.      Patient had the above evaluation.  Results were discussed with the patient.  Patient was found to be in A-fib with RVR.  Troponin is negative.  BNP is normal.  He was started on Cardizem.  Heart rate is much improved.  Interpretation of chest x-ray shows no infiltrate or effusion.  White blood cell count is normal.  CMP significant for mild elevation in LFTs with , AST 43, alk phos 146.  Bilirubin is normal.  Patient is complaining of some right upper quadrant pain.  Right upper quadrant ultrasound was obtained which showed fatty liver but no other acute abnormality.  I discussed with the hospitalist and the patient will be admitted for further evaluation and management.      Final diagnoses:   Atrial fibrillation with rapid ventricular response   Abdominal pain, unspecified abdominal location       ED Disposition  ED Disposition       ED Disposition   Decision to Admit    Condition   --    Comment   Level of Care: Telemetry [5]   Admitting Physician: CB CAMPA [[id number]]   Attending Physician: CB CAMPA [[id number]]   Bed Request Comments: [facility]                 No follow-up provider specified.       Medication List      No changes were made to your prescriptions during this visit.            Robert Yip MD  11/17/23 2036

## 2023-11-18 VITALS
WEIGHT: 243.39 LBS | HEART RATE: 85 BPM | OXYGEN SATURATION: 99 % | HEIGHT: 72 IN | DIASTOLIC BLOOD PRESSURE: 78 MMHG | RESPIRATION RATE: 17 BRPM | TEMPERATURE: 98.1 F | SYSTOLIC BLOOD PRESSURE: 136 MMHG | BODY MASS INDEX: 32.97 KG/M2

## 2023-11-18 PROBLEM — I48.91 ATRIAL FIBRILLATION: Status: ACTIVE | Noted: 2023-11-18

## 2023-11-18 LAB
ALBUMIN SERPL-MCNC: 3.4 G/DL (ref 3.5–5.2)
ALBUMIN/GLOB SERPL: 1.1 G/DL
ALP SERPL-CCNC: 125 U/L (ref 39–117)
ALT SERPL W P-5'-P-CCNC: 114 U/L (ref 1–41)
ANION GAP SERPL CALCULATED.3IONS-SCNC: 10 MMOL/L (ref 5–15)
AST SERPL-CCNC: 28 U/L (ref 1–40)
BILIRUB SERPL-MCNC: 0.7 MG/DL (ref 0–1.2)
BUN SERPL-MCNC: 12 MG/DL (ref 8–23)
BUN/CREAT SERPL: 15.2 (ref 7–25)
CALCIUM SPEC-SCNC: 8.8 MG/DL (ref 8.6–10.5)
CHLORIDE SERPL-SCNC: 101 MMOL/L (ref 98–107)
CO2 SERPL-SCNC: 23 MMOL/L (ref 22–29)
CREAT SERPL-MCNC: 0.79 MG/DL (ref 0.76–1.27)
DEPRECATED RDW RBC AUTO: 50.8 FL (ref 37–54)
EGFRCR SERPLBLD CKD-EPI 2021: 99.8 ML/MIN/1.73
ERYTHROCYTE [DISTWIDTH] IN BLOOD BY AUTOMATED COUNT: 14.5 % (ref 12.3–15.4)
GLOBULIN UR ELPH-MCNC: 3 GM/DL
GLUCOSE BLDC GLUCOMTR-MCNC: 209 MG/DL (ref 70–105)
GLUCOSE SERPL-MCNC: 249 MG/DL (ref 65–99)
HCT VFR BLD AUTO: 44.5 % (ref 37.5–51)
HGB BLD-MCNC: 15 G/DL (ref 13–17.7)
MCH RBC QN AUTO: 34.2 PG (ref 26.6–33)
MCHC RBC AUTO-ENTMCNC: 33.8 G/DL (ref 31.5–35.7)
MCV RBC AUTO: 101.3 FL (ref 79–97)
PLATELET # BLD AUTO: 92 10*3/MM3 (ref 140–450)
PMV BLD AUTO: 9.5 FL (ref 6–12)
POTASSIUM SERPL-SCNC: 4 MMOL/L (ref 3.5–5.2)
PROT SERPL-MCNC: 6.4 G/DL (ref 6–8.5)
QT INTERVAL: 279 MS
QT INTERVAL: 348 MS
QTC INTERVAL: 438 MS
QTC INTERVAL: 463 MS
RBC # BLD AUTO: 4.4 10*6/MM3 (ref 4.14–5.8)
SODIUM SERPL-SCNC: 134 MMOL/L (ref 136–145)
WBC NRBC COR # BLD AUTO: 6.5 10*3/MM3 (ref 3.4–10.8)

## 2023-11-18 PROCEDURE — 82948 REAGENT STRIP/BLOOD GLUCOSE: CPT

## 2023-11-18 PROCEDURE — 63710000001 INSULIN LISPRO (HUMAN) PER 5 UNITS: Performed by: INTERNAL MEDICINE

## 2023-11-18 PROCEDURE — 63710000001 HYDROCHLOROTHIAZIDE 12.5 MG TABLET: Performed by: INTERNAL MEDICINE

## 2023-11-18 PROCEDURE — 25010000002 HEPARIN (PORCINE) PER 1000 UNITS: Performed by: HOSPITALIST

## 2023-11-18 PROCEDURE — 63710000001 PANTOPRAZOLE 40 MG TABLET DELAYED-RELEASE: Performed by: INTERNAL MEDICINE

## 2023-11-18 PROCEDURE — 93005 ELECTROCARDIOGRAM TRACING: CPT | Performed by: INTERNAL MEDICINE

## 2023-11-18 PROCEDURE — 85027 COMPLETE CBC AUTOMATED: CPT | Performed by: HOSPITALIST

## 2023-11-18 PROCEDURE — A9270 NON-COVERED ITEM OR SERVICE: HCPCS | Performed by: INTERNAL MEDICINE

## 2023-11-18 PROCEDURE — 80053 COMPREHEN METABOLIC PANEL: CPT | Performed by: HOSPITALIST

## 2023-11-18 PROCEDURE — 99214 OFFICE O/P EST MOD 30 MIN: CPT | Performed by: INTERNAL MEDICINE

## 2023-11-18 PROCEDURE — 96372 THER/PROPH/DIAG INJ SC/IM: CPT

## 2023-11-18 PROCEDURE — G0378 HOSPITAL OBSERVATION PER HR: HCPCS

## 2023-11-18 PROCEDURE — 25010000002 MORPHINE PER 10 MG: Performed by: HOSPITALIST

## 2023-11-18 PROCEDURE — 96376 TX/PRO/DX INJ SAME DRUG ADON: CPT

## 2023-11-18 RX ORDER — METOPROLOL SUCCINATE 50 MG/1
50 TABLET, EXTENDED RELEASE ORAL
Status: DISCONTINUED | OUTPATIENT
Start: 2023-11-18 | End: 2023-11-18 | Stop reason: HOSPADM

## 2023-11-18 RX ORDER — DOXEPIN HYDROCHLORIDE 10 MG/1
10 CAPSULE ORAL NIGHTLY
Status: DISCONTINUED | OUTPATIENT
Start: 2023-11-18 | End: 2023-11-18 | Stop reason: HOSPADM

## 2023-11-18 RX ORDER — AMLODIPINE BESYLATE 5 MG/1
10 TABLET ORAL DAILY
Status: DISCONTINUED | OUTPATIENT
Start: 2023-11-18 | End: 2023-11-18 | Stop reason: HOSPADM

## 2023-11-18 RX ORDER — ASPIRIN 81 MG/1
81 TABLET ORAL DAILY
Status: DISCONTINUED | OUTPATIENT
Start: 2023-11-18 | End: 2023-11-18

## 2023-11-18 RX ORDER — PANTOPRAZOLE SODIUM 40 MG/1
40 TABLET, DELAYED RELEASE ORAL DAILY
Status: DISCONTINUED | OUTPATIENT
Start: 2023-11-18 | End: 2023-11-18 | Stop reason: HOSPADM

## 2023-11-18 RX ORDER — INSULIN LISPRO 100 [IU]/ML
2-9 INJECTION, SOLUTION INTRAVENOUS; SUBCUTANEOUS
Status: DISCONTINUED | OUTPATIENT
Start: 2023-11-18 | End: 2023-11-18 | Stop reason: HOSPADM

## 2023-11-18 RX ORDER — ATORVASTATIN CALCIUM 10 MG/1
10 TABLET, FILM COATED ORAL DAILY
Status: DISCONTINUED | OUTPATIENT
Start: 2023-11-18 | End: 2023-11-18 | Stop reason: HOSPADM

## 2023-11-18 RX ORDER — IBUPROFEN 600 MG/1
1 TABLET ORAL
Status: DISCONTINUED | OUTPATIENT
Start: 2023-11-18 | End: 2023-11-18 | Stop reason: HOSPADM

## 2023-11-18 RX ORDER — AMLODIPINE BESYLATE 5 MG/1
10 TABLET ORAL
Status: DISCONTINUED | OUTPATIENT
Start: 2023-11-18 | End: 2023-11-18

## 2023-11-18 RX ORDER — ASPIRIN 81 MG/1
81 TABLET, CHEWABLE ORAL DAILY
Status: DISCONTINUED | OUTPATIENT
Start: 2023-11-18 | End: 2023-11-18

## 2023-11-18 RX ORDER — IPRATROPIUM BROMIDE AND ALBUTEROL SULFATE 2.5; .5 MG/3ML; MG/3ML
3 SOLUTION RESPIRATORY (INHALATION) EVERY 4 HOURS PRN
Status: DISCONTINUED | OUTPATIENT
Start: 2023-11-18 | End: 2023-11-18 | Stop reason: HOSPADM

## 2023-11-18 RX ORDER — ALBUTEROL SULFATE 2.5 MG/3ML
2.5 SOLUTION RESPIRATORY (INHALATION) EVERY 6 HOURS PRN
Status: DISCONTINUED | OUTPATIENT
Start: 2023-11-18 | End: 2023-11-18 | Stop reason: HOSPADM

## 2023-11-18 RX ORDER — METOPROLOL SUCCINATE 50 MG/1
50 TABLET, EXTENDED RELEASE ORAL
Qty: 30 TABLET | Refills: 0 | Status: SHIPPED | OUTPATIENT
Start: 2023-11-19

## 2023-11-18 RX ORDER — IPRATROPIUM BROMIDE AND ALBUTEROL SULFATE 2.5; .5 MG/3ML; MG/3ML
3 SOLUTION RESPIRATORY (INHALATION) EVERY 4 HOURS PRN
COMMUNITY

## 2023-11-18 RX ORDER — DEXTROSE MONOHYDRATE 25 G/50ML
25 INJECTION, SOLUTION INTRAVENOUS
Status: DISCONTINUED | OUTPATIENT
Start: 2023-11-18 | End: 2023-11-18 | Stop reason: HOSPADM

## 2023-11-18 RX ORDER — HYDROCHLOROTHIAZIDE 12.5 MG/1
12.5 TABLET ORAL DAILY
Status: DISCONTINUED | OUTPATIENT
Start: 2023-11-18 | End: 2023-11-18 | Stop reason: HOSPADM

## 2023-11-18 RX ORDER — NICOTINE POLACRILEX 4 MG
15 LOZENGE BUCCAL
Status: DISCONTINUED | OUTPATIENT
Start: 2023-11-18 | End: 2023-11-18 | Stop reason: HOSPADM

## 2023-11-18 RX ADMIN — HEPARIN SODIUM 5000 UNITS: 5000 INJECTION INTRAVENOUS; SUBCUTANEOUS at 00:00

## 2023-11-18 RX ADMIN — METOPROLOL SUCCINATE 50 MG: 50 TABLET, EXTENDED RELEASE ORAL at 10:58

## 2023-11-18 RX ADMIN — HYDROCHLOROTHIAZIDE 12.5 MG: 12.5 TABLET ORAL at 14:27

## 2023-11-18 RX ADMIN — PANTOPRAZOLE SODIUM 40 MG: 40 TABLET, DELAYED RELEASE ORAL at 14:27

## 2023-11-18 RX ADMIN — AMLODIPINE BESYLATE 10 MG: 5 TABLET ORAL at 10:58

## 2023-11-18 RX ADMIN — APIXABAN 5 MG: 5 TABLET, FILM COATED ORAL at 10:58

## 2023-11-18 RX ADMIN — INSULIN LISPRO 4 UNITS: 100 INJECTION, SOLUTION INTRAVENOUS; SUBCUTANEOUS at 12:47

## 2023-11-18 RX ADMIN — MORPHINE SULFATE 4 MG: 4 INJECTION, SOLUTION INTRAMUSCULAR; INTRAVENOUS at 03:51

## 2023-11-18 RX ADMIN — MORPHINE SULFATE 4 MG: 4 INJECTION, SOLUTION INTRAMUSCULAR; INTRAVENOUS at 14:27

## 2023-11-18 RX ADMIN — HEPARIN SODIUM 5000 UNITS: 5000 INJECTION INTRAVENOUS; SUBCUTANEOUS at 05:57

## 2023-11-18 NOTE — H&P
[facility] Medicine Services  History & Physical    Patient Name: Jagdish Rea  : 1960  MRN: [id number]  Primary Care Physician:  Yamilka Pascual APRN  Date of admission: 2023  Date and Time of Service: 2023 at 20:45 EST      Subjective      Chief Complaint: Palpitations    History of Present Illness: Jagdish Rea is a 63 y.o. male who presented to [facility] on 2023 with palpitations.  Patient does have a past medical history of COPD, morbid obesity, hypertension, hyperlipidemia and GERD.  Of note patient was admitted 10/29/2023 for COPD exacerbation.  During that admission he was also diagnosed with new onset A-fib. He converted to normal sinus rhythm and he was discharged home on a cardiac monitor and aspirin.  Patient presents today due to palpitations. Patient states his heart has been racing all day.  He reports some chest tightness and shortness of breath as well. He also endorses some right upper quadrant abdominal pain. He denies any alleviating or exacerbating factors.  Denies any sick contacts.    In the ED, heart rate was in the 180s.  EKG confirmed A-fib with RVR.  Labs remarkable for , AST 43, .  Ultrasound of the abdomen shows diffusely echogenic liver compatible with diffuse fatty infiltration.  Patient was started on Cardizem drip    Review of Systems  Negative except those in HPI  Personal History     Past Medical History:   Diagnosis Date    Acute hypoxemic respiratory failure 2023    Allergic     Anxiety     Arthritis 2005    Back pain     Claustrophobia     COPD (chronic obstructive pulmonary disease)     CTS (carpal tunnel syndrome) 10/2022    Dysphagia     Esophageal stricture     GERD (gastroesophageal reflux disease)     Hyperlipidemia     Hypertension     Knee pain     rt    Low back pain     Obesity     Obesity (BMI 30-39.9) 2023    Other cervical disc degeneration at C5-C6 level 2018    Paroxysmal  atrial fibrillation with rapid ventricular response 11/06/2023    Pneumonia     Prediabetes 11/06/2023    PTSD (post-traumatic stress disorder)     Rhinovirus infection 11/06/2023    Thoracic disc herniation     Vitamin B12 deficiency        Past Surgical History:   Procedure Laterality Date    BRONCHOSCOPY N/A 11/3/2023    Procedure: BRONCHOSCOPY with bilateral lung washing's;  Surgeon: Kolton Brewer MD;  Location: [facility] ENDOSCOPY;  Service: Pulmonary;  Laterality: N/A;    CARDIAC CATHETERIZATION N/A 07/13/2022    Procedure: Left Heart Cath, possible pci;  Surgeon: Prieto Sidhu MD;  Location: [facility] CATH INVASIVE LOCATION;  Service: Cardiovascular;  Laterality: N/A;    ENDOSCOPY      ENDOSCOPY N/A 9/21/2023    Procedure: ESOPHAGOGASTRODUODENOSCOPY WITH non guided esophageal dialtion 54 Fr. Bougie and  cold forcep biopsy x1 area;  Surgeon: Andres Concepcion MD;  Location: [facility] ENDOSCOPY;  Service: Gastroenterology;  Laterality: N/A;  Post- erosive gastritis, hiatal hernia, esophageal stricture    HERNIA REPAIR      KNEE ARTHROPLASTY      x2    SHOULDER ARTHROSCOPY W/ ROTATOR CUFF REPAIR Right 08/11/2020    Procedure: SHOULDER ARTHROSCOPY WITH ROTATOR CUFF REPAIR, extensive debridement;  Surgeon: Fredi Ritchie MD;  Location: [facility] MAIN OR;  Service: Orthopedics;  Laterality: Right;    TONSILLECTOMY         Family History: family history includes Alcohol abuse in his brother; Arthritis in his paternal grandmother; Asthma in his brother; Cancer in his father; Depression in his brother; Drug abuse in his brother; Early death in his brother and mother; Heart disease in his brother and mother; Hyperlipidemia in his brother, brother, and mother; Hypertension in his brother, brother, and mother; Learning disabilities in his brother; Mental illness in his brother; Stroke in his father and paternal grandfather; Thyroid disease in his mother; Vision loss in his father. Otherwise pertinent  FHx was reviewed and not pertinent to current issue.    Social History:  reports that he has been smoking cigarettes. He started smoking about 50 years ago. He has a 47.00 pack-year smoking history. He has never used smokeless tobacco. He reports that he does not drink alcohol and does not use drugs.    Home Medications:  Prior to Admission Medications       Prescriptions Last Dose Informant Patient Reported? Taking?    albuterol sulfate  (90 Base) MCG/ACT inhaler   No No    Inhale 2 puffs Every 4 (Four) Hours As Needed for Wheezing.    amLODIPine (NORVASC) 10 MG tablet   No No    TAKE 1 TABLET EVERY DAY    amoxicillin-clavulanate (AUGMENTIN) 875-125 MG per tablet   No No    Take 1 tablet by mouth 2 (Two) Times a Day.    aspirin 81 MG EC tablet   No No    Take 1 tablet by mouth Daily.    benzonatate (TESSALON) 100 MG capsule   No No    Take 1 capsule by mouth 3 (Three) Times a Day As Needed for Cough.    cetirizine (zyrTEC) 10 MG tablet   No No    Take 1 tablet by mouth Daily.    doxepin (SINEquan) 10 MG capsule   No No    TAKE 1 CAPSULE EVERY NIGHT.    hydroCHLOROthiazide (MICROZIDE) 12.5 MG capsule   Yes No    Take 1 capsule by mouth As Needed.    ipratropium-albuterol (DUO-NEB) 0.5-2.5 mg/3 ml nebulizer   No No    Take 3 mL by nebulization 4 (Four) Times a Day.    metoprolol succinate XL (TOPROL-XL) 25 MG 24 hr tablet   No No    TAKE 1 TABLET EVERY DAY    pantoprazole (PROTONIX) 40 MG EC tablet   No No    TAKE 1 TABLET EVERY DAY    pravastatin (PRAVACHOL) 80 MG tablet   No No    TAKE 1 TABLET EVERY NIGHT    predniSONE (DELTASONE) 50 MG tablet   No No    Take 1 tablet by mouth Daily.              Allergies:  Allergies   Allergen Reactions    Atorvastatin Swelling    Lisinopril Other (See Comments)     Facial paralysis        Objective      Vitals:   Temp:  [97.8 °F (36.6 °C)] 97.8 °F (36.6 °C)  Heart Rate:  [] 96  Resp:  [18-28] 18  BP: (104-135)/(75-91) 131/91  Body mass index is 35.94  kg/m².    Physical Exam  General Appearance: AOO x 4, cooperative, no distress, appropriate for age  Head:  Normocephalic, without obvious abnormality  Eyes:  PERRL, EOM's intact, conjunctivae and cornea clear  Nose:  Nares symmetrical, septum midline, mucosa pink  Throat:  Lips, tongue, and mucosa are moist, pink, and intact  Neck:  Supple; symmetrical, trachea midline, no adenopathy  Back:  Symmetrical, ROM normal, no CVA tenderness  Lungs: Respirations unlabored, no audible wheeze  Heart: Irregular rhythm and rate  Abdomen:  Soft, nontender, bowel sounds active all four quadrants  Musculoskeletal: Tone and strength strong and symmetrical, all extremities; no joint pain or edema         Skin/Hair/Nails:  Skin warm, dry and intact, no rashes or abnormal dyspigmentation     Diagnostic Data:  Lab Results (last 24 hours)       Procedure Component Value Units Date/Time    High Sensitivity Troponin T 2Hr [[id number]] Collected: 11/17/23 2029    Specimen: Blood Updated: 11/17/23 2032    Extra Tubes [[id number]] Collected: 11/17/23 1822    Specimen: Blood from Arm, Left Updated: 11/17/23 1932    Narrative:      The following orders were created for panel order Extra Tubes.  Procedure                               Abnormality         Status                     ---------                               -----------         ------                     Gold Top - SST[[id number]]                                   Final result                 Please view results for these tests on the individual orders.    Gold Top - SST [[id number]] Collected: 11/17/23 1822    Specimen: Blood from Arm, Left Updated: 11/17/23 1932     Extra Tube Hold for add-ons.     Comment: Auto resulted.       Comprehensive Metabolic Panel [[id number]]  (Abnormal) Collected: 11/17/23 1822    Specimen: Blood from Arm, Left Updated: 11/17/23 1855     Glucose 256 mg/dL      BUN 15 mg/dL      Creatinine 0.91 mg/dL      Sodium 139 mmol/L      Potassium 4.1 mmol/L       Comment: Slight hemolysis detected by analyzer. Result may be falsely elevated.        Chloride 104 mmol/L      CO2 23.0 mmol/L      Calcium 9.7 mg/dL      Total Protein 7.1 g/dL      Albumin 3.8 g/dL      ALT (SGPT) 141 U/L      AST (SGOT) 43 U/L      Comment: Slight hemolysis detected by analyzer. Result may be falsely elevated.        Alkaline Phosphatase 146 U/L      Total Bilirubin 0.6 mg/dL      Globulin 3.3 gm/dL      A/G Ratio 1.2 g/dL      BUN/Creatinine Ratio 16.5     Anion Gap 12.0 mmol/L      eGFR 94.7 mL/min/1.73     Narrative:      GFR Normal >60  Chronic Kidney Disease <60  Kidney Failure <15      BNP [[id number]]  (Normal) Collected: 11/17/23 1822    Specimen: Blood from Arm, Left Updated: 11/17/23 1852     proBNP 73.4 pg/mL     Narrative:      This assay is used as an aid in the diagnosis of individuals suspected of having heart failure. It can be used as an aid in the diagnosis of acute decompensated heart failure (ADHF) in patients presenting with signs and symptoms of ADHF to the emergency department (ED). In addition, NT-proBNP of <300 pg/mL indicates ADHF is not likely.    Age Range Result Interpretation  NT-proBNP Concentration (pg/mL:      <50             Positive            >450                   Gray                 300-450                    Negative             <300    50-75           Positive            >900                  Gray                300-900                  Negative            <300      >75             Positive            >1800                  Gray                300-1800                  Negative            <300    High Sensitivity Troponin T [[id number]]  (Normal) Collected: 11/17/23 1822    Specimen: Blood from Arm, Left Updated: 11/17/23 1852     HS Troponin T 10 ng/L     Narrative:      High Sensitive Troponin T Reference Range:  <14.0 ng/L- Negative Female for AMI  <22.0 ng/L- Negative Male for AMI  >=14 - Abnormal Female indicating possible myocardial injury.  >=22 -  Abnormal Male indicating possible myocardial injury.   Clinicians would have to utilize clinical acumen, EKG, Troponin, and serial changes to determine if it is an Acute Myocardial Infarction or myocardial injury due to an underlying chronic condition.         CBC & Differential [[id number]]  (Abnormal) Collected: 11/17/23 1734    Specimen: Blood Updated: 11/17/23 1740    Narrative:      The following orders were created for panel order CBC & Differential.  Procedure                               Abnormality         Status                     ---------                               -----------         ------                     CBC Auto Differential[[id number]]        Abnormal            Final result                 Please view results for these tests on the individual orders.    CBC Auto Differential [[id number]]  (Abnormal) Collected: 11/17/23 1734    Specimen: Blood Updated: 11/17/23 1740     WBC 8.90 10*3/mm3      RBC 5.19 10*6/mm3      Hemoglobin 17.1 g/dL      Hematocrit 50.8 %      MCV 97.9 fL      MCH 33.0 pg      MCHC 33.7 g/dL      RDW 14.5 %      RDW-SD 52.5 fl      MPV 9.3 fL      Platelets 106 10*3/mm3      Neutrophil % 58.5 %      Lymphocyte % 32.3 %      Monocyte % 7.8 %      Eosinophil % 1.0 %      Basophil % 0.4 %      Neutrophils, Absolute 5.20 10*3/mm3      Lymphocytes, Absolute 2.90 10*3/mm3      Monocytes, Absolute 0.70 10*3/mm3      Eosinophils, Absolute 0.10 10*3/mm3      Basophils, Absolute 0.00 10*3/mm3      nRBC 0.2 /100 WBC              Imaging Results (Last 24 Hours)       Procedure Component Value Units Date/Time    US Abdomen Limited [[id number]] Collected: 11/17/23 2016     Updated: 11/17/23 2021    Narrative:      US ABDOMEN LIMITED    Date of Exam: 11/17/2023 7:53 PM EST    Indication: right upper quadrant pain.    Comparison: No comparisons available.    Technique: Grayscale and color Doppler ultrasound evaluation of the right upper quadrant was performed.      Findings:  Liver  echotexture is mildly heterogeneous and liver is diffusely echogenic without focal mass demonstrated. Hepatic and portal veins are patent with normal directional flow. The gallbladder is not completely distended and is borderline thick walled. No   sludge or stones are demonstrated. No intra or extrahepatic biliary dilatation. The common duct is 5 mm. The tail the pancreas is obscured by bowel gas, visualized portions are sonographically unremarkable. The right kidney is 11.3 cm in aiae-pl-hsza   length and is sonographically normal. The visualized abdominal aorta and the intrahepatic portions of the IVC are patent. No fluid collections are seen in the right upper quadrant.      Impression:      Impression:  1.The gallbladder is not completely distended and is borderline thick walled. No sludge or stones are demonstrated. No biliary dilatation.  2.Diffusely echogenic liver compatible with diffuse fatty infiltration.        Electronically Signed: Taye Bradshaw DO    11/17/2023 8:19 PM EST    Workstation ID: [id number]    XR Chest 1 View [[id number]] Collected: 11/17/23 1815     Updated: 11/17/23 1821    Narrative:      XR CHEST 1 VW    Date of Exam: 11/17/2023 6:03 PM EST    Indication: shortness of breath    Comparison: Portable chest dated 11/2/2023    Findings:    The lungs are grossly clear except for plate atelectasis in the left lung base much less extensive than on the prior study.. Cardiac, hilar, and mediastinal silhouettes are stable radiographically.. Pulmonary vascularity is within normal limits. No   pneumothorax or pleural effusions. The trachea is midline.  No acute bony abnormality or aggressive appearing focal osseous lesions in the visualized bony thorax.         Impression:      Impression:  Plate atelectasis in the left lung base. No active cardiopulmonary disease.        Electronically Signed: Taye Bradshaw DO    11/17/2023 6:19 PM EST    Workstation ID: [id number]              Assessment & Plan         This is a 63 y.o. male with PMH recently diagnosed A-fib presenting with A-fib with RVR    Active and Resolved Problems  Active Hospital Problems    Diagnosis  POA    **Atrial fibrillation with RVR [I48.91]  Yes      Resolved Hospital Problems   No resolved problems to display.       Atrial Fibrillation with RVR  Admit to telemetry  Continue with Cardizem drip  Consult cardiology  Continue aspirin  Echocardiogram obtained 11/03 shows EF of 61 to 65%    Transaminitis  Ultrasound of the abdomen shows fatty infiltration  Monitor liver enzymes  Weight loss encouraged  Repeat CMP in the morning    Hypertension  Blood pressure stable  Resume BP meds    COPD  Not in exacerbation  Breathing treatments as needed    GERD  Resume PPI    Hyperlipidemia  Obtain lipid panel  Resume statin    DVT prophylaxis:  Medical DVT prophylaxis orders are present.      CODE STATUS:           Admission Status:  I believe this patient meets inpatient status.      I discussed the patient's findings and my recommendations with patient.      Signature:     This document has been electronically signed by Edie Juarez MD on November 17, 2023 20:42 [facility] Hospitalist Team

## 2023-11-18 NOTE — CONSULTS
CC--- recurrent and symptomatic paroxysmal atrial fibrillation    Sub  63 old male patient presented with recurrent symptomatic rapid atrial fibrillation and started on intravenous Cardizem and converted spontaneously to sinus rhythm with resolution of symptoms.  He had similar symptoms of atrial fibrillation recently and had rhinovirus infection.  Patient was started on low-dose of beta-blockers and aspirin and echocardiography revealed normal EF.  TSH is normal and no prior history of stroke.  Cardiac catheterization was done last year without significant coronary artery stenosis.  Patient denies any bleeding diathesis  Patient feels much better in sinus rhythm.        Past Medical History:   Diagnosis Date    Acute hypoxemic respiratory failure 11/06/2023    Allergic     Anxiety     Arthritis 06/2005    Back pain     Claustrophobia 1978    COPD (chronic obstructive pulmonary disease)     CTS (carpal tunnel syndrome) 10/2022    Dysphagia     Esophageal stricture     GERD (gastroesophageal reflux disease)     Hyperlipidemia     Hypertension     Knee pain     rt    Low back pain     Obesity     Obesity (BMI 30-39.9) 11/06/2023    Other cervical disc degeneration at C5-C6 level 08/22/2018    Paroxysmal atrial fibrillation with rapid ventricular response 11/06/2023    Pneumonia     Prediabetes 11/06/2023    PTSD (post-traumatic stress disorder)     Rhinovirus infection 11/06/2023    Thoracic disc herniation     Vitamin B12 deficiency      Past Surgical History:   Procedure Laterality Date    BRONCHOSCOPY N/A 11/3/2023    Procedure: BRONCHOSCOPY with bilateral lung washing's;  Surgeon: Kolton Brewer MD;  Location: [facility] ENDOSCOPY;  Service: Pulmonary;  Laterality: N/A;    CARDIAC CATHETERIZATION N/A 07/13/2022    Procedure: Left Heart Cath, possible pci;  Surgeon: Prieto Sidhu MD;  Location: [facility] CATH INVASIVE LOCATION;  Service: Cardiovascular;  Laterality: N/A;    ENDOSCOPY      ENDOSCOPY N/A  9/21/2023    Procedure: ESOPHAGOGASTRODUODENOSCOPY WITH non guided esophageal dialtion 54 Fr. Bougie and  cold forcep biopsy x1 area;  Surgeon: Andres Concepcion MD;  Location: [facility] ENDOSCOPY;  Service: Gastroenterology;  Laterality: N/A;  Post- erosive gastritis, hiatal hernia, esophageal stricture    HERNIA REPAIR      KNEE ARTHROPLASTY      x2    SHOULDER ARTHROSCOPY W/ ROTATOR CUFF REPAIR Right 08/11/2020    Procedure: SHOULDER ARTHROSCOPY WITH ROTATOR CUFF REPAIR, extensive debridement;  Surgeon: Fredi Ritchie MD;  Location: [facility] MAIN OR;  Service: Orthopedics;  Laterality: Right;    TONSILLECTOMY       Family History   Problem Relation Age of Onset    Heart disease Mother     Early death Mother     Hyperlipidemia Mother     Hypertension Mother     Thyroid disease Mother     Cancer Father     Stroke Father     Vision loss Father     Stroke Paternal Grandfather     Arthritis Paternal Grandmother     Alcohol abuse Brother     Asthma Brother     Depression Brother     Hyperlipidemia Brother     Hypertension Brother     Learning disabilities Brother     Mental illness Brother     Drug abuse Brother     Early death Brother     Heart disease Brother     Hyperlipidemia Brother     Hypertension Brother      Social History     Tobacco Use    Smoking status: Every Day     Packs/day: 1.00     Years: 47.00     Additional pack years: 0.00     Total pack years: 47.00     Types: Cigarettes     Start date: 1/9/1973    Smokeless tobacco: Never    Tobacco comments:     Smoked a half a pack a day for 42 years didn't start to smoke a whole pack until 2017   Vaping Use    Vaping Use: Never used   Substance Use Topics    Alcohol use: No    Drug use: No     Medications Prior to Admission   Medication Sig Dispense Refill Last Dose    albuterol sulfate  (90 Base) MCG/ACT inhaler Inhale 2 puffs Every 4 (Four) Hours As Needed for Wheezing. 18 g 1 Past Week at 0700    amLODIPine (NORVASC) 10 MG tablet TAKE 1  TABLET EVERY DAY 90 tablet 0 11/17/2023 at 0700    amoxicillin-clavulanate (AUGMENTIN) 875-125 MG per tablet Take 1 tablet by mouth 2 (Two) Times a Day. 5 tablet 0 Past Week    benzonatate (TESSALON) 100 MG capsule Take 1 capsule by mouth 3 (Three) Times a Day As Needed for Cough. 30 capsule 0 11/16/2023 at 0700    cetirizine (zyrTEC) 10 MG tablet Take 1 tablet by mouth Daily. 30 tablet 0 11/16/2023 at 2100    doxepin (SINEquan) 10 MG capsule TAKE 1 CAPSULE EVERY NIGHT. 90 capsule 1 11/16/2023 at 2100    ipratropium-albuterol (DUO-NEB) 0.5-2.5 mg/3 ml nebulizer Take 3 mL by nebulization 4 (Four) Times a Day. 360 mL 0 Past Week    metoprolol succinate XL (TOPROL-XL) 25 MG 24 hr tablet TAKE 1 TABLET EVERY DAY 90 tablet 0 11/17/2023 at 0700    pantoprazole (PROTONIX) 40 MG EC tablet TAKE 1 TABLET EVERY DAY 90 tablet 1 11/17/2023 at 0700    pravastatin (PRAVACHOL) 80 MG tablet TAKE 1 TABLET EVERY NIGHT 90 tablet 10 11/16/2023 at 2100    predniSONE (DELTASONE) 50 MG tablet Take 1 tablet by mouth Daily. 5 tablet 0 Past Week    aspirin 81 MG EC tablet Take 1 tablet by mouth Daily. (Patient not taking: Reported on 11/17/2023)   Not Taking    hydroCHLOROthiazide (MICROZIDE) 12.5 MG capsule Take 1 capsule by mouth As Needed.   More than a month     Allergies:  Atorvastatin and Lisinopril    Review of Systems   General:  positive for fatigue and tiredness  Eyes: No redness  Cardiovascular: No chest pain, no palpitations        Physical Exam    General:      well developed, well nourished, in no acute distress.    Head:      normocephalic and atraumatic.    Eyes:      PERRL/EOM intact, conjunctivae and sclerae clear without nystagmus.    Neck:      no  thyromegaly, trachea central with normal respiratory effort  Lungs:      clear bilaterally to auscultation.    Heart:       regular rate and rhythm, S1, S2 without murmurs, rubs, or gallops  Skin:      intact without lesions or rashes.    Psych:      alert and cooperative; normal  mood and affect; normal attention span and concentration.            CBC    Results from last 7 days   Lab Units 11/18/23  0703 11/17/23  1734   WBC 10*3/mm3 6.50 8.90   HEMOGLOBIN g/dL 15.0 17.1   PLATELETS 10*3/mm3 92* 106*     BMP   Results from last 7 days   Lab Units 11/18/23  0703 11/17/23  2104 11/17/23  1822   SODIUM mmol/L 134*  --  139   POTASSIUM mmol/L 4.0  --  4.1   CHLORIDE mmol/L 101  --  104   CO2 mmol/L 23.0  --  23.0   BUN mg/dL 12  --  15   CREATININE mg/dL 0.79  --  0.91   GLUCOSE mg/dL 249*  --  256*   MAGNESIUM mg/dL  --  2.0  --      CMP   Results from last 7 days   Lab Units 11/18/23  0703 11/17/23  1822   SODIUM mmol/L 134* 139   POTASSIUM mmol/L 4.0 4.1   CHLORIDE mmol/L 101 104   CO2 mmol/L 23.0 23.0   BUN mg/dL 12 15   CREATININE mg/dL 0.79 0.91   GLUCOSE mg/dL 249* 256*   ALBUMIN g/dL 3.4* 3.8   BILIRUBIN mg/dL 0.7 0.6   ALK PHOS U/L 125* 146*   AST (SGOT) U/L 28 43*   ALT (SGPT) U/L 114* 141*     Radiology(recent) US Abdomen Limited    Result Date: 11/17/2023  Impression: 1.The gallbladder is not completely distended and is borderline thick walled. No sludge or stones are demonstrated. No biliary dilatation. 2.Diffusely echogenic liver compatible with diffuse fatty infiltration. Electronically Signed: Taye Bradshaw DO  11/17/2023 8:19 PM EST  Workstation ID: [id number]    XR Chest 1 View    Result Date: 11/17/2023  Impression: Plate atelectasis in the left lung base. No active cardiopulmonary disease. Electronically Signed: Taye Bradshaw DO  11/17/2023 6:19 PM EST  Workstation ID: [id number]                 Assessment plan    Intermittent recurrent symptomatic rapid paroxysmal atrial fibrillation currently in sinus rhythm and off intravenous Cardizem  TSH normal  Normal EF without any coronary artery stenosis  Recent rhinovirus infection  Stop aspirin  Start Eliquis  Increase metoprolol dose to 50 mg a day  Restart amlodipine    If he has recurrent AF, either antiarrhythmic with Multaq  can be considered or RF ablation can be planned  Once the patient's blood pressure is better, patient can be discharged home and followed as an outpatient      Electronically signed by Pardeep Walker MD, 11/18/23, 10:01 AM EST.

## 2023-11-18 NOTE — DISCHARGE SUMMARY
[facility] Medicine Services  DISCHARGE SUMMARY    Patient Name: Jagdish Rea  : 1960  MRN: [id number]    Date of Admission: 2023  Discharge Diagnosis: Atrial Fibrillation with RVR  Date of Discharge:  23  Primary Care Physician: Yamilka Pascual APRN      Presenting Problem:   Atrial fibrillation with rapid ventricular response [I48.91]  Atrial fibrillation with RVR [I48.91]  Abdominal pain, unspecified abdominal location [R10.9]  Atrial fibrillation [I48.91]    Active and Resolved Hospital Problems:  Active Hospital Problems    Diagnosis POA    **Atrial fibrillation with RVR [I48.91] Yes    Atrial fibrillation [I48.91] Yes      Resolved Hospital Problems   No resolved problems to display.     Atrial Fibrillation with RVR  Admit to telemetry  Off Cardizem drip  Consulted cardiology  dc aspirin, add eliquis  Echocardiogram obtained  shows EF of 61 to 65%     Transaminitis  Ultrasound of the abdomen shows fatty infiltration  Monitor liver enzymes  Weight loss encouraged  Repeat CMP in the morning     Hypertension  Blood pressure stable  Resume BP meds     COPD  Not in exacerbation  Breathing treatments as needed     GERD  Resume PPI     Hyperlipidemia  Obtain lipid panel  Resume statin    Hospital Course     Hospital Course:  Jagdish Rea is a 63 y.o. male who presented to [facility] on 2023 with palpitations.  Patient does have a past medical history of COPD, morbid obesity, hypertension, hyperlipidemia and GERD.  Of note patient was admitted 10/29/2023 for COPD exacerbation.  During that admission he was also diagnosed with new onset A-fib. He converted to normal sinus rhythm and he was discharged home on a cardiac monitor and aspirin.  Patient presents today due to palpitations. Patient states his heart has been racing all day.  He reports some chest tightness and shortness of breath as well. He also endorses some right upper quadrant  abdominal pain. He denies any alleviating or exacerbating factors.  Denies any sick contacts.     In the ED, heart rate was in the 180s.  EKG confirmed A-fib with RVR.  Labs remarkable for , AST 43, .  Ultrasound of the abdomen shows diffusely echogenic liver compatible with diffuse fatty infiltration.  Patient was started on Cardizem drip     11/18/23 patient seen and examined in bed no acute distress, vital signs stable, discussed with RN, blood pressure improving.  No new complaints.  Will discharge patient home.  Condition at discharge stable.    DISCHARGE Follow Up Recommendations for labs and diagnostics: Follow-up with PCP in a week  Follow-up with cardiology in 2 weeks      Reasons For Change In Medications and Indications for New Medications:  START taking:  apixaban (ELIQUIS)    CHANGE how you take:  metoprolol succinate XL (TOPROL-XL) -- medication strength, how much to take, when to take this   STOP taking:  aspirin 81 MG EC tablet     Day of Discharge     Vital Signs:  Temp:  [97.3 °F (36.3 °C)-98.8 °F (37.1 °C)] 98.1 °F (36.7 °C)  Heart Rate:  [] 82  Resp:  [14-28] 17  BP: (104-159)/() 124/82  Flow (L/min):  [3] 3    Physical Exam:  Physical Exam General Appearance: AOO x 4, cooperative, no distress, appropriate for age  Head:  Normocephalic, without obvious abnormality  Eyes:  PERRL, EOM's intact, conjunctivae and cornea clear  Nose:  Nares symmetrical, septum midline, mucosa pink  Throat:  Lips, tongue, and mucosa are moist, pink, and intact  Neck:  Supple; symmetrical, trachea midline, no adenopathy  Back:  Symmetrical, ROM normal, no CVA tenderness  Lungs: Respirations unlabored, no audible wheeze  Heart: Irregular rhythm and rate  Abdomen:  Soft, nontender, bowel sounds active all four quadrants  Musculoskeletal: Tone and strength strong and symmetrical, all extremities; no joint pain or edema         Skin/Hair/Nails:  Skin warm, dry and intact, no rashes or abnormal  dyspigmentation         Pertinent  and/or Most Recent Results     LAB RESULTS:      Lab 11/18/23  0703 11/17/23  1734   WBC 6.50 8.90   HEMOGLOBIN 15.0 17.1   HEMATOCRIT 44.5 50.8   PLATELETS 92* 106*   NEUTROS ABS  --  5.20   LYMPHS ABS  --  2.90   MONOS ABS  --  0.70   EOS ABS  --  0.10   .3* 97.9*         Lab 11/18/23  0703 11/17/23 2104 11/17/23  1822   SODIUM 134*  --  139   POTASSIUM 4.0  --  4.1   CHLORIDE 101  --  104   CO2 23.0  --  23.0   ANION GAP 10.0  --  12.0   BUN 12  --  15   CREATININE 0.79  --  0.91   EGFR 99.8  --  94.7   GLUCOSE 249*  --  256*   CALCIUM 8.8  --  9.7   MAGNESIUM  --  2.0  --          Lab 11/18/23  0703 11/17/23  1822   TOTAL PROTEIN 6.4 7.1   ALBUMIN 3.4* 3.8   GLOBULIN 3.0 3.3   ALT (SGPT) 114* 141*   AST (SGOT) 28 43*   BILIRUBIN 0.7 0.6   ALK PHOS 125* 146*         Lab 11/17/23 2104 11/17/23  1822   PROBNP  --  73.4   HSTROP T 11 10                 Brief Urine Lab Results       None          Microbiology Results (last 10 days)       ** No results found for the last 240 hours. **            US Abdomen Limited    Result Date: 11/17/2023  Impression: Impression: 1.The gallbladder is not completely distended and is borderline thick walled. No sludge or stones are demonstrated. No biliary dilatation. 2.Diffusely echogenic liver compatible with diffuse fatty infiltration. Electronically Signed: Taye Bradshaw DO  11/17/2023 8:19 PM EST  Workstation ID: [id number]    XR Chest 1 View    Result Date: 11/17/2023  Impression: Impression: Plate atelectasis in the left lung base. No active cardiopulmonary disease. Electronically Signed: Taye Bradshaw DO  11/17/2023 6:19 PM EST  Workstation ID: [id number]    XR Chest 1 View    Result Date: 11/2/2023  Impression: Impression: Persistent bibasilar airspace disease left greater than right which may relate to atelectasis and/or pneumonia. Electronically Signed: Americo Connell MD  11/2/2023 7:21 AM EDT  Workstation ID: [id number]    XR Chest 1  View    Result Date: 10/29/2023  Impression: 1.Mild opacities in the left base which could represent atelectasis, pneumonia, or other infiltrate. 2.Emphysema. Electronically Signed: Andres Sunmayo  10/29/2023 7:04 PM EDT  Workstation ID: [id number]             Results for orders placed during the hospital encounter of 10/29/23    Adult Transthoracic Echo Complete W/ Cont if Necessary Per Protocol    Interpretation Summary    Left ventricular systolic function is normal. Left ventricular ejection fraction appears to be 61 - 65%.    Estimated right ventricular systolic pressure from tricuspid regurgitation is normal (<35 mmHg).    Unremarkable study  Normal EF, normal diastolic function, normal RV size and function, no significant valvular abnormality, normal filling pressures      Labs Pending at Discharge:      Procedures Performed           Consults:   Consults       Date and Time Order Name Status Description    11/17/2023  8:40 PM Inpatient Cardiology Consult      11/17/2023  7:50 PM Hospitalist (on-call MD unless specified)      11/3/2023 12:27 AM Inpatient Cardiology Consult Completed     11/1/2023 11:20 AM Inpatient Hospitalist Consult Completed     11/1/2023  8:26 AM Inpatient Pulmonology Consult Completed               Discharge Details        Discharge Medications        New Medications        Instructions Start Date   apixaban 5 MG tablet tablet  Commonly known as: ELIQUIS   5 mg, Oral, Every 12 Hours Scheduled             Changes to Medications        Instructions Start Date   metoprolol succinate XL 50 MG 24 hr tablet  Commonly known as: TOPROL-XL  What changed:   medication strength  how much to take  when to take this   50 mg, Oral, Every 24 Hours Scheduled   Start Date: November 19, 2023            Continue These Medications        Instructions Start Date   albuterol sulfate  (90 Base) MCG/ACT inhaler  Commonly known as: PROVENTIL HFA;VENTOLIN HFA;PROAIR HFA   2 puffs, Inhalation, Every 4 Hours  PRN      amLODIPine 10 MG tablet  Commonly known as: NORVASC   TAKE 1 TABLET EVERY DAY      doxepin 10 MG capsule  Commonly known as: SINEquan   TAKE 1 CAPSULE EVERY NIGHT.      hydroCHLOROthiazide 12.5 MG capsule  Commonly known as: MICROZIDE   12.5 mg, Oral, As Needed      ipratropium-albuterol 0.5-2.5 mg/3 ml nebulizer  Commonly known as: DUO-NEB   3 mL, Nebulization, Every 4 Hours PRN      pantoprazole 40 MG EC tablet  Commonly known as: PROTONIX   TAKE 1 TABLET EVERY DAY      pravastatin 80 MG tablet  Commonly known as: PRAVACHOL   TAKE 1 TABLET EVERY NIGHT             Stop These Medications      aspirin 81 MG EC tablet              Allergies   Allergen Reactions    Atorvastatin Swelling    Lisinopril Other (See Comments)     Facial paralysis          Discharge Disposition:   Home or Self Care    Diet:  Hospital:  Diet Order   Procedures    Diet: Diabetic Diets; Consistent Carbohydrate; Texture: Regular Texture (IDDSI 7); Fluid Consistency: Thin (IDDSI 0)         Discharge Activity:         CODE STATUS:  Code Status and Medical Interventions:   Ordered at: 11/18/23 0530     Code Status (Patient has no pulse and is not breathing):    CPR (Attempt to Resuscitate)     Medical Interventions (Patient has pulse or is breathing):    Full Support     I have utilized all available, immediate resources to obtain, update, or review the patient's current medications including all prescriptions, over-the-counter products, herbals, cannabis/cannabidiol products, and vitamin.mineral/dietary (nutritional) supplements.      Code Status (Patient has no pulse and is not breathing): CPR (Attempt to Resuscitate)  Medical Interventions (Patient has pulse or is breathing): Full Support        Admission Status:  I believe this patient meets admit status.            Future Appointments   Date Time Provider Department [place]   12/18/2023  1:15 PM Yamilka Pascual APRN [facility]   7/22/2024 10:15 AM Andres Valadez MD [facility]  [facility] NA [facility] NA           Time spent on Discharge including face to face service:  34 minutes    Signature: Electronically signed by Morris Rios MD, 11/18/23, 3:13 PM EST.

## 2023-11-18 NOTE — PROGRESS NOTES
[facility] Medicine Services Daily Progress Note    Patient Name: Jagdish Rea  : 1960  MRN: [id number]  Primary Care Physician:  Yamilka Pascual APRN  Date of admission: 2023      Subjective      Chief Complaint:  Palpitations     History of Present Illness: Jagdish Rea is a 63 y.o. male who [facility]  Patient does have a past medical history of COPD, morbid obesity, hypertension, hyperlipidemia and GERD.  Of note patient was admitted 10/29/2023 for COPD exacerbation.  During that admission he was also diagnosed with new onset A-fib. He converted to normal sinus rhythm and he was discharged home on a cardiac monitor and aspirin.  Patient presents today due to palpitations. Patient states his heart has been racing all day.  He reports some chest tightness and shortness of breath as well. He also endorses some right upper quadrant abdominal pain. He denies any alleviating or exacerbating factors.  Denies any sick contacts.     In the ED, heart rate was in the 180s.  EKG confirmed A-fib with RVR.  Labs remarkable for , AST 43, .  Ultrasound of the abdomen shows diffusely echogenic liver compatible with diffuse fatty infiltration.  Patient was started on Cardizem drip     23 patient seen and examined in bed no acute distress, vital signs stable, discussed with RN, blood pressure improving.  No new complaints.    ROS Review of Systems  Negative except those in HPI      Objective      Vitals:   Temp:  [97.3 °F (36.3 °C)-98.8 °F (37.1 °C)] 98.1 °F (36.7 °C)  Heart Rate:  [] 82  Resp:  [14-28] 17  BP: (104-159)/() 124/82  Flow (L/min):  [3] 3    Physical Exam General Appearance: AOO x 4, cooperative, no distress, appropriate for age  Head:  Normocephalic, without obvious abnormality  Eyes:  PERRL, EOM's intact, conjunctivae and cornea clear  Nose:  Nares symmetrical, septum midline, mucosa  "pink  Throat:  Lips, tongue, and mucosa are moist, pink, and intact  Neck:  Supple; symmetrical, trachea midline, no adenopathy  Back:  Symmetrical, ROM normal, no CVA tenderness  Lungs: Respirations unlabored, no audible wheeze  Heart: Irregular rhythm and rate  Abdomen:  Soft, nontender, bowel sounds active all four quadrants  Musculoskeletal: Tone and strength strong and symmetrical, all extremities; no joint pain or edema         Skin/Hair/Nails:  Skin warm, dry and intact, no rashes or abnormal dyspigmentation          Result Review    Result Review:  I have personally reviewed the results from the time of this admission to 11/18/2023 14:27 EST and agree with these findings:  []  Laboratory  []  Microbiology  []  Radiology  []  EKG/Telemetry   []  Cardiology/Vascular   []  Pathology  []  Old records  []  Other:  Most notable findings include:       CBC:      Lab 11/18/23  0703 11/17/23  1734   WBC 6.50 8.90   HEMOGLOBIN 15.0 17.1   HEMATOCRIT 44.5 50.8   PLATELETS 92* 106*   NEUTROS ABS  --  5.20   LYMPHS ABS  --  2.90   MONOS ABS  --  0.70   EOS ABS  --  0.10   .3* 97.9*     CMP:        Lab 11/18/23  0703 11/17/23  2104 11/17/23  1822   SODIUM 134*  --  139   POTASSIUM 4.0  --  4.1   CHLORIDE 101  --  104   CO2 23.0  --  23.0   ANION GAP 10.0  --  12.0   BUN 12  --  15   CREATININE 0.79  --  0.91   EGFR 99.8  --  94.7   GLUCOSE 249*  --  256*   CALCIUM 8.8  --  9.7   MAGNESIUM  --  2.0  --    TOTAL PROTEIN 6.4  --  7.1   ALBUMIN 3.4*  --  3.8   GLOBULIN 3.0  --  3.3   ALT (SGPT) 114*  --  141*   AST (SGOT) 28  --  43*   BILIRUBIN 0.7  --  0.6   ALK PHOS 125*  --  146*     No results found for: \"ACANTHNAEG\", \"AFBCX\", \"BPERTUSSISCX\", \"BLOODCX\"  No results found for: \"BCIDPCR\", \"CXREFLEX\", \"CSFCX\", \"CULTURETIS\"  No results found for: \"CULTURES\", \"HSVCX\", \"URCX\"  No results found for: \"EYECULTURE\", \"GCCX\", \"HSVCULTURE\", \"LABHSV\"  No results found for: \"LEGIONELLA\", \"MRSACX\", \"MUMPSCX\", \"MYCOPLASCX\"  No " "results found for: \"NOCARDIACX\", \"STOOLCX\"  No results found for: \"THROATCX\", \"UNSTIMCULT\", \"URINECX\", \"CULTURE\", \"VZVCULTUR\"  No results found for: \"VIRALCULTU\", \"WOUNDCX\"      Assessment & Plan      Brief Patient Summary:  Jagdish Rea is a 63 y.o. male who       amLODIPine, 10 mg, Oral, Daily  apixaban, 5 mg, Oral, Q12H  atorvastatin, 10 mg, Oral, Daily  doxepin, 10 mg, Oral, Nightly  hydroCHLOROthiazide, 12.5 mg, Oral, Daily  insulin lispro, 2-9 Units, Subcutaneous, 4x Daily AC & at Bedtime  metoprolol succinate XL, 50 mg, Oral, Q24H  pantoprazole, 40 mg, Oral, Daily  senna-docusate sodium, 2 tablet, Oral, BID  sodium chloride, 10 mL, Intravenous, Q12H             Active Hospital Problems:  Active Hospital Problems    Diagnosis     **Atrial fibrillation with RVR     Atrial fibrillation        Atrial Fibrillation with RVR  Admit to telemetry  Off Cardizem drip  Consulted cardiology  dc aspirin, add eliquis  Echocardiogram obtained 11/03 shows EF of 61 to 65%     Transaminitis  Ultrasound of the abdomen shows fatty infiltration  Monitor liver enzymes  Weight loss encouraged  Repeat CMP in the morning     Hypertension  Blood pressure stable  Resume BP meds     COPD  Not in exacerbation  Breathing treatments as needed     GERD  Resume PPI     Hyperlipidemia  Obtain lipid panel  Resume statin       DVT prophylaxis:  Medical DVT prophylaxis orders are present.    CODE STATUS:    Code Status (Patient has no pulse and is not breathing): CPR (Attempt to Resuscitate)  Medical Interventions (Patient has pulse or is breathing): Full Support      Disposition:  I expect patient to be discharged home next 24 hours.    I have utilized all available, immediate resources to obtain, update, or review the patient's current medications including all prescriptions, over-the-counter products, herbals, cannabis/cannabidiol products, and vitamin/mineral/dietary (nutritional) supplements.      Code Status (Patient has no pulse and is " not breathing): CPR (Attempt to Resuscitate)  Medical Interventions (Patient has pulse or is breathing): Full Support      Admission Status:  I believe this patient meets obs status.        Electronically signed by Morris Rios MD, 11/18/23, 14:27 EST.  [facility] Hospitalist Team

## 2023-11-19 ENCOUNTER — READMISSION MANAGEMENT (OUTPATIENT)
Dept: CALL CENTER | Facility: HOSPITAL | Age: 63
End: 2023-11-19
Payer: MEDICARE

## 2023-11-19 LAB
QT INTERVAL: 345 MS
QTC INTERVAL: 430 MS
QTC INTERVAL: NORMAL MS

## 2023-11-19 NOTE — OUTREACH NOTE
Prep Survey      Flowsheet Row Responses   [facility] patient discharged from? [facility]   Is LACE score < 7 ? No   Eligibility [facility]   Date of Admission 11/17/23   Date of Discharge 11/18/23   Discharge Disposition Home or Self Care   Discharge diagnosis Atrial fibrillation with RVR   Does the patient have one of the following disease processes/diagnoses(primary or secondary)? Other   Does the patient have Home health ordered? No   Is there a DME ordered? No   Comments regarding appointments Follow-up with PCP in a week  Follow-up with cardiology in 2 weeks   Medication alerts for this patient see avs for all meds--eliquis   Prep survey completed? Yes            Felicia BEEBE - Registered Nurse

## 2023-11-20 ENCOUNTER — TRANSITIONAL CARE MANAGEMENT TELEPHONE ENCOUNTER (OUTPATIENT)
Dept: CALL CENTER | Facility: HOSPITAL | Age: 63
End: 2023-11-20
Payer: MEDICARE

## 2023-11-20 NOTE — OUTREACH NOTE
"Call Center TCM Note      Flowsheet Row Responses   [facility] patient discharged from? [facility]   Does the patient have one of the following disease processes/diagnoses(primary or secondary)? Other   TCM attempt successful? Yes   Call start time 0931   Call end time 0934   Discharge diagnosis Atrial fibrillation with RVR   Meds reviewed with patient/caregiver? Yes   Is the patient having any side effects they believe may be caused by any medication additions or changes? No   Does the patient have all medications ordered at discharge? No   Is the patient taking all medications as directed (includes completed medication regime)? No   Medication comments States [facility] didn't have but they are suppose to fill today.  If he has any issues getting medication he will notify Dr. Valadez's office.   Comments States he can not follow up with PCP at this time due to haveing [employer].  Has changed insurance but will not be effection til end of Dec and then will make follow up.   Does the patient have an appointment with their PCP within 7-14 days of discharge? No   Nursing Interventions Patient declined scheduling/rescheduling appointment at this time   Psychosocial issues? No   Did the patient receive a copy of their discharge instructions? Yes   Nursing interventions Reviewed instructions with patient   What is the patient's perception of their health status since discharge? Improving   Is the patient/caregiver able to teach back signs and symptoms related to disease process for when to call PCP? Yes   Is the patient/caregiver able to teach back signs and symptoms related to disease process for when to call 911? Yes   Is the patient/caregiver able to teach back the hierarchy of who to call/visit for symptoms/problems? PCP, Specialist, Home health nurse, Urgent Care, ED, 911 Yes   Additional teach back comments State he is doing \"ok\"   TCM call completed? Yes   Wrap up additional comments If pt has any issues getting " medication, he will contact Dr. Valadez's office.   Call end time [id number]            Marli Gonzalez LPN    11/20/2023, 09:37 EST

## 2023-11-24 LAB
FUNGUS WND CULT: NORMAL
MYCOBACTERIUM SPEC CULT: NORMAL
NIGHT BLUE STAIN TISS: NORMAL

## 2023-11-28 ENCOUNTER — READMISSION MANAGEMENT (OUTPATIENT)
Dept: CALL CENTER | Facility: HOSPITAL | Age: 63
End: 2023-11-28
Payer: MEDICARE

## 2023-11-28 NOTE — OUTREACH NOTE
Medical Week 2 Survey      Flowsheet Row Responses   [facility] patient discharged from? [facility]   Does the patient have one of the following disease processes/diagnoses(primary or secondary)? Other   Week 2 attempt successful? Yes   Call start time 1319   Discharge diagnosis Atrial fibrillation with RVR   Call end time 1322   Meds reviewed with patient/caregiver? Yes   Is the patient having any side effects they believe may be caused by any medication additions or changes? No   Does the patient have all medications ordered at discharge? Yes   Is the patient taking all medications as directed (includes completed medication regime)? Yes   Has the patient kept scheduled appointments due by today? N/A   Has home health visited the patient within 72 hours of discharge? N/A   Has all DME been delivered? Yes   Psychosocial issues? No   Did the patient receive a copy of their discharge instructions? Yes   Nursing interventions Reviewed instructions with patient   What is the patient's perception of their health status since discharge? Improving   Is the patient/caregiver able to teach back signs and symptoms related to disease process for when to call PCP? Yes   Is the patient/caregiver able to teach back signs and symptoms related to disease process for when to call 911? Yes   Is the patient/caregiver able to teach back the hierarchy of who to call/visit for symptoms/problems? PCP, Specialist, Home health nurse, Urgent Care, ED, 911 Yes   If the patient is a current smoker, are they able to teach back resources for cessation? Smoking cessation medications   Week 2 Call Completed? Yes   Is the patient interested in additional calls from an ambulatory ? No   Would this patient benefit from a Referral to [facility] Social Work? No   Call end time 1322            Mary Ann MANZO - Registered Nurse

## 2023-12-01 LAB
FUNGUS WND CULT: NORMAL
MYCOBACTERIUM SPEC CULT: NORMAL
NIGHT BLUE STAIN TISS: NORMAL

## 2023-12-04 ENCOUNTER — TELEPHONE (OUTPATIENT)
Dept: CARDIOLOGY | Facility: CLINIC | Age: 63
End: 2023-12-04
Payer: MEDICARE

## 2023-12-04 DIAGNOSIS — R94.31 ABNORMAL HOLTER MONITOR FINDING: Primary | ICD-10-CM

## 2023-12-04 NOTE — TELEPHONE ENCOUNTER
Left message for patient to call office to schedule appointment to go over monitor results. Anyone can schedule

## 2023-12-04 NOTE — TELEPHONE ENCOUNTER
Patient called back and can't set up an appt. Because he has [employer] Medicare and can't afford to see us. Is there anyway that someone can just call him to go over results.

## 2023-12-08 LAB
MYCOBACTERIUM SPEC CULT: NORMAL
NIGHT BLUE STAIN TISS: NORMAL

## 2023-12-11 ENCOUNTER — PATIENT OUTREACH (OUTPATIENT)
Dept: CASE MANAGEMENT | Facility: OTHER | Age: 63
End: 2023-12-11
Payer: MEDICARE

## 2023-12-11 NOTE — OUTREACH NOTE
AMBULATORY CASE MANAGEMENT NOTE    Name and Relationship of Patient/Support Person: Jagdish Rea - Self  Self    Patient Outreach    Follow up RN-ACM outreach call made to pt. Pt states he's doing ok. He reports fatigue at times. Reviewed most recent hospital discharge AVS. Pt reports to be taking medications as directed. Pt reports he has not yet followed up with PCP or cardiology, states both his providers are out of network with his insurance, he plans to get new providers. RN-ACM emphasized importance of provider follow up. Pt states he will call [facility] today to schedule PCP appt. RN-ACM encouraged pt to schedule with cardiology as well, offered assistance with scheduling appts if needed. Disease education provided regarding COPD. Updated SDOH. No needs per pt. Advised him to call with any. Follow up outreach scheduled for 3-4 weeks.     Send Education  Questions/Answers      Flowsheet Row Most Recent Value   Other Patient Education/Resources  24/7 [facility] Nurse Call Line   24/7 Nurse Call Line Education Method Verbal   Advanced Directives: --  [resources provided]          SDOH updated and reviewed with the patient during this program:  Financial Resource Strain: Low Risk  (12/11/2023)    Overall Financial Resource Strain (CARDIA)     Difficulty of Paying Living Expenses: Not very hard      Food Insecurity: No Food Insecurity (12/11/2023)    Hunger Vital Sign     Worried About Running Out of Food in the Last Year: Never true     Ran Out of Food in the Last Year: Never true   Recent Concern: Food Insecurity - Food Insecurity Present (11/9/2023)    Hunger Vital Sign     Worried About Running Out of Food in the Last Year: Sometimes true     Ran Out of Food in the Last Year: Sometimes true      Transportation Needs: No Transportation Needs (12/11/2023)    PRAPARE - Transportation     Lack of Transportation (Medical): No     Lack of Transportation (Non-Medical): No       Education  Documentation  Copd Action Plan, taught by Henri Kenney, RN at 12/11/2023  1:15 PM.  Learner: Patient  Readiness: Acceptance  Method: Explanation  Response: Verbalizes Understanding    Pulmonary Hygiene, taught by Henri Kenney, RN at 12/11/2023  1:15 PM.  Learner: Patient  Readiness: Acceptance  Method: Explanation  Response: Verbalizes Understanding    Provider Follow-Up, taught by Henri Kenney, RN at 12/11/2023  1:15 PM.  Learner: Patient  Readiness: Acceptance  Method: Explanation  Response: Verbalizes Understanding    Energy Conservation, taught by Henri Kenney, RN at 12/11/2023  1:15 PM.  Learner: Patient  Readiness: Acceptance  Method: Explanation  Response: Verbalizes Understanding    Steps to Quit Smoking, taught by Henri Kenney, RN at 12/11/2023  1:15 PM.  Learner: Patient  Readiness: Acceptance  Method: Explanation  Response: Verbalizes Understanding          Henri FALK  Ambulatory Case Management    12/11/2023, 13:15 EST

## 2023-12-15 LAB
MYCOBACTERIUM SPEC CULT: NORMAL
NIGHT BLUE STAIN TISS: NORMAL

## 2024-01-09 ENCOUNTER — PATIENT OUTREACH (OUTPATIENT)
Dept: CASE MANAGEMENT | Facility: OTHER | Age: 64
End: 2024-01-09
Payer: MEDICARE

## 2024-01-09 NOTE — OUTREACH NOTE
AMBULATORY CASE MANAGEMENT NOTE    Name and Relationship of Patient/Support Person: Jagdish Rea - Self  Self    Patient Outreach    Follow up RN-ACM outreach call made to pt. Pt reports to be doing well, states recent med changes have helped. Denies symptoms or concerns. Disease education provided regarding COPD. Pt states he saw his cardiologist earlier this month, reports he has PCP appt scheduled for 1/22/24 with Dr. Causey. Pt reports he is following up with his pulmonologist as well. Updated SDOH. No needs per pt. Advised him to call with any. Follow up outreach scheduled for 1 month.     Send Education  Questions/Answers      Flowsheet Row Most Recent Value   Annual Wellness Visit:  --  [information provided]   Other Patient Education/Resources  24/7 [facility] Nurse Call Line   Advanced Directives: --  [resources provided]          SDOH updated and reviewed with the patient during this program:  Financial Resource Strain: Low Risk  (1/9/2024)    Overall Financial Resource Strain (CARDIA)     Difficulty of Paying Living Expenses: Not very hard      --     Food Insecurity: No Food Insecurity (1/9/2024)    Hunger Vital Sign     Worried About Running Out of Food in the Last Year: Never true     Ran Out of Food in the Last Year: Never true   Recent Concern: Food Insecurity - Food Insecurity Present (11/9/2023)    Hunger Vital Sign     Worried About Running Out of Food in the Last Year: Sometimes true     Ran Out of Food in the Last Year: Sometimes true      --     Transportation Needs: No Transportation Needs (1/9/2024)    PRAPARE - Transportation     Lack of Transportation (Medical): No     Lack of Transportation (Non-Medical): No       Education Documentation  Copd Action Plan, taught by Henri Kenney, RN at 1/9/2024  1:44 PM.  Learner: Patient  Readiness: Acceptance  Method: Explanation  Response: Verbalizes Understanding    Pulmonary Hygiene, taught by Henri Kenney, RN at 1/9/2024  1:44  PM.  Learner: Patient  Readiness: Acceptance  Method: Explanation  Response: Verbalizes Understanding    Provider Follow-Up, taught by Henri Kenney, RN at 1/9/2024  1:44 PM.  Learner: Patient  Readiness: Acceptance  Method: Explanation  Response: Verbalizes Understanding    Energy Conservation, taught by Henri Kenney, RN at 1/9/2024  1:44 PM.  Learner: Patient  Readiness: Acceptance  Method: Explanation  Response: Verbalizes Understanding          Henri FALK  Ambulatory Case Management    1/9/2024, 13:44 EST

## 2024-01-20 ENCOUNTER — APPOINTMENT (OUTPATIENT)
Dept: GENERAL RADIOLOGY | Facility: HOSPITAL | Age: 64
End: 2024-01-20
Payer: MEDICARE

## 2024-01-20 ENCOUNTER — HOSPITAL ENCOUNTER (OUTPATIENT)
Facility: HOSPITAL | Age: 64
Setting detail: OBSERVATION
Discharge: HOME | End: 2024-01-21
Attending: EMERGENCY MEDICINE | Admitting: EMERGENCY MEDICINE
Payer: MEDICARE

## 2024-01-20 DIAGNOSIS — R07.89 CHEST TIGHTNESS: Primary | ICD-10-CM

## 2024-01-20 DIAGNOSIS — G47.34 HYPOXIA, SLEEP RELATED: ICD-10-CM

## 2024-01-20 DIAGNOSIS — I48.0 PAROXYSMAL ATRIAL FIBRILLATION: ICD-10-CM

## 2024-01-20 LAB
ALBUMIN SERPL-MCNC: 4.3 G/DL (ref 3.5–5.2)
ALBUMIN/GLOB SERPL: 1.4 G/DL
ALP SERPL-CCNC: 72 U/L (ref 39–117)
ALT SERPL W P-5'-P-CCNC: 22 U/L (ref 1–41)
ANION GAP SERPL CALCULATED.3IONS-SCNC: 12 MMOL/L (ref 5–15)
AST SERPL-CCNC: 22 U/L (ref 1–40)
BASOPHILS # BLD AUTO: 0.1 10*3/MM3 (ref 0–0.2)
BASOPHILS NFR BLD AUTO: 0.7 % (ref 0–1.5)
BILIRUB SERPL-MCNC: 0.5 MG/DL (ref 0–1.2)
BUN SERPL-MCNC: 9 MG/DL (ref 8–23)
BUN/CREAT SERPL: 10.7 (ref 7–25)
CALCIUM SPEC-SCNC: 8.9 MG/DL (ref 8.6–10.5)
CHLORIDE SERPL-SCNC: 107 MMOL/L (ref 98–107)
CO2 SERPL-SCNC: 24 MMOL/L (ref 22–29)
CREAT SERPL-MCNC: 0.84 MG/DL (ref 0.76–1.27)
DEPRECATED RDW RBC AUTO: 49 FL (ref 37–54)
DIGOXIN SERPL-MCNC: 1.5 NG/ML (ref 0.6–1.2)
EGFRCR SERPLBLD CKD-EPI 2021: 98 ML/MIN/1.73
EOSINOPHIL # BLD AUTO: 0.2 10*3/MM3 (ref 0–0.4)
EOSINOPHIL NFR BLD AUTO: 3.1 % (ref 0.3–6.2)
ERYTHROCYTE [DISTWIDTH] IN BLOOD BY AUTOMATED COUNT: 14.2 % (ref 12.3–15.4)
GLOBULIN UR ELPH-MCNC: 3 GM/DL
GLUCOSE SERPL-MCNC: 112 MG/DL (ref 65–99)
HCT VFR BLD AUTO: 48.9 % (ref 37.5–51)
HGB BLD-MCNC: 16.5 G/DL (ref 13–17.7)
LYMPHOCYTES # BLD AUTO: 2.8 10*3/MM3 (ref 0.7–3.1)
LYMPHOCYTES NFR BLD AUTO: 39.4 % (ref 19.6–45.3)
MAGNESIUM SERPL-MCNC: 2 MG/DL (ref 1.6–2.4)
MCH RBC QN AUTO: 33.9 PG (ref 26.6–33)
MCHC RBC AUTO-ENTMCNC: 33.7 G/DL (ref 31.5–35.7)
MCV RBC AUTO: 100.5 FL (ref 79–97)
MONOCYTES # BLD AUTO: 0.5 10*3/MM3 (ref 0.1–0.9)
MONOCYTES NFR BLD AUTO: 6.2 % (ref 5–12)
NEUTROPHILS NFR BLD AUTO: 3.7 10*3/MM3 (ref 1.7–7)
NEUTROPHILS NFR BLD AUTO: 50.6 % (ref 42.7–76)
NRBC BLD AUTO-RTO: 0.1 /100 WBC (ref 0–0.2)
PLATELET # BLD AUTO: 157 10*3/MM3 (ref 140–450)
PMV BLD AUTO: 9.7 FL (ref 6–12)
POTASSIUM SERPL-SCNC: 3.7 MMOL/L (ref 3.5–5.2)
PROT SERPL-MCNC: 7.3 G/DL (ref 6–8.5)
RBC # BLD AUTO: 4.86 10*6/MM3 (ref 4.14–5.8)
SODIUM SERPL-SCNC: 143 MMOL/L (ref 136–145)
T4 FREE SERPL-MCNC: 0.77 NG/DL (ref 0.93–1.7)
TROPONIN T SERPL HS-MCNC: 7 NG/L
TROPONIN T SERPL HS-MCNC: 8 NG/L
TSH SERPL DL<=0.05 MIU/L-ACNC: 2.73 UIU/ML (ref 0.27–4.2)
WBC NRBC COR # BLD AUTO: 7.2 10*3/MM3 (ref 3.4–10.8)

## 2024-01-20 PROCEDURE — G0378 HOSPITAL OBSERVATION PER HR: HCPCS

## 2024-01-20 PROCEDURE — 85025 COMPLETE CBC W/AUTO DIFF WBC: CPT | Performed by: EMERGENCY MEDICINE

## 2024-01-20 PROCEDURE — 99284 EMERGENCY DEPT VISIT MOD MDM: CPT

## 2024-01-20 PROCEDURE — 71045 X-RAY EXAM CHEST 1 VIEW: CPT

## 2024-01-20 PROCEDURE — 80053 COMPREHEN METABOLIC PANEL: CPT | Performed by: EMERGENCY MEDICINE

## 2024-01-20 PROCEDURE — 84439 ASSAY OF FREE THYROXINE: CPT | Performed by: EMERGENCY MEDICINE

## 2024-01-20 PROCEDURE — 84484 ASSAY OF TROPONIN QUANT: CPT | Performed by: NURSE PRACTITIONER

## 2024-01-20 PROCEDURE — 80162 ASSAY OF DIGOXIN TOTAL: CPT | Performed by: EMERGENCY MEDICINE

## 2024-01-20 PROCEDURE — 84484 ASSAY OF TROPONIN QUANT: CPT | Performed by: EMERGENCY MEDICINE

## 2024-01-20 PROCEDURE — 93005 ELECTROCARDIOGRAM TRACING: CPT

## 2024-01-20 PROCEDURE — 84443 ASSAY THYROID STIM HORMONE: CPT | Performed by: EMERGENCY MEDICINE

## 2024-01-20 PROCEDURE — 99222 1ST HOSP IP/OBS MODERATE 55: CPT | Performed by: INTERNAL MEDICINE

## 2024-01-20 PROCEDURE — 83735 ASSAY OF MAGNESIUM: CPT | Performed by: EMERGENCY MEDICINE

## 2024-01-20 RX ORDER — SODIUM CHLORIDE 0.9 % (FLUSH) 0.9 %
10 SYRINGE (ML) INJECTION AS NEEDED
Status: DISCONTINUED | OUTPATIENT
Start: 2024-01-20 | End: 2024-01-21 | Stop reason: HOSPADM

## 2024-01-20 RX ORDER — SODIUM CHLORIDE 9 MG/ML
40 INJECTION, SOLUTION INTRAVENOUS AS NEEDED
Status: DISCONTINUED | OUTPATIENT
Start: 2024-01-20 | End: 2024-01-21 | Stop reason: HOSPADM

## 2024-01-20 RX ORDER — POLYETHYLENE GLYCOL 3350 17 G/17G
17 POWDER, FOR SOLUTION ORAL DAILY PRN
Status: DISCONTINUED | OUTPATIENT
Start: 2024-01-20 | End: 2024-01-21 | Stop reason: HOSPADM

## 2024-01-20 RX ORDER — ACETAMINOPHEN 325 MG/1
650 TABLET ORAL EVERY 4 HOURS PRN
Status: DISCONTINUED | OUTPATIENT
Start: 2024-01-20 | End: 2024-01-21 | Stop reason: HOSPADM

## 2024-01-20 RX ORDER — IPRATROPIUM BROMIDE AND ALBUTEROL SULFATE 2.5; .5 MG/3ML; MG/3ML
3 SOLUTION RESPIRATORY (INHALATION) EVERY 4 HOURS PRN
COMMUNITY

## 2024-01-20 RX ORDER — PANTOPRAZOLE SODIUM 40 MG/1
40 TABLET, DELAYED RELEASE ORAL DAILY
COMMUNITY

## 2024-01-20 RX ORDER — NITROGLYCERIN 0.4 MG/1
0.4 TABLET SUBLINGUAL
Status: DISCONTINUED | OUTPATIENT
Start: 2024-01-20 | End: 2024-01-21 | Stop reason: HOSPADM

## 2024-01-20 RX ORDER — ONDANSETRON 2 MG/ML
4 INJECTION INTRAMUSCULAR; INTRAVENOUS EVERY 6 HOURS PRN
Status: DISCONTINUED | OUTPATIENT
Start: 2024-01-20 | End: 2024-01-21 | Stop reason: HOSPADM

## 2024-01-20 RX ORDER — AMOXICILLIN 250 MG
2 CAPSULE ORAL 2 TIMES DAILY
Status: DISCONTINUED | OUTPATIENT
Start: 2024-01-20 | End: 2024-01-21 | Stop reason: HOSPADM

## 2024-01-20 RX ORDER — BISACODYL 10 MG
10 SUPPOSITORY, RECTAL RECTAL DAILY PRN
Status: DISCONTINUED | OUTPATIENT
Start: 2024-01-20 | End: 2024-01-21 | Stop reason: HOSPADM

## 2024-01-20 RX ORDER — AMLODIPINE BESYLATE 10 MG/1
10 TABLET ORAL DAILY
COMMUNITY

## 2024-01-20 RX ORDER — PANTOPRAZOLE SODIUM 40 MG/1
40 TABLET, DELAYED RELEASE ORAL DAILY
Status: DISCONTINUED | OUTPATIENT
Start: 2024-01-20 | End: 2024-01-21 | Stop reason: HOSPADM

## 2024-01-20 RX ORDER — METOPROLOL SUCCINATE 50 MG/1
50 TABLET, EXTENDED RELEASE ORAL
Status: DISCONTINUED | OUTPATIENT
Start: 2024-01-20 | End: 2024-01-21 | Stop reason: HOSPADM

## 2024-01-20 RX ORDER — DIGOXIN 250 MCG
250 TABLET ORAL
COMMUNITY
End: 2024-01-21 | Stop reason: HOSPADM

## 2024-01-20 RX ORDER — FLECAINIDE ACETATE 50 MG/1
100 TABLET ORAL EVERY 12 HOURS SCHEDULED
Status: DISCONTINUED | OUTPATIENT
Start: 2024-01-20 | End: 2024-01-21 | Stop reason: HOSPADM

## 2024-01-20 RX ORDER — SODIUM CHLORIDE 0.9 % (FLUSH) 0.9 %
10 SYRINGE (ML) INJECTION EVERY 12 HOURS SCHEDULED
Status: DISCONTINUED | OUTPATIENT
Start: 2024-01-20 | End: 2024-01-21 | Stop reason: HOSPADM

## 2024-01-20 RX ORDER — AMLODIPINE BESYLATE 5 MG/1
10 TABLET ORAL DAILY
Status: DISCONTINUED | OUTPATIENT
Start: 2024-01-20 | End: 2024-01-21 | Stop reason: HOSPADM

## 2024-01-20 RX ORDER — IPRATROPIUM BROMIDE AND ALBUTEROL SULFATE 2.5; .5 MG/3ML; MG/3ML
3 SOLUTION RESPIRATORY (INHALATION) EVERY 4 HOURS PRN
Status: DISCONTINUED | OUTPATIENT
Start: 2024-01-20 | End: 2024-01-21 | Stop reason: HOSPADM

## 2024-01-20 RX ORDER — DOXEPIN HYDROCHLORIDE 10 MG/1
10 CAPSULE ORAL NIGHTLY
Status: DISCONTINUED | OUTPATIENT
Start: 2024-01-20 | End: 2024-01-21 | Stop reason: HOSPADM

## 2024-01-20 RX ORDER — ACETAMINOPHEN 160 MG/5ML
650 SOLUTION ORAL EVERY 4 HOURS PRN
Status: DISCONTINUED | OUTPATIENT
Start: 2024-01-20 | End: 2024-01-21 | Stop reason: HOSPADM

## 2024-01-20 RX ORDER — PRAVASTATIN SODIUM 80 MG/1
80 TABLET ORAL NIGHTLY
COMMUNITY

## 2024-01-20 RX ORDER — ACETAMINOPHEN 650 MG/1
650 SUPPOSITORY RECTAL EVERY 4 HOURS PRN
Status: DISCONTINUED | OUTPATIENT
Start: 2024-01-20 | End: 2024-01-21 | Stop reason: HOSPADM

## 2024-01-20 RX ORDER — DOXEPIN HYDROCHLORIDE 10 MG/1
10 CAPSULE ORAL NIGHTLY
COMMUNITY

## 2024-01-20 RX ORDER — ALUMINA, MAGNESIA, AND SIMETHICONE 2400; 2400; 240 MG/30ML; MG/30ML; MG/30ML
15 SUSPENSION ORAL EVERY 6 HOURS PRN
Status: DISCONTINUED | OUTPATIENT
Start: 2024-01-20 | End: 2024-01-21 | Stop reason: HOSPADM

## 2024-01-20 RX ORDER — BISACODYL 5 MG/1
5 TABLET, DELAYED RELEASE ORAL DAILY PRN
Status: DISCONTINUED | OUTPATIENT
Start: 2024-01-20 | End: 2024-01-21 | Stop reason: HOSPADM

## 2024-01-20 RX ORDER — DIGOXIN 250 MCG
250 TABLET ORAL
Status: DISCONTINUED | OUTPATIENT
Start: 2024-01-20 | End: 2024-01-20

## 2024-01-20 RX ADMIN — Medication 10 ML: at 12:27

## 2024-01-20 RX ADMIN — PANTOPRAZOLE SODIUM 40 MG: 40 TABLET, DELAYED RELEASE ORAL at 12:24

## 2024-01-20 RX ADMIN — METOPROLOL SUCCINATE 50 MG: 50 TABLET, EXTENDED RELEASE ORAL at 12:24

## 2024-01-20 RX ADMIN — DOXEPIN HYDROCHLORIDE 10 MG: 10 CAPSULE ORAL at 20:15

## 2024-01-20 RX ADMIN — FLECAINIDE ACETATE 100 MG: 50 TABLET ORAL at 20:14

## 2024-01-20 RX ADMIN — Medication 10 ML: at 20:15

## 2024-01-20 RX ADMIN — Medication 10 ML: at 09:51

## 2024-01-20 RX ADMIN — AMLODIPINE BESYLATE 10 MG: 5 TABLET ORAL at 12:24

## 2024-01-20 RX ADMIN — APIXABAN 5 MG: 5 TABLET, FILM COATED ORAL at 20:14

## 2024-01-20 NOTE — ED NOTES
Patient presents to the ED via EMS from home with complaints of Chest Pain, SOB, Elevated Heart Rate/AFib that started early this morning. Patient stated he is on Eliquis, Hydrochlorothiazide and Digoxin.

## 2024-01-20 NOTE — CONSULTS
HP      Name: Jagdish Rea ADMIT: 2024   : 1960  PCP: David Causey MD    MRN: [id number] LOS: 0 days   AGE/SEX: 63 y.o. male  ROOM: 220/1     Chief Complaint   Patient presents with    Chest Pain       Subjective        History of present illness  Jagdish Rea is a 63-year-old male patient who has no history of CAD, he has paroxysmal atrial fibrillation which seems to be of new onset, he was here for rapid symptomatic A-fib in 2023.  Patient had to switch cardiologist due to his insurance no longer being in network with [facility].  He had been on metoprolol and Eliquis for stroke prevention and more recently digoxin has been added.  Patient presents to the hospital due to shortness of breath and rapid heart rate in the 160s.  He was found to be in atrial fibrillation but self converted back to sinus rhythm.    Past Medical History:   Diagnosis Date    Acute hypoxemic respiratory failure 2023    Allergic     Anxiety     Arthritis 2005    Back pain     Claustrophobia     COPD (chronic obstructive pulmonary disease)     CTS (carpal tunnel syndrome) 10/2022    Dysphagia     Esophageal stricture     GERD (gastroesophageal reflux disease)     Hyperlipidemia     Hypertension     Knee pain     rt    Low back pain     Obesity     Obesity (BMI 30-39.9) 2023    Other cervical disc degeneration at C5-C6 level 2018    Paroxysmal atrial fibrillation with rapid ventricular response 2023    Pneumonia     Prediabetes 2023    PTSD (post-traumatic stress disorder)     Rhinovirus infection 2023    Thoracic disc herniation     Vitamin B12 deficiency      Past Surgical History:   Procedure Laterality Date    BRONCHOSCOPY N/A 11/3/2023    Procedure: BRONCHOSCOPY with bilateral lung washing's;  Surgeon: Kolton Brewer MD;  Location: [facility] ENDOSCOPY;  Service: Pulmonary;  Laterality: N/A;    CARDIAC CATHETERIZATION N/A 2022    Procedure: Left Heart Cath,  possible pci;  Surgeon: Prieto Sidhu MD;  Location: [facility] CATH INVASIVE LOCATION;  Service: Cardiovascular;  Laterality: N/A;    ENDOSCOPY      ENDOSCOPY N/A 9/21/2023    Procedure: ESOPHAGOGASTRODUODENOSCOPY WITH non guided esophageal dialtion 54 [address]ougie and  cold forcep biopsy x1 area;  Surgeon: Andres Concepcion MD;  Location: [facility] ENDOSCOPY;  Service: Gastroenterology;  Laterality: N/A;  Post- erosive gastritis, hiatal hernia, esophageal stricture    HERNIA REPAIR      KNEE ARTHROPLASTY      x2    SHOULDER ARTHROSCOPY W/ ROTATOR CUFF REPAIR Right 08/11/2020    Procedure: SHOULDER ARTHROSCOPY WITH ROTATOR CUFF REPAIR, extensive debridement;  Surgeon: Fredi Ritchie MD;  Location: [facility] MAIN OR;  Service: Orthopedics;  Laterality: Right;    TONSILLECTOMY       Family History   Problem Relation Age of Onset    Heart disease Mother     Early death Mother     Hyperlipidemia Mother     Hypertension Mother     Thyroid disease Mother     Cancer Father     Stroke Father     Vision loss Father     Stroke Paternal Grandfather     Arthritis Paternal Grandmother     Alcohol abuse Brother     Asthma Brother     Depression Brother     Hyperlipidemia Brother     Hypertension Brother     Learning disabilities Brother     Mental illness Brother     Drug abuse Brother     Early death Brother     Heart disease Brother     Hyperlipidemia Brother     Hypertension Brother      Social History     Tobacco Use    Smoking status: Every Day     Packs/day: 1.00     Years: 47.00     Additional pack years: 0.00     Total pack years: 47.00     Types: Cigarettes     Start date: 1/9/1973    Smokeless tobacco: Never    Tobacco comments:     Smoked a half a pack a day for 42 years didn't start to smoke a whole pack until 2017   Vaping Use    Vaping Use: Never used   Substance Use Topics    Alcohol use: No    Drug use: No     Medications Prior to Admission   Medication Sig Dispense Refill Last Dose    albuterol  sulfate  (90 Base) MCG/ACT inhaler Inhale 2 puffs Every 4 (Four) Hours As Needed for Wheezing. 18 g 1     amLODIPine (NORVASC) 10 MG tablet Take 1 tablet by mouth Daily.       apixaban (ELIQUIS) 5 MG tablet tablet Take 1 tablet by mouth Every 12 (Twelve) Hours. Indications: Atrial Fibrillation 60 tablet 0     digoxin (LANOXIN) 250 MCG tablet Take 1 tablet by mouth Daily.       doxepin (SINEquan) 10 MG capsule Take 1 capsule by mouth Every Night.       ipratropium-albuterol (DUO-NEB) 0.5-2.5 mg/3 ml nebulizer Take 3 mL by nebulization Every 4 (Four) Hours As Needed for Wheezing.       metoprolol succinate XL (TOPROL-XL) 50 MG 24 hr tablet Take 1 tablet by mouth Daily. 30 tablet 0     pantoprazole (PROTONIX) 40 MG EC tablet Take 1 tablet by mouth Daily.       pravastatin (PRAVACHOL) 80 MG tablet Take 1 tablet by mouth Every Night.       tiotropium bromide-olodaterol (STIOLTO RESPIMAT) 2.5-2.5 MCG/ACT aerosol solution inhaler Inhale 1 puff Daily.        Allergies:  Atorvastatin and Lisinopril    Review of systems    Constitutional: Negative.    Respiratory and cardiovascular: As detailed in HPI section.  Gastrointestinal: Negative for constipation, nausea and vomiting negative for abdominal distention, abdominal pain and diarrhea.   Genitourinary: Negative for difficulty urinating and flank pain.   Musculoskeletal: Negative for arthralgias, joint swelling and myalgias.   Skin: Negative for color change, rash and wound.   Neurological: Negative for dizziness, syncope, weakness and headaches.   Hematological: Negative for adenopathy.   Psychiatric/Behavioral: Negative for confusion.   All other systems reviewed and are negative.       Physical Exam  VITALS REVIEWED    General:      well developed, in no acute distress.    Head:      normocephalic and atraumatic.    Eyes:      PERRL/EOM intact, conjunctiva and sclera clear with out nystagmus.    Neck:      no masses, thyromegaly,  trachea central with normal  respiratory effort and PMI displaced laterally  Lungs:      Clear to auscultation bilaterally  Heart:       Regular rate and rhythm  Msk:      no deformity or scoliosis noted of thoracic or lumbar spine.    Pulses:      pulses normal in all 4 extremities.    Extremities:       No lower extremity edema  Neurologic:      no focal deficits.   alert oriented x3  Skin:      intact without lesions or rashes.    Psych:      alert and cooperative; normal mood and affect; normal attention span and concentration.      Result Review :               Pertinent cardiac workup    EKG 11/17/2023 atrial fibrillation ventricular rate of 165 bpm.  EKG 1/20/2024 sinus rhythm  Echo 11/10/2023 ejection fraction 60 to 65%  Heart cath 7/13/2022 no significant CAD.      Assessment and Plan         Chest tightness    Paroxysmal atrial fibrillation with rapid ventricular response    Atrial fibrillation      Jagdish Rea is a 63-year-old male patient who has paroxysmal atrial fibrillation, first occurrence around November 2023, at that time he was admitted with A-fib RVR, self converted to sinus rhythm.  Patient had been on metoprolol and digoxin at home and Eliquis for stroke prevention.  He presented back to the hospital due to shortness of breath and fast heart rate reported at home, most likely recurrence of A-fib.  He is back in normal rhythm now.  Patient has a recent heart catheterization which did not show any significant CAD.  So definitely has paroxysmal atrial fibrillation with rapid ventricular rates and quite symptomatic with it.  Patient will benefit from adequate rhythm control strategy.  Unfortunately we will not be able to follow this patient in our office due to his insurance but we can initiate flecainide since he does not have CAD, stop the digoxin.  In the future if he has recurrence despite antiarrhythmic therapy then ablation can be considered.        No follow-ups on file.  Patient was given instructions and counseling  regarding his condition or for health maintenance advice. Please see specific information pulled into the AVS if appropriate.

## 2024-01-20 NOTE — ED NOTES
Nursing report ED to floor  Jagdish Rea  63 y.o.  male    HPI:   Chief Complaint   Patient presents with    Chest Pain       Admitting doctor:   Neymar Musa MD    Admitting diagnosis:   The primary encounter diagnosis was Chest tightness. A diagnosis of Paroxysmal atrial fibrillation was also pertinent to this visit.    Code status:   Current Code Status       Date Active Code Status Order ID Comments User Context       1/20/2024 1101 CPR (Attempt to Resuscitate) [id number]  Divine Whitaker APRN ED        Question Answer    Code Status (Patient has no pulse and is not breathing) CPR (Attempt to Resuscitate)    Medical Interventions (Patient has pulse or is breathing) Full Support    Level Of Support Discussed With Patient                    Allergies:   Atorvastatin and Lisinopril    Isolation:  No active isolations     Fall Risk:  Fall Risk Assessment was completed, and patient is at low risk for falls.   Predictive Model Details         9 (Low) Factor Value    Calculated 1/20/2024 11:18 Age 63    Risk of Fall Model Musculoskeletal Assessment WDL     Active Peripheral IV Present     Imaging order in this encounter Present     Respiratory Rate 18     Skin Assessment WDL     Magnesium 2 mg/dL     Financial Class Other     Drug Use No     Tobacco Use Current     Gregg Scale not on file     Peripheral Vascular Assessment WDL     Calcium 8.9 mg/dL     Cardiac Assessment X     Diastolic BP 81     Clinically Relevant Sex Not Female     Gastrointestinal Assessment WDL     Number of Distinct Medication Classes administered 1     Chloride 107 mmol/L     Total Bilirubin 0.5 mg/dL     Creatinine 0.84 mg/dL     Albumin 4.3 g/dL     ALT 22 U/L     Days after Admission 0.087     Potassium 3.7 mmol/L         Weight:       01/20/24  0909   Weight: 113 kg (250 lb)       Intake and Output  No intake or output data in the 24 hours ending 01/20/24 1118    Diet:        Most recent vitals:   Vitals:    01/20/24 1045 01/20/24  1100 01/20/24 1101 01/20/24 1117   BP:   121/72 120/81   BP Location:       Patient Position:       Pulse: 69 78 91 64   Resp:       Temp:       TempSrc:       SpO2: 90% 93% 95% 95%   Weight:       Height:           Active LDAs/IV Access:   Lines, Drains & Airways       Active LDAs       Name Placement date Placement time Site Days    Peripheral IV 01/20/24 0948 Right Antecubital 01/20/24  0948  Antecubital  less than 1                    Skin Condition:   Skin Assessments (last day)       None             Labs (abnormal labs have a star):   Labs Reviewed   COMPREHENSIVE METABOLIC PANEL - Abnormal; Notable for the following components:       Result Value    Glucose 112 (*)     All other components within normal limits    Narrative:     GFR Normal >60  Chronic Kidney Disease <60  Kidney Failure <15     DIGOXIN LEVEL - Abnormal; Notable for the following components:    Digoxin 1.50 (*)     All other components within normal limits   T4, FREE - Abnormal; Notable for the following components:    Free T4 0.77 (*)     All other components within normal limits    Narrative:     Results may be falsely increased if patient taking Biotin.     CBC WITH AUTO DIFFERENTIAL - Abnormal; Notable for the following components:    .5 (*)     MCH 33.9 (*)     All other components within normal limits   SINGLE HSTROPONIN T - Normal    Narrative:     High Sensitive Troponin T Reference Range:  <14.0 ng/L- Negative Female for AMI  <22.0 ng/L- Negative Male for AMI  >=14 - Abnormal Female indicating possible myocardial injury.  >=22 - Abnormal Male indicating possible myocardial injury.   Clinicians would have to utilize clinical acumen, EKG, Troponin, and serial changes to determine if it is an Acute Myocardial Infarction or myocardial injury due to an underlying chronic condition.        MAGNESIUM - Normal   TSH - Normal   CBC AND DIFFERENTIAL    Narrative:     The following orders were created for panel order CBC &  Differential.  Procedure                               Abnormality         Status                     ---------                               -----------         ------                     CBC Auto Differential[[id number]]        Abnormal            Final result                 Please view results for these tests on the individual orders.       LOC: Person, Place, Time, and Situation    Telemetry:  Observation Unit    Cardiac Monitoring Ordered: yes    EKG:   ECG 12 Lead Rhythm Change   Preliminary Result   HEART RATE= 91  bpm   RR Interval= 740  ms   CA Interval= 163  ms   P Horizontal Axis= 0  deg   P Front Axis= 72  deg   QRSD Interval= 74  ms   QT Interval= 348  ms   QTcB= 405  ms   QRS Axis= 18  deg   T Wave Axis= 53  deg   - OTHERWISE NORMAL ECG -   Sinus arrhythmia   Low voltage, extremity leads   When compared with ECG of 18-Nov-2023 14:07:18,   No significant change   Electronically Signed By:    Date and Time of Study: 2024-01-20 09:19:10          Medications Given in the ED:   Medications   sodium chloride 0.9 % flush 10 mL (10 mL Intravenous Given 1/20/24 0951)       Imaging results:  XR Chest 1 View    Result Date: 1/20/2024  No radiographic findings of acute cardiopulmonary abnormality. Electronically Signed: Andres Alatorre  1/20/2024 9:40 AM EST  Workstation ID: [id number]     Social issues:   Social History     Socioeconomic History    Marital status:    Tobacco Use    Smoking status: Every Day     Packs/day: 1.00     Years: 47.00     Additional pack years: 0.00     Total pack years: 47.00     Types: Cigarettes     Start date: 1/9/1973    Smokeless tobacco: Never    Tobacco comments:     Smoked a half a pack a day for 42 years didn't start to smoke a whole pack until 2017   Vaping Use    Vaping Use: Never used   Substance and Sexual Activity    Alcohol use: No    Drug use: No    Sexual activity: Defer       NIH Stroke Scale:  Interval: (not recorded)  1a. Level of Consciousness: (not recorded)  1b.  LOC Questions: (not recorded)  1c. LOC Commands: (not recorded)  2. Best Gaze: (not recorded)  3. Visual: (not recorded)  4. Facial Palsy: (not recorded)  5a. Motor Arm, Left: (not recorded)  5b. Motor Arm, Right: (not recorded)  6a. Motor Leg, Left: (not recorded)  6b. Motor Leg, Right: (not recorded)  7. Limb Ataxia: (not recorded)  8. Sensory: (not recorded)  9. Best Language: (not recorded)  10. Dysarthria: (not recorded)  11. Extinction and Inattention (formerly Neglect): (not recorded)    Total (NIH Stroke Scale): (not recorded)     Additional notable assessment information:     Nursing report ED to floor:  Report given to JENNIFER Bateman taken room 220    Kashif Lane LPN   01/20/24 11:18 EST Nursing report ED to floor  Jagdish Rea  63 y.o.  male    HPI:   Chief Complaint   Patient presents with    Chest Pain       Admitting doctor:   Neymar Musa MD    Admitting diagnosis:   The primary encounter diagnosis was Chest tightness. A diagnosis of Paroxysmal atrial fibrillation was also pertinent to this visit.    Code status:   Current Code Status       Date Active Code Status Order ID Comments User Context       1/20/2024 1101 CPR (Attempt to Resuscitate) [id number]  Divine Whitaker APRN ED        Question Answer    Code Status (Patient has no pulse and is not breathing) CPR (Attempt to Resuscitate)    Medical Interventions (Patient has pulse or is breathing) Full Support    Level Of Support Discussed With Patient                    Allergies:   Atorvastatin and Lisinopril    Isolation:  No active isolations     Fall Risk:  Fall Risk Assessment was completed, and patient is at low risk for falls.   Predictive Model Details         9 (Low) Factor Value    Calculated 1/20/2024 11:18 Age 63    Risk of Fall Model Musculoskeletal Assessment WDL     Active Peripheral IV Present     Imaging order in this encounter Present     Respiratory Rate 18     Skin Assessment WDL     Magnesium 2 mg/dL     Financial Class Other      Drug Use No     Tobacco Use Current     Gregg Scale not on file     Peripheral Vascular Assessment WDL     Calcium 8.9 mg/dL     Cardiac Assessment X     Diastolic BP 81     Clinically Relevant Sex Not Female     Gastrointestinal Assessment WDL     Number of Distinct Medication Classes administered 1     Chloride 107 mmol/L     Total Bilirubin 0.5 mg/dL     Creatinine 0.84 mg/dL     Albumin 4.3 g/dL     ALT 22 U/L     Days after Admission 0.087     Potassium 3.7 mmol/L         Weight:       01/20/24  0909   Weight: 113 kg (250 lb)       Intake and Output  No intake or output data in the 24 hours ending 01/20/24 1118    Diet:        Most recent vitals:   Vitals:    01/20/24 1045 01/20/24 1100 01/20/24 1101 01/20/24 1117   BP:   121/72 120/81   BP Location:       Patient Position:       Pulse: 69 78 91 64   Resp:       Temp:       TempSrc:       SpO2: 90% 93% 95% 95%   Weight:       Height:           Active LDAs/IV Access:   Lines, Drains & Airways       Active LDAs       Name Placement date Placement time Site Days    Peripheral IV 01/20/24 0948 Right Antecubital 01/20/24  0948  Antecubital  less than 1                    Skin Condition:   Skin Assessments (last day)       None             Labs (abnormal labs have a star):   Labs Reviewed   COMPREHENSIVE METABOLIC PANEL - Abnormal; Notable for the following components:       Result Value    Glucose 112 (*)     All other components within normal limits    Narrative:     GFR Normal >60  Chronic Kidney Disease <60  Kidney Failure <15     DIGOXIN LEVEL - Abnormal; Notable for the following components:    Digoxin 1.50 (*)     All other components within normal limits   T4, FREE - Abnormal; Notable for the following components:    Free T4 0.77 (*)     All other components within normal limits    Narrative:     Results may be falsely increased if patient taking Biotin.     CBC WITH AUTO DIFFERENTIAL - Abnormal; Notable for the following components:    .5 (*)      MCH 33.9 (*)     All other components within normal limits   SINGLE HSTROPONIN T - Normal    Narrative:     High Sensitive Troponin T Reference Range:  <14.0 ng/L- Negative Female for AMI  <22.0 ng/L- Negative Male for AMI  >=14 - Abnormal Female indicating possible myocardial injury.  >=22 - Abnormal Male indicating possible myocardial injury.   Clinicians would have to utilize clinical acumen, EKG, Troponin, and serial changes to determine if it is an Acute Myocardial Infarction or myocardial injury due to an underlying chronic condition.        MAGNESIUM - Normal   TSH - Normal   CBC AND DIFFERENTIAL    Narrative:     The following orders were created for panel order CBC & Differential.  Procedure                               Abnormality         Status                     ---------                               -----------         ------                     CBC Auto Differential[[id number]]        Abnormal            Final result                 Please view results for these tests on the individual orders.       LOC: Person, Place, Time, and Situation    Telemetry:  Observation Unit    Cardiac Monitoring Ordered: yes    EKG:   ECG 12 Lead Rhythm Change   Preliminary Result   HEART RATE= 91  bpm   RR Interval= 740  ms   DC Interval= 163  ms   P Horizontal Axis= 0  deg   P Front Axis= 72  deg   QRSD Interval= 74  ms   QT Interval= 348  ms   QTcB= 405  ms   QRS Axis= 18  deg   T Wave Axis= 53  deg   - OTHERWISE NORMAL ECG -   Sinus arrhythmia   Low voltage, extremity leads   When compared with ECG of 18-Nov-2023 14:07:18,   No significant change   Electronically Signed By:    Date and Time of Study: 2024-01-20 09:19:10          Medications Given in the ED:   Medications   sodium chloride 0.9 % flush 10 mL (10 mL Intravenous Given 1/20/24 0951)       Imaging results:  XR Chest 1 View    Result Date: 1/20/2024  No radiographic findings of acute cardiopulmonary abnormality. Electronically Signed: Andres Alatorre   1/20/2024 9:40 AM EST  Workstation ID: [id number]     Social issues:   Social History     Socioeconomic History    Marital status:    Tobacco Use    Smoking status: Every Day     Packs/day: 1.00     Years: 47.00     Additional pack years: 0.00     Total pack years: 47.00     Types: Cigarettes     Start date: 1/9/1973    Smokeless tobacco: Never    Tobacco comments:     Smoked a half a pack a day for 42 years didn't start to smoke a whole pack until 2017   Vaping Use    Vaping Use: Never used   Substance and Sexual Activity    Alcohol use: No    Drug use: No    Sexual activity: Defer       NIH Stroke Scale:  Interval: (not recorded)  1a. Level of Consciousness: (not recorded)  1b. LOC Questions: (not recorded)  1c. LOC Commands: (not recorded)  2. Best Gaze: (not recorded)  3. Visual: (not recorded)  4. Facial Palsy: (not recorded)  5a. Motor Arm, Left: (not recorded)  5b. Motor Arm, Right: (not recorded)  6a. Motor Leg, Left: (not recorded)  6b. Motor Leg, Right: (not recorded)  7. Limb Ataxia: (not recorded)  8. Sensory: (not recorded)  9. Best Language: (not recorded)  10. Dysarthria: (not recorded)  11. Extinction and Inattention (formerly Neglect): (not recorded)    Total (NIH Stroke Scale): (not recorded)     Additional notable assessment information:     Nursing report ED to floor:  Report given to JENNIFER Bateman taken room 220    Kashif Lane LPN   01/20/24 11:18 EST

## 2024-01-20 NOTE — PLAN OF CARE
Goal Outcome Evaluation:  Plan of Care Reviewed With: patient        Progress: improving  Outcome Evaluation: Pt oriented to the care setting, call light within reach. Cardiology consult called.

## 2024-01-20 NOTE — H&P
[facility] Observation Unit H&P    Patient Name: Jagdish Rea  : 1960  MRN: [id number]  Primary Care Physician: David Causey MD  Date of admission: 2024     Patient Care Team:  David Causey MD as PCP - General (Family Medicine)  David Boyd MD as Consulting Physician (Otolaryngology)  Fredi Ritchie MD as Consulting Physician (Orthopedic Surgery)  Prieto Sidhu MD as Consulting Physician (Cardiology)  Henri Kenney RN as Ambulatory  ([facility])          Subjective   History Present Illness     Chief Complaint:   Chief Complaint   Patient presents with    Chest Pain     Chest Pain     Chest Pain   Associated symptoms include near-syncope, palpitations and shortness of breath. Pertinent negatives include no diaphoresis, nausea, syncope or vomiting.     ED 2024  History of present illness a 63-year-old gentleman has history of atrial fibrillation states he was walking in the house this morning he developed some tightness in his chest his heart was racing and felt lightheaded.  He took his heart rate it was in the 140s.  The patient was brought in by EMS.  He denies any current pain he states it was mild there was no neck arm jaw pain there was no shortness of breath or sweatiness with that he was lightheaded and a little bit dizzy.  The patient denies any vomiting diarrhea no black or bloody stool no recent injury illness flus viruses vaccinations no recent long car ride plane ride immobilization foreign travels.  Patient did have some medication changes recently he has a history of atrial fibrillation and recently had digoxin added to his regimen.     Observation 2024  Patient agrees with HPI noted above including irregular and elevated heart rate with onset this morning.  He reports a history of A-fib and is currently on Eliquis, metoprolol and digoxin.  He states that he was started on digoxin early this month and it seemed to be working  well until this morning.  He also reports a near syncope episode and heart rate of 153 this morning.  Patient is currently asymptomatic.    Review of Systems   Constitutional: Negative for diaphoresis.   Cardiovascular:  Positive for chest pain, near-syncope and palpitations. Negative for dyspnea on exertion and syncope.   Respiratory:  Positive for shortness of breath.    Gastrointestinal:  Negative for nausea and vomiting.   All other systems reviewed and are negative.          Personal History     Past Medical History:   Past Medical History:   Diagnosis Date    Acute hypoxemic respiratory failure 11/06/2023    Allergic     Anxiety     Arthritis 06/2005    Back pain     Claustrophobia 1978    COPD (chronic obstructive pulmonary disease)     CTS (carpal tunnel syndrome) 10/2022    Dysphagia     Esophageal stricture     GERD (gastroesophageal reflux disease)     Hyperlipidemia     Hypertension     Knee pain     rt    Low back pain     Obesity     Obesity (BMI 30-39.9) 11/06/2023    Other cervical disc degeneration at C5-C6 level 08/22/2018    Paroxysmal atrial fibrillation with rapid ventricular response 11/06/2023    Pneumonia     Prediabetes 11/06/2023    PTSD (post-traumatic stress disorder)     Rhinovirus infection 11/06/2023    Thoracic disc herniation     Vitamin B12 deficiency        Surgical History:      Past Surgical History:   Procedure Laterality Date    BRONCHOSCOPY N/A 11/3/2023    Procedure: BRONCHOSCOPY with bilateral lung washing's;  Surgeon: Kolton Brewer MD;  Location: [facility] ENDOSCOPY;  Service: Pulmonary;  Laterality: N/A;    CARDIAC CATHETERIZATION N/A 07/13/2022    Procedure: Left Heart Cath, possible pci;  Surgeon: Prieto Sidhu MD;  Location: [facility] CATH INVASIVE LOCATION;  Service: Cardiovascular;  Laterality: N/A;    ENDOSCOPY      ENDOSCOPY N/A 9/21/2023    Procedure: ESOPHAGOGASTRODUODENOSCOPY WITH non guided esophageal dialtion 54 Fr. Bougie and  cold forcep biopsy x1  area;  Surgeon: Andres Concepcion MD;  Location: [facility] ENDOSCOPY;  Service: Gastroenterology;  Laterality: N/A;  Post- erosive gastritis, hiatal hernia, esophageal stricture    HERNIA REPAIR      KNEE ARTHROPLASTY      x2    SHOULDER ARTHROSCOPY W/ ROTATOR CUFF REPAIR Right 08/11/2020    Procedure: SHOULDER ARTHROSCOPY WITH ROTATOR CUFF REPAIR, extensive debridement;  Surgeon: Fredi Ritchie MD;  Location: [facility] MAIN OR;  Service: Orthopedics;  Laterality: Right;    TONSILLECTOMY             Family History: family history includes Alcohol abuse in his brother; Arthritis in his paternal grandmother; Asthma in his brother; Cancer in his father; Depression in his brother; Drug abuse in his brother; Early death in his brother and mother; Heart disease in his brother and mother; Hyperlipidemia in his brother, brother, and mother; Hypertension in his brother, brother, and mother; Learning disabilities in his brother; Mental illness in his brother; Stroke in his father and paternal grandfather; Thyroid disease in his mother; Vision loss in his father. Otherwise pertinent FHx was reviewed and unremarkable.     Social History:  reports that he has been smoking cigarettes. He started smoking about 51 years ago. He has a 47.00 pack-year smoking history. He has never used smokeless tobacco. He reports that he does not drink alcohol and does not use drugs.      Medications:  Prior to Admission medications    Medication Sig Start Date End Date Taking? Authorizing Provider   albuterol sulfate  (90 Base) MCG/ACT inhaler Inhale 2 puffs Every 4 (Four) Hours As Needed for Wheezing. 4/4/22  Yes Yamilka Pascual APRN   amLODIPine (NORVASC) 10 MG tablet Take 1 tablet by mouth Daily.   Yes Provider, MD Sulaiman   apixaban (ELIQUIS) 5 MG tablet tablet Take 1 tablet by mouth Every 12 (Twelve) Hours. Indications: Atrial Fibrillation 11/18/23  Yes Morris Rios MD   digoxin (LANOXIN) 250 MCG tablet Take 1  tablet by mouth Daily.   Yes ProviderSulaiman MD   doxepin (SINEquan) 10 MG capsule Take 1 capsule by mouth Every Night.   Yes Sulaiman Gibson MD   ipratropium-albuterol (DUO-NEB) 0.5-2.5 mg/3 ml nebulizer Take 3 mL by nebulization Every 4 (Four) Hours As Needed for Wheezing.   Yes Sulaiman Gibson MD   metoprolol succinate XL (TOPROL-XL) 50 MG 24 hr tablet Take 1 tablet by mouth Daily. 11/19/23  Yes Morris Rios MD   pantoprazole (PROTONIX) 40 MG EC tablet Take 1 tablet by mouth Daily.   Yes Sulaiman Gibson MD   pravastatin (PRAVACHOL) 80 MG tablet Take 1 tablet by mouth Every Night.   Yes Sulaiman Gibson MD   tiotropium bromide-olodaterol (STIOLTO RESPIMAT) 2.5-2.5 MCG/ACT aerosol solution inhaler Inhale 1 puff Daily.   Yes Sulaiman Gibson MD   amLODIPine (NORVASC) 10 MG tablet TAKE 1 TABLET EVERY DAY 9/4/23 1/20/24  Yamilka Pascual APRN   doxepin (SINEquan) 10 MG capsule TAKE 1 CAPSULE EVERY NIGHT. 6/21/23 1/20/24  Yamilka Pascual APRN   hydroCHLOROthiazide (MICROZIDE) 12.5 MG capsule Take 1 capsule by mouth As Needed.  1/20/24  Sulaiman Gibson MD   ipratropium-albuterol (DUO-NEB) 0.5-2.5 mg/3 ml nebulizer Take 3 mL by nebulization Every 4 (Four) Hours As Needed for Wheezing.  1/20/24  Sulaiman Gibson MD   pantoprazole (PROTONIX) 40 MG EC tablet TAKE 1 TABLET EVERY DAY 9/29/23 1/20/24  Yamilka Pascual APRN   pravastatin (PRAVACHOL) 80 MG tablet TAKE 1 TABLET EVERY NIGHT 11/17/23 1/20/24  Yamilka Pascual APRN       Allergies:    Allergies   Allergen Reactions    Atorvastatin Swelling    Lisinopril Other (See Comments)     Facial paralysis        Objective   Objective     Vital Signs  Temp:  [97.5 °F (36.4 °C)-97.7 °F (36.5 °C)] 97.5 °F (36.4 °C)  Heart Rate:  [] 68  Resp:  [16-18] 16  BP: ()/(66-82) 123/82  SpO2:  [89 %-97 %] 93 %  on   ;   Device (Oxygen Therapy): room air  Body mass index is 33.91 kg/m².    Physical Exam  Vitals and nursing  note reviewed.   Constitutional:       Appearance: Normal appearance.   HENT:      Head: Normocephalic and atraumatic.      Right Ear: External ear normal.      Left Ear: External ear normal.      Nose: Nose normal.      Mouth/Throat:      Mouth: Mucous membranes are moist.   Eyes:      Extraocular Movements: Extraocular movements intact.   Cardiovascular:      Rate and Rhythm: Normal rate and regular rhythm.      Pulses: Normal pulses.      Heart sounds: Normal heart sounds.   Pulmonary:      Effort: Pulmonary effort is normal.      Breath sounds: Normal breath sounds.   Abdominal:      General: Abdomen is flat. Bowel sounds are normal.      Palpations: Abdomen is soft.   Musculoskeletal:         General: Normal range of motion.      Cervical back: Normal range of motion.   Skin:     General: Skin is warm.   Neurological:      Mental Status: He is alert and oriented to person, place, and time.   Psychiatric:         Behavior: Behavior normal.         Thought Content: Thought content normal.         Judgment: Judgment normal.         Results Review:  I have personally reviewed most recent cardiac tracings, lab results, and radiology images and interpretations and agree with findings, most notably: Troponin, CMP, TSH, T4, CBC, digoxin, chest x-ray, EKG.    Results from last 7 days   Lab Units 01/20/24  0932   WBC 10*3/mm3 7.20   HEMOGLOBIN g/dL 16.5   HEMATOCRIT % 48.9   PLATELETS 10*3/mm3 157     Results from last 7 days   Lab Units 01/20/24  1245 01/20/24  0932   SODIUM mmol/L  --  143   POTASSIUM mmol/L  --  3.7   CHLORIDE mmol/L  --  107   CO2 mmol/L  --  24.0   BUN mg/dL  --  9   CREATININE mg/dL  --  0.84   GLUCOSE mg/dL  --  112*   CALCIUM mg/dL  --  8.9   ALK PHOS U/L  --  72   ALT (SGPT) U/L  --  22   AST (SGOT) U/L  --  22   HSTROP T ng/L 7 8     Estimated Creatinine Clearance: 116.9 mL/min (by C-G formula based on SCr of 0.84 mg/dL).  Brief Urine Lab Results       None            Microbiology Results (last  10 days)       ** No results found for the last 240 hours. **            ECG/EMG Results (most recent)       Procedure Component Value Units Date/Time    ECG 12 Lead Rhythm Change [[id number]] Collected: 01/20/24 0919     Updated: 01/20/24 0920     QT Interval 348 ms      QTC Interval 405 ms     Narrative:      HEART RATE= 91  bpm  RR Interval= 740  ms  DC Interval= 163  ms  P Horizontal Axis= 0  deg  P Front Axis= 72  deg  QRSD Interval= 74  ms  QT Interval= 348  ms  QTcB= 405  ms  QRS Axis= 18  deg  T Wave Axis= 53  deg  - OTHERWISE NORMAL ECG -  Sinus arrhythmia  Low voltage, extremity leads  When compared with ECG of 18-Nov-2023 14:07:18,  No significant change  Electronically Signed By:   Date and Time of Study: 2024-01-20 09:19:10                Results for orders placed during the hospital encounter of 10/29/23    Adult Transthoracic Echo Complete W/ Cont if Necessary Per Protocol    Interpretation Summary    Left ventricular systolic function is normal. Left ventricular ejection fraction appears to be 61 - 65%.    Estimated right ventricular systolic pressure from tricuspid regurgitation is normal (<35 mmHg).    Unremarkable study  Normal EF, normal diastolic function, normal RV size and function, no significant valvular abnormality, normal filling pressures      XR Chest 1 View    Result Date: 1/20/2024  No radiographic findings of acute cardiopulmonary abnormality. Electronically Signed: Andres Alatorre  1/20/2024 9:40 AM EST  Workstation ID: [id number]       Estimated Creatinine Clearance: 116.9 mL/min (by C-G formula based on SCr of 0.84 mg/dL).    Assessment & Plan   Assessment/Plan       Active Hospital Problems    Diagnosis  POA    **Chest tightness [R07.89]  Yes      Resolved Hospital Problems   No resolved problems to display.       Chest tightness with history of A-fib  Lab Results   Component Value Date    TROPONINT 7 01/20/2024    TROPONINT 8 01/20/2024    TROPONINT 11 11/17/2023   -CMP unremarkable  except mild hyperglycemia  -TSH within normal range and T4 mildly decreased at 0.77  -CBC unremarkable  -Digoxin level 1.50  -Chest X-ray: Showed no acute cardiopulmonary abnormality  -EKG: Sinus arrhythmia with heart rate of 91, MS interval 163 and   -According to ED providers patient was in and out of A-fib on the heart monitor.  Highest heart rate of 121.  Current heart rate in the 70s on the heart monitor  -Telemetry  -Cardiology consulted  -Continue Eliquis  -Hold digoxin for now  -Further recommendations per cardiology    Hypertension  -Well controlled   BP Readings from Last 1 Encounters:   01/20/24 123/82   - Continue Norvasc  - Monitor while admitted     GERD  -Continue PPI    Hyperlipidemia  -Patient on pravastatin.  Reports allergy to atorvastatin.  -Will hold while admitted since pravastatin is a nonformulary drug    VTE Prophylaxis -   Mechanical Order History:        Ordered        01/20/24 1157  Place Sequential Compression Device  Once            01/20/24 1157  Maintain Sequential Compression Device  Continuous                          Pharmalogical Order History:        Ordered     Dose Route Frequency Stop    01/20/24 1157  apixaban (ELIQUIS) tablet 5 mg         5 mg PO Every 12 Hours Scheduled --                    CODE STATUS:    Code Status and Medical Interventions:   Ordered at: 01/20/24 1101     Level Of Support Discussed With:    Patient     Code Status (Patient has no pulse and is not breathing):    CPR (Attempt to Resuscitate)     Medical Interventions (Patient has pulse or is breathing):    Full Support       This patient has been examined wearing personal protective equipment.     I discussed the patient's findings and my recommendations with patient and nursing staff.      Signature:Electronically signed by KEILA Paige, 01/20/24, 1:44 PM EST.

## 2024-01-21 VITALS
SYSTOLIC BLOOD PRESSURE: 112 MMHG | HEIGHT: 72 IN | DIASTOLIC BLOOD PRESSURE: 68 MMHG | BODY MASS INDEX: 33.86 KG/M2 | OXYGEN SATURATION: 96 % | WEIGHT: 250 LBS | RESPIRATION RATE: 12 BRPM | HEART RATE: 60 BPM | TEMPERATURE: 98.4 F

## 2024-01-21 PROBLEM — I48.0 PAROXYSMAL ATRIAL FIBRILLATION WITH RAPID VENTRICULAR RESPONSE: Status: RESOLVED | Noted: 2023-11-06 | Resolved: 2024-01-21

## 2024-01-21 PROBLEM — R07.89 CHEST TIGHTNESS: Status: RESOLVED | Noted: 2024-01-20 | Resolved: 2024-01-21

## 2024-01-21 PROBLEM — I48.91 ATRIAL FIBRILLATION: Status: RESOLVED | Noted: 2023-11-18 | Resolved: 2024-01-21

## 2024-01-21 LAB
ANION GAP SERPL CALCULATED.3IONS-SCNC: 10 MMOL/L (ref 5–15)
BASOPHILS # BLD AUTO: 0 10*3/MM3 (ref 0–0.2)
BASOPHILS NFR BLD AUTO: 0.6 % (ref 0–1.5)
BUN SERPL-MCNC: 12 MG/DL (ref 8–23)
BUN/CREAT SERPL: 13.6 (ref 7–25)
CALCIUM SPEC-SCNC: 8.4 MG/DL (ref 8.6–10.5)
CHLORIDE SERPL-SCNC: 106 MMOL/L (ref 98–107)
CO2 SERPL-SCNC: 27 MMOL/L (ref 22–29)
CREAT SERPL-MCNC: 0.88 MG/DL (ref 0.76–1.27)
DEPRECATED RDW RBC AUTO: 52.5 FL (ref 37–54)
EGFRCR SERPLBLD CKD-EPI 2021: 96.6 ML/MIN/1.73
EOSINOPHIL # BLD AUTO: 0.2 10*3/MM3 (ref 0–0.4)
EOSINOPHIL NFR BLD AUTO: 3.1 % (ref 0.3–6.2)
ERYTHROCYTE [DISTWIDTH] IN BLOOD BY AUTOMATED COUNT: 14.5 % (ref 12.3–15.4)
GLUCOSE SERPL-MCNC: 130 MG/DL (ref 65–99)
HCT VFR BLD AUTO: 44.8 % (ref 37.5–51)
HGB BLD-MCNC: 14.8 G/DL (ref 13–17.7)
LYMPHOCYTES # BLD AUTO: 2.7 10*3/MM3 (ref 0.7–3.1)
LYMPHOCYTES NFR BLD AUTO: 38.9 % (ref 19.6–45.3)
MCH RBC QN AUTO: 32.9 PG (ref 26.6–33)
MCHC RBC AUTO-ENTMCNC: 33 G/DL (ref 31.5–35.7)
MCV RBC AUTO: 99.5 FL (ref 79–97)
MONOCYTES # BLD AUTO: 0.5 10*3/MM3 (ref 0.1–0.9)
MONOCYTES NFR BLD AUTO: 7.4 % (ref 5–12)
NEUTROPHILS NFR BLD AUTO: 3.5 10*3/MM3 (ref 1.7–7)
NEUTROPHILS NFR BLD AUTO: 50 % (ref 42.7–76)
NRBC BLD AUTO-RTO: 0.2 /100 WBC (ref 0–0.2)
PLATELET # BLD AUTO: 135 10*3/MM3 (ref 140–450)
PMV BLD AUTO: 10.2 FL (ref 6–12)
POTASSIUM SERPL-SCNC: 3.6 MMOL/L (ref 3.5–5.2)
QT INTERVAL: 348 MS
QTC INTERVAL: 405 MS
RBC # BLD AUTO: 4.5 10*6/MM3 (ref 4.14–5.8)
SODIUM SERPL-SCNC: 143 MMOL/L (ref 136–145)
WBC NRBC COR # BLD AUTO: 7 10*3/MM3 (ref 3.4–10.8)

## 2024-01-21 PROCEDURE — 85025 COMPLETE CBC W/AUTO DIFF WBC: CPT | Performed by: NURSE PRACTITIONER

## 2024-01-21 PROCEDURE — 99232 SBSQ HOSP IP/OBS MODERATE 35: CPT | Performed by: INTERNAL MEDICINE

## 2024-01-21 PROCEDURE — 80048 BASIC METABOLIC PNL TOTAL CA: CPT | Performed by: NURSE PRACTITIONER

## 2024-01-21 PROCEDURE — G0378 HOSPITAL OBSERVATION PER HR: HCPCS

## 2024-01-21 RX ORDER — FLECAINIDE ACETATE 100 MG/1
100 TABLET ORAL EVERY 12 HOURS SCHEDULED
Qty: 60 TABLET | Refills: 0 | Status: SHIPPED | OUTPATIENT
Start: 2024-01-21 | End: 2024-02-20

## 2024-01-21 RX ADMIN — Medication 10 ML: at 10:04

## 2024-01-21 RX ADMIN — METOPROLOL SUCCINATE 50 MG: 50 TABLET, EXTENDED RELEASE ORAL at 10:02

## 2024-01-21 RX ADMIN — PANTOPRAZOLE SODIUM 40 MG: 40 TABLET, DELAYED RELEASE ORAL at 10:02

## 2024-01-21 RX ADMIN — APIXABAN 5 MG: 5 TABLET, FILM COATED ORAL at 10:02

## 2024-01-21 RX ADMIN — AMLODIPINE BESYLATE 10 MG: 5 TABLET ORAL at 10:01

## 2024-01-21 RX ADMIN — FLECAINIDE ACETATE 100 MG: 50 TABLET ORAL at 10:02

## 2024-01-21 NOTE — DISCHARGE SUMMARY
[facility]    David Causey MD    CHIEF COMPLAINT:     Chest tightness and elevated HR    HISTORY OF PRESENT ILLNESS:    Chest Pain         ED 01/20/2024  History of present illness a 63-year-old gentleman has history of atrial fibrillation states he was walking in the house this morning he developed some tightness in his chest his heart was racing and felt lightheaded.  He took his heart rate it was in the 140s.  The patient was brought in by EMS.  He denies any current pain he states it was mild there was no neck arm jaw pain there was no shortness of breath or sweatiness with that he was lightheaded and a little bit dizzy.  The patient denies any vomiting diarrhea no black or bloody stool no recent injury illness flus viruses vaccinations no recent long car ride plane ride immobilization foreign travels.  Patient did have some medication changes recently he has a history of atrial fibrillation and recently had digoxin added to his regimen.      Observation 01/20/2024  Patient agrees with HPI noted above including irregular and elevated heart rate with onset this morning.  He reports a history of A-fib and is currently on Eliquis, metoprolol and digoxin.  He states that he was started on digoxin early this month and it seemed to be working well until this morning.  He also reports a near syncope episode and heart rate of 153 this morning.  Patient is currently asymptomatic.        Observation 01/21/2024  Patient denies any acute distress or complaints including chest tightness, shortness of breath or elevated heart rate.  He is sinus arrhythmia on the monitor.  Per nursing staff, cardiology has rounded and cleared patient for discharge.  RN also stated that patient required oxygen overnight due to hypoxia when sleeping.     Past Medical History:   Diagnosis Date    Acute hypoxemic respiratory failure 11/06/2023    Allergic     Anxiety     Arthritis 06/2005    Back pain     Claustrophobia  1978    COPD (chronic obstructive pulmonary disease)     CTS (carpal tunnel syndrome) 10/2022    Dysphagia     Esophageal stricture     GERD (gastroesophageal reflux disease)     Hyperlipidemia     Hypertension     Knee pain     rt    Low back pain     Obesity     Obesity (BMI 30-39.9) 11/06/2023    Other cervical disc degeneration at C5-C6 level 08/22/2018    Paroxysmal atrial fibrillation with rapid ventricular response 11/06/2023    Pneumonia     Prediabetes 11/06/2023    PTSD (post-traumatic stress disorder)     Rhinovirus infection 11/06/2023    Thoracic disc herniation     Vitamin B12 deficiency      Past Surgical History:   Procedure Laterality Date    BRONCHOSCOPY N/A 11/3/2023    Procedure: BRONCHOSCOPY with bilateral lung washing's;  Surgeon: Kolton Brewer MD;  Location: [facility] ENDOSCOPY;  Service: Pulmonary;  Laterality: N/A;    CARDIAC CATHETERIZATION N/A 07/13/2022    Procedure: Left Heart Cath, possible pci;  Surgeon: Prieto Sidhu MD;  Location: [facility] CATH INVASIVE LOCATION;  Service: Cardiovascular;  Laterality: N/A;    ENDOSCOPY      ENDOSCOPY N/A 9/21/2023    Procedure: ESOPHAGOGASTRODUODENOSCOPY WITH non guided esophageal dialtion 54 Fr. Bougie and  cold forcep biopsy x1 area;  Surgeon: Andres Concepcion MD;  Location: [facility] ENDOSCOPY;  Service: Gastroenterology;  Laterality: N/A;  Post- erosive gastritis, hiatal hernia, esophageal stricture    HERNIA REPAIR      KNEE ARTHROPLASTY      x2    SHOULDER ARTHROSCOPY W/ ROTATOR CUFF REPAIR Right 08/11/2020    Procedure: SHOULDER ARTHROSCOPY WITH ROTATOR CUFF REPAIR, extensive debridement;  Surgeon: Fredi Ritchie MD;  Location: [facility] MAIN OR;  Service: Orthopedics;  Laterality: Right;    TONSILLECTOMY       Family History   Problem Relation Age of Onset    Heart disease Mother     Early death Mother     Hyperlipidemia Mother     Hypertension Mother     Thyroid disease Mother     Cancer Father     Stroke Father      Vision loss Father     Stroke Paternal Grandfather     Arthritis Paternal Grandmother     Alcohol abuse Brother     Asthma Brother     Depression Brother     Hyperlipidemia Brother     Hypertension Brother     Learning disabilities Brother     Mental illness Brother     Drug abuse Brother     Early death Brother     Heart disease Brother     Hyperlipidemia Brother     Hypertension Brother      Social History     Tobacco Use    Smoking status: Every Day     Packs/day: 1.00     Years: 47.00     Additional pack years: 0.00     Total pack years: 47.00     Types: Cigarettes     Start date: 1/9/1973    Smokeless tobacco: Never    Tobacco comments:     Smoked a half a pack a day for 42 years didn't start to smoke a whole pack until 2017   Vaping Use    Vaping Use: Never used   Substance Use Topics    Alcohol use: No    Drug use: No     Medications Prior to Admission   Medication Sig Dispense Refill Last Dose    albuterol sulfate  (90 Base) MCG/ACT inhaler Inhale 2 puffs Every 4 (Four) Hours As Needed for Wheezing. 18 g 1     amLODIPine (NORVASC) 10 MG tablet Take 1 tablet by mouth Daily.       apixaban (ELIQUIS) 5 MG tablet tablet Take 1 tablet by mouth Every 12 (Twelve) Hours. Indications: Atrial Fibrillation 60 tablet 0     digoxin (LANOXIN) 250 MCG tablet Take 1 tablet by mouth Daily.       doxepin (SINEquan) 10 MG capsule Take 1 capsule by mouth Every Night.       ipratropium-albuterol (DUO-NEB) 0.5-2.5 mg/3 ml nebulizer Take 3 mL by nebulization Every 4 (Four) Hours As Needed for Wheezing.       metoprolol succinate XL (TOPROL-XL) 50 MG 24 hr tablet Take 1 tablet by mouth Daily. 30 tablet 0     pantoprazole (PROTONIX) 40 MG EC tablet Take 1 tablet by mouth Daily.       pravastatin (PRAVACHOL) 80 MG tablet Take 1 tablet by mouth Every Night.       tiotropium bromide-olodaterol (STIOLTO RESPIMAT) 2.5-2.5 MCG/ACT aerosol solution inhaler Inhale 1 puff Daily.        Allergies:  Atorvastatin and  Lisinopril    Immunization History   Administered Date(s) Administered    COVID-19 (ACE) 03/13/2021    Fluzone (or Fluarix & Flulaval for VFC) >6mos 10/25/2022    Pneumococcal Conjugate 20-Valent (PCV20) 06/09/2023    Pneumococcal Polysaccharide (PPSV23) 05/13/2015, 07/13/2018    Shingrix 07/30/2021, 10/05/2021           REVIEW OF SYSTEMS:    Review of Systems   Cardiovascular:  Positive for chest pain.       Review of Systems   Constitutional: Negative for diaphoresis.   Cardiovascular:  Positive for chest pain, near-syncope and palpitations. Negative for dyspnea on exertion and syncope.   Respiratory:  Positive for shortness of breath.    Gastrointestinal:  Negative for nausea and vomiting.   All other systems reviewed and are negative.    Vital Signs  Temp:  [97.5 °F (36.4 °C)-98.4 °F (36.9 °C)] 98.4 °F (36.9 °C)  Heart Rate:  [60-88] 60  Resp:  [12-22] 12  BP: (104-127)/(63-83) 112/68          Physical Exam:  Physical Exam  Vitals and nursing note reviewed.   Constitutional:       Appearance: Normal appearance. He is obese.   HENT:      Head: Normocephalic and atraumatic.      Right Ear: External ear normal.      Left Ear: External ear normal.      Nose: Nose normal.      Mouth/Throat:      Mouth: Mucous membranes are moist.   Eyes:      Extraocular Movements: Extraocular movements intact.   Cardiovascular:      Rate and Rhythm: Normal rate and regular rhythm.      Pulses: Normal pulses.      Heart sounds: Normal heart sounds.   Pulmonary:      Effort: Pulmonary effort is normal.      Breath sounds: Normal breath sounds.   Abdominal:      General: Abdomen is flat. Bowel sounds are normal.      Palpations: Abdomen is soft.   Musculoskeletal:         General: Normal range of motion.      Cervical back: Normal range of motion.   Skin:     General: Skin is warm.   Neurological:      Mental Status: He is alert and oriented to person, place, and time.   Psychiatric:         Behavior: Behavior normal.          Thought Content: Thought content normal.         Judgment: Judgment normal.         Emotional Behavior:    Normal   Debilities:   None  Results Review:    I reviewed the patient's new clinical results.  Lab Results (most recent)       Procedure Component Value Units Date/Time    Basic Metabolic Panel [[id number]]  (Abnormal) Collected: 01/21/24 0418    Specimen: Blood from Arm, Left Updated: 01/21/24 0508     Glucose 130 mg/dL      BUN 12 mg/dL      Creatinine 0.88 mg/dL      Sodium 143 mmol/L      Potassium 3.6 mmol/L      Comment: Slight hemolysis detected by analyzer. Result may be falsely elevated.        Chloride 106 mmol/L      CO2 27.0 mmol/L      Calcium 8.4 mg/dL      BUN/Creatinine Ratio 13.6     Anion Gap 10.0 mmol/L      eGFR 96.6 mL/min/1.73     Narrative:      GFR Normal >60  Chronic Kidney Disease <60  Kidney Failure <15      CBC & Differential [[id number]]  (Abnormal) Collected: 01/21/24 0418    Specimen: Blood from Arm, Left Updated: 01/21/24 0440    Narrative:      The following orders were created for panel order CBC & Differential.  Procedure                               Abnormality         Status                     ---------                               -----------         ------                     CBC Auto Differential[[id number]]        Abnormal            Final result                 Please view results for these tests on the individual orders.    CBC Auto Differential [[id number]]  (Abnormal) Collected: 01/21/24 0418    Specimen: Blood from Arm, Left Updated: 01/21/24 0440     WBC 7.00 10*3/mm3      RBC 4.50 10*6/mm3      Hemoglobin 14.8 g/dL      Hematocrit 44.8 %      MCV 99.5 fL      MCH 32.9 pg      MCHC 33.0 g/dL      RDW 14.5 %      RDW-SD 52.5 fl      MPV 10.2 fL      Platelets 135 10*3/mm3      Neutrophil % 50.0 %      Lymphocyte % 38.9 %      Monocyte % 7.4 %      Eosinophil % 3.1 %      Basophil % 0.6 %      Neutrophils, Absolute 3.50 10*3/mm3      Lymphocytes, Absolute 2.70  10*3/mm3      Monocytes, Absolute 0.50 10*3/mm3      Eosinophils, Absolute 0.20 10*3/mm3      Basophils, Absolute 0.00 10*3/mm3      nRBC 0.2 /100 WBC     High Sensitivity Troponin T [[id number]]  (Normal) Collected: 01/20/24 1245    Specimen: Blood from Arm, Left Updated: 01/20/24 1322     HS Troponin T 7 ng/L     Narrative:      High Sensitive Troponin T Reference Range:  <14.0 ng/L- Negative Female for AMI  <22.0 ng/L- Negative Male for AMI  >=14 - Abnormal Female indicating possible myocardial injury.  >=22 - Abnormal Male indicating possible myocardial injury.   Clinicians would have to utilize clinical acumen, EKG, Troponin, and serial changes to determine if it is an Acute Myocardial Infarction or myocardial injury due to an underlying chronic condition.         TSH [[id number]]  (Normal) Collected: 01/20/24 0932    Specimen: Blood Updated: 01/20/24 1014     TSH 2.730 uIU/mL     T4, Free [[id number]]  (Abnormal) Collected: 01/20/24 0932    Specimen: Blood Updated: 01/20/24 1014     Free T4 0.77 ng/dL     Narrative:      Results may be falsely increased if patient taking Biotin.      Digoxin Level [[id number]]  (Abnormal) Collected: 01/20/24 0932    Specimen: Blood Updated: 01/20/24 1009     Digoxin 1.50 ng/mL     Single High Sensitivity Troponin T [[id number]]  (Normal) Collected: 01/20/24 0932    Specimen: Blood Updated: 01/20/24 1008     HS Troponin T 8 ng/L     Narrative:      High Sensitive Troponin T Reference Range:  <14.0 ng/L- Negative Female for AMI  <22.0 ng/L- Negative Male for AMI  >=14 - Abnormal Female indicating possible myocardial injury.  >=22 - Abnormal Male indicating possible myocardial injury.   Clinicians would have to utilize clinical acumen, EKG, Troponin, and serial changes to determine if it is an Acute Myocardial Infarction or myocardial injury due to an underlying chronic condition.         Comprehensive Metabolic Panel [[id number]]  (Abnormal) Collected: 01/20/24 0932    Specimen: Blood  Updated: 01/20/24 1007     Glucose 112 mg/dL      BUN 9 mg/dL      Creatinine 0.84 mg/dL      Sodium 143 mmol/L      Potassium 3.7 mmol/L      Comment: Slight hemolysis detected by analyzer. Result may be falsely elevated.        Chloride 107 mmol/L      CO2 24.0 mmol/L      Calcium 8.9 mg/dL      Total Protein 7.3 g/dL      Albumin 4.3 g/dL      ALT (SGPT) 22 U/L      AST (SGOT) 22 U/L      Comment: Slight hemolysis detected by analyzer. Result may be falsely elevated.        Alkaline Phosphatase 72 U/L      Total Bilirubin 0.5 mg/dL      Globulin 3.0 gm/dL      A/G Ratio 1.4 g/dL      BUN/Creatinine Ratio 10.7     Anion Gap 12.0 mmol/L      eGFR 98.0 mL/min/1.73     Narrative:      GFR Normal >60  Chronic Kidney Disease <60  Kidney Failure <15      Magnesium [[id number]]  (Normal) Collected: 01/20/24 0932    Specimen: Blood Updated: 01/20/24 1007     Magnesium 2.0 mg/dL     CBC & Differential [[id number]]  (Abnormal) Collected: 01/20/24 0932    Specimen: Blood Updated: 01/20/24 0938    Narrative:      The following orders were created for panel order CBC & Differential.  Procedure                               Abnormality         Status                     ---------                               -----------         ------                     CBC Auto Differential[[id number]]        Abnormal            Final result                 Please view results for these tests on the individual orders.    CBC Auto Differential [[id number]]  (Abnormal) Collected: 01/20/24 0932    Specimen: Blood Updated: 01/20/24 0938     WBC 7.20 10*3/mm3      RBC 4.86 10*6/mm3      Hemoglobin 16.5 g/dL      Hematocrit 48.9 %      .5 fL      MCH 33.9 pg      MCHC 33.7 g/dL      RDW 14.2 %      RDW-SD 49.0 fl      MPV 9.7 fL      Platelets 157 10*3/mm3      Neutrophil % 50.6 %      Lymphocyte % 39.4 %      Monocyte % 6.2 %      Eosinophil % 3.1 %      Basophil % 0.7 %      Neutrophils, Absolute 3.70 10*3/mm3      Lymphocytes, Absolute 2.80  10*3/mm3      Monocytes, Absolute 0.50 10*3/mm3      Eosinophils, Absolute 0.20 10*3/mm3      Basophils, Absolute 0.10 10*3/mm3      nRBC 0.1 /100 WBC             Imaging Results (Most Recent)       Procedure Component Value Units Date/Time    XR Chest 1 View [[id number]] Collected: 01/20/24 0940     Updated: 01/20/24 0942    Narrative:        XR CHEST 1 VW    Date of Exam: 1/20/2024 9:38 AM EST    Indication: Palpitations    Comparison: November 17, 2023    FINDINGS:  No definite focal or diffuse pulmonary infiltrate is identified.  No pneumothorax or significant pleural effusion.  Heart size and mediastinal contour appear within normal limits.  No definite osseous abnormality is seen on this limited single view.      Impression:      No radiographic findings of acute cardiopulmonary abnormality.      Electronically Signed: Andres Alatorre    1/20/2024 9:40 AM EST    Workstation ID: [id number]          reviewed    ECG/EMG Results (most recent)       Procedure Component Value Units Date/Time    SCANNED - TELEMETRY   [[id number]] Resulted: 01/20/24     Updated: 01/20/24 1739    SCANNED - TELEMETRY   [[id number]] Resulted: 01/20/24     Updated: 01/20/24 1826    ECG 12 Lead Rhythm Change [[id number]] Collected: 01/20/24 0919     Updated: 01/21/24 0611     QT Interval 348 ms      QTC Interval 405 ms     Narrative:      HEART RATE= 91  bpm  RR Interval= 740  ms  HI Interval= 163  ms  P Horizontal Axis= 0  deg  P Front Axis= 72  deg  QRSD Interval= 74  ms  QT Interval= 348  ms  QTcB= 405  ms  QRS Axis= 18  deg  T Wave Axis= 53  deg  - OTHERWISE NORMAL ECG -  Sinus arrhythmia  Low voltage, extremity leads  Electronically Signed By: Neymar Musa (ELISEO) 21-Jan-2024 06:11:05  Date and Time of Study: 2024-01-20 09:19:10          reviewed        Results for orders placed during the hospital encounter of 10/29/23    Adult Transthoracic Echo Complete W/ Cont if Necessary Per Protocol    Interpretation Summary    Left ventricular systolic  function is normal. Left ventricular ejection fraction appears to be 61 - 65%.    Estimated right ventricular systolic pressure from tricuspid regurgitation is normal (<35 mmHg).    Unremarkable study  Normal EF, normal diastolic function, normal RV size and function, no significant valvular abnormality, normal filling pressures      Microbiology Results (last 10 days)       ** No results found for the last 240 hours. **            Assessment & Plan     * No active hospital problems. *          Chest tightness with history of A-fib        Lab Results   Component Value Date     TROPONINT 7 01/20/2024     TROPONINT 8 01/20/2024     TROPONINT 11 11/17/2023   -CMP unremarkable except mild hyperglycemia  -TSH within normal range and T4 mildly decreased at 0.77  -CBC unremarkable  -Digoxin level 1.50  -Chest X-ray: Showed no acute cardiopulmonary abnormality  -EKG: Sinus arrhythmia with heart rate of 91, PA interval 163 and   -According to ED providers patient was in and out of A-fib on the heart monitor.  Highest heart rate of 121.  Current heart rate in the 70s on the heart monitor  -Telemetry  -Cardiology consulted and recommended discontinue digoxin and start flecainide  -Continue Eliquis  -Follow-up with primary cardiology.  Per cardiologist notes if A-fib reoccurs then ablation should be considered    Hypoxia related to sleeping  -Outpatient referral to sleep medicine to rule out sleep apnea     Hypertension  -Well controlled   BP Readings from Last 1 Encounters:   01/21/24 112/68         BP Readings from Last 1 Encounters:   01/20/24 123/82   - Continue Norvasc  - Monitor while admitted      GERD  -Continue PPI     Hyperlipidemia  -Patient on pravastatin.  Reports allergy to atorvastatin.  -Will hold while admitted since pravastatin is a nonformulary drug       I discussed the patients findings and my recommendations with patient and nursing staff.     Discharge Diagnosis:      * No active hospital problems.  *      Hospital Course  Patient is a 63 y.o. male presented with chest tightness and elevated heart rate as noted in HPI above.  Troponin, CMP, TSH, CBC unremarkable.  Chest x-ray showed no acute process and EKG showed sinus arrhythmia.  Patient has a history of A-fib and has been treated with metoprolol, Eliquis and digoxin.  Per ED providers patient was in and out of A-fib in the ER.  His self converted to sinus rhythm.  Cardiology was consulted and recommended switching digoxin to flecainide. At this time, patient felt to be in good condition for discharge with close follow up with PCP and primary cardiologist. Instructed to take all medications as prescribed and to return to ED if any concerning signs/symptoms.  Patient was given a referral to sleep med seen at discharge due to hypoxia related to sleep.  All test/lab results were discussed with patient. All questions were answered and patient verbalizes understanding.   .      Past Medical History:     Past Medical History:   Diagnosis Date    Acute hypoxemic respiratory failure 11/06/2023    Allergic     Anxiety     Arthritis 06/2005    Back pain     Claustrophobia 1978    COPD (chronic obstructive pulmonary disease)     CTS (carpal tunnel syndrome) 10/2022    Dysphagia     Esophageal stricture     GERD (gastroesophageal reflux disease)     Hyperlipidemia     Hypertension     Knee pain     rt    Low back pain     Obesity     Obesity (BMI 30-39.9) 11/06/2023    Other cervical disc degeneration at C5-C6 level 08/22/2018    Paroxysmal atrial fibrillation with rapid ventricular response 11/06/2023    Pneumonia     Prediabetes 11/06/2023    PTSD (post-traumatic stress disorder)     Rhinovirus infection 11/06/2023    Thoracic disc herniation     Vitamin B12 deficiency        Past Surgical History:     Past Surgical History:   Procedure Laterality Date    BRONCHOSCOPY N/A 11/3/2023    Procedure: BRONCHOSCOPY with bilateral lung washing's;  Surgeon: Kolton Brewer MD;   Location: [facility] ENDOSCOPY;  Service: Pulmonary;  Laterality: N/A;    CARDIAC CATHETERIZATION N/A 07/13/2022    Procedure: Left Heart Cath, possible pci;  Surgeon: Prieto Sidhu MD;  Location: [facility] CATH INVASIVE LOCATION;  Service: Cardiovascular;  Laterality: N/A;    ENDOSCOPY      ENDOSCOPY N/A 9/21/2023    Procedure: ESOPHAGOGASTRODUODENOSCOPY WITH non guided esophageal dialtion 54 Fr. Bougie and  cold forcep biopsy x1 area;  Surgeon: Andres Concepcion MD;  Location: [facility] ENDOSCOPY;  Service: Gastroenterology;  Laterality: N/A;  Post- erosive gastritis, hiatal hernia, esophageal stricture    HERNIA REPAIR      KNEE ARTHROPLASTY      x2    SHOULDER ARTHROSCOPY W/ ROTATOR CUFF REPAIR Right 08/11/2020    Procedure: SHOULDER ARTHROSCOPY WITH ROTATOR CUFF REPAIR, extensive debridement;  Surgeon: Fredi Ritchie MD;  Location: [facility] MAIN OR;  Service: Orthopedics;  Laterality: Right;    TONSILLECTOMY         Social History:   Social History     Socioeconomic History    Marital status:    Tobacco Use    Smoking status: Every Day     Packs/day: 1.00     Years: 47.00     Additional pack years: 0.00     Total pack years: 47.00     Types: Cigarettes     Start date: 1/9/1973    Smokeless tobacco: Never    Tobacco comments:     Smoked a half a pack a day for 42 years didn't start to smoke a whole pack until 2017   Vaping Use    Vaping Use: Never used   Substance and Sexual Activity    Alcohol use: No    Drug use: No    Sexual activity: Defer       Procedures Performed         Consults:   Consults       Date and Time Order Name Status Description    1/20/2024 11:57 AM Inpatient Cardiology Consult              Condition on Discharge:     Stable    Discharge Disposition  Home or Self Care    Discharge Medications     Discharge Medications        New Medications        Instructions Start Date   flecainide 100 MG tablet  Commonly known as: TAMBOCOR   100 mg, Oral, Every 12 Hours  Scheduled             Continue These Medications        Instructions Start Date   albuterol sulfate  (90 Base) MCG/ACT inhaler  Commonly known as: PROVENTIL HFA;VENTOLIN HFA;PROAIR HFA   2 puffs, Inhalation, Every 4 Hours PRN      amLODIPine 10 MG tablet  Commonly known as: NORVASC   10 mg, Oral, Daily      apixaban 5 MG tablet tablet  Commonly known as: ELIQUIS   5 mg, Oral, Every 12 Hours Scheduled      doxepin 10 MG capsule  Commonly known as: SINEquan   10 mg, Oral, Nightly      ipratropium-albuterol 0.5-2.5 mg/3 ml nebulizer  Commonly known as: DUO-NEB   3 mL, Nebulization, Every 4 Hours PRN      metoprolol succinate XL 50 MG 24 hr tablet  Commonly known as: TOPROL-XL   50 mg, Oral, Every 24 Hours Scheduled      pantoprazole 40 MG EC tablet  Commonly known as: PROTONIX   40 mg, Oral, Daily      pravastatin 80 MG tablet  Commonly known as: PRAVACHOL   80 mg, Oral, Nightly      tiotropium bromide-olodaterol 2.5-2.5 MCG/ACT aerosol solution inhaler  Commonly known as: STIOLTO RESPIMAT   1 puff, Inhalation, Daily - RT             Stop These Medications      digoxin 250 MCG tablet  Commonly known as: LANOXIN              Discharge Diet:   Diet Instructions       Diet: Cardiac Diets; Healthy Heart (2-3 Na+); Regular Texture (IDDSI 7); Thin (IDDSI 0)      Discharge Diet: Cardiac Diets    Cardiac Diet: Healthy Heart (2-3 Na+)    Texture: Regular Texture (IDDSI 7)    Fluid Consistency: Thin (IDDSI 0)            Activity at Discharge:     Follow-up Appointments  Future Appointments   Date Time Provider Department Center   7/22/2024 10:15 AM Andres Valadez MD [facility]     Additional Instructions for the Follow-ups that You Need to Schedule       Ambulatory Referral to Sleep Medicine   As directed      Follow-up needed: Yes        Discharge Follow-up with PCP   As directed       Currently Documented PCP:    David Causey MD    PCP Phone Number:    [phone]     Follow Up Details: 5-7 days         Discharge Follow-up with Specified Provider: Cardiology; 2 Weeks   As directed      To: Cardiology   Follow Up: 2 Weeks                Test Results Pending at Discharge       Risk for Readmission (LACE) Score: 5 (1/21/2024  6:00 AM)      Greater than 30 minutes spent in discharge activities for this patient    Signature:Electronically signed by KEILA Paige, 01/21/24, 11:31 AM EST.

## 2024-01-21 NOTE — PLAN OF CARE
Goal Outcome Evaluation:  Plan of Care Reviewed With: patient           Outcome Evaluation: Pt rested throughout the night without complaints of chest pain  VSS pt placed on 3L N/C while sleeping d/t sats dropping to 86% on room air. will await further orders from MD

## 2024-01-21 NOTE — PROGRESS NOTES
"    Reason for follow-up: A-fib     Patient Care Team:  David Causey MD as PCP - General (Family Medicine)  David Boyd MD as Consulting Physician (Otolaryngology)  Fredi Ritchie MD as Consulting Physician (Orthopedic Surgery)  Prieto Sidhu MD as Consulting Physician (Cardiology)  Henri Kenney, JENNIFER as Ambulatory  ([facility])    Dar Mariana Rea is doing well today     ROS    Atorvastatin and Lisinopril    Scheduled Meds:amLODIPine, 10 mg, Oral, Daily  apixaban, 5 mg, Oral, Q12H  doxepin, 10 mg, Oral, Nightly  flecainide, 100 mg, Oral, Q12H  metoprolol succinate XL, 50 mg, Oral, Q24H  pantoprazole, 40 mg, Oral, Daily  senna-docusate sodium, 2 tablet, Oral, BID  sodium chloride, 10 mL, Intravenous, Q12H      Continuous Infusions:   PRN Meds:.  acetaminophen **OR** acetaminophen **OR** acetaminophen    aluminum-magnesium hydroxide-simethicone    senna-docusate sodium **AND** polyethylene glycol **AND** bisacodyl **AND** bisacodyl    ipratropium-albuterol    nitroglycerin    ondansetron    [COMPLETED] Insert Peripheral IV **AND** sodium chloride    sodium chloride    sodium chloride      VITAL SIGNS  Vitals:    01/20/24 2203 01/21/24 0246 01/21/24 0620 01/21/24 1001   BP: 104/63 125/82 127/80 112/68   BP Location: Left arm Left arm Left arm Right arm   Patient Position: Lying Lying Lying Lying   Pulse: 68 78 67 60   Resp: 17 19 22 12   Temp: 98.3 °F (36.8 °C) 98.2 °F (36.8 °C) 98 °F (36.7 °C) 98.4 °F (36.9 °C)   TempSrc: Oral Oral Oral Oral   SpO2: 91% 93% 96%    Weight:       Height:           Flowsheet Rows      Flowsheet Row First Filed Value   Admission Height 182.9 cm (72\") Documented at 01/20/2024 0909   Admission Weight 113 kg (250 lb) Documented at 01/20/2024 0909               Physical Exam  VITALS REVIEWED    General:      well developed, in no acute distress.    Head:      normocephalic and atraumatic.    Eyes:      PERRL/EOM intact, conjunctiva " and sclera clear with out nystagmus.    Neck:      no masses, thyromegaly,  trachea central with normal respiratory effort and PMI displaced laterally  Lungs:      Clear  Heart:       Regular rate and rhythm  Msk:      no deformity or scoliosis noted of thoracic or lumbar spine.    Pulses:      pulses normal in all 4 extremities.    Extremities:       No lower extremity edema  Neurologic:      no focal deficits.   alert oriented x3  Skin:      intact without lesions or rashes.    Psych:      alert and cooperative; normal mood and affect; normal attention span and concentration.          LAB RESULTS (LAST 7 DAYS)    CBC  Results from last 7 days   Lab Units 01/21/24  0418 01/20/24  0932   WBC 10*3/mm3 7.00 7.20   RBC 10*6/mm3 4.50 4.86   HEMOGLOBIN g/dL 14.8 16.5   HEMATOCRIT % 44.8 48.9   MCV fL 99.5* 100.5*   PLATELETS 10*3/mm3 135* 157       BMP  Results from last 7 days   Lab Units 01/21/24  0418 01/20/24  0932   SODIUM mmol/L 143 143   POTASSIUM mmol/L 3.6 3.7   CHLORIDE mmol/L 106 107   CO2 mmol/L 27.0 24.0   BUN mg/dL 12 9   CREATININE mg/dL 0.88 0.84   GLUCOSE mg/dL 130* 112*   MAGNESIUM mg/dL  --  2.0       CMP   Results from last 7 days   Lab Units 01/21/24  0418 01/20/24  0932   SODIUM mmol/L 143 143   POTASSIUM mmol/L 3.6 3.7   CHLORIDE mmol/L 106 107   CO2 mmol/L 27.0 24.0   BUN mg/dL 12 9   CREATININE mg/dL 0.88 0.84   GLUCOSE mg/dL 130* 112*   ALBUMIN g/dL  --  4.3   BILIRUBIN mg/dL  --  0.5   ALK PHOS U/L  --  72   AST (SGOT) U/L  --  22   ALT (SGPT) U/L  --  22         BNP        TROPONIN  Results from last 7 days   Lab Units 01/20/24  1245   HSTROP T ng/L 7       CoAg        Creatinine Clearance  Estimated Creatinine Clearance: 111.6 mL/min (by C-G formula based on SCr of 0.88 mg/dL).    ABG          EKG    I personally reviewed the patient's EKG/Telemetry data: Sinus with occasional PACs      Assessment & Plan       Atrial fibrillation with RVR      Jagdish Rea is a 63-year-old male patient who  has paroxysmal atrial fibrillation, first occurrence around November 2023, at that time he was admitted with A-fib RVR, self converted to sinus rhythm.  Patient had been on metoprolol and digoxin at home and Eliquis for stroke prevention.  He presented back to the hospital due to shortness of breath and fast heart rate reported at home, most likely recurrence of A-fib.  He is back in normal rhythm now.  Patient has a recent heart catheterization which did not show any significant CAD.  So definitely has paroxysmal atrial fibrillation with rapid ventricular rates and quite symptomatic with it.  Patient will benefit from adequate rhythm control strategy.  Unfortunately we will not be able to follow this patient in our office due to his insurance but we can initiate flecainide since he does not have CAD, stop the digoxin.  In the future if he has recurrence despite antiarrhythmic therapy then ablation can be considered.    1/21/2024  Patient remained in sinus rhythm overnight, okay for discharge on Toprol-XL 50 mg once a day, Eliquis 5 mg every 12 and flecainide 100 mg every 12.  No digoxin.  He will need prescription for flecainide on discharge.  He will follow-up with his own cardiologist.    I discussed the patients findings and my recommendations with patient and agrees with outlined plan.    Rodolfo Estes MD  01/21/24  11:43 EST

## 2024-02-08 ENCOUNTER — PATIENT OUTREACH (OUTPATIENT)
Dept: CASE MANAGEMENT | Facility: OTHER | Age: 64
End: 2024-02-08
Payer: MEDICARE

## 2024-02-08 NOTE — OUTREACH NOTE
AMBULATORY CASE MANAGEMENT NOTE    Name and Relationship of Patient/Support Person: Jagdish Rea - Self  Self    Patient Outreach    Follow up RN-ACM outreach call made to pt. Pt states he's doing ok, resting more. Reviewed AVS with pt from last hospital admission at [facility]. Pt denies CP, reports he has had 1 episode of lightheadedness. He reports compliant with medications. Reports he has seen his cardiologist post hospital discharge. Pt states he has seen his PCP and has next routine follow up scheduled. He declines pulmonology follow up at this time. Unable to further outreach, pt states he needs to go. Follow up outreach scheduled.       Henri FALK  Ambulatory Case Management    2/8/2024, 11:11 EST

## 2024-03-29 NOTE — ANESTHESIA POSTPROCEDURE EVALUATION
Patient: Jagdish Rea    Procedure Summary     Date:  08/11/20 Room / Location:  [facility] OR  / [facility] MAIN OR    Anesthesia Start:  0958 Anesthesia Stop:  1119    Procedure:  SHOULDER ARTHROSCOPY WITH ROTATOR CUFF REPAIR, extensive debridement (Right Shoulder) Diagnosis:       Acute pain of right shoulder      (Acute pain of right shoulder [M25.511])    Surgeon:  Fredi Ritchie MD Provider:  Paul Nascimento MD    Anesthesia Type:  general with block ASA Status:  3          Anesthesia Type: general with block    Vitals  Vitals Value Taken Time   /79 8/11/2020 11:57 AM   Temp 96.9 °F (36.1 °C) 8/11/2020 11:57 AM   Pulse 77 8/11/2020 11:58 AM   Resp 13 8/11/2020 11:57 AM   SpO2 92 % 8/11/2020 11:58 AM   Vitals shown include unvalidated device data.        Post Anesthesia Care and Evaluation    Pain management: adequate  Airway patency: patent  Anesthetic complications: No anesthetic complications  PONV Status: none  Cardiovascular status: acceptable  Respiratory status: acceptable  Hydration status: acceptable       approval from the insurance company.

## 2024-07-31 ENCOUNTER — ANESTHESIA EVENT (OUTPATIENT)
Dept: CARDIOLOGY | Facility: HOSPITAL | Age: 64
End: 2024-07-31
Payer: MEDICARE

## 2024-07-31 ENCOUNTER — ANESTHESIA (OUTPATIENT)
Dept: CARDIOLOGY | Facility: HOSPITAL | Age: 64
End: 2024-07-31
Payer: MEDICARE

## 2024-07-31 ENCOUNTER — APPOINTMENT (OUTPATIENT)
Dept: GENERAL RADIOLOGY | Facility: HOSPITAL | Age: 64
End: 2024-07-31
Payer: MEDICARE

## 2024-07-31 ENCOUNTER — HOSPITAL ENCOUNTER (INPATIENT)
Facility: HOSPITAL | Age: 64
LOS: 2 days | Discharge: HOME | End: 2024-08-02
Attending: EMERGENCY MEDICINE | Admitting: INTERNAL MEDICINE
Payer: MEDICARE

## 2024-07-31 DIAGNOSIS — I48.92 ATRIAL FLUTTER, UNSPECIFIED TYPE: ICD-10-CM

## 2024-07-31 DIAGNOSIS — R55 NEAR SYNCOPE: ICD-10-CM

## 2024-07-31 DIAGNOSIS — I45.5 SINUS PAUSE: Primary | ICD-10-CM

## 2024-07-31 PROBLEM — I45.9 HEART BLOCK: Status: ACTIVE | Noted: 2024-07-31

## 2024-07-31 LAB
ACT BLD: 128 SECONDS (ref 89–137)
ACT BLD: 287 SECONDS (ref 89–137)
ACT BLD: 311 SECONDS (ref 89–137)
ACT BLD: 324 SECONDS (ref 89–137)
ACT BLD: 360 SECONDS (ref 89–137)
ALBUMIN SERPL-MCNC: 4.3 G/DL (ref 3.5–5.2)
ALBUMIN/GLOB SERPL: 1.4 G/DL
ALP SERPL-CCNC: 73 U/L (ref 39–117)
ALT SERPL W P-5'-P-CCNC: 13 U/L (ref 1–41)
ANION GAP SERPL CALCULATED.3IONS-SCNC: 10 MMOL/L (ref 5–15)
AST SERPL-CCNC: 19 U/L (ref 1–40)
BASOPHILS # BLD AUTO: 0.06 10*3/MM3 (ref 0–0.2)
BASOPHILS NFR BLD AUTO: 0.9 % (ref 0–1.5)
BILIRUB SERPL-MCNC: 0.5 MG/DL (ref 0–1.2)
BUN SERPL-MCNC: 9 MG/DL (ref 8–23)
BUN/CREAT SERPL: 9.9 (ref 7–25)
CALCIUM SPEC-SCNC: 9.4 MG/DL (ref 8.6–10.5)
CHLORIDE SERPL-SCNC: 106 MMOL/L (ref 98–107)
CO2 SERPL-SCNC: 24 MMOL/L (ref 22–29)
CREAT SERPL-MCNC: 0.91 MG/DL (ref 0.76–1.27)
DEPRECATED RDW RBC AUTO: 48.6 FL (ref 37–54)
EGFRCR SERPLBLD CKD-EPI 2021: 94.7 ML/MIN/1.73
EOSINOPHIL # BLD AUTO: 0.12 10*3/MM3 (ref 0–0.4)
EOSINOPHIL NFR BLD AUTO: 1.8 % (ref 0.3–6.2)
ERYTHROCYTE [DISTWIDTH] IN BLOOD BY AUTOMATED COUNT: 12.5 % (ref 12.3–15.4)
GLOBULIN UR ELPH-MCNC: 3.1 GM/DL
GLUCOSE SERPL-MCNC: 99 MG/DL (ref 65–99)
HCT VFR BLD AUTO: 51.2 % (ref 37.5–51)
HGB BLD-MCNC: 17.1 G/DL (ref 13–17.7)
HOLD SPECIMEN: NORMAL
HOLD SPECIMEN: NORMAL
IMM GRANULOCYTES # BLD AUTO: 0.04 10*3/MM3 (ref 0–0.05)
IMM GRANULOCYTES NFR BLD AUTO: 0.6 % (ref 0–0.5)
LYMPHOCYTES # BLD AUTO: 2.62 10*3/MM3 (ref 0.7–3.1)
LYMPHOCYTES NFR BLD AUTO: 39.8 % (ref 19.6–45.3)
MAGNESIUM SERPL-MCNC: 2.2 MG/DL (ref 1.6–2.4)
MCH RBC QN AUTO: 34.3 PG (ref 26.6–33)
MCHC RBC AUTO-ENTMCNC: 33.4 G/DL (ref 31.5–35.7)
MCV RBC AUTO: 102.8 FL (ref 79–97)
MONOCYTES # BLD AUTO: 0.43 10*3/MM3 (ref 0.1–0.9)
MONOCYTES NFR BLD AUTO: 6.5 % (ref 5–12)
MRSA DNA SPEC QL NAA+PROBE: NORMAL
NEUTROPHILS NFR BLD AUTO: 3.32 10*3/MM3 (ref 1.7–7)
NEUTROPHILS NFR BLD AUTO: 50.4 % (ref 42.7–76)
NRBC BLD AUTO-RTO: 0 /100 WBC (ref 0–0.2)
NT-PROBNP SERPL-MCNC: 774 PG/ML (ref 0–900)
PLATELET # BLD AUTO: 157 10*3/MM3 (ref 140–450)
PMV BLD AUTO: 11.7 FL (ref 6–12)
POTASSIUM SERPL-SCNC: 4.1 MMOL/L (ref 3.5–5.2)
PROT SERPL-MCNC: 7.4 G/DL (ref 6–8.5)
RBC # BLD AUTO: 4.98 10*6/MM3 (ref 4.14–5.8)
SODIUM SERPL-SCNC: 140 MMOL/L (ref 136–145)
T4 FREE SERPL-MCNC: 0.69 NG/DL (ref 0.93–1.7)
TROPONIN T SERPL HS-MCNC: 11 NG/L
TSH SERPL DL<=0.05 MIU/L-ACNC: 3.67 UIU/ML (ref 0.27–4.2)
WBC NRBC COR # BLD AUTO: 6.59 10*3/MM3 (ref 3.4–10.8)
WHOLE BLOOD HOLD COAG: NORMAL
WHOLE BLOOD HOLD SPECIMEN: NORMAL

## 2024-07-31 PROCEDURE — 85347 COAGULATION TIME ACTIVATED: CPT

## 2024-07-31 PROCEDURE — 25010000002 CEFAZOLIN PER 500 MG: Performed by: NURSE ANESTHETIST, CERTIFIED REGISTERED

## 2024-07-31 PROCEDURE — 85025 COMPLETE CBC W/AUTO DIFF WBC: CPT | Performed by: EMERGENCY MEDICINE

## 2024-07-31 PROCEDURE — 25010000002 DEXAMETHASONE PER 1 MG: Performed by: NURSE ANESTHETIST, CERTIFIED REGISTERED

## 2024-07-31 PROCEDURE — 94799 UNLISTED PULMONARY SVC/PX: CPT

## 2024-07-31 PROCEDURE — C1894 INTRO/SHEATH, NON-LASER: HCPCS | Performed by: INTERNAL MEDICINE

## 2024-07-31 PROCEDURE — 71045 X-RAY EXAM CHEST 1 VIEW: CPT

## 2024-07-31 PROCEDURE — 84443 ASSAY THYROID STIM HORMONE: CPT | Performed by: EMERGENCY MEDICINE

## 2024-07-31 PROCEDURE — 25810000003 SODIUM CHLORIDE 0.9 % SOLUTION: Performed by: NURSE ANESTHETIST, CERTIFIED REGISTERED

## 2024-07-31 PROCEDURE — 93656 COMPRE EP EVAL ABLTJ ATR FIB: CPT | Performed by: INTERNAL MEDICINE

## 2024-07-31 PROCEDURE — C1732 CATH, EP, DIAG/ABL, 3D/VECT: HCPCS | Performed by: INTERNAL MEDICINE

## 2024-07-31 PROCEDURE — C1769 GUIDE WIRE: HCPCS | Performed by: INTERNAL MEDICINE

## 2024-07-31 PROCEDURE — 05HM33Z INSERTION OF INFUSION DEVICE INTO RIGHT INTERNAL JUGULAR VEIN, PERCUTANEOUS APPROACH: ICD-10-PCS | Performed by: EMERGENCY MEDICINE

## 2024-07-31 PROCEDURE — 93655 ICAR CATH ABLTJ DSCRT ARRHYT: CPT | Performed by: INTERNAL MEDICINE

## 2024-07-31 PROCEDURE — 25010000002 GLYCOPYRROLATE 0.2 MG/ML SOLUTION: Performed by: NURSE ANESTHETIST, CERTIFIED REGISTERED

## 2024-07-31 PROCEDURE — C1893 INTRO/SHEATH, FIXED,NON-PEEL: HCPCS | Performed by: INTERNAL MEDICINE

## 2024-07-31 PROCEDURE — 4A023FZ MEASUREMENT OF CARDIAC RHYTHM, PERCUTANEOUS APPROACH: ICD-10-PCS | Performed by: INTERNAL MEDICINE

## 2024-07-31 PROCEDURE — 83735 ASSAY OF MAGNESIUM: CPT | Performed by: EMERGENCY MEDICINE

## 2024-07-31 PROCEDURE — 80053 COMPREHEN METABOLIC PANEL: CPT | Performed by: EMERGENCY MEDICINE

## 2024-07-31 PROCEDURE — B24BZZZ ULTRASONOGRAPHY OF HEART WITH AORTA: ICD-10-PCS | Performed by: INTERNAL MEDICINE

## 2024-07-31 PROCEDURE — 84439 ASSAY OF FREE THYROXINE: CPT | Performed by: EMERGENCY MEDICINE

## 2024-07-31 PROCEDURE — C1751 CATH, INF, PER/CENT/MIDLINE: HCPCS

## 2024-07-31 PROCEDURE — C1766 INTRO/SHEATH,STRBLE,NON-PEEL: HCPCS | Performed by: INTERNAL MEDICINE

## 2024-07-31 PROCEDURE — 93005 ELECTROCARDIOGRAM TRACING: CPT | Performed by: EMERGENCY MEDICINE

## 2024-07-31 PROCEDURE — 25010000002 HEPARIN (PORCINE) PER 1000 UNITS: Performed by: NURSE ANESTHETIST, CERTIFIED REGISTERED

## 2024-07-31 PROCEDURE — 99223 1ST HOSP IP/OBS HIGH 75: CPT | Performed by: INTERNAL MEDICINE

## 2024-07-31 PROCEDURE — 25810000003 SODIUM CHLORIDE 0.9 % SOLUTION 250 ML FLEX CONT: Performed by: NURSE ANESTHETIST, CERTIFIED REGISTERED

## 2024-07-31 PROCEDURE — C1730 CATH, EP, 19 OR FEW ELECT: HCPCS | Performed by: INTERNAL MEDICINE

## 2024-07-31 PROCEDURE — 25010000002 ATROPINE SULFATE: Performed by: EMERGENCY MEDICINE

## 2024-07-31 PROCEDURE — 87641 MR-STAPH DNA AMP PROBE: CPT | Performed by: NURSE PRACTITIONER

## 2024-07-31 PROCEDURE — 25010000002 PROPOFOL 200 MG/20ML EMULSION: Performed by: NURSE ANESTHETIST, CERTIFIED REGISTERED

## 2024-07-31 PROCEDURE — 99291 CRITICAL CARE FIRST HOUR: CPT

## 2024-07-31 PROCEDURE — 25010000002 PROTAMINE SULFATE PER 10 MG: Performed by: NURSE ANESTHETIST, CERTIFIED REGISTERED

## 2024-07-31 PROCEDURE — 83880 ASSAY OF NATRIURETIC PEPTIDE: CPT | Performed by: EMERGENCY MEDICINE

## 2024-07-31 PROCEDURE — B543ZZA ULTRASONOGRAPHY OF RIGHT JUGULAR VEINS, GUIDANCE: ICD-10-PCS | Performed by: EMERGENCY MEDICINE

## 2024-07-31 PROCEDURE — 84484 ASSAY OF TROPONIN QUANT: CPT | Performed by: EMERGENCY MEDICINE

## 2024-07-31 PROCEDURE — 02583ZZ DESTRUCTION OF CONDUCTION MECHANISM, PERCUTANEOUS APPROACH: ICD-10-PCS | Performed by: INTERNAL MEDICINE

## 2024-07-31 PROCEDURE — 5A1223Z PERFORMANCE OF CARDIAC PACING, CONTINUOUS: ICD-10-PCS | Performed by: EMERGENCY MEDICINE

## 2024-07-31 PROCEDURE — 4A0234Z MEASUREMENT OF CARDIAC ELECTRICAL ACTIVITY, PERCUTANEOUS APPROACH: ICD-10-PCS | Performed by: INTERNAL MEDICINE

## 2024-07-31 PROCEDURE — C1759 CATH, INTRA ECHOCARDIOGRAPHY: HCPCS | Performed by: INTERNAL MEDICINE

## 2024-07-31 PROCEDURE — C1733 CATH, EP, OTHR THAN COOL-TIP: HCPCS | Performed by: INTERNAL MEDICINE

## 2024-07-31 PROCEDURE — 02K83ZZ MAP CONDUCTION MECHANISM, PERCUTANEOUS APPROACH: ICD-10-PCS | Performed by: INTERNAL MEDICINE

## 2024-07-31 PROCEDURE — 25010000002 PHENYLEPHRINE 10 MG/ML SOLUTION 5 ML VIAL: Performed by: NURSE ANESTHETIST, CERTIFIED REGISTERED

## 2024-07-31 PROCEDURE — 25010000002 SUGAMMADEX 200 MG/2ML SOLUTION: Performed by: NURSE ANESTHETIST, CERTIFIED REGISTERED

## 2024-07-31 PROCEDURE — 25010000002 ONDANSETRON PER 1 MG: Performed by: NURSE ANESTHETIST, CERTIFIED REGISTERED

## 2024-07-31 RX ORDER — DEXAMETHASONE SODIUM PHOSPHATE 4 MG/ML
INJECTION, SOLUTION INTRA-ARTICULAR; INTRALESIONAL; INTRAMUSCULAR; INTRAVENOUS; SOFT TISSUE AS NEEDED
Status: DISCONTINUED | OUTPATIENT
Start: 2024-07-31 | End: 2024-07-31 | Stop reason: SURG

## 2024-07-31 RX ORDER — POLYETHYLENE GLYCOL 3350 17 G/17G
17 POWDER, FOR SOLUTION ORAL DAILY PRN
Status: DISCONTINUED | OUTPATIENT
Start: 2024-07-31 | End: 2024-08-02 | Stop reason: HOSPADM

## 2024-07-31 RX ORDER — SODIUM CHLORIDE 9 MG/ML
40 INJECTION, SOLUTION INTRAVENOUS AS NEEDED
Status: DISCONTINUED | OUTPATIENT
Start: 2024-07-31 | End: 2024-08-02 | Stop reason: HOSPADM

## 2024-07-31 RX ORDER — EPHEDRINE SULFATE 5 MG/ML
5 INJECTION INTRAVENOUS ONCE AS NEEDED
Status: DISCONTINUED | OUTPATIENT
Start: 2024-07-31 | End: 2024-07-31 | Stop reason: HOSPADM

## 2024-07-31 RX ORDER — SODIUM CHLORIDE 0.9 % (FLUSH) 0.9 %
10 SYRINGE (ML) INJECTION AS NEEDED
Status: DISCONTINUED | OUTPATIENT
Start: 2024-07-31 | End: 2024-08-02 | Stop reason: HOSPADM

## 2024-07-31 RX ORDER — ONDANSETRON 4 MG/1
4 TABLET, ORALLY DISINTEGRATING ORAL EVERY 6 HOURS PRN
Status: DISCONTINUED | OUTPATIENT
Start: 2024-07-31 | End: 2024-08-02 | Stop reason: HOSPADM

## 2024-07-31 RX ORDER — HYDRALAZINE HYDROCHLORIDE 20 MG/ML
5 INJECTION INTRAMUSCULAR; INTRAVENOUS
Status: DISCONTINUED | OUTPATIENT
Start: 2024-07-31 | End: 2024-07-31 | Stop reason: HOSPADM

## 2024-07-31 RX ORDER — FENTANYL CITRATE 50 UG/ML
50 INJECTION, SOLUTION INTRAMUSCULAR; INTRAVENOUS
Status: DISCONTINUED | OUTPATIENT
Start: 2024-07-31 | End: 2024-07-31 | Stop reason: HOSPADM

## 2024-07-31 RX ORDER — CEFAZOLIN SODIUM 1 G/3ML
INJECTION, POWDER, FOR SOLUTION INTRAMUSCULAR; INTRAVENOUS AS NEEDED
Status: DISCONTINUED | OUTPATIENT
Start: 2024-07-31 | End: 2024-07-31 | Stop reason: SURG

## 2024-07-31 RX ORDER — NALOXONE HCL 0.4 MG/ML
0.4 VIAL (ML) INJECTION
Status: DISCONTINUED | OUTPATIENT
Start: 2024-07-31 | End: 2024-08-02 | Stop reason: HOSPADM

## 2024-07-31 RX ORDER — PANTOPRAZOLE SODIUM 40 MG/1
40 TABLET, DELAYED RELEASE ORAL
Status: DISCONTINUED | OUTPATIENT
Start: 2024-07-31 | End: 2024-08-02 | Stop reason: HOSPADM

## 2024-07-31 RX ORDER — PROPOFOL 10 MG/ML
INJECTION, EMULSION INTRAVENOUS AS NEEDED
Status: DISCONTINUED | OUTPATIENT
Start: 2024-07-31 | End: 2024-07-31 | Stop reason: SURG

## 2024-07-31 RX ORDER — SODIUM CHLORIDE 9 MG/ML
INJECTION, SOLUTION INTRAVENOUS CONTINUOUS PRN
Status: DISCONTINUED | OUTPATIENT
Start: 2024-07-31 | End: 2024-07-31 | Stop reason: SURG

## 2024-07-31 RX ORDER — SODIUM CHLORIDE 0.9 % (FLUSH) 0.9 %
10 SYRINGE (ML) INJECTION EVERY 12 HOURS SCHEDULED
Status: DISCONTINUED | OUTPATIENT
Start: 2024-07-31 | End: 2024-08-02 | Stop reason: HOSPADM

## 2024-07-31 RX ORDER — ROCURONIUM BROMIDE 10 MG/ML
INJECTION, SOLUTION INTRAVENOUS AS NEEDED
Status: DISCONTINUED | OUTPATIENT
Start: 2024-07-31 | End: 2024-07-31 | Stop reason: SURG

## 2024-07-31 RX ORDER — AMOXICILLIN 250 MG
2 CAPSULE ORAL 2 TIMES DAILY PRN
Status: DISCONTINUED | OUTPATIENT
Start: 2024-07-31 | End: 2024-08-02 | Stop reason: HOSPADM

## 2024-07-31 RX ORDER — GLYCOPYRROLATE 0.2 MG/ML
INJECTION INTRAMUSCULAR; INTRAVENOUS AS NEEDED
Status: DISCONTINUED | OUTPATIENT
Start: 2024-07-31 | End: 2024-07-31 | Stop reason: SURG

## 2024-07-31 RX ORDER — IPRATROPIUM BROMIDE AND ALBUTEROL SULFATE 2.5; .5 MG/3ML; MG/3ML
3 SOLUTION RESPIRATORY (INHALATION) ONCE AS NEEDED
Status: DISCONTINUED | OUTPATIENT
Start: 2024-07-31 | End: 2024-07-31 | Stop reason: HOSPADM

## 2024-07-31 RX ORDER — HEPARIN SODIUM 1000 [USP'U]/ML
INJECTION, SOLUTION INTRAVENOUS; SUBCUTANEOUS AS NEEDED
Status: DISCONTINUED | OUTPATIENT
Start: 2024-07-31 | End: 2024-07-31 | Stop reason: SURG

## 2024-07-31 RX ORDER — ONDANSETRON 2 MG/ML
INJECTION INTRAMUSCULAR; INTRAVENOUS AS NEEDED
Status: DISCONTINUED | OUTPATIENT
Start: 2024-07-31 | End: 2024-07-31 | Stop reason: SURG

## 2024-07-31 RX ORDER — BISACODYL 10 MG
10 SUPPOSITORY, RECTAL RECTAL DAILY PRN
Status: DISCONTINUED | OUTPATIENT
Start: 2024-07-31 | End: 2024-08-02 | Stop reason: HOSPADM

## 2024-07-31 RX ORDER — ACETAMINOPHEN 650 MG/1
650 SUPPOSITORY RECTAL EVERY 4 HOURS PRN
Status: DISCONTINUED | OUTPATIENT
Start: 2024-07-31 | End: 2024-07-31 | Stop reason: SDUPTHER

## 2024-07-31 RX ORDER — OXYCODONE HYDROCHLORIDE 5 MG/1
5 TABLET ORAL ONCE AS NEEDED
Status: DISCONTINUED | OUTPATIENT
Start: 2024-07-31 | End: 2024-07-31 | Stop reason: HOSPADM

## 2024-07-31 RX ORDER — MORPHINE SULFATE 1 MG/ML
1 INJECTION, SOLUTION EPIDURAL; INTRATHECAL; INTRAVENOUS EVERY 4 HOURS PRN
Status: DISCONTINUED | OUTPATIENT
Start: 2024-07-31 | End: 2024-07-31

## 2024-07-31 RX ORDER — ACETAMINOPHEN 325 MG/1
650 TABLET ORAL ONCE AS NEEDED
Status: DISCONTINUED | OUTPATIENT
Start: 2024-07-31 | End: 2024-07-31 | Stop reason: HOSPADM

## 2024-07-31 RX ORDER — NALOXONE HCL 0.4 MG/ML
0.4 VIAL (ML) INJECTION AS NEEDED
Status: DISCONTINUED | OUTPATIENT
Start: 2024-07-31 | End: 2024-07-31 | Stop reason: HOSPADM

## 2024-07-31 RX ORDER — ACETAMINOPHEN 650 MG/1
650 SUPPOSITORY RECTAL EVERY 4 HOURS PRN
Status: DISCONTINUED | OUTPATIENT
Start: 2024-07-31 | End: 2024-08-02 | Stop reason: HOSPADM

## 2024-07-31 RX ORDER — ONDANSETRON 2 MG/ML
4 INJECTION INTRAMUSCULAR; INTRAVENOUS ONCE AS NEEDED
Status: DISCONTINUED | OUTPATIENT
Start: 2024-07-31 | End: 2024-07-31 | Stop reason: HOSPADM

## 2024-07-31 RX ORDER — ALUMINA, MAGNESIA, AND SIMETHICONE 2400; 2400; 240 MG/30ML; MG/30ML; MG/30ML
15 SUSPENSION ORAL EVERY 6 HOURS PRN
Status: DISCONTINUED | OUTPATIENT
Start: 2024-07-31 | End: 2024-08-02 | Stop reason: HOSPADM

## 2024-07-31 RX ORDER — ENOXAPARIN SODIUM 150 MG/ML
1 INJECTION SUBCUTANEOUS EVERY 12 HOURS
Status: DISCONTINUED | OUTPATIENT
Start: 2024-07-31 | End: 2024-07-31

## 2024-07-31 RX ORDER — LABETALOL HYDROCHLORIDE 5 MG/ML
5 INJECTION, SOLUTION INTRAVENOUS
Status: DISCONTINUED | OUTPATIENT
Start: 2024-07-31 | End: 2024-07-31 | Stop reason: HOSPADM

## 2024-07-31 RX ORDER — BISACODYL 5 MG/1
5 TABLET, DELAYED RELEASE ORAL DAILY PRN
Status: DISCONTINUED | OUTPATIENT
Start: 2024-07-31 | End: 2024-08-02 | Stop reason: HOSPADM

## 2024-07-31 RX ORDER — BENZONATATE 100 MG/1
200 CAPSULE ORAL 3 TIMES DAILY PRN
Status: DISCONTINUED | OUTPATIENT
Start: 2024-07-31 | End: 2024-08-02 | Stop reason: HOSPADM

## 2024-07-31 RX ORDER — ACETAMINOPHEN 325 MG/1
650 TABLET ORAL EVERY 4 HOURS PRN
Status: DISCONTINUED | OUTPATIENT
Start: 2024-07-31 | End: 2024-07-31 | Stop reason: SDUPTHER

## 2024-07-31 RX ORDER — DEXMEDETOMIDINE HYDROCHLORIDE 100 UG/ML
INJECTION, SOLUTION INTRAVENOUS AS NEEDED
Status: DISCONTINUED | OUTPATIENT
Start: 2024-07-31 | End: 2024-07-31 | Stop reason: SURG

## 2024-07-31 RX ORDER — FLUMAZENIL 0.1 MG/ML
0.2 INJECTION INTRAVENOUS AS NEEDED
Status: DISCONTINUED | OUTPATIENT
Start: 2024-07-31 | End: 2024-07-31 | Stop reason: HOSPADM

## 2024-07-31 RX ORDER — ONDANSETRON 2 MG/ML
4 INJECTION INTRAMUSCULAR; INTRAVENOUS EVERY 6 HOURS PRN
Status: DISCONTINUED | OUTPATIENT
Start: 2024-07-31 | End: 2024-08-02 | Stop reason: HOSPADM

## 2024-07-31 RX ORDER — MORPHINE SULFATE 2 MG/ML
1 INJECTION, SOLUTION INTRAMUSCULAR; INTRAVENOUS EVERY 4 HOURS PRN
Status: DISCONTINUED | OUTPATIENT
Start: 2024-07-31 | End: 2024-08-02 | Stop reason: HOSPADM

## 2024-07-31 RX ORDER — ARFORMOTEROL TARTRATE 15 UG/2ML
15 SOLUTION RESPIRATORY (INHALATION)
Status: DISCONTINUED | OUTPATIENT
Start: 2024-07-31 | End: 2024-08-02 | Stop reason: HOSPADM

## 2024-07-31 RX ORDER — PROTAMINE SULFATE 10 MG/ML
INJECTION, SOLUTION INTRAVENOUS AS NEEDED
Status: DISCONTINUED | OUTPATIENT
Start: 2024-07-31 | End: 2024-07-31 | Stop reason: SURG

## 2024-07-31 RX ORDER — HYDROCODONE BITARTRATE AND ACETAMINOPHEN 5; 325 MG/1; MG/1
1 TABLET ORAL EVERY 4 HOURS PRN
Status: DISCONTINUED | OUTPATIENT
Start: 2024-07-31 | End: 2024-08-02 | Stop reason: HOSPADM

## 2024-07-31 RX ORDER — ACETAMINOPHEN 325 MG/1
650 TABLET ORAL EVERY 4 HOURS PRN
Status: DISCONTINUED | OUTPATIENT
Start: 2024-07-31 | End: 2024-08-02 | Stop reason: HOSPADM

## 2024-07-31 RX ORDER — NITROGLYCERIN 0.4 MG/1
0.4 TABLET SUBLINGUAL
Status: DISCONTINUED | OUTPATIENT
Start: 2024-07-31 | End: 2024-08-02 | Stop reason: HOSPADM

## 2024-07-31 RX ADMIN — GLYCOPYRROLATE 0.2 MG: 0.2 INJECTION, SOLUTION INTRAMUSCULAR; INTRAVENOUS at 14:11

## 2024-07-31 RX ADMIN — HYDROCODONE BITARTRATE AND ACETAMINOPHEN 1 TABLET: 5; 325 TABLET ORAL at 21:50

## 2024-07-31 RX ADMIN — HEPARIN SODIUM 10000 UNITS: 1000 INJECTION INTRAVENOUS; SUBCUTANEOUS at 14:12

## 2024-07-31 RX ADMIN — ONDANSETRON 4 MG: 2 INJECTION INTRAMUSCULAR; INTRAVENOUS at 13:59

## 2024-07-31 RX ADMIN — ROCURONIUM BROMIDE 20 MG: 10 INJECTION, SOLUTION INTRAVENOUS at 15:13

## 2024-07-31 RX ADMIN — SUGAMMADEX 300 MG: 100 INJECTION, SOLUTION INTRAVENOUS at 15:47

## 2024-07-31 RX ADMIN — CEFAZOLIN 2 G: 1 INJECTION, POWDER, FOR SOLUTION INTRAMUSCULAR; INTRAVENOUS at 14:03

## 2024-07-31 RX ADMIN — DEXMEDETOMIDINE HYDROCHLORIDE 10 MCG: 100 INJECTION, SOLUTION INTRAVENOUS at 15:54

## 2024-07-31 RX ADMIN — HYDROCODONE BITARTRATE AND ACETAMINOPHEN 1 TABLET: 5; 325 TABLET ORAL at 17:46

## 2024-07-31 RX ADMIN — DEXMEDETOMIDINE HYDROCHLORIDE 10 MCG: 100 INJECTION, SOLUTION INTRAVENOUS at 16:10

## 2024-07-31 RX ADMIN — PROPOFOL 130 MG: 10 INJECTION, EMULSION INTRAVENOUS at 13:47

## 2024-07-31 RX ADMIN — SODIUM CHLORIDE: 9 INJECTION, SOLUTION INTRAVENOUS at 15:44

## 2024-07-31 RX ADMIN — Medication 10 ML: at 20:22

## 2024-07-31 RX ADMIN — ROCURONIUM BROMIDE 20 MG: 10 INJECTION, SOLUTION INTRAVENOUS at 14:33

## 2024-07-31 RX ADMIN — PROTAMINE SULFATE 100 MG: 10 INJECTION, SOLUTION INTRAVENOUS at 15:41

## 2024-07-31 RX ADMIN — ATROPINE SULFATE 1 MG: 0.1 INJECTION PARENTERAL at 09:01

## 2024-07-31 RX ADMIN — ROCURONIUM BROMIDE 50 MG: 10 INJECTION, SOLUTION INTRAVENOUS at 13:47

## 2024-07-31 RX ADMIN — DEXMEDETOMIDINE HYDROCHLORIDE 10 MCG: 100 INJECTION, SOLUTION INTRAVENOUS at 15:56

## 2024-07-31 RX ADMIN — DEXAMETHASONE SODIUM PHOSPHATE 4 MG: 4 INJECTION, SOLUTION INTRAMUSCULAR; INTRAVENOUS at 13:59

## 2024-07-31 RX ADMIN — ARFORMOTEROL TARTRATE 15 MCG: 15 SOLUTION RESPIRATORY (INHALATION) at 18:20

## 2024-07-31 RX ADMIN — ROCURONIUM BROMIDE 10 MG: 10 INJECTION, SOLUTION INTRAVENOUS at 14:05

## 2024-07-31 RX ADMIN — BENZONATATE 200 MG: 100 CAPSULE ORAL at 21:54

## 2024-07-31 RX ADMIN — HEPARIN SODIUM 2000 UNITS: 1000 INJECTION INTRAVENOUS; SUBCUTANEOUS at 15:13

## 2024-07-31 RX ADMIN — IPRATROPIUM BROMIDE 0.5 MG: 0.5 SOLUTION RESPIRATORY (INHALATION) at 18:24

## 2024-07-31 RX ADMIN — SODIUM CHLORIDE: 9 INJECTION, SOLUTION INTRAVENOUS at 13:38

## 2024-07-31 RX ADMIN — APIXABAN 5 MG: 5 TABLET, FILM COATED ORAL at 20:22

## 2024-07-31 RX ADMIN — LIDOCAINE HYDROCHLORIDE 100 MG: 20 INJECTION, SOLUTION EPIDURAL; INFILTRATION; INTRACAUDAL; PERINEURAL at 13:47

## 2024-07-31 RX ADMIN — ATROPINE SULFATE 1 MG: 0.1 INJECTION PARENTERAL at 08:48

## 2024-07-31 RX ADMIN — PHENYLEPHRINE HYDROCHLORIDE 0.5 MCG/KG/MIN: 10 INJECTION INTRAVENOUS at 13:54

## 2024-07-31 RX ADMIN — HEPARIN SODIUM 4000 UNITS: 1000 INJECTION INTRAVENOUS; SUBCUTANEOUS at 14:15
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
## 2024-07-31 NOTE — ANESTHESIA PREPROCEDURE EVALUATION
Anesthesia Evaluation     Patient summary reviewed and Nursing notes reviewed   NPO Solid Status: > 8 hours  NPO Liquid Status: > 8 hours           Airway   Mallampati: II  TM distance: >3 FB  Neck ROM: full  No difficulty expected  Dental    (+) poor dentition    Pulmonary    (+) COPD,  Cardiovascular     (+) hypertension, dysrhythmias Atrial Flutter, Paroxysmal Atrial Fib, hyperlipidemia      Neuro/Psych  (+) numbness, psychiatric history  GI/Hepatic/Renal/Endo    (+) obesity, hiatal hernia, GERD    Musculoskeletal     (+) back pain  Abdominal    Substance History      OB/GYN          Other   arthritis,                 Anesthesia Plan    ASA 3     general     intravenous induction     Anesthetic plan, risks, benefits, and alternatives have been provided, discussed and informed consent has been obtained with: patient.    Plan discussed with CRNA.    CODE STATUS:    Code Status (Patient has no pulse and is not breathing): CPR (Attempt to Resuscitate)  Medical Interventions (Patient has pulse or is breathing): Full Support

## 2024-07-31 NOTE — ED PROVIDER NOTES
Subjective   History of Present Illness  63-year-old male presents for multiple near syncopal episodes this morning.  States he is a history of A-fib.  His Fitbit monitor was showing heart rates between upper 30s and 150 this morning.  States he is asymptomatic now but it has been coming and going since he woke up this morning.  No changes to his medications.  Is on both metoprolol and flecainide for his A-fib.  Review of Systems  See HPI.  Past Medical History:   Diagnosis Date    Acute hypoxemic respiratory failure 11/06/2023    Allergic     Anxiety     Arthritis 06/2005    Back pain     Claustrophobia 1978    COPD (chronic obstructive pulmonary disease)     CTS (carpal tunnel syndrome) 10/2022    Dysphagia     Esophageal stricture     GERD (gastroesophageal reflux disease)     Hyperlipidemia     Hypertension     Knee pain     rt    Low back pain     Obesity     Obesity (BMI 30-39.9) 11/06/2023    Other cervical disc degeneration at C5-C6 level 08/22/2018    Paroxysmal atrial fibrillation with rapid ventricular response 11/06/2023    Pneumonia     Prediabetes 11/06/2023    PTSD (post-traumatic stress disorder)     Rhinovirus infection 11/06/2023    Thoracic disc herniation     Vitamin B12 deficiency        Allergies   Allergen Reactions    Atorvastatin Swelling    Lisinopril Other (See Comments)     Facial paralysis        Past Surgical History:   Procedure Laterality Date    BRONCHOSCOPY N/A 11/3/2023    Procedure: BRONCHOSCOPY with bilateral lung washing's;  Surgeon: Kolton Brewer MD;  Location: [facility] ENDOSCOPY;  Service: Pulmonary;  Laterality: N/A;    CARDIAC CATHETERIZATION N/A 07/13/2022    Procedure: Left Heart Cath, possible pci;  Surgeon: Prieto Sidhu MD;  Location: [facility] CATH INVASIVE LOCATION;  Service: Cardiovascular;  Laterality: N/A;    CARDIAC ELECTROPHYSIOLOGY PROCEDURE N/A 7/31/2024    Procedure: Ablation atrial flutter;  Surgeon: Pardeep Walker MD;  Location: [facility] CATH  INVASIVE LOCATION;  Service: Cardiovascular;  Laterality: N/A;    ENDOSCOPY      ENDOSCOPY N/A 9/21/2023    Procedure: ESOPHAGOGASTRODUODENOSCOPY WITH non guided esophageal dialtion 54 Fr. Bougie and  cold forcep biopsy x1 area;  Surgeon: Andres Concepcion MD;  Location: [facility] ENDOSCOPY;  Service: Gastroenterology;  Laterality: N/A;  Post- erosive gastritis, hiatal hernia, esophageal stricture    HERNIA REPAIR      KNEE ARTHROPLASTY      x2    SHOULDER ARTHROSCOPY W/ ROTATOR CUFF REPAIR Right 08/11/2020    Procedure: SHOULDER ARTHROSCOPY WITH ROTATOR CUFF REPAIR, extensive debridement;  Surgeon: Fredi Ritchie MD;  Location: [facility] MAIN OR;  Service: Orthopedics;  Laterality: Right;    TONSILLECTOMY         Family History   Problem Relation Age of Onset    Heart disease Mother     Early death Mother     Hyperlipidemia Mother     Hypertension Mother     Thyroid disease Mother     Cancer Father     Stroke Father     Vision loss Father     Stroke Paternal Grandfather     Arthritis Paternal Grandmother     Alcohol abuse Brother     Asthma Brother     Depression Brother     Hyperlipidemia Brother     Hypertension Brother     Learning disabilities Brother     Mental illness Brother     Drug abuse Brother     Early death Brother     Heart disease Brother     Hyperlipidemia Brother     Hypertension Brother        Social History     Socioeconomic History    Marital status:    Tobacco Use    Smoking status: Every Day     Current packs/day: 1.00     Average packs/day: 1 pack/day for 51.6 years (51.6 ttl pk-yrs)     Types: Cigarettes     Start date: 1/9/1973    Smokeless tobacco: Never    Tobacco comments:     Smoked a half a pack a day for 42 years didn't start to smoke a whole pack until 2017   Vaping Use    Vaping status: Never Used   Substance and Sexual Activity    Alcohol use: No    Drug use: No    Sexual activity: Defer           Objective   Physical Exam  No acute distress, regular rate and  "rhythm, clear auscultation bilaterally, no significant bilateral lower extremity edema, abdomen soft and nontender, alert and oriented  Central Line At Bedside    Date/Time: 8/1/2024 1:18 PM    Performed by: Bharathi Rivers MD  Authorized by: Sherwin Brady MD    Consent:     Consent obtained:  Verbal    Consent given by:  Patient    Risks discussed:  Arterial puncture, incorrect placement, nerve damage, bleeding, infection and pneumothorax  Universal protocol:     Patient identity confirmed:  Verbally with patient  Pre-procedure details:     Indication(s): transvenous cardiac pacing      Hand hygiene: Hand hygiene performed prior to insertion      Sterile barrier technique: All elements of maximal sterile technique followed      Skin preparation:  Chlorhexidine    Skin preparation agent: Skin preparation agent completely dried prior to procedure    Sedation:     Sedation type:  None  Anesthesia:     Anesthesia method:  Local infiltration    Local anesthetic:  Lidocaine 1% w/o epi  Procedure details:     Location:  R internal jugular    Patient position:  Supine    Procedural supplies:  Cordis    Catheter size: 6 Fr.    Landmarks identified: yes      Ultrasound guidance: yes      Ultrasound guidance timing: real time      Sterile ultrasound techniques: Sterile gel and sterile probe covers were used      Number of attempts:  1    Successful placement: yes    Post-procedure details:     Post-procedure:  Dressing applied and line sutured    Assessment:  Blood return through all ports, no pneumothorax on x-ray, placement verified by x-ray and free fluid flow    Procedure completion:  Tolerated well, no immediate complications             ED Course      /69 (BP Location: Right arm, Patient Position: Lying)   Pulse 82   Temp 98.2 °F (36.8 °C) (Oral)   Resp 22   Ht 182.9 cm (72\")   Wt 112 kg (247 lb 5.7 oz)   SpO2 95%   BMI 33.55 kg/m²   Labs Reviewed   T4, FREE - Abnormal; Notable for the following " components:       Result Value    Free T4 0.69 (*)     All other components within normal limits   CBC WITH AUTO DIFFERENTIAL - Abnormal; Notable for the following components:    Hematocrit 51.2 (*)     .8 (*)     MCH 34.3 (*)     Immature Grans % 0.6 (*)     All other components within normal limits   LIPID PANEL - Abnormal; Notable for the following components:    HDL Cholesterol 32 (*)     All other components within normal limits    Narrative:     Cholesterol Reference Ranges  (U.S. Department of Health and Human Services ATP III Classifications)    Desirable          <200 mg/dL  Borderline High    200-239 mg/dL  High Risk          >240 mg/dL      Triglyceride Reference Ranges  (U.S. Department of Health and Human Services ATP III Classifications)    Normal           <150 mg/dL  Borderline High  150-199 mg/dL  High             200-499 mg/dL  Very High        >500 mg/dL    HDL Reference Ranges  (U.S. Department of Health and Human Services ATP III Classifications)    Low     <40 mg/dl (major risk factor for CHD)  High    >60 mg/dl ('negative' risk factor for CHD)        LDL Reference Ranges  (U.S. Department of Health and Human Services ATP III Classifications)    Optimal          <100 mg/dL  Near Optimal     100-129 mg/dL  Borderline High  130-159 mg/dL  High             160-189 mg/dL  Very High        >189 mg/dL   PHOSPHORUS - Abnormal; Notable for the following components:    Phosphorus 2.3 (*)     All other components within normal limits   COMPREHENSIVE METABOLIC PANEL - Abnormal; Notable for the following components:    Glucose 127 (*)     AST (SGOT) 88 (*)     All other components within normal limits    Narrative:     GFR Normal >60  Chronic Kidney Disease <60  Kidney Failure <15     CBC WITH AUTO DIFFERENTIAL - Abnormal; Notable for the following components:    .4 (*)     Platelets 128 (*)     Neutrophil % 78.9 (*)     Lymphocyte % 14.4 (*)     Eosinophil % 0.0 (*)     Immature Grans % 0.6  (*)     Neutrophils, Absolute 7.64 (*)     Immature Grans, Absolute 0.06 (*)     All other components within normal limits   POCT ACTIVATED CLOTTING TIME - Abnormal; Notable for the following components:    Activated Clotting Time  360 (*)     All other components within normal limits   POCT ACTIVATED CLOTTING TIME - Abnormal; Notable for the following components:    Activated Clotting Time  311 (*)     All other components within normal limits   POCT ACTIVATED CLOTTING TIME - Abnormal; Notable for the following components:    Activated Clotting Time  324 (*)     All other components within normal limits   POCT ACTIVATED CLOTTING TIME - Abnormal; Notable for the following components:    Activated Clotting Time  287 (*)     All other components within normal limits   MRSA SCREEN, PCR - Normal    Narrative:     The negative predictive value of this diagnostic test is high and should only be used to consider de-escalating anti-MRSA therapy. A positive result may indicate colonization with MRSA and must be correlated clinically.   BNP (IN-HOUSE) - Normal    Narrative:     This assay is used as an aid in the diagnosis of individuals suspected of having heart failure. It can be used as an aid in the diagnosis of acute decompensated heart failure (ADHF) in patients presenting with signs and symptoms of ADHF to the emergency department (ED). In addition, NT-proBNP of <300 pg/mL indicates ADHF is not likely.    Age Range Result Interpretation  NT-proBNP Concentration (pg/mL:      <50             Positive            >450                   Gray                 300-450                    Negative             <300    50-75           Positive            >900                  Gray                300-900                  Negative            <300      >75             Positive            >1800                  Gray                300-1800                  Negative            <300   SINGLE HS TROPONIN T - Normal    Narrative:     High  Sensitive Troponin T Reference Range:  <14.0 ng/L- Negative Female for AMI  <22.0 ng/L- Negative Male for AMI  >=14 - Abnormal Female indicating possible myocardial injury.  >=22 - Abnormal Male indicating possible myocardial injury.   Clinicians would have to utilize clinical acumen, EKG, Troponin, and serial changes to determine if it is an Acute Myocardial Infarction or myocardial injury due to an underlying chronic condition.        TSH - Normal   MAGNESIUM - Normal   MAGNESIUM - Normal   POCT ACTIVATED CLOTTING TIME - Normal   COMPREHENSIVE METABOLIC PANEL    Narrative:     GFR Normal >60  Chronic Kidney Disease <60  Kidney Failure <15     POCT ACTIVATED CLOTTING TIME   POCT ACTIVATED CLOTTING TIME   POCT ACTIVATED CLOTTING TIME   CBC AND DIFFERENTIAL    Narrative:     The following orders were created for panel order CBC & Differential.  Procedure                               Abnormality         Status                     ---------                               -----------         ------                     CBC Auto Differential[[id number]]        Abnormal            Final result                 Please view results for these tests on the individual orders.   EXTRA TUBES    Narrative:     The following orders were created for panel order Extra Tubes.  Procedure                               Abnormality         Status                     ---------                               -----------         ------                     Lavender Top[[id number]]                                     Final result               Gold Top - SST[[id number]]                                   Final result               Green Top (Gel)[[id number]]                                  Final result               Light Blue Top[[id number]]                                   Final result                 Please view results for these tests on the individual orders.   LAVENDER TOP   GOLD TOP - SST   GREEN TOP   LIGHT BLUE TOP   CBC AND DIFFERENTIAL     Narrative:     The following orders were created for panel order CBC & Differential.  Procedure                               Abnormality         Status                     ---------                               -----------         ------                     CBC Auto Differential[[id number]]        Abnormal            Final result               Scan Slide[[id number]]                                                                    Please view results for these tests on the individual orders.     Medications   sodium chloride 0.9 % flush 10 mL ( Intravenous MAR Unhold 7/31/24 1617)   nitroglycerin (NITROSTAT) SL tablet 0.4 mg ( Sublingual MAR Unhold 7/31/24 1617)   sodium chloride 0.9 % flush 10 mL (10 mL Intravenous Given 8/1/24 0832)   sodium chloride 0.9 % flush 10 mL ( Intravenous MAR Unhold 7/31/24 1617)   sodium chloride 0.9 % infusion 40 mL ( Intravenous MAR Unhold 7/31/24 1617)   aluminum-magnesium hydroxide-simethicone (MAALOX MAX) 400-400-40 MG/5ML suspension 15 mL ( Oral MAR Unhold 7/31/24 1617)   sennosides-docusate (PERICOLACE) 8.6-50 MG per tablet 2 tablet ( Oral MAR Unhold 7/31/24 1617)     And   polyethylene glycol (MIRALAX) packet 17 g ( Oral MAR Unhold 7/31/24 1617)     And   bisacodyl (DULCOLAX) EC tablet 5 mg ( Oral MAR Unhold 7/31/24 1617)     And   bisacodyl (DULCOLAX) suppository 10 mg ( Rectal MAR Unhold 7/31/24 1617)   ondansetron ODT (ZOFRAN-ODT) disintegrating tablet 4 mg ( Oral MAR Unhold 7/31/24 1617)     Or   ondansetron (ZOFRAN) injection 4 mg ( Intravenous MAR Unhold 7/31/24 1617)   Potassium Replacement - Follow Nurse / BPA Driven Protocol (has no administration in time range)   Magnesium Cardiology Dose Replacement - Follow Nurse / BPA Driven Protocol ( Does not apply MAR Unhold 7/31/24 1617)   Phosphorus Replacement - Follow Nurse / BPA Driven Protocol ( Does not apply MAR Unhold 7/31/24 1617)   Calcium Replacement - Follow Nurse / BPA Driven Protocol ( Does not apply MAR Unhold  7/31/24 1617)   pantoprazole (PROTONIX) EC tablet 40 mg (40 mg Oral Given 8/1/24 0508)   ipratropium (ATROVENT) nebulizer solution 0.5 mg (0.5 mg Nebulization Given 8/1/24 1212)   arformoterol (BROVANA) nebulizer solution 15 mcg (15 mcg Nebulization Given 8/1/24 0700)   apixaban (ELIQUIS) tablet 5 mg (5 mg Oral Given 8/1/24 0832)   acetaminophen (TYLENOL) tablet 650 mg (650 mg Oral Given 8/1/24 0931)     Or   acetaminophen (TYLENOL) suppository 650 mg ( Rectal Not Given:  See Alt 8/1/24 0931)   HYDROcodone-acetaminophen (NORCO) 5-325 MG per tablet 1 tablet (1 tablet Oral Given 8/1/24 0508)   naloxone (NARCAN) injection 0.4 mg (has no administration in time range)   morphine injection 1 mg (1 mg Intravenous Given 8/1/24 0103)   benzonatate (TESSALON) capsule 200 mg (200 mg Oral Given 7/31/24 2154)   atropine sulfate injection (1 mg Intravenous Given 7/31/24 0901)   magnesium sulfate 4g/100mL (PREMIX) infusion (4 g Intravenous New Bag 8/1/24 0508)   lactated ringers bolus 1,000 mL (1,000 mL Intravenous New Bag 8/1/24 0832)     XR Chest 1 View    Result Date: 7/31/2024  Impression: 1. Right IJ temporary pacemaker with tip overlying the right ventricle. Electronically Signed: Abdias Saha MD  7/31/2024 9:46 AM EDT  Workstation ID: [id number]    XR Chest 1 View    Result Date: 7/31/2024  Impression: 1. No acute cardiopulmonary disease. Electronically Signed: Abdias Saha MD  7/31/2024 8:43 AM EDT  Workstation ID: [id number]         [id number] Score: 1                                  Medical Decision Making  Problems Addressed:  Near syncope: complicated acute illness or injury  Sinus pause: complicated acute illness or injury    Amount and/or Complexity of Data Reviewed  Labs: ordered.  Radiology: ordered.  ECG/medicine tests: ordered.    Risk  Prescription drug management.  Decision regarding hospitalization.    EKG interpretation: 7:58 AM, rate 93, atrial flutter with 2-1 block, nonspecific ST changes, normal  QTc.    My interpretation of postprocedure x-ray is no pneumothorax.  See system for radiology interpretation.    Critical Care Time     The high probability of sudden, clinically significant deterioration in the patient's condition required the highest level of my preparedness to intervene urgently.  The services I provided to this patient were to treat and/or prevent clinically significant deterioration that could result in: Cardiovascular collapse and death. Services included the following: chart data review, reviewing nurses notes and/or old charts, documentation time, consultant collaboration regarding findings and treatment options, vital sign assessments and ordering, interpreting and reviewing diagnostic studies/lab tests.  Aggregate critical care time was 43 minutes, which includes only time during which I was engaged in work directly related to the patient's care, as described above, whether at the bedside or elsewhere in the Emergency Department. It did not include time spent performing other reported procedures or the services of residents, students, nurses or physician assistants.    Patient repeatedly having pauses up to 8 seconds.  Appears to be going into sinus rhythm and having the long pauses.  Transvenous pacer was urgently placed.  6 Persian Cordis was placed without issue.  Then pacing wire was advanced into right ventricle with rate set to 120 as 1 patient in A-fib negative rate 100-1 10.  Mechanical capture was verified with palpable pulse as well as Pulse Oximeter.  Rate Was Turned down to 80 after This.  Discussed with Dr. Norris with Cardiology.  Admitted to ICU.    Final diagnoses:   Sinus pause   Near syncope       ED Disposition  ED Disposition       ED Disposition   Decision to Admit    Condition   --    Comment   Level of Care: Critical Care [6]   Diagnosis: Heart block [822602]   Admitting Physician: URBANO CHEEMA [[id number]]   Attending Physician: URBANO CHEEMA [[id number]]    Certification: I Certify That Inpatient Hospital Services Are Medically Necessary For Greater Than 2 Midnights                 No follow-up provider specified.       Medication List      No changes were made to your prescriptions during this visit.            Bharathi Rivers MD  08/01/24 [id number]

## 2024-07-31 NOTE — ANESTHESIA PROCEDURE NOTES
Airway  Urgency: elective    Date/Time: 7/31/2024 1:51 PM  Airway not difficult    General Information and Staff    Patient location during procedure: OR  Anesthesiologist: Angel Cuba MD  CRNA/CAA: Aisha Martino CRNA    Indications and Patient Condition  Indications for airway management: airway protection    Preoxygenated: yes (pt pre-O2 with 100% O2)  Mask difficulty assessment: 2 - vent by mask + OA or adjuvant +/- NMBA (easy BMV )    Final Airway Details  Final airway type: endotracheal airway      Successful airway: ETT  Cuffed: yes   Successful intubation technique: direct laryngoscopy  Endotracheal tube insertion site: oral  Blade: Fabricio  Blade size: 4  ETT size (mm): 7.5  Cormack-Lehane Classification: grade I - full view of glottis  Placement verified by: chest auscultation and capnometry   Cuff volume (mL): 7  Measured from: lips  ETT/EBT  to lips (cm): 23  Number of attempts at approach: 1  Assessment: lips, teeth, and gum same as pre-op and atraumatic intubation    Additional Comments  ATOETx1. No change in dentition.
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
## 2024-07-31 NOTE — Clinical Note
Hemostasis started on the right femoral vein. Figure 8 suturing was used in achieving hemostasis. Closure device deployed in the vessel. Hemostasis achieved successfully. Closure device additional comment: Both sheaths removed by Dr Walker

## 2024-07-31 NOTE — Clinical Note
Level of Care: Critical Care [6]   Admitting Physician: URBANO CHEEMA [[id number]]   Attending Physician: URBANO CHEEMA [[id number]]

## 2024-07-31 NOTE — Clinical Note
An EP study / Ablation is in progress  RF ablation started.  Adequate: hears normal conversation without difficulty

## 2024-08-01 LAB
ALBUMIN SERPL-MCNC: 3.7 G/DL (ref 3.5–5.2)
ALBUMIN/GLOB SERPL: 1.5 G/DL
ALP SERPL-CCNC: 62 U/L (ref 39–117)
ALT SERPL W P-5'-P-CCNC: 17 U/L (ref 1–41)
ANION GAP SERPL CALCULATED.3IONS-SCNC: 8.8 MMOL/L (ref 5–15)
AST SERPL-CCNC: 88 U/L (ref 1–40)
BASOPHILS # BLD AUTO: 0.01 10*3/MM3 (ref 0–0.2)
BASOPHILS NFR BLD AUTO: 0.1 % (ref 0–1.5)
BILIRUB SERPL-MCNC: 1 MG/DL (ref 0–1.2)
BUN SERPL-MCNC: 13 MG/DL (ref 8–23)
BUN/CREAT SERPL: 14.8 (ref 7–25)
CALCIUM SPEC-SCNC: 8.7 MG/DL (ref 8.6–10.5)
CHLORIDE SERPL-SCNC: 107 MMOL/L (ref 98–107)
CHOLEST SERPL-MCNC: 151 MG/DL (ref 0–200)
CO2 SERPL-SCNC: 24.2 MMOL/L (ref 22–29)
CREAT SERPL-MCNC: 0.88 MG/DL (ref 0.76–1.27)
DEPRECATED RDW RBC AUTO: 49.4 FL (ref 37–54)
EGFRCR SERPLBLD CKD-EPI 2021: 96.6 ML/MIN/1.73
EOSINOPHIL # BLD AUTO: 0 10*3/MM3 (ref 0–0.4)
EOSINOPHIL NFR BLD AUTO: 0 % (ref 0.3–6.2)
ERYTHROCYTE [DISTWIDTH] IN BLOOD BY AUTOMATED COUNT: 13 % (ref 12.3–15.4)
GLOBULIN UR ELPH-MCNC: 2.5 GM/DL
GLUCOSE SERPL-MCNC: 127 MG/DL (ref 65–99)
HCT VFR BLD AUTO: 44.2 % (ref 37.5–51)
HDLC SERPL-MCNC: 32 MG/DL (ref 40–60)
HGB BLD-MCNC: 14.4 G/DL (ref 13–17.7)
IMM GRANULOCYTES # BLD AUTO: 0.06 10*3/MM3 (ref 0–0.05)
IMM GRANULOCYTES NFR BLD AUTO: 0.6 % (ref 0–0.5)
LDLC SERPL CALC-MCNC: 93 MG/DL (ref 0–100)
LDLC/HDLC SERPL: 2.81 {RATIO}
LYMPHOCYTES # BLD AUTO: 1.39 10*3/MM3 (ref 0.7–3.1)
LYMPHOCYTES NFR BLD AUTO: 14.4 % (ref 19.6–45.3)
MAGNESIUM SERPL-MCNC: 1.9 MG/DL (ref 1.6–2.4)
MCH RBC QN AUTO: 33 PG (ref 26.6–33)
MCHC RBC AUTO-ENTMCNC: 32.6 G/DL (ref 31.5–35.7)
MCV RBC AUTO: 101.4 FL (ref 79–97)
MONOCYTES # BLD AUTO: 0.58 10*3/MM3 (ref 0.1–0.9)
MONOCYTES NFR BLD AUTO: 6 % (ref 5–12)
NEUTROPHILS NFR BLD AUTO: 7.64 10*3/MM3 (ref 1.7–7)
NEUTROPHILS NFR BLD AUTO: 78.9 % (ref 42.7–76)
NRBC BLD AUTO-RTO: 0 /100 WBC (ref 0–0.2)
PHOSPHATE SERPL-MCNC: 2.3 MG/DL (ref 2.5–4.5)
PLATELET # BLD AUTO: 128 10*3/MM3 (ref 140–450)
PMV BLD AUTO: 11.5 FL (ref 6–12)
POTASSIUM SERPL-SCNC: 4.3 MMOL/L (ref 3.5–5.2)
PROT SERPL-MCNC: 6.2 G/DL (ref 6–8.5)
QT INTERVAL: 263 MS
QT INTERVAL: 375 MS
QTC INTERVAL: 328 MS
QTC INTERVAL: 406 MS
RBC # BLD AUTO: 4.36 10*6/MM3 (ref 4.14–5.8)
SODIUM SERPL-SCNC: 140 MMOL/L (ref 136–145)
TRIGL SERPL-MCNC: 146 MG/DL (ref 0–150)
VLDLC SERPL-MCNC: 26 MG/DL (ref 5–40)
WBC NRBC COR # BLD AUTO: 9.68 10*3/MM3 (ref 3.4–10.8)

## 2024-08-01 PROCEDURE — 25010000002 MAGNESIUM SULFATE 4 GM/100ML SOLUTION: Performed by: INTERNAL MEDICINE

## 2024-08-01 PROCEDURE — 94799 UNLISTED PULMONARY SVC/PX: CPT

## 2024-08-01 PROCEDURE — 25810000003 LACTATED RINGERS SOLUTION: Performed by: INTERNAL MEDICINE

## 2024-08-01 PROCEDURE — 93010 ELECTROCARDIOGRAM REPORT: CPT | Performed by: INTERNAL MEDICINE

## 2024-08-01 PROCEDURE — 80053 COMPREHEN METABOLIC PANEL: CPT | Performed by: INTERNAL MEDICINE

## 2024-08-01 PROCEDURE — 93005 ELECTROCARDIOGRAM TRACING: CPT | Performed by: INTERNAL MEDICINE

## 2024-08-01 PROCEDURE — 84100 ASSAY OF PHOSPHORUS: CPT | Performed by: INTERNAL MEDICINE

## 2024-08-01 PROCEDURE — 94761 N-INVAS EAR/PLS OXIMETRY MLT: CPT

## 2024-08-01 PROCEDURE — 80061 LIPID PANEL: CPT | Performed by: INTERNAL MEDICINE

## 2024-08-01 PROCEDURE — 83735 ASSAY OF MAGNESIUM: CPT | Performed by: INTERNAL MEDICINE

## 2024-08-01 PROCEDURE — 99232 SBSQ HOSP IP/OBS MODERATE 35: CPT | Performed by: INTERNAL MEDICINE

## 2024-08-01 PROCEDURE — 94664 DEMO&/EVAL PT USE INHALER: CPT

## 2024-08-01 PROCEDURE — 25010000002 MORPHINE PER 10 MG: Performed by: INTERNAL MEDICINE

## 2024-08-01 PROCEDURE — 85025 COMPLETE CBC W/AUTO DIFF WBC: CPT | Performed by: INTERNAL MEDICINE

## 2024-08-01 RX ORDER — BUPROPION HYDROCHLORIDE 150 MG/1
TABLET ORAL
COMMUNITY
Start: 2024-05-23

## 2024-08-01 RX ORDER — LAMOTRIGINE 25 MG/1
TABLET, ORALLY DISINTEGRATING ORAL
COMMUNITY

## 2024-08-01 RX ORDER — MAGNESIUM SULFATE HEPTAHYDRATE 40 MG/ML
4 INJECTION, SOLUTION INTRAVENOUS ONCE
Status: COMPLETED | OUTPATIENT
Start: 2024-08-01 | End: 2024-08-01

## 2024-08-01 RX ORDER — HYDROCHLOROTHIAZIDE 12.5 MG/1
1 TABLET ORAL DAILY
COMMUNITY

## 2024-08-01 RX ORDER — AMLODIPINE BESYLATE AND BENAZEPRIL HYDROCHLORIDE 10; 20 MG/1; MG/1
1 CAPSULE ORAL DAILY
COMMUNITY
End: 2024-08-02 | Stop reason: HOSPADM

## 2024-08-01 RX ADMIN — APIXABAN 5 MG: 5 TABLET, FILM COATED ORAL at 21:03

## 2024-08-01 RX ADMIN — MORPHINE SULFATE 1 MG: 2 INJECTION, SOLUTION INTRAMUSCULAR; INTRAVENOUS at 01:03

## 2024-08-01 RX ADMIN — SODIUM CHLORIDE, POTASSIUM CHLORIDE, SODIUM LACTATE AND CALCIUM CHLORIDE 1000 ML: 600; 310; 30; 20 INJECTION, SOLUTION INTRAVENOUS at 08:32

## 2024-08-01 RX ADMIN — IPRATROPIUM BROMIDE 0.5 MG: 0.5 SOLUTION RESPIRATORY (INHALATION) at 19:04

## 2024-08-01 RX ADMIN — HYDROCODONE BITARTRATE AND ACETAMINOPHEN 1 TABLET: 5; 325 TABLET ORAL at 05:08

## 2024-08-01 RX ADMIN — IPRATROPIUM BROMIDE 0.5 MG: 0.5 SOLUTION RESPIRATORY (INHALATION) at 07:04

## 2024-08-01 RX ADMIN — IPRATROPIUM BROMIDE 0.5 MG: 0.5 SOLUTION RESPIRATORY (INHALATION) at 16:29

## 2024-08-01 RX ADMIN — IPRATROPIUM BROMIDE 0.5 MG: 0.5 SOLUTION RESPIRATORY (INHALATION) at 12:12

## 2024-08-01 RX ADMIN — APIXABAN 5 MG: 5 TABLET, FILM COATED ORAL at 08:32

## 2024-08-01 RX ADMIN — PANTOPRAZOLE SODIUM 40 MG: 40 TABLET, DELAYED RELEASE ORAL at 05:08

## 2024-08-01 RX ADMIN — Medication 10 ML: at 08:32

## 2024-08-01 RX ADMIN — ARFORMOTEROL TARTRATE 15 MCG: 15 SOLUTION RESPIRATORY (INHALATION) at 07:00

## 2024-08-01 RX ADMIN — ACETAMINOPHEN 650 MG: 325 TABLET, FILM COATED ORAL at 09:31

## 2024-08-01 RX ADMIN — MAGNESIUM SULFATE HEPTAHYDRATE 4 G: 40 INJECTION, SOLUTION INTRAVENOUS at 05:08

## 2024-08-01 NOTE — ANESTHESIA POSTPROCEDURE EVALUATION
Patient: Jagdish Rea    Procedure Summary       Date: 07/31/24 Room / Location: [facility] CATH LAB 3 / [facility] CATH INVASIVE LOCATION    Anesthesia Start: 1338 Anesthesia Stop: 1614    Procedure: Ablation atrial flutter Diagnosis:       Atrial flutter, unspecified type      (Atrial flutter)    Providers: Pardeep Walker MD Provider: Angel Cuba MD    Anesthesia Type: general ASA Status: 3            Anesthesia Type: general    Vitals  Vitals Value Taken Time   /71 07/31/24 1646   Temp 98.2 °F (36.8 °C) 07/31/24 1700   Pulse 75 07/31/24 1700   Resp 9 07/31/24 1600   SpO2 97 % 07/31/24 1700   Vitals shown include unfiled device data.        Post Anesthesia Care and Evaluation    Patient location during evaluation: PACU  Patient participation: complete - patient participated  Level of consciousness: awake  Pain scale: See nurse's notes for pain score.  Pain management: adequate    Airway patency: patent  Anesthetic complications: No anesthetic complications  PONV Status: none  Cardiovascular status: acceptable  Respiratory status: acceptable and spontaneous ventilation  Hydration status: acceptable    Comments: Patient seen and examined postoperatively; vital signs stable; SpO2 greater than or equal to 90%; cardiopulmonary status stable; nausea/vomiting adequately controlled; pain adequately controlled; no apparent anesthesia complications; patient discharged from anesthesia care when discharge criteria were met

## 2024-08-01 NOTE — PROGRESS NOTES
CC--- uncontrollable atrial fibrillation/atrial flutter associated with sinus pauses    Sub  Post AF ablation to sinus rhythm doing remarkably well and denies any symptoms  No postprocedure complications          Past Medical History:   Diagnosis Date    Acute hypoxemic respiratory failure 11/06/2023    Allergic     Anxiety     Arthritis 06/2005    Back pain     Claustrophobia 1978    COPD (chronic obstructive pulmonary disease)     CTS (carpal tunnel syndrome) 10/2022    Dysphagia     Esophageal stricture     GERD (gastroesophageal reflux disease)     Hyperlipidemia     Hypertension     Knee pain     rt    Low back pain     Obesity     Obesity (BMI 30-39.9) 11/06/2023    Other cervical disc degeneration at C5-C6 level 08/22/2018    Paroxysmal atrial fibrillation with rapid ventricular response 11/06/2023    Pneumonia     Prediabetes 11/06/2023    PTSD (post-traumatic stress disorder)     Rhinovirus infection 11/06/2023    Thoracic disc herniation     Vitamin B12 deficiency      Past Surgical History:   Procedure Laterality Date    BRONCHOSCOPY N/A 11/3/2023    Procedure: BRONCHOSCOPY with bilateral lung washing's;  Surgeon: Kolton Brewer MD;  Location: [facility] ENDOSCOPY;  Service: Pulmonary;  Laterality: N/A;    CARDIAC CATHETERIZATION N/A 07/13/2022    Procedure: Left Heart Cath, possible pci;  Surgeon: Prieto Sidhu MD;  Location: [facility] CATH INVASIVE LOCATION;  Service: Cardiovascular;  Laterality: N/A;    CARDIAC ELECTROPHYSIOLOGY PROCEDURE N/A 7/31/2024    Procedure: Ablation atrial flutter;  Surgeon: Pardeep Walker MD;  Location: [facility] CATH INVASIVE LOCATION;  Service: Cardiovascular;  Laterality: N/A;    ENDOSCOPY      ENDOSCOPY N/A 9/21/2023    Procedure: ESOPHAGOGASTRODUODENOSCOPY WITH non guided esophageal dialtion 54 Fr. Bougie and  cold forcep biopsy x1 area;  Surgeon: Andres Concepcion MD;  Location: [facility] ENDOSCOPY;  Service: Gastroenterology;  Laterality: N/A;  Post-  erosive gastritis, hiatal hernia, esophageal stricture    HERNIA REPAIR      KNEE ARTHROPLASTY      x2    SHOULDER ARTHROSCOPY W/ ROTATOR CUFF REPAIR Right 08/11/2020    Procedure: SHOULDER ARTHROSCOPY WITH ROTATOR CUFF REPAIR, extensive debridement;  Surgeon: Fredi Ritchie MD;  Location: [facility] MAIN OR;  Service: Orthopedics;  Laterality: Right;    TONSILLECTOMY             Physical Exam    General:      well developed, well nourished, in no acute distress.    Head:      normocephalic and atraumatic.    Eyes:      PERRL/EOM intact, conjunctivae and sclerae clear without nystagmus.    Neck:      no  thyromegaly, trachea central with normal respiratory effort  Lungs:      clear bilaterally to auscultation.    Heart:       regular rate and rhythm, S1, S2 without murmurs, rubs, or gallops  Skin:      intact without lesions or rashes.    Psych:      alert and cooperative; normal mood and affect; normal attention span and concentration.      Right groin soft without hematoma          CBC    Results from last 7 days   Lab Units 08/01/24  0324 07/31/24  0817   WBC 10*3/mm3 9.68 6.59   HEMOGLOBIN g/dL 14.4 17.1   PLATELETS 10*3/mm3 128* 157     BMP   Results from last 7 days   Lab Units 08/01/24  0324 07/31/24  0817   SODIUM mmol/L 140 140   POTASSIUM mmol/L 4.3 4.1   CHLORIDE mmol/L 107 106   CO2 mmol/L 24.2 24.0   BUN mg/dL 13 9   CREATININE mg/dL 0.88 0.91   GLUCOSE mg/dL 127* 99   MAGNESIUM mg/dL 1.9 2.2   PHOSPHORUS mg/dL 2.3*  --      CMP   Results from last 7 days   Lab Units 08/01/24  0324 07/31/24  0817   SODIUM mmol/L 140 140   POTASSIUM mmol/L 4.3 4.1   CHLORIDE mmol/L 107 106   CO2 mmol/L 24.2 24.0   BUN mg/dL 13 9   CREATININE mg/dL 0.88 0.91   GLUCOSE mg/dL 127* 99   ALBUMIN g/dL 3.7 4.3   BILIRUBIN mg/dL 1.0 0.5   ALK PHOS U/L 62 73   AST (SGOT) U/L 88* 19   ALT (SGPT) U/L 17 13     Radiology(recent) XR Chest 1 View    Result Date: 7/31/2024  Impression: 1. Right IJ temporary pacemaker with tip  overlying the right ventricle. Electronically Signed: Abdias Saha MD  7/31/2024 9:46 AM EDT  Workstation ID: [id number]    XR Chest 1 View    Result Date: 7/31/2024  Impression: 1. No acute cardiopulmonary disease. Electronically Signed: Abdias Saha MD  7/31/2024 8:43 AM EDT  Workstation ID: [id number]       Assessment plan    Uncontrollable atrial fibrillation/atrial flutter associated with sinus pauses  Post AF ablation to sinus rhythm  Temporary transvenous pacemaker removed during ablation  History of hypertension  Possible sleep apnea being evaluated as an outpatient    Remove Cordis sheath in the right neck  Patient to be ambulated  Eliquis restarted  Potential discharge tomorrow  Discussed with the patient and family and the nurse  Do not use metoprolol or flecainide to avoid sinus node dysfunction        Electronically signed by Pardeep Walker MD, 08/01/24, 9:01 AM EDT.

## 2024-08-01 NOTE — PLAN OF CARE
Problem: Adult Inpatient Plan of Care  Goal: Plan of Care Review  Outcome: Ongoing, Progressing  Flowsheets (Taken 8/1/2024 0612)  Progress: improving  Plan of Care Reviewed With: patient  Goal: Patient-Specific Goal (Individualized)  Outcome: Ongoing, Progressing  Goal: Absence of Hospital-Acquired Illness or Injury  Outcome: Ongoing, Progressing  Intervention: Identify and Manage Fall Risk  Recent Flowsheet Documentation  Taken 8/1/2024 0600 by Aster Guzmán RN  Safety Promotion/Fall Prevention: safety round/check completed  Taken 8/1/2024 0500 by Aster Guzmán RN  Safety Promotion/Fall Prevention: safety round/check completed  Taken 8/1/2024 0405 by Aster Guzmán RN  Safety Promotion/Fall Prevention:   safety round/check completed   room organization consistent   nonskid shoes/slippers when out of bed   fall prevention program maintained   clutter free environment maintained   assistive device/personal items within reach  Taken 8/1/2024 0300 by Aster Guzmán RN  Safety Promotion/Fall Prevention: safety round/check completed  Taken 8/1/2024 0200 by Aster Guzmán RN  Safety Promotion/Fall Prevention: safety round/check completed  Taken 8/1/2024 0005 by Aster Guzmán RN  Safety Promotion/Fall Prevention:   safety round/check completed   room organization consistent   nonskid shoes/slippers when out of bed   fall prevention program maintained   clutter free environment maintained   assistive device/personal items within reach  Taken 7/31/2024 2300 by Aster Guzmán RN  Safety Promotion/Fall Prevention: safety round/check completed  Taken 7/31/2024 2200 by Aster Guzmán RN  Safety Promotion/Fall Prevention: safety round/check completed  Taken 7/31/2024 2005 by Aster Guzmán RN  Safety Promotion/Fall Prevention:   safety round/check completed   room organization consistent  Intervention: Prevent Skin Injury  Recent Flowsheet Documentation  Taken 8/1/2024 0405 by Aster Guzmán RN  Body Position: position changed  independently  Skin Protection: adhesive use limited  Taken 8/1/2024 0005 by Aster Guzmán RN  Body Position: position changed independently  Skin Protection:   adhesive use limited   incontinence pads utilized  Taken 7/31/2024 2005 by Aster Guzmán RN  Body Position: supine  Skin Protection:   adhesive use limited   incontinence pads utilized  Intervention: Prevent and Manage VTE (Venous Thromboembolism) Risk  Recent Flowsheet Documentation  Taken 8/1/2024 0405 by Aster Guzmán RN  Activity Management: bedrest  Taken 8/1/2024 0107 by Aster Guzmán RN  Activity Management:   ambulated to bathroom   back to bed  Taken 8/1/2024 0005 by Aster Guzmán RN  Activity Management: bedrest  VTE Prevention/Management: (pt ambulates)   bilateral   sequential compression devices off   other (see comments)  Range of Motion: active ROM (range of motion) encouraged  Taken 7/31/2024 2005 by Aster Guzmán RN  Activity Management: bedrest  VTE Prevention/Management:   bilateral   sequential compression devices on  Range of Motion: active ROM (range of motion) encouraged  Intervention: Prevent Infection  Recent Flowsheet Documentation  Taken 8/1/2024 0405 by Aster Guzmán RN  Infection Prevention:   environmental surveillance performed   hand hygiene promoted   personal protective equipment utilized   rest/sleep promoted   single patient room provided  Taken 8/1/2024 0005 by Aster Guzmán RN  Infection Prevention:   environmental surveillance performed   hand hygiene promoted   personal protective equipment utilized   rest/sleep promoted   single patient room provided  Taken 7/31/2024 2005 by Aster Guzmán RN  Infection Prevention:   environmental surveillance performed   equipment surfaces disinfected   hand hygiene promoted   personal protective equipment utilized   rest/sleep promoted   single patient room provided  Goal: Optimal Comfort and Wellbeing  Outcome: Ongoing, Progressing  Intervention: Provide Person-Centered Care  Recent  Flowsheet Documentation  Taken 8/1/2024 0405 by Aster Guzmán, RN  Trust Relationship/Rapport:   care explained   choices provided  Taken 8/1/2024 0005 by Aster Guzmán, RN  Trust Relationship/Rapport:   care explained   choices provided  Taken 7/31/2024 2005 by Aster Guzmán, RN  Trust Relationship/Rapport:   care explained   choices provided   empathic listening provided   questions answered   questions encouraged   reassurance provided   thoughts/feelings acknowledged  Goal: Readiness for Transition of Care  Outcome: Ongoing, Progressing   Goal Outcome Evaluation:  Plan of Care Reviewed With: patient        Progress: improving

## 2024-08-01 NOTE — PROGRESS NOTES
Critical Care Progress Note     Jagdish Rea : 1960 MRN:[id number] LOS:1  Rm: 3106/1     Principal Problem: Heart block     Reason for follow up: All the medical problems listed below    Summary     Jagdish Rea is a 63 y.o. male with PMH of PAF, hypertension, COPD, hyperlipidemia, GERD with esophagitis and esophageal stricture, and cervical degenerative disc disease, and presented to the hospital for near syncope, and was admitted with a principal diagnosis of Heart block.  Patient had noted that his Fitbit monitor was showing variable heart rates between the upper 30s all the way up to 150 this morning.      In the ED, labs were obtained without acute findings.  Chest x-ray independently interpreted, without effusions or infiltrates.  Per ED provider, patient was noted to be in atrial flutter, and would subsequently attempt to convert to sinus rhythm and it was followed by exaggerated ventricular pauses measuring 7-8 seconds, of which patient did develop hypotension and subsequent syncope.  6 EKG obtained and independently reviewed, with atrial flutter at a 2:1 AV block, without ST or T wave abnormalities, heart rate 93, QTc 328.  Due to patient's profound pausing with symptoms, ED provider proceeded with temporary transvenous pacemaker placement.  Cardiology was consulted for further treatment and evaluation.     Significant Events     24 : s/p A-fib ablation to sinus rhythm.  Cordis sheath removed today.  Eliquis restarted.  Patient stable for downgrade to hospitalist service.    Assessment / Plan     Symptomatic atrial flutter with sinus pauses / Chronic, paroxysmal atrial fibrillation    Rate controlled with metoprolol, holding metoprolol.  Rhythm control with flecainide, hold.  Previous heart catheterization as documented in 2024 with no significant coronary artery disease.  Stress completed at [place] on , results unavailable.  Having syncope when attempting conversion from AF to  SR with profound bradycardia and 7-8 sec ventricular pauses.  On no drips, but transvenous pacemaker placed in ED, good capture, ventricular rate of 80.  Cardiology consulted, Dr. Norris.  Seen by Dr. Walker, planned cardiac ablation.    [id number] Score of at least 1. Currently on anticoagulation with apixaban, will transition to Lovenox until cardiology has evaluated patient.     Essential hypertension: well controlled.   On amlodipine metoprolol, holding at this time.  Cardiology consulted.  Titrate medications as needed.     COPD: Not in exacerbation.  Prior PFT in 2018 with FVC 5.31, FEV1 3.98, and FEV1/FVC 75.  GOLD Class 2, followed by Dr. Villalba as outpatient.  On Stiolto with duonebs as needed.  History of cervical degenerative disc disease  Dyslipidemia: Continue statin therapy.  Depression / PTSD: Continue buproprion, once patient's medication list verified.  GERD with esophagitis / esophageal stricture: Continue PPI.  Tobacco abuse:  on smoking cessation, 1PPD, reports 1/2 PPD x 42 years, increased to 1 PPD since 2017.  Somnolence, concern for sleep apnea: Referred on 7/29/2024 to Dr. Villalba for sleep study during PCP visit on 7/29/2024.    Disposition:  PCU    Code status:   Code Status (Patient has no pulse and is not breathing): CPR (Attempt to Resuscitate)  Medical Interventions (Patient has pulse or is breathing): Full Support       Nutrition:   Diet: Cardiac; Healthy Heart (2-3 Na+); Fluid Consistency: Thin (IDDSI 0)   Patient isn't on Tube Feeding     VTE Prophylaxis:  Pharmacologic & mechanical VTE prophylaxis orders are present.         Subjective / Review of systems     Review of Systems   Constitutional:  Negative for chills and fever.   Respiratory:  Negative for chest tightness and shortness of breath.    Cardiovascular:  Negative for chest pain.   Gastrointestinal:  Negative for abdominal pain, nausea and vomiting.   Genitourinary:  Negative for difficulty urinating.    Musculoskeletal:  Negative for arthralgias and myalgias.   Neurological:  Negative for dizziness and light-headedness.        Objective / Physical Exam     Vital signs:  Temp: 98.1 °F (36.7 °C)  BP: 92/73  Heart Rate: 82  Resp: 12  SpO2: 94 %  Weight: 112 kg (247 lb 5.7 oz)    Admission Weight: Weight: 113 kg (250 lb)  Current Weight: Weight: 112 kg (247 lb 5.7 oz)    Input/Output in last 24 hours:    Intake/Output Summary (Last 24 hours) at 8/1/2024 0829  Last data filed at 8/1/2024 0802  Gross per 24 hour   Intake 1673 ml   Output 2065 ml   Net -392 ml      Net IO Since Admission: -392 mL [08/01/24 0829]     Physical Exam  Vitals and nursing note reviewed.   Constitutional:       General: He is not in acute distress.     Appearance: He is not ill-appearing.   HENT:      Head: Normocephalic.      Mouth/Throat:      Mouth: Mucous membranes are moist.      Pharynx: Oropharynx is clear.   Eyes:      Extraocular Movements: Extraocular movements intact.      Conjunctiva/sclera: Conjunctivae normal.      Pupils: Pupils are equal, round, and reactive to light.   Cardiovascular:      Rate and Rhythm: Normal rate and regular rhythm.      Pulses: Normal pulses.      Heart sounds: Normal heart sounds.   Pulmonary:      Effort: Pulmonary effort is normal.      Breath sounds: Normal breath sounds.   Abdominal:      General: Bowel sounds are normal.      Palpations: Abdomen is soft.   Musculoskeletal:      Right lower leg: No edema.      Left lower leg: No edema.   Skin:     General: Skin is warm and dry.      Findings: No rash.   Neurological:      Mental Status: He is alert and oriented to person, place, and time.   Psychiatric:         Mood and Affect: Mood normal.         Behavior: Behavior normal.          Radiology and Labs     Results from last 7 days   Lab Units 08/01/24  0324 07/31/24  0817   WBC 10*3/mm3 9.68 6.59   HEMOGLOBIN g/dL 14.4 17.1   HEMATOCRIT % 44.2 51.2*   PLATELETS 10*3/mm3 128* 157           Results  from last 7 days   Lab Units 08/01/24  0324 07/31/24  0817   SODIUM mmol/L 140 140   POTASSIUM mmol/L 4.3 4.1   CHLORIDE mmol/L 107 106   CO2 mmol/L 24.2 24.0   BUN mg/dL 13 9   CREATININE mg/dL 0.88 0.91   GLUCOSE mg/dL 127* 99   MAGNESIUM mg/dL 1.9 2.2   PHOSPHORUS mg/dL 2.3*  --       Results from last 7 days   Lab Units 08/01/24  0324 07/31/24  0817   ALK PHOS U/L 62 73   AST (SGOT) U/L 88* 19   ALT (SGPT) U/L 17 13           XR Chest 1 View    Result Date: 7/31/2024  Impression: 1. Right IJ temporary pacemaker with tip overlying the right ventricle. Electronically Signed: Abdias Saha MD  7/31/2024 9:46 AM EDT  Workstation ID: [id number]    XR Chest 1 View    Result Date: 7/31/2024  Impression: 1. No acute cardiopulmonary disease. Electronically Signed: Abdias Saha MD  7/31/2024 8:43 AM EDT  Workstation ID: [id number]       Current medications     Scheduled Meds:   apixaban, 5 mg, Oral, Q12H  arformoterol, 15 mcg, Nebulization, BID - RT  ipratropium, 0.5 mg, Nebulization, 4x Daily - RT  lactated ringers, 1,000 mL, Intravenous, Once  magnesium sulfate, 4 g, Intravenous, Once  pantoprazole, 40 mg, Oral, Q AM  sodium chloride, 10 mL, Intravenous, Q12H        Continuous Infusions:          Plan discussed with RN. Reviewed all other data in the last 24 hours, including but not limited to vitals, labs, microbiology, imaging and pertinent notes from other providers.  Plan also discussed with patient at the bedside.      KEILA Brothers   Critical Care  08/01/24   08:29 EDT

## 2024-08-01 NOTE — CASE MANAGEMENT/SOCIAL WORK
Discharge Planning Assessment  [facility]     Patient Name: Jagdish Rea  MRN: [id number]  Today's Date: 8/1/2024    Admit Date: 7/31/2024    Plan: DC Plan: Anticipate routine home with family. Patient live with spouse and dep. son. Patient is Caregiver for both. Daughter and FAROOQ will help at DC.   Discharge Needs Assessment       Row Name 08/01/24 1526       Living Environment    People in Home spouse;child(paloma), dependent    Name(s) of People in Home Spouse Maite Rea    Current Living Arrangements home    Potentially Unsafe Housing Conditions none    In the past 12 months has the electric, gas, oil, or water company threatened to shut off services in your home? No    Primary Care Provided by self    Provides Primary Care For child(paloma);spouse    Caregiving Concerns Children will assist with care of spouse while recovering.    Family Caregiver if Needed child(paloma), adult    Family Caregiver Names Daughter and Son in Law    Quality of Family Relationships helpful;involved;supportive    Able to Return to Prior Arrangements yes       Resource/Environmental Concerns    Resource/Environmental Concerns none    Transportation Concerns none       Transportation Needs    In the past 12 months, has lack of transportation kept you from medical appointments or from getting medications? no    In the past 12 months, has lack of transportation kept you from meetings, work, or from getting things needed for daily living? No       Food Insecurity    Within the past 12 months, you worried that your food would run out before you got the money to buy more. Never true    Within the past 12 months, the food you bought just didn't last and you didn't have money to get more. Never true       Transition Planning    Patient/Family Anticipates Transition to home with family    Patient/Family Anticipated Services at Transition none    Transportation Anticipated car, drives self;family or friend will provide       Discharge Needs  Assessment    Readmission Within the Last 30 Days no previous admission in last 30 days    Equipment Currently Used at Home cane, straight;wheelchair, motorized    Anticipated Changes Related to Illness none    Equipment Needed After Discharge none    Provided Post Acute Provider List? N/A    Provided Post Acute Provider Quality & Resource List? N/A    Current Discharge Risk physical impairment                   Discharge Plan       Row Name 08/01/24 1528       Plan    Plan DC Plan: Anticipate routine home with family. Patient live with spouse and dep. son. Patient is Caregiver for both. Daughter and FAROOQ will help at DC.    Patient/Family in Agreement with Plan yes    Provided Post Acute Provider List? N/A    Provided Post Acute Provider Quality & Resource List? N/A    Plan Comments CM spoke with patient at bedside to discuss admission assessment and discharge planning. Patient confirms PCP and pharmacy. Patient is already enrolled in meds to bed program. Patient denies any difficulty affording medications or food at this time. Patient denies any additional needs for services or DME at this time. Patient reports IADL's and drives. Patient daughter will provide transportation when ready for DC.CM spoke with intensivist, nursing, and NP to obtain clinical upates in morning rounds. Patient downgraded to PCU level of care and DC is anticipated tomorrow 8/2/2024.CM will continue to follow for any further needs and adjust discharge plan accordingly. DC Barriers: Cardiac monitoring, O2@4L nc, IV Electrolytes, and monitor labs.               Expected Discharge Date and Time       Expected Discharge Date Expected Discharge Time    Aug 2, 2024            Demographic Summary       Row Name 08/01/24 1525       General Information    Admission Type inpatient    Arrived From emergency department;home    Required Notices Provided Important Message from Medicare    Referral Source admission list    Reason for Consult discharge  planning    Preferred Language English       Contact Information    Permission Granted to Share Info With                    Functional Status       Row Name 08/01/24 1525       Functional Status    Usual Activity Tolerance moderate    Current Activity Tolerance moderate       Physical Activity    On average, how many days per week do you engage in moderate to strenuous exercise (like a brisk walk)? 0 days    On average, how many minutes do you engage in exercise at this level? 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      Imelda Patel RN    Office Phone: [phone]  Office Cell:     [phone]

## 2024-08-02 ENCOUNTER — APPOINTMENT (OUTPATIENT)
Dept: RESPIRATORY THERAPY | Facility: HOSPITAL | Age: 64
End: 2024-08-02
Payer: MEDICARE

## 2024-08-02 VITALS
WEIGHT: 247.36 LBS | RESPIRATION RATE: 12 BRPM | TEMPERATURE: 98.7 F | HEIGHT: 72 IN | SYSTOLIC BLOOD PRESSURE: 110 MMHG | OXYGEN SATURATION: 97 % | BODY MASS INDEX: 33.5 KG/M2 | DIASTOLIC BLOOD PRESSURE: 68 MMHG | HEART RATE: 79 BPM

## 2024-08-02 LAB
ALBUMIN SERPL-MCNC: 3.9 G/DL (ref 3.5–5.2)
ALBUMIN/GLOB SERPL: 1.6 G/DL
ALP SERPL-CCNC: 63 U/L (ref 39–117)
ALT SERPL W P-5'-P-CCNC: 17 U/L (ref 1–41)
ANION GAP SERPL CALCULATED.3IONS-SCNC: 10.9 MMOL/L (ref 5–15)
AST SERPL-CCNC: 46 U/L (ref 1–40)
BASOPHILS # BLD AUTO: 0.04 10*3/MM3 (ref 0–0.2)
BASOPHILS NFR BLD AUTO: 0.4 % (ref 0–1.5)
BILIRUB SERPL-MCNC: 0.4 MG/DL (ref 0–1.2)
BUN SERPL-MCNC: 10 MG/DL (ref 8–23)
BUN/CREAT SERPL: 12.5 (ref 7–25)
CALCIUM SPEC-SCNC: 8.5 MG/DL (ref 8.6–10.5)
CHLORIDE SERPL-SCNC: 106 MMOL/L (ref 98–107)
CO2 SERPL-SCNC: 24.1 MMOL/L (ref 22–29)
CREAT SERPL-MCNC: 0.8 MG/DL (ref 0.76–1.27)
DEPRECATED RDW RBC AUTO: 49 FL (ref 37–54)
EGFRCR SERPLBLD CKD-EPI 2021: 99.4 ML/MIN/1.73
EOSINOPHIL # BLD AUTO: 0.03 10*3/MM3 (ref 0–0.4)
EOSINOPHIL NFR BLD AUTO: 0.3 % (ref 0.3–6.2)
ERYTHROCYTE [DISTWIDTH] IN BLOOD BY AUTOMATED COUNT: 13 % (ref 12.3–15.4)
GLOBULIN UR ELPH-MCNC: 2.5 GM/DL
GLUCOSE SERPL-MCNC: 98 MG/DL (ref 65–99)
HCT VFR BLD AUTO: 42.4 % (ref 37.5–51)
HGB BLD-MCNC: 14.3 G/DL (ref 13–17.7)
IMM GRANULOCYTES # BLD AUTO: 0.08 10*3/MM3 (ref 0–0.05)
IMM GRANULOCYTES NFR BLD AUTO: 0.8 % (ref 0–0.5)
LYMPHOCYTES # BLD AUTO: 3.28 10*3/MM3 (ref 0.7–3.1)
LYMPHOCYTES NFR BLD AUTO: 31.1 % (ref 19.6–45.3)
MAGNESIUM SERPL-MCNC: 2.2 MG/DL (ref 1.6–2.4)
MCH RBC QN AUTO: 34.2 PG (ref 26.6–33)
MCHC RBC AUTO-ENTMCNC: 33.7 G/DL (ref 31.5–35.7)
MCV RBC AUTO: 101.4 FL (ref 79–97)
MONOCYTES # BLD AUTO: 0.67 10*3/MM3 (ref 0.1–0.9)
MONOCYTES NFR BLD AUTO: 6.3 % (ref 5–12)
NEUTROPHILS NFR BLD AUTO: 6.46 10*3/MM3 (ref 1.7–7)
NEUTROPHILS NFR BLD AUTO: 61.1 % (ref 42.7–76)
NRBC BLD AUTO-RTO: 0 /100 WBC (ref 0–0.2)
PHOSPHATE SERPL-MCNC: 3.2 MG/DL (ref 2.5–4.5)
PLATELET # BLD AUTO: 112 10*3/MM3 (ref 140–450)
PMV BLD AUTO: 12 FL (ref 6–12)
POTASSIUM SERPL-SCNC: 3.6 MMOL/L (ref 3.5–5.2)
PROT SERPL-MCNC: 6.4 G/DL (ref 6–8.5)
RBC # BLD AUTO: 4.18 10*6/MM3 (ref 4.14–5.8)
SODIUM SERPL-SCNC: 141 MMOL/L (ref 136–145)
WBC NRBC COR # BLD AUTO: 10.56 10*3/MM3 (ref 3.4–10.8)

## 2024-08-02 PROCEDURE — 80053 COMPREHEN METABOLIC PANEL: CPT | Performed by: INTERNAL MEDICINE

## 2024-08-02 PROCEDURE — 97162 PT EVAL MOD COMPLEX 30 MIN: CPT

## 2024-08-02 PROCEDURE — 94664 DEMO&/EVAL PT USE INHALER: CPT

## 2024-08-02 PROCEDURE — 94799 UNLISTED PULMONARY SVC/PX: CPT

## 2024-08-02 PROCEDURE — 83735 ASSAY OF MAGNESIUM: CPT | Performed by: INTERNAL MEDICINE

## 2024-08-02 PROCEDURE — 85025 COMPLETE CBC W/AUTO DIFF WBC: CPT | Performed by: INTERNAL MEDICINE

## 2024-08-02 PROCEDURE — 94761 N-INVAS EAR/PLS OXIMETRY MLT: CPT

## 2024-08-02 PROCEDURE — 99232 SBSQ HOSP IP/OBS MODERATE 35: CPT | Performed by: NURSE PRACTITIONER

## 2024-08-02 PROCEDURE — 84100 ASSAY OF PHOSPHORUS: CPT | Performed by: INTERNAL MEDICINE

## 2024-08-02 RX ORDER — POTASSIUM CHLORIDE 20 MEQ/1
40 TABLET, EXTENDED RELEASE ORAL EVERY 4 HOURS
Status: COMPLETED | OUTPATIENT
Start: 2024-08-02 | End: 2024-08-02

## 2024-08-02 RX ADMIN — PANTOPRAZOLE SODIUM 40 MG: 40 TABLET, DELAYED RELEASE ORAL at 06:41

## 2024-08-02 RX ADMIN — POTASSIUM CHLORIDE 40 MEQ: 1500 TABLET, EXTENDED RELEASE ORAL at 11:50

## 2024-08-02 RX ADMIN — POTASSIUM CHLORIDE 40 MEQ: 1500 TABLET, EXTENDED RELEASE ORAL at 08:02

## 2024-08-02 RX ADMIN — IPRATROPIUM BROMIDE 0.5 MG: 0.5 SOLUTION RESPIRATORY (INHALATION) at 12:42

## 2024-08-02 RX ADMIN — IPRATROPIUM BROMIDE 0.5 MG: 0.5 SOLUTION RESPIRATORY (INHALATION) at 08:42

## 2024-08-02 RX ADMIN — ACETAMINOPHEN 650 MG: 325 TABLET, FILM COATED ORAL at 08:02

## 2024-08-02 RX ADMIN — APIXABAN 5 MG: 5 TABLET, FILM COATED ORAL at 08:02

## 2024-08-02 RX ADMIN — Medication 10 ML: at 08:03

## 2024-08-02 RX ADMIN — ARFORMOTEROL TARTRATE 15 MCG: 15 SOLUTION RESPIRATORY (INHALATION) at 08:37

## 2024-08-02 NOTE — DISCHARGE SUMMARY
[facility] Medicine Services  Discharge Summary    Date of Service: 2024  Patient Name: Jagdish Rea  : 1960  MRN: [id number]    Date of Admission: 2024  Discharge Diagnosis:       Atrial fibs flutter  Probable obstructive sleep apnea  Obesity  Tobacco abuse  COPD  History of esophageal stricture    Date of Discharge: 2024  Primary Care Physician: David Causey MD      Presenting Problem:   Heart block [I45.9]  Near syncope [R55]  Sinus pause [I45.5]  Atrial flutter, unspecified type [I48.92]    Active and Resolved Hospital Problems:  Active Hospital Problems    Diagnosis POA    **Heart block [I45.9] Yes    Atrial flutter [I48.92] Yes    Sinus pause [I45.5] Unknown    Atrial fibrillation with RVR [I48.91] Yes    Benign essential hypertension [I10] Yes      Resolved Hospital Problems   No resolved problems to display.         Hospital Course     HPI:  Per the H&P dated 2024    Hospital Course:   This is a 63-year-old black male who presented with issues as discussed in history and physical.  He was afebrile during his hospitalization.  Blood pressures were well-controlled at time of discharge.  Oxygenation was maintained on room air.  MRSA screening was negative.  Chest x-ray showed no acute process.  CBC showed some polycythemia otherwise being nonremarkable.  Thyroid functions appear normal.  proBNP was not elevated.  Cardiac enzymes were normal.  Chemistries were nonremarkable.  He was seen in evaluation by cardiology.  He was noted to have his atrial fibs flutter.  He subsequently had a ablation with resumption of sinus rhythm.  He no longer needed his temporary venous pacemaker.  It was thought that he should avoid any beta-blockade or rate limiting antidysrhythmic's.  He was cleared for discharge by cardiology with planned outpatient ambulatory monitoring.  It was thought that he should follow-up with primary care as to any consideration as to the  need for sleep study.              Reasons For Change In Medications and Indications for New Medications:      Day of Discharge     Vital Signs:  Temp:  [98.1 °F (36.7 °C)-98.7 °F (37.1 °C)] 98.7 °F (37.1 °C)  Heart Rate:  [67-86] 79  Resp:  [10-25] 12  BP: (104-148)/(56-84) 110/68  Flow (L/min):  [3] 3    Physical Exam:  Physical Exam  Vitals reviewed.   Constitutional:       Appearance: He is obese.   HENT:      Head: Normocephalic.   Cardiovascular:      Rate and Rhythm: Normal rate and regular rhythm.   Pulmonary:      Effort: Pulmonary effort is normal.      Breath sounds: Normal breath sounds.   Abdominal:      General: Bowel sounds are normal.      Palpations: Abdomen is soft.      Tenderness: There is no abdominal tenderness.   Musculoskeletal:         General: No swelling.   Neurological:      Mental Status: He is alert. Mental status is at baseline.   Psychiatric:         Behavior: Behavior normal.            Pertinent  and/or Most Recent Results     LAB RESULTS:      Lab 08/02/24 0438 08/01/24 0324 07/31/24  0817   WBC 10.56 9.68 6.59   HEMOGLOBIN 14.3 14.4 17.1   HEMATOCRIT 42.4 44.2 51.2*   PLATELETS 112* 128* 157   NEUTROS ABS 6.46 7.64* 3.32   IMMATURE GRANS (ABS) 0.08* 0.06* 0.04   LYMPHS ABS 3.28* 1.39 2.62   MONOS ABS 0.67 0.58 0.43   EOS ABS 0.03 0.00 0.12   .4* 101.4* 102.8*         Lab 08/02/24 0438 08/01/24 0324 07/31/24  0817   SODIUM 141 140 140   POTASSIUM 3.6 4.3 4.1   CHLORIDE 106 107 106   CO2 24.1 24.2 24.0   ANION GAP 10.9 8.8 10.0   BUN 10 13 9   CREATININE 0.80 0.88 0.91   EGFR 99.4 96.6 94.7   GLUCOSE 98 127* 99   CALCIUM 8.5* 8.7 9.4   MAGNESIUM 2.2 1.9 2.2   PHOSPHORUS 3.2 2.3*  --    TSH  --   --  3.670         Lab 08/02/24 0438 08/01/24 0324 07/31/24  0817   TOTAL PROTEIN 6.4 6.2 7.4   ALBUMIN 3.9 3.7 4.3   GLOBULIN 2.5 2.5 3.1   ALT (SGPT) 17 17 13   AST (SGOT) 46* 88* 19   BILIRUBIN 0.4 1.0 0.5   ALK PHOS 63 62 73         Lab 07/31/24  0817   PROBNP 774.0   HSTROP  T 11         Lab 08/01/24  0324   CHOLESTEROL 151   LDL CHOL 93   HDL CHOL 32*   TRIGLYCERIDES 146             Brief Urine Lab Results       None          Microbiology Results (last 10 days)       Procedure Component Value - Date/Time    MRSA Screen, PCR (Inpatient) - Swab, Nares [[id number]]  (Normal) Collected: 07/31/24 1207    Lab Status: Final result Specimen: Swab from Nares Updated: 07/31/24 1329     MRSA PCR No MRSA Detected    Narrative:      The negative predictive value of this diagnostic test is high and should only be used to consider de-escalating anti-MRSA therapy. A positive result may indicate colonization with MRSA and must be correlated clinically.            XR Chest 1 View    Result Date: 7/31/2024  Impression: Impression: 1. Right IJ temporary pacemaker with tip overlying the right ventricle. Electronically Signed: Abdias Saha MD  7/31/2024 9:46 AM EDT  Workstation ID: [id number]    XR Chest 1 View    Result Date: 7/31/2024  Impression: Impression: 1. No acute cardiopulmonary disease. Electronically Signed: Abdias Saha MD  7/31/2024 8:43 AM EDT  Workstation ID: [id number]             Results for orders placed during the hospital encounter of 10/29/23    Adult Transthoracic Echo Complete W/ Cont if Necessary Per Protocol    Interpretation Summary    Left ventricular systolic function is normal. Left ventricular ejection fraction appears to be 61 - 65%.    Estimated right ventricular systolic pressure from tricuspid regurgitation is normal (<35 mmHg).    Unremarkable study  Normal EF, normal diastolic function, normal RV size and function, no significant valvular abnormality, normal filling pressures      Labs Pending at Discharge:      Procedures Performed  Procedure(s):  Ablation atrial flutter         Consults:   Consults       Date and Time Order Name Status Description    8/1/2024  9:47 AM Inpatient Hospitalist Consult      7/31/2024  9:49 AM Inpatient Cardiology Consult Completed      7/31/2024  9:43 AM Cardiology (on-call MD unless specified)                Discharge Details        Discharge Medications        Continue These Medications        Instructions Start Date   albuterol sulfate  (90 Base) MCG/ACT inhaler  Commonly known as: PROVENTIL HFA;VENTOLIN HFA;PROAIR HFA   2 puffs, Inhalation, Every 4 Hours PRN      apixaban 5 MG tablet tablet  Commonly known as: ELIQUIS   5 mg, Oral, Every 12 Hours Scheduled      buPROPion  MG 24 hr tablet  Commonly known as: WELLBUTRIN XL       doxepin 10 MG capsule  Commonly known as: SINEquan   10 mg, Oral, Nightly      hydroCHLOROthiazide 12.5 MG tablet   1 tablet, Oral, Daily      ipratropium-albuterol 0.5-2.5 mg/3 ml nebulizer  Commonly known as: DUO-NEB   3 mL, Nebulization, Every 4 Hours PRN      lamoTRIgine 25 MG disintegrating tablet  Commonly known as: LaMICtal   Place 1 tablet every day by translingual route.      pantoprazole 40 MG EC tablet  Commonly known as: PROTONIX   40 mg, Oral, Daily      pravastatin 80 MG tablet  Commonly known as: PRAVACHOL   80 mg, Oral, Nightly      tiotropium bromide-olodaterol 2.5-2.5 MCG/ACT aerosol solution inhaler  Commonly known as: STIOLTO RESPIMAT   1 puff, Inhalation, Daily - RT             Stop These Medications      amLODIPine 10 MG tablet  Commonly known as: NORVASC     amLODIPine-benazepril 10-20 MG per capsule  Commonly known as: LOTREL     flecainide 100 MG tablet  Commonly known as: TAMBOCOR     metoprolol succinate XL 50 MG 24 hr tablet  Commonly known as: TOPROL-XL              Allergies   Allergen Reactions    Atorvastatin Swelling    Lisinopril Other (See Comments)     Facial paralysis          Discharge Disposition: home  Home or Self Care    Diet:  Hospital:  Diet Order   Procedures    Diet: Cardiac; Healthy Heart (2-3 Na+); Fluid Consistency: Thin (IDDSI 0)         Discharge Activity:         CODE STATUS:  Code Status and Medical Interventions: CPR (Attempt to Resuscitate); Full Support    Ordered at: 07/31/24 0949     Code Status (Patient has no pulse and is not breathing):    CPR (Attempt to Resuscitate)     Medical Interventions (Patient has pulse or is breathing):    Full Support         Future Appointments   Date Time Provider Department Center   8/16/2024  9:30 AM Aislinn Tang APRN [facility]       Additional Instructions for the Follow-ups that You Need to Schedule       Discharge Follow-up with PCP   As directed       Currently Documented PCP:    David Causey MD    PCP Phone Number:    [phone]     Follow Up Details: one week        Discharge Follow-up with Specified Provider: cardiology; 2 Weeks   As directed      To: cardiology   Follow Up: 2 Weeks                Time spent on Discharge including face to face service:  >30 minutes    Signature: Electronically signed by Timothy Duane Brammell, MD, 08/02/24, 15:38 EDT.  [facility] Hospitalist Team

## 2024-08-02 NOTE — CASE MANAGEMENT/SOCIAL WORK
Case Management Discharge Note             Transportation Services  Private: Car (with daughter)    Final Discharge Disposition Code: 01 - home or self-care

## 2024-08-02 NOTE — PROGRESS NOTES
[facility] CARDIOLOGY  [facility] Emergency Department        LOS:  LOS: 2 days   Patient Name: Jagdish Rea  Age/Sex: 63 y.o. male  : 1960  MRN: [id number]    Day of Service: 24   Length of Stay: 2  Encounter Provider: KEILA Avila  Place of Service: [facility] CARDIOLOGY  Patient Care Team:  David Causey MD as PCP - General (Family Medicine)  David Boyd MD as Consulting Physician (Otolaryngology)  Fredi Ritchie MD as Consulting Physician (Orthopedic Surgery)  Prieto Sidhu MD as Consulting Physician (Cardiology)    Subjective:     Chief Complaint:  F/U AF, bradycardia    Subjective:   Patient reports feeling well.  Denies any problem with his groin site and has ambulated without difficulty.  He wants to go home.    Current Medications:   Scheduled Meds:apixaban, 5 mg, Oral, Q12H  arformoterol, 15 mcg, Nebulization, BID - RT  ipratropium, 0.5 mg, Nebulization, 4x Daily - RT  pantoprazole, 40 mg, Oral, Q AM  sodium chloride, 10 mL, Intravenous, Q12H      Continuous Infusions:     Allergies:  Allergies   Allergen Reactions    Atorvastatin Swelling    Lisinopril Other (See Comments)     Facial paralysis        ROS    Objective:     Temp:  [98.1 °F (36.7 °C)-98.7 °F (37.1 °C)] 98.7 °F (37.1 °C)  Heart Rate:  [67-86] 79  Resp:  [10-25] 12  BP: (104-148)/(56-84) 110/68     Intake/Output Summary (Last 24 hours) at 2024 1426  Last data filed at 2024 1400  Gross per 24 hour   Intake 480 ml   Output 2200 ml   Net -1720 ml     Body mass index is 33.55 kg/m².      24  0750 24  0500   Weight: 113 kg (250 lb) 112 kg (247 lb 5.7 oz)         Physical Exam:  Neuro:  CV:  Resp:  GI:  Ext:  Tele: AAOx3, no gross deficits  S1S2 RRR, no murmur  Nonlabored, CTA  BS+, abd soft  Pedal pulses palp, no edema  SR                                                   Lab Review:   Results from last 7 days   Lab Units  08/02/24  0438 08/01/24  0324   SODIUM mmol/L 141 140   POTASSIUM mmol/L 3.6 4.3   CHLORIDE mmol/L 106 107   CO2 mmol/L 24.1 24.2   BUN mg/dL 10 13   CREATININE mg/dL 0.80 0.88   GLUCOSE mg/dL 98 127*   CALCIUM mg/dL 8.5* 8.7   AST (SGOT) U/L 46* 88*   ALT (SGPT) U/L 17 17     Results from last 7 days   Lab Units 07/31/24  0817   HSTROP T ng/L 11     Results from last 7 days   Lab Units 08/02/24  0438 08/01/24  0324   WBC 10*3/mm3 10.56 9.68   HEMOGLOBIN g/dL 14.3 14.4   HEMATOCRIT % 42.4 44.2   PLATELETS 10*3/mm3 112* 128*         Results from last 7 days   Lab Units 08/02/24  0438 08/01/24  0324   MAGNESIUM mg/dL 2.2 1.9     Results from last 7 days   Lab Units 08/01/24  0324   CHOLESTEROL mg/dL 151   TRIGLYCERIDES mg/dL 146   HDL CHOL mg/dL 32*     Results from last 7 days   Lab Units 07/31/24  0817   PROBNP pg/mL 774.0     Results from last 7 days   Lab Units 07/31/24  0817   TSH uIU/mL 3.670       Recent Radiology:  Imaging Results (Most Recent)       Procedure Component Value Units Date/Time    XR Chest 1 View [[id number]] Collected: 07/31/24 0944     Updated: 07/31/24 0948    Narrative:      XR CHEST 1 VW    Date of Exam: 7/31/2024 9:26 AM EDT    Indication: s/p post temporary pacemaker placement    Comparison: 7/31/2024    Findings:  Right IJ catheter is in place. The tip is overlies the right ventricle. Lungs are clear. No evidence of pneumothorax. Cardiac mediastinal contours are within normal limits.      Impression:      Impression:    1. Right IJ temporary pacemaker with tip overlying the right ventricle.      Electronically Signed: Abdias Saha MD    7/31/2024 9:46 AM EDT    Workstation ID: [id number]    XR Chest 1 View [[id number]] Collected: 07/31/24 0842     Updated: 07/31/24 0845    Narrative:      XR CHEST 1 VW    Date of Exam: 7/31/2024 8:30 AM EDT    Indication: palpitations, near syncope    Comparison: 1/20/2024    Findings:  The lungs are clear bilaterally. Pleural spaces are normal. Cardiac and  mediastinal contours are within normal limits. Regional skeleton is within normal limits for age.      Impression:      Impression:    1. No acute cardiopulmonary disease.      Electronically Signed: Abdias Saha MD    7/31/2024 8:43 AM EDT    Workstation ID: [id number]            ECHOCARDIOGRAM:    Results for orders placed during the hospital encounter of 10/29/23    Adult Transthoracic Echo Complete W/ Cont if Necessary Per Protocol    Interpretation Summary    Left ventricular systolic function is normal. Left ventricular ejection fraction appears to be 61 - 65%.    Estimated right ventricular systolic pressure from tricuspid regurgitation is normal (<35 mmHg).    Unremarkable study  Normal EF, normal diastolic function, normal RV size and function, no significant valvular abnormality, normal filling pressures        I reviewed the patient's new clinical results.    EKG:      Assessment:       Heart block    Atrial flutter    Benign essential hypertension    Atrial fibrillation with RVR    Sinus pause    1) Atrial flutter with tachy/isaak  - prior hx PAF  - failed flecainide --> discontinued   - on home Toprol XL --> discontinued  - on eliquis for anticoagulation  - post-conversion pauses up to 8 seconds s/p TV pacer placed in ER 7/31  - K 4.1, Mg 2.2  - TSH WNL, T4 low  - troponin negative  - QT WNL  - Last 2D echo 11/2023 showed an EF 60-65% with mild MR.    - Last cath in 2022 showed no obstructive CAD.    - s/p aflutter ablation 7/31     2) HTN  - continue on amlodipine     3) HLD     4) COPD with tobacco dependence    Plan:   Patient is POD#2 aflutter ablation.  Tele shows he has maintained sinus without further bradycardia.  As per Dr. Walker, do not resume flecainide or beta blockers.  He will go home with a 2 week ambulatory cardiac monitor to watch for further bradycardia.  F/U with Dr. Walker in 2 weeks.          Electronically signed by KEILA Avila, 08/02/24, 2:32 PM EDT.

## 2024-08-02 NOTE — PLAN OF CARE
Goal Outcome Evaluation:  Plan of Care Reviewed With: patient           Outcome Evaluation: Jagdish Rea is a 63 y.o. male with PMH of PAF, HTN, COPD, HLD, GERD, cervical DDD who admits with near syncope and is found in heart block.Underwent Ablation by Dr. Walker on 7/31/24. At baseline, pt lives with wife and 17 y.o. son in a H with ramped entrance.  Pt is typically independent with all mobility and ADLs without AD but occasionally uses SPC due to OA in knees.  Pt is an active . Pt reports hx of sciatica RLE/chronic LBP and was educated in and performed piriformis stretch.  Pt demonstrated independent transfers and ambulation 200' without AD sustaining HR and SpO2 on RA.  No PT needs identified;will complete PT order  Should be safe to discharge home.

## 2024-08-02 NOTE — CASE MANAGEMENT/SOCIAL WORK
Continued Stay Note   Robert     Patient Name: Jagdish Rea  MRN: [id number]  Today's Date: 8/2/2024    Admit Date: 7/31/2024    Plan: DC Plan: Anticipate routine home with family. Patient live with spouse and dep. son. Patient is Caregiver for both. Daughter and FAROOQ will help at DC.   Discharge Plan       Row Name 08/02/24 1448       Plan    Plan DC Plan: Anticipate routine home with family. Patient live with spouse and dep. son. Patient is Caregiver for both. Daughter and FAROOQ will help at DC.    Patient/Family in Agreement with Plan yes    Provided Post Acute Provider List? N/A    Provided Post Acute Provider Quality & Resource List? N/A    Plan Comments CM spoke with Hospitalist and nursing for morning rounds and reviewed chart for clinical updates. Discharge anticipated later today. Cardiology recommending home cardiac monitor for two weeks. No additional needs for services identified. Patient to transport home via private vehicle with daughter. No barriers.               Expected Discharge Date and Time       Expected Discharge Date Expected Discharge Time    Aug 2, 2024           Phone communication or documentation only- no physical contact with patient or family.      Imelda Patel RN    Office Phone: [phone]  Office Cell:     [phone]

## 2024-08-02 NOTE — THERAPY EVALUATION
Patient Name: Jagdish Rea  : 1960    MRN: [id number]                              Today's Date: 2024       Admit Date: 2024    Visit Dx:     ICD-10-CM ICD-9-CM   1. Sinus pause  I45.5 426.6   2. Near syncope  R55 780.2   3. Atrial flutter, unspecified type  I48.92 427.32     Patient Active Problem List   Diagnosis    Anxiety    Benign essential hypertension    Chronic low back pain    Hyperlipidemia    Other cervical disc degeneration at C5-C6 level    Peripheral neuropathy    Thoracic disc herniation    Vitamin B12 deficiency    Right shoulder pain    Chronic obstructive pulmonary disease with acute exacerbation    Fall    Radicular pain in right arm    Acute pain of right shoulder    Tobacco abuse    Chronic pain of both knees    Initial Medicare annual wellness visit    Left lower lobe pneumonia    Chest pain    Abnormal nuclear stress test    Tinnitus of right ear    Paresthesia of lower extremity    Class 2 severe obesity with serious comorbidity and body mass index (BMI) of 36.0 to 36.9 in adult    Arthritis    Diaphragmatic hernia without obstruction or gangrene    Hiatal hernia    Esophageal obstruction    Gastro-esophageal reflux disease with esophagitis    GERD with stricture    High blood pressure    Otalgia, left ear    Dysphagia    Other diseases of stomach and duodenum    Pure hypercholesterolemia    Back pain    Post traumatic stress disorder (PTSD)    Insomnia    Dyspnea    Multiple tracheobronchial mucus plugs    Obesity (BMI 30-39.9)    Rhinovirus infection    Acute hypoxemic respiratory failure    Prediabetes    Atrial fibrillation with RVR    Heart block    Atrial flutter    Sinus pause     Past Medical History:   Diagnosis Date    Acute hypoxemic respiratory failure 2023    Allergic     Anxiety     Arthritis 2005    Back pain     Claustrophobia     COPD (chronic obstructive pulmonary disease)     CTS (carpal tunnel syndrome) 10/2022    Dysphagia     Esophageal  stricture     GERD (gastroesophageal reflux disease)     Hyperlipidemia     Hypertension     Knee pain     rt    Low back pain     Obesity     Obesity (BMI 30-39.9) 11/06/2023    Other cervical disc degeneration at C5-C6 level 08/22/2018    Paroxysmal atrial fibrillation with rapid ventricular response 11/06/2023    Pneumonia     Prediabetes 11/06/2023    PTSD (post-traumatic stress disorder)     Rhinovirus infection 11/06/2023    Thoracic disc herniation     Vitamin B12 deficiency      Past Surgical History:   Procedure Laterality Date    BRONCHOSCOPY N/A 11/3/2023    Procedure: BRONCHOSCOPY with bilateral lung washing's;  Surgeon: Kolton Brewer MD;  Location: [facility] ENDOSCOPY;  Service: Pulmonary;  Laterality: N/A;    CARDIAC CATHETERIZATION N/A 07/13/2022    Procedure: Left Heart Cath, possible pci;  Surgeon: Prieto Sidhu MD;  Location: [facility] CATH INVASIVE LOCATION;  Service: Cardiovascular;  Laterality: N/A;    CARDIAC ELECTROPHYSIOLOGY PROCEDURE N/A 7/31/2024    Procedure: Ablation atrial flutter;  Surgeon: Pardeep Walker MD;  Location: [facility] CATH INVASIVE LOCATION;  Service: Cardiovascular;  Laterality: N/A;    ENDOSCOPY      ENDOSCOPY N/A 9/21/2023    Procedure: ESOPHAGOGASTRODUODENOSCOPY WITH non guided esophageal dialtion 54 [address]ougie and  cold forcep biopsy x1 area;  Surgeon: Andres Concepcion MD;  Location: [facility] ENDOSCOPY;  Service: Gastroenterology;  Laterality: N/A;  Post- erosive gastritis, hiatal hernia, esophageal stricture    HERNIA REPAIR      KNEE ARTHROPLASTY      x2    SHOULDER ARTHROSCOPY W/ ROTATOR CUFF REPAIR Right 08/11/2020    Procedure: SHOULDER ARTHROSCOPY WITH ROTATOR CUFF REPAIR, extensive debridement;  Surgeon: Fredi Ritchie MD;  Location: [facility] MAIN OR;  Service: Orthopedics;  Laterality: Right;    TONSILLECTOMY        General Information       Row Name 08/02/24 0856          Physical Therapy Time and Intention    Document Type evaluation   -CL     Mode of Treatment individual therapy;physical therapy  -CL       Row Name 08/02/24 0856          General Information    Patient Profile Reviewed yes  -CL     Prior Level of Function driving;ADL's;community mobility  -CL     Existing Precautions/Restrictions no known precautions/restrictions  -CL     Barriers to Rehab none identified  -CL       Row Name 08/02/24 0856          Living Environment    People in Home spouse;child(paloma), dependent  17 y.o. son  -CL     Name(s) of People in Home Spouse: Maite Rea  -CL       Row Name 08/02/24 0856          Home Main Entrance    Number of Stairs, Main Entrance other (see comments)  ramp to enter  -CL       Row Name 08/02/24 0856          Stairs Within Home, Primary    Number of Stairs, Within Home, Primary none  -CL       Row Name 08/02/24 0856          Cognition    Orientation Status (Cognition) oriented x 4  -CL               User Key  (r) = Recorded By, (t) = Taken By, (c) = Cosigned By      Initials Name Provider Type    CL Milana Gates, PT Physical Therapist                   Mobility       Row Name 08/02/24 0858          Bed Mobility    Comment, (Bed Mobility) Pt up in chair at start and end of session  -CL       Row Name 08/02/24 0858          Sit-Stand Transfer    Sit-Stand Evans (Transfers) independent  -CL       Row Name 08/02/24 0858          Gait/Stairs (Locomotion)    Evans Level (Gait) independent  -CL     Patient was able to Ambulate yes  -CL     Distance in Feet (Gait) 200  -CL               User Key  (r) = Recorded By, (t) = Taken By, (c) = Cosigned By      Initials Name Provider Type    Milana Perez PT Physical Therapist                   Obj/Interventions       Row Name 08/02/24 0858          Range of Motion Comprehensive    General Range of Motion no range of motion deficits identified  -CL       Row Name 08/02/24 0858          Strength Comprehensive (MMT)    General Manual Muscle Testing (MMT) Assessment no  strength deficits identified  -CL       Row Name 08/02/24 0858          Balance    Balance Assessment sitting static balance;sitting dynamic balance;standing static balance;standing dynamic balance  -CL     Static Sitting Balance independent  -CL     Dynamic Sitting Balance independent  -CL     Position, Sitting Balance sitting in chair  -CL     Dynamic Standing Balance independent  -CL     Position/Device Used, Standing Balance unsupported  -CL       Row Name 08/02/24 0858          Sensory Assessment (Somatosensory)    Sensory Assessment (Somatosensory) sensation intact  -CL               User Key  (r) = Recorded By, (t) = Taken By, (c) = Cosigned By      Initials Name Provider Type    CL Milana Gates, PT Physical Therapist                   Goals/Plan    No documentation.                  Clinical Impression       Row Name 08/02/24 0858          Pain    Pretreatment Pain Rating 2/10  -CL     Posttreatment Pain Rating 2/10  -CL     Pain Location - back  -CL     Pre/Posttreatment Pain Comment Pt with chronic back pain  -CL     Pain Intervention(s) Repositioned;Ambulation/increased activity  -CL       Row Name 08/02/24 0858          Plan of Care Review    Plan of Care Reviewed With patient  -CL     Outcome Evaluation Jagdish Rea is a 63 y.o. male with PMH of PAF, HTN, COPD, HLD, GERD, cervical DDD who admits with near syncope and is found in heart block.Underwent Ablation by Dr. Walker on 7/31/24. At baseline, pt lives with wife and 17 y.o. son in a [facility] with ramped entrance.  Pt is typically independent with all mobility and ADLs without AD but occasionally uses SPC due to OA in knees.  Pt is an active . Pt reports hx of sciatica RLE/chronic LBP and was educated in and performed piriformis stretch.  Pt demonstrated independent transfers and ambulation 200' without AD sustaining HR and SpO2 on RA.  No PT needs identified;will complete PT order  Should be safe to discharge home.  -CL       Row Name  08/02/24 0858          Therapy Assessment/Plan (PT)    Criteria for Skilled Interventions Met (PT) no problems identified which require skilled intervention  -CL     Therapy Frequency (PT) evaluation only  -CL       Row Name 08/02/24 0858          Vital Signs    Pre Systolic BP Rehab 136  -CL     Pre Treatment Diastolic   -CL     Pretreatment Heart Rate (beats/min) 97  -CL     Intratreatment Heart Rate (beats/min) 98  -CL     Posttreatment Heart Rate (beats/min) 98  -CL     Pretreatment Resp Rate (breaths/min) 24  -CL     Pre SpO2 (%) 98  -CL     O2 Delivery Pre Treatment room air  -CL     Intra SpO2 (%) 99  -CL     O2 Delivery Intra Treatment room air  -CL     Post SpO2 (%) 98  -CL     O2 Delivery Post Treatment room air  -CL       Row Name 08/02/24 0858          Positioning and Restraints    Pre-Treatment Position sitting in chair/recliner  -CL     Post Treatment Position chair  -CL     In Chair notified nsg;reclined;call light within reach  -CL               User Key  (r) = Recorded By, (t) = Taken By, (c) = Cosigned By      Initials Name Provider Type    Milana Perez, PT Physical Therapist                   Outcome Measures       Row Name 08/02/24 0903          How much help from another person do you currently need...    Turning from your back to your side while in flat bed without using bedrails? 4  -CL     Moving from lying on back to sitting on the side of a flat bed without bedrails? 4  -CL     Moving to and from a bed to a chair (including a wheelchair)? 4  -CL     Standing up from a chair using your arms (e.g., wheelchair, bedside chair)? 4  -CL     Climbing 3-5 steps with a railing? 4  -CL     To walk in hospital room? 4  -CL     AM-PAC 6 Clicks Score (PT) 24  -CL     Highest Level of Mobility Goal 8 --> Walked 250 feet or more  -CL               User Key  (r) = Recorded By, (t) = Taken By, (c) = Cosigned By      Initials Name Provider Type    Milana Perez, PT Physical Therapist                                  Physical Therapy Education       Title: PT OT SLP Therapies (Done)       Topic: Physical Therapy (Done)       Point: Mobility training (Done)       Learning Progress Summary             Patient Acceptance, E,TB, VU by CL at 8/2/2024 0903                                         User Key       Initials Effective Dates Name Provider Type Discipline     03/02/22 -  Milana Gates, PT Physical Therapist PT                  PT Recommendation and Plan     Plan of Care Reviewed With: patient  Outcome Evaluation: Jagdish Rea is a 63 y.o. male with PMH of PAF, HTN, COPD, HLD, GERD, cervical DDD who admits with near syncope and is found in heart block.Underwent Ablation by Dr. Walker on 7/31/24. At baseline, pt lives with wife and 17 y.o. son in a [facility] with ramped entrance.  Pt is typically independent with all mobility and ADLs without AD but occasionally uses SPC due to OA in knees.  Pt is an active . Pt reports hx of sciatica RLE/chronic LBP and was educated in and performed piriformis stretch.  Pt demonstrated independent transfers and ambulation 200' without AD sustaining HR and SpO2 on RA.  No PT needs identified;will complete PT order  Should be safe to discharge home.     Time Calculation:   PT Evaluation Complexity  History, PT Evaluation Complexity: 3 or more personal factors and/or comorbidities  Examination of Body Systems (PT Eval Complexity): total of 3 or more elements  Clinical Presentation (PT Evaluation Complexity): evolving  Clinical Decision Making (PT Evaluation Complexity): moderate complexity  Overall Complexity (PT Evaluation Complexity): moderate complexity     PT Charges       Row Name 08/02/24 0904             Time Calculation    Start Time 0841  -CL      Stop Time 0902  -CL      Time Calculation (min) 21 min  -CL      PT Received On 08/02/24  -CL         Time Calculation- PT    Total Timed Code Minutes- PT 0 minute(s)  -CL                User Key  (r)  = Recorded By, (t) = Taken By, (c) = Cosigned By      Initials Name Provider Type    CL Milana Gates, PT Physical Therapist                  Therapy Charges for Today       Code Description Service Date Service Provider Modifiers Qty    96978240978 HC PT EVAL MOD COMPLEXITY 4 8/2/2024 Milana Gates, PT GP 1            PT G-Codes  AM-PAC 6 Clicks Score (PT): 24  PT Discharge Summary  Anticipated Discharge Disposition (PT): home  Reason for Discharge: At baseline function    Milana Gates, PT  8/2/2024

## 2024-08-03 ENCOUNTER — READMISSION MANAGEMENT (OUTPATIENT)
Dept: CALL CENTER | Facility: HOSPITAL | Age: 64
End: 2024-08-03
Payer: MEDICARE

## 2024-08-03 NOTE — OUTREACH NOTE
Prep Survey      Flowsheet Row Responses   [facility]   Is LACE score < 7 ? No   Eligibility Readm Mgmt   Discharge diagnosis Heart block   Does the patient have one of the following disease processes/diagnoses(primary or secondary)? Other   Prep survey completed? Yes            Nydia FORDE - Registered Nurse

## 2024-08-07 ENCOUNTER — READMISSION MANAGEMENT (OUTPATIENT)
Dept: CALL CENTER | Facility: HOSPITAL | Age: 64
End: 2024-08-07
Payer: MEDICARE

## 2024-08-07 NOTE — OUTREACH NOTE
Medical Week 1 Survey      Flowsheet Row Responses   [facility] [place] patient discharged from? [facility]   Does the patient have one of the following disease processes/diagnoses(primary or secondary)? Other   Week 1 attempt successful? Yes   Call start time 1152   Call end time 1158   Discharge diagnosis Heart block   Meds reviewed with patient/caregiver? Yes   Does the patient have all medications ordered at discharge? N/A   Is the patient taking all medications as directed (includes completed medication regime)? Yes   Does the patient have a primary care provider?  Yes   Does the patient have an appointment with their PCP within 7 days of discharge? Yes   Has the patient kept scheduled appointments due by today? N/A   Comments has appt with cardiolgoy on 8/16   Psychosocial issues? No   Did the patient receive a copy of their discharge instructions? Yes   Nursing interventions Reviewed instructions with patient   What is the patient's perception of their health status since discharge? New symptoms unrelated to diagnosis   Is the patient/caregiver able to teach back signs and symptoms related to disease process for when to call PCP? Yes   Is the patient/caregiver able to teach back signs and symptoms related to disease process for when to call 911? Yes   Is the patient/caregiver able to teach back the hierarchy of who to call/visit for symptoms/problems? PCP, Specialist, Home health nurse, Urgent Care, ED, 911 Yes   Additional teach back comments Has heart fluttering at times.  States he is currently wearing a hear monitor.  Has had cold sweats, neck and shoulder pain.  Advised to contact cardiology or go to ED to be evaluated.   Week 1 call completed? Yes   Wrap up additional comments Pt to contact cardiology with symptoms or go to ED to be evaluated.   Call end time 1158            Marli FALK - Licensed Nurse

## 2024-08-13 ENCOUNTER — NURSE TRIAGE (OUTPATIENT)
Dept: CALL CENTER | Facility: HOSPITAL | Age: 64
End: 2024-08-13
Payer: MEDICARE

## 2024-08-13 NOTE — TELEPHONE ENCOUNTER
"Pt called with concerns of having blurry vision/weakness.  Pt states vision \"got funny\" today like \"wavy\" --sit down at home, bp taken at home 167/89-HR 83 and 145/84-HR ?--5 mins ago 136/89. Slight headache just started since being home moderate pain above right temple area, no blurry vision now. Pt just not feeling well all the sudden. Went and laid down.  Pt instructed on care advice, needs to see PCP within 24 hours, have someone else drive him to be safe to the appt or urgent care if PCP cannot see him.  Instructed to call back if you have any concerns or s/s worsen.  Pt verbalized understanding.    Transferred call to PCP office, spoke with  and told her the situation, she made him an appt for Wed, tomorrow at 10am.  Pt  voiced understanding.    "

## 2024-08-13 NOTE — TELEPHONE ENCOUNTER
"Reason for Disposition  • [1] Headache AND [2] age > 50 years    Additional Information  • Negative: Weakness of the face, arm or leg on one side of the body  • Negative: Followed getting substance in the eye  • Negative: Foreign body stuck in the eye  • Negative: Followed an eye injury  • Negative: Followed sun lamp or sun exposure (UV keratitis)  • Negative: Yellow or green discharge (pus) in the eye  • Negative: Pregnant  • Negative: Postpartum (from 0 to 6 weeks after delivery)  • Negative: Complete loss of vision in one or both eyes  • Negative: SEVERE eye pain  • Negative: SEVERE headache  • Negative: Double vision  • Negative: [1] Blurred vision or visual changes AND [2] present now AND [3] sudden onset or new (e.g., minutes, hours, days)  (Exception: Seeing floaters / black specks OR previously diagnosed migraine headaches with same symptoms.)  • Negative: Patient sounds very sick or weak to the triager  • Negative: Flashes of light  (Exception: Brief from pressing on the eyeball.)  • Negative: Many floaters in the eye  (Exception: Floater(s) are a chronic symptom and this is unchanged from patient's baseline pattern.)  • Negative: [1] Eye pain AND [2] brief (now gone) blurred vision or visual changes  • Negative: [1] Taking digoxin (e.g., Lanoxin, Digitek, Cardoxin, Lanoxicaps; Toloxin in [place]) AND [2] blurred vision, yellow vision, or yellow-green halos  • Negative: [1] Jaw pain while eating AND [2] age > 50 years    Answer Assessment - Initial Assessment Questions  1. DESCRIPTION: \"How has your vision changed?\" (e.g., complete vision loss, blurred vision, double vision, floaters, etc.)      Blurry vision, weakness, not feeling well, HR irregular   2. LOCATION: \"One or both eyes?\" If one, ask: \"Which eye?\"      Both eyes   3. SEVERITY: \"Can you see anything?\" If Yes, ask: \"What can you see?\" (e.g., fine print)      Ok right now but earlier blurry   4. ONSET: \"When did this begin?\" \"Did it start suddenly " "or has this been gradual?\"      Today   5. PATTERN: \"Does this come and go, or has it been constant since it started?\"      Comes and goes   6. PAIN: \"Is there any pain in your eye(s)?\"  (Scale 1-10; or mild, moderate, severe)    - NONE (0): No pain.    - MILD (1-3): Doesn't interfere with normal activities.    - MODERATE (4-7): Interferes with normal activities or awakens from sleep.     - SEVERE (8-10): Excruciating pain, unable to do any normal activities.      Moderate   7. CONTACTS-GLASSES: \"Do you wear contacts or glasses?\"      Glasses   8. CAUSE: \"What do you think is causing this visual problem?\"      no  9. OTHER SYMPTOMS: \"Do you have any other symptoms?\" (e.g., confusion, headache, arm or leg weakness, speech problems)      No confusion, slight headache above right temple, outer edge of right jaw tingly, no speech issues   10. PREGNANCY: \"Is there any chance you are pregnant?\" \"When was your last menstrual period?\"        no    Protocols used: Vision Loss or Change-ADULT-AH    "

## 2024-08-15 ENCOUNTER — OFFICE VISIT (OUTPATIENT)
Dept: CARDIOLOGY | Facility: CLINIC | Age: 64
End: 2024-08-15
Payer: MEDICARE

## 2024-08-15 VITALS
BODY MASS INDEX: 33.55 KG/M2 | SYSTOLIC BLOOD PRESSURE: 116 MMHG | OXYGEN SATURATION: 96 % | DIASTOLIC BLOOD PRESSURE: 79 MMHG | HEIGHT: 72 IN | HEART RATE: 89 BPM

## 2024-08-15 DIAGNOSIS — E78.2 MIXED HYPERLIPIDEMIA: Chronic | ICD-10-CM

## 2024-08-15 DIAGNOSIS — I10 BENIGN ESSENTIAL HYPERTENSION: Chronic | ICD-10-CM

## 2024-08-15 DIAGNOSIS — I48.3 TYPICAL ATRIAL FLUTTER: Primary | ICD-10-CM

## 2024-08-15 DIAGNOSIS — I45.5 SINUS PAUSE: ICD-10-CM

## 2024-08-15 NOTE — PROGRESS NOTES
"  [facility]      REASON FOR FOLLOW-UP:  Follow-up ablation          Chief Complaint   Patient presents with   • Heart Problem         Dear David Causey MD        History of Present Illness   It was my pleasure to see Jagdish Rea in the office today.  He is a 63 y.o. male who has seen several cardiologists in different healthcare systems due to [employer] insurance difficulties.  He has a cardiac history of paroxysmal atrial fibrillation first diagnosed in 2023 and anticoagulated with eliquis.  He had recurrent AF in 1/2024 refractory to BB and digoxin and was started on flecainide.  Last 2D echo 11/2023 showed an EF 60-65% with mild MR.  Last cath in 2022 showed no obstructive CAD.  Additional past medical history includes HTN, HLD, and COPD with tobacco abuse.  Patient presented to [facility] 7/31/2024 with complaint of dizziness and found with atrial flutter with rates up to 110s and frequent symptomatic.  He underwent cardioversion and was then noted to have pauses up to 8 seconds followed by a sinus beat, then back into atrial flutter.  He ultimately underwent atrial flutter ablation per Dr. Walker.  Flecainide and metoprolol were discontinued.  He was fitted with a 2-week ambulatory monitor which she is currently wearing.  He presents in follow-up from that hospitalization.    The patient tells me that he has rare episodes of heart rate greater than 100, as high as 113 per his Fitbit watch.  He feels fluttering that lasts only seconds.  He does report 1 episode recently in which she was driving and suddenly began to not feel well with palpitations and states it seemed like he was \"looking through water\".  He pulled to the side of the road, the episode subsided and he went home to lie down.  He has had some headaches in the mornings.  His blood pressure is under excellent control in the office today at 116/79, however he shows me recent readings of 169/97, " 145/84 and 136/87.  He denies any edema, chest discomfort, shortness of breath.  He denies any abnormal bleeding.      ASSESSMENT:  Recurrent atrial flutter status post ablation  Recent sinus pause secondary to atrial arrhythmia  History of atrial fibrillation-paroxysmal  Benign essential hypertension  Dyslipidemia  COPD  Elevated chads vascular score      PLAN:  Complete monitor and return as instructed  Continue anticoagulation with Eliquis.  Monitor for any abnormal bleeding  Follow-up with electrophysiologist as scheduled      CHF Guideline Directed Medical Therapy  Beta Blocker:   ARNI/ACE/ARB:   SGLT 2 inhibitors:   Diuretics:   Aldosterone Antagonist:   Vasodilators & Nitrates:       Diagnoses and all orders for this visit:    1. Typical atrial flutter (Primary)    2. Mixed hyperlipidemia    3. Benign essential hypertension    4. Sinus pause          The following portions of the patient's history were reviewed and updated as appropriate: allergies, current medications, past family history, past medical history, past social history, past surgical history, and problem list.    REVIEW OF SYSTEMS:    Review of Systems   Eyes:  Positive for blurred vision.   Cardiovascular:  Positive for palpitations.   Neurological:  Positive for headaches.   All other systems reviewed and are negative.      Vitals:    08/15/24 0939   BP: 116/79   Pulse: 89   SpO2: 96%         PHYSICAL EXAM:    General: Alert, cooperative, no distress, appears stated age  Head:  Normocephalic, atraumatic, mucous membranes moist  Eyes:  Conjunctiva/corneas clear, EOM's intact     Neck:  Supple,  no JVD or bruit     Lungs: Clear to auscultation bilaterally, no wheezes rhonchi rales are noted  Chest wall: No tenderness  Musculoskeletal:   Ambulates freely without assistance  Heart::  Regular rate and rhythm, S1 and S2 normal, no murmur, rub or gallop  Abdomen: Soft, non-tender, nondistended, bowel sounds active, no abdominal bruit  Extremities: No  cyanosis, clubbing, or edema   Pulses: 2+ and symmetric all extremities  Skin:  No rashes or lesions  Neuro/psych: A&O x3. CN II through XII are grossly intact with appropriate affect        Past Medical History:   Diagnosis Date   • Acute hypoxemic respiratory failure 11/06/2023   • Allergic    • Anxiety    • Arthritis 06/2005   • Back pain    • Claustrophobia 1978   • COPD (chronic obstructive pulmonary disease)    • CTS (carpal tunnel syndrome) 10/2022   • Dysphagia    • Esophageal stricture    • GERD (gastroesophageal reflux disease)    • Hyperlipidemia    • Hypertension    • Knee pain     rt   • Low back pain    • Obesity    • Obesity (BMI 30-39.9) 11/06/2023   • Other cervical disc degeneration at C5-C6 level 08/22/2018   • Paroxysmal atrial fibrillation with rapid ventricular response 11/06/2023   • Pneumonia    • Prediabetes 11/06/2023   • PTSD (post-traumatic stress disorder)    • Rhinovirus infection 11/06/2023   • Thoracic disc herniation    • Vitamin B12 deficiency        Past Surgical History:   Procedure Laterality Date   • BRONCHOSCOPY N/A 11/3/2023    Procedure: BRONCHOSCOPY with bilateral lung washing's;  Surgeon: Kolton Brewer MD;  Location: [facility] ENDOSCOPY;  Service: Pulmonary;  Laterality: N/A;   • CARDIAC CATHETERIZATION N/A 07/13/2022    Procedure: Left Heart Cath, possible pci;  Surgeon: Prieto Sidhu MD;  Location: [facility] CATH INVASIVE LOCATION;  Service: Cardiovascular;  Laterality: N/A;   • CARDIAC ELECTROPHYSIOLOGY PROCEDURE N/A 7/31/2024    Procedure: Ablation atrial flutter;  Surgeon: Pardeep Walker MD;  Location: [facility] CATH INVASIVE LOCATION;  Service: Cardiovascular;  Laterality: N/A;   • ENDOSCOPY     • ENDOSCOPY N/A 9/21/2023    Procedure: ESOPHAGOGASTRODUODENOSCOPY WITH non guided esophageal dialtion 54 Fr. Bougie and  cold forcep biopsy x1 area;  Surgeon: Andres Concepcion MD;  Location: [facility] ENDOSCOPY;  Service: Gastroenterology;  Laterality: N/A;   Post- erosive gastritis, hiatal hernia, esophageal stricture   • HERNIA REPAIR     • KNEE ARTHROPLASTY      x2   • SHOULDER ARTHROSCOPY W/ ROTATOR CUFF REPAIR Right 08/11/2020    Procedure: SHOULDER ARTHROSCOPY WITH ROTATOR CUFF REPAIR, extensive debridement;  Surgeon: Fredi Ritchie MD;  Location: [facility] MAIN OR;  Service: Orthopedics;  Laterality: Right;   • TONSILLECTOMY           Current Outpatient Medications:   •  albuterol sulfate  (90 Base) MCG/ACT inhaler, Inhale 2 puffs Every 4 (Four) Hours As Needed for Wheezing., Disp: 18 g, Rfl: 1  •  apixaban (ELIQUIS) 5 MG tablet tablet, Take 1 tablet by mouth Every 12 (Twelve) Hours. Indications: Atrial Fibrillation, Disp: 60 tablet, Rfl: 0  •  hydroCHLOROthiazide 12.5 MG tablet, Take 1 tablet by mouth Daily., Disp: , Rfl:   •  ipratropium-albuterol (DUO-NEB) 0.5-2.5 mg/3 ml nebulizer, Take 3 mL by nebulization Every 4 (Four) Hours As Needed for Wheezing., Disp: , Rfl:   •  lamoTRIgine (LaMICtal) 25 MG disintegrating tablet, Place 1 tablet every day by translingual route., Disp: , Rfl:   •  pantoprazole (PROTONIX) 40 MG EC tablet, Take 1 tablet by mouth Daily., Disp: , Rfl:   •  pravastatin (PRAVACHOL) 80 MG tablet, Take 1 tablet by mouth Every Night., Disp: , Rfl:   •  tiotropium bromide-olodaterol (STIOLTO RESPIMAT) 2.5-2.5 MCG/ACT aerosol solution inhaler, Inhale 1 puff Daily., Disp: , Rfl:     Allergies   Allergen Reactions   • Atorvastatin Swelling   • Lisinopril Other (See Comments)     Facial paralysis        Family History   Problem Relation Age of Onset   • Heart disease Mother    • Early death Mother    • Hyperlipidemia Mother    • Hypertension Mother    • Thyroid disease Mother    • Cancer Father    • Stroke Father    • Vision loss Father    • Stroke Paternal Grandfather    • Arthritis Paternal Grandmother    • Alcohol abuse Brother    • Asthma Brother    • Depression Brother    • Hyperlipidemia Brother    • Hypertension Brother    •  "Learning disabilities Brother    • Mental illness Brother    • Drug abuse Brother    • Early death Brother    • Heart disease Brother    • Hyperlipidemia Brother    • Hypertension Brother        Social History     Tobacco Use   • Smoking status: Every Day     Current packs/day: 1.00     Average packs/day: 1 pack/day for 51.6 years (51.6 ttl pk-yrs)     Types: Cigarettes     Start date: 1/9/1973   • Smokeless tobacco: Never   • Tobacco comments:     Smoked a half a pack a day for 42 years didn't start to smoke a whole pack until 2017   Substance Use Topics   • Alcohol use: No           Current Electrocardiogram:    ECG 12 Lead    Date/Time: 8/15/2024 8:20 AM  Performed by: Stacey Guerrero APRN    Authorized by: Stacey Guerrero APRN  Comparison: not compared with previous ECG   Previous ECG: no previous ECG available  Rhythm: sinus rhythm  BPM: 89            EMR Dragon/Transcription:   \"Dictated utilizing Dragon dictation\".     Copied text in this note has been reviewed by me and is accurate as of 08/16/24.    "

## 2024-08-16 ENCOUNTER — READMISSION MANAGEMENT (OUTPATIENT)
Dept: CALL CENTER | Facility: HOSPITAL | Age: 64
End: 2024-08-16
Payer: MEDICARE

## 2024-08-16 NOTE — OUTREACH NOTE
Medical Week 2 Survey      Flowsheet Row Responses   [facility] patient discharged from? [facility]   Does the patient have one of the following disease processes/diagnoses(primary or secondary)? Other   Week 2 attempt successful? Yes   Call start time 1340   Discharge diagnosis Heart block   Call end time 1342   Meds reviewed with patient/caregiver? Yes   Is the patient having any side effects they believe may be caused by any medication additions or changes? No   Does the patient have all medications ordered at discharge? Yes   Is the patient taking all medications as directed (includes completed medication regime)? Yes   Does the patient have a primary care provider?  Yes   Does the patient have an appointment with their PCP within 7 days of discharge? Yes   Has the patient kept scheduled appointments due by today? Yes   Psychosocial issues? No   Did the patient receive a copy of their discharge instructions? Yes   Nursing interventions Reviewed instructions with patient   What is the patient's perception of their health status since discharge? Improving   Is the patient/caregiver able to teach back signs and symptoms related to disease process for when to call PCP? Yes   Is the patient/caregiver able to teach back signs and symptoms related to disease process for when to call 911? Yes   Is the patient/caregiver able to teach back the hierarchy of who to call/visit for symptoms/problems? PCP, Specialist, Home health nurse, Urgent Care, ED, 911 Yes   If the patient is a current smoker, are they able to teach back resources for cessation? [id number]   Week 2 Call Completed? Yes   Graduated Yes   Did the patient feel the follow up calls were helpful during their recovery period? Yes   Was the number of calls appropriate? Yes   Is the patient interested in additional calls from an ambulatory ? No   Would this patient benefit from a Referral to Amb Social Work? No   Wrap up additional comments has seen  both pcp and cards, doing well.   Call end time 3380            FELICITA MANZO - Registered Nurse

## 2024-09-16 ENCOUNTER — OFFICE VISIT (OUTPATIENT)
Dept: CARDIOLOGY | Facility: CLINIC | Age: 64
End: 2024-09-16
Payer: MEDICARE

## 2024-09-16 VITALS
HEIGHT: 72 IN | WEIGHT: 227 LBS | OXYGEN SATURATION: 95 % | DIASTOLIC BLOOD PRESSURE: 81 MMHG | HEART RATE: 94 BPM | BODY MASS INDEX: 30.75 KG/M2 | SYSTOLIC BLOOD PRESSURE: 115 MMHG

## 2024-09-16 DIAGNOSIS — R94.31 ABNORMAL HOLTER MONITOR FINDING: ICD-10-CM

## 2024-09-16 DIAGNOSIS — I45.5 SINUS PAUSE: ICD-10-CM

## 2024-09-16 DIAGNOSIS — E78.2 MIXED HYPERLIPIDEMIA: ICD-10-CM

## 2024-09-16 DIAGNOSIS — I48.0 PAF (PAROXYSMAL ATRIAL FIBRILLATION): Primary | ICD-10-CM

## 2024-09-16 DIAGNOSIS — I48.3 TYPICAL ATRIAL FLUTTER: ICD-10-CM

## 2024-09-16 PROCEDURE — 99214 OFFICE O/P EST MOD 30 MIN: CPT | Performed by: INTERNAL MEDICINE

## 2024-09-16 PROCEDURE — 93000 ELECTROCARDIOGRAM COMPLETE: CPT | Performed by: INTERNAL MEDICINE

## 2024-09-16 PROCEDURE — 3074F SYST BP LT 130 MM HG: CPT | Performed by: INTERNAL MEDICINE

## 2024-09-16 PROCEDURE — 1159F MED LIST DOCD IN RCRD: CPT | Performed by: INTERNAL MEDICINE

## 2024-09-16 PROCEDURE — 3079F DIAST BP 80-89 MM HG: CPT | Performed by: INTERNAL MEDICINE

## 2024-09-16 PROCEDURE — 1160F RVW MEDS BY RX/DR IN RCRD: CPT | Performed by: INTERNAL MEDICINE

## 2024-12-10 ENCOUNTER — APPOINTMENT (OUTPATIENT)
Dept: CT IMAGING | Facility: HOSPITAL | Age: 64
DRG: 074 | End: 2024-12-10
Payer: MEDICARE

## 2024-12-10 ENCOUNTER — HOSPITAL ENCOUNTER (EMERGENCY)
Facility: HOSPITAL | Age: 64
Discharge: LEFT BEFORE DISCHARGE | DRG: 074 | End: 2024-12-10
Attending: EMERGENCY MEDICINE | Admitting: INTERNAL MEDICINE
Payer: MEDICARE

## 2024-12-10 ENCOUNTER — APPOINTMENT (OUTPATIENT)
Dept: GENERAL RADIOLOGY | Facility: HOSPITAL | Age: 64
DRG: 074 | End: 2024-12-10
Payer: MEDICARE

## 2024-12-10 VITALS
WEIGHT: 224.9 LBS | TEMPERATURE: 97.8 F | RESPIRATION RATE: 18 BRPM | BODY MASS INDEX: 30.46 KG/M2 | DIASTOLIC BLOOD PRESSURE: 80 MMHG | HEIGHT: 72 IN | SYSTOLIC BLOOD PRESSURE: 120 MMHG | OXYGEN SATURATION: 93 % | HEART RATE: 74 BPM

## 2024-12-10 DIAGNOSIS — R29.818 FOCAL NEUROLOGICAL DEFICIT: Primary | ICD-10-CM

## 2024-12-10 PROBLEM — E87.6 HYPOKALEMIA: Status: ACTIVE | Noted: 2024-12-10

## 2024-12-10 PROBLEM — R29.90 STROKE-LIKE SYMPTOM: Status: ACTIVE | Noted: 2024-12-10

## 2024-12-10 LAB
ABO GROUP BLD: NORMAL
ALBUMIN SERPL-MCNC: 3.9 G/DL (ref 3.5–5.2)
ALBUMIN/GLOB SERPL: 1.3 G/DL
ALP SERPL-CCNC: 82 U/L (ref 39–117)
ALT SERPL W P-5'-P-CCNC: 13 U/L (ref 1–41)
ANION GAP SERPL CALCULATED.3IONS-SCNC: 8.7 MMOL/L (ref 5–15)
APTT PPP: 33.6 SECONDS (ref 22.7–35.4)
AST SERPL-CCNC: 17 U/L (ref 1–40)
BASOPHILS # BLD AUTO: 0.02 10*3/MM3 (ref 0–0.2)
BASOPHILS NFR BLD AUTO: 0.4 % (ref 0–1.5)
BILIRUB SERPL-MCNC: 0.3 MG/DL (ref 0–1.2)
BLD GP AB SCN SERPL QL: NEGATIVE
BUN SERPL-MCNC: 6 MG/DL (ref 8–23)
BUN/CREAT SERPL: 6.7 (ref 7–25)
CALCIUM SPEC-SCNC: 8.8 MG/DL (ref 8.6–10.5)
CHLORIDE SERPL-SCNC: 106 MMOL/L (ref 98–107)
CHOLEST SERPL-MCNC: 111 MG/DL (ref 0–200)
CO2 SERPL-SCNC: 26.3 MMOL/L (ref 22–29)
CREAT SERPL-MCNC: 0.89 MG/DL (ref 0.76–1.27)
DEPRECATED RDW RBC AUTO: 51.1 FL (ref 37–54)
EGFRCR SERPLBLD CKD-EPI 2021: 95.7 ML/MIN/1.73
EOSINOPHIL # BLD AUTO: 0.11 10*3/MM3 (ref 0–0.4)
EOSINOPHIL NFR BLD AUTO: 2.3 % (ref 0.3–6.2)
ERYTHROCYTE [DISTWIDTH] IN BLOOD BY AUTOMATED COUNT: 13.6 % (ref 12.3–15.4)
GIANT PLATELETS: NORMAL
GLOBULIN UR ELPH-MCNC: 2.9 GM/DL
GLUCOSE SERPL-MCNC: 91 MG/DL (ref 65–99)
HBA1C MFR BLD: 5.83 % (ref 4.8–5.6)
HCT VFR BLD AUTO: 36.6 % (ref 37.5–51)
HDLC SERPL-MCNC: 28 MG/DL (ref 40–60)
HGB BLD-MCNC: 12 G/DL (ref 13–17.7)
HOLD SPECIMEN: NORMAL
HOLD SPECIMEN: NORMAL
IMM GRANULOCYTES # BLD AUTO: 0.03 10*3/MM3 (ref 0–0.05)
IMM GRANULOCYTES NFR BLD AUTO: 0.6 % (ref 0–0.5)
INR PPP: 1.19 (ref 0.9–1.1)
LDLC SERPL CALC-MCNC: 64 MG/DL (ref 0–100)
LDLC/HDLC SERPL: 2.27 {RATIO}
LYMPHOCYTES # BLD AUTO: 1.79 10*3/MM3 (ref 0.7–3.1)
LYMPHOCYTES NFR BLD AUTO: 36.9 % (ref 19.6–45.3)
MCH RBC QN AUTO: 33.2 PG (ref 26.6–33)
MCHC RBC AUTO-ENTMCNC: 32.8 G/DL (ref 31.5–35.7)
MCV RBC AUTO: 101.4 FL (ref 79–97)
MONOCYTES # BLD AUTO: 0.43 10*3/MM3 (ref 0.1–0.9)
MONOCYTES NFR BLD AUTO: 8.9 % (ref 5–12)
NEUTROPHILS NFR BLD AUTO: 2.47 10*3/MM3 (ref 1.7–7)
NEUTROPHILS NFR BLD AUTO: 50.9 % (ref 42.7–76)
NRBC BLD AUTO-RTO: 0 /100 WBC (ref 0–0.2)
PLATELET # BLD AUTO: 108 10*3/MM3 (ref 140–450)
PMV BLD AUTO: 10.9 FL (ref 6–12)
POTASSIUM SERPL-SCNC: 3.1 MMOL/L (ref 3.5–5.2)
PROT SERPL-MCNC: 6.8 G/DL (ref 6–8.5)
PROTHROMBIN TIME: 15.3 SECONDS (ref 11.7–14.2)
RBC # BLD AUTO: 3.61 10*6/MM3 (ref 4.14–5.8)
RBC MORPH BLD: NORMAL
RH BLD: NEGATIVE
SODIUM SERPL-SCNC: 141 MMOL/L (ref 136–145)
T&S EXPIRATION DATE: NORMAL
TRIGL SERPL-MCNC: 97 MG/DL (ref 0–150)
TSH SERPL DL<=0.05 MIU/L-ACNC: 3.68 UIU/ML (ref 0.27–4.2)
VLDLC SERPL-MCNC: 19 MG/DL (ref 5–40)
WBC MORPH BLD: NORMAL
WBC NRBC COR # BLD AUTO: 4.85 10*3/MM3 (ref 3.4–10.8)
WHOLE BLOOD HOLD COAG: NORMAL
WHOLE BLOOD HOLD SPECIMEN: NORMAL

## 2024-12-10 PROCEDURE — 70498 CT ANGIOGRAPHY NECK: CPT

## 2024-12-10 PROCEDURE — 80053 COMPREHEN METABOLIC PANEL: CPT | Performed by: EMERGENCY MEDICINE

## 2024-12-10 PROCEDURE — 70496 CT ANGIOGRAPHY HEAD: CPT

## 2024-12-10 PROCEDURE — 25510000001 IOPAMIDOL PER 1 ML: Performed by: EMERGENCY MEDICINE

## 2024-12-10 PROCEDURE — 83036 HEMOGLOBIN GLYCOSYLATED A1C: CPT | Performed by: NURSE PRACTITIONER

## 2024-12-10 PROCEDURE — 85610 PROTHROMBIN TIME: CPT | Performed by: EMERGENCY MEDICINE

## 2024-12-10 PROCEDURE — 85025 COMPLETE CBC W/AUTO DIFF WBC: CPT | Performed by: EMERGENCY MEDICINE

## 2024-12-10 PROCEDURE — 71045 X-RAY EXAM CHEST 1 VIEW: CPT

## 2024-12-10 PROCEDURE — G0378 HOSPITAL OBSERVATION PER HR: HCPCS

## 2024-12-10 PROCEDURE — 0042T HC CT CEREBRAL PERFUSION W/WO CONTRAST: CPT

## 2024-12-10 PROCEDURE — 85730 THROMBOPLASTIN TIME PARTIAL: CPT | Performed by: EMERGENCY MEDICINE

## 2024-12-10 PROCEDURE — 86900 BLOOD TYPING SEROLOGIC ABO: CPT | Performed by: EMERGENCY MEDICINE

## 2024-12-10 PROCEDURE — 84443 ASSAY THYROID STIM HORMONE: CPT | Performed by: NURSE PRACTITIONER

## 2024-12-10 PROCEDURE — 85007 BL SMEAR W/DIFF WBC COUNT: CPT | Performed by: EMERGENCY MEDICINE

## 2024-12-10 PROCEDURE — 80061 LIPID PANEL: CPT | Performed by: NURSE PRACTITIONER

## 2024-12-10 PROCEDURE — 86900 BLOOD TYPING SEROLOGIC ABO: CPT

## 2024-12-10 PROCEDURE — 86850 RBC ANTIBODY SCREEN: CPT | Performed by: EMERGENCY MEDICINE

## 2024-12-10 PROCEDURE — 70450 CT HEAD/BRAIN W/O DYE: CPT

## 2024-12-10 PROCEDURE — 99285 EMERGENCY DEPT VISIT HI MDM: CPT

## 2024-12-10 PROCEDURE — 93005 ELECTROCARDIOGRAM TRACING: CPT | Performed by: EMERGENCY MEDICINE

## 2024-12-10 PROCEDURE — 86901 BLOOD TYPING SEROLOGIC RH(D): CPT

## 2024-12-10 PROCEDURE — 86901 BLOOD TYPING SEROLOGIC RH(D): CPT | Performed by: EMERGENCY MEDICINE

## 2024-12-10 RX ORDER — BISACODYL 10 MG
10 SUPPOSITORY, RECTAL RECTAL DAILY PRN
Status: DISCONTINUED | OUTPATIENT
Start: 2024-12-10 | End: 2024-12-10 | Stop reason: HOSPADM

## 2024-12-10 RX ORDER — ONDANSETRON 2 MG/ML
4 INJECTION INTRAMUSCULAR; INTRAVENOUS EVERY 6 HOURS PRN
Status: DISCONTINUED | OUTPATIENT
Start: 2024-12-10 | End: 2024-12-10 | Stop reason: HOSPADM

## 2024-12-10 RX ORDER — IOPAMIDOL 755 MG/ML
100 INJECTION, SOLUTION INTRAVASCULAR
Status: COMPLETED | OUTPATIENT
Start: 2024-12-10 | End: 2024-12-10

## 2024-12-10 RX ORDER — SODIUM CHLORIDE 0.9 % (FLUSH) 0.9 %
10 SYRINGE (ML) INJECTION AS NEEDED
Status: DISCONTINUED | OUTPATIENT
Start: 2024-12-10 | End: 2024-12-10 | Stop reason: HOSPADM

## 2024-12-10 RX ORDER — SODIUM CHLORIDE 0.9 % (FLUSH) 0.9 %
10 SYRINGE (ML) INJECTION EVERY 12 HOURS SCHEDULED
Status: DISCONTINUED | OUTPATIENT
Start: 2024-12-10 | End: 2024-12-10 | Stop reason: HOSPADM

## 2024-12-10 RX ORDER — ASPIRIN 325 MG
325 TABLET ORAL DAILY
Status: DISCONTINUED | OUTPATIENT
Start: 2024-12-10 | End: 2024-12-10 | Stop reason: HOSPADM

## 2024-12-10 RX ORDER — IOPAMIDOL 755 MG/ML
50 INJECTION, SOLUTION INTRAVASCULAR
Status: COMPLETED | OUTPATIENT
Start: 2024-12-10 | End: 2024-12-10

## 2024-12-10 RX ORDER — SODIUM CHLORIDE 9 MG/ML
40 INJECTION, SOLUTION INTRAVENOUS AS NEEDED
Status: DISCONTINUED | OUTPATIENT
Start: 2024-12-10 | End: 2024-12-10 | Stop reason: HOSPADM

## 2024-12-10 RX ORDER — SODIUM CHLORIDE 9 MG/ML
100 INJECTION, SOLUTION INTRAVENOUS CONTINUOUS
Status: DISCONTINUED | OUTPATIENT
Start: 2024-12-10 | End: 2024-12-10 | Stop reason: HOSPADM

## 2024-12-10 RX ORDER — BUTALBITAL, ACETAMINOPHEN AND CAFFEINE 50; 325; 40 MG/1; MG/1; MG/1
1 TABLET ORAL ONCE
Status: DISCONTINUED | OUTPATIENT
Start: 2024-12-10 | End: 2024-12-10 | Stop reason: HOSPADM

## 2024-12-10 RX ORDER — PANTOPRAZOLE SODIUM 40 MG/1
40 TABLET, DELAYED RELEASE ORAL DAILY
Status: DISCONTINUED | OUTPATIENT
Start: 2024-12-11 | End: 2024-12-10 | Stop reason: HOSPADM

## 2024-12-10 RX ORDER — IPRATROPIUM BROMIDE AND ALBUTEROL SULFATE 2.5; .5 MG/3ML; MG/3ML
3 SOLUTION RESPIRATORY (INHALATION) EVERY 4 HOURS PRN
Status: DISCONTINUED | OUTPATIENT
Start: 2024-12-10 | End: 2024-12-10 | Stop reason: HOSPADM

## 2024-12-10 RX ORDER — ASPIRIN 300 MG/1
300 SUPPOSITORY RECTAL DAILY
Status: DISCONTINUED | OUTPATIENT
Start: 2024-12-10 | End: 2024-12-10 | Stop reason: HOSPADM

## 2024-12-10 RX ORDER — ALBUTEROL SULFATE 0.83 MG/ML
2.5 SOLUTION RESPIRATORY (INHALATION) EVERY 4 HOURS PRN
Status: DISCONTINUED | OUTPATIENT
Start: 2024-12-10 | End: 2024-12-10 | Stop reason: HOSPADM

## 2024-12-10 RX ADMIN — IOPAMIDOL 50 ML: 755 INJECTION, SOLUTION INTRAVENOUS at 16:59

## 2024-12-10 RX ADMIN — IOPAMIDOL 100 ML: 755 INJECTION, SOLUTION INTRAVENOUS at 16:56

## 2024-12-10 NOTE — Clinical Note
Level of Care: Med/Surg [1]   Admitting Physician: NICOLE MONTGOMERY [[id number]]   Attending Physician: NICOLE MONTGOMERY [[id number]]   Bed Request Comments: neuro

## 2024-12-11 ENCOUNTER — APPOINTMENT (OUTPATIENT)
Dept: CARDIOLOGY | Facility: HOSPITAL | Age: 64
End: 2024-12-11
Payer: MEDICARE

## 2024-12-11 ENCOUNTER — HOSPITAL ENCOUNTER (INPATIENT)
Facility: HOSPITAL | Age: 64
LOS: 1 days | Discharge: HOME | End: 2024-12-12
Attending: EMERGENCY MEDICINE | Admitting: INTERNAL MEDICINE
Payer: MEDICARE

## 2024-12-11 ENCOUNTER — APPOINTMENT (OUTPATIENT)
Dept: MRI IMAGING | Facility: HOSPITAL | Age: 64
End: 2024-12-11
Payer: MEDICARE

## 2024-12-11 DIAGNOSIS — I63.9 ACUTE CVA (CEREBROVASCULAR ACCIDENT): Primary | ICD-10-CM

## 2024-12-11 LAB
ANION GAP SERPL CALCULATED.3IONS-SCNC: 10.6 MMOL/L (ref 5–15)
BASOPHILS # BLD AUTO: 0.04 10*3/MM3 (ref 0–0.2)
BASOPHILS NFR BLD AUTO: 0.9 % (ref 0–1.5)
BH CV ECHO LEFT VENTRICLE GLOBAL LONGITUDINAL STRAIN: -20.3 %
BH CV ECHO MEAS - AO MAX PG: 6.2 MMHG
BH CV ECHO MEAS - AO MEAN PG: 3 MMHG
BH CV ECHO MEAS - AO V2 MAX: 124 CM/SEC
BH CV ECHO MEAS - AO V2 VTI: 26.1 CM
BH CV ECHO MEAS - AVA(I,D): 3.2 CM2
BH CV ECHO MEAS - EDV(CUBED): 103.8 ML
BH CV ECHO MEAS - EDV(MOD-SP4): 84.8 ML
BH CV ECHO MEAS - EF(MOD-SP4): 63.4 %
BH CV ECHO MEAS - EF_3D-VOL: 65 %
BH CV ECHO MEAS - ESV(CUBED): 24.4 ML
BH CV ECHO MEAS - ESV(MOD-SP4): 31 ML
BH CV ECHO MEAS - FS: 38.3 %
BH CV ECHO MEAS - IVS/LVPW: 1 CM
BH CV ECHO MEAS - IVSD: 1.1 CM
BH CV ECHO MEAS - LA DIMENSION: 4.5 CM
BH CV ECHO MEAS - LAT PEAK E' VEL: 11 CM/SEC
BH CV ECHO MEAS - LV DIASTOLIC VOL/BSA (35-75): 38.3 CM2
BH CV ECHO MEAS - LV MASS(C)D: 187.5 GRAMS
BH CV ECHO MEAS - LV MAX PG: 3.6 MMHG
BH CV ECHO MEAS - LV MEAN PG: 2 MMHG
BH CV ECHO MEAS - LV SYSTOLIC VOL/BSA (12-30): 14 CM2
BH CV ECHO MEAS - LV V1 MAX: 94.3 CM/SEC
BH CV ECHO MEAS - LV V1 VTI: 22.2 CM
BH CV ECHO MEAS - LVIDD: 4.7 CM
BH CV ECHO MEAS - LVIDS: 2.9 CM
BH CV ECHO MEAS - LVOT AREA: 3.8 CM2
BH CV ECHO MEAS - LVOT DIAM: 2.2 CM
BH CV ECHO MEAS - LVPWD: 1.1 CM
BH CV ECHO MEAS - MED PEAK E' VEL: 8.3 CM/SEC
BH CV ECHO MEAS - MR MAX PG: 91 MMHG
BH CV ECHO MEAS - MR MAX VEL: 477 CM/SEC
BH CV ECHO MEAS - MV A DUR: 0.11 SEC
BH CV ECHO MEAS - MV A MAX VEL: 41.9 CM/SEC
BH CV ECHO MEAS - MV DEC SLOPE: 473 CM/SEC2
BH CV ECHO MEAS - MV DEC TIME: 0.18 SEC
BH CV ECHO MEAS - MV E MAX VEL: 74.4 CM/SEC
BH CV ECHO MEAS - MV E/A: 1.78
BH CV ECHO MEAS - MV MAX PG: 2.9 MMHG
BH CV ECHO MEAS - MV MEAN PG: 1 MMHG
BH CV ECHO MEAS - MV P1/2T: 51.1 MSEC
BH CV ECHO MEAS - MV V2 VTI: 24.7 CM
BH CV ECHO MEAS - MVA(P1/2T): 4.3 CM2
BH CV ECHO MEAS - MVA(VTI): 3.4 CM2
BH CV ECHO MEAS - PA ACC TIME: 0.11 SEC
BH CV ECHO MEAS - PA V2 MAX: 85.6 CM/SEC
BH CV ECHO MEAS - RV MAX PG: 1.29 MMHG
BH CV ECHO MEAS - RV V1 MAX: 56.7 CM/SEC
BH CV ECHO MEAS - RV V1 VTI: 13.1 CM
BH CV ECHO MEAS - SV(LVOT): 84.4 ML
BH CV ECHO MEAS - SV(MOD-SP4): 53.8 ML
BH CV ECHO MEAS - SVI(LVOT): 38.1 ML/M2
BH CV ECHO MEAS - SVI(MOD-SP4): 24.3 ML/M2
BH CV ECHO MEAS - TAPSE (>1.6): 2.06 CM
BH CV ECHO MEASUREMENTS AVERAGE E/E' RATIO: 7.71
BH CV ECHO SHUNT ASSESSMENT PERFORMED (HIDDEN SCRIPTING): 1
BH CV XLRA - RV BASE: 4 CM
BH CV XLRA - RV LENGTH: 6.5 CM
BH CV XLRA - RV MID: 2.4 CM
BH CV XLRA - TDI S': 10.4 CM/SEC
BUN SERPL-MCNC: 5 MG/DL (ref 8–23)
BUN/CREAT SERPL: 5.4 (ref 7–25)
CALCIUM SPEC-SCNC: 9 MG/DL (ref 8.6–10.5)
CHLORIDE SERPL-SCNC: 105 MMOL/L (ref 98–107)
CO2 SERPL-SCNC: 25.4 MMOL/L (ref 22–29)
CREAT SERPL-MCNC: 0.93 MG/DL (ref 0.76–1.27)
DEPRECATED RDW RBC AUTO: 50.8 FL (ref 37–54)
EGFRCR SERPLBLD CKD-EPI 2021: 91.7 ML/MIN/1.73
EOSINOPHIL # BLD AUTO: 0.1 10*3/MM3 (ref 0–0.4)
EOSINOPHIL NFR BLD AUTO: 2.3 % (ref 0.3–6.2)
ERYTHROCYTE [DISTWIDTH] IN BLOOD BY AUTOMATED COUNT: 13.5 % (ref 12.3–15.4)
GLUCOSE BLDC GLUCOMTR-MCNC: 112 MG/DL (ref 70–105)
GLUCOSE SERPL-MCNC: 93 MG/DL (ref 65–99)
HCT VFR BLD AUTO: 41.2 % (ref 37.5–51)
HGB BLD-MCNC: 13.5 G/DL (ref 13–17.7)
IMM GRANULOCYTES # BLD AUTO: 0.03 10*3/MM3 (ref 0–0.05)
IMM GRANULOCYTES NFR BLD AUTO: 0.7 % (ref 0–0.5)
LEFT ATRIUM VOLUME INDEX: 35.6 ML/M2
LYMPHOCYTES # BLD AUTO: 2.04 10*3/MM3 (ref 0.7–3.1)
LYMPHOCYTES NFR BLD AUTO: 47.7 % (ref 19.6–45.3)
MAGNESIUM SERPL-MCNC: 2.2 MG/DL (ref 1.6–2.4)
MCH RBC QN AUTO: 33.1 PG (ref 26.6–33)
MCHC RBC AUTO-ENTMCNC: 32.8 G/DL (ref 31.5–35.7)
MCV RBC AUTO: 101 FL (ref 79–97)
MONOCYTES # BLD AUTO: 0.36 10*3/MM3 (ref 0.1–0.9)
MONOCYTES NFR BLD AUTO: 8.4 % (ref 5–12)
NEUTROPHILS NFR BLD AUTO: 1.71 10*3/MM3 (ref 1.7–7)
NEUTROPHILS NFR BLD AUTO: 40 % (ref 42.7–76)
NRBC BLD AUTO-RTO: 0 /100 WBC (ref 0–0.2)
PHOSPHATE SERPL-MCNC: 3.9 MG/DL (ref 2.5–4.5)
PLATELET # BLD AUTO: 130 10*3/MM3 (ref 140–450)
PMV BLD AUTO: 10.9 FL (ref 6–12)
POTASSIUM SERPL-SCNC: 3.7 MMOL/L (ref 3.5–5.2)
QT INTERVAL: 379 MS
QTC INTERVAL: 445 MS
RBC # BLD AUTO: 4.08 10*6/MM3 (ref 4.14–5.8)
RBC MORPH BLD: NORMAL
SINUS: 2.9 CM
SMALL PLATELETS BLD QL SMEAR: NORMAL
SODIUM SERPL-SCNC: 141 MMOL/L (ref 136–145)
STJ: 2.8 CM
WBC MORPH BLD: NORMAL
WBC NRBC COR # BLD AUTO: 4.28 10*3/MM3 (ref 3.4–10.8)

## 2024-12-11 PROCEDURE — 93306 TTE W/DOPPLER COMPLETE: CPT | Performed by: INTERNAL MEDICINE

## 2024-12-11 PROCEDURE — 85025 COMPLETE CBC W/AUTO DIFF WBC: CPT | Performed by: EMERGENCY MEDICINE

## 2024-12-11 PROCEDURE — 93306 TTE W/DOPPLER COMPLETE: CPT

## 2024-12-11 PROCEDURE — 92610 EVALUATE SWALLOWING FUNCTION: CPT

## 2024-12-11 PROCEDURE — 83735 ASSAY OF MAGNESIUM: CPT | Performed by: INTERNAL MEDICINE

## 2024-12-11 PROCEDURE — 70551 MRI BRAIN STEM W/O DYE: CPT

## 2024-12-11 PROCEDURE — 63710000001 PREDNISONE PER 5 MG: Performed by: STUDENT IN AN ORGANIZED HEALTH CARE EDUCATION/TRAINING PROGRAM

## 2024-12-11 PROCEDURE — 99222 1ST HOSP IP/OBS MODERATE 55: CPT | Performed by: STUDENT IN AN ORGANIZED HEALTH CARE EDUCATION/TRAINING PROGRAM

## 2024-12-11 PROCEDURE — 82948 REAGENT STRIP/BLOOD GLUCOSE: CPT

## 2024-12-11 PROCEDURE — 85007 BL SMEAR W/DIFF WBC COUNT: CPT | Performed by: EMERGENCY MEDICINE

## 2024-12-11 PROCEDURE — 97162 PT EVAL MOD COMPLEX 30 MIN: CPT

## 2024-12-11 PROCEDURE — 80048 BASIC METABOLIC PNL TOTAL CA: CPT | Performed by: EMERGENCY MEDICINE

## 2024-12-11 PROCEDURE — 25010000002 HALOPERIDOL LACTATE PER 5 MG: Performed by: EMERGENCY MEDICINE

## 2024-12-11 PROCEDURE — 25010000002 DIPHENHYDRAMINE PER 50 MG

## 2024-12-11 PROCEDURE — 84100 ASSAY OF PHOSPHORUS: CPT | Performed by: INTERNAL MEDICINE

## 2024-12-11 PROCEDURE — 93356 MYOCRD STRAIN IMG SPCKL TRCK: CPT | Performed by: INTERNAL MEDICINE

## 2024-12-11 PROCEDURE — 94799 UNLISTED PULMONARY SVC/PX: CPT

## 2024-12-11 PROCEDURE — 93356 MYOCRD STRAIN IMG SPCKL TRCK: CPT

## 2024-12-11 PROCEDURE — 99285 EMERGENCY DEPT VISIT HI MDM: CPT

## 2024-12-11 PROCEDURE — 94640 AIRWAY INHALATION TREATMENT: CPT

## 2024-12-11 RX ORDER — DIPHENHYDRAMINE HYDROCHLORIDE 50 MG/ML
25 INJECTION INTRAMUSCULAR; INTRAVENOUS ONCE
Status: COMPLETED | OUTPATIENT
Start: 2024-12-11 | End: 2024-12-11

## 2024-12-11 RX ORDER — SODIUM CHLORIDE 0.9 % (FLUSH) 0.9 %
10 SYRINGE (ML) INJECTION AS NEEDED
Status: DISCONTINUED | OUTPATIENT
Start: 2024-12-11 | End: 2024-12-12 | Stop reason: HOSPADM

## 2024-12-11 RX ORDER — PANTOPRAZOLE SODIUM 40 MG/1
40 TABLET, DELAYED RELEASE ORAL DAILY
Status: DISCONTINUED | OUTPATIENT
Start: 2024-12-12 | End: 2024-12-12 | Stop reason: HOSPADM

## 2024-12-11 RX ORDER — HALOPERIDOL 5 MG/ML
5 INJECTION INTRAMUSCULAR ONCE
Status: COMPLETED | OUTPATIENT
Start: 2024-12-11 | End: 2024-12-11

## 2024-12-11 RX ORDER — ACETAMINOPHEN 160 MG/5ML
650 SOLUTION ORAL EVERY 4 HOURS PRN
Status: DISCONTINUED | OUTPATIENT
Start: 2024-12-11 | End: 2024-12-12 | Stop reason: HOSPADM

## 2024-12-11 RX ORDER — ONDANSETRON 2 MG/ML
4 INJECTION INTRAMUSCULAR; INTRAVENOUS EVERY 6 HOURS PRN
Status: DISCONTINUED | OUTPATIENT
Start: 2024-12-11 | End: 2024-12-12 | Stop reason: HOSPADM

## 2024-12-11 RX ORDER — ARFORMOTEROL TARTRATE 15 UG/2ML
15 SOLUTION RESPIRATORY (INHALATION)
Status: DISCONTINUED | OUTPATIENT
Start: 2024-12-11 | End: 2024-12-12 | Stop reason: HOSPADM

## 2024-12-11 RX ORDER — NITROGLYCERIN 0.4 MG/1
0.4 TABLET SUBLINGUAL
Status: DISCONTINUED | OUTPATIENT
Start: 2024-12-11 | End: 2024-12-12 | Stop reason: HOSPADM

## 2024-12-11 RX ORDER — SODIUM CHLORIDE 0.9 % (FLUSH) 0.9 %
10 SYRINGE (ML) INJECTION EVERY 12 HOURS SCHEDULED
Status: DISCONTINUED | OUTPATIENT
Start: 2024-12-11 | End: 2024-12-12 | Stop reason: HOSPADM

## 2024-12-11 RX ORDER — ACETAMINOPHEN 650 MG/1
650 SUPPOSITORY RECTAL EVERY 4 HOURS PRN
Status: DISCONTINUED | OUTPATIENT
Start: 2024-12-11 | End: 2024-12-12 | Stop reason: HOSPADM

## 2024-12-11 RX ORDER — BISACODYL 10 MG
10 SUPPOSITORY, RECTAL RECTAL DAILY PRN
Status: DISCONTINUED | OUTPATIENT
Start: 2024-12-11 | End: 2024-12-12 | Stop reason: HOSPADM

## 2024-12-11 RX ORDER — SODIUM CHLORIDE 9 MG/ML
40 INJECTION, SOLUTION INTRAVENOUS AS NEEDED
Status: DISCONTINUED | OUTPATIENT
Start: 2024-12-11 | End: 2024-12-12 | Stop reason: HOSPADM

## 2024-12-11 RX ORDER — AMOXICILLIN 250 MG
2 CAPSULE ORAL 2 TIMES DAILY PRN
Status: DISCONTINUED | OUTPATIENT
Start: 2024-12-11 | End: 2024-12-12 | Stop reason: HOSPADM

## 2024-12-11 RX ORDER — ACETAMINOPHEN 325 MG/1
650 TABLET ORAL EVERY 4 HOURS PRN
Status: DISCONTINUED | OUTPATIENT
Start: 2024-12-11 | End: 2024-12-12 | Stop reason: HOSPADM

## 2024-12-11 RX ORDER — DIPHENHYDRAMINE HYDROCHLORIDE 50 MG/ML
25 INJECTION INTRAMUSCULAR; INTRAVENOUS ONCE
Status: DISCONTINUED | OUTPATIENT
Start: 2024-12-11 | End: 2024-12-11

## 2024-12-11 RX ORDER — ONDANSETRON 4 MG/1
4 TABLET, ORALLY DISINTEGRATING ORAL EVERY 6 HOURS PRN
Status: DISCONTINUED | OUTPATIENT
Start: 2024-12-11 | End: 2024-12-12 | Stop reason: HOSPADM

## 2024-12-11 RX ORDER — BISACODYL 5 MG/1
5 TABLET, DELAYED RELEASE ORAL DAILY PRN
Status: DISCONTINUED | OUTPATIENT
Start: 2024-12-11 | End: 2024-12-12 | Stop reason: HOSPADM

## 2024-12-11 RX ORDER — ASPIRIN 81 MG/1
324 TABLET, CHEWABLE ORAL DAILY
Status: COMPLETED | OUTPATIENT
Start: 2024-12-11 | End: 2024-12-11

## 2024-12-11 RX ORDER — IPRATROPIUM BROMIDE AND ALBUTEROL SULFATE 2.5; .5 MG/3ML; MG/3ML
3 SOLUTION RESPIRATORY (INHALATION) EVERY 4 HOURS PRN
Status: DISCONTINUED | OUTPATIENT
Start: 2024-12-11 | End: 2024-12-12 | Stop reason: HOSPADM

## 2024-12-11 RX ORDER — DIPHENHYDRAMINE HYDROCHLORIDE 50 MG/ML
INJECTION INTRAMUSCULAR; INTRAVENOUS
Status: DISCONTINUED
Start: 2024-12-11 | End: 2024-12-11 | Stop reason: WASHOUT

## 2024-12-11 RX ORDER — ASPIRIN 81 MG/1
81 TABLET, CHEWABLE ORAL DAILY
Status: DISCONTINUED | OUTPATIENT
Start: 2024-12-12 | End: 2024-12-12 | Stop reason: HOSPADM

## 2024-12-11 RX ORDER — ALBUTEROL SULFATE 90 UG/1
2 INHALANT RESPIRATORY (INHALATION) EVERY 4 HOURS PRN
Status: DISCONTINUED | OUTPATIENT
Start: 2024-12-11 | End: 2024-12-12 | Stop reason: HOSPADM

## 2024-12-11 RX ORDER — POLYETHYLENE GLYCOL 3350 17 G/17G
17 POWDER, FOR SOLUTION ORAL DAILY PRN
Status: DISCONTINUED | OUTPATIENT
Start: 2024-12-11 | End: 2024-12-12 | Stop reason: HOSPADM

## 2024-12-11 RX ORDER — CLOPIDOGREL BISULFATE 75 MG/1
75 TABLET ORAL DAILY
Status: DISCONTINUED | OUTPATIENT
Start: 2024-12-11 | End: 2024-12-12 | Stop reason: HOSPADM

## 2024-12-11 RX ORDER — DROPERIDOL 2.5 MG/ML
2.5 INJECTION, SOLUTION INTRAMUSCULAR; INTRAVENOUS ONCE
Status: DISCONTINUED | OUTPATIENT
Start: 2024-12-11 | End: 2024-12-11

## 2024-12-11 RX ADMIN — HALOPERIDOL LACTATE 5 MG: 5 INJECTION, SOLUTION INTRAMUSCULAR at 11:23

## 2024-12-11 RX ADMIN — DIPHENHYDRAMINE HYDROCHLORIDE 25 MG: 50 INJECTION, SOLUTION INTRAMUSCULAR; INTRAVENOUS at 11:24

## 2024-12-11 RX ADMIN — CLOPIDOGREL BISULFATE 75 MG: 75 TABLET ORAL at 10:58

## 2024-12-11 RX ADMIN — APIXABAN 5 MG: 5 TABLET, FILM COATED ORAL at 21:16

## 2024-12-11 RX ADMIN — DIPHENHYDRAMINE HYDROCHLORIDE 25 MG: 50 INJECTION INTRAMUSCULAR; INTRAVENOUS at 11:24

## 2024-12-11 RX ADMIN — ARFORMOTEROL TARTRATE 15 MCG: 15 SOLUTION RESPIRATORY (INHALATION) at 18:48

## 2024-12-11 RX ADMIN — Medication 10 ML: at 21:17

## 2024-12-11 RX ADMIN — PREDNISONE 60 MG: 50 TABLET ORAL at 21:16

## 2024-12-11 RX ADMIN — Medication 10 ML: at 10:58

## 2024-12-11 RX ADMIN — ASPIRIN 81 MG CHEWABLE TABLET 324 MG: 81 TABLET CHEWABLE at 10:58

## 2024-12-11 NOTE — ED PROVIDER NOTES
Subjective   History of Present Illness  Chief complaint: Patient is a 64-year-old who presents to the emergency department today with right-sided facial droop and facial numbness with lower extremity weakness.  He has blurry vision as well.  With sudden onset 25 minutes prior to arrival.  Anything like before.  He did have some discomfort in the right side of his neck as well.  No recent injury.  This all started while he was driving home.  He arrived home.  He called EMS and they transported him here.  He is on Eliquis for atrial fibrillation.  He did take his Eliquis this morning.    Context:    Duration:    Timing:    Severity:    Associated Symptoms:        PCP:  LMP:      Review of Systems   Eyes:  Positive for visual disturbance.   Respiratory: Negative.     Cardiovascular: Negative.    Gastrointestinal:  Negative for abdominal distention.   Musculoskeletal:  Positive for neck pain.   Neurological:  Positive for weakness and numbness.       Past Medical History:   Diagnosis Date    Acute hypoxemic respiratory failure 11/06/2023    Allergic     Anxiety     Arthritis 06/2005    Back pain     Claustrophobia 1978    COPD (chronic obstructive pulmonary disease)     CTS (carpal tunnel syndrome) 10/2022    Dysphagia     Esophageal stricture     GERD (gastroesophageal reflux disease)     Hyperlipidemia     Hypertension     Knee pain     rt    Low back pain     Obesity     Obesity (BMI 30-39.9) 11/06/2023    Other cervical disc degeneration at C5-C6 level 08/22/2018    Paroxysmal atrial fibrillation with rapid ventricular response 11/06/2023    Pneumonia     Prediabetes 11/06/2023    PTSD (post-traumatic stress disorder)     Rhinovirus infection 11/06/2023    Thoracic disc herniation     Vitamin B12 deficiency        Allergies   Allergen Reactions    Atorvastatin Swelling    Lisinopril Other (See Comments)     Facial paralysis        Past Surgical History:   Procedure Laterality Date    ABLATION OF DYSRHYTHMIC FOCUS   7/31/2024    BRONCHOSCOPY N/A 11/03/2023    Procedure: BRONCHOSCOPY with bilateral lung washing's;  Surgeon: Kolton Brewer MD;  Location: [facility] ENDOSCOPY;  Service: Pulmonary;  Laterality: N/A;    CARDIAC CATHETERIZATION N/A 07/13/2022    Procedure: Left Heart Cath, possible pci;  Surgeon: Prieto Sidhu MD;  Location: [facility] CATH INVASIVE LOCATION;  Service: Cardiovascular;  Laterality: N/A;    CARDIAC ELECTROPHYSIOLOGY PROCEDURE N/A 07/31/2024    Procedure: Ablation atrial flutter;  Surgeon: Pardeep Walker MD;  Location: [facility] CATH INVASIVE LOCATION;  Service: Cardiovascular;  Laterality: N/A;    ENDOSCOPY      ENDOSCOPY N/A 09/21/2023    Procedure: ESOPHAGOGASTRODUODENOSCOPY WITH non guided esophageal dialtion 54 Fr. Bougie and  cold forcep biopsy x1 area;  Surgeon: Andres Concepcion MD;  Location: [facility] ENDOSCOPY;  Service: Gastroenterology;  Laterality: N/A;  Post- erosive gastritis, hiatal hernia, esophageal stricture    HERNIA REPAIR      INSERT / REPLACE / REMOVE PACEMAKER  11/31/2024    Temporary pacemaker implant in emergency room    KNEE ARTHROPLASTY      x2    SHOULDER ARTHROSCOPY W/ ROTATOR CUFF REPAIR Right 08/11/2020    Procedure: SHOULDER ARTHROSCOPY WITH ROTATOR CUFF REPAIR, extensive debridement;  Surgeon: Fredi Ritchie MD;  Location: [facility] MAIN OR;  Service: Orthopedics;  Laterality: Right;    TONSILLECTOMY         Family History   Problem Relation Age of Onset    Heart disease Mother     Early death Mother     Hyperlipidemia Mother     Hypertension Mother     Thyroid disease Mother     Heart attack Mother         Had valve replaced with a pig valve    Cancer Father     Stroke Father     Vision loss Father     Stroke Paternal Grandfather     Arthritis Paternal Grandmother     Alcohol abuse Brother     Asthma Brother     Depression Brother     Hyperlipidemia Brother     Hypertension Brother     Learning disabilities Brother     Mental illness Brother      Drug abuse Brother     Early death Brother     Heart disease Brother     Hyperlipidemia Brother         Bijan is the same person    Hypertension Brother        Social History     Socioeconomic History    Marital status:    Tobacco Use    Smoking status: Every Day     Current packs/day: 1.00     Average packs/day: 1 pack/day for 51.9 years (51.9 ttl pk-yrs)     Types: Cigarettes     Start date: 1/9/1973    Smokeless tobacco: Never    Tobacco comments:     Smoked a half a pack a day for 42 years didn't start to smoke a whole pack until 20     17       Counseled to stop smoking   Vaping Use    Vaping status: Never Used   Substance and Sexual Activity    Alcohol use: No    Drug use: No    Sexual activity: Not Currently     Partners: Female     Birth control/protection: None           Objective   Physical Exam  Vitals and nursing note reviewed.   Constitutional:       Appearance: Normal appearance.   HENT:      Head: Normocephalic and atraumatic.   Eyes:      Pupils: Pupils are equal, round, and reactive to light.   Cardiovascular:      Rate and Rhythm: Normal rate and regular rhythm.      Heart sounds: Normal heart sounds.   Pulmonary:      Effort: Pulmonary effort is normal.      Breath sounds: Normal breath sounds.   Abdominal:      Tenderness: There is no abdominal tenderness.   Musculoskeletal:         General: Normal range of motion.   Skin:     General: Skin is warm and dry.   Neurological:      General: No focal deficit present.      Mental Status: He is alert and oriented to person, place, and time.      Sensory: Sensory deficit present.      Motor: Weakness present.      Coordination: Coordination normal.   Psychiatric:         Mood and Affect: Mood normal.         Thought Content: Thought content normal.         Procedures           ED Course  ED Course as of 12/10/24 2046   Tue Dec 10, 2024   1848 CT Angiogram Neck [LH]      ED Course User Index  [LH] Chidi Yi DO                                                        Medical Decision Making  Patient was seen evaluated for the above problem    Differential diagnosis includes but is not limited to stroke, intracerebral hemorrhage    Patient's initial EKG interpreted by myself sinus rhythm 83.  He was seen within the window for emergent stroke therapy.  Code stroke was initiated.  I spoke with .  Patient is not a TNK candidate as he is on Eliquis.  However he has a thrombectomy candidate.  His perfusion study and CT angiogram are negative for any acute finding.  At this point time patient will be admitted to the hospital for further management.  I spoke with Julia on-call for the hospital staff agrees admit the patient.  Patient verbalized understanding of admission.        Problems Addressed:  Focal neurological deficit: complicated acute illness or injury    Amount and/or Complexity of Data Reviewed  Labs: ordered. Decision-making details documented in ED Course.     Details: Labs reviewed by myself  Radiology: ordered and independent interpretation performed. Decision-making details documented in ED Course.     Details: CTs reviewed by myself  ECG/medicine tests: ordered and independent interpretation performed.     Details: EKG interpreted by myself sinus rhythm rate of 83    Risk  Prescription drug management.  Decision regarding hospitalization.        Final diagnoses:   Focal neurological deficit       ED Disposition  ED Disposition       ED Disposition   Decision to Admit    Condition   --    Comment   Level of Care: Med/Surg [1]   Admitting Physician: NICOLE MONTGOMERY [[id number]]   Attending Physician: NICOLE MONTGOMERY [[id number]]   Bed Request Comments: neuro                 No follow-up provider specified.       Medication List      No changes were made to your prescriptions during this visit.            Chidi Yi,   12/10/24 2051

## 2024-12-11 NOTE — Clinical Note
Level of Care: Telemetry [5]   Admitting Physician: HOLGER PURVIS [[id number]]   Attending Physician: HOLGER PURVIS [[id number]]   Bed Request Comments: tyler

## 2024-12-11 NOTE — NURSING NOTE
Patient upset this nurse failed him on his bedside swallow and asked for paperwork to AMA. When nurse returned to the room, patient explained she had spoken with Speech therapy and they would be up to see him soon for an evaluation, and the patient's daughter stated she refused to see him. MD's on the team notified as well. Care ongoing.

## 2024-12-11 NOTE — THERAPY EVALUATION
Patient Name: Jagdish Rea  : 1960    MRN: [id number]                              Today's Date: 2024       Admit Date: 2024    Visit Dx:     ICD-10-CM ICD-9-CM   1. Acute CVA (cerebrovascular accident)  I63.9 434.91     Patient Active Problem List   Diagnosis    Anxiety    Benign essential hypertension    Chronic low back pain    Hyperlipidemia    Other cervical disc degeneration at C5-C6 level    Peripheral neuropathy    Thoracic disc herniation    Vitamin B12 deficiency    Right shoulder pain    Chronic obstructive pulmonary disease with acute exacerbation    Fall    Radicular pain in right arm    Acute pain of right shoulder    Tobacco abuse    Chronic pain of both knees    Initial Medicare annual wellness visit    Left lower lobe pneumonia    Chest pain    Abnormal nuclear stress test    Tinnitus of right ear    Paresthesia of lower extremity    Class 2 severe obesity with serious comorbidity and body mass index (BMI) of 36.0 to 36.9 in adult    Arthritis    Diaphragmatic hernia without obstruction or gangrene    Hiatal hernia    Esophageal obstruction    Gastro-esophageal reflux disease with esophagitis    GERD with stricture    High blood pressure    Otalgia, left ear    Dysphagia    Other diseases of stomach and duodenum    Pure hypercholesterolemia    Back pain    Post traumatic stress disorder (PTSD)    Insomnia    Dyspnea    Multiple tracheobronchial mucus plugs    Obesity (BMI 30-39.9)    Rhinovirus infection    Acute hypoxemic respiratory failure    Prediabetes    Atrial fibrillation with RVR    Heart block    Atrial flutter    Sinus pause    Stroke-like symptom    Hypokalemia    CVA (cerebral vascular accident)     Past Medical History:   Diagnosis Date    Acute hypoxemic respiratory failure 2023    Allergic     Anxiety     Arthritis 2005    Back pain     Claustrophobia 1978    COPD (chronic obstructive pulmonary disease)     CTS (carpal tunnel syndrome) 10/2022    CVA  (cerebral vascular accident) 12/11/2024    Dysphagia     Esophageal stricture     GERD (gastroesophageal reflux disease)     Hyperlipidemia     Hypertension     Knee pain     rt    Low back pain     Obesity     Obesity (BMI 30-39.9) 11/06/2023    Other cervical disc degeneration at C5-C6 level 08/22/2018    Paroxysmal atrial fibrillation with rapid ventricular response 11/06/2023    Pneumonia     Prediabetes 11/06/2023    PTSD (post-traumatic stress disorder)     Rhinovirus infection 11/06/2023    Thoracic disc herniation     Vitamin B12 deficiency      Past Surgical History:   Procedure Laterality Date    ABLATION OF DYSRHYTHMIC FOCUS  7/31/2024    BRONCHOSCOPY N/A 11/03/2023    Procedure: BRONCHOSCOPY with bilateral lung washing's;  Surgeon: Kolton Brewer MD;  Location: [facility] ENDOSCOPY;  Service: Pulmonary;  Laterality: N/A;    CARDIAC CATHETERIZATION N/A 07/13/2022    Procedure: Left Heart Cath, possible pci;  Surgeon: Prieto Sidhu MD;  Location: [facility] CATH INVASIVE LOCATION;  Service: Cardiovascular;  Laterality: N/A;    CARDIAC ELECTROPHYSIOLOGY PROCEDURE N/A 07/31/2024    Procedure: Ablation atrial flutter;  Surgeon: Pardeep Walker MD;  Location: [facility] CATH INVASIVE LOCATION;  Service: Cardiovascular;  Laterality: N/A;    ENDOSCOPY      ENDOSCOPY N/A 09/21/2023    Procedure: ESOPHAGOGASTRODUODENOSCOPY WITH non guided esophageal dialtion 54 Fr. Bougie and  cold forcep biopsy x1 area;  Surgeon: Andres Concepcion MD;  Location: [facility] ENDOSCOPY;  Service: Gastroenterology;  Laterality: N/A;  Post- erosive gastritis, hiatal hernia, esophageal stricture    HERNIA REPAIR      INSERT / REPLACE / REMOVE PACEMAKER  11/31/2024    Temporary pacemaker implant in emergency room    KNEE ARTHROPLASTY      x2    SHOULDER ARTHROSCOPY W/ ROTATOR CUFF REPAIR Right 08/11/2020    Procedure: SHOULDER ARTHROSCOPY WITH ROTATOR CUFF REPAIR, extensive debridement;  Surgeon: Fredi Ritchie MD;   Location: [facility] MAIN OR;  Service: Orthopedics;  Laterality: Right;    TONSILLECTOMY        General Information       Row Name 12/11/24 1632          Physical Therapy Time and Intention    Document Type evaluation  -SS     Mode of Treatment physical therapy  -       Row Name 12/11/24 1632          General Information    Prior Level of Function independent:  -SS     Existing Precautions/Restrictions no known precautions/restrictions  -       Row Name 12/11/24 1632          Cognition    Orientation Status (Cognition) oriented x 4  -               User Key  (r) = Recorded By, (t) = Taken By, (c) = Cosigned By      Initials Name Provider Type    SS Alisha Barkley PT Physical Therapist                   Mobility       Row Name 12/11/24 1632          Bed Mobility    Bed Mobility bed mobility (all) activities  -     All Activities, [place] (Bed Mobility) independent  -       Row Name 12/11/24 1632          Sit-Stand Transfer    Sit-Stand [place] (Transfers) independent  -       Row Name 12/11/24 1632          Gait/Stairs (Locomotion)    [place] Level (Gait) independent  -     Distance in Feet (Gait) 150  -SS     Comment, (Gait/Stairs) gait WNL  -               User Key  (r) = Recorded By, (t) = Taken By, (c) = Cosigned By      Initials Name Provider Type     Alisha Barkley PT Physical Therapist                   Obj/Interventions       Row Name 12/11/24 1633          Range of Motion Comprehensive    General Range of Motion no range of motion deficits identified  -       Row Name 12/11/24 1633          Strength Comprehensive (MMT)    General Manual Muscle Testing (MMT) Assessment no strength deficits identified  -     Comment, General Manual Muscle Testing (MMT) Assessment patient perceives slight R sided weakness however it is not detectable with MMT, R shoulder with chronic pain from rotator cuff injury/repair: 4-/5  -SS       Row Name 12/11/24 1633          Balance     Balance Assessment standing dynamic balance  -SS     Dynamic Standing Balance independent  -SS       Row Name 12/11/24 1633          Sensory Assessment (Somatosensory)    Sensory Assessment (Somatosensory) sensation intact  -SS               User Key  (r) = Recorded By, (t) = Taken By, (c) = Cosigned By      Initials Name Provider Type    SS lAisha Barkley, PT Physical Therapist                   Goals/Plan    No documentation.                  Clinical Impression       Row Name 12/11/24 1634          Pain    Pretreatment Pain Rating 0/10 - no pain  -SS     Posttreatment Pain Rating 0/10 - no pain  -SS       Row Name 12/11/24 1634          Plan of Care Review    Plan of Care Reviewed With patient  -SS     Outcome Evaluation 65 y/o M who presented with R facial droop and sensation changes. MRI (-) for acute infarct. Patient is independent and assists his spouse at baseline. She has a history of cerebral aneurysm in 2013 and he cares for her. 17 year old son also lives with them. Patient does not have any mobility difficulty at baseline. This date he was independent with bed mobility, transfers and gait. He perceives slight weakness in R UE and LE but this is not detectable with MMT. No gait deficits. No current PT needs. Safe to d/c home from PT standpoint.  -SS       Row Name 12/11/24 1634          Therapy Assessment/Plan (PT)    Criteria for Skilled Interventions Met (PT) no  -SS     Therapy Frequency (PT) evaluation only  -SS               User Key  (r) = Recorded By, (t) = Taken By, (c) = Cosigned By      Initials Name Provider Type    SS Alisha Barkley, PT Physical Therapist                   Outcome Measures       Row Name 12/11/24 1100          How much help from another person do you currently need...    Turning from your back to your side while in flat bed without using bedrails? 4  -KH     Moving from lying on back to sitting on the side of a flat bed without bedrails? 4  -KH     Moving to and  from a bed to a chair (including a wheelchair)? 4  -KH     Standing up from a chair using your arms (e.g., wheelchair, bedside chair)? 4  -KH     Climbing 3-5 steps with a railing? 4  -KH     To walk in hospital room? 4  -KH     AM-PAC 6 Clicks Score (PT) 24  -       Row Name 12/11/24 1637          Modified Zac Scale    Pre-Stroke Modified [place] Scale 0 - No Symptoms at all.  -     Modified [place] Scale 0 - No Symptoms at all.  -       Row Name 12/11/24 1637          Functional Assessment    Outcome Measure Options Modified [place]  -               User Key  (r) = Recorded By, (t) = Taken By, (c) = Cosigned By      Initials Name Provider Type    SS Alisha Barkley, PT Physical Therapist    Reena Mooney, RN Registered Nurse                                 Physical Therapy Education       Title: PT OT SLP Therapies (Done)       Topic: Physical Therapy (Done)       Point: Mobility training (Done)       Learning Progress Summary            Patient Acceptance, E, VU by  at 12/11/2024 1637                                      User Key       Initials Effective Dates Name Provider Type Discipline     06/16/21 -  Alisha Barkley, PT Physical Therapist PT                  PT Recommendation and Plan     Outcome Evaluation: 63 y/o M who presented with R facial droop and sensation changes. MRI (-) for acute infarct. Patient is independent and assists his spouse at baseline. She has a history of cerebral aneurysm in 2013 and he cares for her. 17 year old son also lives with them. Patient does not have any mobility difficulty at baseline. This date he was independent with bed mobility, transfers and gait. He perceives slight weakness in R UE and LE but this is not detectable with MMT. No gait deficits. No current PT needs. Safe to d/c home from PT standpoint.     Time Calculation:   PT Evaluation Complexity  History, PT Evaluation Complexity: 3 or more personal factors and/or comorbidities  Examination  of Body Systems (PT Eval Complexity): total of 3 or more elements  Clinical Presentation (PT Evaluation Complexity): evolving  Clinical Decision Making (PT Evaluation Complexity): moderate complexity  Overall Complexity (PT Evaluation Complexity): moderate complexity     PT Charges       Row Name 12/11/24 1637             Time Calculation    Start Time 1510  -SS      Stop Time 1538  -SS      Time Calculation (min) 28 min  -      PT Received On 12/11/24  -         Time Calculation- PT    Total Timed Code Minutes- PT 0 minute(s)  -                User Key  (r) = Recorded By, (t) = Taken By, (c) = Cosigned By      Initials Name Provider Type    SS Alisha Barkley, PT Physical Therapist                  Therapy Charges for Today       Code Description Service Date Service Provider Modifiers Qty    43865969965 HC PT EVAL MOD COMPLEXITY 4 12/11/2024 Alisha Barkley, PT GP 1            PT G-Codes  Outcome Measure Options: Modified [place]  AM-PAC 6 Clicks Score (PT): 24  Modified Zac Scale: 0 - No Symptoms at all.  PT Discharge Summary  Anticipated Discharge Disposition (PT): home    Alisha Barkley PT  12/11/2024

## 2024-12-11 NOTE — THERAPY EVALUATION
Acute Care - Speech Language Pathology   Swallow Initial Evaluation  Robert     Patient Name: Jagdish Rea  : 1960  MRN: [id number]  Today's Date: 2024               Admit Date: 2024    Visit Dx:     ICD-10-CM ICD-9-CM   1. Acute CVA (cerebrovascular accident)  I63.9 434.91     Patient Active Problem List   Diagnosis    Anxiety    Benign essential hypertension    Chronic low back pain    Hyperlipidemia    Other cervical disc degeneration at C5-C6 level    Peripheral neuropathy    Thoracic disc herniation    Vitamin B12 deficiency    Right shoulder pain    Chronic obstructive pulmonary disease with acute exacerbation    Fall    Radicular pain in right arm    Acute pain of right shoulder    Tobacco abuse    Chronic pain of both knees    Initial Medicare annual wellness visit    Left lower lobe pneumonia    Chest pain    Abnormal nuclear stress test    Tinnitus of right ear    Paresthesia of lower extremity    Class 2 severe obesity with serious comorbidity and body mass index (BMI) of 36.0 to 36.9 in adult    Arthritis    Diaphragmatic hernia without obstruction or gangrene    Hiatal hernia    Esophageal obstruction    Gastro-esophageal reflux disease with esophagitis    GERD with stricture    High blood pressure    Otalgia, left ear    Dysphagia    Other diseases of stomach and duodenum    Pure hypercholesterolemia    Back pain    Post traumatic stress disorder (PTSD)    Insomnia    Dyspnea    Multiple tracheobronchial mucus plugs    Obesity (BMI 30-39.9)    Rhinovirus infection    Acute hypoxemic respiratory failure    Prediabetes    Atrial fibrillation with RVR    Heart block    Atrial flutter    Sinus pause    Stroke-like symptom    Hypokalemia    CVA (cerebral vascular accident)     Past Medical History:   Diagnosis Date    Acute hypoxemic respiratory failure 2023    Allergic     Anxiety     Arthritis 2005    Back pain     Claustrophobia     COPD (chronic obstructive pulmonary  disease)     CTS (carpal tunnel syndrome) 10/2022    CVA (cerebral vascular accident) 12/11/2024    Dysphagia     Esophageal stricture     GERD (gastroesophageal reflux disease)     Hyperlipidemia     Hypertension     Knee pain     rt    Low back pain     Obesity     Obesity (BMI 30-39.9) 11/06/2023    Other cervical disc degeneration at C5-C6 level 08/22/2018    Paroxysmal atrial fibrillation with rapid ventricular response 11/06/2023    Pneumonia     Prediabetes 11/06/2023    PTSD (post-traumatic stress disorder)     Rhinovirus infection 11/06/2023    Thoracic disc herniation     Vitamin B12 deficiency      Past Surgical History:   Procedure Laterality Date    ABLATION OF DYSRHYTHMIC FOCUS  7/31/2024    BRONCHOSCOPY N/A 11/03/2023    Procedure: BRONCHOSCOPY with bilateral lung washing's;  Surgeon: Kolton Brewer MD;  Location: [facility] ENDOSCOPY;  Service: Pulmonary;  Laterality: N/A;    CARDIAC CATHETERIZATION N/A 07/13/2022    Procedure: Left Heart Cath, possible pci;  Surgeon: Prieto Sidhu MD;  Location: [facility] CATH INVASIVE LOCATION;  Service: Cardiovascular;  Laterality: N/A;    CARDIAC ELECTROPHYSIOLOGY PROCEDURE N/A 07/31/2024    Procedure: Ablation atrial flutter;  Surgeon: Pardeep Walker MD;  Location: [facility] CATH INVASIVE LOCATION;  Service: Cardiovascular;  Laterality: N/A;    ENDOSCOPY      ENDOSCOPY N/A 09/21/2023    Procedure: ESOPHAGOGASTRODUODENOSCOPY WITH non guided esophageal dialtion 54 Fr. Bougie and  cold forcep biopsy x1 area;  Surgeon: Andres Concepcion MD;  Location: [facility] ENDOSCOPY;  Service: Gastroenterology;  Laterality: N/A;  Post- erosive gastritis, hiatal hernia, esophageal stricture    HERNIA REPAIR      INSERT / REPLACE / REMOVE PACEMAKER  11/31/2024    Temporary pacemaker implant in emergency room    KNEE ARTHROPLASTY      x2    SHOULDER ARTHROSCOPY W/ ROTATOR CUFF REPAIR Right 08/11/2020    Procedure: SHOULDER ARTHROSCOPY WITH ROTATOR CUFF REPAIR,  extensive debridement;  Surgeon: Fredi Ritchie MD;  Location: [facility] MAIN OR;  Service: Orthopedics;  Laterality: Right;    TONSILLECTOMY       SLP Recommendation and Plan  SLP Swallowing Diagnosis: mild, oral dysphagia, functional pharyngeal phase (12/11/24 1400)  SLP Diet Recommendation: regular textures, thin liquids (12/11/24 1400)  Recommended Precautions and Strategies: upright posture during/after eating, small bites of food and sips of liquid, multiple swallows per bite of food, multiple swallows per sip of liquid, alternate between small bites of food and sips of liquid (12/11/24 1400)  SLP Rec. for Method of Medication Administration: meds whole, with thin liquids, with puree, as tolerated (12/11/24 1400)  Monitor for Signs of Aspiration: yes, notify SLP if any concerns (12/11/24 1400)  Recommended Diagnostics: reassess via clinical swallow evaluation (12/11/24 1400)  Swallow Criteria for Skilled Therapeutic Interventions Met: demonstrates skilled criteria (12/11/24 1400)  Rehab Potential/Prognosis, Swallowing: good, to achieve stated therapy goals (12/11/24 1400)  Therapy Frequency (Swallow): 3 days per week, 4 days per week (12/11/24 1400)  Predicted Duration Therapy Intervention (Days): until discharge (12/11/24 1400)  Oral Care Recommendations: Oral Care BID/PRN, Oral Care before breakfast, after meals and PRN (12/11/24 1400)     SWALLOW EVALUATION (Last 72 Hours)       SLP Adult Swallow Evaluation       Row Name 12/11/24 1400       Rehab Evaluation    Document Type evaluation  -PF    Subjective Information no complaints  -PF    Patient Observations alert;cooperative  -PF    Patient Effort excellent  -PF    Symptoms Noted During/After Treatment none  -PF       General Information    Patient Profile Reviewed yes  -PF    Pertinent History Of Current Problem Per H&P: Jagdish Rea is a 64 y.o. male with a CMH of AFIB status post ablation, on Eliquis, hypertension, hyperlipidemia, COPD, obesity,  tobacco abuse, GERD, PTSD, anxiety who presented to [facility] on 12/11/2024 with right sided weakness. Symptoms started yesterday and patient was seen in the ED last night. A code stroke was initiated and patient had CT head and CT angiogram head and neck. Patient is not a candidate for tPA because he is on Eliquis. Patient was admitted to the hospital service but signed out AGAINST MEDICAL ADVICE. Patient reported he looked at his records on MyChart when he got home from the hospital and decided that he needed to come back for the MRI. Patient currently endorses right facial numbness, right sided weakness. Patient currently denied chest pain, shortness of breath, fever, chills, nausea, vomiting, or any other acute issue. Received orders upon failed nursing swallow screen. CT Head revealed no acute intracranial changes. MRI showed no definite acute infarct. CXR w/ diffuse interstitial opacities with hazy perihilar airspace opacities.  NPO is currently NPO. Clinical swallow evaluation completed. -PF    Precautions/Limitations, Vision WFL;for purposes of eval  -PF    Precautions/Limitations, Hearing WFL;for purposes of eval  -PF    Prior Level of Function-Communication WFL  -PF    Prior Level of Function-Swallowing no diet consistency restrictions  -PF    Plans/Goals Discussed with patient  -PF    Barriers to Rehab none identified  -PF       Pain    Pretreatment Pain Rating 0/10 - no pain  -PF    Posttreatment Pain Rating 0/10 - no pain  -PF       Oral Motor Structure and Function    Dentition Assessment natural, present and adequate;missing teeth  -PF    Secretion Management WNL/WFL  -PF    Mucosal Quality moist, healthy  -PF       Oral Musculature and Cranial Nerve Assessment    Oral Motor General Assessment oral labial or buccal impairment  -PF    Oral Labial or Buccal Impairment, Detail, Cranial Nerve VII (Facial): --  mild right labial droop  -PF       General Eating/Swallowing Observations     Respiratory Support Currently in Use room air  -PF    Eating/Swallowing Skills self-fed  -PF    Positioning During Eating upright 90 degree;upright in bed  -PF    Utensils Used spoon;cup;straw  -PF       Clinical Swallow Eval    Clinical Swallow Evaluation Summary Clinical swallow evaluation completed. Upon entry, pt was supine in bed, which was brought upright prior to PO acceptance. Oral mech examination revealed mild right labial droop.  Pt reported regular and thins is his baseline diet and reported difficulty swallowing w/ a h/o EGD w/ dilation x2 (previously done earlier this year). Pt self-fed all trials.  No difficulty using cup, straw, or spoon. No labial spillage occurred.  Mastication functional and mildly extended on regular solids. No pocketing but min lingual residual and pt achieved oral clearance upon thin liquid wash. Oral transit unremarkable. Digital palpation suggests timely swallow. Pharyngeal phase marked by clear vocal quality w/ no cough, throat clear, or any sign of respiratory distress. Per findings, pt presents w/ mild oral dysphagia and functional pharyngeal swallow.  Recommend pt resume regular diet w/ thin liquids. ST will continue to follow to ensure tolerance of PO diet and provide further recs as indicated.      Recommendations:  - Regular and thin liquids   - Meds: whole in thin liquid or as tolerated  - Safe swallow compensations: HOB at 90 degrees when eating/drinking, small sips/bites, slow rate of intake, alternate liquid and solid  - ST will continue to follow to ensure tolerance of PO diet and provide further recs as indicated.   -PF       SLP Evaluation Clinical Impression    SLP Swallowing Diagnosis mild;oral dysphagia;functional pharyngeal phase  -PF    Functional Impact no impact on function  -PF    Rehab Potential/Prognosis, Swallowing good, to achieve stated therapy goals  -PF    Swallow Criteria for Skilled Therapeutic Interventions Met demonstrates skilled criteria   -PF       Recommendations    Therapy Frequency (Swallow) 3 days per week;4 days per week  -PF    Predicted Duration Therapy Intervention (Days) until discharge  -PF    SLP Diet Recommendation regular textures;thin liquids  -PF    Recommended Diagnostics reassess via clinical swallow evaluation  -PF    Recommended Precautions and Strategies upright posture during/after eating;small bites of food and sips of liquid;multiple swallows per bite of food;multiple swallows per sip of liquid;alternate between small bites of food and sips of liquid  -PF    Oral Care Recommendations Oral Care BID/PRN;Oral Care before breakfast, after meals and PRN  -PF    SLP Rec. for Method of Medication Administration meds whole;with thin liquids;with puree;as tolerated  -PF    Monitor for Signs of Aspiration yes;notify SLP if any concerns  -PF       Swallow Goals (SLP)    Swallow LTGs Swallow Long Term Goal (free text)  -PF    Swallow STGs diet tolerance goal selection (SLP)  -PF    Diet Tolerance Goal Selection (SLP) Swallow Short Term Goal 1;Swallow Short Term Goal 2  -PF       (LTG) Swallow    (LTG) Swallow Pt will maximize swallow function for least restrictive PO diet, exhibiting no complication associated with dysphagia, adequate PO intake, and demonstrating independent use of swallow compensation.  -PF    [place] (Swallow Long Term Goal) with minimal cues (75-90% accuracy)  -PF    Time Frame (Swallow Long Term Goal) by discharge  -PF    Progress/Outcomes (Swallow Long Term Goal) new goal  -PF       (STG) Swallow 1    (STG) Swallow 1 The patient will participate in a full meal assessment to determine safety and adequacy of recommended diet, independent use of safe swallow compensations, and additional goals/recommendations to follow.  -PF    [place] (Swallow Short Term Goal 1) with minimal cues (75-90% accuracy)  -PF    Time Frame (Swallow Short Term Goal 1) 1 week  -PF    Progress/Outcomes (Swallow Short Term Goal 1) new  goal  -PF              User Key  (r) = Recorded By, (t) = Taken By, (c) = Cosigned By      Initials Name Effective Dates    PF Frankie Pena SLP 05/08/23 -                     EDUCATION  The patient has been educated in the following areas:   Oral Care/Hydration Modified Diet Instruction.        SLP GOALS       Row Name 12/11/24 1400       (LTG) Swallow    (LTG) Swallow Pt will maximize swallow function for least restrictive PO diet, exhibiting no complication associated with dysphagia, adequate PO intake, and demonstrating independent use of swallow compensation.  -PF    [place] (Swallow Long Term Goal) with minimal cues (75-90% accuracy)  -PF    Time Frame (Swallow Long Term Goal) by discharge  -PF    Progress/Outcomes (Swallow Long Term Goal) new goal  -PF       (STG) Swallow 1    (STG) Swallow 1 The patient will participate in a full meal assessment to determine safety and adequacy of recommended diet, independent use of safe swallow compensations, and additional goals/recommendations to follow.  -PF    [place] (Swallow Short Term Goal 1) with minimal cues (75-90% accuracy)  -PF    Time Frame (Swallow Short Term Goal 1) 1 week  -PF    Progress/Outcomes (Swallow Short Term Goal 1) new goal  -PF              User Key  (r) = Recorded By, (t) = Taken By, (c) = Cosigned By      Initials Name Provider Type    PF Fakto, [place], SLP Speech and Language Pathologist               YFN Bearden  12/11/2024

## 2024-12-11 NOTE — H&P
"[facility] Medicine Services  History & Physical    Patient Name: Jagdish Rea  : 1960  MRN: [id number]  Primary Care Physician:  David Causey MD  Date of admission: 2024  Date and Time of Service: 2024 at 0915    Subjective      Chief Complaint: right sided weakness, right sided facial droop    History of Present Illness: Jagdish Rea is a 64 y.o. male with a CMH of AFIB status post ablation, on Eliquis, hypertension, hyperlipidemia, COPD, obesity, tobacco abuse, GERD, PTSD, anxiety who presented to [facility] on 2024 with right sided weakness. Symptoms started yesterday and patient was seen in the ED last night. A code stroke was initiated and patient had CT head and CT angiogram head and neck. Patient is not a candidate for tPA because he is on Eliquis. Patient was admitted to the hospital service but signed out AGAINST MEDICAL ADVICE. Patient reported he looked at his records on [employer] when he got home from the hospital and decided that he needed to come back for the MRI. Patient currently endorses right facial numbness, right sided weakness. Patient currently denied chest pain, shortness of breath, fever, chills, nausea, vomiting, or any other acute issue.    In the ED: CBC and BMP unremarkable. EKG shows SR. CT head shows no acute intracranial finding. CTA head/neck shows mild atheromatous disease, no vessel occlusion. CT cerebral perfusion shows \"Small focus of minimally prolonged Tmax greater than 4 seconds within the left temporal lobe, of uncertain significance, possibly artifactual.\" MRI ordered. Hospitalist team to admit at this time for further management of stroke-like symptoms.     Review of Systems   Constitutional:  Negative for chills and fever.   Respiratory:  Negative for chest tightness and shortness of breath.    Cardiovascular:  Negative for chest pain.   Gastrointestinal:  Negative for nausea and vomiting.   Genitourinary:  Negative for " dysuria and hematuria.   Musculoskeletal:  Positive for gait problem.   Neurological:  Positive for facial asymmetry, weakness and numbness.       Personal History     Past Medical History:   Diagnosis Date    Acute hypoxemic respiratory failure 11/06/2023    Allergic     Anxiety     Arthritis 06/2005    Back pain     Claustrophobia 1978    COPD (chronic obstructive pulmonary disease)     CTS (carpal tunnel syndrome) 10/2022    CVA (cerebral vascular accident) 12/11/2024    Dysphagia     Esophageal stricture     GERD (gastroesophageal reflux disease)     Hyperlipidemia     Hypertension     Knee pain     rt    Low back pain     Obesity     Obesity (BMI 30-39.9) 11/06/2023    Other cervical disc degeneration at C5-C6 level 08/22/2018    Paroxysmal atrial fibrillation with rapid ventricular response 11/06/2023    Pneumonia     Prediabetes 11/06/2023    PTSD (post-traumatic stress disorder)     Rhinovirus infection 11/06/2023    Thoracic disc herniation     Vitamin B12 deficiency        Past Surgical History:   Procedure Laterality Date    ABLATION OF DYSRHYTHMIC FOCUS  7/31/2024    BRONCHOSCOPY N/A 11/03/2023    Procedure: BRONCHOSCOPY with bilateral lung washing's;  Surgeon: Kolton Brewer MD;  Location: [facility] ENDOSCOPY;  Service: Pulmonary;  Laterality: N/A;    CARDIAC CATHETERIZATION N/A 07/13/2022    Procedure: Left Heart Cath, possible pci;  Surgeon: Prieto Sidhu MD;  Location: [facility] CATH INVASIVE LOCATION;  Service: Cardiovascular;  Laterality: N/A;    CARDIAC ELECTROPHYSIOLOGY PROCEDURE N/A 07/31/2024    Procedure: Ablation atrial flutter;  Surgeon: Pardeep Walker MD;  Location: [facility] CATH INVASIVE LOCATION;  Service: Cardiovascular;  Laterality: N/A;    ENDOSCOPY      ENDOSCOPY N/A 09/21/2023    Procedure: ESOPHAGOGASTRODUODENOSCOPY WITH non guided esophageal dialtion 54 Fr. Bougie and  cold forcep biopsy x1 area;  Surgeon: Andres Concepcion MD;  Location: [facility] ENDOSCOPY;   Service: Gastroenterology;  Laterality: N/A;  Post- erosive gastritis, hiatal hernia, esophageal stricture    HERNIA REPAIR      INSERT / REPLACE / REMOVE PACEMAKER  11/31/2024    Temporary pacemaker implant in emergency room    KNEE ARTHROPLASTY      x2    SHOULDER ARTHROSCOPY W/ ROTATOR CUFF REPAIR Right 08/11/2020    Procedure: SHOULDER ARTHROSCOPY WITH ROTATOR CUFF REPAIR, extensive debridement;  Surgeon: Fredi Ritchie MD;  Location: [facility] MAIN OR;  Service: Orthopedics;  Laterality: Right;    TONSILLECTOMY         Family History: family history includes Alcohol abuse in his brother; Arthritis in his paternal grandmother; Asthma in his brother; Cancer in his father; Depression in his brother; Drug abuse in his brother; Early death in his brother and mother; Heart attack in his mother; Heart disease in his brother and mother; Hyperlipidemia in his brother, brother, and mother; Hypertension in his brother, brother, and mother; Learning disabilities in his brother; Mental illness in his brother; Stroke in his father and paternal grandfather; Thyroid disease in his mother; Vision loss in his father. Otherwise pertinent FHx was reviewed and not pertinent to current issue.    Social History:  reports that he has been smoking cigarettes. He started smoking about 51 years ago. He has a 51.9 pack-year smoking history. He has never been exposed to tobacco smoke. He has never used smokeless tobacco. He reports that he does not drink alcohol and does not use drugs.    Home Medications:  Prior to Admission Medications       Prescriptions Last Dose Informant Patient Reported? Taking?    albuterol sulfate  (90 Base) MCG/ACT inhaler   No No    Inhale 2 puffs Every 4 (Four) Hours As Needed for Wheezing.    apixaban (ELIQUIS) 5 MG tablet tablet 12/11/2024  No Yes    Take 1 tablet by mouth Every 12 (Twelve) Hours. Indications: Atrial Fibrillation    ipratropium-albuterol (DUO-NEB) 0.5-2.5 mg/3 ml nebulizer   Yes  No    Take 3 mL by nebulization Every 4 (Four) Hours As Needed for Wheezing.    pantoprazole (PROTONIX) 40 MG EC tablet 12/11/2024  Yes Yes    Take 1 tablet by mouth Daily.    pravastatin (PRAVACHOL) 80 MG tablet 12/10/2024  Yes Yes    Take 1 tablet by mouth Every Night.    tiotropium bromide-olodaterol (Stiolto Respimat) 2.5-2.5 MCG/ACT aerosol solution inhaler 12/11/2024  No Yes    Inhale 2 puffs Daily.              Allergies:  Allergies   Allergen Reactions    Atorvastatin Swelling    Lisinopril Other (See Comments)     Facial paralysis        Objective      Vitals:   Temp:  [97.8 °F (36.6 °C)] 97.8 °F (36.6 °C)  Heart Rate:  [77-90] 90  Resp:  [18] 18  BP: (133-151)/(85-96) 133/85  Body mass index is 29.78 kg/m².  Physical Exam  Constitutional:       Appearance: He is overweight.   HENT:      Head: Normocephalic and atraumatic.      Nose: Nose normal.      Mouth/Throat:      Mouth: Mucous membranes are moist.      Pharynx: Oropharynx is clear.   Eyes:      Extraocular Movements: Extraocular movements intact.      Conjunctiva/sclera: Conjunctivae normal.      Pupils: Pupils are equal, round, and reactive to light.   Cardiovascular:      Rate and Rhythm: Normal rate and regular rhythm.      Pulses: Normal pulses.      Heart sounds: Normal heart sounds.   Pulmonary:      Effort: Pulmonary effort is normal.      Breath sounds: Normal breath sounds.   Abdominal:      General: Bowel sounds are normal.      Palpations: Abdomen is soft.   Musculoskeletal:         General: Normal range of motion.      Cervical back: Neck supple.   Skin:     General: Skin is warm and dry.   Neurological:      Mental Status: He is alert and oriented to person, place, and time.      GCS: GCS eye subscore is 4. GCS verbal subscore is 5. GCS motor subscore is 6.      Motor: Weakness present.      Comments: Right sided weakness, mild right facial droop   Psychiatric:         Mood and Affect: Mood normal.         Behavior: Behavior normal.          Thought Content: Thought content normal.         Judgment: Judgment normal.         Diagnostic Data:  Lab Results (last 24 hours)       Procedure Component Value Units Date/Time    CBC & Differential [[id number]]  (Abnormal) Collected: 12/11/24 0919    Specimen: Blood Updated: 12/11/24 0957    Narrative:      The following orders were created for panel order CBC & Differential.  Procedure                               Abnormality         Status                     ---------                               -----------         ------                     CBC Auto Differential[[id number]]        Abnormal            Final result               Scan Slide[[id number]]                                       Final result                 Please view results for these tests on the individual orders.    CBC Auto Differential [[id number]]  (Abnormal) Collected: 12/11/24 0919    Specimen: Blood Updated: 12/11/24 0957     WBC 4.28 10*3/mm3      RBC 4.08 10*6/mm3      Hemoglobin 13.5 g/dL      Hematocrit 41.2 %      .0 fL      MCH 33.1 pg      MCHC 32.8 g/dL      RDW 13.5 %      RDW-SD 50.8 fl      MPV 10.9 fL      Platelets 130 10*3/mm3      Neutrophil % 40.0 %      Lymphocyte % 47.7 %      Monocyte % 8.4 %      Eosinophil % 2.3 %      Basophil % 0.9 %      Immature Grans % 0.7 %      Neutrophils, Absolute 1.71 10*3/mm3      Lymphocytes, Absolute 2.04 10*3/mm3      Monocytes, Absolute 0.36 10*3/mm3      Eosinophils, Absolute 0.10 10*3/mm3      Basophils, Absolute 0.04 10*3/mm3      Immature Grans, Absolute 0.03 10*3/mm3      nRBC 0.0 /100 WBC     Scan Slide [[id number]] Collected: 12/11/24 0919    Specimen: Blood Updated: 12/11/24 0957     RBC Morphology Normal     WBC Morphology Normal     Platelet Estimate Decreased    Magnesium [[id number]]  (Normal) Collected: 12/11/24 0903    Specimen: Blood Updated: 12/11/24 0937     Magnesium 2.2 mg/dL     Phosphorus [[id number]]  (Normal) Collected: 12/11/24 0903    Specimen: Blood  "Updated: 12/11/24 0937     Phosphorus 3.9 mg/dL     Basic Metabolic Panel [[id number]]  (Abnormal) Collected: 12/11/24 0903    Specimen: Blood Updated: 12/11/24 0927     Glucose 93 mg/dL      BUN 5 mg/dL      Creatinine 0.93 mg/dL      Sodium 141 mmol/L      Potassium 3.7 mmol/L      Chloride 105 mmol/L      CO2 25.4 mmol/L      Calcium 9.0 mg/dL      BUN/Creatinine Ratio 5.4     Anion Gap 10.6 mmol/L      eGFR 91.7 mL/min/1.73     Narrative:      GFR Categories in Chronic Kidney Disease (CKD)      GFR Category          GFR (mL/min/1.73)    Interpretation  G1                     90 or greater         Normal or high (1)  G2                      60-89                Mild decrease (1)  G3a                   45-59                Mild to moderate decrease  G3b                   30-44                Moderate to severe decrease  G4                    15-29                Severe decrease  G5                    14 or less           Kidney failure          (1)In the absence of evidence of kidney disease, neither GFR category G1 or G2 fulfill the criteria for CKD.    eGFR calculation 2021 CKD-EPI creatinine equation, which does not include race as a factor             Imaging Results (Last 24 Hours)       ** No results found for the last 24 hours. **              Assessment & Plan        This is a 64 y.o. male with:    Active and Resolved Problems  Active Hospital Problems    Diagnosis  POA    **CVA (cerebral vascular accident) [I63.9]  Yes      Resolved Hospital Problems   No resolved problems to display.       Right sided weakness  Right sided facial droop, numbness  - CT head: No acute intracranial finding  - CTA head/neck: Mild atheromatous disease, no vessel occlusion  - CT cerebral perfusion: \"Small focus of minimally prolonged Tmax greater than 4 seconds within the left temporal lobe, of uncertain significance, possibly artifactual.\"  - MRI brain w/o contrast pending  - TTE ordered  - HgbA1c: 5.83; TSH 3.680, lipid panel " with HDL 28  - Ordered AM folate, Vitamin B12  - DAPT: start plavix, aspirin   - Continuous telemetry, pulse ox  - Neuro checks Q4H, NIHSS Q12H  - Bedrest until PT/OT evaluation  - POC glucose Q6H while NPO; AC/HS when eating  - Will allow permissive hypertension for 24-48 hours, then restart antihypertensives for goal BP <130/80  - Bedside swallow evaluation, pending results consult ST  - Neurology consulted    AFIB   - s/p ablation July 2024, able to come off flecainide and metoprolol  - EKG: SR with HR 83  - Anticoag: eliquis  - Continuous cardiac monitor  - Follows with cardiology, Dr. Walker    Hyperlipidemia  - On pravastatin, not on formulary here; allergy to atorvastatin  - Last lipid panel on 12/10/24  - HDL 28, otherwise within normal limits    COPD  - Not in exacerbation  - Continue Stiolto, PRN duonebs  - Follows with pulmonology, Dr. Villalba     GERD  - Continue PPI     Overweight  - BMI 29.78  - Encourage lifestyle modifications    Nicotine dependence  - Current use of tobacco   - Nicotine patch denied    PTSD  Anxiety  - Untreated  - Supportive care      VTE Prophylaxis:  Mechanical VTE prophylaxis orders are present.        The patient desires to be as follows:    CODE STATUS:    Level Of Support Discussed With: Patient  Code Status (Patient has no pulse and is not breathing): CPR (Attempt to Resuscitate)  Medical Interventions (Patient has pulse or is breathing): Full Support        Laina Bañuelos, daughter, who can be contacted at [phone], is the designated person to make medical decisions on the patient's behalf if He is incapable of doing so. This was clarified with patient and/or next of kin on 12/11/2024 during the course of this H&P.    Admission Status:  I believe this patient meets inpatient status.    Expected Length of Stay: 1-2 days    PDMP and Medication Dispenses via Sidebar reviewed and consistent with patient reported medications.    I discussed the patient's findings and my  recommendations with patient.      Signature:     This document has been electronically signed by KEILA Lowe on December 11, 2024 11:05 [facility] Hospitalist Team

## 2024-12-11 NOTE — H&P
[facility] Medicine Services  History & Physical    Patient Name: Jagdish Rea  : 1960  MRN: [id number]  Primary Care Physician:  David Causey MD  Date of admission: 12/10/2024  Date and Time of Service: 12/10/2024 at 2045    Subjective      Chief Complaint: stroke like symptoms     History of Present Illness: Jagdish Rea is a 64 y.o. male with a CMH of paroxysmal atrial fibrillation status post ablation in July Eliquis, hypertension, hyperlipidemia, obesity BMI 30.5, tobacco use, anxiety, GERD, PTSD, cardiac cath 2022 showing no obstructive coronary artery disease, who presented to [facility] on 12/10/2024 with plaints of right-sided facial droop, right-sided facial numbness and right lower extremity weakness.  Reports some blurry vision.  This happened about 4:30 PM.  He also reports a chronic very mild temporal headache for the past month reportedly not relieved with acetaminophen or ibuprofen.  He does report he wakes up with a headache but is supposed to be on home oxygen for sleep apnea and does not use it.  Labs today showed potassium of 3.1, hemoglobin A1c 5.83 hemoglobin 12.0 CT head per radiology showed no acute intracranial finding, CT cerebral perfusion with and without contrast per radiology showed no convincing territorial ischemia or acute  infarct, small focus of minimally prolonged Tmax greater than 4 seconds with a left temporal lobe of uncertain significance please correlate with findings on MRI.  CT angiogram of head and neck per radiology showed mild atheromatous disease no vessel occlusion or stenosis.  Patient is awake and alert and answering appropriately.  He does have some right-sided mild facial droop which she reports is not normal for him.  He refuses to have an MRI.  EKG shows normal sinus rhythm heart rate 83 no acute ischemic changes.  MRI had been ordered which she has refused and neurology has been consulted.  He reports an allergy to a  atorvastatin for his right-sided facial numbness.  It has been explained to him that it is in his best interest to be admitted for neurology consultation however he is considering leaving A despite explanation.      Review of Systems    Personal History     Past Medical History:   Diagnosis Date    Acute hypoxemic respiratory failure 11/06/2023    Allergic     Anxiety     Arthritis 06/2005    Back pain     Claustrophobia 1978    COPD (chronic obstructive pulmonary disease)     CTS (carpal tunnel syndrome) 10/2022    Dysphagia     Esophageal stricture     GERD (gastroesophageal reflux disease)     Hyperlipidemia     Hypertension     Knee pain     rt    Low back pain     Obesity     Obesity (BMI 30-39.9) 11/06/2023    Other cervical disc degeneration at C5-C6 level 08/22/2018    Paroxysmal atrial fibrillation with rapid ventricular response 11/06/2023    Pneumonia     Prediabetes 11/06/2023    PTSD (post-traumatic stress disorder)     Rhinovirus infection 11/06/2023    Thoracic disc herniation     Vitamin B12 deficiency        Past Surgical History:   Procedure Laterality Date    ABLATION OF DYSRHYTHMIC FOCUS  7/31/2024    BRONCHOSCOPY N/A 11/03/2023    Procedure: BRONCHOSCOPY with bilateral lung washing's;  Surgeon: Kolton Brewer MD;  Location: [facility] ENDOSCOPY;  Service: Pulmonary;  Laterality: N/A;    CARDIAC CATHETERIZATION N/A 07/13/2022    Procedure: Left Heart Cath, possible pci;  Surgeon: Prieto Sidhu MD;  Location: [facility] CATH INVASIVE LOCATION;  Service: Cardiovascular;  Laterality: N/A;    CARDIAC ELECTROPHYSIOLOGY PROCEDURE N/A 07/31/2024    Procedure: Ablation atrial flutter;  Surgeon: Pardeep Walker MD;  Location: [facility] CATH INVASIVE LOCATION;  Service: Cardiovascular;  Laterality: N/A;    ENDOSCOPY      ENDOSCOPY N/A 09/21/2023    Procedure: ESOPHAGOGASTRODUODENOSCOPY WITH non guided esophageal dialtion 54 Fr. Bougie and  cold forcep biopsy x1 area;  Surgeon: Andres Concepcion  MD Williams;  Location: [facility] ENDOSCOPY;  Service: Gastroenterology;  Laterality: N/A;  Post- erosive gastritis, hiatal hernia, esophageal stricture    HERNIA REPAIR      INSERT / REPLACE / REMOVE PACEMAKER  11/31/2024    Temporary pacemaker implant in emergency room    KNEE ARTHROPLASTY      x2    SHOULDER ARTHROSCOPY W/ ROTATOR CUFF REPAIR Right 08/11/2020    Procedure: SHOULDER ARTHROSCOPY WITH ROTATOR CUFF REPAIR, extensive debridement;  Surgeon: Fredi Ritchie MD;  Location: [facility] MAIN OR;  Service: Orthopedics;  Laterality: Right;    TONSILLECTOMY         Family History: family history includes Alcohol abuse in his brother; Arthritis in his paternal grandmother; Asthma in his brother; Cancer in his father; Depression in his brother; Drug abuse in his brother; Early death in his brother and mother; Heart attack in his mother; Heart disease in his brother and mother; Hyperlipidemia in his brother, brother, and mother; Hypertension in his brother, brother, and mother; Learning disabilities in his brother; Mental illness in his brother; Stroke in his father and paternal grandfather; Thyroid disease in his mother; Vision loss in his father. Otherwise pertinent FHx was reviewed and not pertinent to current issue.    Social History:  reports that he has been smoking cigarettes. He started smoking about 51 years ago. He has a 51.9 pack-year smoking history. He has never used smokeless tobacco. He reports that he does not drink alcohol and does not use drugs.    Home Medications:  Prior to Admission Medications       Prescriptions Last Dose Informant Patient Reported? Taking?    albuterol sulfate  (90 Base) MCG/ACT inhaler   No No    Inhale 2 puffs Every 4 (Four) Hours As Needed for Wheezing.    apixaban (ELIQUIS) 5 MG tablet tablet   No No    Take 1 tablet by mouth Every 12 (Twelve) Hours. Indications: Atrial Fibrillation    cefdinir (OMNICEF) 300 MG capsule   No No    take one capsule by mouth twice  daily for 7 days    FLUoxetine (PROzac) 20 MG capsule   No No    Take 1 capsule by mouth Daily.    ipratropium-albuterol (DUO-NEB) 0.5-2.5 mg/3 ml nebulizer   Yes No    Take 3 mL by nebulization Every 4 (Four) Hours As Needed for Wheezing.    pantoprazole (PROTONIX) 40 MG EC tablet   Yes No    Take 1 tablet by mouth Daily.    pravastatin (PRAVACHOL) 80 MG tablet   Yes No    Take 1 tablet by mouth Every Night.    tiotropium bromide-olodaterol (STIOLTO RESPIMAT) 2.5-2.5 MCG/ACT aerosol solution inhaler   Yes No    Inhale 1 puff Daily.    tiotropium bromide-olodaterol (Stiolto Respimat) 2.5-2.5 MCG/ACT aerosol solution inhaler   No No    Inhale 2 puffs Daily.              Allergies:  Allergies   Allergen Reactions    Atorvastatin Swelling    Lisinopril Other (See Comments)     Facial paralysis        Objective      Vitals:   Temp:  [97.8 °F (36.6 °C)] 97.8 °F (36.6 °C)  Heart Rate:  [69-93] 74  Resp:  [18] 18  BP: (119-149)/(76-95) 120/80  Body mass index is 30.5 kg/m².  Physical Exam    Diagnostic Data:  Lab Results (last 24 hours)       Procedure Component Value Units Date/Time    Lipid Panel [[id number]]  (Abnormal) Collected: 12/10/24 1637    Specimen: Blood Updated: 12/10/24 2032     Total Cholesterol 111 mg/dL      Triglycerides 97 mg/dL      HDL Cholesterol 28 mg/dL      LDL Cholesterol  64 mg/dL      VLDL Cholesterol 19 mg/dL      LDL/HDL Ratio 2.27    Narrative:      Cholesterol Reference Ranges  (U.S. Department of Health and Human Services ATP III Classifications)    Desirable          <200 mg/dL  Borderline High    200-239 mg/dL  High Risk          >240 mg/dL      Triglyceride Reference Ranges  (U.S. Department of Health and Human Services ATP III Classifications)    Normal           <150 mg/dL  Borderline High  150-199 mg/dL  High             200-499 mg/dL  Very High        >500 mg/dL    HDL Reference Ranges  (U.S. Department of Health and Human Services ATP III Classifications)    Low     <40 mg/dl (major  risk factor for CHD)  High    >60 mg/dl ('negative' risk factor for CHD)        LDL Reference Ranges  (U.S. Department of Health and Human Services ATP III Classifications)    Optimal          <100 mg/dL  Near Optimal     100-129 mg/dL  Borderline High  130-159 mg/dL  High             160-189 mg/dL  Very High        >189 mg/dL    TSH [[id number]]  (Normal) Collected: 12/10/24 1637    Specimen: Blood Updated: 12/10/24 2032     TSH 3.680 uIU/mL     Hemoglobin A1c [[id number]]  (Abnormal) Collected: 12/10/24 1637    Specimen: Blood Updated: 12/10/24 2021     Hemoglobin A1C 5.83 %     CBC & Differential [[id number]]  (Abnormal) Collected: 12/10/24 1637    Specimen: Blood Updated: 12/10/24 1725    Narrative:      The following orders were created for panel order CBC & Differential.  Procedure                               Abnormality         Status                     ---------                               -----------         ------                     CBC Auto Differential[[id number]]        Abnormal            Final result               Scan Slide[[id number]]                                       Final result                 Please view results for these tests on the individual orders.    CBC Auto Differential [[id number]]  (Abnormal) Collected: 12/10/24 1637    Specimen: Blood Updated: 12/10/24 1725     WBC 4.85 10*3/mm3      RBC 3.61 10*6/mm3      Hemoglobin 12.0 g/dL      Hematocrit 36.6 %      .4 fL      MCH 33.2 pg      MCHC 32.8 g/dL      RDW 13.6 %      RDW-SD 51.1 fl      MPV 10.9 fL      Platelets 108 10*3/mm3      Neutrophil % 50.9 %      Lymphocyte % 36.9 %      Monocyte % 8.9 %      Eosinophil % 2.3 %      Basophil % 0.4 %      Immature Grans % 0.6 %      Neutrophils, Absolute 2.47 10*3/mm3      Lymphocytes, Absolute 1.79 10*3/mm3      Monocytes, Absolute 0.43 10*3/mm3      Eosinophils, Absolute 0.11 10*3/mm3      Basophils, Absolute 0.02 10*3/mm3      Immature Grans, Absolute 0.03 10*3/mm3      nRBC 0.0  /100 WBC     Scan Slide [[id number]] Collected: 12/10/24 1637    Specimen: Blood Updated: 12/10/24 1725     RBC Morphology Normal     WBC Morphology Normal     Giant Platelets Slight/1+    Narrative:      No visible platelet clumping.    Comprehensive Metabolic Panel [[id number]]  (Abnormal) Collected: 12/10/24 1637    Specimen: Blood Updated: 12/10/24 1717     Glucose 91 mg/dL      BUN 6 mg/dL      Creatinine 0.89 mg/dL      Sodium 141 mmol/L      Potassium 3.1 mmol/L      Chloride 106 mmol/L      CO2 26.3 mmol/L      Calcium 8.8 mg/dL      Total Protein 6.8 g/dL      Albumin 3.9 g/dL      ALT (SGPT) 13 U/L      AST (SGOT) 17 U/L      Alkaline Phosphatase 82 U/L      Total Bilirubin 0.3 mg/dL      Globulin 2.9 gm/dL      A/G Ratio 1.3 g/dL      BUN/Creatinine Ratio 6.7     Anion Gap 8.7 mmol/L      eGFR 95.7 mL/min/1.73     Narrative:      GFR Categories in Chronic Kidney Disease (CKD)      GFR Category          GFR (mL/min/1.73)    Interpretation  G1                     90 or greater         Normal or high (1)  G2                      60-89                Mild decrease (1)  G3a                   45-59                Mild to moderate decrease  G3b                   30-44                Moderate to severe decrease  G4                    15-29                Severe decrease  G5                    14 or less           Kidney failure          (1)In the absence of evidence of kidney disease, neither GFR category G1 or G2 fulfill the criteria for CKD.    eGFR calculation 2021 CKD-EPI creatinine equation, which does not include race as a factor    Protime-INR [[id number]]  (Abnormal) Collected: 12/10/24 1637    Specimen: Blood Updated: 12/10/24 1654     Protime 15.3 Seconds      INR 1.19    aPTT [[id number]]  (Normal) Collected: 12/10/24 1637    Specimen: Blood Updated: 12/10/24 1654     PTT 33.6 seconds     [place] Draw [[id number]] Collected: 12/10/24 1637    Specimen: Blood Updated: 12/10/24 1646    Narrative:      The  following orders were created for panel order [place] Draw.  Procedure                               Abnormality         Status                     ---------                               -----------         ------                     Green Top (Gel)[[id number]]                                  Final result               Lavender Top[[id number]]                                     Final result               Gold Top - SST[[id number]]                                   Final result               Light Blue Top[[id number]]                                   Final result                 Please view results for these tests on the individual orders.    Green Top (Gel) [[id number]] Collected: 12/10/24 1637    Specimen: Blood Updated: 12/10/24 1646     Extra Tube Hold for add-ons.     Comment: Auto resulted.       Lavender Top [[id number]] Collected: 12/10/24 1637    Specimen: Blood Updated: 12/10/24 1646     Extra Tube hold for add-on     Comment: Auto resulted       Gold Top - SST [[id number]] Collected: 12/10/24 1637    Specimen: Blood Updated: 12/10/24 1646     Extra Tube Hold for add-ons.     Comment: Auto resulted.       Light Blue Top [[id number]] Collected: 12/10/24 1637    Specimen: Blood Updated: 12/10/24 1646     Extra Tube Hold for add-ons.     Comment: Auto resulted                Imaging Results (Last 24 Hours)       Procedure Component Value Units Date/Time    CT Angiogram Head w AI Analysis of LVO [[id number]] Collected: 12/10/24 1738     Updated: 12/10/24 1745    Narrative:      CT ANGIOGRAM HEAD W AI ANALYSIS OF LVO, CT ANGIOGRAM NECK    Date of Exam: 12/10/2024 4:30 PM EST    Indication: Stroke, follow up  Neuro deficit, acute, stroke suspected  Acute Stroke.    Comparison: None available.    Technique: CTA of the head and neck was performed after the uneventful intravenous administration of iodinated contrast. Reconstructed coronal and sagittal images were also obtained. In addition, a 3-D volume rendered image  was created for   interpretation. Automated exposure control and iterative reconstruction methods were used.    Findings: The extracranial right internal carotid artery is normal. The intracranial segments of the right internal carotid artery are normal. The right carotid terminus is normal. The visualized branches of the right anterior and middle cerebral   arteries are normal. Disc    The left common carotid artery is normal. The left carotid bulb is normal. The extracranial left internal carotid artery is normal. The intracranial segments of the left internal carotid artery are normal. The left carotid terminus is normal. The   visualized branches likely left anterior and middle cerebral arteries are normal.    Both vertebral arteries arise from the subclavian arteries. The right vertebral artery is dominant. Both vertebral arteries supply the basilar artery. The basilar artery and basilar artery tip are normal. The basilar artery terminates in bilateral   posterior cerebral arteries which appear normal.    The intracranial venous sinuses are patent. No intracranial mass or hemorrhage. Orbital and peripheral soft tissues are normal. The nasopharynx is clear. The mastoid air cells are aerated. No cervical adenopathy.      Impression:      Mild atheromatous disease. No vessel occlusion or stenosis.        Electronically Signed: Cesar Warren MD    12/10/2024 5:43 PM EST    Workstation ID: [id number]    CT Angiogram Neck [[id number]] Collected: 12/10/24 1738     Updated: 12/10/24 1745    Narrative:      CT ANGIOGRAM HEAD W AI ANALYSIS OF LVO, CT ANGIOGRAM NECK    Date of Exam: 12/10/2024 4:30 PM EST    Indication: Stroke, follow up  Neuro deficit, acute, stroke suspected  Acute Stroke.    Comparison: None available.    Technique: CTA of the head and neck was performed after the uneventful intravenous administration of iodinated contrast. Reconstructed coronal and sagittal images were also obtained. In addition, a 3-D  volume rendered image was created for   interpretation. Automated exposure control and iterative reconstruction methods were used.    Findings: The extracranial right internal carotid artery is normal. The intracranial segments of the right internal carotid artery are normal. The right carotid terminus is normal. The visualized branches of the right anterior and middle cerebral   arteries are normal. Disc    The left common carotid artery is normal. The left carotid bulb is normal. The extracranial left internal carotid artery is normal. The intracranial segments of the left internal carotid artery are normal. The left carotid terminus is normal. The   visualized branches likely left anterior and middle cerebral arteries are normal.    Both vertebral arteries arise from the subclavian arteries. The right vertebral artery is dominant. Both vertebral arteries supply the basilar artery. The basilar artery and basilar artery tip are normal. The basilar artery terminates in bilateral   posterior cerebral arteries which appear normal.    The intracranial venous sinuses are patent. No intracranial mass or hemorrhage. Orbital and peripheral soft tissues are normal. The nasopharynx is clear. The mastoid air cells are aerated. No cervical adenopathy.      Impression:      Mild atheromatous disease. No vessel occlusion or stenosis.        Electronically Signed: Cesar Warren MD    12/10/2024 5:43 PM EST    Workstation ID: [id number]    XR Chest 1 View [[id number]] Collected: 12/10/24 1708     Updated: 12/10/24 1711    Narrative:      XR CHEST 1 VW    Date of Exam: 12/10/2024 4:55 PM EST    Indication: Acute Stroke Protocol (onset < 12 hrs)    Comparison: 7/31/2024 radiographs    Findings:  Unchanged cardiomediastinal silhouette. There are diffuse interstitial opacities. Hazy perihilar airspace opacities are present. Low lung volumes. No pleural effusion or pneumothorax. No acute osseous abnormality.      Impression:       Impression:  Diffuse interstitial opacities with hazy perihilar airspace opacities. Findings may represent pulmonary edema or atypical infection.        Electronically Signed: Dorian Shepard MD    12/10/2024 5:09 PM EST    Workstation ID: [id number]    CT CEREBRAL PERFUSION WITH & WITHOUT CONTRAST [[id number]] Collected: 12/10/24 1700     Updated: 12/10/24 1705    Narrative:      CT CEREBRAL PERFUSION W WO CONTRAST    Date of Exam: 12/10/2024 4:30 PM EST    Indication: Neuro deficit, acute, stroke suspected  Neuro deficit, acute, stroke suspected.     Comparison: None available.    Technique: Axial CT images of the brain were obtained prior to and after the administration of iodinated contrast. CT Perfusion protocol was utilized. Automated post processing was performed by RAPID software and submitted to PACS for interpretation.   Automated exposure control and iterative reconstruction was utilized.      FINDINGS:     CBF (<30%) volume: 0 mL  Tmax (>6.0s) volume: 0 mL  Mismatch volume: 0 mL  Mismatch ratio: None      Impression:      CT perfusion study shows no convincing territorial ischemia or core infarct.    Small focus of minimally prolonged Tmax greater than 4 seconds within the left temporal lobe, of uncertain significance, possibly artifactual. Please correlate with findings on MRI.        Electronically Signed: Arthur Zambrano MD    12/10/2024 5:03 PM EST    Workstation ID: [id number]    CT Head Without Contrast Stroke Protocol [[id number]] Collected: 12/10/24 1636     Updated: 12/10/24 1641    Narrative:      CT HEAD WO CONTRAST STROKE PROTOCOL    Date of Exam: 12/10/2024 4:24 PM EST    Indication: Neuro deficit, acute, stroke suspected  Neuro deficit, acute, stroke suspected.    Comparison: None available.    Technique: Axial CT images were obtained of the head without contrast administration.  Reconstructed coronal and sagittal images were also obtained. Automated exposure control and iterative construction  methods were used.    Scan Time: 12/10/2024 at 1633  Results discussed with emergency room  staff at 12/10/2024 at 4:38 p.m.      Findings:  Mild generalized atrophy. No evidence of acute or evolving infarct. Normal ventricular configuration. No intracranial hemorrhage, mass lesion, mass effect or midline shift. Paranasal sinuses and mastoid air cells are clear. No acute calvarial   abnormality.      Impression:      Impression:  No acute intracranial finding.  Mild atrophy.        Electronically Signed: Carli Scott MD    12/10/2024 4:39 PM EST    Workstation ID: [id number]              Assessment & Plan        This is a 64 y.o. male with:    Active and Resolved Problems  Active Hospital Problems    Diagnosis  POA    **Stroke-like symptom [R29.90]  Yes     Priority: High    Hypokalemia [E87.6]  Yes     Priority: Medium    Atrial fibrillation with RVR [I48.91]  Yes    Obesity (BMI 30-39.9) [E66.9]  Yes    Pure hypercholesterolemia [E78.00]  Yes    Post traumatic stress disorder (PTSD) [F43.10]  Yes    Gastro-esophageal reflux disease with esophagitis [K21.00]  Yes    Tobacco abuse [Z72.0]  Yes    Anxiety [F41.9]  Yes    Hyperlipidemia [E78.5]  Yes      Resolved Hospital Problems   No resolved problems to display.       Strokelike symptoms, right-sided facial droop right-sided numbness NIH 5, CT head negative, CT perfusion small focus of minimally prolonged Tmax on left temporal lobe but patient has refused brain MRI despite explanation.  Stroke orders in place, on aspirin allergy to statin available in formulary here, neurology consulted    Hypokalemia potassium 3.1, potassium replacement protocol in place    Atrial fibrillation with RVR, normal sinus rhythm on current EKG, on home Eliquis, continuous cardiac monitoring    Obesity BMI 30.5 lifestyle manage education    Pure hypercholesterolemia on pravastatin reports allergy to formulary substitute here atorvastatin    PTSD/anxiety, no home meds    GERD on  PPI    Tobacco use, encourage cessation, on home Stiolto Respimat formulary substituted here stable on room air    VTE Prophylaxis:  Mechanical VTE prophylaxis orders are present.        The patient desires to be as follows:    CODE STATUS:    Code Status (Patient has no pulse and is not breathing): CPR (Attempt to Resuscitate)  Medical Interventions (Patient has pulse or is breathing): Full Support          Admission Status:  I believe this patient meets observation  status.    Expected Length of Stay: pending clinical course and further evaluation    PDMP and Medication Dispenses via Sidebar reviewed and consistent with patient reported medications.    I discussed the patient's findings and my recommendations with patient.      Signature:     This document has been electronically signed by KEILA Mendenhall on December 10, 2024 21:34 [facility] Hospitalist Team

## 2024-12-11 NOTE — ED NOTES
Nursing report ED to floor  Jagdish Rea  64 y.o.  male    HPI:   Chief Complaint   Patient presents with    Neuro Deficit(s)       Admitting doctor:   Lindsey Lazaro MD    Admitting diagnosis:   The encounter diagnosis was Acute CVA (cerebrovascular accident).    Code status:   Current Code Status       Date Active Code Status Order ID Comments User Context       Prior            Allergies:   Atorvastatin and Lisinopril    Isolation:  No active isolations     Fall Risk:  Fall Risk Assessment was completed, and patient is at low risk for falls.   Predictive Model Details         11 (Low) Factor Value    Calculated 12/11/2024 10:42 Age 64    Risk of Fall Model Active Peripheral IV Present     Imaging order in this encounter Present     Magnesium 2.2 mg/dL     Respiratory Rate 18     Tobacco Use Current     Gregg Scale not on file     Calcium 9 mg/dL     Clinically Relevant Sex Not Female     Albumin 3.9 g/dL     Number of Distinct Medication Classes administered 1     Diastolic BP 85     Days after Admission 0.089     ALT 13 U/L     Creatinine 0.93 mg/dL     Total Bilirubin 0.3 mg/dL     Chloride 105 mmol/L     Potassium 3.7 mmol/L         Weight:       12/11/24  0833   Weight: 99.6 kg (219 lb 9.3 oz)       Intake and Output  No intake or output data in the 24 hours ending 12/11/24 1044    Diet:   Dietary Orders (From admission, onward)       Start     Ordered    12/11/24 0912  NPO Diet NPO Type: Strict NPO  Diet Effective Now        Question:  NPO Type  Answer:  Strict NPO    12/11/24 0914                     Most recent vitals:   Vitals:    12/11/24 0908 12/11/24 0921 12/11/24 0922 12/11/24 0923   BP:  133/85     BP Location:       Patient Position:       Pulse: 85 90     Resp:       Temp:       TempSrc:       SpO2: 95%  94% 91%   Weight:       Height:           Active LDAs/IV Access:   Lines, Drains & Airways       Active LDAs       Name Placement date Placement time Site Days    Peripheral IV 12/11/24 0903 Left  Antecubital 12/11/24  0903  Antecubital  less than 1                    Skin Condition:   Skin Assessments (last day)       Date/Time Interval    12/11/24 08:53:47 baseline    12/11/24 09:11:31 baseline             Labs (abnormal labs have a star):   Labs Reviewed   BASIC METABOLIC PANEL - Abnormal; Notable for the following components:       Result Value    BUN 5 (*)     BUN/Creatinine Ratio 5.4 (*)     All other components within normal limits    Narrative:     GFR Categories in Chronic Kidney Disease (CKD)      GFR Category          GFR (mL/min/1.73)    Interpretation  G1                     90 or greater         Normal or high (1)  G2                      60-89                Mild decrease (1)  G3a                   45-59                Mild to moderate decrease  G3b                   30-44                Moderate to severe decrease  G4                    15-29                Severe decrease  G5                    14 or less           Kidney failure          (1)In the absence of evidence of kidney disease, neither GFR category G1 or G2 fulfill the criteria for CKD.    eGFR calculation 2021 CKD-EPI creatinine equation, which does not include race as a factor   CBC WITH AUTO DIFFERENTIAL - Abnormal; Notable for the following components:    RBC 4.08 (*)     .0 (*)     MCH 33.1 (*)     Platelets 130 (*)     Neutrophil % 40.0 (*)     Lymphocyte % 47.7 (*)     Immature Grans % 0.7 (*)     All other components within normal limits   MAGNESIUM - Normal   PHOSPHORUS - Normal   SCAN SLIDE   CBC AND DIFFERENTIAL    Narrative:     The following orders were created for panel order CBC & Differential.  Procedure                               Abnormality         Status                     ---------                               -----------         ------                     CBC Auto Differential[[id number]]        Abnormal            Final result               Scan Slide[[id number]]                                        Final result                 Please view results for these tests on the individual orders.       LOC: Person, Place, Time, and Situation    Telemetry:  Telemetry    Cardiac Monitoring Ordered: yes    EKG:   No orders to display       Medications Given in the ED:   Medications   sodium chloride 0.9 % flush 10 mL (has no administration in time range)   sodium chloride 0.9 % flush 10 mL (has no administration in time range)   sodium chloride 0.9 % infusion 40 mL (has no administration in time range)   nitroglycerin (NITROSTAT) SL tablet 0.4 mg (has no administration in time range)   Potassium Replacement - Follow Nurse / BPA Driven Protocol (has no administration in time range)   Magnesium Standard Dose Replacement - Follow Nurse / BPA Driven Protocol (has no administration in time range)   Phosphorus Replacement - Follow Nurse / BPA Driven Protocol (has no administration in time range)   Calcium Replacement - Follow Nurse / BPA Driven Protocol (has no administration in time range)   acetaminophen (TYLENOL) tablet 650 mg (has no administration in time range)     Or   acetaminophen (TYLENOL) 160 MG/5ML oral solution 650 mg (has no administration in time range)     Or   acetaminophen (TYLENOL) suppository 650 mg (has no administration in time range)   sennosides-docusate (PERICOLACE) 8.6-50 MG per tablet 2 tablet (has no administration in time range)     And   polyethylene glycol (MIRALAX) packet 17 g (has no administration in time range)     And   bisacodyl (DULCOLAX) EC tablet 5 mg (has no administration in time range)     And   bisacodyl (DULCOLAX) suppository 10 mg (has no administration in time range)   ondansetron ODT (ZOFRAN-ODT) disintegrating tablet 4 mg (has no administration in time range)     Or   ondansetron (ZOFRAN) injection 4 mg (has no administration in time range)   aspirin chewable tablet 324 mg (has no administration in time range)   aspirin chewable tablet 81 mg (has no administration in time range)    clopidogrel (PLAVIX) tablet 75 mg (has no administration in time range)   haloperidol lactate (HALDOL) injection 5 mg (has no administration in time range)   diphenhydrAMINE (BENADRYL) injection 25 mg (has no administration in time range)       Imaging results:  CT Angiogram Head w AI Analysis of LVO    Result Date: 12/10/2024  Mild atheromatous disease. No vessel occlusion or stenosis. Electronically Signed: Cesar Warren MD  12/10/2024 5:43 PM EST  Workstation ID: [id number]    CT Angiogram Neck    Result Date: 12/10/2024  Mild atheromatous disease. No vessel occlusion or stenosis. Electronically Signed: Cesar Warren MD  12/10/2024 5:43 PM EST  Workstation ID: [id number]    XR Chest 1 View    Result Date: 12/10/2024  Impression: Diffuse interstitial opacities with hazy perihilar airspace opacities. Findings may represent pulmonary edema or atypical infection. Electronically Signed: Dorian Shepard MD  12/10/2024 5:09 PM EST  Workstation ID: [id number]    CT CEREBRAL PERFUSION WITH & WITHOUT CONTRAST    Result Date: 12/10/2024  CT perfusion study shows no convincing territorial ischemia or core infarct. Small focus of minimally prolonged Tmax greater than 4 seconds within the left temporal lobe, of uncertain significance, possibly artifactual. Please correlate with findings on MRI. Electronically Signed: Arthur Zambrano MD  12/10/2024 5:03 PM EST  Workstation ID: [id number]    CT Head Without Contrast Stroke Protocol    Result Date: 12/10/2024  Impression: No acute intracranial finding. Mild atrophy. Electronically Signed: Carli Scott MD  12/10/2024 4:39 PM EST  Workstation ID: [id number]     Social issues:   Social History     Socioeconomic History    Marital status:    Tobacco Use    Smoking status: Every Day     Current packs/day: 1.00     Average packs/day: 1 pack/day for 51.9 years (51.9 ttl pk-yrs)     Types: Cigarettes     Start date: 1/9/1973    Smokeless tobacco: Never    Tobacco comments:     Smoked a half  a pack a day for 42 years didn't start to smoke a whole pack until 20     17       Counseled to stop smoking   Vaping Use    Vaping status: Never Used   Substance and Sexual Activity    Alcohol use: No    Drug use: No    Sexual activity: Not Currently     Partners: Female     Birth control/protection: None       NIH Stroke Scale:  Interval: baseline (12/11/24 0911)  1a. Level of Consciousness: 0-->Alert, keenly responsive (12/11/24 0911)  1b. LOC Questions: 0-->Answers both questions correctly (12/11/24 0911)  1c. LOC Commands: 0-->Performs both tasks correctly (12/11/24 0911)  2. Best Gaze: 0-->Normal (12/11/24 0911)  3. Visual: 0-->No visual loss (12/11/24 0911)  4. Facial Palsy: 0-->Normal symmetrical movements (12/11/24 0911)  5a. Motor Arm, Left: 0-->No drift, limb holds 90 (or 45) degrees for full 10 secs (12/11/24 0911)  5b. Motor Arm, Right: 1-->Drift, limb holds 90 (or 45) degrees, but drifts down before full 10 secs, does not hit bed or other support (12/11/24 0911)  6a. Motor Leg, Left: 0-->No drift, leg holds 30 degree position for full 5 secs (12/11/24 0911)  6b. Motor Leg, Right: 1-->Drift, leg falls by the end of the 5-sec period but does not hit bed (12/11/24 0911)  7. Limb Ataxia: 0-->Absent (12/11/24 0911)  8. Sensory: 0-->Normal, no sensory loss (12/11/24 0911)  9. Best Language: 0-->No aphasia, normal (12/11/24 0911)  10. Dysarthria: 0-->Normal (12/11/24 0911)  11. Extinction and Inattention (formerly Neglect): 0-->No abnormality (12/11/24 0911)    Total (NIH Stroke Scale): 2 (12/11/24 0911)     Additional notable assessment information:     Nursing report ED to floor:  Reena in [facility]     Maranda Pereira RN   12/11/24 10:44 EST

## 2024-12-11 NOTE — PLAN OF CARE
Goal Outcome Evaluation:     Clinical swallow evaluation completed. Upon entry, pt was supine in bed, which was brought upright prior to PO acceptance. Oral [facility] examination revealed mild right labial droop.  Pt reported regular and thins is his baseline diet and reported difficulty swallowing w/ a h/o EGD w/ dilation x2 (previously done earlier this year). Pt self-fed all trials.  No difficulty using cup, straw, or spoon. No labial spillage occurred.  Mastication functional and mildly extended on regular solids. No pocketing but min lingual residual and pt achieved oral clearance upon thin liquid wash. Oral transit unremarkable. Digital palpation suggests timely swallow. Pharyngeal phase marked by clear vocal quality w/ no cough, throat clear, or any sign of respiratory distress. Per findings, pt presents w/ mild oral dysphagia and functional pharyngeal swallow.  Recommend pt resume regular diet w/ thin liquids. ST will continue to follow to ensure tolerance of PO diet and provide further recs as indicated.      Recommendations:  - Regular and thin liquids   - Meds: whole in thin liquid or as tolerated  - Safe swallow compensations: HOB at 90 degrees when eating/drinking, small sips/bites, slow rate of intake, alternate liquid and solid  - ST will continue to follow to ensure tolerance of PO diet and provide further recs as indicated.   -PF                     SLP Swallowing Diagnosis: mild, oral dysphagia, functional pharyngeal phase (12/11/24 1400)

## 2024-12-11 NOTE — CONSULTS
"Primary Care Provider: No ref. provider found     Consult requested by: Hospitalist service    Reason for Consultation: Neurological evaluation-right-sided facial weakness    History taken from: patient chart RN    Chief complaint: Facial weakness       SUBJECTIVE:    History of present illness: Background per H&P: Jagdish Rea is a 64 y.o. right handed male who has a PMHX of HTN, HLD, prior stroke, prior episode of bacterial meningitis (14 years ago), pAF on Eliquis presenting to [facility] 12/11/24 after he left AMA yesterday for the initial episode of right face weakness that started 12/10 at 330 PM. He felt it worsening throughout the day. He felt his right eye watering but believes hi facial droop has significantly improved since it started and no longer has a watery right eye. No other complaints or neuro deficits.     The patient was seen yesterday and then discharged prior to this.     He reports he had an identical episode in 2022 involving his right face weakness lasting approximately 12 hours.     Mild RFD, right eye watering. Closes well and without injection.     On Sunday he had an acute illness where he was throwing up and felt like he had \"meningitis\". He had general myalgia.     Patient denies hyperacusis, tinnitus, abnormal taste or change in taste, vesicular eruptions anywhere on his body, prior Bell's palsy, or any other neurologic deficits.      On examination the patient has mild right facial droop with very minimal loss of forehead wrinkling, thus the atypical presentation.    - Portions of the above HPI were copied from previous encounters and edited as appropriate. PMH as detailed below.     Review of Systems   Constitutional:  Negative for fever and unexpected weight change.   HENT:  Negative for congestion, drooling, ear discharge, ear pain, hearing loss, rhinorrhea, tinnitus, trouble swallowing and voice change.    Eyes:  Negative for photophobia, pain, redness and visual disturbance. "   Respiratory:  Negative for chest tightness and shortness of breath.    Cardiovascular:  Negative for chest pain and palpitations.   Gastrointestinal:  Negative for nausea and vomiting.   Genitourinary:  Negative for difficulty urinating, dysuria, frequency and urgency.   Musculoskeletal:  Negative for back pain, gait problem, neck pain and neck stiffness.   Neurological:  Positive for facial asymmetry. Negative for dizziness, tremors, seizures, syncope, speech difficulty, weakness, light-headedness, numbness and headaches.   Psychiatric/Behavioral:  Negative for agitation, behavioral problems and confusion.    All other systems reviewed and are negative.         PATIENT HISTORY:  Past Medical History:   Diagnosis Date    Acute hypoxemic respiratory failure 11/06/2023    Allergic     Anxiety     Arthritis 06/2005    Back pain     Claustrophobia 1978    COPD (chronic obstructive pulmonary disease)     CTS (carpal tunnel syndrome) 10/2022    CVA (cerebral vascular accident) 12/11/2024    Dysphagia     Esophageal stricture     GERD (gastroesophageal reflux disease)     Hyperlipidemia     Hypertension     Knee pain     rt    Low back pain     Obesity     Obesity (BMI 30-39.9) 11/06/2023    Other cervical disc degeneration at C5-C6 level 08/22/2018    Paroxysmal atrial fibrillation with rapid ventricular response 11/06/2023    Pneumonia     Prediabetes 11/06/2023    PTSD (post-traumatic stress disorder)     Rhinovirus infection 11/06/2023    Thoracic disc herniation     Vitamin B12 deficiency    ,   Past Surgical History:   Procedure Laterality Date    ABLATION OF DYSRHYTHMIC FOCUS  7/31/2024    BRONCHOSCOPY N/A 11/03/2023    Procedure: BRONCHOSCOPY with bilateral lung washing's;  Surgeon: Kolton Brewer MD;  Location: [facility] ENDOSCOPY;  Service: Pulmonary;  Laterality: N/A;    CARDIAC CATHETERIZATION N/A 07/13/2022    Procedure: Left Heart Cath, possible pci;  Surgeon: Prieto Sidhu MD;  Location: [facility]  CATH INVASIVE LOCATION;  Service: Cardiovascular;  Laterality: N/A;    CARDIAC ELECTROPHYSIOLOGY PROCEDURE N/A 07/31/2024    Procedure: Ablation atrial flutter;  Surgeon: Pardeep Walker MD;  Location: [facility] CATH INVASIVE LOCATION;  Service: Cardiovascular;  Laterality: N/A;    ENDOSCOPY      ENDOSCOPY N/A 09/21/2023    Procedure: ESOPHAGOGASTRODUODENOSCOPY WITH non guided esophageal dialtion 54 Fr. Bougie and  cold forcep biopsy x1 area;  Surgeon: Andres Concepcion MD;  Location: [facility] ENDOSCOPY;  Service: Gastroenterology;  Laterality: N/A;  Post- erosive gastritis, hiatal hernia, esophageal stricture    HERNIA REPAIR      INSERT / REPLACE / REMOVE PACEMAKER  11/31/2024    Temporary pacemaker implant in emergency room    KNEE ARTHROPLASTY      x2    SHOULDER ARTHROSCOPY W/ ROTATOR CUFF REPAIR Right 08/11/2020    Procedure: SHOULDER ARTHROSCOPY WITH ROTATOR CUFF REPAIR, extensive debridement;  Surgeon: Fredi Ritchie MD;  Location: [facility] MAIN OR;  Service: Orthopedics;  Laterality: Right;    TONSILLECTOMY     ,   Family History   Problem Relation Age of Onset    Heart disease Mother     Early death Mother     Hyperlipidemia Mother     Hypertension Mother     Thyroid disease Mother     Heart attack Mother         Had valve replaced with a pig valve    Cancer Father     Stroke Father     Vision loss Father     Stroke Paternal Grandfather     Arthritis Paternal Grandmother     Alcohol abuse Brother     Asthma Brother     Depression Brother     Hyperlipidemia Brother     Hypertension Brother     Learning disabilities Brother     Mental illness Brother     Drug abuse Brother     Early death Brother     Heart disease Brother     Hyperlipidemia Brother         Bijan is the same person    Hypertension Brother    ,   Social History     Tobacco Use    Smoking status: Every Day     Current packs/day: 1.00     Average packs/day: 1 pack/day for 51.9 years (51.9 ttl pk-yrs)     Types: Cigarettes      Start date: 1/9/1973     Passive exposure: Never    Smokeless tobacco: Never    Tobacco comments:     Smoked a half a pack a day for 42 years didn't start to smoke a whole pack until 20     17       Counseled to stop smoking   Vaping Use    Vaping status: Never Used   Substance Use Topics    Alcohol use: No    Drug use: No   ,   Prior to Admission medications    Medication Sig Start Date End Date Taking? Authorizing Provider   apixaban (ELIQUIS) 5 MG tablet tablet Take 1 tablet by mouth Every 12 (Twelve) Hours. Indications: Atrial Fibrillation 11/18/23  Yes Morris Rios MD   pantoprazole (PROTONIX) 40 MG EC tablet Take 1 tablet by mouth Daily.   Yes ProviderSulaiman MD   pravastatin (PRAVACHOL) 80 MG tablet Take 1 tablet by mouth Every Night.   Yes ProviderSulaiman MD   tiotropium bromide-olodaterol (Stiolto Respimat) 2.5-2.5 MCG/ACT aerosol solution inhaler Inhale 2 puffs Daily. 11/5/24  Yes    albuterol sulfate  (90 Base) MCG/ACT inhaler Inhale 2 puffs Every 4 (Four) Hours As Needed for Wheezing. 4/4/22   Yamilka Pascual APRN   ipratropium-albuterol (DUO-NEB) 0.5-2.5 mg/3 ml nebulizer Take 3 mL by nebulization Every 4 (Four) Hours As Needed for Wheezing.    Provider, MD Sulaiman    Allergies:  Atorvastatin and Lisinopril    Current Facility-Administered Medications   Medication Dose Route Frequency Provider Last Rate Last Admin    acetaminophen (TYLENOL) tablet 650 mg  650 mg Oral Q4H PRN Maira Mehta APRN        Or    acetaminophen (TYLENOL) 160 MG/5ML oral solution 650 mg  650 mg Oral Q4H PRN Maira Mehta G, APRN        Or    acetaminophen (TYLENOL) suppository 650 mg  650 mg Rectal Q4H PRN Maira Mehta, APRN        albuterol sulfate HFA (PROVENTIL HFA;VENTOLIN HFA;PROAIR HFA) inhaler 2 puff  2 puff Inhalation Q4H PRN Maira Mehta, APRN        apixaban (ELIQUIS) tablet 5 mg  5 mg Oral Q12H Maira Mehta APRN        [START ON 12/12/2024] aspirin chewable  tablet 81 mg  81 mg Oral Daily Maira Mehta APRN        sennosides-docusate (PERICOLACE) 8.6-50 MG per tablet 2 tablet  2 tablet Oral BID PRN Maira Mehta APRN        And    polyethylene glycol (MIRALAX) packet 17 g  17 g Oral Daily PRN Maira Mehta APRN        And    bisacodyl (DULCOLAX) EC tablet 5 mg  5 mg Oral Daily PRN Maira Mehta APRN        And    bisacodyl (DULCOLAX) suppository 10 mg  10 mg Rectal Daily PRN Maira Mehta APRN        Calcium Replacement - Follow Nurse / BPA Driven Protocol   Not Applicable PRN Maira Mehta APRN        clopidogrel (PLAVIX) tablet 75 mg  75 mg Oral Daily Maira Mehta APRN   75 mg at 12/11/24 1058    ipratropium-albuterol (DUO-NEB) nebulizer solution 3 mL  3 mL Nebulization Q4H PRN Maira Mehta APRN        Magnesium Standard Dose Replacement - Follow Nurse / BPA Driven Protocol   Not Applicable PRN Maira Mehta APRN        nitroglycerin (NITROSTAT) SL tablet 0.4 mg  0.4 mg Sublingual Q5 Min PRN Maira Mehta APRN        Non Formulary - Pharmacy to Enter Medication  2.5 dose Inhalation BID Maira Mehta APRN        ondansetron ODT (ZOFRAN-ODT) disintegrating tablet 4 mg  4 mg Oral Q6H PRN Maira Mehta APRN        Or    ondansetron (ZOFRAN) injection 4 mg  4 mg Intravenous Q6H PRN Maira Mehta APRN        [START ON 12/12/2024] pantoprazole (PROTONIX) EC tablet 40 mg  40 mg Oral Daily Maira Mehta APRN        Phosphorus Replacement - Follow Nurse / BPA Driven Protocol   Not Applicable PRN Maira Mehta APRN        Potassium Replacement - Follow Nurse / BPA Driven Protocol   Not Applicable PRN Maira Mehta APRN        sodium chloride 0.9 % flush 10 mL  10 mL Intravenous PRN Neymar Musa MD        sodium chloride 0.9 % flush 10 mL  10 mL Intravenous Q12H Maira Mehta APRN   10 mL at 12/11/24 1058    sodium chloride 0.9 % infusion 40 mL  40 mL Intravenous PRN  Maira Mehta, KEILA         Current Outpatient Medications   Medication Sig Dispense Refill    apixaban (ELIQUIS) 5 MG tablet tablet Take 1 tablet by mouth Every 12 (Twelve) Hours. Indications: Atrial Fibrillation 60 tablet 0    pantoprazole (PROTONIX) 40 MG EC tablet Take 1 tablet by mouth Daily.      pravastatin (PRAVACHOL) 80 MG tablet Take 1 tablet by mouth Every Night.      tiotropium bromide-olodaterol (Stiolto Respimat) 2.5-2.5 MCG/ACT aerosol solution inhaler Inhale 2 puffs Daily. 4 g 5    albuterol sulfate  (90 Base) MCG/ACT inhaler Inhale 2 puffs Every 4 (Four) Hours As Needed for Wheezing. 18 g 1    ipratropium-albuterol (DUO-NEB) 0.5-2.5 mg/3 ml nebulizer Take 3 mL by nebulization Every 4 (Four) Hours As Needed for Wheezing.          ________________________________________________________        OBJECTIVE:  Upon today's exam      GEN: NAD, pleasant, cooperative  CHEST: No signs of resp distress, on room air    NEURO  MENTAL STATUS: AAOx3, memory intact, fund of knowledge appropriate  LANG/SPEECH: Naming and repetition intact, fluent, follows 3-step commands    CRANIAL NERVES:  II-XII grossly intact with exception of mild right facial droop.    MOTOR:  Motor strength 5/5 throughout, symmetric.     REFLEXES: 2/4 throughout    SENSORY:  Normal to touch, temp all limbs  No hemineglect, no extinction to double-sided stimulation (visual & tactile)  COORD: Normal finger to nose         ________________________________________________________   RESULTS REVIEW:    VITAL SIGNS:   Temp:  [97.8 °F (36.6 °C)] 97.8 °F (36.6 °C)  Heart Rate:  [77-90] 90  Resp:  [18] 18  BP: (133-151)/(85-96) 133/85     LABS:      Lab 12/11/24  0919 12/10/24  1637   WBC 4.28 4.85   HEMOGLOBIN 13.5 12.0*   HEMATOCRIT 41.2 36.6*   PLATELETS 130* 108*   NEUTROS ABS 1.71 2.47   IMMATURE GRANS (ABS) 0.03 0.03   LYMPHS ABS 2.04 1.79   MONOS ABS 0.36 0.43   EOS ABS 0.10 0.11   .0* 101.4*   PROTIME  --  15.3*   APTT  --   33.6         Lab 12/11/24  0903 12/10/24  1637   SODIUM 141 141   POTASSIUM 3.7 3.1*   CHLORIDE 105 106   CO2 25.4 26.3   ANION GAP 10.6 8.7   BUN 5* 6*   CREATININE 0.93 0.89   EGFR 91.7 95.7   GLUCOSE 93 91   CALCIUM 9.0 8.8   MAGNESIUM 2.2  --    PHOSPHORUS 3.9  --    HEMOGLOBIN A1C  --  5.83*   TSH  --  3.680         Lab 12/10/24  1637   TOTAL PROTEIN 6.8   ALBUMIN 3.9   GLOBULIN 2.9   ALT (SGPT) 13   AST (SGOT) 17   BILIRUBIN 0.3   ALK PHOS 82         Lab 12/10/24  1637   PROTIME 15.3*   INR 1.19*         Lab 12/10/24  1637   CHOLESTEROL 111   LDL CHOL 64   HDL CHOL 28*   TRIGLYCERIDES 97         Lab 12/10/24  1700   ABO TYPING AB   RH TYPING Negative   ANTIBODY SCREEN Negative             Lab Results   Component Value Date    TSH 3.680 12/10/2024    LDL 64 12/10/2024    HGBA1C 5.83 (H) 12/10/2024    [id number] 828 08/10/2017       IMAGING STUDIES:  MRI Brain Without Contrast    Result Date: 12/11/2024  Impression: Limited examination with only minimal sequences performed. Based on these sequences, no definite acute infarct. Electronically Signed: Viviana Maria MD  12/11/2024 12:00 PM EST  Workstation ID: [id number]    CT Angiogram Head w AI Analysis of LVO    Result Date: 12/10/2024  Mild atheromatous disease. No vessel occlusion or stenosis. Electronically Signed: Cesar Warren MD  12/10/2024 5:43 PM EST  Workstation ID: [id number]    CT Angiogram Neck    Result Date: 12/10/2024  Mild atheromatous disease. No vessel occlusion or stenosis. Electronically Signed: Cesar Warren MD  12/10/2024 5:43 PM EST  Workstation ID: [id number]    XR Chest 1 View    Result Date: 12/10/2024  Impression: Diffuse interstitial opacities with hazy perihilar airspace opacities. Findings may represent pulmonary edema or atypical infection. Electronically Signed: Dorian Shepard MD  12/10/2024 5:09 PM EST  Workstation ID: [id number]    CT CEREBRAL PERFUSION WITH & WITHOUT CONTRAST    Result Date: 12/10/2024  CT perfusion study shows no  convincing territorial ischemia or core infarct. Small focus of minimally prolonged Tmax greater than 4 seconds within the left temporal lobe, of uncertain significance, possibly artifactual. Please correlate with findings on MRI. Electronically Signed: Arthur Zambrano MD  12/10/2024 5:03 PM EST  Workstation ID: [id number]    CT Head Without Contrast Stroke Protocol    Result Date: 12/10/2024  Impression: No acute intracranial finding. Mild atrophy. Electronically Signed: Carli Scott MD  12/10/2024 4:39 PM EST  Workstation ID: [id number]     I reviewed the patient's new clinical results.    ________________________________________________________     PROBLEM LIST:    CVA (cerebral vascular accident)            ASSESSMENT/PLAN:    Right facial weakness  Partial bells, right side  House-Brackmann grade II    Imaging reviewed  NCCT: No acute intracranial finding.  CTA H/N: Mild atheromatous disease. No vessel occlusion or stenosis.   MRI Brain WO: Limited examination with only minimal sequences performed. Based on these sequences, no definite acute infarct.  Due to the limited number of sequences, atypical presentation for Bell's palsy I recommend repeating MRI brain using a FIESTA sequence protocol with focus on cranial nerve VII  Baseline EKG and chest x-ray  Recommend starting prednisone 60 mg daily for 7 days without taper  Acetaminophen 640 mg PO every 4 hours for temperature >99F  High intensity statin therapy for LDL >70 (to be started prior to discharge)  Tight glucose control (long-term goal HgbA1c < 6%)  Physical/Occupational/Speech therapy: Evaluate and treat  Vital signs per hospital protocol  Neuroassessment per hospital protocol  Please document NIHSS on admission and with any neurochanges  Strict NPO until bedside swallow study, advance diet as appropriate  Oxygen therapy (titrate to keep SpO2 greater than 94%  Activity as tolerated  Lubricating eyedrops as needed        Modification of stroke risk  factors:   - Blood pressure should be less than 130/80 outpatient, HbA1c less than 6.5, LDL less than 70; b12>500 and smoking cessation if applicable. We would be grateful if the primary team / primary care physician would keep a close watch on the above targets.  - Stroke education  - Follow up with neurologist of choice      I discussed the patient's findings and my recommendations with patient, nursing staff, and primary care team    Breann Dykes MD  12/11/24  12:53 EST

## 2024-12-11 NOTE — ED PROVIDER NOTES
Subjective   History of Present Illness  Chief complaint stroke    History of present illness 64-year-old gentleman got several health problems who is on Eliquis for atrial fibrillation who states yesterday about 3:00 he developed some right-sided facial droop and right-sided weakness.  He was seen here in the ER last night code stroke was initiated he underwent CT and CT angiogram was not a candidate for thrombolytic because of the Eliquis.  He was admitted to the hospital but then signed out AGAINST MEDICAL ADVICE.  He reports that it was not explained to him appropriately and that he did not understand that he possibly by his perfusion could ahead this deficit.  He states he got home reading his records and they came back to the hospital date reporting that he agreed that he needed the MRI.  Patient states he is extremely claustrophobic and needs to be heavily medicated for an MRI.      Review of Systems   Constitutional:  Negative for chills and fever.   Respiratory:  Negative for chest tightness and shortness of breath.    Cardiovascular:  Negative for chest pain and palpitations.   Gastrointestinal:  Negative for abdominal pain and vomiting.   Skin:  Negative for rash.   Neurological:  Positive for facial asymmetry. Negative for speech difficulty.   Psychiatric/Behavioral:  Negative for confusion.        Past Medical History:   Diagnosis Date    Acute hypoxemic respiratory failure 11/06/2023    Allergic     Anxiety     Arthritis 06/2005    Back pain     Claustrophobia 1978    COPD (chronic obstructive pulmonary disease)     CTS (carpal tunnel syndrome) 10/2022    Dysphagia     Esophageal stricture     GERD (gastroesophageal reflux disease)     Hyperlipidemia     Hypertension     Knee pain     rt    Low back pain     Obesity     Obesity (BMI 30-39.9) 11/06/2023    Other cervical disc degeneration at C5-C6 level 08/22/2018    Paroxysmal atrial fibrillation with rapid ventricular response 11/06/2023    Pneumonia      Prediabetes 11/06/2023    PTSD (post-traumatic stress disorder)     Rhinovirus infection 11/06/2023    Thoracic disc herniation     Vitamin B12 deficiency        Allergies   Allergen Reactions    Atorvastatin Swelling    Lisinopril Other (See Comments)     Facial paralysis        Past Surgical History:   Procedure Laterality Date    ABLATION OF DYSRHYTHMIC FOCUS  7/31/2024    BRONCHOSCOPY N/A 11/03/2023    Procedure: BRONCHOSCOPY with bilateral lung washing's;  Surgeon: Kolton Brewer MD;  Location: [facility] ENDOSCOPY;  Service: Pulmonary;  Laterality: N/A;    CARDIAC CATHETERIZATION N/A 07/13/2022    Procedure: Left Heart Cath, possible pci;  Surgeon: Prieto Sidhu MD;  Location: [facility] CATH INVASIVE LOCATION;  Service: Cardiovascular;  Laterality: N/A;    CARDIAC ELECTROPHYSIOLOGY PROCEDURE N/A 07/31/2024    Procedure: Ablation atrial flutter;  Surgeon: Pardeep Walker MD;  Location: [facility] CATH INVASIVE LOCATION;  Service: Cardiovascular;  Laterality: N/A;    ENDOSCOPY      ENDOSCOPY N/A 09/21/2023    Procedure: ESOPHAGOGASTRODUODENOSCOPY WITH non guided esophageal dialtion 54 Fr. Bougie and  cold forcep biopsy x1 area;  Surgeon: Andres Concepcion MD;  Location: [facility] ENDOSCOPY;  Service: Gastroenterology;  Laterality: N/A;  Post- erosive gastritis, hiatal hernia, esophageal stricture    HERNIA REPAIR      INSERT / REPLACE / REMOVE PACEMAKER  11/31/2024    Temporary pacemaker implant in emergency room    KNEE ARTHROPLASTY      x2    SHOULDER ARTHROSCOPY W/ ROTATOR CUFF REPAIR Right 08/11/2020    Procedure: SHOULDER ARTHROSCOPY WITH ROTATOR CUFF REPAIR, extensive debridement;  Surgeon: Fredi Ritchie MD;  Location: [facility] MAIN OR;  Service: Orthopedics;  Laterality: Right;    TONSILLECTOMY         Family History   Problem Relation Age of Onset    Heart disease Mother     Early death Mother     Hyperlipidemia Mother     Hypertension Mother     Thyroid disease Mother     Heart  attack Mother         Had valve replaced with a pig valve    Cancer Father     Stroke Father     Vision loss Father     Stroke Paternal Grandfather     Arthritis Paternal Grandmother     Alcohol abuse Brother     Asthma Brother     Depression Brother     Hyperlipidemia Brother     Hypertension Brother     Learning disabilities Brother     Mental illness Brother     Drug abuse Brother     Early death Brother     Heart disease Brother     Hyperlipidemia Brother         Bijan is the same person    Hypertension Brother        Social History     Socioeconomic History    Marital status:    Tobacco Use    Smoking status: Every Day     Current packs/day: 1.00     Average packs/day: 1 pack/day for 51.9 years (51.9 ttl pk-yrs)     Types: Cigarettes     Start date: 1/9/1973    Smokeless tobacco: Never    Tobacco comments:     Smoked a half a pack a day for 42 years didn't start to smoke a whole pack until [age]       Counseled to stop smoking   Vaping Use    Vaping status: Never Used   Substance and Sexual Activity    Alcohol use: No    Drug use: No    Sexual activity: Not Currently     Partners: Female     Birth control/protection: None     Prior to Admission medications    Medication Sig Start Date End Date Taking? Authorizing Provider   apixaban (ELIQUIS) 5 MG tablet tablet Take 1 tablet by mouth Every 12 (Twelve) Hours. Indications: Atrial Fibrillation 11/18/23  Yes Morris Rios MD   pantoprazole (PROTONIX) 40 MG EC tablet Take 1 tablet by mouth Daily.   Yes ProviderSulaiman MD   pravastatin (PRAVACHOL) 80 MG tablet Take 1 tablet by mouth Every Night.   Yes ProviderSulaiman MD   tiotropium bromide-olodaterol (Stiolto Respimat) 2.5-2.5 MCG/ACT aerosol solution inhaler Inhale 2 puffs Daily. 11/5/24  Yes    albuterol sulfate  (90 Base) MCG/ACT inhaler Inhale 2 puffs Every 4 (Four) Hours As Needed for Wheezing. 4/4/22   Yamilka Pascual APRN   ipratropium-albuterol (DUO-NEB) 0.5-2.5 mg/3 ml  nebulizer Take 3 mL by nebulization Every 4 (Four) Hours As Needed for Wheezing.    Provider, MD Sulaiman          Objective   Physical Exam  Constitutional this is a 64-year-old awake alert in no acute distress.  Triage vital signs reviewed.  HEENT extraocular muscles intact pupils equal round reactive sclera clear neck supple no adenopathy no JVD no bruits lungs clear no retraction no use of accessory.  Heart regular without murmur abdomen soft nontender good bowel sounds extremities no edema cords or Homans' sign skin warm dry without rashes neurologic awake alert orientated x 3 patient has slight facial droop on the right slight drift in the right arm and leg speech normal opening closes eyes at difficulty knows the month knows his age no extinction sensation intact NIH is 3  Procedures           ED Course      Results for orders placed or performed during the hospital encounter of 12/10/24   Comprehensive Metabolic Panel    Collection Time: 12/10/24  4:37 PM    Specimen: Blood   Result Value Ref Range    Glucose 91 65 - 99 mg/dL    BUN 6 (L) 8 - 23 mg/dL    Creatinine 0.89 0.76 - 1.27 mg/dL    Sodium 141 136 - 145 mmol/L    Potassium 3.1 (L) 3.5 - 5.2 mmol/L    Chloride 106 98 - 107 mmol/L    CO2 26.3 22.0 - 29.0 mmol/L    Calcium 8.8 8.6 - 10.5 mg/dL    Total Protein 6.8 6.0 - 8.5 g/dL    Albumin 3.9 3.5 - 5.2 g/dL    ALT (SGPT) 13 1 - 41 U/L    AST (SGOT) 17 1 - 40 U/L    Alkaline Phosphatase 82 39 - 117 U/L    Total Bilirubin 0.3 0.0 - 1.2 mg/dL    Globulin 2.9 gm/dL    A/G Ratio 1.3 g/dL    BUN/Creatinine Ratio 6.7 (L) 7.0 - 25.0    Anion Gap 8.7 5.0 - 15.0 mmol/L    eGFR 95.7 >60.0 mL/min/1.73   Protime-INR    Collection Time: 12/10/24  4:37 PM    Specimen: Blood   Result Value Ref Range    Protime 15.3 (H) 11.7 - 14.2 Seconds    INR 1.19 (H) 0.90 - 1.10   aPTT    Collection Time: 12/10/24  4:37 PM    Specimen: Blood   Result Value Ref Range    PTT 33.6 22.7 - 35.4 seconds   CBC Auto Differential     Collection Time: 12/10/24  4:37 PM    Specimen: Blood   Result Value Ref Range    WBC 4.85 3.40 - 10.80 10*3/mm3    RBC 3.61 (L) 4.14 - 5.80 10*6/mm3    Hemoglobin 12.0 (L) 13.0 - 17.7 g/dL    Hematocrit 36.6 (L) 37.5 - 51.0 %    .4 (H) 79.0 - 97.0 fL    MCH 33.2 (H) 26.6 - 33.0 pg    MCHC 32.8 31.5 - 35.7 g/dL    RDW 13.6 12.3 - 15.4 %    RDW-SD 51.1 37.0 - 54.0 fl    MPV 10.9 6.0 - 12.0 fL    Platelets 108 (L) 140 - 450 10*3/mm3    Neutrophil % 50.9 42.7 - 76.0 %    Lymphocyte % 36.9 19.6 - 45.3 %    Monocyte % 8.9 5.0 - 12.0 %    Eosinophil % 2.3 0.3 - 6.2 %    Basophil % 0.4 0.0 - 1.5 %    Immature Grans % 0.6 (H) 0.0 - 0.5 %    Neutrophils, Absolute 2.47 1.70 - 7.00 10*3/mm3    Lymphocytes, Absolute 1.79 0.70 - 3.10 10*3/mm3    Monocytes, Absolute 0.43 0.10 - 0.90 10*3/mm3    Eosinophils, Absolute 0.11 0.00 - 0.40 10*3/mm3    Basophils, Absolute 0.02 0.00 - 0.20 10*3/mm3    Immature Grans, Absolute 0.03 0.00 - 0.05 10*3/mm3    nRBC 0.0 0.0 - 0.2 /100 WBC   Scan Slide    Collection Time: 12/10/24  4:37 PM    Specimen: Blood   Result Value Ref Range    RBC Morphology Normal Normal    WBC Morphology Normal Normal    Giant Platelets Slight/1+ None Seen   Hemoglobin A1c    Collection Time: 12/10/24  4:37 PM    Specimen: Blood   Result Value Ref Range    Hemoglobin A1C 5.83 (H) 4.80 - 5.60 %   Lipid Panel    Collection Time: 12/10/24  4:37 PM    Specimen: Blood   Result Value Ref Range    Total Cholesterol 111 0 - 200 mg/dL    Triglycerides 97 0 - 150 mg/dL    HDL Cholesterol 28 (L) 40 - 60 mg/dL    LDL Cholesterol  64 0 - 100 mg/dL    VLDL Cholesterol 19 5 - 40 mg/dL    LDL/HDL Ratio 2.27    TSH    Collection Time: 12/10/24  4:37 PM    Specimen: Blood   Result Value Ref Range    TSH 3.680 0.270 - 4.200 uIU/mL   Green Top (Gel)    Collection Time: 12/10/24  4:37 PM   Result Value Ref Range    Extra Tube Hold for add-ons.    Lavender Top    Collection Time: 12/10/24  4:37 PM   Result Value Ref Range    Extra  Tube hold for add-on    Gold Top - SST    Collection Time: 12/10/24  4:37 PM   Result Value Ref Range    Extra Tube Hold for add-ons.    Light Blue Top    Collection Time: 12/10/24  4:37 PM   Result Value Ref Range    Extra Tube Hold for add-ons.    ECG 12 Lead Stroke Evaluation    Collection Time: 12/10/24  4:54 PM   Result Value Ref Range    QT Interval 379 ms    QTC Interval 445 ms   Type & Screen    Collection Time: 12/10/24  5:00 PM    Specimen: Blood   Result Value Ref Range    ABO Type AB     RH type Negative     Antibody Screen Negative     T&S Expiration Date 12/13/2024 11:59:59 PM      CT Angiogram Head w AI Analysis of LVO    Result Date: 12/10/2024  Mild atheromatous disease. No vessel occlusion or stenosis. Electronically Signed: Cesar Warren MD  12/10/2024 5:43 PM EST  Workstation ID: [id number]    CT Angiogram Neck    Result Date: 12/10/2024  Mild atheromatous disease. No vessel occlusion or stenosis. Electronically Signed: Cesar Warren MD  12/10/2024 5:43 PM EST  Workstation ID: [id number]    XR Chest 1 View    Result Date: 12/10/2024  Impression: Diffuse interstitial opacities with hazy perihilar airspace opacities. Findings may represent pulmonary edema or atypical infection. Electronically Signed: Dorian Shepard MD  12/10/2024 5:09 PM EST  Workstation ID: [id number]    CT CEREBRAL PERFUSION WITH & WITHOUT CONTRAST    Result Date: 12/10/2024  CT perfusion study shows no convincing territorial ischemia or core infarct. Small focus of minimally prolonged Tmax greater than 4 seconds within the left temporal lobe, of uncertain significance, possibly artifactual. Please correlate with findings on MRI. Electronically Signed: Arthur Zambrano MD  12/10/2024 5:03 PM EST  Workstation ID: [id number]    CT Head Without Contrast Stroke Protocol    Result Date: 12/10/2024  Impression: No acute intracranial finding. Mild atrophy. Electronically Signed: Carli Scott MD  12/10/2024 4:39 PM EST  Workstation ID: [id number]    Medications - No data to display                                                     Medical Decision Making  Medical decision making.  Patient had an IV established records reviewed from yesterday patient underwent a CT head without which was unremarkable CT angiogram with no obstructive disease.  The CT perfusion did show a slight focus of minimally prolonged Tmax of greater than 4 seconds in the left temporal lobe.  The patient was agreeable to an MRI today but he will need to be sedated he states.  His labs from yesterday were reviewed and were unremarkable.  Today's CBC obtained by independent review unremarkable and a hemoglobin of 12 and comprehensive metabolic profile unremarkable potassium of 3.1.  The patient is had a stroke on clinical exam that started yesterday.  He had left yesterday but he returns today he will get his MRI I did talk to the neurologist Dr. Martinez we discussed the case.  He will see the patient I talked to the hospitalist nurse practitioner we discussed the case.  And he is agreeable to stay he will be sedated for his MRI and neurological consultation.  Stable otherwise unremarkable ER course    Problems Addressed:  Acute CVA (cerebrovascular accident): complicated acute illness or injury    Amount and/or Complexity of Data Reviewed  External Data Reviewed: radiology.  Labs: ordered. Decision-making details documented in ED Course.  Radiology: ordered.    Risk  Prescription drug management.  Decision regarding hospitalization.        Final diagnoses:   Acute CVA (cerebrovascular accident)       ED Disposition  ED Disposition       ED Disposition   Intended Admit    Condition   --    Comment   --               No follow-up provider specified.       Medication List      No changes were made to your prescriptions during this visit.            Neymar Musa MD  12/11/24 [id number]

## 2024-12-11 NOTE — PLAN OF CARE
Goal Outcome Evaluation:  Plan of Care Reviewed With: patient           Outcome Evaluation: 63 y/o M who presented with R facial droop and sensation changes. MRI (-) for acute infarct. Patient is independent and assists his spouse at baseline. She has a history of cerebral aneurysm in 2013 and he cares for her. 17 year old son also lives with them. Patient does not have any mobility difficulty at baseline. This date he was independent with bed mobility, transfers and gait. He perceives slight weakness in R UE and LE but this is not detectable with MMT. No gait deficits. No current PT needs. Safe to d/c home from PT standpoint.    Anticipated Discharge Disposition (PT): home

## 2024-12-11 NOTE — CASE MANAGEMENT/SOCIAL WORK
Discharge Planning Assessment   Robert     Patient Name: Jagdish Rea  MRN: [id number]  Today's Date: 12/11/2024    Admit Date: 12/11/2024    Plan: From home with family. Watch for new anti-coags.   Discharge Needs Assessment       Row Name 12/11/24 1303       Living Environment    People in Home child(paloma), dependent;spouse    Name(s) of People in Home Wife- Maite 17year old son in the home.    Current Living Arrangements home    Potentially Unsafe Housing Conditions none    In the past 12 months has the electric, gas, oil, or water company threatened to shut off services in your home? No    Primary Care Provided by self    Provides Primary Care For child(paloma)    Family Caregiver if Needed child(paloma), adult    Family Caregiver Names Daughter-Laina    Quality of Family Relationships helpful;involved;supportive    Able to Return to Prior Arrangements yes       Resource/Environmental Concerns    Resource/Environmental Concerns none    Transportation Concerns none       Transportation Needs    In the past 12 months, has lack of transportation kept you from medical appointments or from getting medications? no    In the past 12 months, has lack of transportation kept you from meetings, work, or from getting things needed for daily living? No       Food Insecurity    Within the past 12 months, you worried that your food would run out before you got the money to buy more. Never true    Within the past 12 months, the food you bought just didn't last and you didn't have money to get more. Never true       Transition Planning    Patient/Family Anticipates Transition to home with family    Patient/Family Anticipated Services at Transition none    Transportation Anticipated family or friend will provide       Discharge Needs Assessment    Readmission Within the Last 30 Days no previous admission in last 30 days    Equipment Currently Used at Home bp cuff    Concerns to be Addressed discharge planning    Anticipated Changes  Related to Illness none    Equipment Needed After Discharge none                   Discharge Plan       Row Name 12/11/24 1305       Plan    Plan From home with family. Watch for new anti-coags.    Patient/Family in Agreement with Plan yes    Plan Comments Patient lives at home with his wife, Maite, and his 17 year old son. Patient does drive and his family will transport at dc. Patient can do ADL. PCP ([facility]) and pharmacy ([facility]) confirmed. Patient already enrolled in the [facility] program. Denies finanical assistance needs with medications and/or food. Denies PT and/or HH needs.Discharge barriers: [facility] following, echo pending, increased cre/bun, PT/OT evals pending.                  Continued Care and Services - Admitted Since 12/11/2024    No active coordination exists for this encounter.          Demographic Summary       Row Name 12/11/24 1301       General Information    Admission Type inpatient    Arrived From emergency department    Required Notices Provided Important Message from Medicare    Referral Source admission list    Reason for Consult discharge planning    Preferred Language English       Contact Information    Contact Information Obtained for                    Functional Status       Row Name 12/11/24 1302       Functional Status    Usual Activity Tolerance good    Current Activity Tolerance good       Functional Status, IADL    Medications independent    Meal Preparation independent    Housekeeping independent    Laundry independent    Shopping independent                  Patient Forms       Row Name 12/11/24 1307       Patient Forms    Important Message from Medicare (IMM) Delivered  IMM given by reg.                  Met with patient in room wearing PPE: mask.    Maintained distance greater than six feet and spent less than 15 minutes in the room.       Noemi Atkins RN

## 2024-12-12 ENCOUNTER — READMISSION MANAGEMENT (OUTPATIENT)
Dept: CALL CENTER | Facility: HOSPITAL | Age: 64
End: 2024-12-12
Payer: MEDICARE

## 2024-12-12 VITALS
BODY MASS INDEX: 29.74 KG/M2 | HEIGHT: 72 IN | SYSTOLIC BLOOD PRESSURE: 158 MMHG | WEIGHT: 219.58 LBS | TEMPERATURE: 97.8 F | HEART RATE: 98 BPM | DIASTOLIC BLOOD PRESSURE: 83 MMHG | OXYGEN SATURATION: 97 % | RESPIRATION RATE: 12 BRPM

## 2024-12-12 LAB
ANION GAP SERPL CALCULATED.3IONS-SCNC: 11.6 MMOL/L (ref 5–15)
BUN SERPL-MCNC: 8 MG/DL (ref 8–23)
BUN/CREAT SERPL: 9.2 (ref 7–25)
CALCIUM SPEC-SCNC: 9.2 MG/DL (ref 8.6–10.5)
CHLORIDE SERPL-SCNC: 108 MMOL/L (ref 98–107)
CO2 SERPL-SCNC: 21.4 MMOL/L (ref 22–29)
CREAT SERPL-MCNC: 0.87 MG/DL (ref 0.76–1.27)
EGFRCR SERPLBLD CKD-EPI 2021: 96.4 ML/MIN/1.73
FOLATE SERPL-MCNC: 6.62 NG/ML (ref 4.78–24.2)
GLUCOSE BLDC GLUCOMTR-MCNC: 104 MG/DL (ref 70–105)
GLUCOSE BLDC GLUCOMTR-MCNC: 126 MG/DL (ref 70–105)
GLUCOSE SERPL-MCNC: 123 MG/DL (ref 65–99)
HBV SURFACE AG SERPL QL IA: NORMAL
HCV AB SER QL: NORMAL
HIV 1+2 AB+HIV1 P24 AG SERPL QL IA: NORMAL
MAGNESIUM SERPL-MCNC: 2.3 MG/DL (ref 1.6–2.4)
PHOSPHATE SERPL-MCNC: 2.4 MG/DL (ref 2.5–4.5)
POTASSIUM SERPL-SCNC: 3.8 MMOL/L (ref 3.5–5.2)
SODIUM SERPL-SCNC: 141 MMOL/L (ref 136–145)
VIT B12 BLD-MCNC: 205 PG/ML (ref 211–946)

## 2024-12-12 PROCEDURE — 94664 DEMO&/EVAL PT USE INHALER: CPT

## 2024-12-12 PROCEDURE — 83735 ASSAY OF MAGNESIUM: CPT

## 2024-12-12 PROCEDURE — 84100 ASSAY OF PHOSPHORUS: CPT

## 2024-12-12 PROCEDURE — 63710000001 PREDNISONE PER 5 MG: Performed by: STUDENT IN AN ORGANIZED HEALTH CARE EDUCATION/TRAINING PROGRAM

## 2024-12-12 PROCEDURE — 94799 UNLISTED PULMONARY SVC/PX: CPT

## 2024-12-12 PROCEDURE — 82746 ASSAY OF FOLIC ACID SERUM: CPT

## 2024-12-12 PROCEDURE — 82948 REAGENT STRIP/BLOOD GLUCOSE: CPT

## 2024-12-12 PROCEDURE — 94761 N-INVAS EAR/PLS OXIMETRY MLT: CPT

## 2024-12-12 PROCEDURE — 87340 HEPATITIS B SURFACE AG IA: CPT | Performed by: INTERNAL MEDICINE

## 2024-12-12 PROCEDURE — 99233 SBSQ HOSP IP/OBS HIGH 50: CPT | Performed by: STUDENT IN AN ORGANIZED HEALTH CARE EDUCATION/TRAINING PROGRAM

## 2024-12-12 PROCEDURE — 80048 BASIC METABOLIC PNL TOTAL CA: CPT

## 2024-12-12 PROCEDURE — 86803 HEPATITIS C AB TEST: CPT | Performed by: INTERNAL MEDICINE

## 2024-12-12 PROCEDURE — G0432 EIA HIV-1/HIV-2 SCREEN: HCPCS | Performed by: INTERNAL MEDICINE

## 2024-12-12 PROCEDURE — 82607 VITAMIN B-12: CPT

## 2024-12-12 RX ORDER — HYDROCODONE BITARTRATE AND ACETAMINOPHEN 5; 325 MG/1; MG/1
1 TABLET ORAL ONCE
Status: COMPLETED | OUTPATIENT
Start: 2024-12-12 | End: 2024-12-12

## 2024-12-12 RX ORDER — PREDNISONE 20 MG/1
60 TABLET ORAL DAILY
Qty: 15 TABLET | Refills: 0 | Status: SHIPPED | OUTPATIENT
Start: 2024-12-13 | End: 2024-12-18

## 2024-12-12 RX ADMIN — Medication 10 ML: at 09:36

## 2024-12-12 RX ADMIN — APIXABAN 5 MG: 5 TABLET, FILM COATED ORAL at 08:24

## 2024-12-12 RX ADMIN — ASPIRIN 81 MG CHEWABLE TABLET 81 MG: 81 TABLET CHEWABLE at 08:24

## 2024-12-12 RX ADMIN — IPRATROPIUM BROMIDE 0.5 MG: 0.5 SOLUTION RESPIRATORY (INHALATION) at 10:55

## 2024-12-12 RX ADMIN — IPRATROPIUM BROMIDE 0.5 MG: 0.5 SOLUTION RESPIRATORY (INHALATION) at 07:04

## 2024-12-12 RX ADMIN — ACETAMINOPHEN 650 MG: 325 TABLET, FILM COATED ORAL at 08:24

## 2024-12-12 RX ADMIN — HYDROCODONE BITARTRATE AND ACETAMINOPHEN 1 TABLET: 5; 325 TABLET ORAL at 12:39

## 2024-12-12 RX ADMIN — PANTOPRAZOLE SODIUM 40 MG: 40 TABLET, DELAYED RELEASE ORAL at 08:24

## 2024-12-12 RX ADMIN — CLOPIDOGREL BISULFATE 75 MG: 75 TABLET ORAL at 08:24

## 2024-12-12 RX ADMIN — PREDNISONE 60 MG: 50 TABLET ORAL at 08:24

## 2024-12-12 RX ADMIN — ARFORMOTEROL TARTRATE 15 MCG: 15 SOLUTION RESPIRATORY (INHALATION) at 07:00

## 2024-12-12 NOTE — PROGRESS NOTES
LOS: 1 day     Chief Complaint: Right facial weakness       SUBJECTIVE:    History taken from: patient chart RN    Interval History: Jagdish Rea was admitted on 12/11/2024 no acute events overnight.  Patient states that he feels significantly better than yesterday and has already noticed an improvement in his facial droop.    Digital information today he adds that he actually has noticed a decrease in taste over the last week which be more consistent with a Correa's presentation.  Patient has tinnitus at baseline and is unsure if he had a notice in worsening of his hearing.      The patient is refused to repeat an MRI brain with contrast.        - Portions of the above HPI were copied from previous encounters and edited as appropriate.    Patient Complaints: none, wants to go home       Review of Systems   Constitutional:  Negative for fever and unexpected weight change.   HENT:  Negative for ear pain, hearing loss, tinnitus, trouble swallowing and voice change.    Eyes:  Negative for photophobia and visual disturbance.   Respiratory:  Negative for chest tightness and shortness of breath.    Cardiovascular:  Negative for chest pain and palpitations.   Gastrointestinal:  Negative for nausea and vomiting.   Genitourinary:  Negative for difficulty urinating, dysuria, frequency and urgency.   Musculoskeletal:  Negative for back pain, gait problem, neck pain and neck stiffness.   Neurological:  Positive for facial asymmetry. Negative for dizziness, tremors, seizures, syncope, speech difficulty, weakness, light-headedness, numbness and headaches.   Psychiatric/Behavioral:  Negative for agitation, behavioral problems and confusion.    All other systems reviewed and are negative.         Pertinent PMH:  has a past medical history of Acute hypoxemic respiratory failure (11/06/2023), Allergic, Anxiety, Arthritis (06/2005), Back pain, Claustrophobia (1978), COPD (chronic obstructive pulmonary disease), CTS (carpal tunnel  syndrome) (10/2022), CVA (cerebral vascular accident) (12/11/2024), Dysphagia, Esophageal stricture, GERD (gastroesophageal reflux disease), Hyperlipidemia, Hypertension, Knee pain, Low back pain, Obesity, Obesity (BMI 30-39.9) (11/06/2023), Other cervical disc degeneration at C5-C6 level (08/22/2018), Paroxysmal atrial fibrillation with rapid ventricular response (11/06/2023), Pneumonia, Prediabetes (11/06/2023), PTSD (post-traumatic stress disorder), Rhinovirus infection (11/06/2023), Thoracic disc herniation, and Vitamin B12 deficiency.   ________________________________________________     OBJECTIVE:  GEN: NAD, pleasant, cooperative  CHEST: No signs of resp distress, on room air     NEURO  MENTAL STATUS: AAOx3, memory intact, fund of knowledge appropriate  LANG/SPEECH: Naming and repetition intact, fluent, follows 3-step commands     CRANIAL NERVES:  II-XII grossly intact with exception of mild right facial droop.     MOTOR:  Motor strength 5/5 throughout, symmetric.      REFLEXES: 2/4 throughout     SENSORY:  Normal to touch, temp all limbs  No hemineglect, no extinction to double-sided stimulation (visual & tactile)  COORD: Normal finger to nose    NIH Stroke Scale  Interval: baseline  1a. Level of Consciousness: 0-->Alert, keenly responsive  1b. LOC Questions: 0-->Answers both questions correctly  1c. LOC Commands: 0-->Performs both tasks correctly  2. Best Gaze: 0-->Normal  3. Visual: 0-->No visual loss  4. Facial Palsy: 1-->Minor paralysis (flattened nasolabial fold, asymmetry on smiling)  5a. Motor Arm, Left: 0-->No drift, limb holds 90 (or 45) degrees for full 10 secs  5b. Motor Arm, Right: 0-->No drift, limb holds 90 (or 45) degrees for full 10 secs  6a. Motor Leg, Left: 0-->No drift, leg holds 30 degree position for full 5 secs  6b. Motor Leg, Right: 0-->No drift, leg holds 30 degree position for full 5 secs  7. Limb Ataxia: 0-->Absent  8. Sensory: 0-->Normal, no sensory loss  9. Best Language: 0-->No  aphasia, normal  10. Dysarthria: 0-->Normal  11. Extinction and Inattention (formerly Neglect): 0-->No abnormality  Total (NIH Stroke Scale): 1      Modified Zac Scale  Pre-Stroke Modified Morgan Scale: 0 - No Symptoms at all.  Modified Morgan Scale: 0 - No Symptoms at all.    ABCD2 Score for TIA  Diabetes History: N    CHADS2 Score  CHF History: N  Hypertension History: Y  Diabetes History: N  Stroke/TIA/Thromboembolism History: N    [id number] Score for Atrial Fibrillation Stroke Risk  Age in Years: <65  Sex: Male  CHF History: N  Hypertension History: Y  Stroke/TIA/Thromboembolism History: N  Vascular Disease History: N  Diabetes History: N  [id number] Score: 1         ________________________________________________   RESULTS REVIEW    VITAL SIGNS:  Temp:  [97.8 °F (36.6 °C)-98.6 °F (37 °C)] 97.8 °F (36.6 °C)  Heart Rate:  [0-111] 98  Resp:  [12-24] 12  BP: (123-158)/(61-89) 158/83    LABS:       Lab 12/11/24  0919 12/10/24  1637   WBC 4.28 4.85   HEMOGLOBIN 13.5 12.0*   HEMATOCRIT 41.2 36.6*   PLATELETS 130* 108*   NEUTROS ABS 1.71 2.47   IMMATURE GRANS (ABS) 0.03 0.03   LYMPHS ABS 2.04 1.79   MONOS ABS 0.36 0.43   EOS ABS 0.10 0.11   .0* 101.4*   PROTIME  --  15.3*   APTT  --  33.6         Lab 12/12/24  0420 12/11/24  0903 12/10/24  1637   SODIUM 141 141 141   POTASSIUM 3.8 3.7 3.1*   CHLORIDE 108* 105 106   CO2 21.4* 25.4 26.3   ANION GAP 11.6 10.6 8.7   BUN 8 5* 6*   CREATININE 0.87 0.93 0.89   EGFR 96.4 91.7 95.7   GLUCOSE 123* 93 91   CALCIUM 9.2 9.0 8.8   MAGNESIUM 2.3 2.2  --    PHOSPHORUS 2.4* 3.9  --    HEMOGLOBIN A1C  --   --  5.83*   TSH  --   --  3.680         Lab 12/10/24  1637   TOTAL PROTEIN 6.8   ALBUMIN 3.9   GLOBULIN 2.9   ALT (SGPT) 13   AST (SGOT) 17   BILIRUBIN 0.3   ALK PHOS 82         Lab 12/10/24  1637   PROTIME 15.3*   INR 1.19*         Lab 12/10/24  1637   CHOLESTEROL 111   LDL CHOL 64   HDL CHOL 28*   TRIGLYCERIDES 97         Lab 12/10/24  1700   ABO TYPING AB   RH  TYPING Negative   ANTIBODY SCREEN Negative             Lab Results   Component Value Date    TSH 3.680 12/10/2024    LDL 64 12/10/2024    HGBA1C 5.83 (H) 12/10/2024    [id number] 828 08/10/2017         IMAGING STUDIES:  MRI Brain Without Contrast    Result Date: 12/11/2024  Impression: Limited examination with only minimal sequences performed. Based on these sequences, no definite acute infarct. Electronically Signed: Viviana Maria MD  12/11/2024 12:00 PM EST  Workstation ID: [id number]    CT Angiogram Head w AI Analysis of LVO    Result Date: 12/10/2024  Mild atheromatous disease. No vessel occlusion or stenosis. Electronically Signed: Cesar Warren MD  12/10/2024 5:43 PM EST  Workstation ID: [id number]    CT Angiogram Neck    Result Date: 12/10/2024  Mild atheromatous disease. No vessel occlusion or stenosis. Electronically Signed: Cesar Warren MD  12/10/2024 5:43 PM EST  Workstation ID: [id number]    XR Chest 1 View    Result Date: 12/10/2024  Impression: Diffuse interstitial opacities with hazy perihilar airspace opacities. Findings may represent pulmonary edema or atypical infection. Electronically Signed: Dorian Shepard MD  12/10/2024 5:09 PM EST  Workstation ID: [id number]    CT CEREBRAL PERFUSION WITH & WITHOUT CONTRAST    Result Date: 12/10/2024  CT perfusion study shows no convincing territorial ischemia or core infarct. Small focus of minimally prolonged Tmax greater than 4 seconds within the left temporal lobe, of uncertain significance, possibly artifactual. Please correlate with findings on MRI. Electronically Signed: Arthur Zambrano MD  12/10/2024 5:03 PM EST  Workstation ID: [id number]    CT Head Without Contrast Stroke Protocol    Result Date: 12/10/2024  Impression: No acute intracranial finding. Mild atrophy. Electronically Signed: Carli Scott MD  12/10/2024 4:39 PM EST  Workstation ID: [id number]     I reviewed the patient's new clinical  results.    ________________________________________________      PROBLEM LIST:    CVA (cerebral vascular accident)        ASSESSMENT/PLAN:  Right facial weakness  Partial bells, right side  House-Brackmann grade II     Imaging reviewed  NCCT: No acute intracranial finding.  CTA H/N: Mild atheromatous disease. No vessel occlusion or stenosis.   MRI Brain WO: Limited examination with only minimal sequences performed. Based on these sequences, no definite acute infarct.  Due to the limited number of sequences, atypical presentation for Bell's palsy I recommend repeating MRI brain using a FIESTA sequence protocol with focus on cranial nerve VII  Patient refused   Baseline EKG and chest x-ray  Recommend starting prednisone 60 mg daily for 7 days without taper  Acetaminophen 640 mg PO every 4 hours for temperature >99F  High intensity statin therapy for LDL >70 (to be started prior to discharge)  Tight glucose control (long-term goal HgbA1c < 6%)  Physical/Occupational/Speech therapy: Evaluate and treat  Vital signs per hospital protocol  Neuroassessment per hospital protocol  Please document NIHSS on admission and with any neurochanges  Strict NPO until bedside swallow study, advance diet as appropriate  Oxygen therapy (titrate to keep SpO2 greater than 94%  Activity as tolerated  Lubricating eyedrops as needed  Patient was advised to follow-up outpatient with his PCP and/or neurologist in the next 1 to 2 weeks.        Medication benefits and potential side effects were discussed with the patient and they verbalized their understanding.     Also discussed the signs symptoms that would warrant the patient return back to the emergency department including unilateral weakness, unilateral numbness, visual disturbances, loss of balance, speech difficulties, and/or a sudden severe headache.  Patient verbalized understanding.    Patient was instructed to immediately call 911 or return to the closest ER with any any new  weakness, numbness, speech difficulty or vision disturbance.       **Please refer to previous notes for further details and recommendations.     I discussed the patient's findings and my recommendations with patient, nursing staff, and primary care team    Breann Dykes MD  12/12/24  14:11 EST

## 2024-12-12 NOTE — PLAN OF CARE
Problem: Adult Inpatient Plan of Care  Goal: Plan of Care Review  Outcome: Progressing  Goal: Patient-Specific Goal (Individualized)  Outcome: Progressing  Goal: Absence of Hospital-Acquired Illness or Injury  Outcome: Progressing  Intervention: Identify and Manage Fall Risk  Recent Flowsheet Documentation  Taken 12/12/2024 0400 by Sarina Jones RN  Safety Promotion/Fall Prevention:   activity supervised   assistive device/personal items within reach   clutter free environment maintained   fall prevention program maintained   safety round/check completed   room organization consistent  Taken 12/12/2024 0000 by Sarina Jones RN  Safety Promotion/Fall Prevention:   activity supervised   assistive device/personal items within reach   clutter free environment maintained   fall prevention program maintained   room organization consistent   safety round/check completed  Taken 12/11/2024 2200 by Sarina Jones RN  Safety Promotion/Fall Prevention:   activity supervised   assistive device/personal items within reach   clutter free environment maintained   fall prevention program maintained   safety round/check completed   room organization consistent  Taken 12/11/2024 2030 by Sarina Jones RN  Safety Promotion/Fall Prevention:   safety round/check completed   room organization consistent   activity supervised   assistive device/personal items within reach   clutter free environment maintained   fall prevention program maintained  Intervention: Prevent Skin Injury  Recent Flowsheet Documentation  Taken 12/12/2024 0400 by Sarina Jones RN  Skin Protection: transparent dressing maintained  Taken 12/11/2024 2030 by Sarina Jones RN  Body Position: position changed independently  Intervention: Prevent and Manage VTE (Venous Thromboembolism) Risk  Recent Flowsheet Documentation  Taken 12/11/2024 2030 by Sarina Jones RN  VTE Prevention/Management:   SCDs (sequential compression devices) off    patient refused intervention  Intervention: Prevent Infection  Recent Flowsheet Documentation  Taken 12/12/2024 0400 by Sarina Jones, RN  Infection Prevention:   environmental surveillance performed   equipment surfaces disinfected   hand hygiene promoted   personal protective equipment utilized   rest/sleep promoted   single patient room provided  Taken 12/12/2024 0000 by Sarina Jones RN  Infection Prevention:   environmental surveillance performed   equipment surfaces disinfected   hand hygiene promoted   personal protective equipment utilized   rest/sleep promoted   single patient room provided  Taken 12/11/2024 2200 by Sarina Jones RN  Infection Prevention:   environmental surveillance performed   equipment surfaces disinfected   hand hygiene promoted   personal protective equipment utilized   rest/sleep promoted   single patient room provided  Taken 12/11/2024 2030 by Sarina Jones RN  Infection Prevention: environmental surveillance performed  Goal: Optimal Comfort and Wellbeing  Outcome: Progressing  Intervention: Provide Person-Centered Care  Recent Flowsheet Documentation  Taken 12/11/2024 2030 by Sarina Jones RN  Trust Relationship/Rapport:   care explained   choices provided  Goal: Readiness for Transition of Care  Outcome: Progressing   Goal Outcome Evaluation:

## 2024-12-12 NOTE — PLAN OF CARE
Goal Outcome Evaluation:  Plan of Care Reviewed With: patient        Progress: improving     Patient discharging home today, to take steroids for one week and follow up with PCP, one time dose of pain medication given d/t increased back pain and was effective per patient, questions and concerns answered.      Problem: Adult Inpatient Plan of Care  Goal: Plan of Care Review  Outcome: Progressing  Flowsheets (Taken 12/12/2024 1350)  Progress: improving  Plan of Care Reviewed With: patient  Goal: Patient-Specific Goal (Individualized)  Outcome: Progressing  Goal: Absence of Hospital-Acquired Illness or Injury  Outcome: Progressing  Intervention: Identify and Manage Fall Risk  Recent Flowsheet Documentation  Taken 12/12/2024 1348 by Keisha Clayton RN  Safety Promotion/Fall Prevention: safety round/check completed  Taken 12/12/2024 1200 by Keisha Clayton RN  Safety Promotion/Fall Prevention: safety round/check completed  Taken 12/12/2024 1020 by Keisha Clayton RN  Safety Promotion/Fall Prevention: safety round/check completed  Taken 12/12/2024 0835 by Keisha Clayton RN  Safety Promotion/Fall Prevention:   activity supervised   fall prevention program maintained   mobility aid in reach   nonskid shoes/slippers when out of bed   safety round/check completed  Goal: Optimal Comfort and Wellbeing  Outcome: Progressing  Intervention: Monitor Pain and Promote Comfort  Recent Flowsheet Documentation  Taken 12/12/2024 0914 by Keisha Clayton RN  Pain Management Interventions: pain medication given  Intervention: Provide Person-Centered Care  Recent Flowsheet Documentation  Taken 12/12/2024 0835 by Keisha Clayton RN  Trust Relationship/Rapport:   questions answered   questions encouraged   reassurance provided  Goal: Readiness for Transition of Care  Outcome: Progressing

## 2024-12-12 NOTE — DISCHARGE SUMMARY
"[facility] Medicine Services  Discharge Summary    Date of Service: 2024  Patient Name: Jagdish Rea  : 1960  MRN: [id number]    Date of Admission: 2024  Discharge Diagnosis: CVA (cerebral vascular accident)  Date of Discharge: 2024  Primary Care Physician: David Causey MD      Presenting Problem:   CVA (cerebral vascular accident) [I63.9]  Acute CVA (cerebrovascular accident) [I63.9]    Active and Resolved Hospital Problems:  Active Hospital Problems    Diagnosis POA    **CVA (cerebral vascular accident) [I63.9] Yes      Resolved Hospital Problems   No resolved problems to display.         Hospital Course     HPI:    \"Jagdish Rea is a 64 y.o. male with a CMH of AFIB status post ablation, on Eliquis, hypertension, hyperlipidemia, COPD, obesity, tobacco abuse, GERD, PTSD, anxiety who presented to [facility] on 2024 with right sided weakness. Symptoms started yesterday and patient was seen in the ED last night. A code stroke was initiated and patient had CT head and CT angiogram head and neck. Patient is not a candidate for tPA because he is on Eliquis. Patient was admitted to the hospital service but signed out AGAINST MEDICAL ADVICE. Patient reported he looked [facility] when he got home from the hospital and decided that he needed to come back for the MRI. Patient currently endorses right facial numbness, right sided weakness. Patient currently denied chest pain, shortness of breath, fever, chills, nausea, vomiting, or any other acute issue.     In the ED: CBC and BMP unremarkable. EKG shows SR. CT head shows no acute intracranial finding. CTA head/neck shows mild atheromatous disease, no vessel occlusion. CT cerebral perfusion shows \"Small focus of minimally prolonged Tmax greater than 4 seconds within the left temporal lobe, of uncertain significance, possibly artifactual.\" MRI ordered. Hospitalist team to admit at this time for " "further management of stroke-like symptoms. \"    Hospital Course:  Patient presented to the hospital with right facial numbness and weakness and after evaluation by neurology service was found to have likely Bell's palsy.  He was started on a course of steroids which improved his symptoms of Bell's palsy rapidly.  He was discharged to finish out a 1 week course of steroids for this condition with follow-up with his PCP.        DISCHARGE Follow Up Recommendations for labs and diagnostics:     Primary care physician        Day of Discharge     Vital Signs:  Temp:  [97.8 °F (36.6 °C)-98.6 °F (37 °C)] 97.8 °F (36.6 °C)  Heart Rate:  [0-111] 98  Resp:  [12-24] 12  BP: (123-158)/(61-89) 158/83    Physical Exam:  Physical Exam  Constitutional:       Appearance: Normal appearance.   Cardiovascular:      Rate and Rhythm: Normal rate and regular rhythm.      Pulses: Normal pulses.      Heart sounds: Normal heart sounds.   Pulmonary:      Effort: Pulmonary effort is normal.      Breath sounds: Normal breath sounds.   Abdominal:      General: Abdomen is flat.      Palpations: Abdomen is soft.   Neurological:      Mental Status: He is alert. Mental status is at baseline.   Psychiatric:         Mood and Affect: Mood normal.            Pertinent  and/or Most Recent Results     LAB RESULTS:      Lab 12/11/24  0919 12/10/24  1637   WBC 4.28 4.85   HEMOGLOBIN 13.5 12.0*   HEMATOCRIT 41.2 36.6*   PLATELETS 130* 108*   NEUTROS ABS 1.71 2.47   IMMATURE GRANS (ABS) 0.03 0.03   LYMPHS ABS 2.04 1.79   MONOS ABS 0.36 0.43   EOS ABS 0.10 0.11   .0* 101.4*   PROTIME  --  15.3*   APTT  --  33.6         Lab 12/12/24  0420 12/11/24  0903 12/10/24  1637   SODIUM 141 141 141   POTASSIUM 3.8 3.7 3.1*   CHLORIDE 108* 105 106   CO2 21.4* 25.4 26.3   ANION GAP 11.6 10.6 8.7   BUN 8 5* 6*   CREATININE 0.87 0.93 0.89   EGFR 96.4 91.7 95.7   GLUCOSE 123* 93 91   CALCIUM 9.2 9.0 8.8   MAGNESIUM 2.3 2.2  --    PHOSPHORUS 2.4* 3.9  --    HEMOGLOBIN " A1C  --   --  5.83*   TSH  --   --  3.680         Lab 12/10/24  1637   TOTAL PROTEIN 6.8   ALBUMIN 3.9   GLOBULIN 2.9   ALT (SGPT) 13   AST (SGOT) 17   BILIRUBIN 0.3   ALK PHOS 82         Lab 12/10/24  1637   PROTIME 15.3*   INR 1.19*         Lab 12/10/24  1637   CHOLESTEROL 111   LDL CHOL 64   HDL CHOL 28*   TRIGLYCERIDES 97         Lab 12/10/24  1700   ABO TYPING AB   RH TYPING Negative   ANTIBODY SCREEN Negative         Brief Urine Lab Results       None          Microbiology Results (last 10 days)       ** No results found for the last 240 hours. **            MRI Brain Without Contrast    Result Date: 12/11/2024  Impression: Impression: Limited examination with only minimal sequences performed. Based on these sequences, no definite acute infarct. Electronically Signed: Viviana Maria MD  12/11/2024 12:00 PM EST  Workstation ID: [id number]    CT Angiogram Head w AI Analysis of LVO    Result Date: 12/10/2024  Impression: Mild atheromatous disease. No vessel occlusion or stenosis. Electronically Signed: Cesar Warren MD  12/10/2024 5:43 PM EST  Workstation ID: [id number]    CT Angiogram Neck    Result Date: 12/10/2024  Impression: Mild atheromatous disease. No vessel occlusion or stenosis. Electronically Signed: Cesar Warren MD  12/10/2024 5:43 PM EST  Workstation ID: [id number]    XR Chest 1 View    Result Date: 12/10/2024  Impression: Impression: Diffuse interstitial opacities with hazy perihilar airspace opacities. Findings may represent pulmonary edema or atypical infection. Electronically Signed: Dorian Shepard MD  12/10/2024 5:09 PM EST  Workstation ID: [id number]    CT CEREBRAL PERFUSION WITH & WITHOUT CONTRAST    Result Date: 12/10/2024  Impression: CT perfusion study shows no convincing territorial ischemia or core infarct. Small focus of minimally prolonged Tmax greater than 4 seconds within the left temporal lobe, of uncertain significance, possibly artifactual. Please correlate with findings on MRI.  Electronically Signed: Arthur Zambrano MD  12/10/2024 5:03 PM EST  Workstation ID: [id number]    CT Head Without Contrast Stroke Protocol    Result Date: 12/10/2024  Impression: Impression: No acute intracranial finding. Mild atrophy. Electronically Signed: Carli Scott MD  12/10/2024 4:39 PM EST  Workstation ID: [id number]             Results for orders placed during the hospital encounter of 12/11/24    Adult Transthoracic Echo Complete w/ Color, Spectral and Contrast if necessary per protocol    Interpretation Summary    Left ventricular systolic function is normal. Calculated left ventricular 3D EF = 65% Left ventricular ejection fraction appears to be 61 - 65%.    Left ventricular diastolic function was normal.    The right ventricular cavity is mildly dilated.    The left atrial cavity is mild to moderately dilated.    Left atrial volume is severely increased.    Saline test results are negative.    The right atrial cavity is moderately  dilated.    Moderate mitral valve regurgitation is present.      Labs Pending at Discharge:  Pending Results       Procedure [Order ID] Specimen - Date/Time    CBC & Differential [[id number]] Updated: 12/12/24 0629    Specimen: Blood from Arm, Left     Narrative:      The following orders were created for panel order CBC & Differential.  Procedure                               Abnormality         Status                     ---------                               -----------         ------                     CBC Auto Differential[[id number]]                                                         Please view results for these tests on the individual orders.    CBC Auto Differential [[id number]] Updated: 12/12/24 0629    Specimen: Blood from Arm, Left     Folate [[id number]] Collected: 12/12/24 0420    Specimen: Blood from Arm, Left Updated: 12/12/24 0453    Vitamin B12 [[id number]] Collected: 12/12/24 0420    Specimen: Blood from Arm, Left Updated: 12/12/24 0453            Procedures  Performed           Consults:   Consults       Date and Time Order Name Status Description    12/11/2024  8:50 AM Inpatient Neurology Consult Stroke Completed     12/11/2024  8:50 AM Hospitalist (on-call MD unless specified)                Discharge Details        Discharge Medications        New Medications        Instructions Start Date   predniSONE 20 MG tablet  Commonly known as: DELTASONE   60 mg, Oral, Daily   Start Date: December 13, 2024            Continue These Medications        Instructions Start Date   albuterol sulfate  (90 Base) MCG/ACT inhaler  Commonly known as: PROVENTIL HFA;VENTOLIN HFA;PROAIR HFA   2 puffs, Inhalation, Every 4 Hours PRN      apixaban 5 MG tablet tablet  Commonly known as: ELIQUIS   5 mg, Oral, Every 12 Hours Scheduled      ipratropium-albuterol 0.5-2.5 mg/3 ml nebulizer  Commonly known as: DUO-NEB   3 mL, Every 4 Hours PRN      pantoprazole 40 MG EC tablet  Commonly known as: PROTONIX   40 mg, Daily      pravastatin 80 MG tablet  Commonly known as: PRAVACHOL   80 mg, Nightly      Stiolto Respimat 2.5-2.5 MCG/ACT aerosol solution inhaler  Generic drug: tiotropium bromide-olodaterol   2 puffs, Inhalation, Daily               Allergies   Allergen Reactions    Atorvastatin Swelling    Lisinopril Other (See Comments)     Facial paralysis          Discharge Disposition:   Home or Self Care    Diet:  Hospital:  Diet Order   Procedures    Diet: Diabetic; Consistent Carbohydrate; Fluid Consistency: Thin (IDDSI 0)         Discharge Activity:         CODE STATUS:  Code Status and Medical Interventions: CPR (Attempt to Resuscitate); Full Support   Ordered at: 12/11/24 1105     Level Of Support Discussed With:    Patient     Code Status (Patient has no pulse and is not breathing):    CPR (Attempt to Resuscitate)     Medical Interventions (Patient has pulse or is breathing):    Full Support         Future Appointments   Date Time Provider Department Center   12/16/2024  8:30 AM  Pardeep Walker MD [facility]           Time spent on Discharge including face to face service:  50 minutes    Signature: Electronically signed by Johnathan Garcia MD, 12/12/24, 13:37 EST.  [facility] Hospitalist Team

## 2024-12-13 NOTE — OUTREACH NOTE
Prep Survey      Flowsheet Row Responses   [facility] facility patient discharged from? [facility]   Is LACE score < 7 ? No   Eligibility Readm Mgmt   Discharge diagnosis CVA   Does the patient have one of the following disease processes/diagnoses(primary or secondary)? Stroke   Does the patient have Home health ordered? No   Is there a DME ordered? No   Prep survey completed? Yes            HAMZAH AGOSTO - Registered Nurse

## 2024-12-16 ENCOUNTER — OFFICE VISIT (OUTPATIENT)
Dept: CARDIOLOGY | Facility: CLINIC | Age: 64
End: 2024-12-16
Payer: MEDICARE

## 2024-12-16 VITALS
HEART RATE: 85 BPM | WEIGHT: 222 LBS | BODY MASS INDEX: 30.07 KG/M2 | DIASTOLIC BLOOD PRESSURE: 77 MMHG | OXYGEN SATURATION: 96 % | HEIGHT: 72 IN | SYSTOLIC BLOOD PRESSURE: 130 MMHG

## 2024-12-16 DIAGNOSIS — I48.0 PAF (PAROXYSMAL ATRIAL FIBRILLATION): Primary | ICD-10-CM

## 2024-12-16 DIAGNOSIS — E78.2 MIXED HYPERLIPIDEMIA: ICD-10-CM

## 2024-12-16 DIAGNOSIS — I48.3 TYPICAL ATRIAL FLUTTER: ICD-10-CM

## 2024-12-16 DIAGNOSIS — R94.31 ABNORMAL HOLTER MONITOR FINDING: ICD-10-CM

## 2024-12-16 PROCEDURE — 1160F RVW MEDS BY RX/DR IN RCRD: CPT | Performed by: INTERNAL MEDICINE

## 2024-12-16 PROCEDURE — 3075F SYST BP GE 130 - 139MM HG: CPT | Performed by: INTERNAL MEDICINE

## 2024-12-16 PROCEDURE — 99214 OFFICE O/P EST MOD 30 MIN: CPT | Performed by: INTERNAL MEDICINE

## 2024-12-16 PROCEDURE — 1159F MED LIST DOCD IN RCRD: CPT | Performed by: INTERNAL MEDICINE

## 2024-12-16 PROCEDURE — 3078F DIAST BP <80 MM HG: CPT | Performed by: INTERNAL MEDICINE

## 2024-12-16 NOTE — PROGRESS NOTES
CC--- atrial fibrillation and atrial flutter    Sub  64-year-old pleasant patient has atrial fibrillation and atrial flutter and underwent ablation in 2024 July.  Patient was on flecainide and metoprolol which have been stopped after ablation and is doing well and has history of hyperlipidemia.  Cardiac catheterization was performed in 2022 without significant stenosis and has history of COPD.    Recent hospitalization with facial numbness revealed Bell's palsy and discharge summary attached below for reference    Patient presented to the hospital with right facial numbness and weakness and after evaluation by neurology service was found to have likely Bell's palsy. He was started on a course of steroids which improved his symptoms of Bell's palsy rapidly. He was discharged to finish out a 1 week course of steroids for this condition with follow-up with his PCP.   MRI without any infarct      Past Medical History:   Diagnosis Date    Acute hypoxemic respiratory failure 11/06/2023    Allergic     Anxiety     Arthritis 06/2005    Back pain     Claustrophobia 1978    COPD (chronic obstructive pulmonary disease)     CTS (carpal tunnel syndrome) 10/2022    Dysphagia     Esophageal stricture     GERD (gastroesophageal reflux disease)     Hyperlipidemia     Hypertension     Knee pain     rt    Low back pain     Obesity     Obesity (BMI 30-39.9) 11/06/2023    Other cervical disc degeneration at C5-C6 level 08/22/2018    Paroxysmal atrial fibrillation with rapid ventricular response 11/06/2023    Pneumonia     Prediabetes 11/06/2023    PTSD (post-traumatic stress disorder)     Rhinovirus infection 11/06/2023    Thoracic disc herniation     Vitamin B12 deficiency      Past Surgical History:   Procedure Laterality Date    ABLATION OF DYSRHYTHMIC FOCUS  7/31/2024    BRONCHOSCOPY N/A 11/03/2023    Procedure: BRONCHOSCOPY with bilateral lung washing's;  Surgeon: Kolton Brewer MD;  Location: [facility] ENDOSCOPY;  Service:  Pulmonary;  Laterality: N/A;    CARDIAC CATHETERIZATION N/A 07/13/2022    Procedure: Left Heart Cath, possible pci;  Surgeon: Prieto Sidhu MD;  Location: [facility] CATH INVASIVE LOCATION;  Service: Cardiovascular;  Laterality: N/A;    CARDIAC ELECTROPHYSIOLOGY PROCEDURE N/A 07/31/2024    Procedure: Ablation atrial flutter;  Surgeon: Pardeep Walker MD;  Location: [facility] CATH INVASIVE LOCATION;  Service: Cardiovascular;  Laterality: N/A;    ENDOSCOPY      ENDOSCOPY N/A 09/21/2023    Procedure: ESOPHAGOGASTRODUODENOSCOPY WITH non guided esophageal dialtion 54 Fr. Bougie and  cold forcep biopsy x1 area;  Surgeon: Andres Concepcion MD;  Location: [facility] ENDOSCOPY;  Service: Gastroenterology;  Laterality: N/A;  Post- erosive gastritis, hiatal hernia, esophageal stricture    HERNIA REPAIR      INSERT / REPLACE / REMOVE PACEMAKER  11/31/2024    Temporary pacemaker implant in emergency room    KNEE ARTHROPLASTY      x2    SHOULDER ARTHROSCOPY W/ ROTATOR CUFF REPAIR Right 08/11/2020    Procedure: SHOULDER ARTHROSCOPY WITH ROTATOR CUFF REPAIR, extensive debridement;  Surgeon: Fredi Ritchie MD;  Location: [facility] MAIN OR;  Service: Orthopedics;  Laterality: Right;    TONSILLECTOMY       Family History   Problem Relation Age of Onset    Heart disease Mother     Early death Mother     Hyperlipidemia Mother     Hypertension Mother     Thyroid disease Mother     Heart attack Mother         Had valve replaced with a pig valve    Cancer Father     Stroke Father     Vision loss Father     Stroke Paternal Grandfather     Arthritis Paternal Grandmother     Alcohol abuse Brother     Asthma Brother     Depression Brother     Hyperlipidemia Brother     Hypertension Brother     Learning disabilities Brother     Mental illness Brother     Drug abuse Brother     Early death Brother     Heart disease Brother     Hyperlipidemia Brother         Bijan is the same person    Hypertension Brother          Physical  Exam    General:      well developed, well nourished, in no acute distress.    Head:      normocephalic and atraumatic.    Eyes:      PERRL/EOM intact, conjunctivae and sclerae clear without nystagmus.    Neck:      no  thyromegaly, trachea central with normal respiratory effort  Lungs:      clear bilaterally to auscultation.    Heart:       regular rate and rhythm, S1, S2 without murmurs, rubs, or gallops  Skin:      intact without lesions or rashes.    Psych:      alert and cooperative; normal mood and affect; normal attention span and concentration.          Assessment plan    Current atrial arrhythmias post ablation currently in sinus rhythm on apixaban  Hyper lipidemia on pravastatin  History of COPD on DuoNeb therapy  Intermittent palpitations with ectopic beats  Medications reviewed and follow-up appointments made  Recent LDL is 64.  TSH is normal.  Potassium 3.8 and creatinine normal    Electronically signed by Pardeep Walker MD, 12/16/24, 8:52 AM EST.

## 2024-12-17 ENCOUNTER — READMISSION MANAGEMENT (OUTPATIENT)
Dept: CALL CENTER | Facility: HOSPITAL | Age: 64
End: 2024-12-17
Payer: MEDICARE

## 2024-12-17 NOTE — OUTREACH NOTE
Stroke Week 1 Survey      Flowsheet Row Responses   [facility] patient discharged from? [facility]   Does the patient have one of the following disease processes/diagnoses(primary or secondary)? Stroke   Week 1 attempt successful? Yes   Call start time 0951   Call end time 0952   Discharge diagnosis CVA   Meds reviewed with patient/caregiver? Yes   Is the patient having any side effects they believe may be caused by any medication additions or changes? No   Does the patient have all medications ordered at discharge? Yes   Is the patient taking all medications as directed (includes completed medication regime)? Yes   Does the patient have a primary care provider?  Yes   Does the patient have an appointment with their PCP within 7 days of discharge? No   What is preventing the patient from scheduling follow up appointments within 7 days of discharge? Haven't had time   Nursing Interventions Advised patient to make appointment   Has the patient kept scheduled appointments due by today? N/A   Has home health visited the patient within 72 hours of discharge? N/A   Psychosocial issues? No   Does the patient require any assistance with activities of daily living such as eating, bathing, dressing, walking, etc.? No   Does the patient have any residual symptoms from stroke/TIA? No   Did the patient receive a copy of their discharge instructions? Yes   Nursing interventions Reviewed instructions with patient   What is the patient's perception of their health status since discharge? Improving   Is the patient/caregiver able to teach back the hierarchy of who to call/visit for symptoms/problems? PCP, Specialist, Home health nurse, Urgent Care, ED, 911 Yes   Is the patient able to teach back FAST for Stroke? B alance: Watch for sudden loss of balance, E yes: Check for vision loss, F ace: Look for an uneven smile, A rm: Check if one arm is weak, S peech: Listen for slurred speech, T jeane: Call 9-1-1 right away   Week 1 call  completed? Yes   Revoked No further contact(revokes)-requires comment   Wrap up additional comments Pt doing great and will call to schedule f/u appts.   Call end time [id number]            Estrella BAGLEY - Registered Nurse

## 2025-01-28 RX ORDER — ALBUTEROL SULFATE 90 UG/1
2 INHALANT RESPIRATORY (INHALATION) EVERY 4 HOURS PRN
Qty: 18 G | Refills: 1 | OUTPATIENT
Start: 2025-01-28

## 2025-04-30 RX ORDER — ALBUTEROL SULFATE 90 UG/1
2 INHALANT RESPIRATORY (INHALATION) EVERY 4 HOURS PRN
Qty: 18 G | Refills: 1 | OUTPATIENT
Start: 2025-04-30

## 2025-05-12 ENCOUNTER — HOSPITAL ENCOUNTER (EMERGENCY)
Facility: HOSPITAL | Age: 65
Discharge: HOME | End: 2025-05-12
Admitting: EMERGENCY MEDICINE
Payer: MEDICARE

## 2025-05-12 VITALS
WEIGHT: 222 LBS | HEART RATE: 91 BPM | HEIGHT: 72 IN | OXYGEN SATURATION: 96 % | RESPIRATION RATE: 14 BRPM | BODY MASS INDEX: 30.07 KG/M2 | SYSTOLIC BLOOD PRESSURE: 155 MMHG | TEMPERATURE: 97.9 F | DIASTOLIC BLOOD PRESSURE: 88 MMHG

## 2025-05-12 DIAGNOSIS — T50.901A ACCIDENTAL DRUG INGESTION, INITIAL ENCOUNTER: Primary | ICD-10-CM

## 2025-05-12 PROCEDURE — 99283 EMERGENCY DEPT VISIT LOW MDM: CPT

## 2025-05-12 NOTE — ED PROVIDER NOTES
"Subjective   History of Present Illness  64-year-old male with history significant for acute hypoxic respiratory failure, anxiety, arthritis, COPD, CVA, esophageal stricture, GERD, hyperlipidemia, hypertension, obesity, paroxysmal A-fib with RVR, PTSD presents via EMS after taking a CBD gummy for the first time for his chronic back pain. He stated, \"I have no concept of time.\" Although he is able to answer all orientation questions appropriately.  He reports he no longer has back pain.  He has no complaints.    History provided by:  Patient      Review of Systems   Constitutional:  Negative for chills and diaphoresis.   HENT:  Negative for ear discharge and rhinorrhea.    Eyes:  Negative for visual disturbance.   Respiratory:  Negative for chest tightness and shortness of breath.    Cardiovascular:  Negative for chest pain and palpitations.   Gastrointestinal:  Negative for abdominal pain, nausea and vomiting.   Musculoskeletal:  Negative for arthralgias, myalgias and neck stiffness.   Skin:  Negative for color change, pallor, rash and wound.   Neurological:  Negative for dizziness, speech difficulty, weakness, numbness and headaches.   Psychiatric/Behavioral:  Positive for decreased concentration and dysphoric mood. Negative for agitation, behavioral problems, confusion, hallucinations, self-injury, sleep disturbance and suicidal ideas. The patient is not nervous/anxious and is not hyperactive.    All other systems reviewed and are negative.      Past Medical History:   Diagnosis Date    Acute hypoxemic respiratory failure 11/06/2023    Allergic     Anxiety     Arthritis 06/2005    Back pain     Claustrophobia 1978    COPD (chronic obstructive pulmonary disease)     CTS (carpal tunnel syndrome) 10/2022    CVA (cerebral vascular accident) 12/11/2024    Dysphagia     Esophageal stricture     GERD (gastroesophageal reflux disease)     Hyperlipidemia     Hypertension     Knee pain     rt    Low back pain     Obesity  "    Obesity (BMI 30-39.9) 11/06/2023    Other cervical disc degeneration at C5-C6 level 08/22/2018    Paroxysmal atrial fibrillation with rapid ventricular response 11/06/2023    Pneumonia     Prediabetes 11/06/2023    PTSD (post-traumatic stress disorder)     Rhinovirus infection 11/06/2023    Thoracic disc herniation     Vitamin B12 deficiency        Allergies   Allergen Reactions    Atorvastatin Swelling    Lisinopril Other (See Comments)     Facial paralysis        Past Surgical History:   Procedure Laterality Date    ABLATION OF DYSRHYTHMIC FOCUS  7/31/2024    BRONCHOSCOPY N/A 11/03/2023    Procedure: BRONCHOSCOPY with bilateral lung washing's;  Surgeon: Kolton Brewer MD;  Location: [facility] ENDOSCOPY;  Service: Pulmonary;  Laterality: N/A;    CARDIAC CATHETERIZATION N/A 07/13/2022    Procedure: Left Heart Cath, possible pci;  Surgeon: Prieto Sidhu MD;  Location: [facility] CATH INVASIVE LOCATION;  Service: Cardiovascular;  Laterality: N/A;    CARDIAC ELECTROPHYSIOLOGY PROCEDURE N/A 07/31/2024    Procedure: Ablation atrial flutter;  Surgeon: Pardeep Walker MD;  Location: [facility] CATH INVASIVE LOCATION;  Service: Cardiovascular;  Laterality: N/A;    ENDOSCOPY      ENDOSCOPY N/A 09/21/2023    Procedure: ESOPHAGOGASTRODUODENOSCOPY WITH non guided esophageal dialtion 54 Fr. Bougie and  cold forcep biopsy x1 area;  Surgeon: Andres Concepcion MD;  Location: [facility] ENDOSCOPY;  Service: Gastroenterology;  Laterality: N/A;  Post- erosive gastritis, hiatal hernia, esophageal stricture    HERNIA REPAIR      INSERT / REPLACE / REMOVE PACEMAKER  11/31/2024    Temporary pacemaker implant in emergency room    KNEE ARTHROPLASTY      x2    SHOULDER ARTHROSCOPY W/ ROTATOR CUFF REPAIR Right 08/11/2020    Procedure: SHOULDER ARTHROSCOPY WITH ROTATOR CUFF REPAIR, extensive debridement;  Surgeon: Fredi Ritchie MD;  Location: [facility] MAIN OR;  Service: Orthopedics;  Laterality: Right;    TONSILLECTOMY          Family History   Problem Relation Age of Onset    Heart disease Mother     Early death Mother     Hyperlipidemia Mother     Hypertension Mother     Thyroid disease Mother     Heart attack Mother         Had valve replaced with a pig valve    Cancer Father     Stroke Father     Vision loss Father     Stroke Paternal Grandfather     Arthritis Paternal Grandmother     Alcohol abuse Brother     Asthma Brother     Depression Brother     Hyperlipidemia Brother     Hypertension Brother     Learning disabilities Brother     Mental illness Brother     Drug abuse Brother     Early death Brother     Heart disease Brother     Hyperlipidemia Brother         Bijan is the same person    Hypertension Brother        Social History     Socioeconomic History    Marital status:    Tobacco Use    Smoking status: Every Day     Current packs/day: 1.00     Average packs/day: 1 pack/day for 52.3 years (52.3 ttl pk-yrs)     Types: Cigarettes     Start date: 1/9/1973     Passive exposure: Never    Smokeless tobacco: Never    Tobacco comments:     Smoked a half a pack a day for 42 years didn't start to smoke a whole pack until [age]       Counseled to stop smoking   Vaping Use    Vaping status: Never Used   Substance and Sexual Activity    Alcohol use: No    Drug use: No    Sexual activity: Not Currently     Partners: Female     Birth control/protection: None           Objective   Physical Exam  Vitals and nursing note reviewed.   Constitutional:       General: He is awake. He is not in acute distress.     Appearance: Normal appearance. He is well-developed and normal weight. He is not ill-appearing, toxic-appearing or diaphoretic.   HENT:      Head: Normocephalic and atraumatic.      Right Ear: External ear normal.      Left Ear: External ear normal.      Mouth/Throat:      Mouth: Mucous membranes are moist.      Pharynx: Oropharynx is clear.   Eyes:      Extraocular Movements: Extraocular movements intact.       Conjunctiva/sclera: Conjunctivae normal.      Pupils: Pupils are equal, round, and reactive to light.   Neck:      Trachea: Trachea and phonation normal.   Cardiovascular:      Rate and Rhythm: Normal rate and regular rhythm.      Pulses: Normal pulses.      Heart sounds: Normal heart sounds, S1 normal and S2 normal. Heart sounds not distant. No murmur heard.     No friction rub. No gallop.   Pulmonary:      Effort: Pulmonary effort is normal. No respiratory distress.      Breath sounds: Normal breath sounds and air entry. No wheezing or rales.   Chest:      Chest wall: No tenderness.   Musculoskeletal:      Cervical back: Full passive range of motion without pain, normal range of motion and neck supple.   Skin:     General: Skin is warm and dry.      Capillary Refill: Capillary refill takes less than 2 seconds.      Coloration: Skin is not pale.      Findings: No bruising, erythema or rash.   Neurological:      General: No focal deficit present.      Mental Status: He is alert and oriented to person, place, and time.      GCS: GCS eye subscore is 4. GCS verbal subscore is 5. GCS motor subscore is 6.   Psychiatric:         Attention and Perception: Attention and perception normal.         Mood and Affect: Mood and affect normal. Mood is elated.         Speech: Speech normal. Speech is delayed.         Behavior: Behavior normal. Behavior is slowed. Behavior is cooperative.         Thought Content: Thought content normal. Thought content does not include homicidal or suicidal plan.         Cognition and Memory: Cognition and memory normal.         Judgment: Judgment normal.         Procedures           ED Course                                                       Medical Decision Making  Problems Addressed:  Accidental drug ingestion, initial encounter: acute illness or injury    Interpreted by radiologist as below:     No radiology results for the last day      /64   Pulse 78   Temp 97.9 °F (36.6 °C)    "Resp 14   Ht 182.9 cm (72\")   Wt 101 kg (222 lb)   SpO2 91%   BMI 30.11 kg/m²      Lab Results (last 24 hours)       ** No results found for the last 24 hours. **             Medications - No data to display     Appropriate PPE worn during patient exam.  Appropriate monitoring initiated. Patient is alert and oriented x3.  No acute distress noted. S1-S2 heart sounds on exam.  Lungs are clear to auscultation.  Patient was given meal tray by nursing staff tolerated without difficulty.  Upon reassessment he is sitting up talking without difficulty.  He is requesting to go home.  He was discouraged from taking drugs from other people.  He verbalized understanding.    I reviewed chart 12/16/2024 patient was seen by cardiology for follow-up of A-fib and a flutter.     Disposition/Treatment: Discussed results with patient, verbalized understanding. Discussed reasons to return, patient verbalized understanding. Agreeable with plan of care. Patient was stable upon disposition.    Upon reassessment, patient is flesh tone warm and dry no acute distress noted.  Vital signs are stable. Discussed return precautions, patient verbalized understanding.     Part of this note may be an electronic transcription/translation of spoken language to printed text using the Dragon Dictation System.   Final diagnoses:   Accidental drug ingestion, initial encounter       ED Disposition  ED Disposition       ED Disposition   Discharge    Condition   Stable    Comment   --               David Causey MD  [address] [place] [address] [place] [address] [phone]    Schedule an appointment as soon as possible for a visit       [facility] EMERGENCY DEPARTMENT  [address] [place] [id number] [phone]  Go to   If symptoms worsen         Medication List      No changes were made to your prescriptions during this visit.            Livia Hernandez, APRN  05/12/25 1610    "

## 2025-05-30 ENCOUNTER — APPOINTMENT (OUTPATIENT)
Dept: GENERAL RADIOLOGY | Facility: HOSPITAL | Age: 65
End: 2025-05-30
Payer: MEDICARE

## 2025-05-30 ENCOUNTER — HOSPITAL ENCOUNTER (EMERGENCY)
Facility: HOSPITAL | Age: 65
Discharge: HOME | End: 2025-05-30
Payer: MEDICARE

## 2025-05-30 VITALS
TEMPERATURE: 97.9 F | WEIGHT: 222.66 LBS | SYSTOLIC BLOOD PRESSURE: 160 MMHG | HEIGHT: 72 IN | DIASTOLIC BLOOD PRESSURE: 102 MMHG | OXYGEN SATURATION: 94 % | HEART RATE: 83 BPM | RESPIRATION RATE: 23 BRPM | BODY MASS INDEX: 30.16 KG/M2

## 2025-05-30 DIAGNOSIS — J06.9 UPPER RESPIRATORY TRACT INFECTION, UNSPECIFIED TYPE: ICD-10-CM

## 2025-05-30 DIAGNOSIS — B34.8 RHINOVIRUS: Primary | ICD-10-CM

## 2025-05-30 LAB
ALBUMIN SERPL-MCNC: 4.3 G/DL (ref 3.5–5.2)
ALBUMIN/GLOB SERPL: 1.5 G/DL
ALP SERPL-CCNC: 91 U/L (ref 39–117)
ALT SERPL W P-5'-P-CCNC: 48 U/L (ref 1–41)
ANION GAP SERPL CALCULATED.3IONS-SCNC: 11.4 MMOL/L (ref 5–15)
ANISOCYTOSIS BLD QL: NORMAL
AST SERPL-CCNC: 36 U/L (ref 1–40)
B PARAPERT DNA SPEC QL NAA+PROBE: NOT DETECTED
B PERT DNA SPEC QL NAA+PROBE: NOT DETECTED
BASOPHILS # BLD AUTO: 0.06 10*3/MM3 (ref 0–0.2)
BASOPHILS NFR BLD AUTO: 0.7 % (ref 0–1.5)
BILIRUB SERPL-MCNC: 0.4 MG/DL (ref 0–1.2)
BUN SERPL-MCNC: 8.5 MG/DL (ref 8–23)
BUN/CREAT SERPL: 9.6 (ref 7–25)
C PNEUM DNA NPH QL NAA+NON-PROBE: NOT DETECTED
CALCIUM SPEC-SCNC: 8.9 MG/DL (ref 8.6–10.5)
CHLORIDE SERPL-SCNC: 110 MMOL/L (ref 98–107)
CO2 SERPL-SCNC: 22.6 MMOL/L (ref 22–29)
CREAT SERPL-MCNC: 0.89 MG/DL (ref 0.76–1.27)
DACRYOCYTES BLD QL SMEAR: NORMAL
DEPRECATED RDW RBC AUTO: 51.5 FL (ref 37–54)
EGFRCR SERPLBLD CKD-EPI 2021: 95.7 ML/MIN/1.73
EOSINOPHIL # BLD AUTO: 0.07 10*3/MM3 (ref 0–0.4)
EOSINOPHIL NFR BLD AUTO: 0.9 % (ref 0.3–6.2)
ERYTHROCYTE [DISTWIDTH] IN BLOOD BY AUTOMATED COUNT: 13.9 % (ref 12.3–15.4)
FLUAV SUBTYP SPEC NAA+PROBE: NOT DETECTED
FLUBV RNA ISLT QL NAA+PROBE: NOT DETECTED
GLOBULIN UR ELPH-MCNC: 2.8 GM/DL
GLUCOSE SERPL-MCNC: 100 MG/DL (ref 65–99)
HADV DNA SPEC NAA+PROBE: NOT DETECTED
HCOV 229E RNA SPEC QL NAA+PROBE: NOT DETECTED
HCOV HKU1 RNA SPEC QL NAA+PROBE: NOT DETECTED
HCOV NL63 RNA SPEC QL NAA+PROBE: NOT DETECTED
HCOV OC43 RNA SPEC QL NAA+PROBE: NOT DETECTED
HCT VFR BLD AUTO: 47.9 % (ref 37.5–51)
HGB BLD-MCNC: 15.7 G/DL (ref 13–17.7)
HMPV RNA NPH QL NAA+NON-PROBE: NOT DETECTED
HOLD SPECIMEN: NORMAL
HOLD SPECIMEN: NORMAL
HPIV1 RNA ISLT QL NAA+PROBE: NOT DETECTED
HPIV2 RNA SPEC QL NAA+PROBE: NOT DETECTED
HPIV3 RNA NPH QL NAA+PROBE: NOT DETECTED
HPIV4 P GENE NPH QL NAA+PROBE: NOT DETECTED
IMM GRANULOCYTES # BLD AUTO: 0.07 10*3/MM3 (ref 0–0.05)
IMM GRANULOCYTES NFR BLD AUTO: 0.9 % (ref 0–0.5)
LARGE PLATELETS: NORMAL
LYMPHOCYTES # BLD AUTO: 1.84 10*3/MM3 (ref 0.7–3.1)
LYMPHOCYTES NFR BLD AUTO: 22.5 % (ref 19.6–45.3)
M PNEUMO IGG SER IA-ACNC: NOT DETECTED
MCH RBC QN AUTO: 32.4 PG (ref 26.6–33)
MCHC RBC AUTO-ENTMCNC: 32.8 G/DL (ref 31.5–35.7)
MCV RBC AUTO: 99 FL (ref 79–97)
MONOCYTES # BLD AUTO: 0.59 10*3/MM3 (ref 0.1–0.9)
MONOCYTES NFR BLD AUTO: 7.2 % (ref 5–12)
NEUTROPHILS NFR BLD AUTO: 5.55 10*3/MM3 (ref 1.7–7)
NEUTROPHILS NFR BLD AUTO: 67.8 % (ref 42.7–76)
NRBC BLD AUTO-RTO: 0 /100 WBC (ref 0–0.2)
PLATELET # BLD AUTO: 137 10*3/MM3 (ref 140–450)
PMV BLD AUTO: 10.7 FL (ref 6–12)
POTASSIUM SERPL-SCNC: 4 MMOL/L (ref 3.5–5.2)
PROT SERPL-MCNC: 7.1 G/DL (ref 6–8.5)
QT INTERVAL: 339 MS
QTC INTERVAL: 417 MS
RBC # BLD AUTO: 4.84 10*6/MM3 (ref 4.14–5.8)
RHINOVIRUS RNA SPEC NAA+PROBE: DETECTED
RSV RNA NPH QL NAA+NON-PROBE: NOT DETECTED
SARS-COV-2 RNA RESP QL NAA+PROBE: NOT DETECTED
SODIUM SERPL-SCNC: 144 MMOL/L (ref 136–145)
WBC MORPH BLD: NORMAL
WBC NRBC COR # BLD AUTO: 8.18 10*3/MM3 (ref 3.4–10.8)
WHOLE BLOOD HOLD COAG: NORMAL
WHOLE BLOOD HOLD SPECIMEN: NORMAL

## 2025-05-30 PROCEDURE — 80053 COMPREHEN METABOLIC PANEL: CPT

## 2025-05-30 PROCEDURE — 99284 EMERGENCY DEPT VISIT MOD MDM: CPT

## 2025-05-30 PROCEDURE — 63710000001 ONDANSETRON ODT 4 MG TABLET DISPERSIBLE: Performed by: NURSE PRACTITIONER

## 2025-05-30 PROCEDURE — 85025 COMPLETE CBC W/AUTO DIFF WBC: CPT

## 2025-05-30 PROCEDURE — 71046 X-RAY EXAM CHEST 2 VIEWS: CPT

## 2025-05-30 PROCEDURE — 0202U NFCT DS 22 TRGT SARS-COV-2: CPT | Performed by: NURSE PRACTITIONER

## 2025-05-30 PROCEDURE — 85007 BL SMEAR W/DIFF WBC COUNT: CPT

## 2025-05-30 PROCEDURE — 36415 COLL VENOUS BLD VENIPUNCTURE: CPT

## 2025-05-30 PROCEDURE — 87040 BLOOD CULTURE FOR BACTERIA: CPT

## 2025-05-30 PROCEDURE — 93005 ELECTROCARDIOGRAM TRACING: CPT

## 2025-05-30 RX ORDER — BROMPHENIRAMINE MALEATE, PSEUDOEPHEDRINE HYDROCHLORIDE, AND DEXTROMETHORPHAN HYDROBROMIDE 2; 30; 10 MG/5ML; MG/5ML; MG/5ML
5 SYRUP ORAL 4 TIMES DAILY PRN
Qty: 118 ML | Refills: 0 | Status: SHIPPED | OUTPATIENT
Start: 2025-05-30

## 2025-05-30 RX ORDER — FLUTICASONE PROPIONATE 50 MCG
2 SPRAY, SUSPENSION (ML) NASAL DAILY
Qty: 16 G | Refills: 0 | Status: SHIPPED | OUTPATIENT
Start: 2025-05-30

## 2025-05-30 RX ORDER — SODIUM CHLORIDE 0.9 % (FLUSH) 0.9 %
10 SYRINGE (ML) INJECTION AS NEEDED
Status: DISCONTINUED | OUTPATIENT
Start: 2025-05-30 | End: 2025-05-30 | Stop reason: HOSPADM

## 2025-05-30 RX ORDER — BENZONATATE 100 MG/1
100 CAPSULE ORAL ONCE
Status: COMPLETED | OUTPATIENT
Start: 2025-05-30 | End: 2025-05-30

## 2025-05-30 RX ORDER — ONDANSETRON 4 MG/1
4 TABLET, ORALLY DISINTEGRATING ORAL ONCE
Status: COMPLETED | OUTPATIENT
Start: 2025-05-30 | End: 2025-05-30

## 2025-05-30 RX ADMIN — ONDANSETRON 4 MG: 4 TABLET, ORALLY DISINTEGRATING ORAL at 15:16

## 2025-05-30 RX ADMIN — BENZONATATE 100 MG: 100 CAPSULE ORAL at 15:18

## 2025-05-30 NOTE — ED PROVIDER NOTES
Subjective   History of Present Illness  Chief complaint: Shortness of breath      Context: Patient is a 64-year-old male past medical history significant for hypertension hyperlipidemia smoker COPD GERD who presents ambulatory by private vehicle with complaints of productive cough with occasional posttussive vomiting.  Has had some nasal congestion.  Denies any fever abdominal pain chest pain.  No swelling his legs or feet.  States his wife is in rehab so he has been around some ill contacts.  No rash bug bites or tick bites noted        PCP: gray        Review of Systems   Constitutional:  Negative for fever.       Past Medical History:   Diagnosis Date    Acute hypoxemic respiratory failure 11/06/2023    Allergic     Anxiety     Arthritis 06/2005    Back pain     Claustrophobia 1978    COPD (chronic obstructive pulmonary disease)     CTS (carpal tunnel syndrome) 10/2022    CVA (cerebral vascular accident) 12/11/2024    Dysphagia     Esophageal stricture     GERD (gastroesophageal reflux disease)     Hyperlipidemia     Hypertension     Knee pain     rt    Low back pain     Obesity     Obesity (BMI 30-39.9) 11/06/2023    Other cervical disc degeneration at C5-C6 level 08/22/2018    Paroxysmal atrial fibrillation with rapid ventricular response 11/06/2023    Pneumonia     Prediabetes 11/06/2023    PTSD (post-traumatic stress disorder)     Rhinovirus infection 11/06/2023    Thoracic disc herniation     Vitamin B12 deficiency        Allergies   Allergen Reactions    Atorvastatin Swelling    Lisinopril Other (See Comments)     Facial paralysis        Past Surgical History:   Procedure Laterality Date    ABLATION OF DYSRHYTHMIC FOCUS  7/31/2024    BRONCHOSCOPY N/A 11/03/2023    Procedure: BRONCHOSCOPY with bilateral lung washing's;  Surgeon: Kolton Brewer MD;  Location: [facility] ENDOSCOPY;  Service: Pulmonary;  Laterality: N/A;    CARDIAC CATHETERIZATION N/A 07/13/2022    Procedure: Left Heart Cath, possible pci;   Surgeon: Prieto Sidhu MD;  Location: [facility] CATH INVASIVE LOCATION;  Service: Cardiovascular;  Laterality: N/A;    CARDIAC ELECTROPHYSIOLOGY PROCEDURE N/A 07/31/2024    Procedure: Ablation atrial flutter;  Surgeon: Pardeep Walker MD;  Location: [facility] CATH INVASIVE LOCATION;  Service: Cardiovascular;  Laterality: N/A;    ENDOSCOPY      ENDOSCOPY N/A 09/21/2023    Procedure: ESOPHAGOGASTRODUODENOSCOPY WITH non guided esophageal dialtion 54 Fr. Bougie and  cold forcep biopsy x1 area;  Surgeon: Andres Concepcion MD;  Location: [facility] ENDOSCOPY;  Service: Gastroenterology;  Laterality: N/A;  Post- erosive gastritis, hiatal hernia, esophageal stricture    HERNIA REPAIR      INSERT / REPLACE / REMOVE PACEMAKER  11/31/2024    Temporary pacemaker implant in emergency room    KNEE ARTHROPLASTY      x2    SHOULDER ARTHROSCOPY W/ ROTATOR CUFF REPAIR Right 08/11/2020    Procedure: SHOULDER ARTHROSCOPY WITH ROTATOR CUFF REPAIR, extensive debridement;  Surgeon: Fredi Ritchie MD;  Location: [facility] MAIN OR;  Service: Orthopedics;  Laterality: Right;    TONSILLECTOMY         Family History   Problem Relation Age of Onset    Heart disease Mother     Early death Mother     Hyperlipidemia Mother     Hypertension Mother     Thyroid disease Mother     Heart attack Mother         Had valve replaced with a pig valve    Cancer Father     Stroke Father     Vision loss Father     Stroke Paternal Grandfather     Arthritis Paternal Grandmother     Alcohol abuse Brother     Asthma Brother     Depression Brother     Hyperlipidemia Brother     Hypertension Brother     Learning disabilities Brother     Mental illness Brother     Drug abuse Brother     Early death Brother     Heart disease Brother     Hyperlipidemia Brother         Bijan is the same person    Hypertension Brother        Social History     Socioeconomic History    Marital status:    Tobacco Use    Smoking status: Every Day     Current  packs/day: 1.00     Average packs/day: 1 pack/day for 52.4 years (52.4 ttl pk-yrs)     Types: Cigarettes     Start date: 1/9/1973     Passive exposure: Never    Smokeless tobacco: Never    Tobacco comments:     Smoked a half a pack a day for 42 years didn't start to smoke a whole pack until [age]       Counseled to stop smoking   Vaping Use    Vaping status: Never Used   Substance and Sexual Activity    Alcohol use: No    Drug use: No    Sexual activity: Not Currently     Partners: Female     Birth control/protection: None           Objective   Physical Exam    Vital signs and triage nurse note reviewed.  Constitutional: Awake, alert; well-developed and well-nourished.    HEENT: Normocephalic, atraumatic; with intact EOM; oropharynx is pink and moist without exudate or erythema.  Congestion noted.  No pain over the frontal maxillary sinuses  Neck: Supple,    Cardiovascular: Regular rate and rhythm, normal S1-S2.  Pulmonary: Respiratory effort regular nonlabored, breath sounds clear to auscultation all fields.  Abdomen: Soft, nontender nondistended with normoactive bowel sounds; no rebound or guarding.  Musculoskeletal: Independent range of motion of all extremities with no palpable tenderness or edema.  Neuro: Alert oriented x3, speech is clear and appropriate, GCS 15  Skin:  Fleshtone warm, dry, intact; no erythematous or petechial rash or lesion      Procedures           ED Course        Labs Reviewed   RESPIRATORY PANEL PCR W/ COVID-19 (SARS-COV-2), NP SWAB IN UTM/VTP, 2 HR TAT - Abnormal; Notable for the following components:       Result Value    Human Rhinovirus/Enterovirus Detected (*)     All other components within normal limits    Narrative:     In the setting of a positive respiratory panel with a viral infection PLUS a negative procalcitonin without other underlying concern for bacterial infection, consider observing off antibiotics or discontinuation of antibiotics and continue supportive care. If  the respiratory panel is positive for atypical bacterial infection (Bordetella pertussis, Chlamydophila pneumoniae, or Mycoplasma pneumoniae), consider antibiotic de-escalation to target atypical bacterial infection.   COMPREHENSIVE METABOLIC PANEL - Abnormal; Notable for the following components:    Glucose 100 (*)     Chloride 110 (*)     ALT (SGPT) 48 (*)     All other components within normal limits    Narrative:     GFR Categories in Chronic Kidney Disease (CKD)              GFR Category          GFR (mL/min/1.73)    Interpretation  G1                    90 or greater        Normal or high (1)  G2                    60-89                Mild decrease (1)  G3a                   45-59                Mild to moderate decrease  G3b                   30-44                Moderate to severe decrease  G4                    15-29                Severe decrease  G5                    14 or less           Kidney failure    (1)In the absence of evidence of kidney disease, neither GFR category G1 or G2 fulfill the criteria for CKD.    eGFR calculation 2021 CKD-EPI creatinine equation, which does not include race as a factor   CBC WITH AUTO DIFFERENTIAL - Abnormal; Notable for the following components:    MCV 99.0 (*)     Platelets 137 (*)     Immature Grans % 0.9 (*)     Immature Grans, Absolute 0.07 (*)     All other components within normal limits   BLOOD CULTURE   BLOOD CULTURE   RAINBOW DRAW    Narrative:     The following orders were created for panel order [place] Draw.  Procedure                               Abnormality         Status                     ---------                               -----------         ------                     Green Top (Gel)[[id number]]                                  Final result               Lavender Top[[id number]]                                     Final result               Gold Top - [facility][[id number]]                                   Final result               Light Blue  Top[[id number]]                                   Final result                 Please view results for these tests on the individual orders.   SCAN SLIDE   CBC AND DIFFERENTIAL    Narrative:     The following orders were created for panel order CBC & Differential.  Procedure                               Abnormality         Status                     ---------                               -----------         ------                     CBC Auto Differential[[id number]]        Abnormal            Final result               Scan Slide[[id number]]                                       Final result                 Please view results for these tests on the individual orders.   GREEN TOP   LAVENDER TOP   GOLD TOP - SST   LIGHT BLUE TOP     Medications   sodium chloride 0.9 % flush 10 mL (has no administration in time range)   ondansetron ODT (ZOFRAN-ODT) disintegrating tablet 4 mg (4 mg Oral Given 5/30/25 1516)   benzonatate (TESSALON) capsule 100 mg (100 mg Oral Given 5/30/25 1518)     XR Chest 2 View  Result Date: 5/30/2025  Impression: No acute cardiopulmonary findings. Electronically Signed: Nish Reyes MD  5/30/2025 2:06 PM EDT  Workstation ID: [id number]    Prior to Admission medications    Medication Sig Start Date End Date Taking? Authorizing Provider   albuterol sulfate  (90 Base) MCG/ACT inhaler Inhale 2 puffs Every 4 (Four) Hours As Needed for Wheezing. 4/4/22   Yamilka Pascual APRN   apixaban (ELIQUIS) 5 MG tablet tablet Take 1 tablet by mouth Every 12 (Twelve) Hours. Indications: Atrial Fibrillation 11/18/23   Morris Rios MD   brompheniramine-pseudoephedrine-DM 30-2-10 MG/5ML syrup Take 5 mL by mouth 4 (Four) Times a Day As Needed for Allergies. 5/30/25   Roberta Jones APRN   fluticasone (FLONASE) 50 MCG/ACT nasal spray Administer 2 sprays into the nostril(s) as directed by provider Daily. 5/30/25   Roberta Jones APRN   ipratropium-albuterol (DUO-NEB) 0.5-2.5 mg/3 ml nebulizer Take 3  "mL by nebulization Every 4 (Four) Hours As Needed for Wheezing.    Provider, MD Sulaiman   pantoprazole (PROTONIX) 40 MG EC tablet Take 1 tablet by mouth Daily.    Provider, MD Sulaiman   pravastatin (PRAVACHOL) 80 MG tablet Take 1 tablet by mouth Every Night.    Provider, MD Sulaiman   tiotropium bromide-olodaterol (Stiolto Respimat) 2.5-2.5 MCG/ACT aerosol solution inhaler Inhale 2 puffs Daily. 5/12/25      azithromycin (ZITHROMAX) 250 MG tablet Take 2 tablets Orally on day 1 followed by one tablet daily for days 2 through 5 1/10/25 5/30/25     methylPREDNISolone (Medrol) 4 MG dose pack Take as directed Orally 3/28/25 5/30/25                                                      Medical Decision Making      /93 (BP Location: Right arm, Patient Position: Sitting)   Pulse 95   Temp 98.4 °F (36.9 °C) (Oral)   Resp 23   Ht 182.9 cm (72\")   Wt 101 kg (222 lb 10.6 oz)   SpO2 97%   BMI 30.20 kg/m²           Radiology interpretation:  X-rays reviewed and interpreted by arnulfo: no pna  Further interpretation by radiologist as above  Lab interpretation:  Labs all viewed by me and significant for, + rhinovirus glucose 100 plt 137    EKG viewed and interpreted by Dr. arredondo  , sinus rhythm rate of 91 QTc 417  comparison: 12/10/2024 sinus rhythm rate of 83            Appropriate PPE worn during exam.  Sepsis orders were initially placed by nursing staff although do not feel patient is septic.  Viral illness is prevalent in the community and he was evaluated for pneumonia viruses COPD exacerbation.  EKG was obtained per triage protocol was unremarkable.  Viral illness was positive on RVP and patient will be given Bromfed and Flonase discharged in good stable condition      i discussed findings with patient who voices understanding of discharge instructions, signs and symptoms requiring return to ED; discharged improved and in stable condition with follow up for re-evaluation.  This document is intended for " medical expert use only. Reading of this document by patients and/or patient's family without participating medical staff guidance may result in misinterpretation and unintended morbidity.  Any interpretation of such data is the responsibility of the patient and/or family member responsible for the patient in concert with their primary or specialist providers, not to be left for sources of online searches such as [employer], [employer] or similar queries. Relying on these approaches to knowledge may result in misinterpretation, misguided goals of care and even death should patients or family members try recommendations outside of the realm of professional medical care in a supervised inpatient environment.         Problems Addressed:  Rhinovirus: complicated acute illness or injury  Upper respiratory tract infection, unspecified type: complicated acute illness or injury    Amount and/or Complexity of Data Reviewed  Radiology: ordered.    Risk  Prescription drug management.        Final diagnoses:   Rhinovirus   Upper respiratory tract infection, unspecified type       ED Disposition  ED Disposition       ED Disposition   Discharge    Condition   Stable    Comment   --               David Causey MD  [address] [place] [address] [place] [address] [phone]               Medication List        New Prescriptions      brompheniramine-pseudoephedrine-DM 30-2-10 MG/5ML syrup  Take 5 mL by mouth 4 (Four) Times a Day As Needed for Allergies.     fluticasone 50 MCG/ACT nasal spray  Commonly known as: FLONASE  Administer 2 sprays into the nostril(s) as directed by provider Daily.            Stop      azithromycin 250 MG tablet  Commonly known as: ZITHROMAX     methylPREDNISolone 4 MG dose pack  Commonly known as: Medrol               Where to Get Your Medications        These medications were sent to [address] [place] [address]      Hours: Monday to Friday 7 AM to 7 PM Phone: [phone]    brompheniramine-pseudoephedrine-DM 30-2-10 MG/5ML syrup  fluticasone 50 MCG/ACT nasal spray            Roberta Jones, APRN  05/30/25 1878

## 2025-05-30 NOTE — ED NOTES
Pt states he started having a cough and sinus congestion several days ago but says today his cough was worse and he is feeling SOB. Denies fever

## 2025-05-30 NOTE — DISCHARGE INSTRUCTIONS
medications as directed. plenty of fluids. Tylenol as needed.  Follow up with your family doctor the [facility] next week.  Return for any new or worsening symptoms. May use warm saltwater gargles or Chloraseptic spray throat lozenges for sore throat. May use saline rinses or neti pots for congestion

## 2025-06-04 ENCOUNTER — HOSPITAL ENCOUNTER (EMERGENCY)
Facility: HOSPITAL | Age: 65
Discharge: HOME | End: 2025-06-04
Attending: EMERGENCY MEDICINE | Admitting: EMERGENCY MEDICINE
Payer: MEDICARE

## 2025-06-04 ENCOUNTER — APPOINTMENT (OUTPATIENT)
Dept: CT IMAGING | Facility: HOSPITAL | Age: 65
End: 2025-06-04
Payer: MEDICARE

## 2025-06-04 VITALS
OXYGEN SATURATION: 96 % | HEART RATE: 86 BPM | TEMPERATURE: 99.2 F | WEIGHT: 218.7 LBS | DIASTOLIC BLOOD PRESSURE: 97 MMHG | BODY MASS INDEX: 29.62 KG/M2 | RESPIRATION RATE: 18 BRPM | SYSTOLIC BLOOD PRESSURE: 154 MMHG | HEIGHT: 72 IN

## 2025-06-04 DIAGNOSIS — N39.0 URINARY TRACT INFECTION WITH HEMATURIA, SITE UNSPECIFIED: Primary | ICD-10-CM

## 2025-06-04 DIAGNOSIS — R10.31 RIGHT INGUINAL PAIN: ICD-10-CM

## 2025-06-04 DIAGNOSIS — R31.9 URINARY TRACT INFECTION WITH HEMATURIA, SITE UNSPECIFIED: Primary | ICD-10-CM

## 2025-06-04 LAB
ALBUMIN SERPL-MCNC: 4.2 G/DL (ref 3.5–5.2)
ALP SERPL-CCNC: 94 U/L (ref 39–117)
ALT SERPL W P-5'-P-CCNC: 25 U/L (ref 1–41)
ANION GAP SERPL CALCULATED.3IONS-SCNC: 11.2 MMOL/L (ref 5–15)
AST SERPL-CCNC: 20 U/L (ref 1–40)
BACTERIA SPEC AEROBE CULT: NORMAL
BACTERIA SPEC AEROBE CULT: NORMAL
BACTERIA UR QL AUTO: ABNORMAL /HPF
BASOPHILS # BLD AUTO: 0.05 10*3/MM3 (ref 0–0.2)
BASOPHILS NFR BLD AUTO: 0.4 % (ref 0–1.5)
BILIRUB CONJ SERPL-MCNC: 0.3 MG/DL (ref 0–0.3)
BILIRUB INDIRECT SERPL-MCNC: 0.6 MG/DL
BILIRUB SERPL-MCNC: 0.9 MG/DL (ref 0–1.2)
BILIRUB UR QL STRIP: ABNORMAL
BUN SERPL-MCNC: 10.3 MG/DL (ref 8–23)
BUN/CREAT SERPL: 11 (ref 7–25)
CALCIUM SPEC-SCNC: 8.8 MG/DL (ref 8.6–10.5)
CHLORIDE SERPL-SCNC: 106 MMOL/L (ref 98–107)
CLARITY UR: ABNORMAL
CO2 SERPL-SCNC: 23.8 MMOL/L (ref 22–29)
COLOR UR: ABNORMAL
CREAT SERPL-MCNC: 0.94 MG/DL (ref 0.76–1.27)
D-LACTATE SERPL-SCNC: 0.5 MMOL/L (ref 0.3–2)
DEPRECATED RDW RBC AUTO: 48.9 FL (ref 37–54)
EGFRCR SERPLBLD CKD-EPI 2021: 90.5 ML/MIN/1.73
EOSINOPHIL # BLD AUTO: 0.05 10*3/MM3 (ref 0–0.4)
EOSINOPHIL NFR BLD AUTO: 0.4 % (ref 0.3–6.2)
ERYTHROCYTE [DISTWIDTH] IN BLOOD BY AUTOMATED COUNT: 13.6 % (ref 12.3–15.4)
GLUCOSE SERPL-MCNC: 93 MG/DL (ref 65–99)
GLUCOSE UR STRIP-MCNC: NEGATIVE MG/DL
HCT VFR BLD AUTO: 48.2 % (ref 37.5–51)
HGB BLD-MCNC: 16.1 G/DL (ref 13–17.7)
HGB UR QL STRIP.AUTO: ABNORMAL
HYALINE CASTS UR QL AUTO: ABNORMAL /LPF
IMM GRANULOCYTES # BLD AUTO: 0.04 10*3/MM3 (ref 0–0.05)
IMM GRANULOCYTES NFR BLD AUTO: 0.3 % (ref 0–0.5)
KETONES UR QL STRIP: NEGATIVE
LEUKOCYTE ESTERASE UR QL STRIP.AUTO: ABNORMAL
LYMPHOCYTES # BLD AUTO: 3 10*3/MM3 (ref 0.7–3.1)
LYMPHOCYTES NFR BLD AUTO: 24.9 % (ref 19.6–45.3)
MCH RBC QN AUTO: 32.3 PG (ref 26.6–33)
MCHC RBC AUTO-ENTMCNC: 33.4 G/DL (ref 31.5–35.7)
MCV RBC AUTO: 96.8 FL (ref 79–97)
MONOCYTES # BLD AUTO: 0.71 10*3/MM3 (ref 0.1–0.9)
MONOCYTES NFR BLD AUTO: 5.9 % (ref 5–12)
NEUTROPHILS NFR BLD AUTO: 68.1 % (ref 42.7–76)
NEUTROPHILS NFR BLD AUTO: 8.21 10*3/MM3 (ref 1.7–7)
NITRITE UR QL STRIP: NEGATIVE
NRBC BLD AUTO-RTO: 0 /100 WBC (ref 0–0.2)
PH UR STRIP.AUTO: 6 [PH] (ref 5–8)
PLATELET # BLD AUTO: 160 10*3/MM3 (ref 140–450)
PMV BLD AUTO: 10.9 FL (ref 6–12)
POTASSIUM SERPL-SCNC: 3.6 MMOL/L (ref 3.5–5.2)
PROT SERPL-MCNC: 7.4 G/DL (ref 6–8.5)
PROT UR QL STRIP: ABNORMAL
RBC # BLD AUTO: 4.98 10*6/MM3 (ref 4.14–5.8)
RBC # UR STRIP: ABNORMAL /HPF
REF LAB TEST METHOD: ABNORMAL
SODIUM SERPL-SCNC: 141 MMOL/L (ref 136–145)
SP GR UR STRIP: 1.03 (ref 1–1.03)
SQUAMOUS #/AREA URNS HPF: ABNORMAL /HPF
UROBILINOGEN UR QL STRIP: ABNORMAL
WBC # UR STRIP: ABNORMAL /HPF
WBC NRBC COR # BLD AUTO: 12.06 10*3/MM3 (ref 3.4–10.8)

## 2025-06-04 PROCEDURE — 87077 CULTURE AEROBIC IDENTIFY: CPT | Performed by: EMERGENCY MEDICINE

## 2025-06-04 PROCEDURE — 36415 COLL VENOUS BLD VENIPUNCTURE: CPT

## 2025-06-04 PROCEDURE — 84153 ASSAY OF PSA TOTAL: CPT | Performed by: EMERGENCY MEDICINE

## 2025-06-04 PROCEDURE — 80076 HEPATIC FUNCTION PANEL: CPT | Performed by: EMERGENCY MEDICINE

## 2025-06-04 PROCEDURE — 74177 CT ABD & PELVIS W/CONTRAST: CPT

## 2025-06-04 PROCEDURE — 83605 ASSAY OF LACTIC ACID: CPT

## 2025-06-04 PROCEDURE — 25010000002 HYDROMORPHONE PER 4 MG: Performed by: EMERGENCY MEDICINE

## 2025-06-04 PROCEDURE — 87040 BLOOD CULTURE FOR BACTERIA: CPT | Performed by: EMERGENCY MEDICINE

## 2025-06-04 PROCEDURE — 96375 TX/PRO/DX INJ NEW DRUG ADDON: CPT

## 2025-06-04 PROCEDURE — 96365 THER/PROPH/DIAG IV INF INIT: CPT

## 2025-06-04 PROCEDURE — 80048 BASIC METABOLIC PNL TOTAL CA: CPT | Performed by: EMERGENCY MEDICINE

## 2025-06-04 PROCEDURE — 99285 EMERGENCY DEPT VISIT HI MDM: CPT

## 2025-06-04 PROCEDURE — 87086 URINE CULTURE/COLONY COUNT: CPT | Performed by: EMERGENCY MEDICINE

## 2025-06-04 PROCEDURE — 85025 COMPLETE CBC W/AUTO DIFF WBC: CPT | Performed by: EMERGENCY MEDICINE

## 2025-06-04 PROCEDURE — 25010000002 CEFTRIAXONE PER 250 MG: Performed by: EMERGENCY MEDICINE

## 2025-06-04 PROCEDURE — 81001 URINALYSIS AUTO W/SCOPE: CPT | Performed by: EMERGENCY MEDICINE

## 2025-06-04 PROCEDURE — 87186 SC STD MICRODIL/AGAR DIL: CPT | Performed by: EMERGENCY MEDICINE

## 2025-06-04 PROCEDURE — 25510000001 IOPAMIDOL PER 1 ML: Performed by: EMERGENCY MEDICINE

## 2025-06-04 PROCEDURE — 84154 ASSAY OF PSA FREE: CPT | Performed by: EMERGENCY MEDICINE

## 2025-06-04 RX ORDER — SODIUM CHLORIDE 0.9 % (FLUSH) 0.9 %
10 SYRINGE (ML) INJECTION AS NEEDED
Status: DISCONTINUED | OUTPATIENT
Start: 2025-06-04 | End: 2025-06-04 | Stop reason: HOSPADM

## 2025-06-04 RX ORDER — ACETAMINOPHEN 500 MG
1000 TABLET ORAL ONCE
Status: COMPLETED | OUTPATIENT
Start: 2025-06-04 | End: 2025-06-04

## 2025-06-04 RX ORDER — HYDROMORPHONE HYDROCHLORIDE 1 MG/ML
0.5 INJECTION, SOLUTION INTRAMUSCULAR; INTRAVENOUS; SUBCUTANEOUS ONCE
Refills: 0 | Status: COMPLETED | OUTPATIENT
Start: 2025-06-04 | End: 2025-06-04

## 2025-06-04 RX ORDER — HYDROCODONE BITARTRATE AND ACETAMINOPHEN 5; 325 MG/1; MG/1
1 TABLET ORAL EVERY 6 HOURS PRN
Qty: 10 TABLET | Refills: 0 | Status: SHIPPED | OUTPATIENT
Start: 2025-06-04 | End: 2025-06-16

## 2025-06-04 RX ORDER — IOPAMIDOL 755 MG/ML
100 INJECTION, SOLUTION INTRAVASCULAR
Status: COMPLETED | OUTPATIENT
Start: 2025-06-04 | End: 2025-06-04

## 2025-06-04 RX ADMIN — ACETAMINOPHEN 1000 MG: 500 TABLET, FILM COATED ORAL at 13:38

## 2025-06-04 RX ADMIN — HYDROMORPHONE HYDROCHLORIDE 0.5 MG: 1 INJECTION, SOLUTION INTRAMUSCULAR; INTRAVENOUS; SUBCUTANEOUS at 14:43

## 2025-06-04 RX ADMIN — IOPAMIDOL 100 ML: 755 INJECTION, SOLUTION INTRAVENOUS at 13:05

## 2025-06-04 RX ADMIN — CEFTRIAXONE 2 G: 2 INJECTION, POWDER, FOR SOLUTION INTRAMUSCULAR; INTRAVENOUS at 13:56

## 2025-06-04 NOTE — DISCHARGE INSTRUCTIONS
Drink plenty of fluids.  Follow-up primary provider 2 days for recheck, return new or worsening symptoms increased pain fever vomiting or as needed

## 2025-06-04 NOTE — ED PROVIDER NOTES
Subjective   History of Present Illness  64-year-old male presents with bleeding from penis.  He states he had some urinary frequency last couple of days and this morning noted blood in his urine.  He complains of some pain to right groin area.  Some mild right testicular pain.  He reports he has never had his prostate checked.  He is on Eliquis.  He reports no fevers.  No flank pain.  Review of Systems    Past Medical History:   Diagnosis Date    Acute hypoxemic respiratory failure 11/06/2023    Allergic     Anxiety     Arthritis 06/2005    Back pain     Claustrophobia 1978    COPD (chronic obstructive pulmonary disease)     CTS (carpal tunnel syndrome) 10/2022    CVA (cerebral vascular accident) 12/11/2024    Dysphagia     Esophageal stricture     GERD (gastroesophageal reflux disease)     Hyperlipidemia     Hypertension     Knee pain     rt    Low back pain     Obesity     Obesity (BMI 30-39.9) 11/06/2023    Other cervical disc degeneration at C5-C6 level 08/22/2018    Paroxysmal atrial fibrillation with rapid ventricular response 11/06/2023    Pneumonia     Prediabetes 11/06/2023    PTSD (post-traumatic stress disorder)     Rhinovirus infection 11/06/2023    Thoracic disc herniation     Vitamin B12 deficiency        Allergies   Allergen Reactions    Atorvastatin Swelling    Lisinopril Other (See Comments)     Facial paralysis        Past Surgical History:   Procedure Laterality Date    ABLATION OF DYSRHYTHMIC FOCUS  7/31/2024    BRONCHOSCOPY N/A 11/03/2023    Procedure: BRONCHOSCOPY with bilateral lung washing's;  Surgeon: Kolton Brewer MD;  Location: [facility] ENDOSCOPY;  Service: Pulmonary;  Laterality: N/A;    CARDIAC CATHETERIZATION N/A 07/13/2022    Procedure: Left Heart Cath, possible pci;  Surgeon: Prieto Sidhu MD;  Location: [facility] CATH INVASIVE LOCATION;  Service: Cardiovascular;  Laterality: N/A;    CARDIAC ELECTROPHYSIOLOGY PROCEDURE N/A 07/31/2024    Procedure: Ablation atrial flutter;   Surgeon: Pardeep Walker MD;  Location: [facility] CATH INVASIVE LOCATION;  Service: Cardiovascular;  Laterality: N/A;    ENDOSCOPY      ENDOSCOPY N/A 09/21/2023    Procedure: ESOPHAGOGASTRODUODENOSCOPY WITH non guided esophageal dialtion 54 Fr. Bougie and  cold forcep biopsy x1 area;  Surgeon: Andres Concepcion MD;  Location: [facility] ENDOSCOPY;  Service: Gastroenterology;  Laterality: N/A;  Post- erosive gastritis, hiatal hernia, esophageal stricture    HERNIA REPAIR      INSERT / REPLACE / REMOVE PACEMAKER  11/31/2024    Temporary pacemaker implant in emergency room    KNEE ARTHROPLASTY      x2    SHOULDER ARTHROSCOPY W/ ROTATOR CUFF REPAIR Right 08/11/2020    Procedure: SHOULDER ARTHROSCOPY WITH ROTATOR CUFF REPAIR, extensive debridement;  Surgeon: Fredi Ritchie MD;  Location: [facility] MAIN OR;  Service: Orthopedics;  Laterality: Right;    TONSILLECTOMY         Family History   Problem Relation Age of Onset    Heart disease Mother     Early death Mother     Hyperlipidemia Mother     Hypertension Mother     Thyroid disease Mother     Heart attack Mother         Had valve replaced with a pig valve    Cancer Father     Stroke Father     Vision loss Father     Stroke Paternal Grandfather     Arthritis Paternal Grandmother     Alcohol abuse Brother     Asthma Brother     Depression Brother     Hyperlipidemia Brother     Hypertension Brother     Learning disabilities Brother     Mental illness Brother     Drug abuse Brother     Early death Brother     Heart disease Brother     Hyperlipidemia Brother         Bijan is the same person    Hypertension Brother        Social History     Socioeconomic History    Marital status:    Tobacco Use    Smoking status: Every Day     Current packs/day: 1.00     Average packs/day: 1 pack/day for 52.4 years (52.4 ttl pk-yrs)     Types: Cigarettes     Start date: 1/9/1973     Passive exposure: Never    Smokeless tobacco: Never    Tobacco comments:     Smoked a  half a pack a day for 42 years didn't start to smoke a whole pack until 20     17       Counseled to stop smoking   Vaping Use    Vaping status: Never Used   Substance and Sexual Activity    Alcohol use: No    Drug use: No    Sexual activity: Not Currently     Partners: Female     Birth control/protection: None     Prior to Admission medications    Medication Sig Start Date End Date Taking? Authorizing Provider   albuterol sulfate  (90 Base) MCG/ACT inhaler Inhale 2 puffs Every 4 (Four) Hours As Needed for Wheezing. 4/4/22   Yamilka Pascual APRN   apixaban (ELIQUIS) 5 MG tablet tablet Take 1 tablet by mouth Every 12 (Twelve) Hours. Indications: Atrial Fibrillation 11/18/23   Morris Rios MD   brompheniramine-pseudoephedrine-DM 30-2-10 MG/5ML syrup Take 5 mL by mouth 4 (Four) Times a Day As Needed for Allergies. 5/30/25   Roberta Jones APRN   fluticasone (FLONASE) 50 MCG/ACT nasal spray Administer 2 sprays into the nostril(s) as directed by provider Daily. 5/30/25   Roberta Jones APRN   ipratropium-albuterol (DUO-NEB) 0.5-2.5 mg/3 ml nebulizer Take 3 mL by nebulization Every 4 (Four) Hours As Needed for Wheezing.    ProviderSulaiman MD   pantoprazole (PROTONIX) 40 MG EC tablet Take 1 tablet by mouth Daily.    ProviderSulaiman MD   pravastatin (PRAVACHOL) 80 MG tablet Take 1 tablet by mouth Every Night.    ProviderSulaiman MD   tiotropium bromide-olodaterol (Stiolto Respimat) 2.5-2.5 MCG/ACT aerosol solution inhaler Inhale 2 puffs Daily. 5/12/25                Objective   Physical Exam  64 male awake alert.  Generally well-developed well-nourished.  Chest clear equal breath sounds cardiovascular really rhythm no CVA tenderness.  Abdomen soft without mass rebound or guarding he complains of some mild right inguinal tenderness.  No mass or hernia appreciated.  Patient is noted to have gross blood from meatus.  He complains of some mild right testicular tenderness without mass or  abnormal lie noted.  Rectal exam prostate nontender without mass  Procedures           ED Course      Results for orders placed or performed during the hospital encounter of 06/04/25   Basic Metabolic Panel    Collection Time: 06/04/25 12:39 PM    Specimen: Blood   Result Value Ref Range    Glucose 93 65 - 99 mg/dL    BUN 10.3 8.0 - 23.0 mg/dL    Creatinine 0.94 0.76 - 1.27 mg/dL    Sodium 141 136 - 145 mmol/L    Potassium 3.6 3.5 - 5.2 mmol/L    Chloride 106 98 - 107 mmol/L    CO2 23.8 22.0 - 29.0 mmol/L    Calcium 8.8 8.6 - 10.5 mg/dL    BUN/Creatinine Ratio 11.0 7.0 - 25.0    Anion Gap 11.2 5.0 - 15.0 mmol/L    eGFR 90.5 >60.0 mL/min/1.73   CBC Auto Differential    Collection Time: 06/04/25 12:39 PM    Specimen: Blood   Result Value Ref Range    WBC 12.06 (H) 3.40 - 10.80 10*3/mm3    RBC 4.98 4.14 - 5.80 10*6/mm3    Hemoglobin 16.1 13.0 - 17.7 g/dL    Hematocrit 48.2 37.5 - 51.0 %    MCV 96.8 79.0 - 97.0 fL    MCH 32.3 26.6 - 33.0 pg    MCHC 33.4 31.5 - 35.7 g/dL    RDW 13.6 12.3 - 15.4 %    RDW-SD 48.9 37.0 - 54.0 fl    MPV 10.9 6.0 - 12.0 fL    Platelets 160 140 - 450 10*3/mm3    Neutrophil % 68.1 42.7 - 76.0 %    Lymphocyte % 24.9 19.6 - 45.3 %    Monocyte % 5.9 5.0 - 12.0 %    Eosinophil % 0.4 0.3 - 6.2 %    Basophil % 0.4 0.0 - 1.5 %    Immature Grans % 0.3 0.0 - 0.5 %    Neutrophils, Absolute 8.21 (H) 1.70 - 7.00 10*3/mm3    Lymphocytes, Absolute 3.00 0.70 - 3.10 10*3/mm3    Monocytes, Absolute 0.71 0.10 - 0.90 10*3/mm3    Eosinophils, Absolute 0.05 0.00 - 0.40 10*3/mm3    Basophils, Absolute 0.05 0.00 - 0.20 10*3/mm3    Immature Grans, Absolute 0.04 0.00 - 0.05 10*3/mm3    nRBC 0.0 0.0 - 0.2 /100 WBC   Hepatic Function Panel    Collection Time: 06/04/25 12:39 PM    Specimen: Blood   Result Value Ref Range    Total Protein 7.4 6.0 - 8.5 g/dL    Albumin 4.2 3.5 - 5.2 g/dL    ALT (SGPT) 25 1 - 41 U/L    AST (SGOT) 20 1 - 40 U/L    Alkaline Phosphatase 94 39 - 117 U/L    Total Bilirubin 0.9 0.0 - 1.2 mg/dL     Bilirubin, Direct 0.3 0.0 - 0.3 mg/dL    Bilirubin, Indirect 0.6 mg/dL   Urinalysis With Culture If Indicated - Urine, Clean Catch    Collection Time: 06/04/25 12:46 PM    Specimen: Urine, Clean Catch   Result Value Ref Range    Color, UA Red (A) Yellow, Straw    Appearance, UA Turbid (A) Clear    pH, UA 6.0 5.0 - 8.0    Specific Gravity, UA 1.031 (H) 1.005 - 1.030    Glucose, UA Negative Negative    Ketones, UA Negative Negative    Bilirubin, UA Small (1+) (A) Negative    Blood, UA Large (3+) (A) Negative    Protein, UA >=300 mg/dL (3+) (A) Negative    Leuk Esterase, UA      Nitrite, UA Negative Negative    Urobilinogen, UA 1.0 E.U./dL 0.2 - 1.0 E.U./dL   Urinalysis, Microscopic Only - Urine, Clean Catch    Collection Time: 06/04/25 12:46 PM    Specimen: Urine, Clean Catch   Result Value Ref Range    RBC, UA Too Numerous to Count (A) None Seen, 0-2 /HPF    WBC, UA Too Numerous to Count (A) None Seen, 0-2 /HPF    Bacteria, UA 1+ (A) None Seen /HPF    Squamous Epithelial Cells, UA None Seen None Seen, 0-2 /HPF    Hyaline Casts, UA None Seen None Seen /LPF    Methodology Manual Light Microscopy    POC Lactate    Collection Time: 06/04/25  1:56 PM    Specimen: Blood   Result Value Ref Range    Lactate 0.5 0.3 - 2.0 mmol/L     CT Abdomen Pelvis With Contrast   Final Result   Impression:   1.Mild concentric urinary bladder wall thickening may be related to its decompressed state. Cystitis not excluded. Correlate with clinical symptoms and urinalysis findings.         Electronically Signed: Carli Scott MD     6/4/2025 1:12 PM EDT     Workstation ID: [id number]        Medications   sodium chloride 0.9 % flush 10 mL (has no administration in time range)   cefTRIAXone (ROCEPHIN) 2 g in sodium chloride 0.9 % 100 mL MBP (2 g Intravenous New Bag 6/4/25 2781)   HYDROmorphone (DILAUDID) injection 0.5 mg (has no administration in time range)   iopamidol (ISOVUE-370) 76 % injection 100 mL (100 mL Intravenous Given 6/4/25 4145)  "  acetaminophen (TYLENOL) tablet 1,000 mg (1,000 mg Oral Given 6/4/25 1338)     /90   Pulse 91   Temp 99.2 °F (37.3 °C) (Oral)   Resp 18   Ht 182.9 cm (72\")   Wt 99.2 kg (218 lb 11.1 oz)   SpO2 93%   BMI 29.66 kg/m²                                                      Medical Decision Making  Amount and/or Complexity of Data Reviewed  Labs: ordered.  Radiology: ordered.    Chart review: Patient had been seen by prior provider for low back pain hypertension hyperlipidemia April 29.  Had also been seen in March for COPD.  Seen by cardiology December of last year for atrial flutter paroxysmal atrial fibrillation.  Comorbidity: As per past history   Differential: Hematuria, UTI, prostatitis, mass,  My EKG interpretation: Not indicated  Lab: Urinalysis TNTC red blood cells white blood cells 1+ bacteria basic metabolic panel normal CBC white count 12,000 with normal differential.  Lactic acid 0.5  My Radiology review and interpretation: CT scan abdomen and pelvis reveals mild urinary bladder wall thickening.  Prostate appears within normal limits.  There is a 2.8 cm sclerotic area mid sacrum nonspecific.  Kidneys enhance and normal and symmetric fashion.  There acute small cyst noted left kidney.  Discussion/treatment: Patient had IV placed.  He was given Rocephin.  Was given Tylenol for pain.  Patient was still complaining of discomfort and was given dose of hydromorphone.  He is not driving.  Disposition was discussed with him observation versus outpatient treatment after discussion he would prefer to try outpatient treatment.  His inspect report was reviewed.  He was discharged placed on Augmentin and given short course of Norco for pain.  He is to follow-up with his prime provider 2 days for recheck and to follow-up on cultures.  Reasons to return were discussed.  Patient was evaluated using appropriate PPE      Final diagnoses:   Urinary tract infection with hematuria, site unspecified   Right inguinal " pain       ED Disposition  ED Disposition       ED Disposition   Discharge    Condition   Stable    Comment   --               David Causey MD  [address] [place] [address] [place] [address] [phone]    Schedule an appointment as soon as possible for a visit in 2 days           Medication List        New Prescriptions      amoxicillin-clavulanate 875-125 MG per tablet  Commonly known as: AUGMENTIN  Take 1 tablet by mouth Every 12 (Twelve) Hours.     HYDROcodone-acetaminophen 5-325 MG per tablet  Commonly known as: NORCO  Take 1 tablet by mouth Every 6 (Six) Hours As Needed for Moderate Pain.               Where to Get Your Medications        These medications were sent to [facility] [address] [place] [address]      Hours: Monday to Friday 7 AM to 7 PM Phone: [phone]   amoxicillin-clavulanate 875-125 MG per tablet  HYDROcodone-acetaminophen 5-325 MG per tablet            David Hatch MD  06/04/25 6619       David Hatch MD  06/05/25 8339     tablet  Commonly known as: NORCO  Take 1 tablet by mouth Every 6 (Six) Hours As Needed for Moderate Pain.               Where to Get Your Medications        These medications were sent to [facility] [address] [place] [address]      Hours: Monday to Friday 7 AM to 7 PM Phone: [phone]   HYDROcodone-acetaminophen 5-325 MG per tablet            David Hatch MD  06/04/25 1426       David Hatch MD  06/05/25 8708       David Hatch MD  06/12/25 8623

## 2025-06-05 ENCOUNTER — HOSPITAL ENCOUNTER (OUTPATIENT)
Facility: HOSPITAL | Age: 65
Setting detail: OBSERVATION
Discharge: HOME | End: 2025-06-06
Attending: EMERGENCY MEDICINE | Admitting: EMERGENCY MEDICINE
Payer: MEDICARE

## 2025-06-05 ENCOUNTER — NURSE TRIAGE (OUTPATIENT)
Dept: CALL CENTER | Facility: HOSPITAL | Age: 65
End: 2025-06-05
Payer: MEDICARE

## 2025-06-05 ENCOUNTER — APPOINTMENT (OUTPATIENT)
Dept: ULTRASOUND IMAGING | Facility: HOSPITAL | Age: 65
End: 2025-06-05
Payer: MEDICARE

## 2025-06-05 DIAGNOSIS — N43.3 RIGHT HYDROCELE: ICD-10-CM

## 2025-06-05 DIAGNOSIS — N45.1 EPIDIDYMITIS, RIGHT: Primary | ICD-10-CM

## 2025-06-05 DIAGNOSIS — N39.0 ACUTE URINARY TRACT INFECTION: ICD-10-CM

## 2025-06-05 LAB
ALBUMIN SERPL-MCNC: 4.5 G/DL (ref 3.5–5.2)
ALBUMIN/GLOB SERPL: 1.2 G/DL
ALP SERPL-CCNC: 99 U/L (ref 39–117)
ALT SERPL W P-5'-P-CCNC: 28 U/L (ref 1–41)
ANION GAP SERPL CALCULATED.3IONS-SCNC: 13.2 MMOL/L (ref 5–15)
AST SERPL-CCNC: 25 U/L (ref 1–40)
BACTERIA UR QL AUTO: ABNORMAL /HPF
BASOPHILS # BLD AUTO: 0.04 10*3/MM3 (ref 0–0.2)
BASOPHILS NFR BLD AUTO: 0.3 % (ref 0–1.5)
BILIRUB SERPL-MCNC: 1 MG/DL (ref 0–1.2)
BILIRUB UR QL STRIP: ABNORMAL
BUN SERPL-MCNC: 6.9 MG/DL (ref 8–23)
BUN/CREAT SERPL: 7.4 (ref 7–25)
C TRACH DNA SPEC QL NAA+PROBE: NOT DETECTED
CALCIUM SPEC-SCNC: 9.6 MG/DL (ref 8.6–10.5)
CHLORIDE SERPL-SCNC: 102 MMOL/L (ref 98–107)
CLARITY UR: ABNORMAL
CO2 SERPL-SCNC: 23.8 MMOL/L (ref 22–29)
COLOR UR: ABNORMAL
CREAT SERPL-MCNC: 0.93 MG/DL (ref 0.76–1.27)
D-LACTATE SERPL-SCNC: 1 MMOL/L (ref 0.3–2)
DEPRECATED RDW RBC AUTO: 48.5 FL (ref 37–54)
EGFRCR SERPLBLD CKD-EPI 2021: 91.7 ML/MIN/1.73
EOSINOPHIL # BLD AUTO: 0.03 10*3/MM3 (ref 0–0.4)
EOSINOPHIL NFR BLD AUTO: 0.2 % (ref 0.3–6.2)
ERYTHROCYTE [DISTWIDTH] IN BLOOD BY AUTOMATED COUNT: 13.4 % (ref 12.3–15.4)
GLOBULIN UR ELPH-MCNC: 3.8 GM/DL
GLUCOSE SERPL-MCNC: 101 MG/DL (ref 65–99)
GLUCOSE UR STRIP-MCNC: NEGATIVE MG/DL
HCT VFR BLD AUTO: 49.4 % (ref 37.5–51)
HGB BLD-MCNC: 16.6 G/DL (ref 13–17.7)
HGB UR QL STRIP.AUTO: ABNORMAL
HOLD SPECIMEN: NORMAL
HOLD SPECIMEN: NORMAL
HYALINE CASTS UR QL AUTO: ABNORMAL /LPF
IMM GRANULOCYTES # BLD AUTO: 0.06 10*3/MM3 (ref 0–0.05)
IMM GRANULOCYTES NFR BLD AUTO: 0.5 % (ref 0–0.5)
KETONES UR QL STRIP: ABNORMAL
LEUKOCYTE ESTERASE UR QL STRIP.AUTO: ABNORMAL
LYMPHOCYTES # BLD AUTO: 2.65 10*3/MM3 (ref 0.7–3.1)
LYMPHOCYTES NFR BLD AUTO: 19.9 % (ref 19.6–45.3)
MCH RBC QN AUTO: 32.5 PG (ref 26.6–33)
MCHC RBC AUTO-ENTMCNC: 33.6 G/DL (ref 31.5–35.7)
MCV RBC AUTO: 96.7 FL (ref 79–97)
MONOCYTES # BLD AUTO: 0.84 10*3/MM3 (ref 0.1–0.9)
MONOCYTES NFR BLD AUTO: 6.3 % (ref 5–12)
N GONORRHOEA RRNA SPEC QL NAA+PROBE: NOT DETECTED
NEUTROPHILS NFR BLD AUTO: 72.8 % (ref 42.7–76)
NEUTROPHILS NFR BLD AUTO: 9.7 10*3/MM3 (ref 1.7–7)
NITRITE UR QL STRIP: POSITIVE
NRBC BLD AUTO-RTO: 0 /100 WBC (ref 0–0.2)
PH UR STRIP.AUTO: <=5 [PH] (ref 5–8)
PLATELET # BLD AUTO: 159 10*3/MM3 (ref 140–450)
PMV BLD AUTO: 11.1 FL (ref 6–12)
POTASSIUM SERPL-SCNC: 3.2 MMOL/L (ref 3.5–5.2)
PROT SERPL-MCNC: 8.3 G/DL (ref 6–8.5)
PROT UR QL STRIP: ABNORMAL
PSA FREE MFR SERPL: 4.8 %
PSA FREE SERPL-MCNC: 0.3 NG/ML
PSA SERPL-MCNC: 6.3 NG/ML (ref 0–4)
RBC # BLD AUTO: 5.11 10*6/MM3 (ref 4.14–5.8)
RBC # UR STRIP: ABNORMAL /HPF
REF LAB TEST METHOD: ABNORMAL
SODIUM SERPL-SCNC: 139 MMOL/L (ref 136–145)
SP GR UR STRIP: 1.02 (ref 1–1.03)
SQUAMOUS #/AREA URNS HPF: ABNORMAL /HPF
UROBILINOGEN UR QL STRIP: ABNORMAL
WBC # UR STRIP: ABNORMAL /HPF
WBC NRBC COR # BLD AUTO: 13.32 10*3/MM3 (ref 3.4–10.8)
WHOLE BLOOD HOLD COAG: NORMAL
WHOLE BLOOD HOLD SPECIMEN: NORMAL

## 2025-06-05 PROCEDURE — 96376 TX/PRO/DX INJ SAME DRUG ADON: CPT

## 2025-06-05 PROCEDURE — 83605 ASSAY OF LACTIC ACID: CPT

## 2025-06-05 PROCEDURE — 99285 EMERGENCY DEPT VISIT HI MDM: CPT

## 2025-06-05 PROCEDURE — 87040 BLOOD CULTURE FOR BACTERIA: CPT | Performed by: EMERGENCY MEDICINE

## 2025-06-05 PROCEDURE — G0378 HOSPITAL OBSERVATION PER HR: HCPCS

## 2025-06-05 PROCEDURE — 36415 COLL VENOUS BLD VENIPUNCTURE: CPT

## 2025-06-05 PROCEDURE — 25810000003 LACTATED RINGERS SOLUTION: Performed by: EMERGENCY MEDICINE

## 2025-06-05 PROCEDURE — 93976 VASCULAR STUDY: CPT

## 2025-06-05 PROCEDURE — 85025 COMPLETE CBC W/AUTO DIFF WBC: CPT | Performed by: EMERGENCY MEDICINE

## 2025-06-05 PROCEDURE — 25010000002 ONDANSETRON PER 1 MG: Performed by: EMERGENCY MEDICINE

## 2025-06-05 PROCEDURE — 87591 N.GONORRHOEAE DNA AMP PROB: CPT | Performed by: EMERGENCY MEDICINE

## 2025-06-05 PROCEDURE — 87491 CHLMYD TRACH DNA AMP PROBE: CPT | Performed by: EMERGENCY MEDICINE

## 2025-06-05 PROCEDURE — 81001 URINALYSIS AUTO W/SCOPE: CPT

## 2025-06-05 PROCEDURE — 96365 THER/PROPH/DIAG IV INF INIT: CPT

## 2025-06-05 PROCEDURE — 96375 TX/PRO/DX INJ NEW DRUG ADDON: CPT

## 2025-06-05 PROCEDURE — 76870 US EXAM SCROTUM: CPT

## 2025-06-05 PROCEDURE — 25010000002 HYDROMORPHONE 1 MG/ML SOLUTION: Performed by: EMERGENCY MEDICINE

## 2025-06-05 PROCEDURE — 25010000002 LEVOFLOXACIN PER 250 MG: Performed by: EMERGENCY MEDICINE

## 2025-06-05 PROCEDURE — 80053 COMPREHEN METABOLIC PANEL: CPT | Performed by: EMERGENCY MEDICINE

## 2025-06-05 PROCEDURE — 87086 URINE CULTURE/COLONY COUNT: CPT | Performed by: EMERGENCY MEDICINE

## 2025-06-05 RX ORDER — LANOLIN ALCOHOL/MO/W.PET/CERES
1000 CREAM (GRAM) TOPICAL DAILY
COMMUNITY

## 2025-06-05 RX ORDER — ACETAMINOPHEN 500 MG
1000 TABLET ORAL ONCE
Status: COMPLETED | OUTPATIENT
Start: 2025-06-05 | End: 2025-06-05

## 2025-06-05 RX ORDER — SODIUM CHLORIDE 0.9 % (FLUSH) 0.9 %
10 SYRINGE (ML) INJECTION AS NEEDED
Status: DISCONTINUED | OUTPATIENT
Start: 2025-06-05 | End: 2025-06-06 | Stop reason: HOSPADM

## 2025-06-05 RX ORDER — ONDANSETRON 2 MG/ML
4 INJECTION INTRAMUSCULAR; INTRAVENOUS ONCE
Status: COMPLETED | OUTPATIENT
Start: 2025-06-05 | End: 2025-06-05

## 2025-06-05 RX ORDER — LEVOFLOXACIN 5 MG/ML
750 INJECTION, SOLUTION INTRAVENOUS EVERY 24 HOURS
Status: DISCONTINUED | OUTPATIENT
Start: 2025-06-05 | End: 2025-06-06

## 2025-06-05 RX ADMIN — HYDROMORPHONE HYDROCHLORIDE 0.5 MG: 1 INJECTION, SOLUTION INTRAMUSCULAR; INTRAVENOUS; SUBCUTANEOUS at 22:03

## 2025-06-05 RX ADMIN — ACETAMINOPHEN 1000 MG: 500 TABLET, FILM COATED ORAL at 22:04

## 2025-06-05 RX ADMIN — ONDANSETRON 4 MG: 2 INJECTION, SOLUTION INTRAMUSCULAR; INTRAVENOUS at 18:53

## 2025-06-05 RX ADMIN — LEVOFLOXACIN 750 MG: 5 INJECTION, SOLUTION INTRAVENOUS at 22:04

## 2025-06-05 RX ADMIN — ONDANSETRON 4 MG: 2 INJECTION, SOLUTION INTRAMUSCULAR; INTRAVENOUS at 22:04

## 2025-06-05 RX ADMIN — HYDROMORPHONE HYDROCHLORIDE 0.5 MG: 1 INJECTION, SOLUTION INTRAMUSCULAR; INTRAVENOUS; SUBCUTANEOUS at 18:53

## 2025-06-05 RX ADMIN — SODIUM CHLORIDE, SODIUM LACTATE, POTASSIUM CHLORIDE, CALCIUM CHLORIDE 500 ML: 20; 30; 600; 310 INJECTION, SOLUTION INTRAVENOUS at 18:53

## 2025-06-05 NOTE — TELEPHONE ENCOUNTER
"Right testicle is swollen twice the size of his left testicle and painful    Care advice given per guideline  Reason for Disposition   Scrotum is painful or tender to touch    Additional Information   Negative: [1] Rash or color change of scrotum BUT [2] no swelling or pain   Negative: Inguinal hernia previously diagnosed by a doctor (or NP/PA)   Negative: Swelling followed a genital area injury (e.g., penis, scrotum)   Negative: Pain in scrotum is main symptom   Negative: [1] Rash or color change of scrotum BUT [2] no swelling or pain   Negative: Inguinal hernia previously diagnosed by a physician   Negative: Followed a genital area injury (e.g., penis, scrotum)   Swelling of scrotum is main symptom    Answer Assessment - Initial Assessment Questions  1. SCROTAL SWELLING: \"What does the scrotum look like?\" \"How swollen is it?\" (mild, moderate severe; compare to other side)   Twice the size of left testicel  2. LOCATION: \"Where is the swelling located?\"     Right testicel  3. ONSET: \"When did the swelling start?\"      today  4. PATTERN: \"Does it come and go, or has it been constant since it started?\"      *No Answer*  5. SCROTAL PAIN: \"Is there any pain?\" If Yes, ask: \"How bad is it?\"  (Scale 1-10; or mild, moderate, severe)      8/10  6. HERNIA: \"Has a doctor ever told you that you have a hernia?\"   no  7. OTHER SYMPTOMS: \"Do you have any other symptoms?\" (e.g., abdomen pain, difficulty passing urine, fever, vomiting)    No vomiting    Urinating fine   Urine was dark wine color yesterday, a little brownish color today (it has improved)    Answer Assessment - Initial Assessment Questions  1. LOCATION and RADIATION: \"Where is the pain located?\"       Right testicle  2. QUALITY: \"What does the pain feel like?\"  (e.g., sharp, dull, aching, burning)        dull  3. SEVERITY: \"How bad is the pain?\"  (Scale 1-10; or mild, moderate, severe)    - MILD (1-3): doesn't interfere with normal activities     - MODERATE (4-7): " "interferes with normal activities (e.g., work or school) or awakens from sleep    - SEVERE (8-10): excruciating pain, unable to do any normal activities, difficulty walking   8/10 with movement  4. ONSET: \"When did the pain start?\"         yesterday  5. PATTERN: \"Does it come and go, or has it been constant since it started?\"      *No Answer*  6. SCROTAL APPEARANCE: \"What does the scrotum look like?\" \"Is there any swelling or redness?\"    Right testicle is swollen double  7. HERNIA: \"Has a doctor ever told you that you have a hernia?\"      denies  8. OTHER SYMPTOMS: \"Do you have any other symptoms?\" (e.g., fever, abdominal pain, vomiting, difficulty passing urine)    Urinating fine    urine brownish yesterday was the color of dark wine    Protocols used: Scrotal Pain-ADULT-AH, Scrotum Swelling-ADULT-AH    "

## 2025-06-06 ENCOUNTER — READMISSION MANAGEMENT (OUTPATIENT)
Dept: CALL CENTER | Facility: HOSPITAL | Age: 65
End: 2025-06-06
Payer: MEDICARE

## 2025-06-06 VITALS
HEART RATE: 80 BPM | WEIGHT: 217.59 LBS | SYSTOLIC BLOOD PRESSURE: 96 MMHG | RESPIRATION RATE: 16 BRPM | HEIGHT: 72 IN | BODY MASS INDEX: 29.47 KG/M2 | OXYGEN SATURATION: 100 % | TEMPERATURE: 98.1 F | DIASTOLIC BLOOD PRESSURE: 64 MMHG

## 2025-06-06 LAB
ANION GAP SERPL CALCULATED.3IONS-SCNC: 11.2 MMOL/L (ref 5–15)
BUN SERPL-MCNC: 9.1 MG/DL (ref 8–23)
BUN/CREAT SERPL: 11.1 (ref 7–25)
CALCIUM SPEC-SCNC: 9.1 MG/DL (ref 8.6–10.5)
CHLORIDE SERPL-SCNC: 104 MMOL/L (ref 98–107)
CO2 SERPL-SCNC: 23.8 MMOL/L (ref 22–29)
CREAT SERPL-MCNC: 0.82 MG/DL (ref 0.76–1.27)
EGFRCR SERPLBLD CKD-EPI 2021: 98.1 ML/MIN/1.73
GLUCOSE SERPL-MCNC: 141 MG/DL (ref 65–99)
POTASSIUM SERPL-SCNC: 3.6 MMOL/L (ref 3.5–5.2)
SODIUM SERPL-SCNC: 139 MMOL/L (ref 136–145)

## 2025-06-06 PROCEDURE — G0378 HOSPITAL OBSERVATION PER HR: HCPCS

## 2025-06-06 PROCEDURE — 25810000003 SODIUM CHLORIDE 0.9 % SOLUTION: Performed by: EMERGENCY MEDICINE

## 2025-06-06 PROCEDURE — 94664 DEMO&/EVAL PT USE INHALER: CPT

## 2025-06-06 PROCEDURE — 94640 AIRWAY INHALATION TREATMENT: CPT

## 2025-06-06 PROCEDURE — 94761 N-INVAS EAR/PLS OXIMETRY MLT: CPT

## 2025-06-06 PROCEDURE — 94799 UNLISTED PULMONARY SVC/PX: CPT

## 2025-06-06 PROCEDURE — 25010000002 CEFTRIAXONE PER 250 MG: Performed by: NURSE PRACTITIONER

## 2025-06-06 PROCEDURE — 80048 BASIC METABOLIC PNL TOTAL CA: CPT | Performed by: NURSE PRACTITIONER

## 2025-06-06 PROCEDURE — 63710000001 REVEFENACIN 175 MCG/3ML SOLUTION: Performed by: NURSE PRACTITIONER

## 2025-06-06 PROCEDURE — 25010000002 HYDROMORPHONE PER 4 MG: Performed by: EMERGENCY MEDICINE

## 2025-06-06 PROCEDURE — 96376 TX/PRO/DX INJ SAME DRUG ADON: CPT

## 2025-06-06 RX ORDER — SODIUM CHLORIDE 0.9 % (FLUSH) 0.9 %
10 SYRINGE (ML) INJECTION EVERY 12 HOURS SCHEDULED
Status: DISCONTINUED | OUTPATIENT
Start: 2025-06-06 | End: 2025-06-06 | Stop reason: HOSPADM

## 2025-06-06 RX ORDER — SODIUM CHLORIDE 0.9 % (FLUSH) 0.9 %
10 SYRINGE (ML) INJECTION AS NEEDED
Status: DISCONTINUED | OUTPATIENT
Start: 2025-06-06 | End: 2025-06-06 | Stop reason: HOSPADM

## 2025-06-06 RX ORDER — ONDANSETRON 2 MG/ML
4 INJECTION INTRAMUSCULAR; INTRAVENOUS EVERY 6 HOURS PRN
Status: DISCONTINUED | OUTPATIENT
Start: 2025-06-06 | End: 2025-06-06 | Stop reason: HOSPADM

## 2025-06-06 RX ORDER — HYDROMORPHONE HYDROCHLORIDE 1 MG/ML
0.5 INJECTION, SOLUTION INTRAMUSCULAR; INTRAVENOUS; SUBCUTANEOUS
Status: DISCONTINUED | OUTPATIENT
Start: 2025-06-06 | End: 2025-06-06 | Stop reason: HOSPADM

## 2025-06-06 RX ORDER — SODIUM CHLORIDE 9 MG/ML
100 INJECTION, SOLUTION INTRAVENOUS CONTINUOUS
Status: DISCONTINUED | OUTPATIENT
Start: 2025-06-06 | End: 2025-06-06 | Stop reason: HOSPADM

## 2025-06-06 RX ORDER — SODIUM CHLORIDE 9 MG/ML
40 INJECTION, SOLUTION INTRAVENOUS AS NEEDED
Status: DISCONTINUED | OUTPATIENT
Start: 2025-06-06 | End: 2025-06-06 | Stop reason: HOSPADM

## 2025-06-06 RX ORDER — PANTOPRAZOLE SODIUM 40 MG/1
40 TABLET, DELAYED RELEASE ORAL DAILY
Status: DISCONTINUED | OUTPATIENT
Start: 2025-06-06 | End: 2025-06-06 | Stop reason: HOSPADM

## 2025-06-06 RX ORDER — ARFORMOTEROL TARTRATE 15 UG/2ML
15 SOLUTION RESPIRATORY (INHALATION)
Status: DISCONTINUED | OUTPATIENT
Start: 2025-06-06 | End: 2025-06-06 | Stop reason: HOSPADM

## 2025-06-06 RX ORDER — ALBUTEROL SULFATE 0.83 MG/ML
2.5 SOLUTION RESPIRATORY (INHALATION) EVERY 6 HOURS PRN
Status: DISCONTINUED | OUTPATIENT
Start: 2025-06-06 | End: 2025-06-06 | Stop reason: HOSPADM

## 2025-06-06 RX ADMIN — HYDROMORPHONE HYDROCHLORIDE 0.5 MG: 1 INJECTION, SOLUTION INTRAMUSCULAR; INTRAVENOUS; SUBCUTANEOUS at 07:20

## 2025-06-06 RX ADMIN — CEFTRIAXONE SODIUM 1000 MG: 1 INJECTION, POWDER, FOR SOLUTION INTRAMUSCULAR; INTRAVENOUS at 12:00

## 2025-06-06 RX ADMIN — REVEFENACIN 175 MCG: 175 SOLUTION RESPIRATORY (INHALATION) at 10:49

## 2025-06-06 RX ADMIN — HYDROMORPHONE HYDROCHLORIDE 0.5 MG: 1 INJECTION, SOLUTION INTRAMUSCULAR; INTRAVENOUS; SUBCUTANEOUS at 12:00

## 2025-06-06 RX ADMIN — PANTOPRAZOLE SODIUM 40 MG: 40 TABLET, DELAYED RELEASE ORAL at 09:22

## 2025-06-06 RX ADMIN — Medication 10 ML: at 09:23

## 2025-06-06 RX ADMIN — SODIUM CHLORIDE 100 ML/HR: 900 INJECTION, SOLUTION INTRAVENOUS at 02:50

## 2025-06-06 RX ADMIN — ARFORMOTEROL TARTRATE 15 MCG: 15 SOLUTION RESPIRATORY (INHALATION) at 10:45

## 2025-06-06 RX ADMIN — Medication 10 ML: at 02:50

## 2025-06-06 NOTE — ED PROVIDER NOTES
Subjective   History of Present Illness  64-year-old male evaluated yesterday for abdominal pain.  The patient stated that he declined admission at that time and went home.  Reports today he has had increased swelling to his right hemiscrotum associated with nausea and marked increase in pain.  He reports he has had subjective fever and chills today.  He reports no actual vomiting or diarrhea he reports that he has had some yellowish penile discharge      Review of Systems   Constitutional:  Positive for chills, fatigue and fever.   Gastrointestinal:  Positive for nausea.   Genitourinary:  Positive for dysuria, penile discharge, penile pain, scrotal swelling and testicular pain.   Hematological:  Does not bruise/bleed easily.   All other systems reviewed and are negative.      Past Medical History:   Diagnosis Date    Acute hypoxemic respiratory failure 11/06/2023    Allergic     Anxiety     Arthritis 06/2005    Back pain     Claustrophobia 1978    COPD (chronic obstructive pulmonary disease)     CTS (carpal tunnel syndrome) 10/2022    CVA (cerebral vascular accident) 12/11/2024    Dysphagia     Esophageal stricture     GERD (gastroesophageal reflux disease)     Hyperlipidemia     Hypertension     Knee pain     rt    Low back pain     Obesity     Obesity (BMI 30-39.9) 11/06/2023    Other cervical disc degeneration at C5-C6 level 08/22/2018    Paroxysmal atrial fibrillation with rapid ventricular response 11/06/2023    Pneumonia     Prediabetes 11/06/2023    PTSD (post-traumatic stress disorder)     Rhinovirus infection 11/06/2023    Thoracic disc herniation     Vitamin B12 deficiency        Allergies   Allergen Reactions    Atorvastatin Swelling    Lisinopril Other (See Comments)     Facial paralysis        Past Surgical History:   Procedure Laterality Date    ABLATION OF DYSRHYTHMIC FOCUS  7/31/2024    BRONCHOSCOPY N/A 11/03/2023    Procedure: BRONCHOSCOPY with bilateral lung washing's;  Surgeon: Kolton Brewer,  MD;  Location: [facility] ENDOSCOPY;  Service: Pulmonary;  Laterality: N/A;    CARDIAC CATHETERIZATION N/A 07/13/2022    Procedure: Left Heart Cath, possible pci;  Surgeon: Prieto Sidhu MD;  Location: [facility] CATH INVASIVE LOCATION;  Service: Cardiovascular;  Laterality: N/A;    CARDIAC ELECTROPHYSIOLOGY PROCEDURE N/A 07/31/2024    Procedure: Ablation atrial flutter;  Surgeon: Pardeep Walker MD;  Location: [facility] CATH INVASIVE LOCATION;  Service: Cardiovascular;  Laterality: N/A;    ENDOSCOPY      ENDOSCOPY N/A 09/21/2023    Procedure: ESOPHAGOGASTRODUODENOSCOPY WITH non guided esophageal dialtion 54 Fr. Bougie and  cold forcep biopsy x1 area;  Surgeon: Andres Concepcion MD;  Location: [facility] ENDOSCOPY;  Service: Gastroenterology;  Laterality: N/A;  Post- erosive gastritis, hiatal hernia, esophageal stricture    HERNIA REPAIR      INSERT / REPLACE / REMOVE PACEMAKER  11/31/2024    Temporary pacemaker implant in emergency room    KNEE ARTHROPLASTY      x2    SHOULDER ARTHROSCOPY W/ ROTATOR CUFF REPAIR Right 08/11/2020    Procedure: SHOULDER ARTHROSCOPY WITH ROTATOR CUFF REPAIR, extensive debridement;  Surgeon: Fredi Ritchie MD;  Location: [facility] MAIN OR;  Service: Orthopedics;  Laterality: Right;    TONSILLECTOMY         Family History   Problem Relation Age of Onset    Heart disease Mother     Early death Mother     Hyperlipidemia Mother     Hypertension Mother     Thyroid disease Mother     Heart attack Mother         Had valve replaced with a pig valve    Cancer Father     Stroke Father     Vision loss Father     Stroke Paternal Grandfather     Arthritis Paternal Grandmother     Alcohol abuse Brother     Asthma Brother     Depression Brother     Hyperlipidemia Brother     Hypertension Brother     Learning disabilities Brother     Mental illness Brother     Drug abuse Brother     Early death Brother     Heart disease Brother     Hyperlipidemia Brother         Bijan is the same  person    Hypertension Brother        Social History     Socioeconomic History    Marital status:    Tobacco Use    Smoking status: Every Day     Current packs/day: 1.00     Average packs/day: 1 pack/day for 52.4 years (52.4 ttl pk-yrs)     Types: Cigarettes     Start date: 1/9/1973     Passive exposure: Never    Smokeless tobacco: Never    Tobacco comments:     Smoked a half a pack a day for 42 years didn't start to smoke a whole pack until 20     17       Counseled to stop smoking   Vaping Use    Vaping status: Never Used   Substance and Sexual Activity    Alcohol use: No    Drug use: No    Sexual activity: Not Currently     Partners: Female     Birth control/protection: None             Objective   Physical Exam  Alert Leeanne Coma Scale 15   HEENT: Pupils equal and reactive to light. Conjunctivae are not injected. Normal tympanic membranes. Oropharynx and nares are normal.   Neck: Supple. Midline trachea. No JVD. No goiter.   Chest: Clear and equal breath sounds bilaterally, regular rate and rhythm without murmur or rub.   Abdomen: Positive bowel sounds, nontender, nondistended. No rebound or peritoneal signs. No CVA tenderness.   : Circumcised there is swelling noted to the right superior pole of the testes itself the patient appears to have bilateral inguinal lymphadenopathy there was no meatal erythema noted to some yellowish drainage was apparent  Extremities no clubbing. cyanosis or edema. Motor sensory exam is normal. The full range of motion is intact   Skin: Warm and dry, no rashes or petechia.   Lymphatic: No regional lymphadenopathy. No calf pain, swelling or Homans sign    Procedures           ED Course        Labs Reviewed   URINALYSIS W/ MICROSCOPIC IF INDICATED (NO CULTURE) - Abnormal; Notable for the following components:       Result Value    Color, UA Orange (*)     Appearance, UA Cloudy (*)     Ketones, UA Trace (*)     Bilirubin, UA Small (1+) (*)     Blood, UA Large (3+) (*)      Protein, UA 30 mg/dL (1+) (*)     Leuk Esterase, UA Moderate (2+) (*)     Nitrite, UA Positive (*)     All other components within normal limits   COMPREHENSIVE METABOLIC PANEL - Abnormal; Notable for the following components:    Glucose 101 (*)     BUN 6.9 (*)     Potassium 3.2 (*)     All other components within normal limits    Narrative:     GFR Categories in Chronic Kidney Disease (CKD)              GFR Category          GFR (mL/min/1.73)    Interpretation  G1                    90 or greater        Normal or high (1)  G2                    60-89                Mild decrease (1)  G3a                   45-59                Mild to moderate decrease  G3b                   30-44                Moderate to severe decrease  G4                    15-29                Severe decrease  G5                    14 or less           Kidney failure    (1)In the absence of evidence of kidney disease, neither GFR category G1 or G2 fulfill the criteria for CKD.    eGFR calculation 2021 CKD-EPI creatinine equation, which does not include race as a factor   URINALYSIS, MICROSCOPIC ONLY - Abnormal; Notable for the following components:    RBC, UA Too Numerous to Count (*)     WBC, UA Too Numerous to Count (*)     All other components within normal limits   CBC WITH AUTO DIFFERENTIAL - Abnormal; Notable for the following components:    WBC 13.32 (*)     Eosinophil % 0.2 (*)     Neutrophils, Absolute 9.70 (*)     Immature Grans, Absolute 0.06 (*)     All other components within normal limits   POC LACTATE - Normal   BLOOD CULTURE   BLOOD CULTURE   URINE CULTURE   RAINBOW DRAW    Narrative:     The following orders were created for panel order [facility] Draw.  Procedure                               Abnormality         Status                     ---------                               -----------         ------                     Green Top (Gel)[[id number]]                                  Final result               Lavender  Top[[id number]]                                     Final result               Gold Top - SST[[id number]]                                   Final result               Light Blue Top[[id number]]                                   Final result                 Please view results for these tests on the individual orders.   POC LACTATE   GREEN TOP   LAVENDER TOP   GOLD TOP - SST   LIGHT BLUE TOP   CBC AND DIFFERENTIAL    Narrative:     The following orders were created for panel order CBC & Differential.  Procedure                               Abnormality         Status                     ---------                               -----------         ------                     CBC Auto Differential[[id number]]        Abnormal            Final result                 Please view results for these tests on the individual orders.     Medications   sodium chloride 0.9 % flush 10 mL (has no administration in time range)   levoFLOXacin (LEVAQUIN) 750 mg/150 mL D5W (premix) (LEVAQUIN) 750 mg (750 mg Intravenous New Bag 6/5/25 2204)   lactated ringers bolus 500 mL (0 mL Intravenous Stopped 6/5/25 2046)   ondansetron (ZOFRAN) injection 4 mg (4 mg Intravenous Given 6/5/25 1853)   HYDROmorphone (DILAUDID) injection 0.5 mg (0.5 mg Intravenous Given 6/5/25 1853)   ondansetron (ZOFRAN) injection 4 mg (4 mg Intravenous Given 6/5/25 2204)   acetaminophen (TYLENOL) tablet 1,000 mg (1,000 mg Oral Given 6/5/25 2204)   HYDROmorphone (DILAUDID) injection 0.5 mg (0.5 mg Intravenous Given 6/5/25 2203)     US Scrotum & Testicles  Result Date: 6/5/2025  Impression: 1.Enlarged hypervascular right epididymal tail likely representing localized epididymitis. 2.No evidence of testicular torsion. 3.Small right-sided hydrocele. Electronically Signed: Corbin Mclain MD  6/5/2025 7:47 PM EDT  Workstation ID: [id number]    CT Abdomen Pelvis With Contrast  Result Date: 6/4/2025  Impression: 1.Mild concentric urinary bladder wall thickening may be related to its  decompressed state. Cystitis not excluded. Correlate with clinical symptoms and urinalysis findings. Electronically Signed: Carli Scott MD  6/4/2025 1:12 PM EDT  Workstation ID: [id number]                                                   Medical Decision Making  The patient's pain was controlled with IV hydromorphone.  The patient was started on IV Levaquin ultrasound was reviewed the patient will be placed in observational unit for pain control and hydration tonight he was agreeable to this plan of treatment    Amount and/or Complexity of Data Reviewed  Labs: ordered. Decision-making details documented in ED Course.  Radiology: ordered and independent interpretation performed.    Risk  OTC drugs.  Prescription drug management.  Parenteral controlled substances.  Decision regarding hospitalization.        Final diagnoses:   None       ED Disposition  ED Disposition       None            No follow-up provider specified.       Medication List      No changes were made to your prescriptions during this visit.            Baljeet Leon MD  06/05/25 3530

## 2025-06-06 NOTE — NURSING NOTE
Discharge order received from provider.  Discharge instructions reviewed with patient and patient verbalized understanding.  IV discontinued without complication.  Patient states that all belongings are accounted for.  Patient discharged home via private vehicle accompanied by daughter.

## 2025-06-06 NOTE — CONSULTS
[facility] CONSULT      Patient Identification:  NAME:  Jagdish Rea  Age:  64 y.o.   Sex:  male   :  1960   MRN:  [id number]       Chief complaint/Reason for consult: Right epididymitis, UTI    History of present illness:  64 y.o. male came to ER for hematuria and pain and found to have a UTI and epididymitis on CT and HAYLIE. Admitted on abx      Past medical history:  Past Medical History:   Diagnosis Date    Acute hypoxemic respiratory failure 2023    Allergic     Anxiety     Arthritis 2005    Back pain     Claustrophobia     COPD (chronic obstructive pulmonary disease)     CTS (carpal tunnel syndrome) 10/2022    CVA (cerebral vascular accident) 2024    Dysphagia     Esophageal stricture     GERD (gastroesophageal reflux disease)     Hyperlipidemia     Hypertension     Knee pain     rt    Low back pain     Obesity     Obesity (BMI 30-39.9) 2023    Other cervical disc degeneration at C5-C6 level 2018    Paroxysmal atrial fibrillation with rapid ventricular response 2023    Pneumonia     Prediabetes 2023    PTSD (post-traumatic stress disorder)     Rhinovirus infection 2023    Thoracic disc herniation     Vitamin B12 deficiency        Past surgical history:  Past Surgical History:   Procedure Laterality Date    ABLATION OF DYSRHYTHMIC FOCUS  2024    BRONCHOSCOPY N/A 2023    Procedure: BRONCHOSCOPY with bilateral lung washing's;  Surgeon: Kolton Brewer MD;  Location: [facility] ENDOSCOPY;  Service: Pulmonary;  Laterality: N/A;    CARDIAC CATHETERIZATION N/A 2022    Procedure: Left Heart Cath, possible pci;  Surgeon: Prieto Sidhu MD;  Location: [facility] CATH INVASIVE LOCATION;  Service: Cardiovascular;  Laterality: N/A;    CARDIAC ELECTROPHYSIOLOGY PROCEDURE N/A 2024    Procedure: Ablation atrial flutter;  Surgeon: Pardeep Walker MD;  Location: [facility] CATH INVASIVE LOCATION;  Service: Cardiovascular;  Laterality:  N/A;    ENDOSCOPY      ENDOSCOPY N/A 09/21/2023    Procedure: ESOPHAGOGASTRODUODENOSCOPY WITH non guided esophageal dialtion 54 Fr. Bougie and  cold forcep biopsy x1 area;  Surgeon: Andres Concepcion MD;  Location: [facility] ENDOSCOPY;  Service: Gastroenterology;  Laterality: N/A;  Post- erosive gastritis, hiatal hernia, esophageal stricture    HERNIA REPAIR      INSERT / REPLACE / REMOVE PACEMAKER  11/31/2024    Temporary pacemaker implant in emergency room    KNEE ARTHROPLASTY      x2    SHOULDER ARTHROSCOPY W/ ROTATOR CUFF REPAIR Right 08/11/2020    Procedure: SHOULDER ARTHROSCOPY WITH ROTATOR CUFF REPAIR, extensive debridement;  Surgeon: Fredi Ritchie MD;  Location: [facility] MAIN OR;  Service: Orthopedics;  Laterality: Right;    TONSILLECTOMY         Allergies:  Atorvastatin and Lisinopril    Home medications:  Medications Prior to Admission   Medication Sig Dispense Refill Last Dose/Taking    albuterol sulfate  (90 Base) MCG/ACT inhaler Inhale 2 puffs Every 4 (Four) Hours As Needed for Wheezing. 18 g 1 Taking As Needed    amoxicillin-clavulanate (AUGMENTIN) 875-125 MG per tablet Take 1 tablet by mouth Every 12 (Twelve) Hours. 14 tablet 0 Taking    apixaban (ELIQUIS) 5 MG tablet tablet Take 1 tablet by mouth Every 12 (Twelve) Hours. Indications: Atrial Fibrillation 60 tablet 0 Taking    fluticasone (FLONASE) 50 MCG/ACT nasal spray Administer 2 sprays into the nostril(s) as directed by provider Daily. 16 g 0 Taking    HYDROcodone-acetaminophen (NORCO) 5-325 MG per tablet Take 1 tablet by mouth Every 6 (Six) Hours As Needed for Moderate Pain. 10 tablet 0 Taking As Needed    pantoprazole (PROTONIX) 40 MG EC tablet Take 1 tablet by mouth Daily.   Taking    pravastatin (PRAVACHOL) 80 MG tablet Take 1 tablet by mouth Every Night.   Taking    tiotropium bromide-olodaterol (Stiolto Respimat) 2.5-2.5 MCG/ACT aerosol solution inhaler Inhale 2 puffs Daily. 4 g 5 Taking    vitamin B-12 (CYANOCOBALAMIN)  1000 MCG tablet Take 1 tablet by mouth Daily.   Taking        Hospital medications:  [Held by provider] apixaban, 5 mg, Oral, Q12H  arformoterol, 15 mcg, Nebulization, BID - RT   And  revefenacin, 175 mcg, Nebulization, Daily - RT  cefTRIAXone, 1,000 mg, Intravenous, Q24H  pantoprazole, 40 mg, Oral, Daily  sodium chloride, 10 mL, Intravenous, Q12H      sodium chloride, 100 mL/hr, Last Rate: 100 mL/hr (25 0541)        albuterol    HYDROmorphone    melatonin    ondansetron    sodium chloride    sodium chloride    sodium chloride    Family history:  Family History   Problem Relation Age of Onset    Heart disease Mother     Early death Mother     Hyperlipidemia Mother     Hypertension Mother     Thyroid disease Mother     Heart attack Mother         Had valve replaced with a pig valve    Cancer Father     Stroke Father     Vision loss Father     Stroke Paternal Grandfather     Arthritis Paternal Grandmother     Alcohol abuse Brother     Asthma Brother     Depression Brother     Hyperlipidemia Brother     Hypertension Brother     Learning disabilities Brother     Mental illness Brother     Drug abuse Brother     Early death Brother     Heart disease Brother     Hyperlipidemia Brother         Biajn is the same person    Hypertension Brother        Social history:  Social History     Tobacco Use    Smoking status: Every Day     Current packs/day: 1.00     Average packs/day: 1 pack/day for 52.4 years (52.4 ttl pk-yrs)     Types: Cigarettes     Start date: 1973     Passive exposure: Never    Smokeless tobacco: Never    Tobacco comments:     Smoked a half a pack a day for 42 years didn't start to smoke a whole pack until 20     17       Counseled to stop smoking   Vaping Use    Vaping status: Never Used   Substance Use Topics    Alcohol use: No    Drug use: No       Objective:  TMax 24 hours:   Temp (24hrs), Av.1 °F (36.7 °C), Min:97.9 °F (36.6 °C), Max:98.5 °F (36.9 °C)      Vitals Ranges:   Temp:  [97.9 °F  (36.6 °C)-98.5 °F (36.9 °C)] 98.1 °F (36.7 °C)  Heart Rate:  [] 80  Resp:  [16-20] 16  BP: ()/(64-86) 96/64    Intake/Output Last 3 shifts:  I/O last 3 completed shifts:  In: 200 [P.O.:200]  Out: 400 [Urine:400]     Physical Exam:    General Appearance:    Alert, cooperative, NAD   Lungs:     Respirations unlabored, no audible wheezing    Heart:    No cyanosis   Abdomen:     Soft, ND    :    C/w R epididymitis              Results review:   I reviewed the patient's new clinical results.    Data review:  Lab Results (last 24 hours)       Procedure Component Value Units Date/Time    Basic Metabolic Panel [[id number]]  (Abnormal) Collected: 06/06/25 0930    Specimen: Blood Updated: 06/06/25 1021     Glucose 141 mg/dL      BUN 9.1 mg/dL      Creatinine 0.82 mg/dL      Sodium 139 mmol/L      Potassium 3.6 mmol/L      Comment: Specimen hemolyzed.  Result may be falsely elevated.        Chloride 104 mmol/L      CO2 23.8 mmol/L      Calcium 9.1 mg/dL      BUN/Creatinine Ratio 11.1     Anion Gap 11.2 mmol/L      eGFR 98.1 mL/min/1.73     Narrative:      GFR Categories in Chronic Kidney Disease (CKD)              GFR Category          GFR (mL/min/1.73)    Interpretation  G1                    90 or greater        Normal or high (1)  G2                    60-89                Mild decrease (1)  G3a                   45-59                Mild to moderate decrease  G3b                   30-44                Moderate to severe decrease  G4                    15-29                Severe decrease  G5                    14 or less           Kidney failure    (1)In the absence of evidence of kidney disease, neither GFR category G1 or G2 fulfill the criteria for CKD.    eGFR calculation 2021 CKD-EPI creatinine equation, which does not include race as a factor    Chlamydia trachomatis, Neisseria gonorrhoeae, PCR - Urine, Urine, Clean Catch [[id number]]  (Normal) Collected: 06/05/25 2221    Specimen: Urine, Clean Catch  Updated: 06/05/25 2358     Chlamydia by PCR Not Detected     Neisseria gonorrhoeae by PCR Not Detected    Blood Culture - Blood, Arm, Left [[id number]] Collected: 06/05/25 1928    Specimen: Blood from Arm, Left Updated: 06/05/25 1931    CBC & Differential [[id number]]  (Abnormal) Collected: 06/05/25 1841    Specimen: Blood Updated: 06/05/25 1927    Narrative:      The following orders were created for panel order CBC & Differential.  Procedure                               Abnormality         Status                     ---------                               -----------         ------                     CBC Auto Differential[[id number]]        Abnormal            Final result                 Please view results for these tests on the individual orders.    CBC Auto Differential [[id number]]  (Abnormal) Collected: 06/05/25 1841    Specimen: Blood Updated: 06/05/25 1927     WBC 13.32 10*3/mm3      RBC 5.11 10*6/mm3      Hemoglobin 16.6 g/dL      Hematocrit 49.4 %      MCV 96.7 fL      MCH 32.5 pg      MCHC 33.6 g/dL      RDW 13.4 %      RDW-SD 48.5 fl      MPV 11.1 fL      Platelets 159 10*3/mm3      Neutrophil % 72.8 %      Lymphocyte % 19.9 %      Monocyte % 6.3 %      Eosinophil % 0.2 %      Basophil % 0.3 %      Immature Grans % 0.5 %      Neutrophils, Absolute 9.70 10*3/mm3      Lymphocytes, Absolute 2.65 10*3/mm3      Monocytes, Absolute 0.84 10*3/mm3      Eosinophils, Absolute 0.03 10*3/mm3      Basophils, Absolute 0.04 10*3/mm3      Immature Grans, Absolute 0.06 10*3/mm3      nRBC 0.0 /100 WBC     Urine Culture - Urine, Urine, Clean Catch [[id number]] Collected: 06/05/25 1841    Specimen: Urine, Clean Catch Updated: 06/05/25 1920    Comprehensive Metabolic Panel [[id number]]  (Abnormal) Collected: 06/05/25 1841    Specimen: Blood Updated: 06/05/25 1917     Glucose 101 mg/dL      BUN 6.9 mg/dL      Creatinine 0.93 mg/dL      Sodium 139 mmol/L      Potassium 3.2 mmol/L      Chloride 102 mmol/L      CO2 23.8 mmol/L       Calcium 9.6 mg/dL      Total Protein 8.3 g/dL      Albumin 4.5 g/dL      ALT (SGPT) 28 U/L      AST (SGOT) 25 U/L      Alkaline Phosphatase 99 U/L      Total Bilirubin 1.0 mg/dL      Globulin 3.8 gm/dL      A/G Ratio 1.2 g/dL      BUN/Creatinine Ratio 7.4     Anion Gap 13.2 mmol/L      eGFR 91.7 mL/min/1.73     Narrative:      GFR Categories in Chronic Kidney Disease (CKD)              GFR Category          GFR (mL/min/1.73)    Interpretation  G1                    90 or greater        Normal or high (1)  G2                    60-89                Mild decrease (1)  G3a                   45-59                Mild to moderate decrease  G3b                   30-44                Moderate to severe decrease  G4                    15-29                Severe decrease  G5                    14 or less           Kidney failure    (1)In the absence of evidence of kidney disease, neither GFR category G1 or G2 fulfill the criteria for CKD.    eGFR calculation 2021 CKD-EPI creatinine equation, which does not include race as a factor    Blood Culture - Blood, Arm, Right [[id number]] Collected: 06/05/25 1856    Specimen: Blood from Arm, Right Updated: 06/05/25 1905    POC Lactate [[id number]]  (Normal) Collected: 06/05/25 1903    Specimen: Blood Updated: 06/05/25 1904     Lactate 1.0 mmol/L      Comment: Serial Number: [id number]:  [id number]       [place] Draw [[id number]] Collected: 06/05/25 1841    Specimen: Blood Updated: 06/05/25 1901    Narrative:      The following orders were created for panel order [place] Draw.  Procedure                               Abnormality         Status                     ---------                               -----------         ------                     Green Top (Gel)[[id number]]                                  Final result               Lavender Top[[id number]]                                     Final result               Gold Top - SST[[id number]]                                    Final result               Light Blue Top[[id number]]                                   Final result                 Please view results for these tests on the individual orders.    Green Top (Gel) [[id number]] Collected: 06/05/25 1841    Specimen: Blood Updated: 06/05/25 1901     Extra Tube Hold for add-ons.     Comment: Auto resulted.       Lavender Top [[id number]] Collected: 06/05/25 1841    Specimen: Blood Updated: 06/05/25 1901     Extra Tube hold for add-on     Comment: Auto resulted       Gold Top - SST [[id number]] Collected: 06/05/25 1841    Specimen: Blood Updated: 06/05/25 1901     Extra Tube Hold for add-ons.     Comment: Auto resulted.       Light Blue Top [[id number]] Collected: 06/05/25 1841    Specimen: Blood Updated: 06/05/25 1901     Extra Tube Hold for add-ons.     Comment: Auto resulted       Urinalysis With Microscopic If Indicated (No Culture) - Urine, Clean Catch [[id number]]  (Abnormal) Collected: 06/05/25 1841    Specimen: Urine, Clean Catch Updated: 06/05/25 1857     Color, UA [place]     Comment: Any Substance that causes an abnormal urine color can alter the accuracy of the chemical reactions.        Appearance, UA Cloudy     pH, UA <=5.0     Specific Gravity, UA 1.025     Glucose, UA Negative     Ketones, UA Trace     Bilirubin, UA Small (1+)     Comment: Confirmation testing is unavailable.  A serum bilirubin is recommended for further assessment.        Blood, UA Large (3+)     Protein, UA 30 mg/dL (1+)     Leuk Esterase, UA Moderate (2+)     Nitrite, UA Positive     Urobilinogen, UA 1.0 E.U./dL    Urinalysis, Microscopic Only - Urine, Clean Catch [[id number]]  (Abnormal) Collected: 06/05/25 1841    Specimen: Urine, Clean Catch Updated: 06/05/25 1856     RBC, UA Too Numerous to Count /HPF      WBC, UA Too Numerous to Count /HPF      Bacteria, UA None Seen /HPF      Squamous Epithelial Cells, UA 0-2 /HPF      Hyaline Casts, UA 3-6 /LPF      Methodology Automated Microscopy              Imaging:  Imaging Results (Last 24 Hours)       Procedure Component Value Units Date/Time    US Scrotum & Testicles [[id number]] Collected: 06/05/25 1941     Updated: 06/05/25 1949    Narrative:      US SCROTUM AND TESTICLES    Date of Exam: 6/5/2025 6:58 PM EDT    Indication: Right testicular pain and swelling.    Comparison: CT abdomen/pelvis dated 6/4/2025    Technique: Multiple sonographic images of the scrotum were obtained in transverse and longitudinal planes. Grayscale and color Doppler duplex techniques were utilized.  Doppler spectral analysis was performed.      Findings:  The right testicle measures 4.3 x 3.0 x 3.2 cm. The left testicle measures 4.6 x 2.4 x 2.7 cm. There is normal color Doppler and spectral Doppler flow within both testicles. There is a small right-sided hydrocele. The right epididymal tail is enlarged   and hypervascular likely representing localized epididymitis.      Impression:      Impression:  1.Enlarged hypervascular right epididymal tail likely representing localized epididymitis.  2.No evidence of testicular torsion.  3.Small right-sided hydrocele.        Electronically Signed: Corbin Mclain MD    6/5/2025 7:47 PM EDT    Workstation ID: [id number]               Assessment:       Acute epididymitis      Right epididymitis  UTI    Plan:     CT and HAYLIE reviewed  Rec abx and follow cultures  Ok for discharge once feeling better on 2 weeks total antibiotics  Follow up in office 2 weeks  Okay for discharge today on doxycycline twice daily for 2 weeks, will need to follow-up final urine cultures      Dorian Traore MD  [facility]  [facility] [facility] [address] [facility], [address]  [place] [address]  Office: [phone]  06/06/25  10:58 EDT

## 2025-06-06 NOTE — DISCHARGE SUMMARY
[facility]    David Causey MD    CHIEF COMPLAINT:     Abdominal pain     HISTORY OF PRESENT ILLNESS:    Testicle Pain  The patient's primary symptoms include testicular pain. Pertinent negatives include no dysuria, fever, nausea or vomiting.     ED 06/05/2025  64-year-old male evaluated yesterday for abdominal pain. The patient stated that he declined admission at that time and went home. Reports today he has had increased swelling to his right hemiscrotum associated with nausea and marked increase in pain. He reports he has had subjective fever and chills today. He reports no actual vomiting or diarrhea he reports that he has had some yellowish penile discharge     Observation 06/06/2025  Patient agrees with HPI noted above and clearing blood in his urine 2 days ago.  He states that he came to the ED and was told he had a UTI and prescribed antibiotic.  Yesterday he noticed that right testicle was swollen and he came back to the ED.  He reports mild pain currently.  Denies any dysuria.  Urology consulted    Reviewed recent ED notes.  Patient seen on 6/4 and discharged on amox-clav for 7 days.  Reviewed urology's notes, Dr Traore recommended discharge on doxycycline. Discussed with Ot[facility] who recommended 10 day coarse of amox/clav monotherapy since patient is negative for Chlamydia. Pharmacist reached to Dr Traore who agreed with plan to dc on amox/clav. Ed pharmacist will monitor urine cx susceptibility from 6/4 and reach out to patient if needed.     Past Medical History:   Diagnosis Date    Acute hypoxemic respiratory failure 11/06/2023    Allergic     Anxiety     Arthritis 06/2005    Back pain     Claustrophobia 1978    COPD (chronic obstructive pulmonary disease)     CTS (carpal tunnel syndrome) 10/2022    CVA (cerebral vascular accident) 12/11/2024    Dysphagia     Esophageal stricture     GERD (gastroesophageal reflux disease)     Hyperlipidemia     Hypertension      Knee pain     rt    Low back pain     Obesity     Obesity (BMI 30-39.9) 11/06/2023    Other cervical disc degeneration at C5-C6 level 08/22/2018    Paroxysmal atrial fibrillation with rapid ventricular response 11/06/2023    Pneumonia     Prediabetes 11/06/2023    PTSD (post-traumatic stress disorder)     Rhinovirus infection 11/06/2023    Thoracic disc herniation     Vitamin B12 deficiency      Past Surgical History:   Procedure Laterality Date    ABLATION OF DYSRHYTHMIC FOCUS  7/31/2024    BRONCHOSCOPY N/A 11/03/2023    Procedure: BRONCHOSCOPY with bilateral lung washing's;  Surgeon: Kolton Brewer MD;  Location: [facility] ENDOSCOPY;  Service: Pulmonary;  Laterality: N/A;    CARDIAC CATHETERIZATION N/A 07/13/2022    Procedure: Left Heart Cath, possible pci;  Surgeon: Prieto Sidhu MD;  Location: [facility] CATH INVASIVE LOCATION;  Service: Cardiovascular;  Laterality: N/A;    CARDIAC ELECTROPHYSIOLOGY PROCEDURE N/A 07/31/2024    Procedure: Ablation atrial flutter;  Surgeon: Pardeep Walker MD;  Location: [facility] CATH INVASIVE LOCATION;  Service: Cardiovascular;  Laterality: N/A;    ENDOSCOPY      ENDOSCOPY N/A 09/21/2023    Procedure: ESOPHAGOGASTRODUODENOSCOPY WITH non guided esophageal dialtion 54 Fr. Bougie and  cold forcep biopsy x1 area;  Surgeon: Andres Concepcion MD;  Location: [facility] ENDOSCOPY;  Service: Gastroenterology;  Laterality: N/A;  Post- erosive gastritis, hiatal hernia, esophageal stricture    HERNIA REPAIR      INSERT / REPLACE / REMOVE PACEMAKER  11/31/2024    Temporary pacemaker implant in emergency room    KNEE ARTHROPLASTY      x2    SHOULDER ARTHROSCOPY W/ ROTATOR CUFF REPAIR Right 08/11/2020    Procedure: SHOULDER ARTHROSCOPY WITH ROTATOR CUFF REPAIR, extensive debridement;  Surgeon: Fredi Ritchie MD;  Location: [facility] MAIN OR;  Service: Orthopedics;  Laterality: Right;    TONSILLECTOMY       Family History   Problem Relation Age of Onset    Heart disease Mother      Early death Mother     Hyperlipidemia Mother     Hypertension Mother     Thyroid disease Mother     Heart attack Mother         Had valve replaced with a pig valve    Cancer Father     Stroke Father     Vision loss Father     Stroke Paternal Grandfather     Arthritis Paternal Grandmother     Alcohol abuse Brother     Asthma Brother     Depression Brother     Hyperlipidemia Brother     Hypertension Brother     Learning disabilities Brother     Mental illness Brother     Drug abuse Brother     Early death Brother     Heart disease Brother     Hyperlipidemia Brother         Bijan is the same person    Hypertension Brother      Social History     Tobacco Use    Smoking status: Every Day     Current packs/day: 1.00     Average packs/day: 1 pack/day for 52.4 years (52.4 ttl pk-yrs)     Types: Cigarettes     Start date: 1/9/1973     Passive exposure: Never    Smokeless tobacco: Never    Tobacco comments:     Smoked a half a pack a day for 42 years didn't start to smoke a whole pack until 20     17       Counseled to stop smoking   Vaping Use    Vaping status: Never Used   Substance Use Topics    Alcohol use: No    Drug use: No     Medications Prior to Admission   Medication Sig Dispense Refill Last Dose/Taking    albuterol sulfate  (90 Base) MCG/ACT inhaler Inhale 2 puffs Every 4 (Four) Hours As Needed for Wheezing. 18 g 1 Taking As Needed    apixaban (ELIQUIS) 5 MG tablet tablet Take 1 tablet by mouth Every 12 (Twelve) Hours. Indications: Atrial Fibrillation 60 tablet 0 Taking    fluticasone (FLONASE) 50 MCG/ACT nasal spray Administer 2 sprays into the nostril(s) as directed by provider Daily. 16 g 0 Taking    HYDROcodone-acetaminophen (NORCO) 5-325 MG per tablet Take 1 tablet by mouth Every 6 (Six) Hours As Needed for Moderate Pain. 10 tablet 0 Taking As Needed    pantoprazole (PROTONIX) 40 MG EC tablet Take 1 tablet by mouth Daily.   Taking    pravastatin (PRAVACHOL) 80 MG tablet Take 1 tablet by mouth Every  Night.   Taking    tiotropium bromide-olodaterol (Stiolto Respimat) 2.5-2.5 MCG/ACT aerosol solution inhaler Inhale 2 puffs Daily. 4 g 5 Taking    vitamin B-12 (CYANOCOBALAMIN) 1000 MCG tablet Take 1 tablet by mouth Daily.   Taking    [DISCONTINUED] amoxicillin-clavulanate (AUGMENTIN) 875-125 MG per tablet Take 1 tablet by mouth Every 12 (Twelve) Hours. 14 tablet 0 Taking     Allergies:  Atorvastatin and Lisinopril    Immunization History   Administered Date(s) Administered    COVID-19 (AYLIEN) 03/13/2021    Fluzone (or Fluarix & Flulaval for VFC) >6mos 10/25/2022    Pneumococcal Conjugate 20-Valent (PCV20) 06/09/2023    Pneumococcal Polysaccharide (PPSV23) 05/13/2015, 07/13/2018    Shingrix 07/30/2021, 10/05/2021           REVIEW OF SYSTEMS:    Review of Systems   Constitutional: Negative for fever.   Gastrointestinal:  Negative for nausea and vomiting.   Genitourinary:  Positive for hematuria and testicular pain. Negative for dysuria.   All other systems reviewed and are negative.        Vital Signs  Temp:  [97.9 °F (36.6 °C)-98.5 °F (36.9 °C)] 98.1 °F (36.7 °C)  Heart Rate:  [] 80  Resp:  [16-20] 16  BP: ()/(64-86) 96/64          Physical Exam:  Physical Exam  Vitals and nursing note reviewed.   Constitutional:       Appearance: Normal appearance.   HENT:      Head: Normocephalic and atraumatic.      Right Ear: External ear normal.      Left Ear: External ear normal.      Nose: Nose normal.      Mouth/Throat:      Mouth: Mucous membranes are moist.   Eyes:      Extraocular Movements: Extraocular movements intact.   Cardiovascular:      Rate and Rhythm: Normal rate and regular rhythm.      Pulses: Normal pulses.      Heart sounds: Normal heart sounds.   Pulmonary:      Effort: Pulmonary effort is normal.      Breath sounds: Normal breath sounds.   Abdominal:      General: Abdomen is flat. Bowel sounds are normal.      Palpations: Abdomen is soft.   Musculoskeletal:         General: Normal range of  motion.      Cervical back: Normal range of motion.   Skin:     General: Skin is warm.   Neurological:      Mental Status: He is alert and oriented to person, place, and time.   Psychiatric:         Behavior: Behavior normal.         Thought Content: Thought content normal.         Judgment: Judgment normal.           Emotional Behavior:    Normal    Debilities:   None  Results Review:    I reviewed the patient's new clinical results.  Lab Results (most recent)       Procedure Component Value Units Date/Time    Urine Culture - Urine, Urine, Clean Catch [[id number]]  (Normal) Collected: 06/05/25 1841    Specimen: Urine, Clean Catch Updated: 06/06/25 1147     Urine Culture No growth    Basic Metabolic Panel [[id number]]  (Abnormal) Collected: 06/06/25 0930    Specimen: Blood Updated: 06/06/25 1021     Glucose 141 mg/dL      BUN 9.1 mg/dL      Creatinine 0.82 mg/dL      Sodium 139 mmol/L      Potassium 3.6 mmol/L      Comment: Specimen hemolyzed.  Result may be falsely elevated.        Chloride 104 mmol/L      CO2 23.8 mmol/L      Calcium 9.1 mg/dL      BUN/Creatinine Ratio 11.1     Anion Gap 11.2 mmol/L      eGFR 98.1 mL/min/1.73     Narrative:      GFR Categories in Chronic Kidney Disease (CKD)              GFR Category          GFR (mL/min/1.73)    Interpretation  G1                    90 or greater        Normal or high (1)  G2                    60-89                Mild decrease (1)  G3a                   45-59                Mild to moderate decrease  G3b                   30-44                Moderate to severe decrease  G4                    15-29                Severe decrease  G5                    14 or less           Kidney failure    (1)In the absence of evidence of kidney disease, neither GFR category G1 or G2 fulfill the criteria for CKD.    eGFR calculation 2021 CKD-EPI creatinine equation, which does not include race as a factor    Chlamydia trachomatis, Neisseria gonorrhoeae, PCR - Urine, Urine, Clean  Catch [[id number]]  (Normal) Collected: 06/05/25 2221    Specimen: Urine, Clean Catch Updated: 06/05/25 2358     Chlamydia by PCR Not Detected     Neisseria gonorrhoeae by PCR Not Detected    Blood Culture - Blood, Arm, Left [[id number]] Collected: 06/05/25 1928    Specimen: Blood from Arm, Left Updated: 06/05/25 1931    CBC & Differential [[id number]]  (Abnormal) Collected: 06/05/25 1841    Specimen: Blood Updated: 06/05/25 1927    Narrative:      The following orders were created for panel order CBC & Differential.  Procedure                               Abnormality         Status                     ---------                               -----------         ------                     CBC Auto Differential[[id number]]        Abnormal            Final result                 Please view results for these tests on the individual orders.    CBC Auto Differential [[id number]]  (Abnormal) Collected: 06/05/25 1841    Specimen: Blood Updated: 06/05/25 1927     WBC 13.32 10*3/mm3      RBC 5.11 10*6/mm3      Hemoglobin 16.6 g/dL      Hematocrit 49.4 %      MCV 96.7 fL      MCH 32.5 pg      MCHC 33.6 g/dL      RDW 13.4 %      RDW-SD 48.5 fl      MPV 11.1 fL      Platelets 159 10*3/mm3      Neutrophil % 72.8 %      Lymphocyte % 19.9 %      Monocyte % 6.3 %      Eosinophil % 0.2 %      Basophil % 0.3 %      Immature Grans % 0.5 %      Neutrophils, Absolute 9.70 10*3/mm3      Lymphocytes, Absolute 2.65 10*3/mm3      Monocytes, Absolute 0.84 10*3/mm3      Eosinophils, Absolute 0.03 10*3/mm3      Basophils, Absolute 0.04 10*3/mm3      Immature Grans, Absolute 0.06 10*3/mm3      nRBC 0.0 /100 WBC     Comprehensive Metabolic Panel [[id number]]  (Abnormal) Collected: 06/05/25 1841    Specimen: Blood Updated: 06/05/25 1917     Glucose 101 mg/dL      BUN 6.9 mg/dL      Creatinine 0.93 mg/dL      Sodium 139 mmol/L      Potassium 3.2 mmol/L      Chloride 102 mmol/L      CO2 23.8 mmol/L      Calcium 9.6 mg/dL      Total Protein 8.3 g/dL       Albumin 4.5 g/dL      ALT (SGPT) 28 U/L      AST (SGOT) 25 U/L      Alkaline Phosphatase 99 U/L      Total Bilirubin 1.0 mg/dL      Globulin 3.8 gm/dL      A/G Ratio 1.2 g/dL      BUN/Creatinine Ratio 7.4     Anion Gap 13.2 mmol/L      eGFR 91.7 mL/min/1.73     Narrative:      GFR Categories in Chronic Kidney Disease (CKD)              GFR Category          GFR (mL/min/1.73)    Interpretation  G1                    90 or greater        Normal or high (1)  G2                    60-89                Mild decrease (1)  G3a                   45-59                Mild to moderate decrease  G3b                   30-44                Moderate to severe decrease  G4                    15-29                Severe decrease  G5                    14 or less           Kidney failure    (1)In the absence of evidence of kidney disease, neither GFR category G1 or G2 fulfill the criteria for CKD.    eGFR calculation 2021 CKD-EPI creatinine equation, which does not include race as a factor    Blood Culture - Blood, Arm, Right [[id number]] Collected: 06/05/25 1856    Specimen: Blood from Arm, Right Updated: 06/05/25 1905    POC Lactate [[id number]]  (Normal) Collected: 06/05/25 1903    Specimen: Blood Updated: 06/05/25 1904     Lactate 1.0 mmol/L      Comment: Serial Number: [id number]:  [id number]       Rumely Draw [[id number]] Collected: 06/05/25 1841    Specimen: Blood Updated: 06/05/25 1901    Narrative:      The following orders were created for panel order Rumely Draw.  Procedure                               Abnormality         Status                     ---------                               -----------         ------                     Green Top (Gel)[[id number]]                                  Final result               Lavender Top[[id number]]                                     Final result               Gold Top - SST[[id number]]                                   Final result               Light Blue Top[[id number]]                                    Final result                 Please view results for these tests on the individual orders.    Green Top (Gel) [[id number]] Collected: 06/05/25 1841    Specimen: Blood Updated: 06/05/25 1901     Extra Tube Hold for add-ons.     Comment: Auto resulted.       Lavender Top [[id number]] Collected: 06/05/25 1841    Specimen: Blood Updated: 06/05/25 1901     Extra Tube hold for add-on     Comment: Auto resulted       Gold Top - SST [[id number]] Collected: 06/05/25 1841    Specimen: Blood Updated: 06/05/25 1901     Extra Tube Hold for add-ons.     Comment: Auto resulted.       Light Blue Top [[id number]] Collected: 06/05/25 1841    Specimen: Blood Updated: 06/05/25 1901     Extra Tube Hold for add-ons.     Comment: Auto resulted       Urinalysis With Microscopic If Indicated (No Culture) - Urine, Clean Catch [[id number]]  (Abnormal) Collected: 06/05/25 1841    Specimen: Urine, Clean Catch Updated: 06/05/25 1857     Color, UA [place]     Comment: Any Substance that causes an abnormal urine color can alter the accuracy of the chemical reactions.        Appearance, UA Cloudy     pH, UA <=5.0     Specific Gravity, UA 1.025     Glucose, UA Negative     Ketones, UA Trace     Bilirubin, UA Small (1+)     Comment: Confirmation testing is unavailable.  A serum bilirubin is recommended for further assessment.        Blood, UA Large (3+)     Protein, UA 30 mg/dL (1+)     Leuk Esterase, UA Moderate (2+)     Nitrite, UA Positive     Urobilinogen, UA 1.0 E.U./dL    Urinalysis, Microscopic Only - Urine, Clean Catch [[id number]]  (Abnormal) Collected: 06/05/25 1841    Specimen: Urine, Clean Catch Updated: 06/05/25 1856     RBC, UA Too Numerous to Count /HPF      WBC, UA Too Numerous to Count /HPF      Bacteria, UA None Seen /HPF      Squamous Epithelial Cells, UA 0-2 /HPF      Hyaline Casts, UA 3-6 /LPF      Methodology Automated Microscopy            Imaging Results (Most Recent)       Procedure Component Value  Units Date/Time    US Scrotum & Testicles [[id number]] Collected: 06/05/25 1941     Updated: 06/05/25 1949    Narrative:      US SCROTUM AND TESTICLES    Date of Exam: 6/5/2025 6:58 PM EDT    Indication: Right testicular pain and swelling.    Comparison: CT abdomen/pelvis dated 6/4/2025    Technique: Multiple sonographic images of the scrotum were obtained in transverse and longitudinal planes. Grayscale and color Doppler duplex techniques were utilized.  Doppler spectral analysis was performed.      Findings:  The right testicle measures 4.3 x 3.0 x 3.2 cm. The left testicle measures 4.6 x 2.4 x 2.7 cm. There is normal color Doppler and spectral Doppler flow within both testicles. There is a small right-sided hydrocele. The right epididymal tail is enlarged   and hypervascular likely representing localized epididymitis.      Impression:      Impression:  1.Enlarged hypervascular right epididymal tail likely representing localized epididymitis.  2.No evidence of testicular torsion.  3.Small right-sided hydrocele.        Electronically Signed: Corbin Mclain MD    6/5/2025 7:47 PM EDT    Workstation ID: [id number]          reviewed    ECG/EMG Results (most recent)       None          reviewed        Results for orders placed during the hospital encounter of 12/11/24    Adult Transthoracic Echo Complete w/ Color, Spectral and Contrast if necessary per protocol    Interpretation Summary    Left ventricular systolic function is normal. Calculated left ventricular 3D EF = 65% Left ventricular ejection fraction appears to be 61 - 65%.    Left ventricular diastolic function was normal.    The right ventricular cavity is mildly dilated.    The left atrial cavity is mild to moderately dilated.    Left atrial volume is severely increased.    Saline test results are negative.    The right atrial cavity is moderately  dilated.    Moderate mitral valve regurgitation is present.      Microbiology Results (last 10 days)        Procedure Component Value - Date/Time    Chlamydia trachomatis, Neisseria gonorrhoeae, PCR - Urine, Urine, Clean Catch [[id number]]  (Normal) Collected: 06/05/25 2221    Lab Status: Final result Specimen: Urine, Clean Catch Updated: 06/05/25 2358     Chlamydia by PCR Not Detected     Neisseria gonorrhoeae by PCR Not Detected    Urine Culture - Urine, Urine, Clean Catch [[id number]]  (Normal) Collected: 06/05/25 1841    Lab Status: Preliminary result Specimen: Urine, Clean Catch Updated: 06/06/25 1147     Urine Culture No growth    Blood Culture - Blood, Arm, Left [[id number]]  (Normal) Collected: 06/04/25 1353    Lab Status: Preliminary result Specimen: Blood from Arm, Left Updated: 06/05/25 1415     Blood Culture No growth at 24 hours    Blood Culture - Blood, Hand, Left [[id number]]  (Normal) Collected: 06/04/25 1353    Lab Status: Preliminary result Specimen: Blood from Hand, Left Updated: 06/05/25 1415     Blood Culture No growth at 24 hours    Narrative:      Less than seven (7) mL's of blood was collected.  Insufficient quantity may yield false negative results.    Urine Culture - Urine, Urine, Clean Catch [[id number]]  (Abnormal) Collected: 06/04/25 1246    Lab Status: Preliminary result Specimen: Urine, Clean Catch Updated: 06/06/25 1046     Urine Culture >100,000 CFU/mL Gram Negative Bacilli    Narrative:      Colonization of the urinary tract without infection is common. Treatment is discouraged unless the patient is symptomatic, pregnant, or undergoing an invasive urologic procedure.    Blood Culture - Blood, Arm, Right [[id number]]  (Normal) Collected: 05/30/25 1446    Lab Status: Final result Specimen: Blood from Arm, Right Updated: 06/04/25 1500     Blood Culture No growth at 5 days    Respiratory Panel PCR w/COVID-19(SARS-CoV-2) ISAEL/ALEXIA/ELISEO/PAD/COR/ARNOLD In-House, NP Swab in UTM/VTM, 2 HR TAT - Swab, Nasopharynx [[id number]]  (Abnormal) Collected: 05/30/25 1432    Lab Status: Final result Specimen: Swab from  Nasopharynx Updated: 05/30/25 1542     ADENOVIRUS, PCR Not Detected     Coronavirus 229E Not Detected     Coronavirus HKU1 Not Detected     Coronavirus NL63 Not Detected     Coronavirus OC43 Not Detected     COVID19 Not Detected     Human Metapneumovirus Not Detected     Human Rhinovirus/Enterovirus Detected     Influenza A PCR Not Detected     Influenza B PCR Not Detected     Parainfluenza Virus 1 Not Detected     Parainfluenza Virus 2 Not Detected     Parainfluenza Virus 3 Not Detected     Parainfluenza Virus 4 Not Detected     RSV, PCR Not Detected     Bordetella pertussis pcr Not Detected     Bordetella parapertussis PCR Not Detected     Chlamydophila pneumoniae PCR Not Detected     Mycoplasma pneumo by PCR Not Detected    Narrative:      In the setting of a positive respiratory panel with a viral infection PLUS a negative procalcitonin without other underlying concern for bacterial infection, consider observing off antibiotics or discontinuation of antibiotics and continue supportive care. If the respiratory panel is positive for atypical bacterial infection (Bordetella pertussis, Chlamydophila pneumoniae, or Mycoplasma pneumoniae), consider antibiotic de-escalation to target atypical bacterial infection.    Blood Culture - Blood, Arm, Left [[id number]]  (Normal) Collected: 05/30/25 1431    Lab Status: Final result Specimen: Blood from Arm, Left Updated: 06/04/25 1445     Blood Culture No growth at 5 days            Assessment & Plan     Acute epididymitis       Acute epidermidis and UTI  - CMP and CBC generally unremarkable except WBC 13.32  - Lactate 1  - UA on 6/4 positive for hematuria, nitrite and bacteria  -Urine cx on 6/4 positive but pending susceptibility and per pharmacist won't be resulted til am  -IV ceftriaxone given  -6/4- CT abdomen pelvis showed mild urinary bladder wall thickening  -6/5 scrotum and testicles ultrasound showed enlarged right epididymal tail representing localized  epididymitis  -Urology consulted and recommended doxycycline for 2 weeks.   -Chlamydia/gonorrhea negative  -Pharmacy recommending amox/clav x 10 days for UTI and epididymitis. Pharmacy notified urology who agreed with plan.   -F/u with urology in 2 weeks.     Hx of Aflutter  - Continue Eliquis    Hyperlipidemia  - Continue statin    I discussed the patients findings and my recommendations with patient and nursing staff.     Discharge Diagnosis:      Acute epididymitis      Hospital Course  Patient is a 64 y.o. male presented with hematuria as noted in HPI above.  CMP and CBC generally unremarkable and lactate within normal range.  UA positive for UTI and urine culture pending results.  CT abdomen pelvis consistent with cystitis and ultrasound showed localized right epididymitis.  Patient was admitted to the observation unit for IV fluids and IV antibiotic and urology was consulted.  Chlamydia and gonorrhea were negative and urology recommended initially to DC home on doxycycline but pharmacist discussed with urologist and recommended amox-clav for 10 days, urology agreeable. At this time, patient felt to be in good condition for discharge with close follow up with PCP and urologist. Instructed to take all medications as prescribed and to return to ED if any concerning signs/symptoms. All test/lab results were discussed with patient. All questions were answered and patient verbalizes understanding.        Past Medical History:     Past Medical History:   Diagnosis Date    Acute hypoxemic respiratory failure 11/06/2023    Allergic     Anxiety     Arthritis 06/2005    Back pain     Claustrophobia 1978    COPD (chronic obstructive pulmonary disease)     CTS (carpal tunnel syndrome) 10/2022    CVA (cerebral vascular accident) 12/11/2024    Dysphagia     Esophageal stricture     GERD (gastroesophageal reflux disease)     Hyperlipidemia     Hypertension     Knee pain     rt    Low back pain     Obesity     Obesity (BMI  30-39.9) 11/06/2023    Other cervical disc degeneration at C5-C6 level 08/22/2018    Paroxysmal atrial fibrillation with rapid ventricular response 11/06/2023    Pneumonia     Prediabetes 11/06/2023    PTSD (post-traumatic stress disorder)     Rhinovirus infection 11/06/2023    Thoracic disc herniation     Vitamin B12 deficiency        Past Surgical History:     Past Surgical History:   Procedure Laterality Date    ABLATION OF DYSRHYTHMIC FOCUS  7/31/2024    BRONCHOSCOPY N/A 11/03/2023    Procedure: BRONCHOSCOPY with bilateral lung washing's;  Surgeon: Kolton Brewer MD;  Location: [facility] ENDOSCOPY;  Service: Pulmonary;  Laterality: N/A;    CARDIAC CATHETERIZATION N/A 07/13/2022    Procedure: Left Heart Cath, possible pci;  Surgeon: Prieto Sidhu MD;  Location: [facility] CATH INVASIVE LOCATION;  Service: Cardiovascular;  Laterality: N/A;    CARDIAC ELECTROPHYSIOLOGY PROCEDURE N/A 07/31/2024    Procedure: Ablation atrial flutter;  Surgeon: Pardeep Walker MD;  Location: [facility] CATH INVASIVE LOCATION;  Service: Cardiovascular;  Laterality: N/A;    ENDOSCOPY      ENDOSCOPY N/A 09/21/2023    Procedure: ESOPHAGOGASTRODUODENOSCOPY WITH non guided esophageal dialtion 54 Fr. Bougie and  cold forcep biopsy x1 area;  Surgeon: Andres Concepcion MD;  Location: [facility] ENDOSCOPY;  Service: Gastroenterology;  Laterality: N/A;  Post- erosive gastritis, hiatal hernia, esophageal stricture    HERNIA REPAIR      INSERT / REPLACE / REMOVE PACEMAKER  11/31/2024    Temporary pacemaker implant in emergency room    KNEE ARTHROPLASTY      x2    SHOULDER ARTHROSCOPY W/ ROTATOR CUFF REPAIR Right 08/11/2020    Procedure: SHOULDER ARTHROSCOPY WITH ROTATOR CUFF REPAIR, extensive debridement;  Surgeon: Fredi Ritchie MD;  Location: [facility] MAIN OR;  Service: Orthopedics;  Laterality: Right;    TONSILLECTOMY         Social History:   Social History     Socioeconomic History    Marital status:    Tobacco Use     Smoking status: Every Day     Current packs/day: 1.00     Average packs/day: 1 pack/day for 52.4 years (52.4 ttl pk-yrs)     Types: Cigarettes     Start date: 1/9/1973     Passive exposure: Never    Smokeless tobacco: Never    Tobacco comments:     Smoked a half a pack a day for 42 years didn't start to smoke a whole pack until 20     17       Counseled to stop smoking   Vaping Use    Vaping status: Never Used   Substance and Sexual Activity    Alcohol use: No    Drug use: No    Sexual activity: Defer       Procedures Performed         Consults:   Consults       Date and Time Order Name Status Description    6/6/2025  8:02 AM Inpatient Urology Consult Completed             Condition on Discharge:     Stable    Discharge Disposition  Home or Self Care    Discharge Medications     Discharge Medications        Continue These Medications        Instructions Start Date   albuterol sulfate  (90 Base) MCG/ACT inhaler  Commonly known as: PROVENTIL HFA;VENTOLIN HFA;PROAIR HFA   2 puffs, Inhalation, Every 4 Hours PRN      amoxicillin-clavulanate 875-125 MG per tablet  Commonly known as: AUGMENTIN   1 tablet, Oral, Every 12 Hours   Start Date: June 7, 2025     apixaban 5 MG tablet tablet  Commonly known as: ELIQUIS   5 mg, Oral, Every 12 Hours Scheduled      fluticasone 50 MCG/ACT nasal spray  Commonly known as: FLONASE   2 sprays, Nasal, Daily      HYDROcodone-acetaminophen 5-325 MG per tablet  Commonly known as: NORCO   1 tablet, Oral, Every 6 Hours PRN      pantoprazole 40 MG EC tablet  Commonly known as: PROTONIX   40 mg, Daily      pravastatin 80 MG tablet  Commonly known as: PRAVACHOL   80 mg, Nightly      Stiolto Respimat 2.5-2.5 MCG/ACT aerosol solution inhaler  Generic drug: tiotropium bromide-olodaterol   2 puffs, Inhalation, Daily      vitamin B-12 1000 MCG tablet  Commonly known as: CYANOCOBALAMIN   1,000 mcg, Daily               Discharge Diet:   Diet Instructions       Diet: Regular/House Diet; Regular  (IDDSI 7); Thin (IDDSI 0)      Discharge Diet: Regular/House Diet    Texture: Regular (IDDSI 7)    Fluid Consistency: Thin (IDDSI 0)            Activity at Discharge:     Follow-up Appointments  Future Appointments   Date Time Provider Department Center   6/16/2025  9:00 AM Pardeep Walker MD [facility]     Additional Instructions for the Follow-ups that You Need to Schedule       Discharge Follow-up with PCP   As directed       Currently Documented PCP:    David Causey MD    PCP Phone Number:    [phone]     Follow Up Details: 5-7 days        Discharge Follow-up with Specified Provider: Urologist; 2 Weeks   As directed      To: Urologist   Follow Up: 2 Weeks                Test Results Pending at Discharge  Pending Results       Procedure [Order ID] Specimen - Date/Time    Blood Culture - Blood, Arm, Left [[id number]] Collected: 06/05/25 1928    Specimen: Blood from Arm, Left Updated: 06/05/25 1931    Blood Culture - Blood, Arm, Right [[id number]] Collected: 06/05/25 1856    Specimen: Blood from Arm, Right Updated: 06/05/25 1905             Risk for Readmission (LACE) Score: 8 (6/6/2025  6:00 AM)      Greater than 30 minutes spent in discharge activities for this patient    Signature:Electronically signed by KEILA Paige, 06/06/25, 1:22 PM EDT.

## 2025-06-06 NOTE — OUTREACH NOTE
Prep Survey      Flowsheet Row Responses   [facility] facility patient discharged from? [facility]   Is LACE score < 7 ? No   Eligibility Readm Mgmt   Discharge diagnosis Acute epididymitis   Does the patient have one of the following disease processes/diagnoses(primary or secondary)? Other   Prep survey completed? Yes            Nydia FORDE - Registered Nurse

## 2025-06-06 NOTE — PLAN OF CARE
Goal Outcome Evaluation:      VSS on room air, IVF, prn dilaudid for scrotal edema pain

## 2025-06-07 LAB
BACTERIA SPEC AEROBE CULT: ABNORMAL
BACTERIA SPEC AEROBE CULT: NO GROWTH

## 2025-06-09 LAB
BACTERIA SPEC AEROBE CULT: NORMAL
BACTERIA SPEC AEROBE CULT: NORMAL

## 2025-06-10 LAB
BACTERIA SPEC AEROBE CULT: NORMAL
BACTERIA SPEC AEROBE CULT: NORMAL

## 2025-06-11 ENCOUNTER — READMISSION MANAGEMENT (OUTPATIENT)
Dept: CALL CENTER | Facility: HOSPITAL | Age: 65
End: 2025-06-11
Payer: MEDICARE

## 2025-06-11 NOTE — OUTREACH NOTE
Medical Week 1 Survey      Flowsheet Row Responses   [facility] patient discharged from? [facility]   Does the patient have one of the following disease processes/diagnoses(primary or secondary)? Other   Week 1 attempt successful? Yes   Call start time 1619   Call end time 1624   Discharge diagnosis Acute epididymitis   Person spoke with today (if not patient) and relationship Patient   Medication alerts for this patient Augmentin   Meds reviewed with patient/caregiver? Yes   Is the patient having any side effects they believe may be caused by any medication additions or changes? Yes   Side effects comments  GI upset--Advised this is a common side effect from Augmentin. Advised patient to eat then take medication. Patient states he has not been eating very much, and that would explain why his stomach was hurting.   Does the patient have all medications ordered at discharge? Yes   Is the patient taking all medications as directed (includes completed medication regime)? Yes   Comments regarding appointments Cardiology f/u appt on 6/16/25 at 9:00 AM with Dr. Pardeep Walker.   Does the patient have a primary care provider?  Yes   Does the patient have an appointment with their PCP within 7 days of discharge? Yes   Comments regarding PCP PCP--Dr. David Causey   Has the patient kept scheduled appointments due by today? N/A   Has home health visited the patient within 72 hours of discharge? N/A   Psychosocial issues? No   Comments Patient reports he was diagnosed with Rhinovirus while hospitalized. He states he has a productive cough with clear phelgm. Advised patient he could take Mucinex to help thin secretions and to drink lots of water. Patient verbalized understanding. Patient reports scrotal swelling and pain has improved. He states pain was at a level 8 in the hospital, now only sore, rates pain at a 2.   Did the patient receive a copy of their discharge instructions? Yes   Nursing interventions Reviewed  instructions with patient   What is the patient's perception of their health status since discharge? Improving   Is the patient/caregiver able to teach back signs and symptoms related to disease process for when to call PCP? Yes   Is the patient/caregiver able to teach back signs and symptoms related to disease process for when to call 911? Yes   Is the patient/caregiver able to teach back the hierarchy of who to call/visit for symptoms/problems? PCP, Specialist, Home health nurse, Urgent Care, ED, 911 Yes   Week 1 call completed? Yes   Graduated Yes   Would this patient benefit from a Referral to Amb Social Work? No   Is the patient interested in additional calls from an ambulatory ? No   Graduated/Revoked comments Answered all questions. No needs or concerns.   Call end time [id number]            Viviana ANDRES - Registered Nurse      Viviana ANDRES - Registered Nurse

## 2025-06-16 ENCOUNTER — OFFICE VISIT (OUTPATIENT)
Dept: CARDIOLOGY | Facility: CLINIC | Age: 65
End: 2025-06-16
Payer: MEDICARE

## 2025-06-16 VITALS
WEIGHT: 218 LBS | SYSTOLIC BLOOD PRESSURE: 132 MMHG | RESPIRATION RATE: 16 BRPM | OXYGEN SATURATION: 96 % | DIASTOLIC BLOOD PRESSURE: 82 MMHG | HEIGHT: 72 IN | HEART RATE: 95 BPM | BODY MASS INDEX: 29.53 KG/M2

## 2025-06-16 DIAGNOSIS — I48.3 TYPICAL ATRIAL FLUTTER: ICD-10-CM

## 2025-06-16 DIAGNOSIS — I48.0 PAF (PAROXYSMAL ATRIAL FIBRILLATION): Primary | ICD-10-CM

## 2025-06-16 DIAGNOSIS — E78.5 DYSLIPIDEMIA: ICD-10-CM

## 2025-06-16 PROCEDURE — 3079F DIAST BP 80-89 MM HG: CPT | Performed by: INTERNAL MEDICINE

## 2025-06-16 PROCEDURE — 99213 OFFICE O/P EST LOW 20 MIN: CPT | Performed by: INTERNAL MEDICINE

## 2025-06-16 PROCEDURE — 1159F MED LIST DOCD IN RCRD: CPT | Performed by: INTERNAL MEDICINE

## 2025-06-16 PROCEDURE — 1160F RVW MEDS BY RX/DR IN RCRD: CPT | Performed by: INTERNAL MEDICINE

## 2025-06-16 PROCEDURE — 3075F SYST BP GE 130 - 139MM HG: CPT | Performed by: INTERNAL MEDICINE

## 2025-06-16 NOTE — PROGRESS NOTES
CC--- atrial fibrillation and atrial flutter    Sub  64-year-old male patient had AF and flutter ablation in July 2024 and comes in for follow-up  Patient was on flecainide which has been stopped post ablation  Patient has a history of hyperlipidemia and previous cardiac catheterization in 2022 without significant stenosis and has history of COPD  Prior hospitalization with facial numbness revealed Bell's palsy and discharge summary attached below for reference    Patient presented to the hospital with right facial numbness and weakness and after evaluation by neurology service was found to have likely Bell's palsy. He was started on a course of steroids which improved his symptoms of Bell's palsy rapidly. He was discharged to finish out a 1 week course of steroids for this condition with follow-up with his PCP.   MRI without any infarct      Past Medical History:   Diagnosis Date    Acute hypoxemic respiratory failure 11/06/2023    Allergic     Anxiety     Arthritis 06/2005    Back pain     Claustrophobia 1978    COPD (chronic obstructive pulmonary disease)     CTS (carpal tunnel syndrome) 10/2022    Dysphagia     Esophageal stricture     GERD (gastroesophageal reflux disease)     Hyperlipidemia     Hypertension     Knee pain     rt    Low back pain     Obesity     Obesity (BMI 30-39.9) 11/06/2023    Other cervical disc degeneration at C5-C6 level 08/22/2018    Paroxysmal atrial fibrillation with rapid ventricular response 11/06/2023    Pneumonia     Prediabetes 11/06/2023    PTSD (post-traumatic stress disorder)     Rhinovirus infection 11/06/2023    Thoracic disc herniation     Vitamin B12 deficiency      Past Surgical History:   Procedure Laterality Date    ABLATION OF DYSRHYTHMIC FOCUS  7/31/2024    BRONCHOSCOPY N/A 11/03/2023    Procedure: BRONCHOSCOPY with bilateral lung washing's;  Surgeon: Kolton Brewer MD;  Location: [facility] ENDOSCOPY;  Service: Pulmonary;  Laterality: N/A;    CARDIAC CATHETERIZATION N/A  07/13/2022    Procedure: Left Heart Cath, possible pci;  Surgeon: Prieto Sidhu MD;  Location: [facility] CATH INVASIVE LOCATION;  Service: Cardiovascular;  Laterality: N/A;    CARDIAC ELECTROPHYSIOLOGY PROCEDURE N/A 07/31/2024    Procedure: Ablation atrial flutter;  Surgeon: Pardeep Walker MD;  Location: [facility] CATH INVASIVE LOCATION;  Service: Cardiovascular;  Laterality: N/A;    ENDOSCOPY      ENDOSCOPY N/A 09/21/2023    Procedure: ESOPHAGOGASTRODUODENOSCOPY WITH non guided esophageal dialtion 54 Fr. Bougie and  cold forcep biopsy x1 area;  Surgeon: Andres Concepcion MD;  Location: [facility] ENDOSCOPY;  Service: Gastroenterology;  Laterality: N/A;  Post- erosive gastritis, hiatal hernia, esophageal stricture    HERNIA REPAIR      INSERT / REPLACE / REMOVE PACEMAKER  11/31/2024    Temporary pacemaker implant in emergency room    KNEE ARTHROPLASTY      x2    SHOULDER ARTHROSCOPY W/ ROTATOR CUFF REPAIR Right 08/11/2020    Procedure: SHOULDER ARTHROSCOPY WITH ROTATOR CUFF REPAIR, extensive debridement;  Surgeon: Fredi Ritchie MD;  Location: [facility] MAIN OR;  Service: Orthopedics;  Laterality: Right;    TONSILLECTOMY       Family History   Problem Relation Age of Onset    Heart disease Mother     Early death Mother     Hyperlipidemia Mother     Hypertension Mother     Thyroid disease Mother     Heart attack Mother         Had valve replaced with a pig valve    Cancer Father     Stroke Father     Vision loss Father     Stroke Paternal Grandfather     Arthritis Paternal Grandmother     Alcohol abuse Brother     Asthma Brother     Depression Brother     Hyperlipidemia Brother     Hypertension Brother     Learning disabilities Brother     Mental illness Brother     Drug abuse Brother     Early death Brother     Heart disease Brother     Hyperlipidemia Brother         Bijan is the same person    Hypertension Brother          Physical Exam    General:      well developed, well nourished, in no  acute distress.    Head:      normocephalic and atraumatic.    Eyes:      PERRL/EOM intact, conjunctivae and sclerae clear without nystagmus.    Neck:      no  thyromegaly, trachea central with normal respiratory effort  Lungs:      clear bilaterally to auscultation.    Heart:       regular rate and rhythm, S1, S2 without murmurs, rubs, or gallops  Skin:      intact without lesions or rashes.    Psych:      alert and cooperative; normal mood and affect; normal attention span and concentration.            Assessment plan    History of recurrent atrial arrhythmias post ablation currently in sinus rhythm on apixaban  Hyperlipidemia on pravastatin  COPD on albuterol  History of intermittent palpitations with ectopic beats  Medications reviewed and follow-up appointments made  Eliquis refilled      Electronically signed by Pardeep Walker MD, 06/16/25, 9:36 AM EDT.     Chart(s)/Patient

## 2025-06-18 ENCOUNTER — APPOINTMENT (OUTPATIENT)
Dept: ULTRASOUND IMAGING | Facility: HOSPITAL | Age: 65
End: 2025-06-18
Payer: MEDICARE

## 2025-06-18 ENCOUNTER — HOSPITAL ENCOUNTER (EMERGENCY)
Facility: HOSPITAL | Age: 65
Discharge: HOME | End: 2025-06-18
Payer: MEDICARE

## 2025-06-18 VITALS
TEMPERATURE: 97.7 F | HEART RATE: 80 BPM | DIASTOLIC BLOOD PRESSURE: 89 MMHG | BODY MASS INDEX: 29.77 KG/M2 | HEIGHT: 72 IN | RESPIRATION RATE: 18 BRPM | OXYGEN SATURATION: 95 % | SYSTOLIC BLOOD PRESSURE: 145 MMHG | WEIGHT: 219.8 LBS

## 2025-06-18 DIAGNOSIS — N50.811 RIGHT TESTICULAR PAIN: Primary | ICD-10-CM

## 2025-06-18 DIAGNOSIS — N45.1 EPIDIDYMITIS, RIGHT: ICD-10-CM

## 2025-06-18 LAB
ALBUMIN SERPL-MCNC: 4.4 G/DL (ref 3.5–5.2)
ALBUMIN/GLOB SERPL: 1.4 G/DL
ALP SERPL-CCNC: 87 U/L (ref 39–117)
ALT SERPL W P-5'-P-CCNC: 24 U/L (ref 1–41)
ANION GAP SERPL CALCULATED.3IONS-SCNC: 14.6 MMOL/L (ref 5–15)
AST SERPL-CCNC: 17 U/L (ref 1–40)
BACTERIA UR QL AUTO: NORMAL /HPF
BASOPHILS # BLD AUTO: 0.05 10*3/MM3 (ref 0–0.2)
BASOPHILS NFR BLD AUTO: 0.7 % (ref 0–1.5)
BILIRUB SERPL-MCNC: 0.6 MG/DL (ref 0–1.2)
BILIRUB UR QL STRIP: NEGATIVE
BUN SERPL-MCNC: 11.1 MG/DL (ref 8–23)
BUN/CREAT SERPL: 12.9 (ref 7–25)
CALCIUM SPEC-SCNC: 9.3 MG/DL (ref 8.6–10.5)
CHLORIDE SERPL-SCNC: 107 MMOL/L (ref 98–107)
CLARITY UR: CLEAR
CO2 SERPL-SCNC: 25.4 MMOL/L (ref 22–29)
COLOR UR: YELLOW
CREAT SERPL-MCNC: 0.86 MG/DL (ref 0.76–1.27)
DEPRECATED RDW RBC AUTO: 50.3 FL (ref 37–54)
EGFRCR SERPLBLD CKD-EPI 2021: 96.7 ML/MIN/1.73
EOSINOPHIL # BLD AUTO: 0.12 10*3/MM3 (ref 0–0.4)
EOSINOPHIL NFR BLD AUTO: 1.6 % (ref 0.3–6.2)
ERYTHROCYTE [DISTWIDTH] IN BLOOD BY AUTOMATED COUNT: 13.3 % (ref 12.3–15.4)
GLOBULIN UR ELPH-MCNC: 3.1 GM/DL
GLUCOSE SERPL-MCNC: 106 MG/DL (ref 65–99)
GLUCOSE UR STRIP-MCNC: NEGATIVE MG/DL
HCT VFR BLD AUTO: 48.6 % (ref 37.5–51)
HGB BLD-MCNC: 15.9 G/DL (ref 13–17.7)
HGB UR QL STRIP.AUTO: NEGATIVE
HOLD SPECIMEN: NORMAL
HYALINE CASTS UR QL AUTO: NORMAL /LPF
IMM GRANULOCYTES # BLD AUTO: 0.04 10*3/MM3 (ref 0–0.05)
IMM GRANULOCYTES NFR BLD AUTO: 0.5 % (ref 0–0.5)
KETONES UR QL STRIP: ABNORMAL
LEUKOCYTE ESTERASE UR QL STRIP.AUTO: ABNORMAL
LYMPHOCYTES # BLD AUTO: 2.83 10*3/MM3 (ref 0.7–3.1)
LYMPHOCYTES NFR BLD AUTO: 37.7 % (ref 19.6–45.3)
MCH RBC QN AUTO: 33 PG (ref 26.6–33)
MCHC RBC AUTO-ENTMCNC: 32.7 G/DL (ref 31.5–35.7)
MCV RBC AUTO: 100.8 FL (ref 79–97)
MONOCYTES # BLD AUTO: 0.35 10*3/MM3 (ref 0.1–0.9)
MONOCYTES NFR BLD AUTO: 4.7 % (ref 5–12)
NEUTROPHILS NFR BLD AUTO: 4.12 10*3/MM3 (ref 1.7–7)
NEUTROPHILS NFR BLD AUTO: 54.8 % (ref 42.7–76)
NITRITE UR QL STRIP: NEGATIVE
NRBC BLD AUTO-RTO: 0 /100 WBC (ref 0–0.2)
PH UR STRIP.AUTO: <=5 [PH] (ref 5–8)
PLATELET # BLD AUTO: 180 10*3/MM3 (ref 140–450)
PMV BLD AUTO: 11 FL (ref 6–12)
POTASSIUM SERPL-SCNC: 3.6 MMOL/L (ref 3.5–5.2)
PROCALCITONIN SERPL-MCNC: 0.03 NG/ML (ref 0–0.25)
PROT SERPL-MCNC: 7.5 G/DL (ref 6–8.5)
PROT UR QL STRIP: NEGATIVE
RBC # BLD AUTO: 4.82 10*6/MM3 (ref 4.14–5.8)
RBC # UR STRIP: NORMAL /HPF
REF LAB TEST METHOD: NORMAL
SODIUM SERPL-SCNC: 147 MMOL/L (ref 136–145)
SP GR UR STRIP: 1.02 (ref 1–1.03)
SQUAMOUS #/AREA URNS HPF: NORMAL /HPF
UROBILINOGEN UR QL STRIP: ABNORMAL
WBC # UR STRIP: NORMAL /HPF
WBC NRBC COR # BLD AUTO: 7.51 10*3/MM3 (ref 3.4–10.8)

## 2025-06-18 PROCEDURE — 84145 PROCALCITONIN (PCT): CPT | Performed by: NURSE PRACTITIONER

## 2025-06-18 PROCEDURE — 81001 URINALYSIS AUTO W/SCOPE: CPT | Performed by: NURSE PRACTITIONER

## 2025-06-18 PROCEDURE — 25810000003 SODIUM CHLORIDE 0.9 % SOLUTION: Performed by: NURSE PRACTITIONER

## 2025-06-18 PROCEDURE — 80053 COMPREHEN METABOLIC PANEL: CPT | Performed by: NURSE PRACTITIONER

## 2025-06-18 PROCEDURE — 25010000002 ONDANSETRON PER 1 MG: Performed by: NURSE PRACTITIONER

## 2025-06-18 PROCEDURE — 96374 THER/PROPH/DIAG INJ IV PUSH: CPT

## 2025-06-18 PROCEDURE — 76870 US EXAM SCROTUM: CPT

## 2025-06-18 PROCEDURE — 99284 EMERGENCY DEPT VISIT MOD MDM: CPT

## 2025-06-18 PROCEDURE — 96375 TX/PRO/DX INJ NEW DRUG ADDON: CPT

## 2025-06-18 PROCEDURE — 85025 COMPLETE CBC W/AUTO DIFF WBC: CPT | Performed by: NURSE PRACTITIONER

## 2025-06-18 PROCEDURE — 25010000002 MORPHINE PER 10 MG: Performed by: NURSE PRACTITIONER

## 2025-06-18 PROCEDURE — 93976 VASCULAR STUDY: CPT

## 2025-06-18 RX ORDER — ONDANSETRON 4 MG/1
4 TABLET, ORALLY DISINTEGRATING ORAL EVERY 8 HOURS PRN
Qty: 20 TABLET | Refills: 0 | Status: SHIPPED | OUTPATIENT
Start: 2025-06-18

## 2025-06-18 RX ORDER — SULFAMETHOXAZOLE AND TRIMETHOPRIM 800; 160 MG/1; MG/1
1 TABLET ORAL 2 TIMES DAILY
Qty: 20 TABLET | Refills: 0 | Status: SHIPPED | OUTPATIENT
Start: 2025-06-18

## 2025-06-18 RX ORDER — ONDANSETRON 2 MG/ML
4 INJECTION INTRAMUSCULAR; INTRAVENOUS ONCE
Status: COMPLETED | OUTPATIENT
Start: 2025-06-18 | End: 2025-06-18

## 2025-06-18 RX ORDER — MORPHINE SULFATE 2 MG/ML
2 INJECTION, SOLUTION INTRAMUSCULAR; INTRAVENOUS ONCE
Status: COMPLETED | OUTPATIENT
Start: 2025-06-18 | End: 2025-06-18

## 2025-06-18 RX ORDER — HYDROCODONE BITARTRATE AND ACETAMINOPHEN 5; 325 MG/1; MG/1
1 TABLET ORAL EVERY 8 HOURS PRN
Qty: 9 TABLET | Refills: 0 | Status: SHIPPED | OUTPATIENT
Start: 2025-06-18

## 2025-06-18 RX ORDER — SODIUM CHLORIDE 0.9 % (FLUSH) 0.9 %
10 SYRINGE (ML) INJECTION AS NEEDED
Status: DISCONTINUED | OUTPATIENT
Start: 2025-06-18 | End: 2025-06-18 | Stop reason: HOSPADM

## 2025-06-18 RX ADMIN — ONDANSETRON 4 MG: 2 INJECTION, SOLUTION INTRAMUSCULAR; INTRAVENOUS at 10:12

## 2025-06-18 RX ADMIN — SODIUM CHLORIDE 500 ML: 9 INJECTION, SOLUTION INTRAVENOUS at 10:13

## 2025-06-18 RX ADMIN — MORPHINE SULFATE 2 MG: 2 INJECTION, SOLUTION INTRAMUSCULAR; INTRAVENOUS at 10:12

## 2025-06-18 NOTE — DISCHARGE INSTRUCTIONS
Take pain medications as directed.  Do not drive while taking this medication.  Take stool softeners as needed.     Antibiotics  as prescribed. Make sure you are drinking at least 110 ounces of water daily.  As discussed, [facility] to schedule follow-up appointment.  Wear tight fitting underwear.  Roll a washcloth up under the scrotum for comfort.  Okay to use ice every 2 hours while awake, on for 15 to 20 minutes at a time.  Schedule follow-up with primary care for recheck.  Return to the ER for any new or worsening symptoms.

## 2025-06-18 NOTE — ED PROVIDER NOTES
Subjective   History of Present Illness  64-year-old male with history significant for acute hypoxemic respiratory failure, anxiety, arthritis, COPD, CVA, esophageal stricture, GERD, hyperlipidemia, hypertension, paroxysmal A-fib with RVR-on Eliquis, prediabetes, PTSD, vitamin B12 deficiency, presents with right testicular pain.  He denies fever sweats chills.  He denies dysuria.  He reports that he was seen here for the same and was diagnosed with a hydrocele.  He reports that he was given amoxicillin and completed that.  He reports that now he has pain up into the groin.    History provided by:  Patient      Review of Systems    Past Medical History:   Diagnosis Date    Acute hypoxemic respiratory failure 11/06/2023    Allergic     Anxiety     Arthritis 06/2005    Back pain     Claustrophobia 1978    COPD (chronic obstructive pulmonary disease)     CTS (carpal tunnel syndrome) 10/2022    CVA (cerebral vascular accident) 12/11/2024    Dysphagia     Esophageal stricture     GERD (gastroesophageal reflux disease)     Hyperlipidemia     Hypertension     Injury of back 2004    Pulled lower back muscles after lifting a very heavy object    Knee pain     rt    Low back pain     Obesity     Obesity (BMI 30-39.9) 11/06/2023    Other cervical disc degeneration at C5-C6 level 08/22/2018    Paralysis 2019    Reaction to lysinapril paralyzed left side of face    Paroxysmal atrial fibrillation with rapid ventricular response 11/06/2023    Pneumonia     Prediabetes 11/06/2023    PTSD (post-traumatic stress disorder)     Rhinovirus infection 11/06/2023    Sleep apnea     Thoracic disc herniation     Vitamin B12 deficiency        Allergies   Allergen Reactions    Atorvastatin Swelling    Lisinopril Other (See Comments)     Facial paralysis        Past Surgical History:   Procedure Laterality Date    ABLATION OF DYSRHYTHMIC FOCUS  7/31/2024    BRONCHOSCOPY N/A 11/03/2023    Procedure: BRONCHOSCOPY with bilateral lung washing's;   Surgeon: Kolton Brewer MD;  Location: [facility] ENDOSCOPY;  Service: Pulmonary;  Laterality: N/A;    CARDIAC CATHETERIZATION N/A 07/13/2022    Procedure: Left Heart Cath, possible pci;  Surgeon: Prieto Sidhu MD;  Location: [facility] CATH INVASIVE LOCATION;  Service: Cardiovascular;  Laterality: N/A;    CARDIAC ELECTROPHYSIOLOGY PROCEDURE N/A 07/31/2024    Procedure: Ablation atrial flutter;  Surgeon: Pardeep Walker MD;  Location: [facility] CATH INVASIVE LOCATION;  Service: Cardiovascular;  Laterality: N/A;    ENDOSCOPY      ENDOSCOPY N/A 09/21/2023    Procedure: ESOPHAGOGASTRODUODENOSCOPY WITH non guided esophageal dialtion 54 Fr. Bougie and  cold forcep biopsy x1 area;  Surgeon: Andres Concepcion MD;  Location: [facility] ENDOSCOPY;  Service: Gastroenterology;  Laterality: N/A;  Post- erosive gastritis, hiatal hernia, esophageal stricture    HERNIA REPAIR      INSERT / REPLACE / REMOVE PACEMAKER  11/31/2024    Temporary pacemaker implant in emergency room    KNEE ARTHROPLASTY      x2    SHOULDER ARTHROSCOPY W/ ROTATOR CUFF REPAIR Right 08/11/2020    Procedure: SHOULDER ARTHROSCOPY WITH ROTATOR CUFF REPAIR, extensive debridement;  Surgeon: Fredi Ritchie MD;  Location: [facility] MAIN OR;  Service: Orthopedics;  Laterality: Right;    TONSILLECTOMY         Family History   Problem Relation Age of Onset    Heart disease Mother     Early death Mother     Hyperlipidemia Mother     Hypertension Mother     Thyroid disease Mother     Heart attack Mother         Had valve replaced with a pig valve    Cancer Father     Stroke Father     Vision loss Father     Stroke Paternal Grandfather     Arthritis Paternal Grandmother     Alcohol abuse Brother     Asthma Brother     Depression Brother     Hyperlipidemia Brother     Hypertension Brother     Learning disabilities Brother     Mental illness Brother     Drug abuse Brother     Early death Brother     Heart disease Brother     Hyperlipidemia Brother          Bijan is the same person    Hypertension Brother     Heart disease Brother     Hyperlipidemia Brother         Bijan is the same person    Hypertension Brother     Drug abuse Brother     Early death Brother     Asthma Brother     Hyperlipidemia Brother     Hypertension Brother     Alcohol abuse Brother     Depression Brother        Social History     Socioeconomic History    Marital status:    Tobacco Use    Smoking status: Every Day     Current packs/day: 1.00     Average packs/day: 1 pack/day for 52.4 years (52.4 ttl pk-yrs)     Types: Cigarettes     Start date: 1/9/1973     Passive exposure: Never    Smokeless tobacco: Never    Tobacco comments:     Smoked a half a pack a day for 42 years didn't start to smoke a whole pack until 2017     Counseled to stop smoking   Vaping Use    Vaping status: Never Used   Substance and Sexual Activity    Alcohol use: No    Drug use: No    Sexual activity: Not Currently     Partners: Female     Birth control/protection: None           Objective   Physical Exam  Vitals and nursing note reviewed. Exam conducted with a chaperone present (Neymar Ryan,  tech).   Constitutional:       General: He is awake. He is not in acute distress.     Appearance: Normal appearance. He is well-developed. He is obese. He is not ill-appearing, toxic-appearing or diaphoretic.   HENT:      Mouth/Throat:      Mouth: Mucous membranes are moist.   Eyes:      General: No scleral icterus.  Cardiovascular:      Rate and Rhythm: Normal rate and regular rhythm.      Pulses: Normal pulses.      Heart sounds: Normal heart sounds, S1 normal and S2 normal. Heart sounds not distant. No murmur heard.     No friction rub. No gallop.   Pulmonary:      Effort: Pulmonary effort is normal.      Breath sounds: Normal breath sounds and air entry.   Abdominal:      General: Abdomen is flat. Bowel sounds are normal. There is no distension.      Palpations: Abdomen is soft. There is no mass.      Tenderness: There is  "no abdominal tenderness. There is no right CVA tenderness, left CVA tenderness, guarding or rebound.      Hernia: No hernia is present.   Genitourinary:     Penis: Circumcised.       Testes:         Right: Tenderness present. Cremasteric reflex is absent.          Left: Cremasteric reflex is absent.       Epididymis:      Right: Tenderness present.      Left: Normal.       Skin:     General: Skin is warm and dry.      Capillary Refill: Capillary refill takes less than 2 seconds.      Coloration: Skin is not jaundiced or pale.      Findings: No bruising or rash.   Neurological:      Mental Status: He is alert and oriented to person, place, and time.   Psychiatric:         Behavior: Behavior is cooperative.       Procedures           ED Course  ED Course as of 06/18/25 1338   Wed Jun 18, 2025   1033 Awaiting labs and patient to go to ultrasound. [AL]   1112 Awaiting patient to go to ultrasound. [AL]   1203 Awaiting callback from urology. [AL]      ED Course User Index  [AL] Livia Hernandez, APRN                                                       Medical Decision Making  Inspect performed per Sandra Gavin ED pharmacist yielding Norco 5/325 quantity of 10 on 6/4/2025 upon discharge from [facility] [place] by [facility].    Problems Addressed:  Epididymitis, right: complicated acute illness or injury  Right testicular pain: complicated acute illness or injury    Amount and/or Complexity of Data Reviewed  Labs: ordered.  Radiology: ordered.    Risk  Prescription drug management.    Interpreted by radiologist as below:     US Scrotum & Testicles  Result Date: 6/18/2025  Impression: 1.Right epididymitis. 2.Small bilateral hydroceles. 3.Small left epididymal head cyst. 4.Both testicles are normal. Electronically Signed: Jagdish Joe MD  6/18/2025 11:51 AM EDT  Workstation ID: [id number]        /89   Pulse 80   Temp 97.7 °F (36.5 °C) (Oral)   Resp 18   Ht 182.9 cm (72\")   Wt 99.7 kg (219 lb 12.8 oz)   SpO2 95%   BMI 29.81 kg/m²  "     Lab Results (last 24 hours)       Procedure Component Value Units Date/Time    CBC & Differential [[id number]]  (Abnormal) Collected: 06/18/25 1011    Specimen: Blood from Arm, Left Updated: 06/18/25 1024    Narrative:      The following orders were created for panel order CBC & Differential.  Procedure                               Abnormality         Status                     ---------                               -----------         ------                     CBC Auto Differential[[id number]]        Abnormal            Final result                 Please view results for these tests on the individual orders.    Comprehensive Metabolic Panel [[id number]]  (Abnormal) Collected: 06/18/25 1011    Specimen: Blood from Arm, Left Updated: 06/18/25 1058     Glucose 106 mg/dL      BUN 11.1 mg/dL      Creatinine 0.86 mg/dL      Sodium 147 mmol/L      Potassium 3.6 mmol/L      Chloride 107 mmol/L      CO2 25.4 mmol/L      Calcium 9.3 mg/dL      Total Protein 7.5 g/dL      Albumin 4.4 g/dL      ALT (SGPT) 24 U/L      AST (SGOT) 17 U/L      Alkaline Phosphatase 87 U/L      Total Bilirubin 0.6 mg/dL      Globulin 3.1 gm/dL      A/G Ratio 1.4 g/dL      BUN/Creatinine Ratio 12.9     Anion Gap 14.6 mmol/L      eGFR 96.7 mL/min/1.73     Narrative:      GFR Categories in Chronic Kidney Disease (CKD)              GFR Category          GFR (mL/min/1.73)    Interpretation  G1                    90 or greater        Normal or high (1)  G2                    60-89                Mild decrease (1)  G3a                   45-59                Mild to moderate decrease  G3b                   30-44                Moderate to severe decrease  G4                    15-29                Severe decrease  G5                    14 or less           Kidney failure    (1)In the absence of evidence of kidney disease, neither GFR category G1 or G2 fulfill the criteria for CKD.    eGFR calculation 2021 CKD-EPI creatinine equation, which does not  "include race as a factor    Procalcitonin [[id number]]  (Normal) Collected: 06/18/25 1011    Specimen: Blood from Arm, Left Updated: 06/18/25 1058     Procalcitonin 0.03 ng/mL     Narrative:      As a Marker for Sepsis (Non-Neonates):    1. <0.5 ng/mL represents a low risk of severe sepsis and/or septic shock.  2. >2 ng/mL represents a high risk of severe sepsis and/or septic shock.    As a Marker for Lower Respiratory Tract Infections that require antibiotic therapy:    PCT on Admission    Antibiotic Therapy       6-12 Hrs later    >0.5                Strongly Recommended  >0.25 - <0.5        Recommended   0.1 - 0.25          Discouraged              Remeasure/reassess PCT  <0.1                Strongly Discouraged     Remeasure/reassess PCT    As 28 day mortality risk marker: \"Change in Procalcitonin Result\" (>80% or <=80%) if Day 0 (or Day 1) and Day 4 values are available. Refer to http://www.[facility]-pct-calculator.com    Change in PCT <=80%  A decrease of PCT levels below or equal to 80% defines a positive change in PCT test result representing a higher risk for 28-day all-cause mortality of patients diagnosed with severe sepsis for septic shock.    Change in PCT >80%  A decrease of PCT levels of more than 80% defines a negative change in PCT result representing a lower risk for 28-day all-cause mortality of patients diagnosed with severe sepsis or septic shock.       CBC Auto Differential [[id number]]  (Abnormal) Collected: 06/18/25 1011    Specimen: Blood from Arm, Left Updated: 06/18/25 1024     WBC 7.51 10*3/mm3      RBC 4.82 10*6/mm3      Hemoglobin 15.9 g/dL      Hematocrit 48.6 %      .8 fL      MCH 33.0 pg      MCHC 32.7 g/dL      RDW 13.3 %      RDW-SD 50.3 fl      MPV 11.0 fL      Platelets 180 10*3/mm3      Neutrophil % 54.8 %      Lymphocyte % 37.7 %      Monocyte % 4.7 %      Eosinophil % 1.6 %      Basophil % 0.7 %      Immature Grans % 0.5 %      Neutrophils, Absolute 4.12 10*3/mm3      " Lymphocytes, Absolute 2.83 10*3/mm3      Monocytes, Absolute 0.35 10*3/mm3      Eosinophils, Absolute 0.12 10*3/mm3      Basophils, Absolute 0.05 10*3/mm3      Immature Grans, Absolute 0.04 10*3/mm3      nRBC 0.0 /100 WBC     Urinalysis With Culture If Indicated - Urine, Clean Catch [[id number]]  (Abnormal) Collected: 06/18/25 1012    Specimen: Urine, Clean Catch Updated: 06/18/25 1025     Color, UA Yellow     Appearance, UA Clear     pH, UA <=5.0     Specific Gravity, UA 1.025     Glucose, UA Negative     Ketones, UA Trace     Bilirubin, UA Negative     Blood, UA Negative     Protein, UA Negative     Leuk Esterase, UA Trace     Nitrite, UA Negative     Urobilinogen, UA 1.0 E.U./dL    Narrative:      In absence of clinical symptoms, the presence of pyuria, bacteria, and/or nitrites on the urinalysis result does not correlate with infection.    Urinalysis, Microscopic Only - Urine, Clean Catch [[id number]] Collected: 06/18/25 1012    Specimen: Urine, Clean Catch Updated: 06/18/25 1030     RBC, UA 0-2 /HPF      WBC, UA 0-2 /HPF      Comment: Urine culture not indicated.        Bacteria, UA None Seen /HPF      Squamous Epithelial Cells, UA 0-2 /HPF      Hyaline Casts, UA None Seen /LPF      Methodology Automated Microscopy             Medications   sodium chloride 0.9 % flush 10 mL (has no administration in time range)   sodium chloride 0.9 % bolus 500 mL (0 mL Intravenous Stopped 6/18/25 1043)   morphine injection 2 mg (2 mg Intravenous Given 6/18/25 1012)   ondansetron (ZOFRAN) injection 4 mg (4 mg Intravenous Given 6/18/25 1012)        Appropriate PPE worn during patient exam.  Appropriate monitoring initiated. Patient is alert and oriented x3.  No acute distress noted.  Patient has noted to be tender in the right testicle.  There is no overlying erythema or noticeable swelling.  He is also tender in the right groin.  Cremaster not present in both testes.  IV established and labs obtained.  Patient was given normal  saline 500 cc bolus, morphine 2 mg IV, and Zofran 4 mg IV.  Testicular ultrasound obtained with the above findings.  CBC CMP essentially unremarkable.  Urine showed trace ketones trace leuk esterase otherwise unremarkable.  Procalcitonin within normal limits.  Testicular ultrasound showed right epididymitis with bilateral hydrocele.  Consulted with ED pharmacist about antibiotic therapy and discharge patient on Bactrim twice daily for 10 days.  A short course of Norco and Zofran was given.  Urology was paged by the ER and there is no way to leave a voicemail or page the on-call.  Patient was encouraged to call urology today for close follow-up.  He was seen by Dr. Traore follow-up in observation last week.    I reviewed chart 6/16/2025 patient was seen by cardiology for follow-up on A-fib. My radiology interpretation of testicular ultrasound shows right epididymitis, bilateral hydrocele.  No testicular torsion noted. Imaging was independently interpreted by Dr. Musa. Differential Diagnoses, not all-inclusive and does not constitute entirety of all causes: Epididymitis, testicular torsion, hydrocele.  Epididymitis hydrocele determined by imaging, torsion ruled out by imaging.    Disposition/Treatment: Discussed results with patient, verbalized understanding. Discussed reasons to return, patient verbalized understanding. Agreeable with plan of care. Patient was stable upon disposition.    Upon reassessment, patient is flesh tone warm and dry no acute distress noted.  Vital signs are stable. Discussed return precautions, patient verbalized understanding.     Part of this note may be an electronic transcription/translation of spoken language to printed text using the Dragon Dictation System.   Final diagnoses:   Right testicular pain   Epididymitis, right       ED Disposition  ED Disposition       ED Disposition   Discharge    Condition   Stable    Comment   --               David Causey MD  [address] [place] [address]  [place]  [place] [address] [phone]    Schedule an appointment as soon as possible for a visit       [facility]  [address] [facility] [id number] [phone]  Schedule an appointment as soon as possible for a visit today      [facility] EMERGENCY DEPARTMENT  [address] [place] [id number] [phone]  Go to   If symptoms worsen         Medication List        New Prescriptions      HYDROcodone-acetaminophen 5-325 MG per tablet  Commonly known as: NORCO  Take 1 tablet by mouth Every 8 (Eight) Hours As Needed for Severe Pain.     ondansetron ODT 4 MG disintegrating tablet  Commonly known as: ZOFRAN-ODT  Take 1 tablet by mouth Every 8 (Eight) Hours As Needed for Nausea or Vomiting.     sulfamethoxazole-trimethoprim 800-160 MG per tablet  Commonly known as: BACTRIM DS,SEPTRA DS  Take 1 tablet by mouth 2 (Two) Times a Day.               Where to Get Your Medications        These medications were sent to [facility] - [address] [place] [address]      Hours: Monday to Friday 7 AM to 7 PM Phone: [phone]   HYDROcodone-acetaminophen 5-325 MG per tablet  ondansetron ODT 4 MG disintegrating tablet  sulfamethoxazole-trimethoprim 800-160 MG per tablet            Livia Hernandez, APRN  06/18/25 5923

## 2025-06-25 ENCOUNTER — HOSPITAL ENCOUNTER (EMERGENCY)
Facility: HOSPITAL | Age: 65
Discharge: HOME | End: 2025-06-25
Attending: EMERGENCY MEDICINE | Admitting: EMERGENCY MEDICINE
Payer: MEDICARE

## 2025-06-25 VITALS
WEIGHT: 221.12 LBS | TEMPERATURE: 98.1 F | HEART RATE: 80 BPM | BODY MASS INDEX: 29.95 KG/M2 | SYSTOLIC BLOOD PRESSURE: 133 MMHG | RESPIRATION RATE: 19 BRPM | DIASTOLIC BLOOD PRESSURE: 84 MMHG | HEIGHT: 72 IN | OXYGEN SATURATION: 99 %

## 2025-06-25 DIAGNOSIS — N50.811 PAIN IN RIGHT TESTICLE: Primary | ICD-10-CM

## 2025-06-25 LAB
BACTERIA UR QL AUTO: NORMAL /HPF
BILIRUB UR QL STRIP: NEGATIVE
CLARITY UR: CLEAR
COLOR UR: ABNORMAL
GLUCOSE UR STRIP-MCNC: NEGATIVE MG/DL
HGB UR QL STRIP.AUTO: NEGATIVE
HYALINE CASTS UR QL AUTO: NORMAL /LPF
KETONES UR QL STRIP: ABNORMAL
LEUKOCYTE ESTERASE UR QL STRIP.AUTO: ABNORMAL
NITRITE UR QL STRIP: NEGATIVE
PH UR STRIP.AUTO: <=5 [PH] (ref 5–8)
PROT UR QL STRIP: NEGATIVE
RBC # UR STRIP: NORMAL /HPF
REF LAB TEST METHOD: NORMAL
SP GR UR STRIP: 1.03 (ref 1–1.03)
SQUAMOUS #/AREA URNS HPF: NORMAL /HPF
UROBILINOGEN UR QL STRIP: ABNORMAL
WBC # UR STRIP: NORMAL /HPF

## 2025-06-25 PROCEDURE — 81001 URINALYSIS AUTO W/SCOPE: CPT | Performed by: EMERGENCY MEDICINE

## 2025-06-25 PROCEDURE — 99283 EMERGENCY DEPT VISIT LOW MDM: CPT

## 2025-06-25 RX ORDER — TRAMADOL HYDROCHLORIDE 50 MG/1
50 TABLET ORAL EVERY 6 HOURS PRN
Qty: 12 TABLET | Refills: 0 | Status: SHIPPED | OUTPATIENT
Start: 2025-06-25

## 2025-06-25 NOTE — ED PROVIDER NOTES
Subjective   History of Present Illness  Patient is a 64-year-old male complaining of pain in his right testicle.  He states has had this for the past 1 month.  He had CT scan of his abdomen pelvis and ultrasound which did show epididymitis that was treated.  He denies abdominal pain Bham diarrhea dysuria or other complaint      Review of Systems    Past Medical History:   Diagnosis Date    Acute hypoxemic respiratory failure 11/06/2023    Allergic     Anxiety     Arthritis 06/2005    Back pain     Claustrophobia 1978    COPD (chronic obstructive pulmonary disease)     CTS (carpal tunnel syndrome) 10/2022    CVA (cerebral vascular accident) 12/11/2024    Dysphagia     Esophageal stricture     GERD (gastroesophageal reflux disease)     Hyperlipidemia     Hypertension     Injury of back 2004    Pulled lower back muscles after lifting a very heavy object    Knee pain     rt    Low back pain     Obesity     Obesity (BMI 30-39.9) 11/06/2023    Other cervical disc degeneration at C5-C6 level 08/22/2018    Paralysis 2019    Reaction to lysinapril paralyzed left side of face    Paroxysmal atrial fibrillation with rapid ventricular response 11/06/2023    Pneumonia     Prediabetes 11/06/2023    PTSD (post-traumatic stress disorder)     Rhinovirus infection 11/06/2023    Sleep apnea     Thoracic disc herniation     Vitamin B12 deficiency        Allergies   Allergen Reactions    Atorvastatin Swelling    Lisinopril Other (See Comments)     Facial paralysis        Past Surgical History:   Procedure Laterality Date    ABLATION OF DYSRHYTHMIC FOCUS  7/31/2024    BRONCHOSCOPY N/A 11/03/2023    Procedure: BRONCHOSCOPY with bilateral lung washing's;  Surgeon: Kolton Brewer MD;  Location: [facility] ENDOSCOPY;  Service: Pulmonary;  Laterality: N/A;    CARDIAC CATHETERIZATION N/A 07/13/2022    Procedure: Left Heart Cath, possible pci;  Surgeon: Prieto Sidhu MD;  Location: [facility] CATH INVASIVE LOCATION;  Service:  Cardiovascular;  Laterality: N/A;    CARDIAC ELECTROPHYSIOLOGY PROCEDURE N/A 07/31/2024    Procedure: Ablation atrial flutter;  Surgeon: Pardeep Walker MD;  Location: [facility] CATH INVASIVE LOCATION;  Service: Cardiovascular;  Laterality: N/A;    ENDOSCOPY      ENDOSCOPY N/A 09/21/2023    Procedure: ESOPHAGOGASTRODUODENOSCOPY WITH non guided esophageal dialtion 54 Fr[address]ougie and  cold forcep biopsy x1 area;  Surgeon: Andres Concepcion MD;  Location: [facility] ENDOSCOPY;  Service: Gastroenterology;  Laterality: N/A;  Post- erosive gastritis, hiatal hernia, esophageal stricture    HERNIA REPAIR      INSERT / REPLACE / REMOVE PACEMAKER  11/31/2024    Temporary pacemaker implant in emergency room    KNEE ARTHROPLASTY      x2    SHOULDER ARTHROSCOPY W/ ROTATOR CUFF REPAIR Right 08/11/2020    Procedure: SHOULDER ARTHROSCOPY WITH ROTATOR CUFF REPAIR, extensive debridement;  Surgeon: Fredi Ritchie MD;  Location: [facility] MAIN OR;  Service: Orthopedics;  Laterality: Right;    TONSILLECTOMY         Family History   Problem Relation Age of Onset    Heart disease Mother     Early death Mother     Hyperlipidemia Mother     Hypertension Mother     Thyroid disease Mother     Heart attack Mother         Had valve replaced with a pig valve    Cancer Father     Stroke Father     Vision loss Father     Stroke Paternal Grandfather     Arthritis Paternal Grandmother     Alcohol abuse Brother     Asthma Brother     Depression Brother     Hyperlipidemia Brother     Hypertension Brother     Learning disabilities Brother     Mental illness Brother     Drug abuse Brother     Early death Brother     Heart disease Brother     Hyperlipidemia Brother         Bijan is the same person    Hypertension Brother     Heart disease Brother     Hyperlipidemia Brother         Bijan is the same person    Hypertension Brother     Drug abuse Brother     Early death Brother     Asthma Brother     Hyperlipidemia Brother     Hypertension  Brother     Alcohol abuse Brother     Depression Brother        Social History     Socioeconomic History    Marital status:    Tobacco Use    Smoking status: Every Day     Current packs/day: 1.00     Average packs/day: 1 pack/day for 52.5 years (52.5 ttl pk-yrs)     Types: Cigarettes     Start date: 1/9/1973     Passive exposure: Never    Smokeless tobacco: Never    Tobacco comments:     Smoked a half a pack a day for 42 years didn't start to smoke a whole pack until 2017     Counseled to stop smoking   Vaping Use    Vaping status: Never Used   Substance and Sexual Activity    Alcohol use: No    Drug use: No    Sexual activity: Not Currently     Partners: Female     Birth control/protection: None           Objective   Physical Exam  Lungs are clear.  Heart has regular rhythm.  Abdomen soft nontender.   exam shows mild tenderness of the patient's right testicle there is no swelling.  There is no bleeding or discharge at the meatus.  There is no palpable hernias.  Procedures           ED Course        Results for orders placed or performed during the hospital encounter of 06/25/25   Urinalysis With Microscopic If Indicated (No Culture) - Urine, Clean Catch    Collection Time: 06/25/25  3:43 PM    Specimen: Urine, Clean Catch   Result Value Ref Range    Color, UA Dark Yellow (A) Yellow, Straw    Appearance, UA Clear Clear    pH, UA <=5.0 5.0 - 8.0    Specific Gravity, UA 1.026 1.005 - 1.030    Glucose, UA Negative Negative    Ketones, UA Trace (A) Negative    Bilirubin, UA Negative Negative    Blood, UA Negative Negative    Protein, UA Negative Negative    Leuk Esterase, UA Trace (A) Negative    Nitrite, UA Negative Negative    Urobilinogen, UA 1.0 E.U./dL 0.2 - 1.0 E.U./dL   Urinalysis, Microscopic Only - Urine, Clean Catch    Collection Time: 06/25/25  3:43 PM    Specimen: Urine, Clean Catch   Result Value Ref Range    RBC, UA 0-2 None Seen, 0-2 /HPF    WBC, UA 0-2 None Seen, 0-2 /HPF    Bacteria, UA None  Seen None Seen /HPF    Squamous Epithelial Cells, UA 0-2 None Seen, 0-2 /HPF    Hyaline Casts, UA None Seen None Seen /LPF    Methodology Automated Microscopy                                                     Medical Decision Making  Interpretation patient's UA shows no evidence of infectious process.  There is no hematuria.  I reviewed his old chart and his ultrasound did show mild epididymitis several weeks ago which was treated.  He also had normal CT scan abdomen pelvis without contrast.  Patient would be discharged.  Will place on Ultram.  He scheduled follow-up with his urologist in 2 days.    Amount and/or Complexity of Data Reviewed  Labs: ordered. Decision-making details documented in ED Course.    Risk  Prescription drug management.        Final diagnoses:   Pain in right testicle       ED Disposition  ED Disposition       ED Disposition   Discharge    Condition   Stable    Comment   --               No follow-up provider specified.       Medication List        New Prescriptions      traMADol 50 MG tablet  Commonly known as: ULTRAM  Take 1 tablet by mouth Every 6 (Six) Hours As Needed for Severe Pain.               Where to Get Your Medications        These medications were sent to [facility] [address] [place] [address]      Hours: Monday to Friday 7 AM to 7 PM Phone: [phone]   traMADol 50 MG tablet            Mgiuel Ibarra MD  06/25/25 [id number]

## 2025-07-12 ENCOUNTER — APPOINTMENT (OUTPATIENT)
Dept: CT IMAGING | Facility: HOSPITAL | Age: 65
End: 2025-07-12
Payer: MEDICARE

## 2025-07-12 ENCOUNTER — HOSPITAL ENCOUNTER (EMERGENCY)
Facility: HOSPITAL | Age: 65
Discharge: HOME | End: 2025-07-12
Payer: MEDICARE

## 2025-07-12 VITALS
DIASTOLIC BLOOD PRESSURE: 76 MMHG | WEIGHT: 223.99 LBS | HEART RATE: 88 BPM | BODY MASS INDEX: 30.34 KG/M2 | TEMPERATURE: 98.5 F | SYSTOLIC BLOOD PRESSURE: 131 MMHG | RESPIRATION RATE: 17 BRPM | HEIGHT: 72 IN | OXYGEN SATURATION: 96 %

## 2025-07-12 DIAGNOSIS — N32.89 BLADDER WALL THICKENING: ICD-10-CM

## 2025-07-12 DIAGNOSIS — E87.6 HYPOKALEMIA: ICD-10-CM

## 2025-07-12 DIAGNOSIS — R10.31 RIGHT GROIN PAIN: ICD-10-CM

## 2025-07-12 DIAGNOSIS — M54.41 ACUTE RIGHT-SIDED LOW BACK PAIN WITH RIGHT-SIDED SCIATICA: Primary | ICD-10-CM

## 2025-07-12 DIAGNOSIS — Z87.438 HISTORY OF EPIDIDYMITIS: ICD-10-CM

## 2025-07-12 LAB
ALBUMIN SERPL-MCNC: 4.2 G/DL (ref 3.5–5.2)
ALBUMIN/GLOB SERPL: 1.5 G/DL
ALP SERPL-CCNC: 75 U/L (ref 39–117)
ALT SERPL W P-5'-P-CCNC: 14 U/L (ref 1–41)
ANION GAP SERPL CALCULATED.3IONS-SCNC: 11 MMOL/L (ref 5–15)
AST SERPL-CCNC: 16 U/L (ref 1–40)
BASOPHILS # BLD AUTO: 0.04 10*3/MM3 (ref 0–0.2)
BASOPHILS NFR BLD AUTO: 0.6 % (ref 0–1.5)
BILIRUB SERPL-MCNC: 0.4 MG/DL (ref 0–1.2)
BILIRUB UR QL STRIP: NEGATIVE
BUN SERPL-MCNC: 9.4 MG/DL (ref 8–23)
BUN/CREAT SERPL: 10.1 (ref 7–25)
CALCIUM SPEC-SCNC: 9.1 MG/DL (ref 8.6–10.5)
CHLORIDE SERPL-SCNC: 108 MMOL/L (ref 98–107)
CLARITY UR: CLEAR
CO2 SERPL-SCNC: 24 MMOL/L (ref 22–29)
COLOR UR: ABNORMAL
CREAT SERPL-MCNC: 0.93 MG/DL (ref 0.76–1.27)
DEPRECATED RDW RBC AUTO: 47.5 FL (ref 37–54)
EGFRCR SERPLBLD CKD-EPI 2021: 91.7 ML/MIN/1.73
EOSINOPHIL # BLD AUTO: 0.14 10*3/MM3 (ref 0–0.4)
EOSINOPHIL NFR BLD AUTO: 2 % (ref 0.3–6.2)
ERYTHROCYTE [DISTWIDTH] IN BLOOD BY AUTOMATED COUNT: 13.2 % (ref 12.3–15.4)
GLOBULIN UR ELPH-MCNC: 2.8 GM/DL
GLUCOSE SERPL-MCNC: 112 MG/DL (ref 65–99)
GLUCOSE UR STRIP-MCNC: NEGATIVE MG/DL
HCT VFR BLD AUTO: 44.2 % (ref 37.5–51)
HGB BLD-MCNC: 14.4 G/DL (ref 13–17.7)
HGB UR QL STRIP.AUTO: NEGATIVE
HOLD SPECIMEN: NORMAL
HOLD SPECIMEN: NORMAL
IMM GRANULOCYTES # BLD AUTO: 0.05 10*3/MM3 (ref 0–0.05)
IMM GRANULOCYTES NFR BLD AUTO: 0.7 % (ref 0–0.5)
KETONES UR QL STRIP: ABNORMAL
LEUKOCYTE ESTERASE UR QL STRIP.AUTO: NEGATIVE
LYMPHOCYTES # BLD AUTO: 2.58 10*3/MM3 (ref 0.7–3.1)
LYMPHOCYTES NFR BLD AUTO: 36.2 % (ref 19.6–45.3)
MCH RBC QN AUTO: 32.1 PG (ref 26.6–33)
MCHC RBC AUTO-ENTMCNC: 32.6 G/DL (ref 31.5–35.7)
MCV RBC AUTO: 98.7 FL (ref 79–97)
MONOCYTES # BLD AUTO: 0.41 10*3/MM3 (ref 0.1–0.9)
MONOCYTES NFR BLD AUTO: 5.8 % (ref 5–12)
NEUTROPHILS NFR BLD AUTO: 3.9 10*3/MM3 (ref 1.7–7)
NEUTROPHILS NFR BLD AUTO: 54.7 % (ref 42.7–76)
NITRITE UR QL STRIP: NEGATIVE
NRBC BLD AUTO-RTO: 0 /100 WBC (ref 0–0.2)
PH UR STRIP.AUTO: 5.5 [PH] (ref 5–8)
PLATELET # BLD AUTO: 146 10*3/MM3 (ref 140–450)
PMV BLD AUTO: 11 FL (ref 6–12)
POTASSIUM SERPL-SCNC: 3.1 MMOL/L (ref 3.5–5.2)
PROT SERPL-MCNC: 7 G/DL (ref 6–8.5)
PROT UR QL STRIP: NEGATIVE
RBC # BLD AUTO: 4.48 10*6/MM3 (ref 4.14–5.8)
SODIUM SERPL-SCNC: 143 MMOL/L (ref 136–145)
SP GR UR STRIP: 1.02 (ref 1–1.03)
UROBILINOGEN UR QL STRIP: ABNORMAL
WBC NRBC COR # BLD AUTO: 7.12 10*3/MM3 (ref 3.4–10.8)
WHOLE BLOOD HOLD COAG: NORMAL
WHOLE BLOOD HOLD SPECIMEN: NORMAL

## 2025-07-12 PROCEDURE — 74177 CT ABD & PELVIS W/CONTRAST: CPT

## 2025-07-12 PROCEDURE — 85025 COMPLETE CBC W/AUTO DIFF WBC: CPT | Performed by: NURSE PRACTITIONER

## 2025-07-12 PROCEDURE — 25010000002 HYDROMORPHONE PER 4 MG: Performed by: NURSE PRACTITIONER

## 2025-07-12 PROCEDURE — 99285 EMERGENCY DEPT VISIT HI MDM: CPT

## 2025-07-12 PROCEDURE — 25510000001 IOPAMIDOL PER 1 ML: Performed by: NURSE PRACTITIONER

## 2025-07-12 PROCEDURE — 96375 TX/PRO/DX INJ NEW DRUG ADDON: CPT

## 2025-07-12 PROCEDURE — 81003 URINALYSIS AUTO W/O SCOPE: CPT | Performed by: NURSE PRACTITIONER

## 2025-07-12 PROCEDURE — 25010000002 ONDANSETRON PER 1 MG: Performed by: NURSE PRACTITIONER

## 2025-07-12 PROCEDURE — 96374 THER/PROPH/DIAG INJ IV PUSH: CPT

## 2025-07-12 PROCEDURE — 80053 COMPREHEN METABOLIC PANEL: CPT | Performed by: NURSE PRACTITIONER

## 2025-07-12 PROCEDURE — 25810000003 SODIUM CHLORIDE 0.9 % SOLUTION: Performed by: NURSE PRACTITIONER

## 2025-07-12 RX ORDER — SODIUM CHLORIDE 0.9 % (FLUSH) 0.9 %
10 SYRINGE (ML) INJECTION AS NEEDED
Status: DISCONTINUED | OUTPATIENT
Start: 2025-07-12 | End: 2025-07-12 | Stop reason: HOSPADM

## 2025-07-12 RX ORDER — POTASSIUM CHLORIDE 1500 MG/1
40 TABLET, EXTENDED RELEASE ORAL ONCE
Status: COMPLETED | OUTPATIENT
Start: 2025-07-12 | End: 2025-07-12

## 2025-07-12 RX ORDER — ONDANSETRON 2 MG/ML
4 INJECTION INTRAMUSCULAR; INTRAVENOUS ONCE
Status: COMPLETED | OUTPATIENT
Start: 2025-07-12 | End: 2025-07-12

## 2025-07-12 RX ORDER — GABAPENTIN 300 MG/1
300 CAPSULE ORAL 3 TIMES DAILY
Qty: 21 CAPSULE | Refills: 0 | Status: SHIPPED | OUTPATIENT
Start: 2025-07-12 | End: 2025-07-21

## 2025-07-12 RX ORDER — HYDROMORPHONE HYDROCHLORIDE 1 MG/ML
0.5 INJECTION, SOLUTION INTRAMUSCULAR; INTRAVENOUS; SUBCUTANEOUS ONCE
Refills: 0 | Status: COMPLETED | OUTPATIENT
Start: 2025-07-12 | End: 2025-07-12

## 2025-07-12 RX ORDER — IOPAMIDOL 755 MG/ML
100 INJECTION, SOLUTION INTRAVASCULAR
Status: COMPLETED | OUTPATIENT
Start: 2025-07-12 | End: 2025-07-12

## 2025-07-12 RX ORDER — METHYLPREDNISOLONE 4 MG/1
TABLET ORAL
Qty: 21 TABLET | Refills: 0 | Status: SHIPPED | OUTPATIENT
Start: 2025-07-12

## 2025-07-12 RX ADMIN — POTASSIUM CHLORIDE 40 MEQ: 1500 TABLET, EXTENDED RELEASE ORAL at 11:36

## 2025-07-12 RX ADMIN — SODIUM CHLORIDE 1000 ML: 9 INJECTION, SOLUTION INTRAVENOUS at 10:39

## 2025-07-12 RX ADMIN — IOPAMIDOL 100 ML: 755 INJECTION, SOLUTION INTRAVENOUS at 11:43

## 2025-07-12 RX ADMIN — HYDROMORPHONE HYDROCHLORIDE 0.5 MG: 1 INJECTION, SOLUTION INTRAMUSCULAR; INTRAVENOUS; SUBCUTANEOUS at 10:39

## 2025-07-12 RX ADMIN — ONDANSETRON 4 MG: 2 INJECTION, SOLUTION INTRAMUSCULAR; INTRAVENOUS at 10:39

## 2025-07-12 NOTE — ED NOTES
Pt arrived to ED with c/o flank, hip, and groin pain on the right side after being sent here by his urologist. Denies any issues urinating at this time.

## 2025-07-12 NOTE — ED PROVIDER NOTES
Subjective   History of Present Illness  64-year-old male with history significant for acute hypoxemic respiratory failure, anxiety, arthritis, COPD, CVA, esophageal stricture, GERD, hyperlipidemia, hypertension, paroxysmal A-fib with RVR-on Eliquis, prediabetes, PTSD, vitamin B12 deficiency, chronic low back pain with radiculopathy, presents with 4-day history of right groin pain extending into the right testicle, low back pain with radiculopathy to the right hip without known injury, worse when lifting the right leg and ambulating.  Patient reports that he has had to use the [employer] to go down the driveway to get the mail because of pain while walking.  He denies urinary bowel incontinence saddle paresthesia.  Patient reports that since the first week of June he has had recurrent epididymitis of the right testicle.  He was seen by his urologist 2 weeks ago and started on Bactrim for 4 weeks.  He reports compliance with this and initially had improvement, then symptoms returned 4 days ago.  He denies associated fever sweats chills.  He denies hematuria dysuria.  Denies flank or abdominal pain.    History provided by:  Patient      Review of Systems   Constitutional:  Negative for appetite change, chills, diaphoresis and fever.   Gastrointestinal:  Negative for abdominal pain, blood in stool, constipation, diarrhea, nausea and vomiting.   Genitourinary:  Positive for testicular pain. Negative for decreased urine volume, difficulty urinating, flank pain and hematuria.   Musculoskeletal:  Positive for back pain.   Skin:  Negative for pallor and rash.   Neurological:  Negative for weakness and numbness.   Hematological:  Negative for adenopathy.   All other systems reviewed and are negative.      Past Medical History:   Diagnosis Date    Acute hypoxemic respiratory failure 11/06/2023    Allergic     Anxiety     Arthritis 06/2005    Back pain     Claustrophobia 1978    COPD (chronic obstructive pulmonary disease)      CTS (carpal tunnel syndrome) 10/2022    CVA (cerebral vascular accident) 12/11/2024    Dysphagia     Esophageal stricture     GERD (gastroesophageal reflux disease)     Hyperlipidemia     Hypertension     Injury of back 2004    Pulled lower back muscles after lifting a very heavy object    Knee pain     rt    Low back pain     Obesity     Obesity (BMI 30-39.9) 11/06/2023    Other cervical disc degeneration at C5-C6 level 08/22/2018    Paralysis 2019    Reaction to lysinapril paralyzed left side of face    Paroxysmal atrial fibrillation with rapid ventricular response 11/06/2023    Pneumonia     Prediabetes 11/06/2023    PTSD (post-traumatic stress disorder)     Rhinovirus infection 11/06/2023    Sleep apnea     Thoracic disc herniation     Vitamin B12 deficiency        Allergies   Allergen Reactions    Atorvastatin Swelling    Lisinopril Other (See Comments)     Facial paralysis        Past Surgical History:   Procedure Laterality Date    ABLATION OF DYSRHYTHMIC FOCUS  7/31/2024    BRONCHOSCOPY N/A 11/03/2023    Procedure: BRONCHOSCOPY with bilateral lung washing's;  Surgeon: Kolton Brewer MD;  Location: [facility] ENDOSCOPY;  Service: Pulmonary;  Laterality: N/A;    CARDIAC CATHETERIZATION N/A 07/13/2022    Procedure: Left Heart Cath, possible pci;  Surgeon: Prieto Sidhu MD;  Location: [facility] CATH INVASIVE LOCATION;  Service: Cardiovascular;  Laterality: N/A;    CARDIAC ELECTROPHYSIOLOGY PROCEDURE N/A 07/31/2024    Procedure: Ablation atrial flutter;  Surgeon: Pardeep Walker MD;  Location: [facility] CATH INVASIVE LOCATION;  Service: Cardiovascular;  Laterality: N/A;    ENDOSCOPY      ENDOSCOPY N/A 09/21/2023    Procedure: ESOPHAGOGASTRODUODENOSCOPY WITH non guided esophageal dialtion 54 Fr. Bougie and  cold forcep biopsy x1 area;  Surgeon: Andres Concepcion MD;  Location: [facility] ENDOSCOPY;  Service: Gastroenterology;  Laterality: N/A;  Post- erosive gastritis, hiatal hernia, esophageal  stricture    HERNIA REPAIR      INSERT / REPLACE / REMOVE PACEMAKER  11/31/2024    Temporary pacemaker implant in emergency room    KNEE ARTHROPLASTY      x2    SHOULDER ARTHROSCOPY W/ ROTATOR CUFF REPAIR Right 08/11/2020    Procedure: SHOULDER ARTHROSCOPY WITH ROTATOR CUFF REPAIR, extensive debridement;  Surgeon: Fredi Ritchie MD;  Location[facility] MAIN OR;  Service: Orthopedics;  Laterality: Right;    TONSILLECTOMY         Family History   Problem Relation Age of Onset    Heart disease Mother     Early death Mother     Hyperlipidemia Mother     Hypertension Mother     Thyroid disease Mother     Heart attack Mother         Had valve replaced with a pig valve    Cancer Father     Stroke Father     Vision loss Father     Stroke Paternal Grandfather     Arthritis Paternal Grandmother     Alcohol abuse Brother     Asthma Brother     Depression Brother     Hyperlipidemia Brother     Hypertension Brother     Learning disabilities Brother     Mental illness Brother     Drug abuse Brother     Early death Brother     Heart disease Brother     Hyperlipidemia Brother         Bijan is the same person    Hypertension Brother     Heart disease Brother     Hyperlipidemia Brother         Bijan is the same person    Hypertension Brother     Drug abuse Brother     Early death Brother     Asthma Brother     Hyperlipidemia Brother     Hypertension Brother     Alcohol abuse Brother     Depression Brother        Social History     Socioeconomic History    Marital status:    Tobacco Use    Smoking status: Every Day     Current packs/day: 1.00     Average packs/day: 1 pack/day for 52.5 years (52.5 ttl pk-yrs)     Types: Cigarettes     Start date: 1/9/1973     Passive exposure: Never    Smokeless tobacco: Never    Tobacco comments:     Smoked a half a pack a day for 42 years didn't start to smoke a whole pack until 2017     Counseled to stop smoking   Vaping Use    Vaping status: Never Used   Substance and Sexual  Activity    Alcohol use: No    Drug use: No    Sexual activity: Not Currently     Partners: Female     Birth control/protection: None           Objective   Physical Exam  Vitals and nursing note reviewed. Exam conducted with a chaperone present (Waleska RN).   Constitutional:       General: He is awake. He is not in acute distress.     Appearance: Normal appearance. He is well-developed and normal weight. He is not ill-appearing, toxic-appearing or diaphoretic.   HENT:      Mouth/Throat:      Mouth: Mucous membranes are moist.   Eyes:      General: No scleral icterus.  Cardiovascular:      Rate and Rhythm: Normal rate and regular rhythm.      Pulses: Normal pulses.      Heart sounds: Normal heart sounds, S1 normal and S2 normal. Heart sounds not distant. No murmur heard.     No friction rub. No gallop.   Pulmonary:      Effort: Pulmonary effort is normal.      Breath sounds: Normal breath sounds and air entry.   Abdominal:      General: Abdomen is flat. Bowel sounds are normal. There is no distension.      Palpations: Abdomen is soft. There is no mass.      Tenderness: There is no abdominal tenderness. There is no right CVA tenderness, left CVA tenderness, guarding or rebound.      Hernia: No hernia is present.   Genitourinary:     Penis: Normal and circumcised.       Testes: Cremasteric reflex is present.         Right: Tenderness present.       Musculoskeletal:      Lumbar back: Tenderness and bony tenderness present. Normal range of motion. Positive right straight leg raise test.        Back:    Skin:     General: Skin is warm and dry.      Capillary Refill: Capillary refill takes less than 2 seconds.      Coloration: Skin is not jaundiced or pale.      Findings: No bruising or rash.   Neurological:      Mental Status: He is alert and oriented to person, place, and time.   Psychiatric:         Behavior: Behavior is cooperative.         Procedures           ED Course  ED Course as of 07/12/25 1224   Sat Jul 12,  "2025 1152 CT Abdomen Pelvis With Contrast  Awaiting CT report. [AL]      ED Course User Index  [AL] Livia Hernandez, APRN                                                       Medical Decision Making  Problems Addressed:  Acute right-sided low back pain with right-sided sciatica: complicated acute illness or injury  Bladder wall thickening: complicated acute illness or injury  History of epididymitis: complicated acute illness or injury  Hypokalemia: complicated acute illness or injury  Right groin pain: complicated acute illness or injury    Amount and/or Complexity of Data Reviewed  Labs: ordered.  Radiology: ordered. Decision-making details documented in ED Course.    Risk  Prescription drug management.    Interpreted by radiologist as below:     CT Abdomen Pelvis With Contrast  Result Date: 7/12/2025  Impression: 1.No acute findings within the abdomen. 2.Mild thickening of the urinary bladder wall. I cannot exclude cystitis. 3.Mild increased stool burden in the ascending colon and proximal transverse colon. 4.Emphysematous changes in the lung bases with subsegmental atelectasis. 5.Degenerative changes of the lumbar spine. There are a few disc osteophytes suggested in the lumbar spine which may be causing at least mild canal narrowing. It is difficult to exclude an extruded disc without intrathecal contrast. Electronically Signed: Jagdish Joe MD  7/12/2025 11:51 AM EDT  Workstation ID: [id number]        /76   Pulse 88   Temp 98.5 °F (36.9 °C) (Oral)   Resp 17   Ht 182.9 cm (72\")   Wt 102 kg (223 lb 15.8 oz)   SpO2 96%   BMI 30.38 kg/m²      Lab Results (last 24 hours)       Procedure Component Value Units Date/Time    Urinalysis With Microscopic If Indicated (No Culture) - Urine, Clean Catch [[id number]]  (Abnormal) Collected: 07/12/25 1021    Specimen: Urine, Clean Catch Updated: 07/12/25 1039     Color, UA Dark Yellow     Appearance, UA Clear     pH, UA 5.5     Specific Gravity, UA 1.023     Glucose, " UA Negative     Ketones, UA Trace     Bilirubin, UA Negative     Blood, UA Negative     Protein, UA Negative     Leuk Esterase, UA Negative     Nitrite, UA Negative     Urobilinogen, UA 1.0 E.U./dL    Narrative:      Urine microscopic not indicated.    CBC & Differential [[id number]]  (Abnormal) Collected: 07/12/25 1031    Specimen: Blood from Arm, Right Updated: 07/12/25 1038    Narrative:      The following orders were created for panel order CBC & Differential.  Procedure                               Abnormality         Status                     ---------                               -----------         ------                     CBC Auto Differential[[id number]]        Abnormal            Final result                 Please view results for these tests on the individual orders.    Comprehensive Metabolic Panel [[id number]]  (Abnormal) Collected: 07/12/25 1031    Specimen: Blood from Arm, Right Updated: 07/12/25 1059     Glucose 112 mg/dL      BUN 9.4 mg/dL      Creatinine 0.93 mg/dL      Sodium 143 mmol/L      Potassium 3.1 mmol/L      Chloride 108 mmol/L      CO2 24.0 mmol/L      Calcium 9.1 mg/dL      Total Protein 7.0 g/dL      Albumin 4.2 g/dL      ALT (SGPT) 14 U/L      AST (SGOT) 16 U/L      Alkaline Phosphatase 75 U/L      Total Bilirubin 0.4 mg/dL      Globulin 2.8 gm/dL      A/G Ratio 1.5 g/dL      BUN/Creatinine Ratio 10.1     Anion Gap 11.0 mmol/L      eGFR 91.7 mL/min/1.73     Narrative:      GFR Categories in Chronic Kidney Disease (CKD)              GFR Category          GFR (mL/min/1.73)    Interpretation  G1                    90 or greater        Normal or high (1)  G2                    60-89                Mild decrease (1)  G3a                   45-59                Mild to moderate decrease  G3b                   30-44                Moderate to severe decrease  G4                    15-29                Severe decrease  G5                    14 or less           Kidney failure    (1)In the  absence of evidence of kidney disease, neither GFR category G1 or G2 fulfill the criteria for CKD.    eGFR calculation 2021 CKD-EPI creatinine equation, which does not include race as a factor    CBC Auto Differential [[id number]]  (Abnormal) Collected: 07/12/25 1031    Specimen: Blood from Arm, Right Updated: 07/12/25 1038     WBC 7.12 10*3/mm3      RBC 4.48 10*6/mm3      Hemoglobin 14.4 g/dL      Hematocrit 44.2 %      MCV 98.7 fL      MCH 32.1 pg      MCHC 32.6 g/dL      RDW 13.2 %      RDW-SD 47.5 fl      MPV 11.0 fL      Platelets 146 10*3/mm3      Neutrophil % 54.7 %      Lymphocyte % 36.2 %      Monocyte % 5.8 %      Eosinophil % 2.0 %      Basophil % 0.6 %      Immature Grans % 0.7 %      Neutrophils, Absolute 3.90 10*3/mm3      Lymphocytes, Absolute 2.58 10*3/mm3      Monocytes, Absolute 0.41 10*3/mm3      Eosinophils, Absolute 0.14 10*3/mm3      Basophils, Absolute 0.04 10*3/mm3      Immature Grans, Absolute 0.05 10*3/mm3      nRBC 0.0 /100 WBC              Medications   sodium chloride 0.9 % flush 10 mL (has no administration in time range)   sodium chloride 0.9 % bolus 1,000 mL (1,000 mL Intravenous New Bag 7/12/25 1039)   ondansetron (ZOFRAN) injection 4 mg (4 mg Intravenous Given 7/12/25 1039)   HYDROmorphone (DILAUDID) injection 0.5 mg (0.5 mg Intravenous Given 7/12/25 1039)   potassium chloride (KLOR-CON M20) CR tablet 40 mEq (40 mEq Oral Given 7/12/25 1136)   iopamidol (ISOVUE-370) 76 % injection 100 mL (100 mL Intravenous Given 7/12/25 1143)        Appropriate PPE worn during patient exam.  Appropriate monitoring initiated. Patient is alert and oriented x3.  No acute distress noted. Regular rate and rhythm with S1/S2. Lungs are clear to auscultation bilaterally.  There is point tenderness in the right SI joint.  There is no overlying erythema bony step-off.  Positive right straight leg raise.  Abdomen soft, nontender, nondistended.  Patient is noted to have some tenderness in the right groin, into  the right testicle.  Bowel sounds present in all four quadrants. Vital signs stable.  IV established and labs obtained.  Patient was given normal saline 1 L bolus, Dilaudid 0.5 mg IV and Zofran 4 mg IV.  Potassium was low, 1 dose of potassium chloride 40 mEq given.  Blood cell count 7120 no shift platelets 146 hemoglobin 14.4 hematocrit 44.2.  CMP was essentially unremarkable, aside from low potassium at 3.1.  Urine shows no sign of infection.  CT was significant for degenerative changes of the lumbar spine and bladder wall thickening.  There is mild stool burden.  Patient was encouraged to follow-up with urology about bladder wall thickening as he has no signs of UTI and is a smoker so he is at high risk for bladder cancer.  He reports that he has done physical therapy for his sciatica multiple times in the past and is not interested in doing this again.  He was given a handout of exercises to complete.  He was given prescriptions for prednisone.  He did need a refill of his gabapentin which he was given a short course until he can follow-up with his primary care physician.  He was also encouraged to increase potassium rich foods in his diet.    I reviewed chart 6/25/2025 patient was seen at this facility for testicular pain given a prescription for tramadol. My radiology interpretation of CT shows lumbar degenerative disc disease and bladder wall thickening.  No hernia noted, obstruction. Imaging was independently interpreted by Dr. Hatch. Differential Diagnoses, not all-inclusive and does not constitute entirety of all causes: Epididymitis, hernia, UTI, pyelonephritis.  Epididymitis hernia pyelonephritis ruled out by CT, and negative UA.  UTI ruled out by negative UA.    Disposition/Treatment: Discussed results with patient, verbalized understanding. Discussed reasons to return, patient verbalized understanding. Agreeable with plan of care. Patient was stable upon disposition.    Upon reassessment, patient is  flesh tone warm and dry no acute distress noted.  Vital signs are stable. Discussed return precautions, patient verbalized understanding.     Part of this note may be an electronic transcription/translation of spoken language to printed text using the Dragon Dictation System.   Final diagnoses:   Acute right-sided low back pain with right-sided sciatica   Right groin pain   Bladder wall thickening   History of epididymitis   Hypokalemia       ED Disposition  ED Disposition       ED Disposition   Discharge    Condition   Stable    Comment   --               David Causey MD  [address] [place] [address] [place] [id number]  [phone]    Schedule an appointment as soon as possible for a visit       [facility] EMERGENCY DEPARTMENT  [address] [place] [id number] [phone]  Go to   If symptoms worsen         Medication List        New Prescriptions      gabapentin 300 MG capsule  Commonly known as: NEURONTIN  Take 1 capsule by mouth 3 (Three) Times a Day for 7 days.     methylPREDNISolone 4 MG dose pack  Commonly known as: MEDROL  Take as directed on package instructions.               Where to Get Your Medications        These medications were sent to [facility] [address] [place] [address]      Hours: Monday to Friday 8 AM to 6 PM Phone: [phone]   gabapentin 300 MG capsule  methylPREDNISolone 4 MG dose pack            Livia Hernandez, KEILA  07/12/25 1224       Livia Hernandez, APRAUGUSTIN  07/12/25 9130

## 2025-07-12 NOTE — DISCHARGE INSTRUCTIONS
Continue your previously prescribed medication by urology.  As discussed, make sure that you mention to them that you have bladder wall thickening on your CT.     Take gabapentin and your Medrol Dosepak as prescribed.  Exercises per handout.  Make sure you are drinking at least 112 ounces water daily.  Rotate heat and ice every 2 hours while awake, on for 20 minutes.    Eat a diet high in potassium rich foods, see attached handout.    Schedule follow-up with primary care for recheck.  Follow-up with urology as previously scheduled.  Return to the ER for any new or worsening symptoms.

## 2025-07-28 ENCOUNTER — APPOINTMENT (OUTPATIENT)
Dept: CT IMAGING | Facility: HOSPITAL | Age: 65
End: 2025-07-28
Payer: MEDICARE

## 2025-07-28 ENCOUNTER — HOSPITAL ENCOUNTER (EMERGENCY)
Facility: HOSPITAL | Age: 65
Discharge: HOME | End: 2025-07-28
Attending: EMERGENCY MEDICINE | Admitting: EMERGENCY MEDICINE
Payer: MEDICARE

## 2025-07-28 VITALS
BODY MASS INDEX: 30.4 KG/M2 | HEIGHT: 72 IN | TEMPERATURE: 98.5 F | OXYGEN SATURATION: 92 % | SYSTOLIC BLOOD PRESSURE: 154 MMHG | WEIGHT: 224.43 LBS | RESPIRATION RATE: 16 BRPM | DIASTOLIC BLOOD PRESSURE: 97 MMHG | HEART RATE: 71 BPM

## 2025-07-28 DIAGNOSIS — M54.12 CERVICAL RADICULOPATHY: Primary | ICD-10-CM

## 2025-07-28 PROCEDURE — 99284 EMERGENCY DEPT VISIT MOD MDM: CPT

## 2025-07-28 PROCEDURE — 72125 CT NECK SPINE W/O DYE: CPT

## 2025-07-28 RX ORDER — METHOCARBAMOL 750 MG/1
750 TABLET, FILM COATED ORAL 3 TIMES DAILY PRN
Qty: 20 TABLET | Refills: 0 | Status: SHIPPED | OUTPATIENT
Start: 2025-07-28

## 2025-07-28 RX ORDER — MELOXICAM 15 MG/1
15 TABLET ORAL DAILY
Qty: 30 TABLET | Refills: 0 | Status: SHIPPED | OUTPATIENT
Start: 2025-07-28

## 2025-07-28 RX ORDER — METHYLPREDNISOLONE 4 MG/1
TABLET ORAL
Qty: 21 TABLET | Refills: 0 | Status: SHIPPED | OUTPATIENT
Start: 2025-07-28

## 2025-07-28 NOTE — ED PROVIDER NOTES
Subjective   History of Present Illness  Chief complaint: Neck pain    64-year-old male presents with neck pain.  Patient states symptoms started this morning.  Pain is on the right side of his neck and radiates into the right arm.  He states pain is worse with movement of his neck.  He denies any known injury.  He has had no fever.  He denies any numbness, weakness, tingling.    History provided by:  Patient      Review of Systems   Constitutional:  Negative for fever.   HENT:  Negative for congestion.    Respiratory:  Negative for cough and shortness of breath.    Cardiovascular:  Negative for chest pain.   Gastrointestinal:  Negative for abdominal pain and vomiting.   Musculoskeletal:  Positive for neck pain. Negative for back pain.   Neurological:  Negative for headaches.   Psychiatric/Behavioral:  Negative for confusion.        Past Medical History:   Diagnosis Date    Acute hypoxemic respiratory failure 11/06/2023    Allergic     Anxiety     Arthritis 06/2005    Back pain     Claustrophobia 1978    COPD (chronic obstructive pulmonary disease)     CTS (carpal tunnel syndrome) 10/2022    CVA (cerebral vascular accident) 12/11/2024    Dysphagia     Esophageal stricture     GERD (gastroesophageal reflux disease)     Hyperlipidemia     Hypertension     Injury of back 2004    Pulled lower back muscles after lifting a very heavy object    Knee pain     rt    Low back pain     Obesity     Obesity (BMI 30-39.9) 11/06/2023    Other cervical disc degeneration at C5-C6 level 08/22/2018    Paralysis 2019    Reaction to lysinapril paralyzed left side of face    Paroxysmal atrial fibrillation with rapid ventricular response 11/06/2023    Pneumonia     Prediabetes 11/06/2023    PTSD (post-traumatic stress disorder)     Rhinovirus infection 11/06/2023    Sleep apnea     Thoracic disc herniation     Vitamin B12 deficiency        Allergies   Allergen Reactions    Atorvastatin Swelling    Lisinopril Other (See Comments)      Facial paralysis        Past Surgical History:   Procedure Laterality Date    ABLATION OF DYSRHYTHMIC FOCUS  7/31/2024    BRONCHOSCOPY N/A 11/03/2023    Procedure: BRONCHOSCOPY with bilateral lung washing's;  Surgeon: Kolton Brewer MD;  Location: [facility] ENDOSCOPY;  Service: Pulmonary;  Laterality: N/A;    CARDIAC CATHETERIZATION N/A 07/13/2022    Procedure: Left Heart Cath, possible pci;  Surgeon: Prieto Sidhu MD;  Location: [facility] CATH INVASIVE LOCATION;  Service: Cardiovascular;  Laterality: N/A;    CARDIAC ELECTROPHYSIOLOGY PROCEDURE N/A 07/31/2024    Procedure: Ablation atrial flutter;  Surgeon: Pardeep Walker MD;  Location: [facility] CATH INVASIVE LOCATION;  Service: Cardiovascular;  Laterality: N/A;    ENDOSCOPY      ENDOSCOPY N/A 09/21/2023    Procedure: ESOPHAGOGASTRODUODENOSCOPY WITH non guided esophageal dialtion 54 Fr[address]ougie and  cold forcep biopsy x1 area;  Surgeon: Andres Concepcion MD;  Location: [facility] ENDOSCOPY;  Service: Gastroenterology;  Laterality: N/A;  Post- erosive gastritis, hiatal hernia, esophageal stricture    HERNIA REPAIR      INSERT / REPLACE / REMOVE PACEMAKER  11/31/2024    Temporary pacemaker implant in emergency room    KNEE ARTHROPLASTY      x2    SHOULDER ARTHROSCOPY W/ ROTATOR CUFF REPAIR Right 08/11/2020    Procedure: SHOULDER ARTHROSCOPY WITH ROTATOR CUFF REPAIR, extensive debridement;  Surgeon: Fredi Ritchie MD;  Location: [facility] MAIN OR;  Service: Orthopedics;  Laterality: Right;    TONSILLECTOMY         Family History   Problem Relation Age of Onset    Heart disease Mother     Early death Mother     Hyperlipidemia Mother     Hypertension Mother     Thyroid disease Mother     Heart attack Mother         Had valve replaced with a pig valve    Cancer Father     Stroke Father     Vision loss Father     Stroke Paternal Grandfather     Arthritis Paternal Grandmother     Alcohol abuse Brother     Asthma Brother     Depression Brother      "Hyperlipidemia Brother     Hypertension Brother     Learning disabilities Brother     Mental illness Brother     Drug abuse Brother     Early death Brother     Heart disease Brother     Hyperlipidemia Brother         Bijan is the same person    Hypertension Brother     Heart disease Brother     Hyperlipidemia Brother         Bijan is the same person    Hypertension Brother     Drug abuse Brother     Early death Brother     Asthma Brother     Hyperlipidemia Brother     Hypertension Brother     Alcohol abuse Brother     Depression Brother        Social History     Socioeconomic History    Marital status:    Tobacco Use    Smoking status: Every Day     Current packs/day: 1.00     Average packs/day: 1 pack/day for 52.5 years (52.5 ttl pk-yrs)     Types: Cigarettes     Start date: 1/9/1973     Passive exposure: Never    Smokeless tobacco: Never    Tobacco comments:     Smoked a half a pack a day for 42 years didn't start to smoke a whole pack until 2017     Counseled to stop smoking   Vaping Use    Vaping status: Never Used   Substance and Sexual Activity    Alcohol use: No    Drug use: No    Sexual activity: Not Currently     Partners: Female     Birth control/protection: None       /97   Pulse 71   Temp 98.5 °F (36.9 °C) (Oral)   Resp 16   Ht 182.9 cm (72\")   Wt 102 kg (224 lb 6.9 oz)   SpO2 92%   BMI 30.44 kg/m²       Objective   Physical Exam  Vitals and nursing note reviewed.   Constitutional:       Appearance: Normal appearance.   HENT:      Head: Normocephalic and atraumatic.      Mouth/Throat:      Mouth: Mucous membranes are moist.   Cardiovascular:      Rate and Rhythm: Normal rate and regular rhythm.      Heart sounds: Normal heart sounds.   Pulmonary:      Effort: Pulmonary effort is normal. No respiratory distress.      Breath sounds: Normal breath sounds.   Abdominal:      Palpations: Abdomen is soft.      Tenderness: There is no abdominal tenderness.   Musculoskeletal:      Comments: " There is no tenderness to palpation to the posterior neck or right neck musculature.  Patient does have some pain with range of motion and when he turns his head to the right.  There is no neck stiffness or meningismus.  Examination of the right arm is unremarkable.  There is no tenderness to palpation.  Neurovascular intact distally.   Skin:     General: Skin is warm and dry.   Neurological:      General: No focal deficit present.      Mental Status: He is alert and oriented to person, place, and time.         Procedures           ED Course        CT Cervical Spine Without Contrast  Result Date: 7/28/2025  Impression: No acute fracture or traumatic malalignment of the cervical spine. Multilevel degenerative changes of the cervical spine with up to severe foraminal narrowing on the right at C5-6. Emphysema. Emphysema on CT is an independent risk factor for lung cancer. Low dose lung cancer screening should be considered if patient qualifies based on smoking history or if not already enrolled in a screening program. Electronically Signed: Angel Goyal MD  7/28/2025 2:32 PM EDT  Workstation ID: [id number]                                                     Medical Decision Making  Amount and/or Complexity of Data Reviewed  Radiology: ordered.      CT of the cervical spine shows no acute injury but does show multilevel degenerative changes with severe foraminal narrowing on the right at C5-C6.  This could explain the patient's symptoms.  He will be treated with Medrol Dosepak, Robaxin, Mobic.  He will follow-up with his primary care.      Final diagnoses:   Cervical radiculopathy       ED Disposition  ED Disposition       ED Disposition   Discharge    Condition   Stable    Comment   --               David Causey MD  [address] [place] [address] [place] [address] [phone]    Call in 1 day           Medication List        New Prescriptions      meloxicam 15 MG tablet  Commonly known as: MOBIC  Take 1 tablet by mouth  Daily.     methocarbamol 750 MG tablet  Commonly known as: ROBAXIN  Take 1 tablet by mouth 3 (Three) Times a Day As Needed for Muscle Spasms.               Where to Get Your Medications        These medications were sent to [facility] [address] [place] [address]      Hours: Monday to Friday 8 AM to 6 PM Phone: [phone]   meloxicam 15 MG tablet  methocarbamol 750 MG tablet  methylPREDNISolone 4 MG dose pack            Robert Yip MD  07/28/25 [id number]

## 2025-08-12 ENCOUNTER — HOSPITAL ENCOUNTER (OUTPATIENT)
Facility: HOSPITAL | Age: 65
Setting detail: OBSERVATION
Discharge: HOME | End: 2025-08-13
Attending: EMERGENCY MEDICINE | Admitting: EMERGENCY MEDICINE
Payer: MEDICARE

## 2025-08-12 ENCOUNTER — APPOINTMENT (OUTPATIENT)
Dept: GENERAL RADIOLOGY | Facility: HOSPITAL | Age: 65
End: 2025-08-12
Payer: MEDICARE

## 2025-08-12 DIAGNOSIS — R00.2 PALPITATIONS: Primary | ICD-10-CM

## 2025-08-12 DIAGNOSIS — R07.9 CHEST PAIN, UNSPECIFIED TYPE: ICD-10-CM

## 2025-08-12 LAB
ALBUMIN SERPL-MCNC: 4.3 G/DL (ref 3.5–5.2)
ALBUMIN/GLOB SERPL: 1.4 G/DL
ALP SERPL-CCNC: 76 U/L (ref 39–117)
ALT SERPL W P-5'-P-CCNC: 20 U/L (ref 1–41)
ANION GAP SERPL CALCULATED.3IONS-SCNC: 10.3 MMOL/L (ref 5–15)
AST SERPL-CCNC: 20 U/L (ref 1–40)
B PARAPERT DNA SPEC QL NAA+PROBE: NOT DETECTED
B PERT DNA SPEC QL NAA+PROBE: NOT DETECTED
BASOPHILS # BLD AUTO: 0.05 10*3/MM3 (ref 0–0.2)
BASOPHILS NFR BLD AUTO: 0.8 % (ref 0–1.5)
BILIRUB SERPL-MCNC: 0.4 MG/DL (ref 0–1.2)
BUN SERPL-MCNC: 14.1 MG/DL (ref 8–23)
BUN/CREAT SERPL: 15.3 (ref 7–25)
C PNEUM DNA NPH QL NAA+NON-PROBE: NOT DETECTED
CALCIUM SPEC-SCNC: 9.1 MG/DL (ref 8.6–10.5)
CHLORIDE SERPL-SCNC: 106 MMOL/L (ref 98–107)
CO2 SERPL-SCNC: 25.7 MMOL/L (ref 22–29)
CREAT SERPL-MCNC: 0.92 MG/DL (ref 0.76–1.27)
DEPRECATED RDW RBC AUTO: 51.7 FL (ref 37–54)
EGFRCR SERPLBLD CKD-EPI 2021: 92.9 ML/MIN/1.73
EOSINOPHIL # BLD AUTO: 0.13 10*3/MM3 (ref 0–0.4)
EOSINOPHIL NFR BLD AUTO: 2.1 % (ref 0.3–6.2)
ERYTHROCYTE [DISTWIDTH] IN BLOOD BY AUTOMATED COUNT: 14 % (ref 12.3–15.4)
FLUAV SUBTYP SPEC NAA+PROBE: NOT DETECTED
FLUBV RNA NPH QL NAA+NON-PROBE: NOT DETECTED
GEN 5 1HR TROPONIN T REFLEX: 9 NG/L
GLOBULIN UR ELPH-MCNC: 3 GM/DL
GLUCOSE SERPL-MCNC: 103 MG/DL (ref 65–99)
HADV DNA SPEC NAA+PROBE: NOT DETECTED
HCOV 229E RNA SPEC QL NAA+PROBE: NOT DETECTED
HCOV HKU1 RNA SPEC QL NAA+PROBE: NOT DETECTED
HCOV NL63 RNA SPEC QL NAA+PROBE: NOT DETECTED
HCOV OC43 RNA SPEC QL NAA+PROBE: NOT DETECTED
HCT VFR BLD AUTO: 49.7 % (ref 37.5–51)
HGB BLD-MCNC: 16.2 G/DL (ref 13–17.7)
HMPV RNA NPH QL NAA+NON-PROBE: NOT DETECTED
HOLD SPECIMEN: NORMAL
HPIV1 RNA ISLT QL NAA+PROBE: NOT DETECTED
HPIV2 RNA SPEC QL NAA+PROBE: NOT DETECTED
HPIV3 RNA NPH QL NAA+PROBE: NOT DETECTED
HPIV4 P GENE NPH QL NAA+PROBE: NOT DETECTED
IMM GRANULOCYTES # BLD AUTO: 0.06 10*3/MM3 (ref 0–0.05)
IMM GRANULOCYTES NFR BLD AUTO: 1 % (ref 0–0.5)
LYMPHOCYTES # BLD AUTO: 2.21 10*3/MM3 (ref 0.7–3.1)
LYMPHOCYTES NFR BLD AUTO: 35.6 % (ref 19.6–45.3)
M PNEUMO IGG SER IA-ACNC: NOT DETECTED
MAGNESIUM SERPL-MCNC: 2.2 MG/DL (ref 1.6–2.4)
MCH RBC QN AUTO: 32.5 PG (ref 26.6–33)
MCHC RBC AUTO-ENTMCNC: 32.6 G/DL (ref 31.5–35.7)
MCV RBC AUTO: 99.8 FL (ref 79–97)
MONOCYTES # BLD AUTO: 0.36 10*3/MM3 (ref 0.1–0.9)
MONOCYTES NFR BLD AUTO: 5.8 % (ref 5–12)
NEUTROPHILS NFR BLD AUTO: 3.39 10*3/MM3 (ref 1.7–7)
NEUTROPHILS NFR BLD AUTO: 54.7 % (ref 42.7–76)
NRBC BLD AUTO-RTO: 0 /100 WBC (ref 0–0.2)
NT-PROBNP SERPL-MCNC: 65.6 PG/ML (ref 0–900)
PLATELET # BLD AUTO: 147 10*3/MM3 (ref 140–450)
PMV BLD AUTO: 11.3 FL (ref 6–12)
POTASSIUM SERPL-SCNC: 4.2 MMOL/L (ref 3.5–5.2)
PROT SERPL-MCNC: 7.3 G/DL (ref 6–8.5)
RBC # BLD AUTO: 4.98 10*6/MM3 (ref 4.14–5.8)
RHINOVIRUS RNA SPEC NAA+PROBE: DETECTED
RSV RNA NPH QL NAA+NON-PROBE: NOT DETECTED
SARS-COV-2 RNA RESP QL NAA+PROBE: NOT DETECTED
SODIUM SERPL-SCNC: 142 MMOL/L (ref 136–145)
T3FREE SERPL-MCNC: 2.91 PG/ML (ref 2–4.4)
T4 FREE SERPL-MCNC: 0.91 NG/DL (ref 0.92–1.68)
TROPONIN T NUMERIC DELTA: 2 NG/L
TROPONIN T SERPL HS-MCNC: 7 NG/L
TSH SERPL DL<=0.05 MIU/L-ACNC: 0.76 UIU/ML (ref 0.27–4.2)
WBC NRBC COR # BLD AUTO: 6.2 10*3/MM3 (ref 3.4–10.8)
WHOLE BLOOD HOLD COAG: NORMAL

## 2025-08-12 PROCEDURE — G0378 HOSPITAL OBSERVATION PER HR: HCPCS

## 2025-08-12 PROCEDURE — 36415 COLL VENOUS BLD VENIPUNCTURE: CPT

## 2025-08-12 PROCEDURE — 84443 ASSAY THYROID STIM HORMONE: CPT | Performed by: NURSE PRACTITIONER

## 2025-08-12 PROCEDURE — 84439 ASSAY OF FREE THYROXINE: CPT | Performed by: NURSE PRACTITIONER

## 2025-08-12 PROCEDURE — 0202U NFCT DS 22 TRGT SARS-COV-2: CPT | Performed by: NURSE PRACTITIONER

## 2025-08-12 PROCEDURE — 80053 COMPREHEN METABOLIC PANEL: CPT | Performed by: NURSE PRACTITIONER

## 2025-08-12 PROCEDURE — 84481 FREE ASSAY (FT-3): CPT | Performed by: NURSE PRACTITIONER

## 2025-08-12 PROCEDURE — 71045 X-RAY EXAM CHEST 1 VIEW: CPT

## 2025-08-12 PROCEDURE — 99285 EMERGENCY DEPT VISIT HI MDM: CPT

## 2025-08-12 PROCEDURE — 83880 ASSAY OF NATRIURETIC PEPTIDE: CPT | Performed by: NURSE PRACTITIONER

## 2025-08-12 PROCEDURE — 94799 UNLISTED PULMONARY SVC/PX: CPT

## 2025-08-12 PROCEDURE — 93005 ELECTROCARDIOGRAM TRACING: CPT

## 2025-08-12 PROCEDURE — 85025 COMPLETE CBC W/AUTO DIFF WBC: CPT | Performed by: NURSE PRACTITIONER

## 2025-08-12 PROCEDURE — 83735 ASSAY OF MAGNESIUM: CPT | Performed by: NURSE PRACTITIONER

## 2025-08-12 PROCEDURE — 84484 ASSAY OF TROPONIN QUANT: CPT | Performed by: NURSE PRACTITIONER

## 2025-08-12 PROCEDURE — 93005 ELECTROCARDIOGRAM TRACING: CPT | Performed by: EMERGENCY MEDICINE

## 2025-08-12 RX ORDER — POLYETHYLENE GLYCOL 3350 17 G/17G
17 POWDER, FOR SOLUTION ORAL DAILY PRN
Status: DISCONTINUED | OUTPATIENT
Start: 2025-08-12 | End: 2025-08-13 | Stop reason: HOSPADM

## 2025-08-12 RX ORDER — GUAIFENESIN 600 MG/1
600 TABLET, EXTENDED RELEASE ORAL EVERY 12 HOURS SCHEDULED
Status: DISCONTINUED | OUTPATIENT
Start: 2025-08-12 | End: 2025-08-12

## 2025-08-12 RX ORDER — AMLODIPINE BESYLATE 5 MG/1
5 TABLET ORAL DAILY
Status: DISCONTINUED | OUTPATIENT
Start: 2025-08-13 | End: 2025-08-13 | Stop reason: HOSPADM

## 2025-08-12 RX ORDER — ACETAMINOPHEN 325 MG/1
650 TABLET ORAL EVERY 4 HOURS PRN
Status: DISCONTINUED | OUTPATIENT
Start: 2025-08-12 | End: 2025-08-13 | Stop reason: HOSPADM

## 2025-08-12 RX ORDER — ACETAMINOPHEN 650 MG/1
650 SUPPOSITORY RECTAL EVERY 4 HOURS PRN
Status: DISCONTINUED | OUTPATIENT
Start: 2025-08-12 | End: 2025-08-13 | Stop reason: HOSPADM

## 2025-08-12 RX ORDER — PANTOPRAZOLE SODIUM 40 MG/1
40 TABLET, DELAYED RELEASE ORAL DAILY
Status: DISCONTINUED | OUTPATIENT
Start: 2025-08-13 | End: 2025-08-13 | Stop reason: HOSPADM

## 2025-08-12 RX ORDER — TRAMADOL HYDROCHLORIDE 50 MG/1
50 TABLET ORAL EVERY 6 HOURS PRN
Status: DISCONTINUED | OUTPATIENT
Start: 2025-08-12 | End: 2025-08-12

## 2025-08-12 RX ORDER — SODIUM CHLORIDE 0.9 % (FLUSH) 0.9 %
10 SYRINGE (ML) INJECTION AS NEEDED
Status: DISCONTINUED | OUTPATIENT
Start: 2025-08-12 | End: 2025-08-13 | Stop reason: HOSPADM

## 2025-08-12 RX ORDER — ACETAMINOPHEN 160 MG/5ML
650 SOLUTION ORAL EVERY 4 HOURS PRN
Status: DISCONTINUED | OUTPATIENT
Start: 2025-08-12 | End: 2025-08-13 | Stop reason: HOSPADM

## 2025-08-12 RX ORDER — MELOXICAM 15 MG/1
15 TABLET ORAL DAILY
Status: DISCONTINUED | OUTPATIENT
Start: 2025-08-13 | End: 2025-08-13 | Stop reason: HOSPADM

## 2025-08-12 RX ORDER — ACETYLCYSTEINE 200 MG/ML
3 SOLUTION ORAL; RESPIRATORY (INHALATION)
Status: DISCONTINUED | OUTPATIENT
Start: 2025-08-12 | End: 2025-08-13 | Stop reason: HOSPADM

## 2025-08-12 RX ORDER — AMOXICILLIN 250 MG
2 CAPSULE ORAL 2 TIMES DAILY PRN
Status: DISCONTINUED | OUTPATIENT
Start: 2025-08-12 | End: 2025-08-13 | Stop reason: HOSPADM

## 2025-08-12 RX ORDER — NITROGLYCERIN 0.4 MG/1
0.4 TABLET SUBLINGUAL
Status: DISCONTINUED | OUTPATIENT
Start: 2025-08-12 | End: 2025-08-13 | Stop reason: HOSPADM

## 2025-08-12 RX ORDER — ARFORMOTEROL TARTRATE 15 UG/2ML
15 SOLUTION RESPIRATORY (INHALATION)
Status: DISCONTINUED | OUTPATIENT
Start: 2025-08-12 | End: 2025-08-13 | Stop reason: HOSPADM

## 2025-08-12 RX ORDER — SODIUM CHLORIDE 0.9 % (FLUSH) 0.9 %
10 SYRINGE (ML) INJECTION EVERY 12 HOURS SCHEDULED
Status: DISCONTINUED | OUTPATIENT
Start: 2025-08-12 | End: 2025-08-13 | Stop reason: HOSPADM

## 2025-08-12 RX ORDER — ALBUTEROL SULFATE 0.83 MG/ML
2.5 SOLUTION RESPIRATORY (INHALATION) EVERY 6 HOURS PRN
Status: DISCONTINUED | OUTPATIENT
Start: 2025-08-12 | End: 2025-08-13 | Stop reason: HOSPADM

## 2025-08-12 RX ORDER — BISACODYL 5 MG/1
5 TABLET, DELAYED RELEASE ORAL DAILY PRN
Status: DISCONTINUED | OUTPATIENT
Start: 2025-08-12 | End: 2025-08-13 | Stop reason: HOSPADM

## 2025-08-12 RX ORDER — BISACODYL 10 MG
10 SUPPOSITORY, RECTAL RECTAL DAILY PRN
Status: DISCONTINUED | OUTPATIENT
Start: 2025-08-12 | End: 2025-08-13 | Stop reason: HOSPADM

## 2025-08-12 RX ORDER — GABAPENTIN 300 MG/1
600 CAPSULE ORAL EVERY 8 HOURS SCHEDULED
Status: DISCONTINUED | OUTPATIENT
Start: 2025-08-12 | End: 2025-08-13 | Stop reason: HOSPADM

## 2025-08-12 RX ORDER — ONDANSETRON 4 MG/1
4 TABLET, ORALLY DISINTEGRATING ORAL EVERY 6 HOURS PRN
Status: DISCONTINUED | OUTPATIENT
Start: 2025-08-12 | End: 2025-08-13 | Stop reason: HOSPADM

## 2025-08-12 RX ORDER — SODIUM CHLORIDE 9 MG/ML
40 INJECTION, SOLUTION INTRAVENOUS AS NEEDED
Status: DISCONTINUED | OUTPATIENT
Start: 2025-08-12 | End: 2025-08-13 | Stop reason: HOSPADM

## 2025-08-12 RX ORDER — ONDANSETRON 2 MG/ML
4 INJECTION INTRAMUSCULAR; INTRAVENOUS EVERY 6 HOURS PRN
Status: DISCONTINUED | OUTPATIENT
Start: 2025-08-12 | End: 2025-08-13 | Stop reason: HOSPADM

## 2025-08-12 RX ADMIN — GABAPENTIN 600 MG: 300 CAPSULE ORAL at 21:04

## 2025-08-12 RX ADMIN — ACETYLCYSTEINE 3 ML: 200 SOLUTION ORAL; RESPIRATORY (INHALATION) at 18:27

## 2025-08-12 RX ADMIN — GABAPENTIN 600 MG: 300 CAPSULE ORAL at 13:56

## 2025-08-12 RX ADMIN — Medication 10 ML: at 13:58

## 2025-08-12 RX ADMIN — APIXABAN 5 MG: 5 TABLET, FILM COATED ORAL at 21:04

## 2025-08-12 RX ADMIN — GUAIFENESIN 600 MG: 600 TABLET, EXTENDED RELEASE ORAL at 13:56

## 2025-08-12 RX ADMIN — Medication 10 ML: at 21:04

## 2025-08-12 RX ADMIN — ARFORMOTEROL TARTRATE 15 MCG: 15 SOLUTION RESPIRATORY (INHALATION) at 18:28

## 2025-08-13 ENCOUNTER — READMISSION MANAGEMENT (OUTPATIENT)
Dept: CALL CENTER | Facility: HOSPITAL | Age: 65
End: 2025-08-13
Payer: MEDICARE

## 2025-08-13 ENCOUNTER — APPOINTMENT (OUTPATIENT)
Dept: CARDIOLOGY | Facility: HOSPITAL | Age: 65
End: 2025-08-13
Payer: MEDICARE

## 2025-08-13 ENCOUNTER — APPOINTMENT (OUTPATIENT)
Dept: RESPIRATORY THERAPY | Facility: HOSPITAL | Age: 65
End: 2025-08-13
Payer: MEDICARE

## 2025-08-13 VITALS
RESPIRATION RATE: 18 BRPM | WEIGHT: 226 LBS | TEMPERATURE: 98.1 F | DIASTOLIC BLOOD PRESSURE: 83 MMHG | BODY MASS INDEX: 30.61 KG/M2 | HEIGHT: 72 IN | HEART RATE: 80 BPM | OXYGEN SATURATION: 94 % | SYSTOLIC BLOOD PRESSURE: 129 MMHG

## 2025-08-13 LAB
ANION GAP SERPL CALCULATED.3IONS-SCNC: 12 MMOL/L (ref 5–15)
AORTIC DIMENSIONLESS INDEX: 0.69 (DI)
AV MEAN PRESS GRAD SYS DOP V1V2: 5 MMHG
AV VMAX SYS DOP: 151 CM/SEC
BASOPHILS # BLD AUTO: 0.06 10*3/MM3 (ref 0–0.2)
BASOPHILS NFR BLD AUTO: 0.9 % (ref 0–1.5)
BH CV ECHO LEFT VENTRICLE GLOBAL LONGITUDINAL STRAIN: -14.7 %
BH CV ECHO MEAS - ACS: 1.9 CM
BH CV ECHO MEAS - AO MAX PG: 9.1 MMHG
BH CV ECHO MEAS - AO V2 VTI: 30.7 CM
BH CV ECHO MEAS - AVA(I,D): 2.17 CM2
BH CV ECHO MEAS - EDV(CUBED): 103.8 ML
BH CV ECHO MEAS - EDV(MOD-SP4): 96.4 ML
BH CV ECHO MEAS - EF(MOD-SP4): 69.1 %
BH CV ECHO MEAS - ESV(CUBED): 35.9 ML
BH CV ECHO MEAS - ESV(MOD-SP4): 29.8 ML
BH CV ECHO MEAS - FS: 29.8 %
BH CV ECHO MEAS - IVS/LVPW: 1.18 CM
BH CV ECHO MEAS - IVSD: 1.3 CM
BH CV ECHO MEAS - LA DIMENSION: 4.2 CM
BH CV ECHO MEAS - LAT PEAK E' VEL: 13.8 CM/SEC
BH CV ECHO MEAS - LV MASS(C)D: 212 GRAMS
BH CV ECHO MEAS - LV MAX PG: 4.1 MMHG
BH CV ECHO MEAS - LV MEAN PG: 3 MMHG
BH CV ECHO MEAS - LV V1 MAX: 101 CM/SEC
BH CV ECHO MEAS - LV V1 VTI: 21.2 CM
BH CV ECHO MEAS - LVIDD: 4.7 CM
BH CV ECHO MEAS - LVIDS: 3.3 CM
BH CV ECHO MEAS - LVOT AREA: 3.1 CM2
BH CV ECHO MEAS - LVOT DIAM: 2 CM
BH CV ECHO MEAS - LVPWD: 1.1 CM
BH CV ECHO MEAS - MED PEAK E' VEL: 9.7 CM/SEC
BH CV ECHO MEAS - MV A MAX VEL: 56.6 CM/SEC
BH CV ECHO MEAS - MV DEC SLOPE: 458 CM/SEC2
BH CV ECHO MEAS - MV DEC TIME: 0.24 SEC
BH CV ECHO MEAS - MV E MAX VEL: 87 CM/SEC
BH CV ECHO MEAS - MV E/A: 1.54
BH CV ECHO MEAS - MV MAX PG: 3.5 MMHG
BH CV ECHO MEAS - MV MEAN PG: 1 MMHG
BH CV ECHO MEAS - MV P1/2T: 67.8 MSEC
BH CV ECHO MEAS - MV V2 VTI: 27.1 CM
BH CV ECHO MEAS - MVA(P1/2T): 3.2 CM2
BH CV ECHO MEAS - MVA(VTI): 2.46 CM2
BH CV ECHO MEAS - PA ACC TIME: 0.12 SEC
BH CV ECHO MEAS - PA V2 MAX: 105 CM/SEC
BH CV ECHO MEAS - PULM A REVS DUR: 0.11 SEC
BH CV ECHO MEAS - PULM A REVS VEL: 27.2 CM/SEC
BH CV ECHO MEAS - PULM DIAS VEL: 31.6 CM/SEC
BH CV ECHO MEAS - PULM S/D: 1.21
BH CV ECHO MEAS - PULM SYS VEL: 38.2 CM/SEC
BH CV ECHO MEAS - QP/QS: 0.64
BH CV ECHO MEAS - RV MAX PG: 2.9 MMHG
BH CV ECHO MEAS - RV V1 MAX: 84.6 CM/SEC
BH CV ECHO MEAS - RV V1 VTI: 18.8 CM
BH CV ECHO MEAS - RVDD: 2.6 CM
BH CV ECHO MEAS - RVOT DIAM: 1.7 CM
BH CV ECHO MEAS - SV(LVOT): 66.6 ML
BH CV ECHO MEAS - SV(MOD-SP4): 66.6 ML
BH CV ECHO MEAS - SV(RVOT): 42.7 ML
BH CV ECHO MEAS - TAPSE (>1.6): 2.6 CM
BH CV ECHO MEASUREMENTS AVERAGE E/E' RATIO: 7.4
BH CV XLRA - TDI S': 12.9 CM/SEC
BUN SERPL-MCNC: 15.6 MG/DL (ref 8–23)
BUN/CREAT SERPL: 16.6 (ref 7–25)
CALCIUM SPEC-SCNC: 8.6 MG/DL (ref 8.6–10.5)
CHLORIDE SERPL-SCNC: 108 MMOL/L (ref 98–107)
CO2 SERPL-SCNC: 23 MMOL/L (ref 22–29)
CREAT SERPL-MCNC: 0.94 MG/DL (ref 0.76–1.27)
DEPRECATED RDW RBC AUTO: 51 FL (ref 37–54)
EGFRCR SERPLBLD CKD-EPI 2021: 90.5 ML/MIN/1.73
EOSINOPHIL # BLD AUTO: 0.14 10*3/MM3 (ref 0–0.4)
EOSINOPHIL NFR BLD AUTO: 2.2 % (ref 0.3–6.2)
ERYTHROCYTE [DISTWIDTH] IN BLOOD BY AUTOMATED COUNT: 13.6 % (ref 12.3–15.4)
GLUCOSE SERPL-MCNC: 96 MG/DL (ref 65–99)
HCT VFR BLD AUTO: 47.6 % (ref 37.5–51)
HGB BLD-MCNC: 15.4 G/DL (ref 13–17.7)
IMM GRANULOCYTES # BLD AUTO: 0.06 10*3/MM3 (ref 0–0.05)
IMM GRANULOCYTES NFR BLD AUTO: 0.9 % (ref 0–0.5)
LYMPHOCYTES # BLD AUTO: 2.81 10*3/MM3 (ref 0.7–3.1)
LYMPHOCYTES NFR BLD AUTO: 44 % (ref 19.6–45.3)
MCH RBC QN AUTO: 32.1 PG (ref 26.6–33)
MCHC RBC AUTO-ENTMCNC: 32.4 G/DL (ref 31.5–35.7)
MCV RBC AUTO: 99.2 FL (ref 79–97)
MONOCYTES # BLD AUTO: 0.41 10*3/MM3 (ref 0.1–0.9)
MONOCYTES NFR BLD AUTO: 6.4 % (ref 5–12)
NEUTROPHILS NFR BLD AUTO: 2.9 10*3/MM3 (ref 1.7–7)
NEUTROPHILS NFR BLD AUTO: 45.6 % (ref 42.7–76)
NRBC BLD AUTO-RTO: 0 /100 WBC (ref 0–0.2)
PLATELET # BLD AUTO: 126 10*3/MM3 (ref 140–450)
PMV BLD AUTO: 11.2 FL (ref 6–12)
POTASSIUM SERPL-SCNC: 3.9 MMOL/L (ref 3.5–5.2)
QT INTERVAL: 356 MS
QTC INTERVAL: 429 MS
RBC # BLD AUTO: 4.8 10*6/MM3 (ref 4.14–5.8)
SINUS: 3.4 CM
SODIUM SERPL-SCNC: 143 MMOL/L (ref 136–145)
WBC NRBC COR # BLD AUTO: 6.38 10*3/MM3 (ref 3.4–10.8)

## 2025-08-13 PROCEDURE — 94799 UNLISTED PULMONARY SVC/PX: CPT

## 2025-08-13 PROCEDURE — G0378 HOSPITAL OBSERVATION PER HR: HCPCS

## 2025-08-13 PROCEDURE — 93306 TTE W/DOPPLER COMPLETE: CPT

## 2025-08-13 PROCEDURE — 93356 MYOCRD STRAIN IMG SPCKL TRCK: CPT | Performed by: INTERNAL MEDICINE

## 2025-08-13 PROCEDURE — 93306 TTE W/DOPPLER COMPLETE: CPT | Performed by: INTERNAL MEDICINE

## 2025-08-13 PROCEDURE — 94761 N-INVAS EAR/PLS OXIMETRY MLT: CPT

## 2025-08-13 PROCEDURE — 93356 MYOCRD STRAIN IMG SPCKL TRCK: CPT

## 2025-08-13 PROCEDURE — 99214 OFFICE O/P EST MOD 30 MIN: CPT | Performed by: NURSE PRACTITIONER

## 2025-08-13 PROCEDURE — 94664 DEMO&/EVAL PT USE INHALER: CPT

## 2025-08-13 PROCEDURE — 85025 COMPLETE CBC W/AUTO DIFF WBC: CPT | Performed by: NURSE PRACTITIONER

## 2025-08-13 PROCEDURE — 80048 BASIC METABOLIC PNL TOTAL CA: CPT | Performed by: NURSE PRACTITIONER

## 2025-08-13 RX ORDER — METHOCARBAMOL 500 MG/1
750 TABLET, FILM COATED ORAL 3 TIMES DAILY PRN
Status: DISCONTINUED | OUTPATIENT
Start: 2025-08-13 | End: 2025-08-13 | Stop reason: HOSPADM

## 2025-08-13 RX ADMIN — GABAPENTIN 600 MG: 300 CAPSULE ORAL at 13:25

## 2025-08-13 RX ADMIN — GABAPENTIN 600 MG: 300 CAPSULE ORAL at 05:29

## 2025-08-13 RX ADMIN — ARFORMOTEROL TARTRATE 15 MCG: 15 SOLUTION RESPIRATORY (INHALATION) at 08:10

## 2025-08-13 RX ADMIN — PANTOPRAZOLE SODIUM 40 MG: 40 TABLET, DELAYED RELEASE ORAL at 09:59

## 2025-08-13 RX ADMIN — METHOCARBAMOL TABLETS 750 MG: 500 TABLET, COATED ORAL at 12:13

## 2025-08-13 RX ADMIN — ACETAMINOPHEN 650 MG: 325 TABLET ORAL at 11:32

## 2025-08-13 RX ADMIN — APIXABAN 5 MG: 5 TABLET, FILM COATED ORAL at 10:00

## 2025-08-13 RX ADMIN — Medication 10 ML: at 10:00

## 2025-08-13 RX ADMIN — AMLODIPINE BESYLATE 5 MG: 5 TABLET ORAL at 09:59

## 2025-08-13 RX ADMIN — MELOXICAM 15 MG: 15 TABLET ORAL at 10:00

## 2025-08-13 RX ADMIN — ACETYLCYSTEINE 3 ML: 200 SOLUTION ORAL; RESPIRATORY (INHALATION) at 08:15

## 2025-08-22 ENCOUNTER — OFFICE VISIT (OUTPATIENT)
Dept: CARDIOLOGY | Facility: CLINIC | Age: 65
End: 2025-08-22
Payer: MEDICARE

## 2025-08-22 VITALS
BODY MASS INDEX: 45.75 KG/M2 | HEART RATE: 74 BPM | OXYGEN SATURATION: 99 % | WEIGHT: 233 LBS | HEIGHT: 60 IN | DIASTOLIC BLOOD PRESSURE: 92 MMHG | SYSTOLIC BLOOD PRESSURE: 156 MMHG

## 2025-08-22 DIAGNOSIS — I48.0 PAF (PAROXYSMAL ATRIAL FIBRILLATION): ICD-10-CM

## 2025-08-22 DIAGNOSIS — I10 BENIGN ESSENTIAL HYPERTENSION: Chronic | ICD-10-CM

## 2025-08-22 DIAGNOSIS — R00.2 PALPITATIONS: Primary | ICD-10-CM

## 2025-08-22 RX ORDER — AMLODIPINE BESYLATE 5 MG/1
5 TABLET ORAL DAILY
Qty: 30 TABLET | Refills: 11 | Status: SHIPPED | OUTPATIENT
Start: 2025-08-22

## (undated) DEVICE — CATH DIAG IMPULSE FL4 6F 100CM

## (undated) DEVICE — Device: Brand: RFP-100A CONNECTOR CABLE

## (undated) DEVICE — ADHS LIQ MASTISOL 2/3ML

## (undated) DEVICE — SINGLE-USE BIOPSY FORCEPS: Brand: RADIAL JAW 4

## (undated) DEVICE — Device

## (undated) DEVICE — PROVE COVER: Brand: UNBRANDED

## (undated) DEVICE — OCTA,PERSEID,2-2-2-2-2,F-CURVE: Brand: OCTARAY MAPPING CATHETER

## (undated) DEVICE — INTRO PERFORMER CHECKFLO/LG RAD/BND NO/GW 18F .038IN 30CM

## (undated) DEVICE — TBG ARTHSCP PUMP W CONN/REDUC 8FT

## (undated) DEVICE — CATH DIAG IMPULSE FR4 6F 100CM

## (undated) DEVICE — PREF.GUIDING SHEATH W/MULT.CRV: Brand: PREFACE

## (undated) DEVICE — PK ENDO GI 50

## (undated) DEVICE — TBG ARTHSCP DUALWAVE

## (undated) DEVICE — Device: Brand: TORAYGUIDE GUIDEWIRE

## (undated) DEVICE — 3M™ STERI-STRIP™ REINFORCED ADHESIVE SKIN CLOSURES, R1547, 1/2 IN X 4 IN (12 MM X 100 MM), 6 STRIPS/ENVELOPE: Brand: 3M™ STERI-STRIP™

## (undated) DEVICE — PK TRY HEART CATH 50

## (undated) DEVICE — Device: Brand: CARTO 3

## (undated) DEVICE — PULSED FIELD ABLATION CATHETER: Brand: FARAWAVE™

## (undated) DEVICE — PAD,ABDOMINAL,5"X9",STERILE,LF,1/PK: Brand: MEDLINE INDUSTRIES, INC.

## (undated) DEVICE — SPNG GZ AVANT 6PLY 4X4IN STRL PK/2

## (undated) DEVICE — ELECTRD DEFIB M/FUNC PROPADZ RADIOL 2PK

## (undated) DEVICE — BLANKT WARM UNDER/BDY FUL/ACC A/ 90X206CM

## (undated) DEVICE — SOL IRRIG SOD CHL 0.9PCT 3000ML

## (undated) DEVICE — SUT 1/0 27 PDS II VIO MONO CT Z353H

## (undated) DEVICE — SUTURELASSO CRV 25D RT

## (undated) DEVICE — BAPTIST FLOYD BRONCHOSCOPY: Brand: MEDLINE INDUSTRIES, INC.

## (undated) DEVICE — CANN PASSPORT BUTN 8MM 4CM

## (undated) DEVICE — TBG ARTHSCP PT W CONN/REDUC 8FT

## (undated) DEVICE — ABL ASP APOLLO RF XL 90D

## (undated) DEVICE — PK SHLDR ARTHROSCOPY 50

## (undated) DEVICE — BLD SHAVER EXCALIBUR CRV 4MM

## (undated) DEVICE — SUTURELASSO CRV 25D LT

## (undated) DEVICE — INTERFACE CABLE: Brand: CARTO 3 SYSTEM ECO INTERFACE CABLE

## (undated) DEVICE — GLV SURG SIGNATURE ESSENTIAL PF LTX SZ8

## (undated) DEVICE — SOUNDSTAR ECO 8F G CATHETER: Brand: SOUNDSTAR

## (undated) DEVICE — STEERABLE SHEATH CLEAR: Brand: FARADRIVE™

## (undated) DEVICE — CATHETER CONNECTION CABLE: Brand: FARASTAR™

## (undated) DEVICE — SHT AIR TRANSFR COMFRT GLIDE LAT 40X80IN

## (undated) DEVICE — Device: Brand: EZ STEER NAV DS

## (undated) DEVICE — PINNACLE INTRODUCER SHEATH: Brand: PINNACLE

## (undated) DEVICE — MYNXGRIP 6F/7F: Brand: MYNXGRIP

## (undated) DEVICE — PAD E/S GRND SGL/FOIL 9FT/CORD DISP

## (undated) DEVICE — GW DIAG EMERALD HEPCOAT MOVE JTIP STD .035 3MM 150CM

## (undated) DEVICE — ST ACC MICROPUNCTURE STFF/CANN PLAT/TP 4F 21G 40CM

## (undated) DEVICE — BITEBLOCK ENDO W/STRAP 60F A/ LF DISP

## (undated) DEVICE — GLV SURG SIGNATURE ESSENTIAL PF LTX SZ8.5

## (undated) DEVICE — PK PROC TURNOVER

## (undated) DEVICE — GLV SURG SENSICARE PI ORTHO SZ8 LF STRL

## (undated) DEVICE — Device: Brand: NRG TRANSSEPTAL NEEDLE

## (undated) DEVICE — Device: Brand: REFERENCE PATCH CARTO 3

## (undated) DEVICE — CANN TRPL DAM 7X7MM

## (undated) DEVICE — CUFF SCD HEMOFORCE SEQ CALF STD MD

## (undated) DEVICE — GLV SURG DERMASSURE GRN LF PF 8.5

## (undated) DEVICE — TUBING, SUCTION, 1/4" X 12', STRAIGHT: Brand: MEDLINE

## (undated) DEVICE — Device: Brand: WEBSTER CS